# Patient Record
Sex: FEMALE | Race: WHITE | NOT HISPANIC OR LATINO | Employment: OTHER | URBAN - METROPOLITAN AREA
[De-identification: names, ages, dates, MRNs, and addresses within clinical notes are randomized per-mention and may not be internally consistent; named-entity substitution may affect disease eponyms.]

---

## 2017-05-19 ENCOUNTER — TRANSCRIBE ORDERS (OUTPATIENT)
Dept: LAB | Facility: CLINIC | Age: 82
End: 2017-05-19

## 2017-05-19 ENCOUNTER — APPOINTMENT (OUTPATIENT)
Dept: LAB | Facility: CLINIC | Age: 82
End: 2017-05-19
Payer: COMMERCIAL

## 2017-05-19 DIAGNOSIS — R60.0 LOCALIZED EDEMA: ICD-10-CM

## 2017-05-19 DIAGNOSIS — R53.83 FATIGUE, UNSPECIFIED TYPE: ICD-10-CM

## 2017-05-19 DIAGNOSIS — R53.83 FATIGUE, UNSPECIFIED TYPE: Primary | ICD-10-CM

## 2017-05-19 LAB
ALBUMIN SERPL BCP-MCNC: 3.5 G/DL (ref 3.5–5)
ALP SERPL-CCNC: 67 U/L (ref 46–116)
ALT SERPL W P-5'-P-CCNC: 16 U/L (ref 12–78)
ANION GAP SERPL CALCULATED.3IONS-SCNC: 7 MMOL/L (ref 4–13)
AST SERPL W P-5'-P-CCNC: 15 U/L (ref 5–45)
BILIRUB SERPL-MCNC: 0.8 MG/DL (ref 0.2–1)
BUN SERPL-MCNC: 23 MG/DL (ref 5–25)
CALCIUM SERPL-MCNC: 9.1 MG/DL (ref 8.3–10.1)
CHLORIDE SERPL-SCNC: 108 MMOL/L (ref 100–108)
CO2 SERPL-SCNC: 27 MMOL/L (ref 21–32)
CREAT SERPL-MCNC: 0.91 MG/DL (ref 0.6–1.3)
GFR SERPL CREATININE-BSD FRML MDRD: 57.8 ML/MIN/1.73SQ M
GLUCOSE P FAST SERPL-MCNC: 76 MG/DL (ref 65–99)
POTASSIUM SERPL-SCNC: 4.2 MMOL/L (ref 3.5–5.3)
PROT SERPL-MCNC: 6.7 G/DL (ref 6.4–8.2)
SODIUM SERPL-SCNC: 142 MMOL/L (ref 136–145)

## 2017-05-19 PROCEDURE — 36415 COLL VENOUS BLD VENIPUNCTURE: CPT

## 2017-05-19 PROCEDURE — 80053 COMPREHEN METABOLIC PANEL: CPT

## 2017-07-06 ENCOUNTER — APPOINTMENT (OUTPATIENT)
Dept: PHYSICAL THERAPY | Facility: REHABILITATION | Age: 82
End: 2017-07-06
Payer: COMMERCIAL

## 2017-07-06 PROCEDURE — 97162 PT EVAL MOD COMPLEX 30 MIN: CPT

## 2017-07-06 PROCEDURE — G8978 MOBILITY CURRENT STATUS: HCPCS | Performed by: PHYSICAL THERAPIST

## 2017-07-06 PROCEDURE — G8979 MOBILITY GOAL STATUS: HCPCS | Performed by: PHYSICAL THERAPIST

## 2017-07-18 ENCOUNTER — APPOINTMENT (OUTPATIENT)
Dept: PHYSICAL THERAPY | Facility: REHABILITATION | Age: 82
End: 2017-07-18
Payer: COMMERCIAL

## 2017-07-18 PROCEDURE — 97112 NEUROMUSCULAR REEDUCATION: CPT

## 2017-07-18 PROCEDURE — 97110 THERAPEUTIC EXERCISES: CPT

## 2017-07-18 PROCEDURE — 97140 MANUAL THERAPY 1/> REGIONS: CPT

## 2017-07-20 ENCOUNTER — APPOINTMENT (OUTPATIENT)
Dept: PHYSICAL THERAPY | Facility: REHABILITATION | Age: 82
End: 2017-07-20
Payer: COMMERCIAL

## 2017-07-24 ENCOUNTER — APPOINTMENT (OUTPATIENT)
Dept: PHYSICAL THERAPY | Facility: REHABILITATION | Age: 82
End: 2017-07-24
Payer: COMMERCIAL

## 2017-07-26 ENCOUNTER — APPOINTMENT (OUTPATIENT)
Dept: PHYSICAL THERAPY | Facility: REHABILITATION | Age: 82
End: 2017-07-26
Payer: COMMERCIAL

## 2017-07-26 PROCEDURE — 97110 THERAPEUTIC EXERCISES: CPT

## 2017-07-26 PROCEDURE — 97112 NEUROMUSCULAR REEDUCATION: CPT

## 2017-07-26 PROCEDURE — 97140 MANUAL THERAPY 1/> REGIONS: CPT

## 2017-07-31 ENCOUNTER — APPOINTMENT (OUTPATIENT)
Dept: PHYSICAL THERAPY | Facility: REHABILITATION | Age: 82
End: 2017-07-31
Payer: COMMERCIAL

## 2017-08-02 ENCOUNTER — APPOINTMENT (OUTPATIENT)
Dept: PHYSICAL THERAPY | Facility: REHABILITATION | Age: 82
End: 2017-08-02
Payer: COMMERCIAL

## 2017-08-02 PROCEDURE — 97140 MANUAL THERAPY 1/> REGIONS: CPT

## 2017-08-02 PROCEDURE — 97110 THERAPEUTIC EXERCISES: CPT

## 2017-08-02 PROCEDURE — 97112 NEUROMUSCULAR REEDUCATION: CPT

## 2017-08-07 ENCOUNTER — APPOINTMENT (OUTPATIENT)
Dept: PHYSICAL THERAPY | Facility: REHABILITATION | Age: 82
End: 2017-08-07
Payer: COMMERCIAL

## 2017-08-09 ENCOUNTER — APPOINTMENT (OUTPATIENT)
Dept: PHYSICAL THERAPY | Facility: REHABILITATION | Age: 82
End: 2017-08-09
Payer: COMMERCIAL

## 2017-08-09 PROCEDURE — 97110 THERAPEUTIC EXERCISES: CPT

## 2017-08-09 PROCEDURE — 97112 NEUROMUSCULAR REEDUCATION: CPT

## 2017-08-14 ENCOUNTER — APPOINTMENT (OUTPATIENT)
Dept: PHYSICAL THERAPY | Facility: REHABILITATION | Age: 82
End: 2017-08-14
Payer: COMMERCIAL

## 2017-08-14 PROCEDURE — 97112 NEUROMUSCULAR REEDUCATION: CPT

## 2017-08-14 PROCEDURE — 97110 THERAPEUTIC EXERCISES: CPT

## 2017-08-23 ENCOUNTER — APPOINTMENT (OUTPATIENT)
Dept: PHYSICAL THERAPY | Facility: REHABILITATION | Age: 82
End: 2017-08-23
Payer: COMMERCIAL

## 2017-08-23 PROCEDURE — 97112 NEUROMUSCULAR REEDUCATION: CPT

## 2017-08-29 ENCOUNTER — APPOINTMENT (OUTPATIENT)
Dept: PHYSICAL THERAPY | Facility: REHABILITATION | Age: 82
End: 2017-08-29
Payer: COMMERCIAL

## 2017-08-29 PROCEDURE — 97110 THERAPEUTIC EXERCISES: CPT

## 2017-08-29 PROCEDURE — 97112 NEUROMUSCULAR REEDUCATION: CPT

## 2017-09-13 ENCOUNTER — APPOINTMENT (OUTPATIENT)
Dept: PHYSICAL THERAPY | Facility: REHABILITATION | Age: 82
End: 2017-09-13
Payer: COMMERCIAL

## 2017-09-13 PROCEDURE — 97112 NEUROMUSCULAR REEDUCATION: CPT

## 2017-09-13 PROCEDURE — 97110 THERAPEUTIC EXERCISES: CPT

## 2017-09-26 ENCOUNTER — APPOINTMENT (OUTPATIENT)
Dept: PHYSICAL THERAPY | Facility: REHABILITATION | Age: 82
End: 2017-09-26
Payer: COMMERCIAL

## 2018-12-03 ENCOUNTER — APPOINTMENT (EMERGENCY)
Dept: RADIOLOGY | Facility: HOSPITAL | Age: 83
End: 2018-12-03
Payer: COMMERCIAL

## 2018-12-03 ENCOUNTER — HOSPITAL ENCOUNTER (OUTPATIENT)
Facility: HOSPITAL | Age: 83
Setting detail: OBSERVATION
Discharge: NURSING, REHAB OR LONG-TERM CARE | End: 2018-12-05
Attending: EMERGENCY MEDICINE | Admitting: INTERNAL MEDICINE
Payer: COMMERCIAL

## 2018-12-03 DIAGNOSIS — R06.00 DYSPNEA: ICD-10-CM

## 2018-12-03 DIAGNOSIS — N39.0 UTI (URINARY TRACT INFECTION): ICD-10-CM

## 2018-12-03 DIAGNOSIS — R07.9 CHEST PAIN: Primary | ICD-10-CM

## 2018-12-03 DIAGNOSIS — I16.0 HYPERTENSIVE URGENCY: ICD-10-CM

## 2018-12-03 PROBLEM — R07.89 ATYPICAL CHEST PAIN: Status: ACTIVE | Noted: 2018-12-03

## 2018-12-03 PROBLEM — G62.9 NEUROPATHY: Status: ACTIVE | Noted: 2018-12-03

## 2018-12-03 LAB
ABO GROUP BLD: NORMAL
ALBUMIN SERPL BCP-MCNC: 3.7 G/DL (ref 3.5–5)
ALP SERPL-CCNC: 76 U/L (ref 46–116)
ALT SERPL W P-5'-P-CCNC: 18 U/L (ref 12–78)
ANION GAP BLD CALC-SCNC: 15 MMOL/L (ref 4–13)
ANION GAP SERPL CALCULATED.3IONS-SCNC: 7 MMOL/L (ref 4–13)
AST SERPL W P-5'-P-CCNC: 12 U/L (ref 5–45)
ATRIAL RATE: 416 BPM
ATRIAL RATE: 62 BPM
ATRIAL RATE: 76 BPM
BACTERIA UR QL AUTO: ABNORMAL /HPF
BASOPHILS # BLD AUTO: 0.03 THOUSANDS/ΜL (ref 0–0.1)
BASOPHILS NFR BLD AUTO: 0 % (ref 0–1)
BILIRUB SERPL-MCNC: 0.59 MG/DL (ref 0.2–1)
BILIRUB UR QL STRIP: NEGATIVE
BLD GP AB SCN SERPL QL: NEGATIVE
BUN BLD-MCNC: 23 MG/DL (ref 5–25)
BUN SERPL-MCNC: 18 MG/DL (ref 5–25)
CA-I BLD-SCNC: 1.06 MMOL/L (ref 1.12–1.32)
CALCIUM SERPL-MCNC: 8.6 MG/DL (ref 8.3–10.1)
CHLORIDE BLD-SCNC: 102 MMOL/L (ref 100–108)
CHLORIDE SERPL-SCNC: 105 MMOL/L (ref 100–108)
CLARITY UR: ABNORMAL
CO2 SERPL-SCNC: 28 MMOL/L (ref 21–32)
COLOR UR: YELLOW
CREAT BLD-MCNC: 0.9 MG/DL (ref 0.6–1.3)
CREAT SERPL-MCNC: 1 MG/DL (ref 0.6–1.3)
EOSINOPHIL # BLD AUTO: 0.13 THOUSAND/ΜL (ref 0–0.61)
EOSINOPHIL NFR BLD AUTO: 2 % (ref 0–6)
ERYTHROCYTE [DISTWIDTH] IN BLOOD BY AUTOMATED COUNT: 12.8 % (ref 11.6–15.1)
GFR SERPL CREATININE-BSD FRML MDRD: 49 ML/MIN/1.73SQ M
GFR SERPL CREATININE-BSD FRML MDRD: 55 ML/MIN/1.73SQ M
GLUCOSE SERPL-MCNC: 125 MG/DL (ref 65–140)
GLUCOSE SERPL-MCNC: 126 MG/DL (ref 65–140)
GLUCOSE UR STRIP-MCNC: NEGATIVE MG/DL
HCT VFR BLD AUTO: 46.2 % (ref 34.8–46.1)
HCT VFR BLD CALC: 44 % (ref 34.8–46.1)
HGB BLD-MCNC: 14.8 G/DL (ref 11.5–15.4)
HGB BLDA-MCNC: 15 G/DL (ref 11.5–15.4)
HGB UR QL STRIP.AUTO: ABNORMAL
HYALINE CASTS #/AREA URNS LPF: ABNORMAL /LPF
IMM GRANULOCYTES # BLD AUTO: 0.03 THOUSAND/UL (ref 0–0.2)
IMM GRANULOCYTES NFR BLD AUTO: 0 % (ref 0–2)
KETONES UR STRIP-MCNC: NEGATIVE MG/DL
LEUKOCYTE ESTERASE UR QL STRIP: ABNORMAL
LYMPHOCYTES # BLD AUTO: 1.75 THOUSANDS/ΜL (ref 0.6–4.47)
LYMPHOCYTES NFR BLD AUTO: 22 % (ref 14–44)
MCH RBC QN AUTO: 31.8 PG (ref 26.8–34.3)
MCHC RBC AUTO-ENTMCNC: 32 G/DL (ref 31.4–37.4)
MCV RBC AUTO: 99 FL (ref 82–98)
MONOCYTES # BLD AUTO: 0.64 THOUSAND/ΜL (ref 0.17–1.22)
MONOCYTES NFR BLD AUTO: 8 % (ref 4–12)
NEUTROPHILS # BLD AUTO: 5.53 THOUSANDS/ΜL (ref 1.85–7.62)
NEUTS SEG NFR BLD AUTO: 68 % (ref 43–75)
NITRITE UR QL STRIP: POSITIVE
NON-SQ EPI CELLS URNS QL MICRO: ABNORMAL /HPF
NRBC BLD AUTO-RTO: 0 /100 WBCS
P AXIS: 72 DEGREES
P AXIS: 85 DEGREES
PCO2 BLD: 29 MMOL/L (ref 21–32)
PH UR STRIP.AUTO: 6 [PH] (ref 4.5–8)
PLATELET # BLD AUTO: 173 THOUSANDS/UL (ref 149–390)
PMV BLD AUTO: 9.7 FL (ref 8.9–12.7)
POTASSIUM BLD-SCNC: 3.9 MMOL/L (ref 3.5–5.3)
POTASSIUM SERPL-SCNC: 3.5 MMOL/L (ref 3.5–5.3)
PR INTERVAL: 178 MS
PR INTERVAL: 188 MS
PROT SERPL-MCNC: 7.2 G/DL (ref 6.4–8.2)
PROT UR STRIP-MCNC: NEGATIVE MG/DL
QRS AXIS: -25 DEGREES
QRS AXIS: -43 DEGREES
QRS AXIS: -76 DEGREES
QRSD INTERVAL: 122 MS
QRSD INTERVAL: 132 MS
QRSD INTERVAL: 138 MS
QT INTERVAL: 300 MS
QT INTERVAL: 400 MS
QT INTERVAL: 444 MS
QTC INTERVAL: 450 MS
QTC INTERVAL: 450 MS
QTC INTERVAL: 466 MS
RBC # BLD AUTO: 4.66 MILLION/UL (ref 3.81–5.12)
RBC #/AREA URNS AUTO: ABNORMAL /HPF
RH BLD: POSITIVE
SODIUM BLD-SCNC: 140 MMOL/L (ref 136–145)
SODIUM SERPL-SCNC: 140 MMOL/L (ref 136–145)
SP GR UR STRIP.AUTO: 1.03 (ref 1–1.03)
SPECIMEN EXPIRATION DATE: NORMAL
SPECIMEN SOURCE: ABNORMAL
T WAVE AXIS: -88 DEGREES
T WAVE AXIS: 39 DEGREES
T WAVE AXIS: 71 DEGREES
TROPONIN I SERPL-MCNC: <0.02 NG/ML
UROBILINOGEN UR QL STRIP.AUTO: 1 E.U./DL
VENTRICULAR RATE: 145 BPM
VENTRICULAR RATE: 62 BPM
VENTRICULAR RATE: 76 BPM
WBC # BLD AUTO: 8.11 THOUSAND/UL (ref 4.31–10.16)
WBC #/AREA URNS AUTO: ABNORMAL /HPF

## 2018-12-03 PROCEDURE — 87077 CULTURE AEROBIC IDENTIFY: CPT | Performed by: INTERNAL MEDICINE

## 2018-12-03 PROCEDURE — 80047 BASIC METABLC PNL IONIZED CA: CPT

## 2018-12-03 PROCEDURE — 85025 COMPLETE CBC W/AUTO DIFF WBC: CPT | Performed by: EMERGENCY MEDICINE

## 2018-12-03 PROCEDURE — 96374 THER/PROPH/DIAG INJ IV PUSH: CPT

## 2018-12-03 PROCEDURE — 93010 ELECTROCARDIOGRAM REPORT: CPT | Performed by: INTERNAL MEDICINE

## 2018-12-03 PROCEDURE — 93005 ELECTROCARDIOGRAM TRACING: CPT

## 2018-12-03 PROCEDURE — 87086 URINE CULTURE/COLONY COUNT: CPT | Performed by: INTERNAL MEDICINE

## 2018-12-03 PROCEDURE — 71275 CT ANGIOGRAPHY CHEST: CPT

## 2018-12-03 PROCEDURE — 86850 RBC ANTIBODY SCREEN: CPT | Performed by: EMERGENCY MEDICINE

## 2018-12-03 PROCEDURE — 36415 COLL VENOUS BLD VENIPUNCTURE: CPT

## 2018-12-03 PROCEDURE — 99285 EMERGENCY DEPT VISIT HI MDM: CPT

## 2018-12-03 PROCEDURE — 80053 COMPREHEN METABOLIC PANEL: CPT | Performed by: EMERGENCY MEDICINE

## 2018-12-03 PROCEDURE — 84484 ASSAY OF TROPONIN QUANT: CPT | Performed by: INTERNAL MEDICINE

## 2018-12-03 PROCEDURE — 74175 CTA ABDOMEN W/CONTRAST: CPT

## 2018-12-03 PROCEDURE — 84484 ASSAY OF TROPONIN QUANT: CPT | Performed by: EMERGENCY MEDICINE

## 2018-12-03 PROCEDURE — 99220 PR INITIAL OBSERVATION CARE/DAY 70 MINUTES: CPT | Performed by: INTERNAL MEDICINE

## 2018-12-03 PROCEDURE — 86900 BLOOD TYPING SEROLOGIC ABO: CPT | Performed by: EMERGENCY MEDICINE

## 2018-12-03 PROCEDURE — 85014 HEMATOCRIT: CPT

## 2018-12-03 PROCEDURE — 87186 SC STD MICRODIL/AGAR DIL: CPT | Performed by: INTERNAL MEDICINE

## 2018-12-03 PROCEDURE — 81001 URINALYSIS AUTO W/SCOPE: CPT | Performed by: INTERNAL MEDICINE

## 2018-12-03 PROCEDURE — 86901 BLOOD TYPING SEROLOGIC RH(D): CPT | Performed by: EMERGENCY MEDICINE

## 2018-12-03 RX ORDER — ASPIRIN 81 MG/1
243 TABLET, CHEWABLE ORAL ONCE
Status: COMPLETED | OUTPATIENT
Start: 2018-12-03 | End: 2018-12-03

## 2018-12-03 RX ORDER — MAGNESIUM HYDROXIDE/ALUMINUM HYDROXICE/SIMETHICONE 120; 1200; 1200 MG/30ML; MG/30ML; MG/30ML
15 SUSPENSION ORAL EVERY 4 HOURS PRN
Status: DISCONTINUED | OUTPATIENT
Start: 2018-12-03 | End: 2018-12-05 | Stop reason: HOSPADM

## 2018-12-03 RX ORDER — GABAPENTIN 100 MG/1
100 CAPSULE ORAL 2 TIMES DAILY
Status: DISCONTINUED | OUTPATIENT
Start: 2018-12-03 | End: 2018-12-05 | Stop reason: HOSPADM

## 2018-12-03 RX ORDER — NITROGLYCERIN 0.4 MG/1
0.4 TABLET SUBLINGUAL
Status: DISCONTINUED | OUTPATIENT
Start: 2018-12-03 | End: 2018-12-05 | Stop reason: HOSPADM

## 2018-12-03 RX ORDER — LABETALOL HYDROCHLORIDE 5 MG/ML
5 INJECTION, SOLUTION INTRAVENOUS ONCE
Status: DISCONTINUED | OUTPATIENT
Start: 2018-12-03 | End: 2018-12-03

## 2018-12-03 RX ORDER — GABAPENTIN 300 MG/1
300 CAPSULE ORAL 2 TIMES DAILY
COMMUNITY
End: 2020-09-20 | Stop reason: SDUPTHER

## 2018-12-03 RX ORDER — MAGNESIUM HYDROXIDE/ALUMINUM HYDROXICE/SIMETHICONE 120; 1200; 1200 MG/30ML; MG/30ML; MG/30ML
30 SUSPENSION ORAL EVERY 4 HOURS PRN
Status: DISCONTINUED | OUTPATIENT
Start: 2018-12-03 | End: 2018-12-03

## 2018-12-03 RX ORDER — LABETALOL HYDROCHLORIDE 5 MG/ML
10 INJECTION, SOLUTION INTRAVENOUS EVERY 8 HOURS PRN
Status: DISCONTINUED | OUTPATIENT
Start: 2018-12-03 | End: 2018-12-05 | Stop reason: HOSPADM

## 2018-12-03 RX ORDER — NITROGLYCERIN 0.4 MG/1
0.4 TABLET SUBLINGUAL ONCE
Status: COMPLETED | OUTPATIENT
Start: 2018-12-03 | End: 2018-12-03

## 2018-12-03 RX ORDER — LISINOPRIL 10 MG/1
10 TABLET ORAL DAILY
Status: DISCONTINUED | OUTPATIENT
Start: 2018-12-03 | End: 2018-12-04

## 2018-12-03 RX ORDER — ACETAMINOPHEN 325 MG/1
650 TABLET ORAL EVERY 6 HOURS PRN
Status: DISCONTINUED | OUTPATIENT
Start: 2018-12-03 | End: 2018-12-05 | Stop reason: HOSPADM

## 2018-12-03 RX ORDER — FENTANYL CITRATE 50 UG/ML
25 INJECTION, SOLUTION INTRAMUSCULAR; INTRAVENOUS ONCE
Status: COMPLETED | OUTPATIENT
Start: 2018-12-03 | End: 2018-12-03

## 2018-12-03 RX ADMIN — ACETAMINOPHEN 650 MG: 325 TABLET, FILM COATED ORAL at 15:19

## 2018-12-03 RX ADMIN — LISINOPRIL 10 MG: 10 TABLET ORAL at 16:30

## 2018-12-03 RX ADMIN — GABAPENTIN 100 MG: 100 CAPSULE ORAL at 09:14

## 2018-12-03 RX ADMIN — ENOXAPARIN SODIUM 40 MG: 40 INJECTION SUBCUTANEOUS at 08:36

## 2018-12-03 RX ADMIN — ASPIRIN 81 MG 243 MG: 81 TABLET ORAL at 06:52

## 2018-12-03 RX ADMIN — SODIUM CHLORIDE 250 ML: 0.9 INJECTION, SOLUTION INTRAVENOUS at 04:19

## 2018-12-03 RX ADMIN — FENTANYL CITRATE 25 MCG: 50 INJECTION, SOLUTION INTRAMUSCULAR; INTRAVENOUS at 04:55

## 2018-12-03 RX ADMIN — NITROGLYCERIN 0.4 MG: 0.4 TABLET SUBLINGUAL at 06:53

## 2018-12-03 RX ADMIN — GABAPENTIN 100 MG: 100 CAPSULE ORAL at 17:16

## 2018-12-03 RX ADMIN — IOHEXOL 100 ML: 350 INJECTION, SOLUTION INTRAVENOUS at 05:15

## 2018-12-03 NOTE — MEDICAL STUDENT
Assessment/Plan     Principal Problem:    Atypical chest pain  Active Problems:    Hypertensive urgency    Neuropathy    1  Chest pain  -Differential includes ACS, musculoskeletal, GERD, hypertensive urgency, PE, aortic dissection   -Chest pain is likely atypical as it is not related to exertion, exacerbated by deep breaths and positional changes, radiates to the back, and is not relieved with rest   -Patient's pain was reproducible on exam making musculoskeletal most likely  -3/3 troponins have been <0 02   -ECG shows right bundle branch block with left axis deviation; no signs of ischemic changes  -ACS less likely due to atypical nature of chest pain, negative troponins and ECG findings  -CTA showed no evidence of aortic dissection or any acute abnormality consistent with the patient's presentation including PE   -Patient got one dose of fentayl citrate 25 mcg in the ED for her pain  2  Hypertension   -Patient got one dose of nitroglycerine 0 4 mg in the ED which may be responsible for temporary decrease of patient's blood pressures   -Lisinopril 10 mg QD for blood pressure  3  Fever   -Patient was febrile this afternoon to 100 7   -Consider UTI in the context of patient's reported post-void abdominal pain  -Get urinalysis to check for presence of infection  4  Neuropathy   -Continue patient's home gabapentin 100 mg BID  Anna Shone is an 80 y o  female with a past medical history of neuropathy who presented to the ED with complaints of chest pain  She was brought here by her son who is present during the interview  She does not currently have the pain  She states her chest pain started at 1:30 am this morning and woke her from sleep  It is a pressure type of sensation located in the center of her chest from the top of her sternum to the start of the epigastric area  It radiates to the back in the same distribution  She states its worse with leaning forward and with deep breaths  She tried to lay down and take an aspirin to relieve the pain, neither of which helped  She denies any associated symptoms including lightheadedness, diaphoresis, syncope, SOB, hoarseness, cough, chest pain, palpitations, nausea, vomiting, pain of shoulder or arms  She has never had chest pain or this sensation before  She last ate peaches at 5 pm yesterday and went to bed at 11:30 pm  She is a former smoker who quit > 25 years ago  She smoked less than 1 pack per day  She currently drinks 1 glass of wine per night and occasionally has an orange juice with vodka  She does not use recreational drugs  In the ED she was noted to have an elevated blood pressure to 220/96  She has never been diagnosed with high blood pressure  She sees Dr Norma Montano in [facility] regularly and states her blood pressures have always been normal with him  She takes gabapentin for neuropathy of the lower extremities which was diagnosed 6 years ago  She also states she takes a water pill for bilateral ankle edema  History reviewed  No pertinent surgical history  History reviewed  No pertinent family history  Review of Systems   Constitutional: Negative for activity change, appetite change, chills, diaphoresis, fatigue, fever and unexpected weight change  HENT: Negative for hearing loss, nosebleeds, tinnitus and voice change  Eyes: Negative for visual disturbance  Respiratory: Negative for cough and shortness of breath  Cardiovascular: Positive for chest pain and leg swelling  Negative for palpitations  Gastrointestinal: Negative for abdominal pain, constipation, diarrhea, nausea and vomiting  Genitourinary: Positive for dysuria (describes abdominal pain after urinating)  Neurological: Negative for syncope, weakness, light-headedness, numbness and headaches       Objective     /71 (BP Location: Left arm)   Pulse 93   Temp 99 1 °F (37 3 °C) (Oral)   Resp 20   Ht 5' 6" (1 676 m)   Wt 63 2 kg (139 lb 5 3 oz) SpO2 95%   BMI 22 49 kg/m²     Physical Exam   Constitutional: She is oriented to person, place, and time  She appears well-developed and well-nourished  No distress  Nasal cannula in place  HENT:   Head: Normocephalic and atraumatic  Eyes: Conjunctivae and EOM are normal    Neck: Normal range of motion  Cardiovascular: Normal rate, regular rhythm and normal heart sounds  Exam reveals no gallop and no friction rub  No murmur heard  Pulmonary/Chest: Effort normal and breath sounds normal  No respiratory distress  She has no wheezes  She has no rales  She exhibits tenderness  Abdominal: Soft  Bowel sounds are normal  She exhibits no distension and no mass  There is no tenderness  There is no guarding  Musculoskeletal: She exhibits edema (2+ bilateral pitting lower extremity edema)  She exhibits no tenderness or deformity  Neurological: She is alert and oriented to person, place, and time  Skin: Skin is warm and dry  She is not diaphoretic  Laboratory Studies:  Lab Results   Component Value Date    WBC 8 11 12/03/2018    HGB 15 0 12/03/2018    HCT 44 12/03/2018    MCV 99 (H) 12/03/2018     12/03/2018     Lab Results   Component Value Date    K 3 5 12/03/2018     12/03/2018    CO2 29 12/03/2018    BUN 18 12/03/2018    CREATININE 1 00 12/03/2018    GLUCOSE 125 12/03/2018    CALCIUM 8 6 12/03/2018    AST 12 12/03/2018    ALT 18 12/03/2018    ALKPHOS 76 12/03/2018    EGFR 55 12/03/2018     Imaging Studies:  CTA dissection protocol chest and abdomen   Final Result by Frances Matthews MD (12/03 0550)      No evidence of aortic dissection or other acute abnormality to account for the patient's symptoms  Diverticulosis coli                 Workstation performed: [id number]

## 2018-12-03 NOTE — H&P
INTERNAL MEDICINE HISTORY AND PHYSICAL  ED 05 SOD Team C     NAME: Jasson Parson  AGE: 80 y o  SEX: female  : 1925   MRN: [id number]  ENCOUNTER: [id number]    DATE: 12/3/2018  TIME: 7:47 AM    Primary Care Physician: Syeda Shine DO  Admitting Provider: Maldonado Bowen MD    Chief complaint: Chest Pain    History of Present Illness     Jasson Parson is a 80 y o  female with past medical history of neuropathy for move evaluation of chest pain  Patient states chest pain began approximately 0130 in the morning and woke her up from sleep  She says the chest pain lasted for several hours and she finally told her son to take to the hospital   The chest pain is substernal in area and radiates down towards her abdomen  She says taking deep breaths in toward the end of expiration is when the pain worsens  There is no positional change in severity with chest pain  There is no alleviating or aggravating factors  The chest pains is not associated with any shortness of breath, cough, lower extremity swelling, fevers, symptoms of orthopnea, paroxysmal nocturnal dyspnea or exertional dyspnea  Patient ambulates with a walker has no difficulty ambulating  Denies any fatigue, weakness, appetite change, abdominal swelling  Patient does not recall any history of heartburn  She also states she does not take anything for high blood pressure  Nitroglycerin helped relieve some of the pain however it is still there during my interview  Patient has never had this happen before or any similar events  Otherwise rest review of system was unremarkable  Review of Systems   Review of Systems   Constitutional: Negative for activity change, chills, fatigue and fever  Eyes: Negative for visual disturbance  Respiratory: Positive for chest tightness  Negative for cough and shortness of breath  Cardiovascular: Negative for chest pain and leg swelling     Gastrointestinal: Negative for abdominal pain, constipation, diarrhea, nausea and vomiting  Endocrine: Negative for cold intolerance and heat intolerance  Genitourinary: Negative for difficulty urinating  Musculoskeletal: Negative for gait problem  Skin: Negative for rash  Allergic/Immunologic: Negative  Neurological: Negative  Negative for dizziness, weakness and light-headedness  Hematological: Negative  Psychiatric/Behavioral: Negative  All other systems reviewed and are negative  Past Medical History     Past Medical History:   Diagnosis Date    Neuropathy        Past Surgical History   History reviewed  No pertinent surgical history  Social History     History   Alcohol Use No     History   Drug Use No     History   Smoking Status    Never Smoker   Smokeless Tobacco    Never Used       Family History   History reviewed  No pertinent family history  Medications Prior to Admission     Prior to Admission medications    Medication Sig Start Date End Date Taking? Authorizing Provider   gabapentin (NEURONTIN) 300 mg capsule Take 100 mg by mouth 2 (two) times a day   Yes Historical Provider, MD       Allergies   No Known Allergies    Objective     Vitals:    12/03/18 0500 12/03/18 0650 12/03/18 0715 12/03/18 0730   BP: (!) 188/77 (!) 196/84 160/75 159/76   BP Location: Right arm Right arm     Pulse: 82 94 96 92   Resp: 15 20 18 16   Temp:       TempSrc:       SpO2: 97% 98% 98% 96%   Weight:       Height:         Body mass index is 19 37 kg/m²  Intake/Output Summary (Last 24 hours) at 12/03/18 0747  Last data filed at 12/03/18 0445   Gross per 24 hour   Intake              250 ml   Output                0 ml   Net              250 ml     Invasive Devices     Peripheral Intravenous Line            Peripheral IV 12/03/18 Left Antecubital less than 1 day    Peripheral IV 12/03/18 Right Antecubital less than 1 day                Physical Exam  GENERAL: Cachectic elderly female in no acute distress   HEENT: Normocephalic and atraumatic   No scleral icterus  PERRLA  EOMI B/L  No oropharyngeal edema  MM moist    NECK: Neck supple with no lymphadenopathy  Trachea midline  No JVD  CARDIOVASCULAR: S1 and S2 are present  Regular rate and rhythm  No murmurs, rubs, or gallops  RESPIRATORY: CTA B/L, no rales, rhonci or wheezes  Normal respiratory expansion  ABDOMINAL: Bowel sounds present in all 4 quadrants, non-tender, soft, non-distended  No organomegaly, rebound, or guarding  EXTREMITIES: 2+ DP and PT pulses bilaterally; no cyanosis, clubbing, edema  ROM intact  DEL REAL x4   MUSCULOSKELETAL: No joint tenderness, deformity or swelling, full range of motion without pain  NEUROLOGIC: Patient is alert and oriented to person, place, and time  No sensory or motor deficits  CN 2-12 intact  Plantars downgoing bilaterally  Speech fluent  SKIN: Skin is warm and dry  No skin lesions are present  No rashes  PSYCHIATRIC: Normal mood and affect     Lab Results: I have personally reviewed pertinent reports      CBC:   Results from last 7 days  Lab Units 12/03/18  0359 12/03/18  0341   WBC Thousand/uL  --  8 11   RBC Million/uL  --  4 66   HEMOGLOBIN g/dL  --  14 8   I STAT HEMOGLOBIN g/dl 15 0  --    HEMATOCRIT %  --  46 2*   HEMATOCRIT, ISTAT % 44  --    MCV fL  --  99*   MCH pg  --  31 8   MCHC g/dL  --  32 0   RDW %  --  12 8   MPV fL  --  9 7   PLATELETS Thousands/uL  --  173   NRBC AUTO /100 WBCs  --  0   NEUTROS PCT %  --  68   LYMPHS PCT %  --  22   MONOS PCT %  --  8   EOS PCT %  --  2   BASOS PCT %  --  0   NEUTROS ABS Thousands/µL  --  5 53   LYMPHS ABS Thousands/µL  --  1 75   MONOS ABS Thousand/µL  --  0 64   EOS ABS Thousand/µL  --  0 13   , Chemistry Profile:   Results from last 7 days  Lab Units 12/03/18  0359 12/03/18  0341   POTASSIUM mmol/L  --  3 5   CHLORIDE mmol/L  --  105   CO2 mmol/L  --  28   CO2, I-STAT mmol/L 29  --    BUN mg/dL  --  18   CREATININE mg/dL  --  1 00   GLUCOSE, ISTAT mg/dl 125  --    CALCIUM mg/dL  --  8 6   AST U/L --  12   ALT U/L  --  18   ALK PHOS U/L  --  76   EGFR ml/min/1 73sq m 55 49   , Coagulation Studies:   , Cardiac Studies:   Results from last 7 days  Lab Units 12/03/18  0824   TROPONIN I ng/mL <0 02   , Additional Labs:   , iSTAT CHEM 8:   Results from last 7 days  Lab Units 12/03/18  0359   SODIUM, I-STAT mmol/l 140   AGAP mmol/L 15*   BUN, I-STAT mg/dl 23   CREATININE, I-STAT mg/dl 0 9   EGFR ml/min/1 73sq m 55   GLUCOSE, ISTAT mg/dl 125   I STAT HEMOGLOBIN g/dl 15 0   , ABG:   , Toxicology:   , Last A1C/Lipid Panel/Thyroid Panel: No results found for: HGBA1C, TRIG, CHOL, HDL, LDLCALC, [id number], T3FREE, L8KMWFC, FREET4    Imaging: I have personally reviewed pertinent films in PACS  Cta Dissection Protocol Chest And Abdomen    Result Date: 12/3/2018  Narrative: CTA - CHEST AND ABDOMEN  - WITHOUT AND WITH IV CONTRAST INDICATION:   chest pain radiating to back with elevated BP  COMPARISON:  None  TECHNIQUE: CT examination of the chest and abdomen was performed both prior to and after the administration of intravenous contrast   Thin section angiographic arterial phase post contrast technique was used in order to evaluate for aortic dissection  3D reformatted images and volume rendering were performed on an independent workstation  Additionally, axial, sagittal, and coronal 2D reformatted images were created from the source data and submitted for interpretation  Radiation dose length product (DLP) for this visit:  780 51 mGy-cm   This examination, like all CT scans performed in the [facility], was performed utilizing techniques to minimize radiation dose exposure, including the use of iterative  reconstruction and automated exposure control  IV Contrast:  100 mL of iohexol (OMNIPAQUE) Enteric Contrast: Enteric contrast was not administered  FINDINGS: AORTA: There is no aortic dissection or intramural hematoma  There is no aortic aneurysm    There is extensive atherosclerotic calcification of the thoracic and abdominal aorta and branches  CHEST LUNGS:  There is patchy bibasilar atelectasis  Right lower lobe 8 mm nodule with fat attenuation, consistent with a benign nodule  There is no tracheal or endobronchial lesion  PLEURA:  Unremarkable  HEART/GREAT VESSELS:  Heart is enlarged  Atherosclerotic changes are noted in thoracic aorta and coronary arteries  MEDIASTINUM AND SANCHEZ:  Unremarkable  CHEST WALL AND LOWER NECK:   Unremarkable  ABDOMEN LIVER/BILIARY TREE:  Unremarkable  GALLBLADDER:  No calcified gallstones  No pericholecystic inflammatory change  SPLEEN:  Unremarkable  PANCREAS:  Unremarkable  ADRENAL GLANDS:  Unremarkable  KIDNEYS/URETERS:  There are several renal cysts, largest in the midportion of the right kidney measuring 3 9 cm  No hydronephrosis  VISUALIZED STOMACH, BOWEL AND APPENDIX:  Extensive colonic diverticulosis without imaging findings of diverticulitis  No bowel obstruction  VISUALIZED ABDOMINOPELVIC CAVITY:  No ascites or free intraperitoneal air  No lymphadenopathy  ABDOMINAL WALL:  Unremarkable  OSSEOUS STRUCTURES:  There are age appropriate degenerative changes  No acute fracture or destructive osseous lesion  Impression: No evidence of aortic dissection or other acute abnormality to account for the patient's symptoms  Diverticulosis coli  Workstation performed: [id number]         Urinalysis:       Invalid input(s): URIBILINOGEN     Urine Micro:        EKG, Pathology, and Other Studies: I have personally reviewed pertinent reports        Medications given in Emergency Department     Medication Administration - last 24 hours from 12/02/2018 0747 to 12/03/2018 0747       Date/Time Order Dose Route Action Action by     12/03/2018 0445 sodium chloride 0 9 % bolus 250 mL 0 mL Intravenous Stopped Diana Chinchilla RN     12/03/2018 0419 sodium chloride 0 9 % bolus 250 mL 250 mL Intravenous Tony 37 Diana Chinchilla RN     12/03/2018 0500 labetalol (NORMODYNE) injection 5 mg 0 mg Intravenous Hold Eva Lilly RN     12/03/2018 0455 fentanyl citrate (PF) 100 MCG/2ML 25 mcg 25 mcg Intravenous Given Eva Lilly RN     12/03/2018 0515 iohexol (OMNIPAQUE) 350 MG/ML injection (MULTI-DOSE) 100 mL 100 mL Intravenous Given Venetta Saliva     12/03/2018 5346 nitroglycerin (NITROSTAT) SL tablet 0 4 mg 0 4 mg Sublingual Given Eva Lilly RN     12/03/2018 2950 aspirin chewable tablet 243 mg 243 mg Oral Given Eva Lilly RN          Assessment and Plan       Atypical Chest Pain  · Will admit under observation for ACS rule out  However chest pain sounds very atypical in nature  Chest pain is constant not related to any exertional symptoms  There is no relieving qualities to chest pain and stress related by deep breath in  Also location of chest pain is very atypical in the lower sternal area with radiation toward the abdomen  · Troponin x1 has been negative  Patient was given aspirin 243 mg once as well as a 250 cc bolus normal saline  · EKG shows old T-wave changes  · CTA ruled out any pulmonary embolus, dissection  There was mild cardiomegaly with atherosclerotic changes in the thoracic abdominal aorta  · Will trend troponins x3  · Will give Mylanta, viscous lidocaine for any heartburn-like symptoms  Hypertensive urgency  · Blood pressure on admission was 220/96  · Patient was given nitroglycerin 0 25 mg sublingual and resolved elevated blood pressure  · Patient might need to be discharged with blood pressure medications      Right lower lobe nodule  · Per CTA on admission  Consistent with benign nodule  Code Status: Level 1 - Full Code  VTE Pharmacologic Prophylaxis: Enoxaparin (Lovenox)   VTE Mechanical Prophylaxis: sequential compression device  Admission Status: OBSERVATION    Admission Time  I spent 30 minutes admitting the patient    This involved direct patient contact where I performed a full history and physical, reviewing previous records, and reviewing laboratory and other diagnostic studies      Lavon Trent MD  Internal Medicine  PGY-2

## 2018-12-03 NOTE — ED PROVIDER NOTES
History  Chief Complaint   Patient presents with    Chest Pain     Patient c/o CP beginning around 0130  Patient reports, "I thought they were going to go away so I waited, but the pain just kept getting worse"     81 yo F presents emergency department with son for evaluation of acute onset sharp/stabbing midsternal chest pain that radiates to upper back between shoulder blades with associated pressure sensation and pain with inspiration that awoke her from sleep at 1:30 a m  This morning  Patient denies any trauma/injury, headache, vision changes, dizziness/lightheadedness/syncope, neck pain/stiffness, dyspnea, diaphoresis, palpitations, cough/cold symptoms, fever/chills, abdominal pain, nausea/vomiting/diarrhea, urinary symptoms, rash, blood in stool or focal neuro deficits  Patient does have baseline bilateral lower extremity swelling which she states that she occasionally takes a diuretic, denies any diuretic use at this time, denies any excessive weight gain  Patient has past medical history of neuropathy which she takes gabapentin for, denies any additional daily medications, no significant past surgical history  Patient is a nonsmoker, denies any EtOH or recreational drug use  No recent travel or known sick contacts  No history of similar events  Pt took one 81 mg ASA prior to arrival, no other interventions prior to arrival, no other complaints at this time  MDM:  Acute onset chest pain radiating to upper back with elevated blood pressures (no hx of HTN), will obtain cardiac workup and CTA dissection study, analgesics and likely admit for further evaluation                      Prior to Admission Medications   Prescriptions Last Dose Informant Patient Reported? Taking?   gabapentin (NEURONTIN) 300 mg capsule   Yes Yes   Sig: Take 100 mg by mouth 2 (two) times a day      Facility-Administered Medications: None       Past Medical History:   Diagnosis Date    Neuropathy        History reviewed   No pertinent surgical history  History reviewed  No pertinent family history  I have reviewed and agree with the history as documented  Social History   Substance Use Topics    Smoking status: Never Smoker    Smokeless tobacco: Never Used    Alcohol use No      Comment: socially        Review of Systems   Constitutional: Negative for chills, fatigue and fever  HENT: Negative for congestion, rhinorrhea and sore throat  Eyes: Negative for pain, redness and visual disturbance  Respiratory: Negative for cough, chest tightness, wheezing and stridor  Shortness of breath: pain with inspiration  Cardiovascular: Positive for chest pain and leg swelling  Negative for palpitations  Gastrointestinal: Negative for abdominal pain, blood in stool, constipation, diarrhea, nausea and vomiting  Genitourinary: Negative for dysuria, frequency, hematuria and urgency  Musculoskeletal: Positive for back pain  Negative for neck pain  Skin: Negative for pallor and rash  Neurological: Negative for dizziness, seizures, syncope, weakness, light-headedness and headaches  Numbness: baseline LE neuropathy  Psychiatric/Behavioral: Negative for agitation and confusion  Physical Exam  ED Triage Vitals [12/03/18 0335]   Temperature Pulse Respirations Blood Pressure SpO2   97 9 °F (36 6 °C) 62 20 (!) 220/96 96 %      Temp Source Heart Rate Source Patient Position - Orthostatic VS BP Location FiO2 (%)   Tympanic Monitor Lying Right arm --      Pain Score       8           Orthostatic Vital Signs  Vitals:    12/03/18 2205 12/04/18 0744 12/04/18 1524 12/04/18 2147   BP: (!) 101/47 138/61 127/64 136/80   Pulse: 70 86 79 65   Patient Position - Orthostatic VS: Lying Sitting Sitting Lying       Physical Exam   Constitutional: She is oriented to person, place, and time  She appears well-developed and well-nourished  No distress  HENT:   Head: Normocephalic and atraumatic     Nose: Nose normal    Mouth/Throat: Oropharynx is clear and moist  No oropharyngeal exudate  Eyes: Pupils are equal, round, and reactive to light  Conjunctivae and EOM are normal  Right eye exhibits no discharge  Left eye exhibits no discharge  Neck: Normal range of motion  Neck supple  No JVD present  No tracheal deviation present  Cardiovascular: Normal rate, regular rhythm, normal heart sounds and intact distal pulses  Exam reveals no gallop and no friction rub  No murmur heard  Pulmonary/Chest: Effort normal and breath sounds normal  No stridor  No respiratory distress  She has no wheezes  She has no rales  She exhibits no tenderness  Abdominal: Soft  Bowel sounds are normal  She exhibits no distension  There is no tenderness  There is no rebound and no guarding  Musculoskeletal: Normal range of motion  She exhibits edema (1+ pitting edema bilateral ankles)  She exhibits no tenderness or deformity  Moves all extremities well, full ROM, neurovascularly intact with swelling to bilateral ankles   Neurological: She is alert and oriented to person, place, and time  No cranial nerve deficit  AOx3, no facial droop/dysarthria/aphasia, intact strength/sensation x 4 extremities   Skin: Skin is warm and dry  Capillary refill takes less than 2 seconds  No rash noted  She is not diaphoretic  Psychiatric: She has a normal mood and affect  Nursing note and vitals reviewed        ED Medications  Medications   gabapentin (NEURONTIN) capsule 100 mg (100 mg Oral Given 12/4/18 1708)   acetaminophen (TYLENOL) tablet 650 mg (650 mg Oral Given 12/3/18 1519)   nitroglycerin (NITROSTAT) SL tablet 0 4 mg (not administered)   aluminum-magnesium hydroxide-simethicone (MYLANTA) 200-200-20 mg/5 mL oral suspension 15 mL (not administered)   labetalol (NORMODYNE) injection 10 mg (not administered)   lisinopril (ZESTRIL) tablet 5 mg (5 mg Oral Given 12/4/18 0848)   lidocaine viscous (XYLOCAINE) 2 % mucosal solution 15 mL (not administered)   cephalexin (KEFLEX) capsule 500 mg (500 mg Oral Given 12/4/18 2200)   sodium chloride 0 9 % bolus 250 mL (0 mL Intravenous Stopped 12/3/18 0445)   fentanyl citrate (PF) 100 MCG/2ML 25 mcg (25 mcg Intravenous Given 12/3/18 0455)   iohexol (OMNIPAQUE) 350 MG/ML injection (MULTI-DOSE) 100 mL (100 mL Intravenous Given 12/3/18 0515)   nitroglycerin (NITROSTAT) SL tablet 0 4 mg (0 4 mg Sublingual Given 12/3/18 2815)   aspirin chewable tablet 243 mg (243 mg Oral Given 12/3/18 9575)       Diagnostic Studies  Results Reviewed     Procedure Component Value Units Date/Time    Urine culture [[id number]]  (Abnormal) Collected:  12/03/18 1530    Lab Status:  Preliminary result Specimen:  Urine from Urine, Straight Cath Updated:  12/04/18 1410     Urine Culture >100,000 cfu/ml Escherichia coli (A)    Basic metabolic panel [[id number]] Collected:  12/04/18 0532    Lab Status:  Final result Specimen:  Blood from Arm, Right Updated:  12/04/18 9173     Sodium 141 mmol/L      Potassium 3 8 mmol/L      Chloride 107 mmol/L      CO2 27 mmol/L      ANION GAP 7 mmol/L      BUN 17 mg/dL      Creatinine 1 02 mg/dL      Glucose 86 mg/dL      Calcium 8 5 mg/dL      eGFR 48 ml/min/1 73sq m     Narrative:         National Kidney Disease Education Program recommendations are as follows:  GFR calculation is accurate only with a steady state creatinine  Chronic Kidney disease less than 60 ml/min/1 73 sq  meters  Kidney failure less than 15 ml/min/1 73 sq  meters      CBC and Platelet [[id number]]  (Abnormal) Collected:  12/04/18 0532    Lab Status:  Final result Specimen:  Blood from Arm, Right Updated:  12/04/18 0553     WBC 4 59 Thousand/uL      RBC 4 25 Million/uL      Hemoglobin 13 4 g/dL      Hematocrit 42 2 %      MCV 99 (H) fL      MCH 31 5 pg      MCHC 31 8 g/dL      RDW 13 0 %      Platelets 707 (L) Thousands/uL      MPV 9 7 fL     Urine Microscopic [[id number]]  (Abnormal) Collected:  12/03/18 1530    Lab Status:  Final result Specimen:  Urine from Urine, Straight Cath Updated:  12/03/18 1635     RBC, UA 10-20 (A) /hpf      WBC, UA Innumerable (A) /hpf      Epithelial Cells None Seen /hpf      Bacteria, UA Innumerable (A) /hpf      Hyaline Casts, UA None Seen /lpf     UA w Reflex to Microscopic w Reflex to Culture [[id number]]  (Abnormal) Collected:  12/03/18 1530    Lab Status:  Final result Specimen:  Urine from Urine, Straight Cath Updated:  12/03/18 1609     Color, UA Yellow     Clarity, UA Cloudy     Specific Artesian, UA 1 030     pH, UA 6 0     Leukocytes, UA Moderate (A)     Nitrite, UA Positive (A)     Protein, UA Negative mg/dl      Glucose, UA Negative mg/dl      Ketones, UA Negative mg/dl      Urobilinogen, UA 1 0 E U /dl      Bilirubin, UA Negative     Blood, UA Large (A)    Troponin I [[id number]]  (Normal) Collected:  12/03/18 1157    Lab Status:  Final result Specimen:  Blood from Arm, Left Updated:  12/03/18 1244     Troponin I <0 02 ng/mL     Troponin I [[id number]]  (Normal) Collected:  12/03/18 0824    Lab Status:  Final result Specimen:  Blood from Arm, Left Updated:  12/03/18 0856     Troponin I <0 02 ng/mL     Comprehensive metabolic panel [[id number]] Collected:  12/03/18 0341    Lab Status:  Final result Specimen:  Blood from Arm, Left Updated:  12/03/18 0408     Sodium 140 mmol/L      Potassium 3 5 mmol/L      Chloride 105 mmol/L      CO2 28 mmol/L      ANION GAP 7 mmol/L      BUN 18 mg/dL      Creatinine 1 00 mg/dL      Glucose 126 mg/dL      Calcium 8 6 mg/dL      AST 12 U/L      ALT 18 U/L      Alkaline Phosphatase 76 U/L      Total Protein 7 2 g/dL      Albumin 3 7 g/dL      Total Bilirubin 0 59 mg/dL      eGFR 49 ml/min/1 73sq m     Narrative:         National Kidney Disease Education Program recommendations are as follows:  GFR calculation is accurate only with a steady state creatinine  Chronic Kidney disease less than 60 ml/min/1 73 sq  meters  Kidney failure less than 15 ml/min/1 73 sq  meters      Troponin I [[id number]]  (Normal) Collected:  12/03/18 8498    Lab Status:  Final result Specimen:  Blood from Arm, Left Updated:  12/03/18 0408     Troponin I <0 02 ng/mL     CBC and differential [[id number]]  (Abnormal) Collected:  12/03/18 0341    Lab Status:  Final result Specimen:  Blood from Arm, Left Updated:  12/03/18 0406     WBC 8 11 Thousand/uL      RBC 4 66 Million/uL      Hemoglobin 14 8 g/dL      Hematocrit 46 2 (H) %      MCV 99 (H) fL      MCH 31 8 pg      MCHC 32 0 g/dL      RDW 12 8 %      MPV 9 7 fL      Platelets 026 Thousands/uL      nRBC 0 /100 WBCs      Neutrophils Relative 68 %      Immat GRANS % 0 %      Lymphocytes Relative 22 %      Monocytes Relative 8 %      Eosinophils Relative 2 %      Basophils Relative 0 %      Neutrophils Absolute 5 53 Thousands/µL      Immature Grans Absolute 0 03 Thousand/uL      Lymphocytes Absolute 1 75 Thousands/µL      Monocytes Absolute 0 64 Thousand/µL      Eosinophils Absolute 0 13 Thousand/µL      Basophils Absolute 0 03 Thousands/µL     POCT Chem 8+ [[id number]]  (Abnormal) Collected:  12/03/18 0359    Lab Status:  Final result Updated:  12/03/18 0404     SODIUM, I-STAT 140 mmol/l      Potassium, i-STAT 3 9 mmol/L      Chloride, istat 102 mmol/L      CO2, i-STAT 29 mmol/L      Anion Gap, i-STAT 15 (H) mmol/L      Calcium, Ionized i-STAT 1 06 (L) mmol/L      BUN, I-STAT 23 mg/dl      Creatinine, i-STAT 0 9 mg/dl      eGFR 55 ml/min/1 73sq m      Glucose, i-STAT 125 mg/dl      Hct, i-STAT 44 %      Hgb, i-STAT 15 0 g/dl      Specimen Type VENOUS                 CTA dissection protocol chest and abdomen   Final Result by Phyllis Bustillos MD (12/03 9430)      No evidence of aortic dissection or other acute abnormality to account for the patient's symptoms  Diverticulosis coli                 Workstation performed: [id number]               Procedures  ECG 12 Lead Documentation  Date/Time: 12/3/2018 3:41 AM  Performed by: Alexandra Baltazar  Authorized by: Kye Burgess     ECG reviewed by me, the ED Provider: yes    Patient location:  ED  Previous ECG:     Previous ECG:  Unavailable  Interpretation:     Interpretation: abnormal    Rate:     ECG rate assessment: normal    Rhythm:     Rhythm: sinus rhythm    Ectopy:     Ectopy: none    QRS:     QRS axis:  Left    QRS intervals: Wide  ST segments:     ST segments:  Non-specific    Elevation:  V1  T waves:     T waves: inverted      Inverted:  AVR, V1, V2 and V5  ECG 12 Lead Documentation  Date/Time: 12/3/2018 4:35 AM  Performed by: Allie Villegas by: Sada Rudolph     ECG reviewed by me, the ED Provider: yes    Patient location:  ED  Previous ECG:     Previous ECG:  Compared to current    Similarity:  No change  Interpretation:     Interpretation: abnormal    Rate:     ECG rate assessment: normal    Rhythm:     Rhythm: sinus rhythm    Ectopy:     Ectopy: none    QRS:     QRS axis:  Left    QRS intervals: Wide  ST segments:     ST segments:  Non-specific    Elevation:  V1  T waves:     T waves: inverted      Inverted:  AVR, V1, V2, V4 and V5          Phone Consults  ED Phone Contact    ED Course           Identification of Seniors at Risk      Most Recent Value   (ISAR) Identification of Seniors at Risk   Before the illness or injury that brought you to the Emergency, did you need someone to help you on a regular basis? 1 Filed at: 12/03/2018 0339   In the last 24 hours, have you needed more help than usual?  1 Filed at: 12/03/2018 1827   Have you been hospitalized for one or more nights during the past 6 months? 0 Filed at: 12/03/2018 6208   In general, do you see well?  0 Filed at: 12/03/2018 3880   In general, do you have serious problems with your memory? 0 Filed at: 12/03/2018 8998   Do you take more than three different medications every day? 1 Filed at: 12/03/2018 0339   ISAR Score  3 Filed at: 12/03/2018 7459                 Patient reevaluated with improvement in condition noted   Patient updated on results of tests and plan of care including admission to hospital for further evaluation of presenting complaint with understanding verbalized  Report to Dr Machelle Doll with SOD for continuation of patient care  MDM  CritCare Time    Disposition  Final diagnoses:   Chest pain   Dyspnea   Hypertensive urgency     Time reflects when diagnosis was documented in both MDM as applicable and the Disposition within this note     Time User Action Codes Description Comment    12/3/2018  6:37 AM Teagan Pope Add [R07 9] Chest pain     12/3/2018  6:37 AM Teagan Pope Add [R06 00] Dyspnea     12/3/2018  6:37 AM Teagan Pope Add [I16 0] Hypertensive urgency       ED Disposition     ED Disposition Condition Comment    Admit  Case was discussed with SOD and the patient's admission status was agreed to be Admission Status: observation status to the service of Dr Machelle Doll  Follow-up Information    None         Current Discharge Medication List      CONTINUE these medications which have NOT CHANGED    Details   gabapentin (NEURONTIN) 300 mg capsule Take 100 mg by mouth 2 (two) times a day           No discharge procedures on file  ED Provider  Attending physically available and evaluated Reena Hartmann I managed the patient along with the ED Attending      Electronically Signed by         Abdiel Phillips DO  12/05/18 9004

## 2018-12-03 NOTE — ED NOTES
Patient became tachycardic with HR ranging from 135-146  Dr Shruthi Rivera and Dr Keny Langford called to bedside, repeat ECG completed and signed  Patient denies any new complaints; placed on 2LNC  Medications to be ordered         Martha Diehl RN  12/03/18 3296

## 2018-12-03 NOTE — ED ATTENDING ATTESTATION
I, [address], DO, saw and evaluated the patient  I have discussed the patient with the resident/non-physician practitioner and agree with the resident's/non-physician practitioner's findings, Plan of Care, and MDM as documented in the resident's/non-physician practitioner's note, except where noted  All available labs and Radiology studies were reviewed  At this point I agree with the current assessment done in the Emergency Department  I have conducted an independent evaluation of this patient a history and physical is as follows:    80-year-old female presents with chest pain that started at 1:30 a m  In the morning  Patient states that central radiates to her back  Denies shortness of breath, nausea, vomiting, diaphoresis  Denies history of similar symptoms  No history of heart disease  On exam-no acute distress, heart regular, lungs clear, abdomen soft and nontender, bilateral lower extremity edema that patient states is chronic  No calf tenderness    Plan-CTA to rule out dissection, cardiac labs, will need admission    Critical Care Time  CritCare Time    Procedures

## 2018-12-03 NOTE — ED NOTES
Dr Aleman Toledo at the bedside for patient evaluation at this time     Isabelle Whitaker RN  12/03/18 3204

## 2018-12-04 PROBLEM — N39.0 UTI (URINARY TRACT INFECTION): Status: ACTIVE | Noted: 2018-12-04

## 2018-12-04 PROBLEM — R26.2 AMBULATORY DYSFUNCTION: Status: ACTIVE | Noted: 2018-12-04

## 2018-12-04 LAB
ANION GAP SERPL CALCULATED.3IONS-SCNC: 7 MMOL/L (ref 4–13)
BUN SERPL-MCNC: 17 MG/DL (ref 5–25)
CALCIUM SERPL-MCNC: 8.5 MG/DL (ref 8.3–10.1)
CHLORIDE SERPL-SCNC: 107 MMOL/L (ref 100–108)
CO2 SERPL-SCNC: 27 MMOL/L (ref 21–32)
CREAT SERPL-MCNC: 1.02 MG/DL (ref 0.6–1.3)
ERYTHROCYTE [DISTWIDTH] IN BLOOD BY AUTOMATED COUNT: 13 % (ref 11.6–15.1)
GFR SERPL CREATININE-BSD FRML MDRD: 48 ML/MIN/1.73SQ M
GLUCOSE SERPL-MCNC: 86 MG/DL (ref 65–140)
HCT VFR BLD AUTO: 42.2 % (ref 34.8–46.1)
HGB BLD-MCNC: 13.4 G/DL (ref 11.5–15.4)
MCH RBC QN AUTO: 31.5 PG (ref 26.8–34.3)
MCHC RBC AUTO-ENTMCNC: 31.8 G/DL (ref 31.4–37.4)
MCV RBC AUTO: 99 FL (ref 82–98)
PLATELET # BLD AUTO: 146 THOUSANDS/UL (ref 149–390)
PMV BLD AUTO: 9.7 FL (ref 8.9–12.7)
POTASSIUM SERPL-SCNC: 3.8 MMOL/L (ref 3.5–5.3)
RBC # BLD AUTO: 4.25 MILLION/UL (ref 3.81–5.12)
SODIUM SERPL-SCNC: 141 MMOL/L (ref 136–145)
WBC # BLD AUTO: 4.59 THOUSAND/UL (ref 4.31–10.16)

## 2018-12-04 PROCEDURE — 97162 PT EVAL MOD COMPLEX 30 MIN: CPT

## 2018-12-04 PROCEDURE — 80048 BASIC METABOLIC PNL TOTAL CA: CPT | Performed by: INTERNAL MEDICINE

## 2018-12-04 PROCEDURE — G8979 MOBILITY GOAL STATUS: HCPCS

## 2018-12-04 PROCEDURE — G8978 MOBILITY CURRENT STATUS: HCPCS

## 2018-12-04 PROCEDURE — 85027 COMPLETE CBC AUTOMATED: CPT | Performed by: INTERNAL MEDICINE

## 2018-12-04 PROCEDURE — G8987 SELF CARE CURRENT STATUS: HCPCS

## 2018-12-04 PROCEDURE — 97167 OT EVAL HIGH COMPLEX 60 MIN: CPT

## 2018-12-04 PROCEDURE — 99226 PR SBSQ OBSERVATION CARE/DAY 35 MINUTES: CPT | Performed by: INTERNAL MEDICINE

## 2018-12-04 PROCEDURE — G8988 SELF CARE GOAL STATUS: HCPCS

## 2018-12-04 RX ORDER — LISINOPRIL 5 MG/1
5 TABLET ORAL DAILY
Status: DISCONTINUED | OUTPATIENT
Start: 2018-12-04 | End: 2018-12-05 | Stop reason: HOSPADM

## 2018-12-04 RX ORDER — CEPHALEXIN 500 MG/1
500 CAPSULE ORAL EVERY 12 HOURS SCHEDULED
Status: DISCONTINUED | OUTPATIENT
Start: 2018-12-04 | End: 2018-12-05 | Stop reason: HOSPADM

## 2018-12-04 RX ORDER — SULFAMETHOXAZOLE AND TRIMETHOPRIM 800; 160 MG/1; MG/1
1 TABLET ORAL EVERY 12 HOURS SCHEDULED
Status: DISCONTINUED | OUTPATIENT
Start: 2018-12-04 | End: 2018-12-04

## 2018-12-04 RX ADMIN — GABAPENTIN 100 MG: 100 CAPSULE ORAL at 17:08

## 2018-12-04 RX ADMIN — LISINOPRIL 5 MG: 5 TABLET ORAL at 08:48

## 2018-12-04 RX ADMIN — GABAPENTIN 100 MG: 100 CAPSULE ORAL at 08:48

## 2018-12-04 RX ADMIN — CEPHALEXIN 500 MG: 500 CAPSULE ORAL at 15:30

## 2018-12-04 RX ADMIN — SULFAMETHOXAZOLE AND TRIMETHOPRIM 1 TABLET: 800; 160 TABLET ORAL at 08:54

## 2018-12-04 RX ADMIN — CEPHALEXIN 500 MG: 500 CAPSULE ORAL at 22:00

## 2018-12-04 NOTE — SOCIAL WORK
KALPESH initiated authorization with Jerry POSEY called [facility] [phone] and spoke to [address] [place] [address]  Pending reference# [id number]  KALPESH to receive a call/fax to provide clinicals

## 2018-12-04 NOTE — SOCIAL WORK
Informed pt and her son Mehdi Ruvalcaba that [address] and [facility] are able to accept pt  They stated that they prefer [facility]  Discussed transportation to facility with them  Pt's son Mehdi Ruvalcaba stated that he will transport pt to [facility] once discharged

## 2018-12-04 NOTE — PLAN OF CARE
Problem: PHYSICAL THERAPY ADULT  Goal: Performs mobility at highest level of function for planned discharge setting  See evaluation for individualized goals  Treatment/Interventions: Functional transfer training, LE strengthening/ROM, Elevations, Therapeutic exercise, Endurance training, Patient/family training, Equipment eval/education, Bed mobility, Gait training  Equipment Recommended: Leah Hernadez (RW)       See flowsheet documentation for full assessment, interventions and recommendations  Prognosis: Good  Problem List: Decreased strength, Decreased endurance, Impaired balance, Decreased mobility, Decreased coordination, Impaired judgement, Decreased safety awareness  Assessment: Pt is a 81yo female admitted to [facility] on 12/3/18 with substernal chest pain radiating to her abdomen; diagnosis likely musculoskeletal costochondritis with UTI  Pt also presents with neuropathy and hypertensive urgency  During session, pt performed bed mobility, transfer and ambulation as outlined above  While seated at EOB, pt demonstrated mild retropulsion, which she was able to correct with tactile feedback  Pt also demonstrated mild to moderate retropulsion with gait, requiring mod Ax2 to maintain upright balance  After returning to room, pt requested to attempt ambulation without assistance using her SPC  Pt educated on need for therapists to be close for safety and agreed to allow PT/OT to assist while ambulating with cane  Pt agreed that she feels more steady while using RW and agreed to continue use of RW at this time  Pt is moderate complexity based on admitting symptoms, diagnosis and new ambulatory dysfunction  PT recommend pt be D/C to IP rehabilitation at this time  Barriers to Discharge: Inaccessible home environment     Recommendation:  (IP rehabilitation)     PT - OK to Discharge: Yes (to IP rehabilitation when medically stable)    See flowsheet documentation for full assessment

## 2018-12-04 NOTE — SOCIAL WORK
Met with pt and her son Torrie Ritter and explained Cm role  Pt lives with her son and dgt in law in a 1 story house with [address]  Pt was independent PTA and does not drive  Pt's son provides her with transportation  Pt's PCP is Dr Marquis George  Pt has a cane at home  No hx of HHC or rehab  No hx of mental health issues or drug or alcohol use  Pt has a prescription plan and uses [facility] in [place] for her prescriptions  Primary contact is pt's son Torrie Ritter, contact # [phone]  Pt's son will provide pt with transportation home upon discharge  Pt's son Torrie Ritter is her POA  Document requested  CM reviewed d/c planning process including the following: identifying help at home, patient preference for d/c planning needs, Discharge Lounge, [facility] Meds to Bed program, availability of treatment team to discuss questions or concerns patient and/or family may have regarding understanding medications and recognizing signs and symptoms once discharged  CM also encouraged patient to follow up with all recommended appointments after discharge  Patient advised of importance for patient and family to participate in managing patients medical well being  Patient/caregiver received discharge checklist  Content reviewed  Patient/caregiver encouraged to participate in discharge plan of care prior to discharge home

## 2018-12-04 NOTE — OCCUPATIONAL THERAPY NOTE
[address]  Evaluation     Patient Name: Paulina Adjutant  SEVEN Date: 12/4/2018  Problem List  Patient Active Problem List   Diagnosis    Atypical chest pain    Hypertensive urgency    Neuropathy    UTI (urinary tract infection)    Ambulatory dysfunction     Past Medical History  Past Medical History:   Diagnosis Date    Neuropathy      Past Surgical History  History reviewed  No pertinent surgical history  12/04/18 1523   Note Type   Note type Eval/Treat   Restrictions/Precautions   Weight Bearing Precautions Per Order No   Other Precautions Chair Alarm; Fall Risk   Pain Assessment   Pain Assessment No/denies pain   Pain Score No Pain   Home Living   Type of [address] [facility] One level; Able to live on main level with bedroom/bathroom; Performs ADLs on one level  (5 [address])   Bathroom Shower/Tub Tub/shower unit   Bathroom Toilet Standard   Bathroom Equipment (pt's son reports they have grab bars he can install)   [address] [facility]   Prior Function   Level of [address] with ADLs and functional mobility   Lives With Son  (JO-ANN)   Receives Help From Family   ADL Assistance Independent   IADLs Needs assistance  (pt no longer drives)   Falls in the last 6 months 0   Vocational Retired   Lifestyle   Autonomy pta pt reports I in ADLs and functional mobility w/ use of RW; pt receives assistance for IADLs   Reciprocal Relationships supportive son present   Service to Others retired    Intrinsic Gratification likes the adult coloring books   Psychosocial   Psychosocial (WDL) WDL   Subjective   Subjective "I think I can walk w/o assistance w/ my cane"   ADL   Where Assessed Chair   Eating Assistance 5  Supervision/Setup   Grooming Assistance 5  Supervision/Setup   UB Bathing Assistance 4  Minimal Assistance   UB Bathing Deficit Steadying   LB Bathing Assistance 3  Moderate [address] 4  [address] 3  Moderate Assistance   Bed Mobility   Supine to Sit 4  Minimal assistance   Additional items Assist x 1;Bedrails; Increased time required; Impulsive;Verbal cues   Transfers   Sit to Stand 4  Minimal assistance   Additional items Assist x 1; Increased time required; Impulsive;Verbal cues   Stand to Sit 4  Minimal assistance   Additional items Assist x 1; Increased time required; Impulsive;Verbal cues  (after 2nd sit--> pt observed not reaching arms back before s)   Functional Mobility   Functional Mobility 3  Moderate assistance   Additional Comments A x 2   Additional items Rolling walker   Balance   Static Sitting Fair -   Dynamic Sitting Poor +   Static Standing Poor   Dynamic Standing Poor   Ambulatory Poor -   Activity Tolerance   Activity Tolerance Patient limited by fatigue   Medical Staff Made Aware PT [facility] ALEDO and SPT Benita present   Nurse Made Aware okay to see per RN Christina BIRCH Assessment   RUE Assessment WFL   LUE Assessment   LUE Assessment WFL   Hand Function   Gross Motor Coordination Functional   Fine Motor Coordination Functional   Cognition   Overall Cognitive Status Impaired   Arousal/Participation Responsive; Alert; Cooperative   Attention Attends with cues to redirect   Orientation Level Oriented to person;Oriented to place;Oriented to situation;Disoriented to time  (said date was Dec 19th, 2018)   Memory Decreased recall of precautions   Following Commands Follows one step commands with increased time or repetition   Comments pt observed impulsive and requiring redirection to task   Assessment   Limitation Decreased ADL status; Decreased Safe judgement during ADL;Decreased cognition;Decreased endurance;Decreased self-care trans;Decreased high-level ADLs   Prognosis Good   Assessment Pt is a 80* YO  Female admitted to [facility] on 12/03/18 w/ atypical chest pain and hypertensive emergency  Comorbidities include a UTI and a hx of neuropathy and ambulatory dysfxn   Pt with active OT orders and up and OOB as tolerated orders    Pt resides in a one story home w/ 5STE with son and dtr in law  Pt was I w/  ADLS and IADLS, does not drive, & required use of SPC PTA  Currently pt is Mod A x 2 for fxn'l mobility, Min A for transfers and UB ADLs, and Mod A for LB ADLs  During functional mobility, pt observed leaning back into therapist's arm multiple times and requires assistance for balance  When asked pt reported she would be able to walk by herself w/o assistance and only felt "slightly" off balance  Pt is limited at this time 2*: endurance, activity tolerance, functional mobility, balance, trunk control, functional standing tolerance, decreased I w/ ADLS/IADLS, cognitive impairments, decreased safety awareness and decreased insight into deficits  The following Occupational Performance Areas to address include: bathing/shower, toilet hygiene, dressing, medication management, health maintenance, functional mobility, community mobility and household maintenance  Pt scored overall  50/100 on the Barthel Index  Based on the aforementioned OT evaluation, functional performance deficits, and assessments, pt has been identified as a high complexity evaluation  From OT standpoint, anticipate d/c STR  Pt to continue to benefit from acute immediate OT services to address the following goals 3-5x/week to  w/in 7-10 days: Ron Barnes Goals   Patient Goals to go home   LTG Time Frame 7-10   Plan   Treatment Interventions ADL retraining;Functional transfer training; Endurance training;Cognitive reorientation;Patient/family training;Equipment evaluation/education; Compensatory technique education;Continued evaluation; Activityengagement; Energy conservation   Goal Expiration Date 18   OT Frequency 3-5x/wk   Recommendation   OT Discharge Recommendation Short Term Rehab   Equipment Recommended Bedside commode   OT - OK to Discharge Yes  (to STR)   Barthel Index   Feeding 10   Bathing 0   Grooming Score 5   Dressing Score 5   Bladder Score 10   Bowels Score 10 Toilet Use Score 5   Transfers (Bed/Chair) Score 5   Mobility (Level Surface) Score 0   Stairs Score 0   Barthel Index Score 50   Modified [place] Scale   Modified Tessie Scale 4     GOALS    1) Pt will improve activity tolerance to G for min 30 min txment sessions    2) Pt will complete UB/LB dressing/self care w/ mod I using adaptive device and DME as needed    3) Pt will complete bathing w/ Mod I w/ use of AE and DME as needed    4) Pt will complete toileting w/ mod I w/ G hygiene/thoroughness using DME as needed    5) Pt will improve functional transfers to Mod I on/off all surfaces using DME as needed w/ G balance/safety     6) Pt will improve functional mobility during ADL/IADL/leisure tasks to Mod I using DME as needed w/ G balance/safety     7) Pt will participate in simulated IADL management task to increase independence  w/ G safety and endurance    8) Pt will engage in ongoing cognitive assessment w/ G participation  to assist w/ safe d/c planning/recommendations    9) Pt will demonstrate G carryover of pt/caregiver education and training w/ good tolerance    10) Pt will demonstrate 100% carryover of energy conservation techniques t/o functional I/ADL/leisure tasks w/o cues s/p skilled education    Esperanza Penn MS, OTR/L

## 2018-12-04 NOTE — SOCIAL WORK
Informed by therapy that they are recommending inpt rehab  Met with pt and her son Zaid Brown to discuss therapy's recommendation for inpt rehab  They were agreeable  SNF rehab list provided to them to review  They prefer a referral to [place] [id number], [facility] [place] [facility] [place], and [facility]  [address] referral sent for same

## 2018-12-04 NOTE — PLAN OF CARE
Problem: DISCHARGE PLANNING - CARE MANAGEMENT  Goal: Discharge to post-acute care or home with appropriate resources  INTERVENTIONS:  - Conduct assessment to determine patient/family and health care team treatment goals, and need for post-acute services based on payer coverage, community resources, and patient preferences, and barriers to discharge  - Address psychosocial, clinical, and financial barriers to discharge as identified in assessment in conjunction with the patient/family and health care team  - Arrange appropriate level of post-acute services according to patient's   needs and preference and payer coverage in collaboration with the physician and health care team  - Communicate with and update the patient/family, physician, and health care team regarding progress on the discharge plan  - Arrange appropriate transportation to post-acute venues  - Pt will be discharged to home with family  Outcome: Progressing

## 2018-12-04 NOTE — PHYSICAL THERAPY NOTE
Physical Therapy Evaluation     12/04/18 1520   Note Type   Note type Eval only   Pain Assessment   Pain Assessment No/denies pain   Pain Score No Pain   Home Living   Type of [address] One level  (5STE)   Home Equipment [employer]   Additional Comments pt typically ambulates I with SPC   Prior Function   Level of [place] Independent with ADLs and functional mobility   Lives With Son  (daughter-in-law)   Receives Help From Family   ADL Assistance Independent   IADLs Independent   Falls in the last 6 months 0   Vocational Retired   Restrictions/Precautions   Wells Yoan Bearing Precautions Per Order No   Other Precautions Impulsive; Chair Alarm; Bed Alarm;Telemetry; Fall Risk   General   Family/Caregiver Present Yes  (son)   Cognition   Overall Cognitive Status Impaired   Arousal/Participation Alert   Orientation Level Oriented X4   Memory Within functional limits   Following Commands Follows one step commands without difficulty   RLE Assessment   RLE Assessment (grossly 4/5; assessed with ambulation)   LLE Assessment   LLE Assessment (grossly 4/5; assessed with ambulation)   Coordination   Movements are Fluid and Coordinated 0   Bed Mobility   Supine to Sit 5  Supervision   Additional items Increased time required;Verbal cues; Bedrails   Transfers   Sit to Stand 4  Minimal assistance   Additional items Assist x 1; Increased time required;Verbal cues   Stand to Sit 4  Minimal assistance   Additional items Assist x 1; Impulsive;Verbal cues   Ambulation/Elevation   Gait pattern Poor UE support; Improper Weight shift;Retropulsion;Decreased foot clearance; Short stride   Gait Assistance 3  Moderate assist   Additional items Assist x 2;Verbal cues; Tactile cues   Assistive Device Rolling walker   Distance 40ft with brief standing rest break; per pt's request, pt also ambulated 5ft x2 in room using [address]   Balance   Static Sitting Fair -   Dynamic Sitting Poor +   Static Standing Fair -   Dynamic Standing Poor + Ambulatory Poor   Endurance Deficit   Endurance Deficit Yes   Activity Tolerance   Activity Tolerance Patient limited by fatigue   Nurse Made Aware yes - Aileen Beach RN   Assessment   Prognosis Good   Problem List Decreased strength;Decreased endurance; Impaired balance;Decreased mobility; Decreased coordination; Impaired judgement;Decreased safety awareness   Assessment Pt is a 81yo female admitted to [facility] on 12/3/18 with substernal chest pain radiating to her abdomen; diagnosis likely musculoskeletal costochondritis with UTI  Pt also presents with neuropathy and hypertensive urgency  During session, pt performed bed mobility, transfer and ambulation as outlined above  While seated at EOB, pt demonstrated mild retropulsion, which she was able to correct with tactile feedback  Pt also demonstrated mild to moderate retropulsion with gait, requiring mod Ax2 to maintain upright balance  After returning to room, pt requested to attempt ambulation without assistance using her SPC  Pt educated on need for therapists to be close for safety and agreed to allow PT/OT to assist while ambulating with cane  Pt agreed that she feels more steady while using RW and agreed to continue use of RW at this time  Pt is moderate complexity based on admitting symptoms, diagnosis and new ambulatory dysfunction  PT recommend pt be D/C to IP rehabilitation at this time  Barriers to Discharge Inaccessible home environment   Goals   Patient Goals to go home   STG Expiration Date 12/18/18   Short Term Goal #1 pt will be I with bed mobility; pt will be I with transfers; pt will ambulate 300ft mod I with least restrictive device; pt will navigate 5 steps with supervision; pt will improve static standing balance to "fair+"; pt will improve BLE by 1/2 to 1 grade   Treatment Day 0   Plan   Treatment/Interventions Functional transfer training;LE strengthening/ROM; Elevations; Therapeutic exercise; Endurance training;Patient/family training;Equipment eval/education; Bed mobility;Gait training   PT Frequency (3-5x/wk)   Recommendation   Recommendation (IP rehabilitation)   Equipment Recommended Walker  (RW)   PT - OK to Discharge Yes  (to IP rehabilitation when medically stable)   Modified [place] Scale   Modified Tessie Scale 4   Barthel Index   Feeding 10   Bathing 0   Grooming Score 5   Dressing Score 5   Bladder Score 10   Bowels Score 10   Toilet Use Score 5   Transfers (Bed/Chair) Score 5   Mobility (Level Surface) Score 0   Stairs Score 0   Barthel Index Score 50     Benita Turner, SPT

## 2018-12-04 NOTE — PLAN OF CARE
Problem: OCCUPATIONAL THERAPY ADULT  Goal: Performs self-care activities at highest level of function for planned discharge setting  See evaluation for individualized goals  Treatment Interventions: ADL retraining, Functional transfer training, Endurance training, Cognitive reorientation, Patient/family training, Equipment evaluation/education, Compensatory technique education, Continued evaluation, Activityengagement, Energy conservation  Equipment Recommended: Bedside commode       See flowsheet documentation for full assessment, interventions and recommendations  Limitation: Decreased ADL status, Decreased Safe judgement during ADL, Decreased cognition, Decreased endurance, Decreased self-care trans, Decreased high-level ADLs  Prognosis: Good  Assessment: Pt is a 80* YO  Female [facility] hospitals on 12[facility] w/ atypical chest pain and hypertensive emergency  Comorbidities include a UTI and a hx of neuropathy and ambulatory dysfxn   Pt with active OT orders and up and OOB as tolerated orders    Pt resides in a one story home w/ 5STE with son and dtr in law  Pt was I w/  ADLS and IADLS, does not drive, & required use of SPC PTA  Currently pt is Mod A x 2 for fxn'l mobility, Min A for transfers and UB ADLs, and Mod A for LB ADLs  During functional mobility, pt observed leaning back into therapist's arm multiple times and requires assistance for balance  When asked pt reported she would be able to walk by herself w/o assistance and only felt "slightly" off balance  Pt is limited at this time 2*: endurance, activity tolerance, functional mobility, balance, trunk control, functional standing tolerance, decreased I w/ ADLS/IADLS, cognitive impairments, decreased safety awareness and decreased insight into deficits  The following Occupational Performance Areas to address include: bathing/shower, toilet hygiene, dressing, medication management, health maintenance, functional mobility, community mobility and household maintenance  Pt scored overall  50/100 on the Barthel Index  Based on the aforementioned OT evaluation, functional performance deficits, and assessments, pt has been identified as a high complexity evaluation  From OT standpoint, anticipate d/c STR  Pt to continue to benefit from acute immediate OT services to address the following goals 3-5x/week to  w/in 7-10 days:        OT Discharge Recommendation: Short Term Rehab  OT - OK to Discharge: Yes (to STR)      Comments: Iris Alcantara MS, OTR/L

## 2018-12-04 NOTE — UTILIZATION REVIEW
Initial Clinical Review    Admission: Date/Time/Statement: 12/03/18 6778    Orders Placed This Encounter   Procedures    Place in Observation (expected length of stay for this patient is less than two midnights)     Standing Status:   Standing     Number of Occurrences:   1     Order Specific Question:   Admitting Physician     Answer:   Camilo Uribe [[id number]]     Order Specific Question:   Level of Care     Answer:   Med Surg [16]         ED: Date/Time/Mode of Arrival:   ED Arrival Information     Expected Arrival Acuity Means of Arrival Escorted By Service Admission Type    - 12/3/2018 03:34 Emergent Wheelchair Family Member General Medicine Emergency    Arrival Complaint    Heart Problems          Chief Complaint:   Chief Complaint   Patient presents with    Chest Pain     Patient c/o CP beginning around 0130  Patient reports, "I thought they were going to go away so I waited, but the pain just kept getting worse"       History of Illness      80year old female  presents to ed for evaluation of chest pain which began at  0130  In morning waking her from sleep  This pain lasted several hours before coming into the hospital     Pain is substernal   Radiating to her abdomen  Pain exists on expiration  Pain continues  After nitroglycerin administered        ED Triage Vitals [12/03/18 0335]   97 9 °F (36 6 °C) 62 20 (!) 220/96 96 %      Pain Score       8        Wt Readings from Last 1 Encounters:   12/03/18 63 2 kg (139 lb 5 3 oz)       Vital Signs (abnormal):   Blood pressure:   174/75   180/79   197/86   188/77 196/84    97/45   101/47     Room air sats   93%  90%   Placed on 2L   O2    Temp max  100 4    Heart rate  94  96   92   98   90  90  91        Abnormal Labs/Diagnostic Test Results:    2+ bilateral lower extremity pitting edema     CTA chest abdomen and pelvis   No acute finding  Urinalysis : large blood  -  Moderate leukocytes -  Positive nitrite - innumerable bacteria - innumberable wbc- Platelets  914    EKG  #1RATE 62    Normal sinus rhythm  Right bundle branch block  Abnormal ECG  When compared with ECG of 10-AUG-2005 10:56,  QRS axis Shifted left    EKG  #2  RATE  76       Sinus rhythm with sinus arrhythmia  Left axis deviation  Right bundle branch block  Abnormal ECG  When compared with ECG of 03-DEC-2018 03:41, (unconfirmed)  No significant change was found        EKG  #3   RATE 145      Atrial fibrillation with rapid ventricular response  Left axis deviation  Right bundle branch block  Abnormal ECG  When compared with ECG of 03-DEC-2018 04:35, (unconfirmed)  Atrial fibrillation has replaced Sinus rhythm  Vent   rate has increased    TROPONIN  02  X 3       ED Treatment:   Medication Administration from 12/03/2018 0334 to 12/03/2018 1702       Date/Time Order Dose Route     12/03/2018 0419 sodium chloride 0 9 % bolus 250 mL 250 mL Intravenous     12/03/2018 0455 fentanyl citrate (PF) 100 MCG/2ML 25 mcg 25 mcg Intravenous     12/03/2018 0653 nitroglycerin (NITROSTAT) SL tablet 0 4 mg 0 4 mg Sublingual     12/03/2018 0717 aspirin chewable tablet 243 mg 243 mg Oral     12/03/2018 0914 gabapentin (NEURONTIN) capsule 100 mg 100 mg Oral     12/03/2018 0836 enoxaparin (LOVENOX) subcutaneous injection 40 mg 40 mg Subcutaneous     12/03/2018 1519 acetaminophen (TYLENOL) tablet 650 mg 650 mg Oral     12/03/2018 1630 lisinopril (ZESTRIL) tablet 10 mg 10 mg Oral           Past Medical History:    Neuropathy        Admitting Diagnosis: Chest pain [R07 9]  Dyspnea [R06 00]  Hypertensive urgency [I16 0]    Age/Sex: 80 y o  female    Assessment/Plan:    Atypical chest pain -  Rule out acute coronary syndrome, trend troponin, repeat ekg , mylanta and viscous lidocaine for heartburn like symptoms  Hypertension -  Evaluate for  Antihypertensive medication at discharge, start lisinopril and monitor blood pressure   Atrial fibrillation noted on EKG  #3   Continue  Telemetry   Urinalysis with  Positive nitrites Innumerable bacteria and  Wbc, temp 100 1,  Urine culture pending       Admission Orders:    2Lo2 nc  To maintain sats at least  92%   :   For  Hypoxemia , respiratory distress,  Increased work of breathing  Telemetry     Scheduled Meds:     acetaminophen 650 mg Oral Q6H PRN   aluminum-magnesium hydroxide-simethicone 15 mL Oral Q4H PRN   gabapentin 100 mg Oral BID   labetalol 10 mg Intravenous Q8H PRN   lidocaine viscous 15 mL Swish & Swallow 4x Daily PRN   lisinopril 5 mg Oral Daily   nitroglycerin 0 4 mg Sublingual Q5 Min PRN   sulfamethoxazole-trimethoprim 1 tablet Oral Q12H [address]

## 2018-12-04 NOTE — PROGRESS NOTES
IM Residency Progress Note   Unit/Bed#: [facility] 732-01 Encounter: [id number]  SOD Team C       Mahin Jalloh 80 y o  female [id number]    Hospital Stay Days: 0      Assessment/Plan:    Principal Problem:    Atypical chest pain  Active Problems:    Hypertensive urgency    Neuropathy    UTI (urinary tract infection)    Ambulatory dysfunction    1  Atypical chest pain  -reproducible chest pain likely musculoskeletal costochondritis; resolved  -ACS ruled out  Troponins negative x2 and hemodynamically stable  -continue Tylenol p r n   -continue to monitor    2  Hypertensive urgency  -resolved; blood pressure within normal limits  -continue lisinopril 5 mg daily with labetalol p r n     3  Urinary tract infection  -patient reporting burning sensation/dysuria yesterday  -urinalysis with positive nitrites, positive leukocytes, innumerable WBC and innumerable bacteria  -urine culture positive for gram-negative rods resembling E coli  -patient started on p o  Keflex 500 mg b i d     4  Neuropathy  -stable  -continue home gabapentin 100 mg b i d     5  Ambulatory dysfunction  -patient weak and has difficulty with ambulation/movement  -PT/OT evaluation pending, possible rehab placement    Disposition:  Patient medically stable for discharge  PT/OT evaluation pending - possible placement  Subjective:   Seen examined this morning  Patient resting comfortably in no acute distress  No acute events overnight  Patient reports her chest pain and abdominal tenderness have resolved  She also denies dysuria this morning  Denies fever, chills, headaches, lightheadedness, dizziness, visual disturbances, sore throat, chest pain, palpitations, SOB, abdominal pain, nausea, vomiting, or trouble urinating/ defecating            Vitals: Temp (24hrs), Av 7 °F (37 1 °C), Min:97 6 °F (36 4 °C), Max:100 4 °F (38 °C)  Current: Temperature: 98 3 °F (36 8 °C)  Vitals:    18 1711 18 1940 18 2205 18 0744   BP: 126/71 (!) 97/45 (!) 101/47 138/61   BP Location: Left arm Left arm Right arm Right arm   Pulse: 93 75 70 86   Resp: 20 18 18 16   Temp: 99 1 °F (37 3 °C) 97 6 °F (36 4 °C) 98 3 °F (36 8 °C) 98 3 °F (36 8 °C)   TempSrc: Oral Oral Oral Oral   SpO2: 95% 96% 97% 96%   Weight: 63 2 kg (139 lb 5 3 oz)      Height: 5' 6" (1 676 m)       Body mass index is 22 49 kg/m²  I/O last 24 hours: In: 800 [P O :800]  Out: 1700 [Urine:1700]      Physical Exam   Constitutional: She is oriented to person, place, and time  She appears well-developed  No distress  Thin frail appearance   HENT:   Head: Normocephalic and atraumatic  Right Ear: External ear normal    Left Ear: External ear normal    Nose: Nose normal    Eyes: Pupils are equal, round, and reactive to light  Conjunctivae and EOM are normal  Right eye exhibits no discharge  Left eye exhibits no discharge  No scleral icterus  Neck: No tracheal deviation present  Cardiovascular: Normal rate, regular rhythm, normal heart sounds and intact distal pulses  Exam reveals no gallop and no friction rub  No murmur heard  Pulmonary/Chest: Effort normal and breath sounds normal  No stridor  No respiratory distress  She has no wheezes  She has no rales  She exhibits no tenderness  Abdominal: Soft  Bowel sounds are normal  She exhibits no distension and no mass  There is no tenderness  There is no rebound  Musculoskeletal: She exhibits no edema or tenderness  Neurological: She is alert and oriented to person, place, and time  No cranial nerve deficit or sensory deficit  Skin: Skin is warm and dry  No rash noted  She is not diaphoretic  No erythema  No pallor  Vitals reviewed          Invasive Devices          No matching active lines, drains, or airways                    Labs:   Recent Results (from the past 24 hour(s))   UA w Reflex to Microscopic w Reflex to Culture    Collection Time: 12/03/18  3:30 PM   Result Value Ref Range    Color, UA Yellow     Clarity, UA Cloudy Specific [place], UA 1 030 1 003 - 1 030    pH, UA 6 0 4 5 - 8 0    Leukocytes, UA Moderate (A) Negative    Nitrite, UA Positive (A) Negative    Protein, UA Negative Negative mg/dl    Glucose, UA Negative Negative mg/dl    Ketones, UA Negative Negative mg/dl    Urobilinogen, UA 1 0 0 2, 1 0 E U /dl E U /dl    Bilirubin, UA Negative Negative    Blood, UA Large (A) Negative   Urine Microscopic    Collection Time: 12/03/18  3:30 PM   Result Value Ref Range    RBC, UA 10-20 (A) None Seen, 0-5 /hpf    WBC, UA Innumerable (A) None Seen, 0-5, 5-55, 5-65 /hpf    Epithelial Cells None Seen None Seen, Occasional /hpf    Bacteria, UA Innumerable (A) None Seen, Occasional /hpf    Hyaline Casts, UA None Seen None Seen /lpf   Urine culture    Collection Time: 12/03/18  3:30 PM   Result Value Ref Range    Urine Culture (A)      >100,000 cfu/ml Gram Negative Hubert resembling Escherichia coli   CBC and Platelet    Collection Time: 12/04/18  5:32 AM   Result Value Ref Range    WBC 4 59 4 31 - 10 16 Thousand/uL    RBC 4 25 3 81 - 5 12 Million/uL    Hemoglobin 13 4 11 5 - 15 4 g/dL    Hematocrit 42 2 34 8 - 46 1 %    MCV 99 (H) 82 - 98 fL    MCH 31 5 26 8 - 34 3 pg    MCHC 31 8 31 4 - 37 4 g/dL    RDW 13 0 11 6 - 15 1 %    Platelets 139 (L) 751 - 390 Thousands/uL    MPV 9 7 8 9 - 12 7 fL   Basic metabolic panel    Collection Time: 12/04/18  5:32 AM   Result Value Ref Range    Sodium 141 136 - 145 mmol/L    Potassium 3 8 3 5 - 5 3 mmol/L    Chloride 107 100 - 108 mmol/L    CO2 27 21 - 32 mmol/L    ANION GAP 7 4 - 13 mmol/L    BUN 17 5 - 25 mg/dL    Creatinine 1 02 0 60 - 1 30 mg/dL    Glucose 86 65 - 140 mg/dL    Calcium 8 5 8 3 - 10 1 mg/dL    eGFR 48 ml/min/1 73sq m       Radiology Results: I have personally reviewed pertinent reports  Other Diagnostic Testing:   I have personally reviewed pertinent reports          Active Meds:   Current Facility-Administered Medications   Medication Dose Route Frequency    acetaminophen (TYLENOL) tablet 650 mg  650 mg Oral Q6H PRN    aluminum-magnesium hydroxide-simethicone (MYLANTA) 200-200-20 mg/5 mL oral suspension 15 mL  15 mL Oral Q4H PRN    cephalexin (KEFLEX) capsule 500 mg  500 mg Oral Q12H [facility]    gabapentin (NEURONTIN) capsule 100 mg  100 mg Oral BID    labetalol (NORMODYNE) injection 10 mg  10 mg Intravenous Q8H PRN    lidocaine viscous (XYLOCAINE) 2 % mucosal solution 15 mL  15 mL Swish & Swallow 4x Daily PRN    lisinopril (ZESTRIL) tablet 5 mg  5 mg Oral Daily    nitroglycerin (NITROSTAT) SL tablet 0 4 mg  0 4 mg Sublingual Q5 Min PRN         VTE Pharmacologic Prophylaxis:   Low risk  VTE Mechanical Prophylaxis: sequential compression device    Reynaldo Yost DO

## 2018-12-05 VITALS
OXYGEN SATURATION: 98 % | RESPIRATION RATE: 16 BRPM | DIASTOLIC BLOOD PRESSURE: 61 MMHG | TEMPERATURE: 97.7 F | BODY MASS INDEX: 22.39 KG/M2 | HEIGHT: 66 IN | HEART RATE: 82 BPM | SYSTOLIC BLOOD PRESSURE: 122 MMHG | WEIGHT: 139.33 LBS

## 2018-12-05 PROBLEM — R07.89 ATYPICAL CHEST PAIN: Status: RESOLVED | Noted: 2018-12-03 | Resolved: 2018-12-05

## 2018-12-05 PROBLEM — I16.0 HYPERTENSIVE URGENCY: Status: RESOLVED | Noted: 2018-12-03 | Resolved: 2018-12-05

## 2018-12-05 LAB — BACTERIA UR CULT: ABNORMAL

## 2018-12-05 PROCEDURE — 99239 HOSP IP/OBS DSCHRG MGMT >30: CPT | Performed by: INTERNAL MEDICINE

## 2018-12-05 PROCEDURE — 99232 SBSQ HOSP IP/OBS MODERATE 35: CPT | Performed by: INTERNAL MEDICINE

## 2018-12-05 RX ORDER — CEPHALEXIN 500 MG/1
500 CAPSULE ORAL EVERY 12 HOURS SCHEDULED
Qty: 12 CAPSULE | Refills: 0 | Status: SHIPPED | OUTPATIENT
Start: 2018-12-05 | End: 2018-12-11

## 2018-12-05 RX ORDER — LISINOPRIL 5 MG/1
5 TABLET ORAL DAILY
Qty: 30 TABLET | Refills: 0 | Status: SHIPPED | OUTPATIENT
Start: 2018-12-06 | End: 2019-03-20 | Stop reason: ALTCHOICE

## 2018-12-05 RX ADMIN — CEPHALEXIN 500 MG: 500 CAPSULE ORAL at 09:15

## 2018-12-05 RX ADMIN — GABAPENTIN 100 MG: 100 CAPSULE ORAL at 09:15

## 2018-12-05 RX ADMIN — LISINOPRIL 5 MG: 5 TABLET ORAL at 09:15

## 2018-12-05 RX ADMIN — GABAPENTIN 100 MG: 100 CAPSULE ORAL at 16:47

## 2018-12-05 NOTE — PROGRESS NOTES
After discussion with the patient and also with the son patient agree to go to the rehab for ambulatory dysfunction and high fall risk  Case management will be looking for the placement    Otherwise patient is doing very well no chest pain, urinary tract infection from E coli is treated with appropriate antibiotics, patient was seen and evaluated with resident please see resident's note for further detail I will attest the resident note once it is available

## 2018-12-05 NOTE — SOCIAL WORK
Received a call from Richard Aguilar [phone] at [facility] who states pt is approved for 5 days  Start date 12/5 to 12/10  Next review on 12/10  Auth# [id number]  CM provided Choco Baca at [facility] with [address] information  Pt's son Makayla Client requested CM make arrangements for [facility]  CM discussed cost  Makayla Client is agreeable  CM arranged with Esvin Clark for a 7pm dc to [facility]  CM notified pt, pt's bedside RN Donna Villegasch, pt's son Cunningham Client, and Choco Baca at [facility] of dc time  Facility transfer form completed  Chart copy requested

## 2018-12-05 NOTE — DISCHARGE SUMMARY
[facility] CENTRAL Discharge Summary - Medical Pati Holguin 80 y o  female MRN: [id number]    [address] [facility] Room / Bed: [facility] 732/[facility] 732-01 Encounter: [id number]    BRIEF OVERVIEW    Admitting Provider: Jaelyn Live MD  Discharge Provider: Jaelyn Live MD  Primary Care Physician at Discharge: Wilman Fairchild DO    Discharge To: [facility]      Admission Date: 12/3/2018     Discharge Date: 12/5/18  Primary Discharge Diagnosis  Principal Problem (Resolved): Atypical chest pain  Active Problems:    Neuropathy    UTI (urinary tract infection)    Ambulatory dysfunction  Resolved Problems:    Hypertensive urgency      Other Problems Addressed: none    Consulting Providers   none         Therapeutic Operative Procedures Performed  none    Diagnostic Procedures Performed    Cta Dissection Protocol Chest And Abdomen    Result Date: 12/3/2018  Impression: No evidence of aortic dissection or other acute abnormality to account for the patient's symptoms  Diverticulosis coli   Workstation performed: [id number]       Discharge Disposition:  Stable  Discharged With Lines: no    Test Results Pending at Discharge: none    Outpatient Follow-Up    Wilman Fairchild DO - PCP    Follow up with consulting providers  none required   Active Issues Requiring Follow-up     Patient Active Problem List   Diagnosis    Neuropathy    UTI (urinary tract infection)    Ambulatory dysfunction         Code Status: Level 3 - DNAR and DNI  Advance Directive and Living Will: <no information>  Power of :    POLST:      Medications     Medications         TAKE these medications      Morning Afternoon Evening Bedtime As Needed    cephalexin 500 mg capsule   Commonly known as:  KEFLEX   Take 1 capsule (500 mg total) by mouth every 12 (twelve) hours for 6 days   Refills:  0        12/5      gabapentin 300 mg capsule   Commonly known as:  NEURONTIN   Take 100 mg by mouth 2 (two) times a day   Refills:  0     12/6         lisinopril 5 mg tablet   Commonly known as:  ZESTRIL   Start taking on:  12/6/2018   Take 1 tablet (5 mg total) by mouth daily   Refills:  0     12/6                      Allergies  No Known Allergies  Discharge Diet: regular diet  Activity restrictions: none    [address] [facility] [place] [facility]  Patient is a 51-year-old female with past medical history neuropathy who presents for evaluation of substernal chest pain that lasted for several hours the radiation abdomen  Emergency department patient was also noted to be hypertensive with systolic 684  Patient was admitted for ACS rule out  Troponins were negative x2 and EKG was negative  On examination was noted that patient's chest pain was reproducible and was mainly located at costochondral area  It was deemed that the pain was likely musculoskeletal costochondritis  Throughout hospitalization chest pain completely resolved  Patient did report dysuria and mild lower abdominal tenderness  Oral temperature was obtained and revealed she had a temperature of 100 7°F   Urinalysis was obtained and revealed E coli UTI  She was initially started on Bactrim and switch to a 7 day course of p o  Keflex 500 mg b i d  Throughout hospitalization patient remained hemodynamically stable  She was also started on lisinopril 5 mg daily for hypertension  There was concern that the patient is weak and she displayed ambulatory dysfunction  PT/OT evaluated the patient recommended short-term rehab  Patient was discharged on 12/05/2018 to [facility] with instructions to follow up with her primary care provider within 10 days of discharge and to complete 6 days of oral Keflex for UTI  Presenting Problem/History of Present Illness  Principal Problem (Resolved):     Atypical chest pain  Active Problems:    Neuropathy    UTI (urinary tract infection)    Ambulatory dysfunction  Resolved Problems:    Hypertensive urgency        Other Pertinent Test Results  none     Discharge Condition: stable      Discharge  Statement   I spent 30 minutes minutes discharging the patient  This time was spent on the day of discharge  I had direct contact with the patient on the day of discharge

## 2018-12-05 NOTE — PROGRESS NOTES
IM Residency Progress Note   Unit/Bed#: [facility] 732-01 Encounter: [id number]  SOD Team C       Darlin Pelayo 80 y o  female [id number]    Hospital Stay Days: 0      Assessment/Plan:    Principal Problem:    Atypical chest pain  Active Problems:    Hypertensive urgency    Neuropathy    UTI (urinary tract infection)    Ambulatory dysfunction    Atypical chest pain  -reproducible chest pain likely musculoskeletal costochondritis; resolved  -ACS ruled out  Troponins negative x2 and hemodynamically stable  -continue Tylenol p r n   -continue to monitor     Hypertensive urgency  -resolved; blood pressure within normal limits  -continue lisinopril 5 mg daily with labetalol p r n      Urinary tract infection  -patient reporting burning sensation/dysuria yesterday  -urinalysis with positive nitrites, positive leukocytes, innumerable WBC and innumerable bacteria  -urine culture positive for pansensitive E coli  -continue p o  Keflex 500 mg b i d  x 7 days     Neuropathy  -stable  -continue home gabapentin 100 mg b i d      Ambulatory dysfunction  -patient weak and has difficulty with ambulation/movement  -PT/OT evaluation pending, possible rehab placement    Disposition:  Patient medically stable for discharge  Placement pending  Subjective:   Patient seen examined this morning  Patient resting comfortably in no acute distress  No acute events overnight  Patient reports that her chest pain significantly improved and is doing well  Denies any urinary symptoms or dysuria  Denies fever, chills, headaches, lightheadedness, dizziness, visual disturbances, sore throat, chest pain, palpitations, SOB, abdominal pain, nausea, vomiting, or trouble urinating/ defecating            Vitals: Temp (24hrs), Av 8 °F (36 6 °C), Min:97 6 °F (36 4 °C), Max:98 °F (36 7 °C)  Current: Temperature: 97 6 °F (36 4 °C)  Vitals:    18 0744 18 1524 18 2147 18 0743   BP: 138/61 127/64 136/80 154/64   BP Location: Right arm Left arm Left arm Left arm   Pulse: 86 79 65 75   Resp: 16 18 18 18   Temp: 98 3 °F (36 8 °C) 97 8 °F (36 6 °C) 98 °F (36 7 °C) 97 6 °F (36 4 °C)   TempSrc: Oral Oral Oral Oral   SpO2: 96% 94% 98% 96%   Weight:       Height:        Body mass index is 22 49 kg/m²  I/O last 24 hours: In: 1200 [P O :1200]  Out: 1275 [Urine:1275]      Physical Exam   Constitutional: She is oriented to person, place, and time  She appears well-developed  No distress  Thin frail appearance   HENT:   Head: Normocephalic and atraumatic  Right Ear: External ear normal    Left Ear: External ear normal    Nose: Nose normal    Eyes: Pupils are equal, round, and reactive to light  Conjunctivae and EOM are normal  Right eye exhibits no discharge  Left eye exhibits no discharge  No scleral icterus  Neck: No tracheal deviation present  Cardiovascular: Normal rate, regular rhythm, normal heart sounds and intact distal pulses  Exam reveals no gallop and no friction rub  No murmur heard  Pulmonary/Chest: Effort normal and breath sounds normal  No stridor  No respiratory distress  She has no wheezes  She has no rales  She exhibits no tenderness  Abdominal: Soft  Bowel sounds are normal  She exhibits no distension  There is no tenderness  There is no rebound and no guarding  Musculoskeletal: She exhibits no edema or tenderness  Neurological: She is alert and oriented to person, place, and time  No cranial nerve deficit or sensory deficit  Skin: Skin is warm and dry  She is not diaphoretic  Vitals reviewed  Invasive Devices          No matching active lines, drains, or airways                    Labs: No results found for this or any previous visit (from the past 24 hour(s))  Radiology Results: I have personally reviewed pertinent reports  Other Diagnostic Testing:   I have personally reviewed pertinent reports          Active Meds:   Current Facility-Administered Medications   Medication Dose Route Frequency    acetaminophen (TYLENOL) tablet 650 mg  650 mg Oral Q6H PRN    aluminum-magnesium hydroxide-simethicone (MYLANTA) 200-200-20 mg/5 mL oral suspension 15 mL  15 mL Oral Q4H PRN    cephalexin (KEFLEX) capsule 500 mg  500 mg Oral Q12H [address]    gabapentin (NEURONTIN) capsule 100 mg  100 mg Oral BID    labetalol (NORMODYNE) injection 10 mg  10 mg Intravenous Q8H PRN    lidocaine viscous (XYLOCAINE) 2 % mucosal solution 15 mL  15 mL Swish & Swallow 4x Daily PRN    lisinopril (ZESTRIL) tablet 5 mg  5 mg Oral Daily    nitroglycerin (NITROSTAT) SL tablet 0 4 mg  0 4 mg Sublingual Q5 Min PRN         VTE Pharmacologic Prophylaxis:  Low risk  VTE Mechanical Prophylaxis: sequential compression device    Nimo Hutton DO

## 2018-12-05 NOTE — RESTORATIVE TECHNICIAN NOTE
Restorative Specialist Mobility Note       Activity: Ambulate in room, Ambulate in enriquez, [place] privileges, Chair, Stand at bedside, Dangle (Educated/encouraged pt to ambulate with assistance 3-4 x's/day  Chair alarm on   Pt callbell, phone/tray within reach )     Assistive Device: Front wheel walker    Erma Al BS, Restorative Technician, [employer]

## 2019-03-20 ENCOUNTER — OFFICE VISIT (OUTPATIENT)
Dept: FAMILY MEDICINE CLINIC | Facility: CLINIC | Age: 84
End: 2019-03-20
Payer: COMMERCIAL

## 2019-03-20 VITALS
RESPIRATION RATE: 16 BRPM | HEIGHT: 66 IN | DIASTOLIC BLOOD PRESSURE: 62 MMHG | WEIGHT: 144.4 LBS | HEART RATE: 76 BPM | SYSTOLIC BLOOD PRESSURE: 122 MMHG | BODY MASS INDEX: 23.21 KG/M2 | TEMPERATURE: 99.2 F

## 2019-03-20 DIAGNOSIS — R60.0 LOWER EXTREMITY EDEMA: ICD-10-CM

## 2019-03-20 DIAGNOSIS — I10 HYPERTENSION, UNSPECIFIED TYPE: ICD-10-CM

## 2019-03-20 DIAGNOSIS — I87.2 VENOUS STASIS DERMATITIS OF BOTH LOWER EXTREMITIES: ICD-10-CM

## 2019-03-20 DIAGNOSIS — Z28.21 VACCINATION REFUSED BY PATIENT: ICD-10-CM

## 2019-03-20 DIAGNOSIS — E78.5 HYPERLIPIDEMIA, UNSPECIFIED HYPERLIPIDEMIA TYPE: ICD-10-CM

## 2019-03-20 DIAGNOSIS — N18.30 CKD (CHRONIC KIDNEY DISEASE) STAGE 3, GFR 30-59 ML/MIN (HCC): ICD-10-CM

## 2019-03-20 DIAGNOSIS — Z76.89 ENCOUNTER TO ESTABLISH CARE: Primary | ICD-10-CM

## 2019-03-20 PROBLEM — N39.0 UTI (URINARY TRACT INFECTION): Status: RESOLVED | Noted: 2018-12-04 | Resolved: 2019-03-20

## 2019-03-20 PROCEDURE — 3725F SCREEN DEPRESSION PERFORMED: CPT | Performed by: NURSE PRACTITIONER

## 2019-03-20 PROCEDURE — 1160F RVW MEDS BY RX/DR IN RCRD: CPT | Performed by: NURSE PRACTITIONER

## 2019-03-20 PROCEDURE — 99204 OFFICE O/P NEW MOD 45 MIN: CPT | Performed by: NURSE PRACTITIONER

## 2019-03-20 RX ORDER — POTASSIUM CHLORIDE 750 MG/1
10 CAPSULE, EXTENDED RELEASE ORAL DAILY
Qty: 60 CAPSULE | Refills: 1 | Status: SHIPPED | OUTPATIENT
Start: 2019-03-20 | End: 2019-04-16 | Stop reason: SDUPTHER

## 2019-03-20 RX ORDER — FUROSEMIDE 40 MG/1
TABLET ORAL
Qty: 40 TABLET | Refills: 1 | Status: SHIPPED | OUTPATIENT
Start: 2019-03-20 | End: 2019-04-12 | Stop reason: SDUPTHER

## 2019-03-20 NOTE — PROGRESS NOTES
FAMILY PRACTICE OFFICE VISIT       NAME: Reena Man  AGE: 80 y o  SEX: female       : 1925        MRN: [id number]    DATE: 3/20/2019    Assessment and Plan     Problem List Items Addressed This Visit        Musculoskeletal and Integument    Venous stasis dermatitis of both lower extremities    Relevant Orders    Echo complete with contrast if indicated       Genitourinary    CKD (chronic kidney disease) stage 3, GFR 30-59 ml/min (HCC)    Relevant Medications    furosemide (LASIX) 40 mg tablet       Other    Lower extremity edema    Relevant Medications    furosemide (LASIX) 40 mg tablet    potassium chloride (MICRO-K) 10 MEQ CR capsule    Other Relevant Orders    Echo complete with contrast if indicated    VAS lower limb venous duplex study, complete bilateral (Completed)    CBC and differential (Completed)    Comprehensive metabolic panel (Completed)    TSH, 3rd generation (Completed)      Other Visit Diagnoses     Encounter to establish care    -  Primary    Hypertension, unspecified type        Relevant Medications    furosemide (LASIX) 40 mg tablet    Hyperlipidemia, unspecified hyperlipidemia type        Relevant Orders    Lipid panel (Completed)    Vaccination refused by patient        Relevant Orders    PREFERRED: influenza vaccine, [id number], high-dose, PF 0 5 mL, for patients 65 yr+ (FLUZONE HIGH-DOSE)          1  Encounter to establish care  Reena Man is a 49-year-old female presenting today to establish care  She is a prior patient of Dr Kingston Meza who has recently retired  2  Lower extremity edema  Long history of intermittent lower extremity edema  She consistently has lower extremity edema for the past 6 months, worsening over the past 3 5 months  Denies shortness of breath, chest pain, orthopnea, or paroxysmal nocturnal dyspnea  She intermittently takes furosemide 40 mg daily  Will complete bilateral lower extremity venous duplex study to rule out any DVT    Will complete echocardiogram to assess heart function  Will check routine blood work  Recommend taking furosemide 40 mg twice daily for the next 7 days, then return to taking 40 mg daily  When taking furosemide twice daily take potassium chloride twice daily  When taking furosemide once daily, take potassium chloride once daily  She has compression stockings that do not fit currently  Once swelling reduces, recommend wearing compression stockings regularly and elevating lower extremities during the daytime as much as possible  Follow-up in office in 2 weeks  Echo complete with contrast if indicated    VAS lower limb venous duplex study, complete bilateral    CBC and differential    Comprehensive metabolic panel    TSH, 3rd generation    furosemide (LASIX) 40 mg tablet    potassium chloride (MICRO-K) 10 MEQ CR capsule   3  Venous stasis dermatitis of both lower extremities  Erythematous, scaly skin on bilateral lower extremities consistent with stasis dermatitis  No open or ulcerated areas  Will work on reducing swelling as noted above  Recommend applying Eucerin or aquaphor twice daily to lower extremities  Follow up in 2 weeks  Echo complete with contrast if indicated   4  CKD (chronic kidney disease) stage 3, GFR 30-59 ml/min ([facility])  Review of records shows creatinine running around, 0 9, with GFR 40's-50's  Reviewed with patient and family, good hydration and avoiding NSAIDs such as Aleve, ibuprofen, Motrin, Advil, etc  May use Tylenol as needed for pain  5  Hypertension, unspecified type  BP stable 122/62 off BP medication, lisinopril 5 mg daily, as she ran out  Will continue to monitor off lisinopril for now  6  Hyperlipidemia, unspecified hyperlipidemia type  Last cholesterol check available from 2014 with cholesterol 206, triglycerides 48, HDL 85, and   Cholesterol/HDL ratio 2 42  Will repeat lipid panel  Lipid panel   7   Vaccination refused by patient  Refuses influenza vaccination  PREFERRED: influenza vaccine, [id number], high-dose, PF 0 5 mL, for patients 65 yr+ (FLUZONE HIGH-DOSE)           Chief Complaint     Chief Complaint   Patient presents with   [address] Swelling     Pt is here w/ c/o bilateral foot swelling     Leg Pain       History of Present Illness     Paulina Steel is a 80year old female presenting today to establish care  She was a patient of Dr Diana No who recently retired  She reports overall good health, aside from peripheral neuropathy, venous stasis with lower extremity edema  She has had lower extremity edema intermittently for many years  She takes Furosemide 40 mg daily on an as needed basis  Lower extremities have been consistently swollen for the past 6 months and getting worse over the past 3 5 months  She now has some redness and dry scaly skin on lower extremities  She has took 80 mg of Lasix yesterday, and swelling is somewhat better today  She is not taking any potassium supplementation currently, believes she has run out  She is not sure how old her Furosemide prescription is  She stopped taking lisinopril 5 mg daily, as she ran out of medication  Denies shortness of breath, chest pain, dizziness, palpitations, cough, orthopnea, or paroxysmal nocturnal dyspnea  Peripheral neuropathy of bilateral lower extremities, causes pain in lower legs and feet  She is taking Gabapentin 300 mg twice daily  She consistently takes her morning dose  She often forgets evening dose  She does not desire any further health maintenance screenings or vaccines  Review of Systems   Review of Systems   Constitutional: Negative  HENT: Negative  Eyes: Negative  Respiratory: Negative  Cardiovascular: Positive for leg swelling  Negative for chest pain and palpitations  Gastrointestinal: Negative  Endocrine: Negative  Genitourinary: Negative  Musculoskeletal: Negative  Skin: Positive for rash (on lower extremities)  Allergic/Immunologic: Negative  Neurological: Negative  Hematological: Negative  Psychiatric/Behavioral: Negative  Active Problem List     Patient Active Problem List   Diagnosis    Neuropathy    Ambulatory dysfunction    Venous stasis dermatitis of both lower extremities    Age-related osteoporosis without current pathological fracture    Lower extremity edema    CKD (chronic kidney disease) stage 3, GFR 30-59 ml/min ([facility])       Past Medical History:  Past Medical History:   Diagnosis Date    Interstitial cystitis     Leukopenia     Neuropathy     Osteoarthritis     Renal cyst        Past Surgical History:  Past Surgical History:   Procedure Laterality Date    CAPSULOTOMY      Right eye    CATARACT EXTRACTION Left     CHALAZION EXCISION      COLONOSCOPY  2006       Family History:  Family History   Problem Relation Age of Onset    Diabetes Mother     Lung disease Father     Cancer Maternal Grandfather        Social History:  Social History     Socioeconomic History    Marital status:       Spouse name: Not on file    Number of children: Not on file    Years of education: Not on file    Highest education level: Not on file   Occupational History    Not on file   Social Needs    Financial resource strain: Not on file    Food insecurity:     Worry: Not on file     Inability: Not on file    Transportation needs:     Medical: Not on file     Non-medical: Not on file   Tobacco Use    Smoking status: Former Smoker     Packs/day: 1 00     Years: 20 00     Pack years: 20 00     Last attempt to quit:      Years since quittin 2    Smokeless tobacco: Never Used   Substance and Sexual Activity    Alcohol use: No     Comment: socially    Drug use: No    Sexual activity: Not Currently   Lifestyle    Physical activity:     Days per week: Not on file     Minutes per session: Not on file    Stress: Not on file   Relationships    Social connections:     Talks on phone: Not on file     Gets together: Not on file     Attends Mandaeism service: Not on file     Active member of club or organization: Not on file     Attends meetings of clubs or organizations: Not on file     Relationship status: Not on file    Intimate partner violence:     Fear of current or ex partner: Not on file     Emotionally abused: Not on file     Physically abused: Not on file     Forced sexual activity: Not on file   Other Topics Concern    Not on file   Social History Narrative    Lives with her son Renea Bob and daughter in [place]  I have reviewed the patient's medical history in detail; there are no changes to the history as noted in the electronic medical record  Objective     Vitals:    03/20/19 1431 03/20/19 1513   BP: 142/72 122/62   BP Location: Right arm    Patient Position: Sitting    Cuff Size: Adult    Pulse: 76    Resp: 16    Temp: 99 2 °F (37 3 °C)    TempSrc: Tympanic    Weight: 65 5 kg (144 lb 6 4 oz)    Height: 5' 6" (1 676 m)      Wt Readings from Last 3 Encounters:   03/20/19 65 5 kg (144 lb 6 4 oz)   12/03/18 63 2 kg (139 lb 5 3 oz)     Body mass index is 23 31 kg/m²  PHQ-9 Depression Screening    PHQ-9:    Frequency of the following problems over the past two weeks:       Little interest or pleasure in doing things:  0 - not at all  Feeling down, depressed, or hopeless:  0 - not at all  PHQ-2 Score:  0       Physical Exam   Constitutional: She is oriented to person, place, and time  She appears well-developed and well-nourished  No distress  Appears younger than age  HENT:   Head: Normocephalic and atraumatic  Right Ear: Tympanic membrane and ear canal normal    Left Ear: Tympanic membrane and ear canal normal    Nose: Nose normal    Mouth/Throat: Oropharynx is clear and moist    Eyes: Pupils are equal, round, and reactive to light  Conjunctivae are normal    Neck: Normal range of motion  Neck supple  No thyromegaly present     Cardiovascular: Normal rate, regular rhythm and normal heart sounds  Pulmonary/Chest: Effort normal and breath sounds normal    Abdominal: Soft  Bowel sounds are normal  There is no tenderness  Musculoskeletal: She exhibits edema (+1 bilateral lower extremity pitting edema  )  Lymphadenopathy:     She has no cervical adenopathy  Neurological: She is alert and oriented to person, place, and time  Skin: Rash (erythema and scaly skin on bilateral lower extremities consistent with stasis dermatitis) noted  Psychiatric: She has a normal mood and affect  Nursing note and vitals reviewed  ALLERGIES:  No Known Allergies    Current Medications     Current Outpatient Medications   Medication Sig Dispense Refill    gabapentin (NEURONTIN) 300 mg capsule Take 300 mg by mouth 2 (two) times a day       furosemide (LASIX) 40 mg tablet Take 2 tablets (80 mg) daily for 7 days, then take 1 tablet (40 mg) daily  40 tablet 1    potassium chloride (MICRO-K) 10 MEQ CR capsule Take 1 capsule (10 mEq total) by mouth daily 60 capsule 1     No current facility-administered medications for this visit            Health Maintenance     Health Maintenance   Topic Date Due    Medicare Annual Wellness Visit (AWV)  05/16/1925    Pneumococcal PPSV23/PCV13 65+ Years / Low and Medium Risk (2 of 2 - PCV13) 10/19/2000    INFLUENZA VACCINE  03/20/2020 (Originally 7/1/2018)    DTaP,Tdap,and Td Vaccines (1 - Tdap) 03/20/2020 (Originally 5/16/1946)    Fall Risk  03/20/2020    Depression Screening PHQ  03/20/2020    Urinary Incontinence Screening  03/20/2020    BMI: Adult  03/20/2020    HEPATITIS B VACCINES  Aged Out     Immunization History   Administered Date(s) Administered    Pneumococcal Polysaccharide PPV23 10/19/1999       KERON Rodriguez

## 2019-03-21 ENCOUNTER — LAB (OUTPATIENT)
Dept: LAB | Facility: CLINIC | Age: 84
End: 2019-03-21
Payer: COMMERCIAL

## 2019-03-21 ENCOUNTER — TRANSCRIBE ORDERS (OUTPATIENT)
Dept: LAB | Facility: CLINIC | Age: 84
End: 2019-03-21

## 2019-03-21 DIAGNOSIS — R60.0 LOWER EXTREMITY EDEMA: ICD-10-CM

## 2019-03-21 DIAGNOSIS — Z13.220 LIPID SCREENING: ICD-10-CM

## 2019-03-21 LAB
ALBUMIN SERPL BCP-MCNC: 3.5 G/DL (ref 3.5–5)
ALP SERPL-CCNC: 75 U/L (ref 46–116)
ALT SERPL W P-5'-P-CCNC: 16 U/L (ref 12–78)
ANION GAP SERPL CALCULATED.3IONS-SCNC: 4 MMOL/L (ref 4–13)
AST SERPL W P-5'-P-CCNC: 13 U/L (ref 5–45)
BASOPHILS # BLD AUTO: 0.04 THOUSANDS/ΜL (ref 0–0.1)
BASOPHILS NFR BLD AUTO: 1 % (ref 0–1)
BILIRUB SERPL-MCNC: 0.62 MG/DL (ref 0.2–1)
BUN SERPL-MCNC: 18 MG/DL (ref 5–25)
CALCIUM SERPL-MCNC: 8.6 MG/DL (ref 8.3–10.1)
CHLORIDE SERPL-SCNC: 108 MMOL/L (ref 100–108)
CHOLEST SERPL-MCNC: 193 MG/DL (ref 50–200)
CO2 SERPL-SCNC: 29 MMOL/L (ref 21–32)
CREAT SERPL-MCNC: 1.07 MG/DL (ref 0.6–1.3)
EOSINOPHIL # BLD AUTO: 0.18 THOUSAND/ΜL (ref 0–0.61)
EOSINOPHIL NFR BLD AUTO: 5 % (ref 0–6)
ERYTHROCYTE [DISTWIDTH] IN BLOOD BY AUTOMATED COUNT: 13.2 % (ref 11.6–15.1)
GFR SERPL CREATININE-BSD FRML MDRD: 45 ML/MIN/1.73SQ M
GLUCOSE P FAST SERPL-MCNC: 89 MG/DL (ref 65–99)
HCT VFR BLD AUTO: 44.4 % (ref 34.8–46.1)
HDLC SERPL-MCNC: 66 MG/DL (ref 40–60)
HGB BLD-MCNC: 14.4 G/DL (ref 11.5–15.4)
IMM GRANULOCYTES # BLD AUTO: 0.01 THOUSAND/UL (ref 0–0.2)
IMM GRANULOCYTES NFR BLD AUTO: 0 % (ref 0–2)
LDLC SERPL CALC-MCNC: 116 MG/DL (ref 0–100)
LYMPHOCYTES # BLD AUTO: 1.37 THOUSANDS/ΜL (ref 0.6–4.47)
LYMPHOCYTES NFR BLD AUTO: 35 % (ref 14–44)
MCH RBC QN AUTO: 32.4 PG (ref 26.8–34.3)
MCHC RBC AUTO-ENTMCNC: 32.4 G/DL (ref 31.4–37.4)
MCV RBC AUTO: 100 FL (ref 82–98)
MONOCYTES # BLD AUTO: 0.57 THOUSAND/ΜL (ref 0.17–1.22)
MONOCYTES NFR BLD AUTO: 15 % (ref 4–12)
NEUTROPHILS # BLD AUTO: 1.77 THOUSANDS/ΜL (ref 1.85–7.62)
NEUTS SEG NFR BLD AUTO: 44 % (ref 43–75)
NONHDLC SERPL-MCNC: 127 MG/DL
NRBC BLD AUTO-RTO: 0 /100 WBCS
PLATELET # BLD AUTO: 160 THOUSANDS/UL (ref 149–390)
PMV BLD AUTO: 10.1 FL (ref 8.9–12.7)
POTASSIUM SERPL-SCNC: 3.8 MMOL/L (ref 3.5–5.3)
PROT SERPL-MCNC: 6.7 G/DL (ref 6.4–8.2)
RBC # BLD AUTO: 4.45 MILLION/UL (ref 3.81–5.12)
SODIUM SERPL-SCNC: 141 MMOL/L (ref 136–145)
TRIGL SERPL-MCNC: 54 MG/DL
TSH SERPL DL<=0.05 MIU/L-ACNC: 1.37 UIU/ML (ref 0.36–3.74)
WBC # BLD AUTO: 3.94 THOUSAND/UL (ref 4.31–10.16)

## 2019-03-21 PROCEDURE — 80053 COMPREHEN METABOLIC PANEL: CPT

## 2019-03-21 PROCEDURE — 36415 COLL VENOUS BLD VENIPUNCTURE: CPT

## 2019-03-21 PROCEDURE — 85025 COMPLETE CBC W/AUTO DIFF WBC: CPT

## 2019-03-21 PROCEDURE — 80061 LIPID PANEL: CPT

## 2019-03-21 PROCEDURE — 84443 ASSAY THYROID STIM HORMONE: CPT

## 2019-03-22 ENCOUNTER — HOSPITAL ENCOUNTER (OUTPATIENT)
Dept: NON INVASIVE DIAGNOSTICS | Facility: CLINIC | Age: 84
Discharge: HOME | End: 2019-03-22
Payer: COMMERCIAL

## 2019-03-22 DIAGNOSIS — R60.0 LOWER EXTREMITY EDEMA: ICD-10-CM

## 2019-03-22 PROCEDURE — 93970 EXTREMITY STUDY: CPT

## 2019-03-22 PROCEDURE — 93970 EXTREMITY STUDY: CPT | Performed by: SURGERY

## 2019-03-24 PROBLEM — R60.0 LOWER EXTREMITY EDEMA: Status: ACTIVE | Noted: 2019-03-24

## 2019-03-24 PROBLEM — N18.30 CKD (CHRONIC KIDNEY DISEASE) STAGE 3, GFR 30-59 ML/MIN (HCC): Status: ACTIVE | Noted: 2019-03-24

## 2019-03-24 PROBLEM — M81.0 AGE-RELATED OSTEOPOROSIS WITHOUT CURRENT PATHOLOGICAL FRACTURE: Status: ACTIVE | Noted: 2019-03-24

## 2019-03-25 ENCOUNTER — TELEPHONE (OUTPATIENT)
Dept: FAMILY MEDICINE CLINIC | Facility: CLINIC | Age: 84
End: 2019-03-25

## 2019-03-25 NOTE — TELEPHONE ENCOUNTER
----- Message from 53Glory Collinsreyna sent at 3/24/2019  8:56 PM EDT -----  Please let patient know her blood work is good  White blood cell count is a little bit low, we will repeat this  We can discuss blood work in further detail at her next office visit in April

## 2019-03-25 NOTE — TELEPHONE ENCOUNTER
----- Message from [id number] sent at 3/24/2019  9:10 PM EDT -----  Please let patient know her venous doppler study showed no blood clots

## 2019-03-25 NOTE — RESULT ENCOUNTER NOTE
Please let patient know her blood work is good  White blood cell count is a little bit low, we will repeat this  We can discuss blood work in further detail at her next office visit in April

## 2019-04-11 ENCOUNTER — HOSPITAL ENCOUNTER (OUTPATIENT)
Dept: NON INVASIVE DIAGNOSTICS | Facility: HOSPITAL | Age: 84
Discharge: HOME | End: 2019-04-11
Payer: COMMERCIAL

## 2019-04-11 DIAGNOSIS — I87.2 VENOUS STASIS DERMATITIS OF BOTH LOWER EXTREMITIES: ICD-10-CM

## 2019-04-11 DIAGNOSIS — R60.0 LOWER EXTREMITY EDEMA: ICD-10-CM

## 2019-04-11 PROCEDURE — 93306 TTE W/DOPPLER COMPLETE: CPT

## 2019-04-11 PROCEDURE — 93306 TTE W/DOPPLER COMPLETE: CPT | Performed by: INTERNAL MEDICINE

## 2019-04-12 ENCOUNTER — TELEPHONE (OUTPATIENT)
Dept: FAMILY MEDICINE CLINIC | Facility: CLINIC | Age: 84
End: 2019-04-12

## 2019-04-12 DIAGNOSIS — R60.0 LOWER EXTREMITY EDEMA: ICD-10-CM

## 2019-04-12 RX ORDER — FUROSEMIDE 40 MG/1
TABLET ORAL
Qty: 40 TABLET | Refills: 0 | OUTPATIENT
Start: 2019-04-12

## 2019-04-12 RX ORDER — FUROSEMIDE 40 MG/1
TABLET ORAL
Qty: 30 TABLET | Refills: 1 | Status: SHIPPED | OUTPATIENT
Start: 2019-04-12 | End: 2019-04-16 | Stop reason: SDUPTHER

## 2019-04-16 ENCOUNTER — OFFICE VISIT (OUTPATIENT)
Dept: FAMILY MEDICINE CLINIC | Facility: CLINIC | Age: 84
End: 2019-04-16
Payer: COMMERCIAL

## 2019-04-16 VITALS
TEMPERATURE: 97.5 F | BODY MASS INDEX: 23.14 KG/M2 | DIASTOLIC BLOOD PRESSURE: 60 MMHG | RESPIRATION RATE: 16 BRPM | SYSTOLIC BLOOD PRESSURE: 130 MMHG | HEIGHT: 66 IN | WEIGHT: 144 LBS | HEART RATE: 72 BPM

## 2019-04-16 DIAGNOSIS — R60.0 LOWER EXTREMITY EDEMA: Primary | ICD-10-CM

## 2019-04-16 DIAGNOSIS — D72.819 LEUKOPENIA, UNSPECIFIED TYPE: ICD-10-CM

## 2019-04-16 DIAGNOSIS — N18.30 CKD (CHRONIC KIDNEY DISEASE) STAGE 3, GFR 30-59 ML/MIN (HCC): ICD-10-CM

## 2019-04-16 PROCEDURE — 1125F AMNT PAIN NOTED PAIN PRSNT: CPT | Performed by: NURSE PRACTITIONER

## 2019-04-16 PROCEDURE — 99214 OFFICE O/P EST MOD 30 MIN: CPT | Performed by: NURSE PRACTITIONER

## 2019-04-16 PROCEDURE — G0438 PPPS, INITIAL VISIT: HCPCS | Performed by: NURSE PRACTITIONER

## 2019-04-16 PROCEDURE — 1170F FXNL STATUS ASSESSED: CPT | Performed by: NURSE PRACTITIONER

## 2019-04-16 RX ORDER — POTASSIUM CHLORIDE 750 MG/1
10 CAPSULE, EXTENDED RELEASE ORAL DAILY
Qty: 90 CAPSULE | Refills: 1 | Status: SHIPPED | OUTPATIENT
Start: 2019-04-16 | End: 2019-10-29 | Stop reason: SDUPTHER

## 2019-04-16 RX ORDER — FUROSEMIDE 40 MG/1
TABLET ORAL
Qty: 90 TABLET | Refills: 1 | Status: SHIPPED | OUTPATIENT
Start: 2019-04-16 | End: 2019-10-03 | Stop reason: SDUPTHER

## 2019-07-05 ENCOUNTER — OFFICE VISIT (OUTPATIENT)
Dept: FAMILY MEDICINE CLINIC | Facility: CLINIC | Age: 84
End: 2019-07-05
Payer: COMMERCIAL

## 2019-07-05 VITALS
RESPIRATION RATE: 16 BRPM | HEART RATE: 60 BPM | HEIGHT: 66 IN | SYSTOLIC BLOOD PRESSURE: 160 MMHG | WEIGHT: 139.25 LBS | TEMPERATURE: 97.3 F | OXYGEN SATURATION: 97 % | DIASTOLIC BLOOD PRESSURE: 70 MMHG | BODY MASS INDEX: 22.38 KG/M2

## 2019-07-05 DIAGNOSIS — L03.116 CELLULITIS OF LEFT LOWER EXTREMITY: Primary | ICD-10-CM

## 2019-07-05 PROCEDURE — 99213 OFFICE O/P EST LOW 20 MIN: CPT | Performed by: NURSE PRACTITIONER

## 2019-07-05 RX ORDER — DOXYCYCLINE 100 MG/1
100 CAPSULE ORAL 2 TIMES DAILY
Qty: 14 CAPSULE | Refills: 0 | Status: SHIPPED | OUTPATIENT
Start: 2019-07-05 | End: 2019-07-12 | Stop reason: SDUPTHER

## 2019-07-05 RX ORDER — CEPHALEXIN 500 MG/1
CAPSULE ORAL
Refills: 0 | COMMUNITY
Start: 2019-06-27 | End: 2019-07-05 | Stop reason: ALTCHOICE

## 2019-07-05 NOTE — PROGRESS NOTES
FAMILY PRACTICE OFFICE VISIT       NAME: Jax Barney  AGE: 80 y o  SEX: female       : 1925        MRN: [id number]    DATE: 2019    Assessment and Plan     Problem List Items Addressed This Visit     None      Visit Diagnoses     Cellulitis of left lower extremity    -  Primary    Relevant Medications    doxycycline monohydrate (MONODOX) 100 mg capsule        1  Cellulitis of left lower extremity  doxycycline monohydrate (MONODOX) 100 mg capsule     This 79-year-old female presents today with suspected cellulitis of left lower extremity  She completed a course of Keflex that was prescribed at urgent care  Depth in color of redness has improved, however redness has extended over more of her lower extremity  She still has a stabbing intermittent pain in her left lower leg  Will treat with doxycycline 100 mg twice daily for 7 days  Warm compresses to left lower extremity 15-20 minutes 3-4 times per day  Tdap was administered at urgent care  Rest, elevate  She was trying compression stockings, but they are difficult to put on  I have asked her to return early next week for re-eval        Chief Complaint     Chief Complaint   Patient presents with    Follow-Up Cellulitis       History of Present Illness     Jax Barney is a 79-year-old female presenting today accompanied by her daughter-in-law, Russell Spencer  Approximately 2 weeks ago she was losing her balance going up concrete stairs, and leaned back to catch her balance  Her posterior left lower leg scraped the back of a concrete step  She went to urgent care on  for redness and increased swelling  She was treated with a course of Keflex  She has been washing with soap and water applying Neosporin daily  Completed Keflex yesterday, has noted some improvement in symptoms  Redness has extended, however depth of redness color is improving  Abrasions have improved significantly    She does still have intermittent stabbing pain in her left lower leg  Review of Systems   Review of Systems   Constitutional: Negative for chills, diaphoresis, fatigue and fever  Skin: Positive for wound (As noted in HPI)  Active Problem List     Patient Active Problem List   Diagnosis    Neuropathy    Ambulatory dysfunction    Venous stasis dermatitis of both lower extremities    Age-related osteoporosis without current pathological fracture    Lower extremity edema    CKD (chronic kidney disease) stage 3, GFR 30-59 ml/min ([facility])       Past Medical History:  Past Medical History:   Diagnosis Date    Interstitial cystitis     Leukopenia     Neurologic gait dysfunction     Abstraction Dr Jose Ruvalcaba charts    Neuropathy     Neutropenia [facility])     Abstraction Dr Ivelisse Acuna Peripheral edema     Abstraction Dr Ed Venegas Renal cyst     Syncope     Abstraction Dr Jose Ruvalcaba charts       Past Surgical History:  Past Surgical History:   Procedure Laterality Date    CAPSULOTOMY      Right eye    CATARACT EXTRACTION Left     CHALAZION EXCISION      COLONOSCOPY  2006       Family History:  Family History   Problem Relation Age of Onset    Diabetes Mother     Lung disease Father     Cancer Maternal Grandfather     Lung disease Sister        Social History:  Social History     Socioeconomic History    Marital status:       Spouse name: Not on file    Number of children: Not on file    Years of education: Not on file    Highest education level: Not on file   Occupational History    Not on file   Social Needs    Financial resource strain: Not on file    Food insecurity:     Worry: Not on file     Inability: Not on file    Transportation needs:     Medical: Not on file     Non-medical: Not on file   Tobacco Use    Smoking status: Former Smoker     Packs/day:      Years: 20 00     Pack years: 20 00     Last attempt to quit:      Years since quittin 5    Smokeless tobacco: Never Used   Substance and Sexual Activity    Alcohol use: No     Comment: socially    Drug use: No    Sexual activity: Not Currently   Lifestyle    Physical activity:     Days per week: Not on file     Minutes per session: Not on file    Stress: Not on file   Relationships    Social connections:     Talks on phone: Not on file     Gets together: Not on file     Attends Episcopal service: Not on file     Active member of club or organization: Not on file     Attends meetings of clubs or organizations: Not on file     Relationship status: Not on file    Intimate partner violence:     Fear of current or ex partner: Not on file     Emotionally abused: Not on file     Physically abused: Not on file     Forced sexual activity: Not on file   Other Topics Concern    Not on file   Social History Narrative    Lives with her son Dior Henriquez and daughter in [place]  I have reviewed the patient's medical history in detail; there are no changes to the history as noted in the electronic medical record  Objective     Vitals:    07/05/19 1544   BP: 160/70   BP Location: Left arm   Patient Position: Sitting   Cuff Size: Adult   Pulse: 60   Resp: 16   Temp: (!) 97 3 °F (36 3 °C)   TempSrc: Tympanic   SpO2: 97%   Weight: 63 2 kg (139 lb 4 oz)   Height: 5' 6" (1 676 m)     Wt Readings from Last 3 Encounters:   07/05/19 63 2 kg (139 lb 4 oz)   04/16/19 65 3 kg (144 lb)   03/20/19 65 5 kg (144 lb 6 4 oz)     Body mass index is 22 48 kg/m²  PHQ-9 Depression Screening    PHQ-9:    Frequency of the following problems over the past two weeks:            Physical Exam   Constitutional: She appears well-developed and well-nourished  No distress  HENT:   Head: Normocephalic and atraumatic  Cardiovascular: Normal rate, regular rhythm and normal heart sounds  No murmur heard    Pulmonary/Chest: Effort normal and breath sounds normal    Skin:   Left lower extremity with well demarcated erythema on left lower extremity, mid calf to ankle  2 healing scabbed areas on the back of left lower leg  Bilateral stasis dermatitis  +1 pitting edema to left lower extremity  Trace to right lower extremity  Psychiatric: She has a normal mood and affect  Nursing note and vitals reviewed  ALLERGIES:  No Known Allergies    Current Medications     Current Outpatient Medications   Medication Sig Dispense Refill    furosemide (LASIX) 40 mg tablet Take 1 tablet (40 mg) daily  90 tablet 1    gabapentin (NEURONTIN) 300 mg capsule Take 300 mg by mouth 2 (two) times a day       potassium chloride (MICRO-K) 10 MEQ CR capsule Take 1 capsule (10 mEq total) by mouth daily 90 capsule 1    doxycycline monohydrate (MONODOX) 100 mg capsule Take 1 capsule (100 mg total) by mouth 2 (two) times a day for 7 days 14 capsule 0     No current facility-administered medications for this visit            Health Maintenance     Health Maintenance   Topic Date Due    HEPATITIS B VACCINES (1 of 3 - Risk 3-dose series) 05/16/1944    Pneumococcal Vaccine: 65+ Years (2 of 2 - PCV13) 10/19/2000    CRC Screening: Colonoscopy  10/11/2009    DTaP,Tdap,and Td Vaccines (1 - Tdap) 06/28/2019    INFLUENZA VACCINE  03/20/2020 (Originally 7/1/2019)    Fall Risk  04/16/2020    Depression Screening PHQ  04/16/2020    Urinary Incontinence Screening  04/16/2020    Medicare Annual Wellness Visit (AWV)  04/16/2020    BMI: Adult  07/05/2020    Pneumococcal Vaccine: Pediatrics (0 to 5 Years) and At-Risk Patients (6 to 59 Years)  Aged [employer] History   Administered Date(s) Administered    Pneumococcal Polysaccharide PPV23 10/19/1999    Td (adult), Unspecified 06/27/2019       KERON Us

## 2019-07-12 ENCOUNTER — LAB (OUTPATIENT)
Dept: LAB | Facility: CLINIC | Age: 84
End: 2019-07-12
Payer: COMMERCIAL

## 2019-07-12 ENCOUNTER — OFFICE VISIT (OUTPATIENT)
Dept: FAMILY MEDICINE CLINIC | Facility: CLINIC | Age: 84
End: 2019-07-12
Payer: COMMERCIAL

## 2019-07-12 VITALS
OXYGEN SATURATION: 95 % | RESPIRATION RATE: 16 BRPM | HEART RATE: 69 BPM | SYSTOLIC BLOOD PRESSURE: 138 MMHG | WEIGHT: 141.4 LBS | TEMPERATURE: 98.8 F | HEIGHT: 66 IN | BODY MASS INDEX: 22.73 KG/M2 | DIASTOLIC BLOOD PRESSURE: 64 MMHG

## 2019-07-12 DIAGNOSIS — D72.819 LEUKOPENIA, UNSPECIFIED TYPE: ICD-10-CM

## 2019-07-12 DIAGNOSIS — N18.30 CKD (CHRONIC KIDNEY DISEASE) STAGE 3, GFR 30-59 ML/MIN (HCC): Primary | ICD-10-CM

## 2019-07-12 DIAGNOSIS — L03.116 CELLULITIS OF LEFT LOWER EXTREMITY: ICD-10-CM

## 2019-07-12 DIAGNOSIS — R60.0 LOWER EXTREMITY EDEMA: ICD-10-CM

## 2019-07-12 DIAGNOSIS — N18.30 CKD (CHRONIC KIDNEY DISEASE) STAGE 3, GFR 30-59 ML/MIN (HCC): ICD-10-CM

## 2019-07-12 LAB
ANION GAP SERPL CALCULATED.3IONS-SCNC: 4 MMOL/L (ref 4–13)
BUN SERPL-MCNC: 19 MG/DL (ref 5–25)
CALCIUM SERPL-MCNC: 9.1 MG/DL (ref 8.3–10.1)
CHLORIDE SERPL-SCNC: 106 MMOL/L (ref 100–108)
CO2 SERPL-SCNC: 30 MMOL/L (ref 21–32)
CREAT SERPL-MCNC: 1.1 MG/DL (ref 0.6–1.3)
ERYTHROCYTE [DISTWIDTH] IN BLOOD BY AUTOMATED COUNT: 12.8 % (ref 11.6–15.1)
GFR SERPL CREATININE-BSD FRML MDRD: 43 ML/MIN/1.73SQ M
GLUCOSE SERPL-MCNC: 81 MG/DL (ref 65–140)
HCT VFR BLD AUTO: 43.8 % (ref 34.8–46.1)
HGB BLD-MCNC: 14.1 G/DL (ref 11.5–15.4)
MCH RBC QN AUTO: 32 PG (ref 26.8–34.3)
MCHC RBC AUTO-ENTMCNC: 32.2 G/DL (ref 31.4–37.4)
MCV RBC AUTO: 99 FL (ref 82–98)
PLATELET # BLD AUTO: 175 THOUSANDS/UL (ref 149–390)
PMV BLD AUTO: 10.6 FL (ref 8.9–12.7)
POTASSIUM SERPL-SCNC: 4.3 MMOL/L (ref 3.5–5.3)
RBC # BLD AUTO: 4.41 MILLION/UL (ref 3.81–5.12)
SODIUM SERPL-SCNC: 140 MMOL/L (ref 136–145)
WBC # BLD AUTO: 4.59 THOUSAND/UL (ref 4.31–10.16)

## 2019-07-12 PROCEDURE — 85027 COMPLETE CBC AUTOMATED: CPT

## 2019-07-12 PROCEDURE — 99213 OFFICE O/P EST LOW 20 MIN: CPT | Performed by: NURSE PRACTITIONER

## 2019-07-12 PROCEDURE — 80048 BASIC METABOLIC PNL TOTAL CA: CPT

## 2019-07-12 PROCEDURE — 36415 COLL VENOUS BLD VENIPUNCTURE: CPT

## 2019-07-12 RX ORDER — DOXYCYCLINE 100 MG/1
100 CAPSULE ORAL 2 TIMES DAILY
Qty: 6 CAPSULE | Refills: 0 | Status: SHIPPED | OUTPATIENT
Start: 2019-07-12 | End: 2019-07-15

## 2019-07-12 NOTE — RESULT ENCOUNTER NOTE
Please let patient know her blood work is good  Please have her repeat her blood work and follow-up in October  I placed the orders for her blood work

## 2019-07-12 NOTE — PROGRESS NOTES
FAMILY PRACTICE OFFICE VISIT       NAME: Gisselle Hobbs  AGE: 80 y o  SEX: female       : 1925        MRN: [id number]    DATE: 2019    Assessment and Plan     Problem List Items Addressed This Visit     None      Visit Diagnoses     Cellulitis of left lower extremity        Relevant Medications    doxycycline monohydrate (MONODOX) 100 mg capsule        1  Cellulitis of left lower extremity  doxycycline monohydrate (MONODOX) 100 mg capsule     This 63-year-old female presents today to follow-up on left lower leg cellulitis  Symptoms are improving significantly  Recommend taking 3 more days of doxycycline  Continue to rest, elevate, warm compresses  Instructed to call if symptoms worsen, or persistent  Chief Complaint     Chief Complaint   Patient presents with    Follow-up       History of Present Illness     Gisselle Hobbs is a 63-year-old female presenting today for follow-up on left lower leg cellulitis  Today is her last dose of doxycycline  Pain has improved significantly  Still has some soreness at times, but not as intense before  She is walking much better due to less pain  Redness is improving, but still present  Edema is at her baseline  Accompanied by her daughter-in-law, Thania Gaston, today  Review of Systems   Review of Systems   Constitutional: Negative for chills, diaphoresis, fatigue and fever     Skin:        As noted in HPI       Active Problem List     Patient Active Problem List   Diagnosis    Neuropathy    Ambulatory dysfunction    Venous stasis dermatitis of both lower extremities    Age-related osteoporosis without current pathological fracture    Lower extremity edema    CKD (chronic kidney disease) stage 3, GFR 30-59 ml/min ([facility])       Past Medical History:  Past Medical History:   Diagnosis Date    Interstitial cystitis     Leukopenia     Neurologic gait dysfunction     Abstraction Dr Flo Shipley charts    Neuropathy     Neutropenia [facility])     Abstraction Dr Jus Garcia Peripheral edema     Abstraction Dr Little K[place]ns Renal cyst     Syncope     Abstraction Dr Morris [place] charts       Past Surgical History:  Past Surgical History:   Procedure Laterality Date    CAPSULOTOMY      Right eye    CATARACT EXTRACTION Left     CHALAZION EXCISION      COLONOSCOPY  2006       Family History:  Family History   Problem Relation Age of Onset    Diabetes Mother     Lung disease Father     Cancer Maternal Grandfather     Lung disease Sister        Social History:  Social History     Socioeconomic History    Marital status:       Spouse name: Not on file    Number of children: Not on file    Years of education: Not on file    Highest education level: Not on file   Occupational History    Not on file   Social Needs    Financial resource strain: Not on file    Food insecurity:     Worry: Not on file     Inability: Not on file    Transportation needs:     Medical: Not on file     Non-medical: Not on file   Tobacco Use    Smoking status: Former Smoker     Packs/day:      Years: 20 00     Pack years: 20 00     Last attempt to quit:      Years since quittin 5    Smokeless tobacco: Never Used   Substance and Sexual Activity    Alcohol use: No     Comment: socially    Drug use: No    Sexual activity: Not Currently   Lifestyle    Physical activity:     Days per week: Not on file     Minutes per session: Not on file    Stress: Not on file   Relationships    Social connections:     Talks on phone: Not on file     Gets together: Not on file     Attends Gnosticist service: Not on file     Active member of club or organization: Not on file     Attends meetings of clubs or organizations: Not on file     Relationship status: Not on file    Intimate partner violence:     Fear of current or ex partner: Not on file     Emotionally abused: Not on file     Physically abused: Not on file     Forced sexual activity: Not on file   Other Topics Concern    Not on file   Social History Narrative    Lives with her son Mann Westfall and daughter in [place]  I have reviewed the patient's medical history in detail; there are no changes to the history as noted in the electronic medical record  Objective     Vitals:    07/12/19 1105   BP: 138/64   BP Location: Left arm   Patient Position: Sitting   Cuff Size: Adult   Pulse: 69   Resp: 16   Temp: 98 8 °F (37 1 °C)   TempSrc: Tympanic   SpO2: 95%   Weight: 64 1 kg (141 lb 6 4 oz)   Height: 5' 6" (1 676 m)     Wt Readings from Last 3 Encounters:   07/12/19 64 1 kg (141 lb 6 4 oz)   07/05/19 63 2 kg (139 lb 4 oz)   04/16/19 65 3 kg (144 lb)     Body mass index is 22 82 kg/m²  PHQ-9 Depression Screening    PHQ-9:    Frequency of the following problems over the past two weeks:            Physical Exam   Constitutional: She appears well-developed and well-nourished  No distress  Neck: Normal range of motion  Neck supple  Cardiovascular: Normal rate, regular rhythm and normal heart sounds  No murmur heard  Pulses:       Dorsalis pedis pulses are 1+ on the right side, and 1+ on the left side  Posterior tibial pulses are 1+ on the right side, and 1+ on the left side  Pulmonary/Chest: Effort normal and breath sounds normal    Musculoskeletal:   Left lower leg redness improving, but still present  Two scabbed healing areas posterior left lower let  Bilateral PVD discoloration  +1 pitting ankle edema bilaterally, left slightly greater than right  Psychiatric: She has a normal mood and affect  ALLERGIES:  No Known Allergies    Current Medications     Current Outpatient Medications   Medication Sig Dispense Refill    doxycycline monohydrate (MONODOX) 100 mg capsule Take 1 capsule (100 mg total) by mouth 2 (two) times a day for 3 days 6 capsule 0    furosemide (LASIX) 40 mg tablet Take 1 tablet (40 mg) daily   90 tablet 1    gabapentin (NEURONTIN) 300 mg capsule Take 300 mg by mouth 2 (two) times a day       potassium chloride (MICRO-K) 10 MEQ CR capsule Take 1 capsule (10 mEq total) by mouth daily 90 capsule 1     No current facility-administered medications for this visit            Health Maintenance     Health Maintenance   Topic Date Due    HEPATITIS B VACCINES (1 of 3 - Risk 3-dose series) 05/16/1944    Pneumococcal Vaccine: 65+ Years (2 of 2 - PCV13) 10/19/2000    CRC Screening: Colonoscopy  10/11/2009    DTaP,Tdap,and Td Vaccines (1 - Tdap) 06/28/2019    INFLUENZA VACCINE  03/20/2020 (Originally 7/1/2019)    Fall Risk  04/16/2020    Depression Screening PHQ  04/16/2020    Urinary Incontinence Screening  04/16/2020    Medicare Annual Wellness Visit (AWV)  04/16/2020    BMI: Adult  07/12/2020    Pneumococcal Vaccine: Pediatrics (0 to 5 Years) and At-Risk Patients (6 to 59 Years)  Aged [employer] History   Administered Date(s) Administered    Pneumococcal Polysaccharide PPV23 10/19/1999    Td (adult), Unspecified 06/27/2019    Td (adult), adsorbed 06/27/2019       KERON Aldrich

## 2019-07-23 ENCOUNTER — OFFICE VISIT (OUTPATIENT)
Dept: FAMILY MEDICINE CLINIC | Facility: CLINIC | Age: 84
End: 2019-07-23
Payer: COMMERCIAL

## 2019-07-23 VITALS
TEMPERATURE: 97.6 F | OXYGEN SATURATION: 96 % | BODY MASS INDEX: 22.73 KG/M2 | HEIGHT: 66 IN | HEART RATE: 64 BPM | SYSTOLIC BLOOD PRESSURE: 142 MMHG | RESPIRATION RATE: 16 BRPM | DIASTOLIC BLOOD PRESSURE: 80 MMHG | WEIGHT: 141.4 LBS

## 2019-07-23 DIAGNOSIS — T14.8XXD DELAYED WOUND HEALING: ICD-10-CM

## 2019-07-23 DIAGNOSIS — R60.0 LOWER EXTREMITY EDEMA: ICD-10-CM

## 2019-07-23 DIAGNOSIS — L30.9 DERMATITIS: ICD-10-CM

## 2019-07-23 DIAGNOSIS — M79.662 PAIN IN LEFT LOWER LEG: Primary | ICD-10-CM

## 2019-07-23 PROCEDURE — 1160F RVW MEDS BY RX/DR IN RCRD: CPT | Performed by: NURSE PRACTITIONER

## 2019-07-23 PROCEDURE — 99214 OFFICE O/P EST MOD 30 MIN: CPT | Performed by: NURSE PRACTITIONER

## 2019-07-23 RX ORDER — TRIAMCINOLONE ACETONIDE 1 MG/G
CREAM TOPICAL 2 TIMES DAILY
Qty: 30 G | Refills: 0 | Status: SHIPPED | OUTPATIENT
Start: 2019-07-23 | End: 2020-01-27 | Stop reason: ALTCHOICE

## 2019-07-23 NOTE — PROGRESS NOTES
FAMILY PRACTICE OFFICE VISIT       NAME: Gisselle Hobbs  AGE: 80 y o  SEX: female       : 1925        MRN: [id number]    DATE: 2019    Assessment and Plan     Problem List Items Addressed This Visit        Other    Lower extremity edema    Relevant Orders    Basic metabolic panel      Other Visit Diagnoses     Pain in left lower leg    -  Primary    Relevant Orders    VAS lower limb arterial duplex, complete bilateral    Delayed wound healing        Relevant Orders    VAS lower limb arterial duplex, complete bilateral    Dermatitis        Relevant Medications    triamcinolone (KENALOG) 0 1 % cream        1  Pain in left lower leg  VAS lower limb arterial duplex, complete bilateral   2  Delayed wound healing  VAS lower limb arterial duplex, complete bilateral   3  Dermatitis  triamcinolone (KENALOG) 0 1 % cream   4  Lower extremity edema  Basic metabolic panel     This 20-G[facility] female presents today for persistent intermittent left lower leg pain and poor healing of abrasions on posterior lower leg over the past 1 month  Abrasions are scabbed and healing slowly  PVD dermatitis bilaterally, will check bilateral lower extremity arterial doppler  Apply eucerin cream or aquaphor to lower extremities daily  Elevate periodically throughout the day  Office will with call with results of Doppler when available  Dermatitis right lateral knee  Will trial triamcinolone cream 0 1%, apply twice daily  Instructed to call if no improvement rash in 1 week  Bilateral ankle and pedal edema  Furosemide 40 mg daily is helping somewhat  Will increase to furosemide 60 mg daily  Continue to stay hydrated  BMP in 1 week  Elevate lower extremities  Consider wearing compression stockings            Chief Complaint     Chief Complaint   Patient presents with    Leg Injury     Pt is here for left leg abrasion not healing and rt leg rash       History of Present Illness     Gisselle Hobbs is a 80-year-old female presenting today for follow-up on left lower extremity  One month ago she she scraped the back of her left leg on the steps at home, and had abrasions on the back of her leg and cellulitis  She was treated for cellulitis  Abrasions are healing very slowly  Still has some pain in left lower extremity  Also has a new rash developing on right lateral knee  Complains of persistent bilateral ankle edema  Furosemide 40 mg daily is only helping a little bit  Review of Systems   Review of Systems   Constitutional: Negative  Cardiovascular: Positive for leg swelling  Skin: Positive for wound (As noted in HPI)  Neurological: Negative  Active Problem List     Patient Active Problem List   Diagnosis    Neuropathy    Ambulatory dysfunction    Venous stasis dermatitis of both lower extremities    Age-related osteoporosis without current pathological fracture    Lower extremity edema    CKD (chronic kidney disease) stage 3, GFR 30-59 ml/min ([facility])       Past Medical History:  Past Medical History:   Diagnosis Date    Interstitial cystitis     Leukopenia     Neurologic gait dysfunction     Abstraction Dr Pawan Belcher charts    Neuropathy     Neutropenia [facility])     Abstraction Dr Chano Sims Peripheral edema     Abstraction Dr Shruthi Harris Renal cyst     Syncope     Abstraction Dr Pawan Belcher charts       Past Surgical History:  Past Surgical History:   Procedure Laterality Date    CAPSULOTOMY      Right eye    CATARACT EXTRACTION Left     CHALAZION EXCISION      COLONOSCOPY  2006       Family History:  Family History   Problem Relation Age of Onset    Diabetes Mother     Lung disease Father     Cancer Maternal Grandfather     Lung disease Sister        Social History:  Social History     Socioeconomic History    Marital status:       Spouse name: Not on file    Number of children: Not on file    Years of education: Not on file    Highest education level: Not on file   Occupational History    Not on file   Social Needs    Financial resource strain: Not on file    Food insecurity:     Worry: Not on file     Inability: Not on file    Transportation needs:     Medical: Not on file     Non-medical: Not on file   Tobacco Use    Smoking status: Former Smoker     Packs/day: 1 00     Years: 20 00     Pack years: 20 00     Last attempt to quit: 1985     Years since quittin 5    Smokeless tobacco: Never Used   Substance and Sexual Activity    Alcohol use: No     Comment: socially    Drug use: No    Sexual activity: Not Currently   Lifestyle    Physical activity:     Days per week: Not on file     Minutes per session: Not on file    Stress: Not on file   Relationships    Social connections:     Talks on phone: Not on file     Gets together: Not on file     Attends Judaism service: Not on file     Active member of club or organization: Not on file     Attends meetings of clubs or organizations: Not on file     Relationship status: Not on file    Intimate partner violence:     Fear of current or ex partner: Not on file     Emotionally abused: Not on file     Physically abused: Not on file     Forced sexual activity: Not on file   Other Topics Concern    Not on file   Social History Narrative    Lives with her son Alanis Brower and daughter in [place]  I have reviewed the patient's medical history in detail; there are no changes to the history as noted in the electronic medical record  Objective     Vitals:    19 1301 19 1341   BP: 150/90 142/80   Pulse: 64    Resp: 16    Temp: 97 6 °F (36 4 °C)    TempSrc: Tympanic    SpO2: 96%    Weight: 64 1 kg (141 lb 6 4 oz)    Height: 5' 6" (1 676 m)      Wt Readings from Last 3 Encounters:   19 64 1 kg (141 lb 6 4 oz)   19 64 1 kg (141 lb 6 4 oz)   19 63 2 kg (139 lb 4 oz)     Body mass index is 22 82 kg/m²    PHQ-9 Depression Screening    PHQ-9: Frequency of the following problems over the past two weeks:            Physical Exam   Constitutional: She appears well-developed and well-nourished  No distress  HENT:   Head: Normocephalic and atraumatic  Eyes: Conjunctivae are normal    Neck: Normal range of motion  Neck supple  Cardiovascular: Normal rate, regular rhythm and normal heart sounds  No murmur heard  Pulmonary/Chest: Effort normal and breath sounds normal    Musculoskeletal: She exhibits edema (+1 pitting bilateral ankle and pedal edema)  Skin:   Two abrasions on posterior lower leg, scabbed, slow healing  No signs of infection  PVD dermatitis bilaterally, left greater than right  Right lateral knee with patch of erythematous macular rash  Psychiatric: She has a normal mood and affect  Nursing note and vitals reviewed  ALLERGIES:  No Known Allergies    Current Medications     Current Outpatient Medications   Medication Sig Dispense Refill    furosemide (LASIX) 40 mg tablet Take 1 tablet (40 mg) daily  90 tablet 1    gabapentin (NEURONTIN) 300 mg capsule Take 300 mg by mouth 2 (two) times a day       potassium chloride (MICRO-K) 10 MEQ CR capsule Take 1 capsule (10 mEq total) by mouth daily 90 capsule 1    triamcinolone (KENALOG) 0 1 % cream Apply topically 2 (two) times a day 30 g 0     No current facility-administered medications for this visit            Health Maintenance     Health Maintenance   Topic Date Due    HEPATITIS B VACCINES (1 of 3 - Risk 3-dose series) 05/16/1944    Pneumococcal Vaccine: 65+ Years (2 of 2 - PCV13) 10/19/2000    DTaP,Tdap,and Td Vaccines (1 - Tdap) 06/28/2019    INFLUENZA VACCINE  03/20/2020 (Originally 7/1/2019)    Fall Risk  04/16/2020    Depression Screening PHQ  04/16/2020    Urinary Incontinence Screening  04/16/2020    Medicare Annual Wellness Visit (AWV)  04/16/2020    BMI: Adult  07/23/2020    Pneumococcal Vaccine: Pediatrics (0 to 5 Years) and At-Risk Patients (6 to 59 Years)  Aged [employer] History   Administered Date(s) Administered    Pneumococcal Polysaccharide PPV23 10/19/1999    Td (adult), Unspecified 06/27/2019    Td (adult), adsorbed 06/27/2019       Katie James Speaker, MELINDANP

## 2019-10-03 DIAGNOSIS — R60.0 LOWER EXTREMITY EDEMA: ICD-10-CM

## 2019-10-03 RX ORDER — FUROSEMIDE 40 MG/1
TABLET ORAL
Qty: 90 TABLET | Refills: 1 | Status: SHIPPED | OUTPATIENT
Start: 2019-10-03 | End: 2020-03-30

## 2019-10-29 DIAGNOSIS — R60.0 LOWER EXTREMITY EDEMA: ICD-10-CM

## 2019-10-29 RX ORDER — POTASSIUM CHLORIDE 750 MG/1
CAPSULE, EXTENDED RELEASE ORAL
Qty: 90 CAPSULE | Refills: 1 | Status: SHIPPED | OUTPATIENT
Start: 2019-10-29 | End: 2020-06-06

## 2020-01-27 ENCOUNTER — OFFICE VISIT (OUTPATIENT)
Dept: FAMILY MEDICINE CLINIC | Facility: CLINIC | Age: 85
End: 2020-01-27
Payer: COMMERCIAL

## 2020-01-27 VITALS
SYSTOLIC BLOOD PRESSURE: 120 MMHG | HEIGHT: 66 IN | OXYGEN SATURATION: 98 % | BODY MASS INDEX: 22.6 KG/M2 | TEMPERATURE: 98.2 F | DIASTOLIC BLOOD PRESSURE: 80 MMHG | RESPIRATION RATE: 16 BRPM | HEART RATE: 108 BPM | WEIGHT: 140.6 LBS

## 2020-01-27 DIAGNOSIS — I51.89 GRADE I DIASTOLIC DYSFUNCTION: ICD-10-CM

## 2020-01-27 DIAGNOSIS — R60.0 LOWER EXTREMITY EDEMA: ICD-10-CM

## 2020-01-27 DIAGNOSIS — S51.011A SKIN TEAR OF RIGHT ELBOW WITHOUT COMPLICATION, INITIAL ENCOUNTER: Primary | ICD-10-CM

## 2020-01-27 DIAGNOSIS — N18.30 CKD (CHRONIC KIDNEY DISEASE) STAGE 3, GFR 30-59 ML/MIN (HCC): ICD-10-CM

## 2020-01-27 DIAGNOSIS — E78.5 DYSLIPIDEMIA: ICD-10-CM

## 2020-01-27 PROCEDURE — 1160F RVW MEDS BY RX/DR IN RCRD: CPT | Performed by: NURSE PRACTITIONER

## 2020-01-27 PROCEDURE — 99214 OFFICE O/P EST MOD 30 MIN: CPT | Performed by: NURSE PRACTITIONER

## 2020-01-27 RX ORDER — CHOLECALCIFEROL (VITAMIN D3) 25 MCG
CAPSULE ORAL DAILY
COMMUNITY

## 2020-01-27 NOTE — PROGRESS NOTES
FAMILY PRACTICE OFFICE VISIT       NAME: Johan Johnson  AGE: 80 y o  SEX: female       : 1925        MRN: [id number]      Assessment and Plan     Problem List Items Addressed This Visit        Genitourinary    CKD (chronic kidney disease) stage 3, GFR 30-59 ml/min ([facility])    Relevant Orders    Lipid Panel with Direct LDL reflex (Completed)    TSH, 3rd generation (Completed)       Other    Lower extremity edema    Relevant Orders    Lipid Panel with Direct LDL reflex (Completed)    TSH, 3rd generation (Completed)    Ambulatory referral to Cardiology      Other Visit Diagnoses     Skin tear of right elbow without complication, initial encounter    -  Primary    Grade I diastolic dysfunction        Relevant Orders    Ambulatory referral to Cardiology    Dyslipidemia        Relevant Orders    Lipid Panel with Direct LDL reflex (Completed)    TSH, 3rd generation (Completed)        1  Skin tear of right elbow without complication, initial encounter  Right elbow skin tear sustained in a fall 4 days ago  Skin tear shows no signs of infection  Healing as expected  Cleansed and redressed today  Advised daughter in [place], [place], to keep clean, apply antibiotic ointment and keep covered  Redress, twice daily  Return to office for any redness, swelling, edema, pain, fevers, chills, or not feeling well  Also instructed to call if wound does not appear to be healing  2  Lower extremity edema  Bilateral lower extremity edema, patient reports is worsening  She self increase furosemide from 40 mg daily to 80 mg daily 4-5 days ago  She has not noticed any improvement in edema  On exam, she has moderate nonpitting bilateral lower extremity edema  She will complete blood work today, CBC, CMP, lipid panel and TSH  Advised to elevate lower extremities as much as possible during the daytime  Wear compression stockings  And decrease sodium intake to 2000 mg per day     Notes she does eat a lot of microwave meals, such as maccarroni and cheese and pot pies  She would benefit from cardiology evaluation, name and number provided today  Return to furosemide 40 mg daily with KCL 10 meq daily, as higher dose, has not improved edema  Lipid Panel with Direct LDL reflex    TSH, 3rd generation    Ambulatory referral to Cardiology   3  Grade I diastolic dysfunction  Ambulatory referral to Cardiology   4  CKD (chronic kidney disease) stage 3, GFR 30-59 ml/min ([facility])  Will repeat blood work, CBC, CMP, lipid panel, TSH for monitoring  Creatinine has been stable around 1 00-1 07 with GFR 44-45  Lipid Panel with Direct LDL reflex    TSH, 3rd generation   5  Dyslipidemia  Due for repeat Lipid panel  LDL acceptable 116  Lipid Panel with Direct LDL reflex    TSH, 3rd generation           Chief Complaint     Chief Complaint   Patient presents with    Fall     Pt ishere for falling and scraping rt forearm 5 + days       History of Present Illness     Zoey Root is a 17-year-old female presenting today for right arm skin tear  She fell onto her buttocks in the kitchen at home and scraped her right arm  Daughter in law has been applying neosporin and keeping covered  Uses a cane to ambulate  Admits she has poor balance  Complains of bilateral lower extremity edema worsening  States over the past 4-5 days she has self increased her furosemide to 2 tablets daily  However does not seem to be helping  Denies shortness of breath, dizziness, palpitations, chest pain  Denies orthopnea or paroxysmal nocturnal dyspnea  Review of Systems   Review of Systems   Constitutional: Negative  Respiratory: Negative for cough, chest tightness, shortness of breath and wheezing  Cardiovascular: Positive for leg swelling  Negative for chest pain and palpitations  Gastrointestinal: Negative  Genitourinary: Negative  Musculoskeletal: Negative  Skin: Positive for wound     Neurological: Negative for dizziness, syncope, speech difficulty, weakness, light-headedness, numbness and headaches  Poor balance as noted in HPI       Active Problem List     Patient Active Problem List   Diagnosis    Neuropathy    Ambulatory dysfunction    Venous stasis dermatitis of both lower extremities    Age-related osteoporosis without current pathological fracture    Lower extremity edema    CKD (chronic kidney disease) stage 3, GFR 30-59 ml/min ([facility])       Past Medical History:  Past Medical History:   Diagnosis Date    Interstitial cystitis     Leukopenia     Neurologic gait dysfunction     Abstraction Dr Jackson Mae charts    Neuropathy     Neutropenia [facility])     Abstraction Dr Kyle Hathaway Peripheral edema     Abstraction Dr Tigre Carpio Renal cyst     Syncope     Abstraction Dr Jackson Mae charts       Past Surgical History:  Past Surgical History:   Procedure Laterality Date    CAPSULOTOMY      Right eye    CATARACT EXTRACTION Left     CHALAZION EXCISION      COLONOSCOPY  2006       Family History:  Family History   Problem Relation Age of Onset    Diabetes Mother     Lung disease Father     Cancer Maternal Grandfather     Lung disease Sister        Social History:  Social History     Socioeconomic History    Marital status:      Spouse name: Not on file    Number of children: Not on file    Years of education: Not on file    Highest education level: Not on file   Occupational History    Not on file   Social Needs    Financial resource strain: Not on file    Food insecurity:     Worry: Not on file     Inability: Not on file    Transportation needs:     Medical: Not on file     Non-medical: Not on file   Tobacco Use    Smoking status: Former Smoker     Packs/day: 1 00     Years: 20 00     Pack years: 20 00     Last attempt to quit:      Years since quittin 0    Smokeless tobacco: Never Used   Substance and Sexual Activity    Alcohol use:  Yes Comment: socially    Drug use: No    Sexual activity: Not Currently   Lifestyle    Physical activity:     Days per week: Not on file     Minutes per session: Not on file    Stress: Not on file   Relationships    Social connections:     Talks on phone: Not on file     Gets together: Not on file     Attends Muslim service: Not on file     Active member of club or organization: Not on file     Attends meetings of clubs or organizations: Not on file     Relationship status: Not on file    Intimate partner violence:     Fear of current or ex partner: Not on file     Emotionally abused: Not on file     Physically abused: Not on file     Forced sexual activity: Not on file   Other Topics Concern    Not on file   Social History Narrative    Lives with her son Darcie Rea and daughter in [place]  I have reviewed the patient's medical history in detail; there are no changes to the history as noted in the electronic medical record  Objective     Vitals:    01/27/20 1204   BP: 120/80   Pulse: (!) 108   Resp: 16   Temp: 98 2 °F (36 8 °C)   TempSrc: Tympanic   SpO2: 98%   Weight: 63 8 kg (140 lb 9 6 oz)   Height: 5' 6" (1 676 m)     Wt Readings from Last 3 Encounters:   01/27/20 63 8 kg (140 lb 9 6 oz)   07/23/19 64 1 kg (141 lb 6 4 oz)   07/12/19 64 1 kg (141 lb 6 4 oz)     Physical Exam   Constitutional: She is oriented to person, place, and time  She appears well-developed and well-nourished  Non-toxic appearance  She does not appear ill  No distress  Cardiovascular: Normal rate, regular rhythm and normal heart sounds  Exam reveals no gallop and no friction rub  No murmur heard  moderate non pitting bilateral lower extremity edema  Pulmonary/Chest: Effort normal and breath sounds normal  No respiratory distress  She has no wheezes  She has no rales  Neurological: She is alert and oriented to person, place, and time  Skin: Skin is warm and dry  Capillary refill takes less than 2 seconds   No rash noted    Right lateral elbow with approximate 6 cm skin tear  Small piece of loose skin cut away with sterile scissors from wound  Approximately, 2 cm of skin tear is open  The rest is covered with purple skin flap, which is well adhered to wound  Daughter in law reports she lifted skin flap and replaced it over wound immediately after injury  There is no redness or warmth  Old dressing with serosanguinous drainage and small amount of yellow discharge  Dressing last changed yesterday  Wound cleansed with normal sterile saline  Neosporin applied  Covered with nonstick dressing and wrapped with gauze  Psychiatric: She has a normal mood and affect  Nursing note and vitals reviewed  ALLERGIES:  No Known Allergies    Current Medications     Current Outpatient Medications   Medication Sig Dispense Refill    Cholecalciferol (VITAMIN D-3) 25 MCG (1000 UT) CAPS Take by mouth daily      furosemide (LASIX) 40 mg tablet TAKE 1 TABLET (40 MG) DAILY  90 tablet 1    gabapentin (NEURONTIN) 300 mg capsule Take 300 mg by mouth 2 (two) times a day       potassium chloride (MICRO-K) 10 MEQ CR capsule TAKE 1 CAPSULE BY MOUTH EVERY DAY 90 capsule 1    TURMERIC PO Take by mouth daily       No current facility-administered medications for this visit            Health Maintenance     Health Maintenance   Topic Date Due    DTaP,Tdap,and Td Vaccines (1 - Tdap) 05/16/1936    Pneumococcal Vaccine: 65+ Years (2 of 2 - PCV13) 10/19/2000    Influenza Vaccine  03/20/2020 (Originally 7/1/2019)    Fall Risk  04/16/2020    Depression Screening PHQ  04/16/2020    Medicare Annual Wellness Visit (AWV)  04/16/2020    BMI: Adult  01/27/2021    Pneumococcal Vaccine: Pediatrics (0 to 5 Years) and At-Risk Patients (6 to 59 Years)  Aged Out    HIB Vaccine  Aged Out    Hepatitis B Vaccine  Aged Out    IPV Vaccine  Aged Out    Hepatitis A Vaccine  Aged Out    Meningococcal ACWY Vaccine  Aged Out    HPV Vaccine  Aged Out Immunization History   Administered Date(s) Administered    Pneumococcal Polysaccharide PPV23 10/19/1999    Td (adult), Unspecified 06/27/2019    Td (adult), adsorbed 06/27/2019       KERON Mckenzie

## 2020-01-28 ENCOUNTER — LAB (OUTPATIENT)
Dept: LAB | Facility: CLINIC | Age: 85
End: 2020-01-28
Payer: COMMERCIAL

## 2020-01-28 DIAGNOSIS — N18.30 CKD (CHRONIC KIDNEY DISEASE) STAGE 3, GFR 30-59 ML/MIN (HCC): ICD-10-CM

## 2020-01-28 DIAGNOSIS — R60.0 LOWER EXTREMITY EDEMA: ICD-10-CM

## 2020-01-28 DIAGNOSIS — E78.5 DYSLIPIDEMIA: ICD-10-CM

## 2020-01-28 LAB
ALBUMIN SERPL BCP-MCNC: 3.4 G/DL (ref 3.5–5)
ALP SERPL-CCNC: 60 U/L (ref 46–116)
ALT SERPL W P-5'-P-CCNC: 17 U/L (ref 12–78)
ANION GAP SERPL CALCULATED.3IONS-SCNC: 3 MMOL/L (ref 4–13)
AST SERPL W P-5'-P-CCNC: 16 U/L (ref 5–45)
BILIRUB SERPL-MCNC: 0.9 MG/DL (ref 0.2–1)
BUN SERPL-MCNC: 20 MG/DL (ref 5–25)
CALCIUM SERPL-MCNC: 9.2 MG/DL (ref 8.3–10.1)
CHLORIDE SERPL-SCNC: 104 MMOL/L (ref 100–108)
CHOLEST SERPL-MCNC: 189 MG/DL (ref 50–200)
CO2 SERPL-SCNC: 33 MMOL/L (ref 21–32)
CREAT SERPL-MCNC: 1.09 MG/DL (ref 0.6–1.3)
ERYTHROCYTE [DISTWIDTH] IN BLOOD BY AUTOMATED COUNT: 12.7 % (ref 11.6–15.1)
GFR SERPL CREATININE-BSD FRML MDRD: 44 ML/MIN/1.73SQ M
GLUCOSE P FAST SERPL-MCNC: 93 MG/DL (ref 65–99)
HCT VFR BLD AUTO: 46.6 % (ref 34.8–46.1)
HDLC SERPL-MCNC: 56 MG/DL
HGB BLD-MCNC: 14.7 G/DL (ref 11.5–15.4)
LDLC SERPL CALC-MCNC: 116 MG/DL (ref 0–100)
MCH RBC QN AUTO: 31.2 PG (ref 26.8–34.3)
MCHC RBC AUTO-ENTMCNC: 31.5 G/DL (ref 31.4–37.4)
MCV RBC AUTO: 99 FL (ref 82–98)
PLATELET # BLD AUTO: 183 THOUSANDS/UL (ref 149–390)
PMV BLD AUTO: 10.1 FL (ref 8.9–12.7)
POTASSIUM SERPL-SCNC: 4 MMOL/L (ref 3.5–5.3)
PROT SERPL-MCNC: 6.6 G/DL (ref 6.4–8.2)
RBC # BLD AUTO: 4.71 MILLION/UL (ref 3.81–5.12)
SODIUM SERPL-SCNC: 140 MMOL/L (ref 136–145)
TRIGL SERPL-MCNC: 87 MG/DL
TSH SERPL DL<=0.05 MIU/L-ACNC: 1.8 UIU/ML (ref 0.36–3.74)
WBC # BLD AUTO: 4.21 THOUSAND/UL (ref 4.31–10.16)

## 2020-01-28 PROCEDURE — 85027 COMPLETE CBC AUTOMATED: CPT

## 2020-01-28 PROCEDURE — 36415 COLL VENOUS BLD VENIPUNCTURE: CPT

## 2020-01-28 PROCEDURE — 80061 LIPID PANEL: CPT

## 2020-01-28 PROCEDURE — 84443 ASSAY THYROID STIM HORMONE: CPT

## 2020-01-28 PROCEDURE — 80053 COMPREHEN METABOLIC PANEL: CPT

## 2020-01-29 DIAGNOSIS — D72.819 LEUKOPENIA, UNSPECIFIED TYPE: Primary | ICD-10-CM

## 2020-01-29 NOTE — RESULT ENCOUNTER NOTE
Please let patient or her son know, her blood work is good, except her white blood cell count is mildly low  I would recommend repeating this in 1 month to make sure it returns to normal range

## 2020-02-03 ENCOUNTER — CONSULT (OUTPATIENT)
Dept: CARDIOLOGY CLINIC | Facility: CLINIC | Age: 85
End: 2020-02-03
Payer: COMMERCIAL

## 2020-02-03 ENCOUNTER — TELEPHONE (OUTPATIENT)
Dept: FAMILY MEDICINE CLINIC | Facility: CLINIC | Age: 85
End: 2020-02-03

## 2020-02-03 VITALS
SYSTOLIC BLOOD PRESSURE: 138 MMHG | DIASTOLIC BLOOD PRESSURE: 76 MMHG | BODY MASS INDEX: 22.11 KG/M2 | HEIGHT: 66 IN | WEIGHT: 137.6 LBS | HEART RATE: 68 BPM

## 2020-02-03 DIAGNOSIS — I51.89 GRADE I DIASTOLIC DYSFUNCTION: ICD-10-CM

## 2020-02-03 DIAGNOSIS — R60.0 LOWER EXTREMITY EDEMA: Primary | ICD-10-CM

## 2020-02-03 PROCEDURE — 99203 OFFICE O/P NEW LOW 30 MIN: CPT | Performed by: INTERNAL MEDICINE

## 2020-02-03 PROCEDURE — 1160F RVW MEDS BY RX/DR IN RCRD: CPT | Performed by: INTERNAL MEDICINE

## 2020-02-03 PROCEDURE — 93000 ELECTROCARDIOGRAM COMPLETE: CPT | Performed by: INTERNAL MEDICINE

## 2020-02-03 NOTE — TELEPHONE ENCOUNTER
Daughter I law would like to discuss possible parkinson's for the patient  She stated that someone her  knows, their father had some of the same symptoms that the patient is having and he was diagnosed with Parkinson's  She would like to know what she can do to have her tested and find out? Please call to discuss    She was made aware that she may need an appointment to discuss,

## 2020-02-03 NOTE — PROGRESS NOTES
Cardiology Consultation     Katlyn Daniel  [id number]  5/16/1925  HEART & VASCULAR [facility] [address] [address]      1  Lower extremity edema  Ambulatory referral to Cardiology    POCT ECG   2  Grade I diastolic dysfunction  Ambulatory referral to Cardiology    POCT ECG       Discussion/Summary:    Overall, she has edema from venous stasis  She does not have heart failure causing her edema  Given her diet is 50% frozen dinners (like Chantal [place] pot pie), she has a high sodium diet  She does not drink too much fluid  She is not consistent with her diuretic  Currently, her edema is just mild after having taken her usual 40mg dose consistently for a week, per family  She is wearing compression stockings  Recommend continuing current lasix dose, elevation, compression stockings, minimize sodium  No need for repeat testing, as she had no cardiac symptoms even when edema was at its worst     Can return to see me PRN  History of Present Illness:  70-year-old female  She presents to the office today with her family for lower extremity edema  She is referred by Carl Chandra  She has been on lasix 40mg daily, but it seems that she doesn't take this regularly  The patient says she does, but family says she hides the pill, throws it on the ground, etc   She admits she doesn't like having to go to the bathroom so often  Was recently seen by Katie, and reported that she was doubling up and taking 80mg, but unclear how long this was for  Overall, her history doesn't seem consistent  Regardless, she says even when the legs were at their worst, she was not short of breath, had no PND, orthopnea  She had an echo done last April, reportedly for similar reasons of LE edema  There was grade 1 diastolic dysfunction, normal for her age        Patient Active Problem List   Diagnosis    Neuropathy    Ambulatory dysfunction    Venous stasis dermatitis of both lower extremities    Age-related osteoporosis without current pathological fracture    Lower extremity edema    CKD (chronic kidney disease) stage 3, GFR 30-59 ml/min ([facility])     Past Medical History:   Diagnosis Date    Interstitial cystitis     Leukopenia     Neurologic gait dysfunction     Abstraction Dr Keith Hernadez charts    Neuropathy     Neutropenia [facility])     Abstraction Dr Leon Mcqueen Peripheral edema     Abstraction Dr Pascale Rudolph Renal cyst     Syncope     Abstraction Dr Chante Talbot History     Tobacco Use    Smoking status: Former Smoker     Packs/day:      Years: 20      Pack years: 20      Last attempt to quit:      Years since quittin 1    Smokeless tobacco: Never Used   Substance Use Topics    Alcohol use: Yes     Comment: socially    Drug use: No      Family History   Problem Relation Age of Onset    Diabetes Mother     Lung disease Father     Cancer Maternal Grandfather     Lung disease Sister      Past Surgical History:   Procedure Laterality Date    CAPSULOTOMY      Right eye    CATARACT EXTRACTION Left     CHALAZION EXCISION      COLONOSCOPY  2006       Current Outpatient Medications:     Cholecalciferol (VITAMIN D-3) 25 MCG (1000 UT) CAPS, Take by mouth daily, Disp: , Rfl:     furosemide (LASIX) 40 mg tablet, TAKE 1 TABLET (40 MG) DAILY  , Disp: 90 tablet, Rfl: 1    gabapentin (NEURONTIN) 300 mg capsule, Take 300 mg by mouth 2 (two) times a day , Disp: , Rfl:     potassium chloride (MICRO-K) 10 MEQ CR capsule, TAKE 1 CAPSULE BY MOUTH EVERY DAY, Disp: 90 capsule, Rfl: 1    TURMERIC PO, Take by mouth daily, Disp: , Rfl:   No Known Allergies    Vitals:    20 1559   BP: 138/76   BP Location: Left arm   Patient Position: Sitting   Cuff Size: Standard   Pulse: 68   Weight: 62 4 kg (137 lb 9 6 oz)   Height: 5' 6" (1 676 m)     Vitals: 02/03/20 1559   Weight: 62 4 kg (137 lb 9 6 oz)      Height: 5' 6" (167 6 cm)   Body mass index is 22 21 kg/m²  Physical Exam:  GENERAL: Alert, well appearing, and in no distress  HEENT:  PERRL, EOMI, no scleral icterus, no conjunctival pallor  NECK:  Supple, No elevated JVP, no thyromegaly, no carotid bruits  HEART:  Regular rate and rhythm, normal S1/S2, no S3/S4, no murmur or rub  LUNGS:  Clear to auscultation bilaterally  ABDOMEN:  Soft, non-tender, positive bowel sounds, no rebound or guarding  EXTREMITIES:  1+ LE edema  NEURO: Slow gait, uses cane, needs assistance  SKIN: Normal without suspicious lesions on exposed skin    ROS:  Positive for difficulty with gait, edema, frequent urination  Except as noted in HPI, is otherwise reviewed in detail and a 12 point review of systems is negative  Labs:  Lab Results   Component Value Date    K 4 0 01/28/2020     01/28/2020    CREATININE 1 09 01/28/2020    BUN 20 01/28/2020    CO2 33 (H) 01/28/2020    ALT 17 01/28/2020    AST 16 01/28/2020    GLUF 93 01/28/2020    WBC 4 21 (L) 01/28/2020    HGB 14 7 01/28/2020    HCT 46 6 (H) 01/28/2020     01/28/2020       No results found for: CHOL  Lab Results   Component Value Date    HDL 56 01/28/2020    HDL 66 (H) 03/21/2019     Lab Results   Component Value Date    LDLCALC 116 (H) 01/28/2020    LDLCALC 116 (H) 03/21/2019     Lab Results   Component Value Date    TRIG 87 01/28/2020    TRIG 54 03/21/2019       Testing:  Echo 4/2019:  LEFT VENTRICLE:  Systolic function was normal  Ejection fraction was estimated to be 63 %  There were no regional wall motion abnormalities  Doppler parameters were consistent with abnormal left ventricular relaxation (grade 1 diastolic dysfunction)      MITRAL VALVE:  There was trace regurgitation      TRICUSPID VALVE:  There was trace regurgitation  EKG:  Sinus rhythm, 68 beats per minute   Bifascicular block

## 2020-02-03 NOTE — TELEPHONE ENCOUNTER
Spoke with Pt's Son Odessa Mon and explained everything to him and he made a appointment with Katie for 2/6/19 @ 11am

## 2020-02-06 ENCOUNTER — OFFICE VISIT (OUTPATIENT)
Dept: FAMILY MEDICINE CLINIC | Facility: CLINIC | Age: 85
End: 2020-02-06
Payer: COMMERCIAL

## 2020-02-06 VITALS
TEMPERATURE: 98.6 F | HEIGHT: 66 IN | DIASTOLIC BLOOD PRESSURE: 70 MMHG | HEART RATE: 86 BPM | OXYGEN SATURATION: 99 % | SYSTOLIC BLOOD PRESSURE: 120 MMHG | BODY MASS INDEX: 22.18 KG/M2 | RESPIRATION RATE: 16 BRPM | WEIGHT: 138 LBS

## 2020-02-06 DIAGNOSIS — R26.89 BALANCE PROBLEM: Primary | ICD-10-CM

## 2020-02-06 DIAGNOSIS — S51.011D SKIN TEAR OF RIGHT ELBOW WITHOUT COMPLICATION, SUBSEQUENT ENCOUNTER: ICD-10-CM

## 2020-02-06 DIAGNOSIS — R26.2 AMBULATORY DYSFUNCTION: ICD-10-CM

## 2020-02-06 DIAGNOSIS — G62.9 NEUROPATHY: ICD-10-CM

## 2020-02-06 DIAGNOSIS — I87.2 VENOUS STASIS DERMATITIS OF BOTH LOWER EXTREMITIES: ICD-10-CM

## 2020-02-06 PROCEDURE — 99214 OFFICE O/P EST MOD 30 MIN: CPT | Performed by: NURSE PRACTITIONER

## 2020-02-06 PROCEDURE — 1160F RVW MEDS BY RX/DR IN RCRD: CPT | Performed by: NURSE PRACTITIONER

## 2020-02-06 PROCEDURE — 4040F PNEUMOC VAC/ADMIN/RCVD: CPT | Performed by: NURSE PRACTITIONER

## 2020-02-06 PROCEDURE — 3078F DIAST BP <80 MM HG: CPT | Performed by: NURSE PRACTITIONER

## 2020-02-06 PROCEDURE — 1036F TOBACCO NON-USER: CPT | Performed by: NURSE PRACTITIONER

## 2020-02-06 PROCEDURE — 3074F SYST BP LT 130 MM HG: CPT | Performed by: NURSE PRACTITIONER

## 2020-02-09 ENCOUNTER — TELEPHONE (OUTPATIENT)
Dept: FAMILY MEDICINE CLINIC | Facility: CLINIC | Age: 85
End: 2020-02-09

## 2020-02-09 NOTE — TELEPHONE ENCOUNTER
Please have patient add a vitamin B12 level and Vitamin D level to her next blood work due in 1 month  Orders have been placed  Thank you

## 2020-03-30 DIAGNOSIS — R60.0 LOWER EXTREMITY EDEMA: ICD-10-CM

## 2020-03-30 RX ORDER — FUROSEMIDE 40 MG/1
TABLET ORAL
Qty: 90 TABLET | Refills: 1 | Status: SHIPPED | OUTPATIENT
Start: 2020-03-30 | End: 2020-09-20 | Stop reason: SDUPTHER

## 2020-06-06 DIAGNOSIS — R60.0 LOWER EXTREMITY EDEMA: ICD-10-CM

## 2020-06-06 RX ORDER — POTASSIUM CHLORIDE 750 MG/1
CAPSULE, EXTENDED RELEASE ORAL
Qty: 90 CAPSULE | Refills: 1 | Status: SHIPPED | OUTPATIENT
Start: 2020-06-06 | End: 2020-09-20 | Stop reason: SDUPTHER

## 2020-08-21 ENCOUNTER — HOSPITAL ENCOUNTER (INPATIENT)
Facility: HOSPITAL | Age: 85
LOS: 4 days | Discharge: NURSING, REHAB OR LONG-TERM CARE | DRG: 552 | End: 2020-08-25
Attending: EMERGENCY MEDICINE | Admitting: SURGERY
Payer: COMMERCIAL

## 2020-08-21 ENCOUNTER — APPOINTMENT (EMERGENCY)
Dept: RADIOLOGY | Facility: HOSPITAL | Age: 85
DRG: 552 | End: 2020-08-21
Payer: COMMERCIAL

## 2020-08-21 DIAGNOSIS — S32.030A COMPRESSION FRACTURE OF L3 LUMBAR VERTEBRA, CLOSED, INITIAL ENCOUNTER (HCC): ICD-10-CM

## 2020-08-21 DIAGNOSIS — S32.030A CLOSED COMPRESSION FRACTURE OF L3 LUMBAR VERTEBRA, INITIAL ENCOUNTER (HCC): Primary | ICD-10-CM

## 2020-08-21 LAB
ANION GAP SERPL CALCULATED.3IONS-SCNC: 5 MMOL/L (ref 4–13)
BUN SERPL-MCNC: 14 MG/DL (ref 5–25)
CALCIUM SERPL-MCNC: 8.4 MG/DL (ref 8.3–10.1)
CHLORIDE SERPL-SCNC: 108 MMOL/L (ref 100–108)
CO2 SERPL-SCNC: 29 MMOL/L (ref 21–32)
CREAT SERPL-MCNC: 0.76 MG/DL (ref 0.6–1.3)
GFR SERPL CREATININE-BSD FRML MDRD: 67 ML/MIN/1.73SQ M
GLUCOSE SERPL-MCNC: 109 MG/DL (ref 65–140)
PLATELET # BLD AUTO: 139 THOUSANDS/UL (ref 149–390)
PMV BLD AUTO: 9.7 FL (ref 8.9–12.7)
POTASSIUM SERPL-SCNC: 4.1 MMOL/L (ref 3.5–5.3)
SODIUM SERPL-SCNC: 142 MMOL/L (ref 136–145)

## 2020-08-21 PROCEDURE — 96374 THER/PROPH/DIAG INJ IV PUSH: CPT

## 2020-08-21 PROCEDURE — 99284 EMERGENCY DEPT VISIT MOD MDM: CPT | Performed by: EMERGENCY MEDICINE

## 2020-08-21 PROCEDURE — 73521 X-RAY EXAM HIPS BI 2 VIEWS: CPT

## 2020-08-21 PROCEDURE — 72100 X-RAY EXAM L-S SPINE 2/3 VWS: CPT

## 2020-08-21 PROCEDURE — 73700 CT LOWER EXTREMITY W/O DYE: CPT

## 2020-08-21 PROCEDURE — G1004 CDSM NDSC: HCPCS

## 2020-08-21 PROCEDURE — 99285 EMERGENCY DEPT VISIT HI MDM: CPT

## 2020-08-21 PROCEDURE — 85049 AUTOMATED PLATELET COUNT: CPT | Performed by: SURGERY

## 2020-08-21 PROCEDURE — 99222 1ST HOSP IP/OBS MODERATE 55: CPT | Performed by: SURGERY

## 2020-08-21 PROCEDURE — 80048 BASIC METABOLIC PNL TOTAL CA: CPT | Performed by: SURGERY

## 2020-08-21 PROCEDURE — 72220 X-RAY EXAM SACRUM TAILBONE: CPT

## 2020-08-21 PROCEDURE — 72131 CT LUMBAR SPINE W/O DYE: CPT

## 2020-08-21 RX ORDER — FUROSEMIDE 40 MG/1
40 TABLET ORAL DAILY
Status: DISCONTINUED | OUTPATIENT
Start: 2020-08-22 | End: 2020-08-25 | Stop reason: HOSPADM

## 2020-08-21 RX ORDER — LIDOCAINE 50 MG/G
1 PATCH TOPICAL ONCE
Status: COMPLETED | OUTPATIENT
Start: 2020-08-21 | End: 2020-08-22

## 2020-08-21 RX ORDER — POTASSIUM CHLORIDE 750 MG/1
10 CAPSULE, EXTENDED RELEASE ORAL DAILY
Status: DISCONTINUED | OUTPATIENT
Start: 2020-08-22 | End: 2020-08-21

## 2020-08-21 RX ORDER — ACETAMINOPHEN 325 MG/1
650 TABLET ORAL EVERY 6 HOURS PRN
Status: DISCONTINUED | OUTPATIENT
Start: 2020-08-21 | End: 2020-08-25 | Stop reason: HOSPADM

## 2020-08-21 RX ORDER — GABAPENTIN 300 MG/1
300 CAPSULE ORAL 2 TIMES DAILY
Status: DISCONTINUED | OUTPATIENT
Start: 2020-08-21 | End: 2020-08-25 | Stop reason: HOSPADM

## 2020-08-21 RX ORDER — ACETAMINOPHEN 325 MG/1
975 TABLET ORAL ONCE
Status: COMPLETED | OUTPATIENT
Start: 2020-08-21 | End: 2020-08-21

## 2020-08-21 RX ORDER — OXYCODONE HYDROCHLORIDE 5 MG/1
2.5 TABLET ORAL EVERY 6 HOURS PRN
Status: DISCONTINUED | OUTPATIENT
Start: 2020-08-21 | End: 2020-08-23

## 2020-08-21 RX ORDER — MELATONIN
1000 DAILY
Status: DISCONTINUED | OUTPATIENT
Start: 2020-08-22 | End: 2020-08-25 | Stop reason: HOSPADM

## 2020-08-21 RX ORDER — HYDROMORPHONE HYDROCHLORIDE 2 MG/1
2 TABLET ORAL ONCE
Status: DISCONTINUED | OUTPATIENT
Start: 2020-08-21 | End: 2020-08-21

## 2020-08-21 RX ORDER — HYDROMORPHONE HCL/PF 1 MG/ML
0.2 SYRINGE (ML) INJECTION 4 TIMES DAILY PRN
Status: DISCONTINUED | OUTPATIENT
Start: 2020-08-21 | End: 2020-08-24

## 2020-08-21 RX ORDER — POTASSIUM CHLORIDE 750 MG/1
10 TABLET, EXTENDED RELEASE ORAL DAILY
Status: DISCONTINUED | OUTPATIENT
Start: 2020-08-22 | End: 2020-08-25 | Stop reason: HOSPADM

## 2020-08-21 RX ADMIN — ACETAMINOPHEN 975 MG: 325 TABLET, FILM COATED ORAL at 14:51

## 2020-08-21 RX ADMIN — HYDROMORPHONE HYDROCHLORIDE 0.2 MG: 1 INJECTION, SOLUTION INTRAMUSCULAR; INTRAVENOUS; SUBCUTANEOUS at 16:33

## 2020-08-21 RX ADMIN — LIDOCAINE 1 PATCH: 50 PATCH CUTANEOUS at 14:52

## 2020-08-21 RX ADMIN — GABAPENTIN 300 MG: 300 CAPSULE ORAL at 20:43

## 2020-08-21 RX ADMIN — ENOXAPARIN SODIUM 30 MG: 30 INJECTION SUBCUTANEOUS at 22:36

## 2020-08-21 NOTE — ED PROVIDER NOTES
History  Chief Complaint   Patient presents with    Back Pain     Pt  reports sliding out of bed onto butt  States that she did not hit head and does not take thinners  Only reporting lower back pain  27-year-old female presents after a fall that occurred 3-4 hours prior to arrival   Patient does not take any blood thinners  Patient slid out of bed this morning and fell onto her bottom  She did not hit her head or lose consciousness  She was not able to get up on her own, but her son helped her up off the floor into a chair  Patient has not tried ambulate since the fall  She is now complaining of right-sided low back pain  Patient has chronic neuropathy in her right leg and states that is no worse since the fall  She has not taken anything for pain  Patient tried to rest thinking she would get better, but the pain is not getting better so she decided to come in  She has no other complaints today or any other injuries  Prior to Admission Medications   Prescriptions Last Dose Informant Patient Reported? Taking? Cholecalciferol (VITAMIN D-3) 25 MCG (1000 UT) CAPS  Child Yes Yes   Sig: Take by mouth daily   TURMERIC PO  Child Yes Yes   Sig: Take by mouth daily   furosemide (LASIX) 40 mg tablet   No Yes   Sig: TAKE 1 TABLET (40 MG) DAILY     gabapentin (NEURONTIN) 300 mg capsule  Child Yes Yes   Sig: Take 300 mg by mouth 2 (two) times a day    potassium chloride (MICRO-K) 10 MEQ CR capsule   No Yes   Sig: TAKE 1 CAPSULE BY MOUTH EVERY DAY      Facility-Administered Medications: None       Past Medical History:   Diagnosis Date    Interstitial cystitis     Leukopenia     Neurologic gait dysfunction     Abstraction Dr Brooke Mccormick charts    Neuropathy     Neutropenia [facility])     Abstraction Dr Melchor Lewis Peripheral edema     Abstraction Dr Suella Kawasaki Renal cyst     Syncope     Abstraction Dr Brooke Mccormick charts       Past Surgical History: Procedure Laterality Date    CAPSULOTOMY      Right eye    CATARACT EXTRACTION Left     CHALAZION EXCISION      COLONOSCOPY  2006       Family History   Problem Relation Age of Onset    Diabetes Mother     Lung disease Father     Cancer Maternal Grandfather     Lung disease Sister      I have reviewed and agree with the history as documented  E-Cigarette/Vaping    E-Cigarette Use Never User      E-Cigarette/Vaping Substances     Social History     Tobacco Use    Smoking status: Former Smoker     Packs/day: 1 00     Years: 20 00     Pack years: 20 00     Last attempt to quit:      Years since quittin 6    Smokeless tobacco: Never Used   Substance Use Topics    Alcohol use: Yes     Comment: socially    Drug use: No        Review of Systems   Constitutional: Negative for chills, fatigue and fever  HENT: Negative  Respiratory: Negative for cough, chest tightness, shortness of breath and wheezing  Cardiovascular: Negative for chest pain and palpitations  Gastrointestinal: Negative for abdominal pain, diarrhea, nausea and vomiting  Genitourinary: Negative for difficulty urinating and hematuria  Musculoskeletal: Positive for back pain  Negative for myalgias  Skin: Negative for pallor and rash  Neurological: Negative for dizziness, syncope, weakness, light-headedness and headaches  Physical Exam  ED Triage Vitals [20 1428]   Temperature Pulse Respirations Blood Pressure SpO2   98 4 °F (36 9 °C) 82 18 167/94 99 %      Temp Source Heart Rate Source Patient Position - Orthostatic VS BP Location FiO2 (%)   Oral Monitor Sitting Right arm --      Pain Score       8             Orthostatic Vital Signs  Vitals:    20 1428 20 1625 20 1700   BP: 167/94 160/69 (!) 181/77   Pulse: 82 62 66   Patient Position - Orthostatic VS: Sitting Lying Lying       Physical Exam  Vitals signs and nursing note reviewed  Constitutional:       Appearance: Normal appearance  HENT:      Head: Normocephalic and atraumatic  Eyes:      Conjunctiva/sclera: Conjunctivae normal    Neck:      Musculoskeletal: Normal range of motion and neck supple  No neck rigidity, spinous process tenderness or muscular tenderness  Cardiovascular:      Rate and Rhythm: Normal rate and regular rhythm  Pulses:           Dorsalis pedis pulses are 2+ on the right side and 2+ on the left side  Pulmonary:      Effort: Pulmonary effort is normal  No respiratory distress  Abdominal:      General: There is no distension  Palpations: Abdomen is soft  Tenderness: There is no abdominal tenderness  Musculoskeletal:      Right hip: She exhibits normal range of motion, normal strength, no tenderness, no bony tenderness and no deformity  Left hip: She exhibits normal range of motion, normal strength, no tenderness, no bony tenderness and no deformity  Right knee: She exhibits normal range of motion, no swelling and no bony tenderness  No tenderness found  Left knee: She exhibits normal range of motion, no swelling and no bony tenderness  No tenderness found  Right ankle: She exhibits normal range of motion and no swelling  No tenderness  Left ankle: She exhibits normal range of motion and no swelling  No tenderness  Thoracic back: She exhibits no tenderness and no bony tenderness  Lumbar back: She exhibits decreased range of motion  She exhibits no tenderness and no bony tenderness  Skin:     General: Skin is warm and dry  Findings: No bruising or lesion  Neurological:      General: No focal deficit present  Mental Status: She is alert and oriented to person, place, and time  Sensory: No sensory deficit  Motor: Weakness (decreased strength lifting her right leg off of the bed) present           ED Medications  Medications   lidocaine (LIDODERM) 5 % patch 1 patch (1 patch Topical Medication Applied 8/21/20 5826)   HYDROmorphone (DILAUDID) injection 0 2 mg (0 2 mg Intravenous Given 8/21/20 1633)   acetaminophen (TYLENOL) tablet 975 mg (975 mg Oral Given 8/21/20 1451)       Diagnostic Studies  Results Reviewed     None                 CT spine lumbar without contrast   Final Result by Zander Peres MD (08/21 1854)      Stable acute L3 compression fracture  No significant fracture fragment retropulsion or central canal stenosis  Workstation performed: [id number]         CT lower extremity wo contrast left   Final Result by Danii Mccormick MD (08/21 1825)      No acute displaced fracture  Moderate arthritic changes left hip joint  Incidentally detected  cyst within the pelvis measuring 3 4 x 2 9 cm probably a postmenopausal ovarian cyst   This can be evaluated with an ultrasound performed on nonemergent basis      The study was marked in EPIC for significant notification  Workstation performed: [id number]         XR lumbar spine 2 or 3 views   Final Result by Frederick De La Torre DO (08/21 1640)   Partial superior endplate collapse of L3 with resulting compression fracture contributing to 30% loss in axial height  This is of unknown age  MRI could be performed to assess acuity if clinically warranted  The study was marked in [facility] for immediate notification  Workstation performed: [id number]         XR sacrum and coccyx   Final Result by Frederick De La Torre DO (08/21 1640)   Neither the sacrum or the coccyx are well seen due to significant osteopenia and overlying stool  No obvious fracture  Workstation performed: [id number]         XR hips bilateral with ap pelvis 2 vw   Final Result by Frederick De La Torre DO (08/21 1658)   Faint linear radiolucency extending through the cephalad portion of the LEFT femoral neck  Nondisplaced fracture is not excluded  Consider CT or MRI if clinically warranted        I personally reviewed this study with Kari Cruz on 8/21/2020 at 4:57 PM                Workstation performed: [id number]               Procedures  Procedures      ED Course  ED Course as of Aug 21 1900   Fri Aug 21, 2020   1600 Pt is feeling a little better but still has some pain      1652 Pt is feeling better after receiving dilaudid  I spoke to her and her son about compression fracture of L3  Pt is okay with staying in the hospital for pain control  US AUDIT      Most Recent Value   Initial Alcohol Screen: US AUDIT-C    1  How often do you have a drink containing alcohol? 6 Filed at: 08/21/2020 1432   2  How many drinks containing alcohol do you have on a typical day you are drinking? 1 Filed at: 08/21/2020 1432   3a  Male UNDER 65: How often do you have five or more drinks on one occasion? 0 Filed at: 08/21/2020 1432   3b  FEMALE Any Age, or MALE 65+: How often do you have 4 or more drinks on one occassion? 0 Filed at: 08/21/2020 1432   Audit-C Score  7 Filed at: 08/21/2020 1432   Full Alcohol Screen: US AUDIT   4  How often during the last year have you found that you were not able to stop drinking once you had started? 0 Filed at: 08/21/2020 1432   5  How often during past year have you failed to do what was normally expected of you because of drinking? 0 Filed at: 08/21/2020 1432   6  How often in past year have you needed a first drink in the morning to get yourself going after a heavy drinking session? 0 Filed at: 08/21/2020 1432   7  How often in past year have you had feeling of guilt or remorse after drinking? 0 Filed at: 08/21/2020 1432   8  How often in past year have you been unable to remember what happened night before because you had been drinking? 0 Filed at: 08/21/2020 1432   9  Have you or someone else been injured as a result of your drinking? 0 Filed at: 08/21/2020 1432   10   Has a relative, friend, doctor or other health worker been concerned about your drinking and suggested you cut down?   0 Filed at: 08/21/2020 1432   AUDIT Total Score  7 Filed at: 08/21/2020 56              Identification of Seniors at Risk      Most Recent Value   (ISAR) Identification of Seniors at Risk   Before the illness or injury that brought you to the Emergency, did you need someone to help you on a regular basis? 0 Filed at: 08/21/2020 1432   In the last 24 hours, have you needed more help than usual?  0 Filed at: 08/21/2020 1432   Have you been hospitalized for one or more nights during the past 6 months? 0 Filed at: 08/21/2020 1432   In general, do you see well?  0 Filed at: 08/21/2020 1432   In general, do you have serious problems with your memory? 0 Filed at: 08/21/2020 1432   Do you take more than three different medications every day? 1 Filed at: 08/21/2020 1432   ISAR Score  1 Filed at: 08/21/2020 1432          ZANE/DAST-10      Most Recent Value   How many times in the past year have you    Used an illegal drug or used a prescription medication for non-medical reasons? Never Filed at: 08/21/2020 1430                              MDM  Number of Diagnoses or Management Options  Closed compression fracture of L3 lumbar vertebra, initial encounter [facility]): new and does not require workup  Diagnosis management comments: 80-year-old female presents after mechanical fall  Patient slipped out of bed and fell onto her bottom  She is not taking blood thinners, did not hit her head, and did not lose consciousness  Patient has decreased range of motion of her back  We will get an x-ray to evaluate the lumbar and sacral spine as well as bilateral hips and pelvis  We will give patient medication for pain control         Amount and/or Complexity of Data Reviewed  Tests in the radiology section of CPT®: ordered and reviewed  Review and summarize past medical records: yes  Discuss the patient with other providers: yes    Risk of Complications, Morbidity, and/or Mortality  Presenting problems: moderate  Diagnostic procedures: moderate  Management options: moderate    Patient Progress  Patient progress: stable    Patient's imaging showed L3 compression fracture  Patient's pain was well controlled after receiving Tylenol, Lidoderm patch, and Dilaudid  Patient was admitted to trauma for pain control  Trauma is aware of the incidental cyst found on her CT that needs to be evaluated with MRI  Disposition  Final diagnoses:   Closed compression fracture of L3 lumbar vertebra, initial encounter [facility])     Time reflects when diagnosis was documented in both MDM as applicable and the Disposition within this note     Time User Action Codes Description Comment    8/21/2020  6:07 PM Kashif Arteaga Add [S32 030A] Closed compression fracture of L3 lumbar vertebra, initial encounter [facility])       ED Disposition     ED Disposition Condition Date/Time Comment    Admit Stable Fri Aug 21, 2020  6:07 PM Case was discussed with Dr Hilario Hernandez and the patient's admission status was agreed to be Admission Status: inpatient status to the service of Dr Joyce Roper   Follow-up Information    None         Current Discharge Medication List      CONTINUE these medications which have NOT CHANGED    Details   Cholecalciferol (VITAMIN D-3) 25 MCG (1000 UT) CAPS Take by mouth daily      furosemide (LASIX) 40 mg tablet TAKE 1 TABLET (40 MG) DAILY  Qty: 90 tablet, Refills: 1    Associated Diagnoses: Lower extremity edema      gabapentin (NEURONTIN) 300 mg capsule Take 300 mg by mouth 2 (two) times a day       potassium chloride (MICRO-K) 10 MEQ CR capsule TAKE 1 CAPSULE BY MOUTH EVERY DAY  Qty: 90 capsule, Refills: 1    Associated Diagnoses: Lower extremity edema      TURMERIC PO Take by mouth daily           No discharge procedures on file  PDMP Review     None           ED Provider  Attending physically available and evaluated Maurice Kaufman  NATI managed the patient along with the ED Attending      Electronically Signed by         Jacquelyn Crowley DO  08/21/20 1937

## 2020-08-21 NOTE — H&P
H&P Exam - Trauma   Kristian Moreno 80 y o  female MRN: [id number]  Unit/Bed#: [facility] 606-01 Encounter: [id number]    Assessment/Plan   80year old female with traumatic L3 compression fracture secondary to a low level fall  Neurologically intact albeit with right low back pain  - optimal analgesia: multi-modal   - neurosurgery consult  - VTE chemoprophylaxis  - oral diet as tolerated  History of Present Illness   HPI:  Kristian Moreno is a 80 y o  female who presents with right sided low back pain following a low level fall  She reports falling off her bed and landing on her buttocks on an attempt to get out of bed earlier today  She was helped up by her son and has not attempted ambulating since then  She however denies any limb weakness or paresthesias  She denies any head trauma nor injuries to her chest, abdomen or extremities  Mechanism:Fall    Review of Systems   Constitutional: Negative  HENT: Negative  Eyes: Negative  Respiratory: Negative  Cardiovascular: Negative  Gastrointestinal: Negative  Endocrine: Negative  Genitourinary: Negative  Musculoskeletal: Positive for back pain  Skin: Negative  Allergic/Immunologic: Negative  Neurological: Negative  Hematological: Negative  Psychiatric/Behavioral: Negative  12-point, complete review of systems was reviewed and negative except as stated above         Historical Information     Past Medical History:   Diagnosis Date    Interstitial cystitis     Leukopenia     Neurologic gait dysfunction     Abstraction Dr Trent Echevarria charts    Neuropathy     Neutropenia [facility])     Abstraction Dr Shannan Zhong Peripheral edema     Abstraction Dr Guerda Valdez Renal cyst     Syncope     Abstraction Dr Trent Echevarria charts     Past Surgical History:   Procedure Laterality Date    CAPSULOTOMY      Right eye    CATARACT EXTRACTION Left     CHALAZION EXCISION      COLONOSCOPY  2006 Social History   Social History     Substance and Sexual Activity   Alcohol Use Yes    Comment: socially     Social History     Substance and Sexual Activity   Drug Use No     Social History     Tobacco Use   Smoking Status Former Smoker    Packs/day: 1 00    Years: 20 00    Pack years: 20 00    Last attempt to quit: [place] Brim Years since quittin 6   Smokeless Tobacco Never Used     E-Cigarette/Vaping    E-Cigarette Use Never User      E-Cigarette/Vaping Substances     Immunization History   Administered Date(s) Administered    Pneumococcal Polysaccharide PPV23 10/19/1999    Td (adult), Unspecified 2019    Td (adult), adsorbed 2019     Last Tetanus: NA  Family History: Non-contributory    Meds/Allergies   all current active meds have been reviewed    No Known Allergies      PHYSICAL EXAM    Objective   Vitals:   First set: Temperature: 98 4 °F (36 9 °C) (20 1428)  Pulse: 82 (20 1428)  Respirations: 18 (20 1428)  Blood Pressure: 167/94 (20 1428)    Secondary Survey: (Click on Physical Exam tab above)  Physical Exam  Vitals signs reviewed  Constitutional:       General: She is not in acute distress  Appearance: She is not ill-appearing, toxic-appearing or diaphoretic  HENT:      Head: Normocephalic and atraumatic  Right Ear: Tympanic membrane, ear canal and external ear normal       Left Ear: Tympanic membrane, ear canal and external ear normal       Nose: Nose normal       Mouth/Throat:      Mouth: Mucous membranes are moist    Eyes:      Pupils: Pupils are equal, round, and reactive to light  Neck:      Musculoskeletal: Normal range of motion and neck supple  Cardiovascular:      Rate and Rhythm: Normal rate  Pulses: Normal pulses  Heart sounds: Normal heart sounds  Pulmonary:      Effort: Pulmonary effort is normal       Breath sounds: Normal breath sounds  Chest:      Chest wall: No tenderness     Abdominal:      General: Abdomen is flat  Bowel sounds are normal       Palpations: Abdomen is soft  Tenderness: There is no abdominal tenderness  Musculoskeletal:      Comments: SPINE:  - right para-spinal tenderness (lumbar)  - no midline spine tenderness  - no step deformities  RLE:  - full power with intact sensation   - SLR positive at about 60 degrees  LLE:  - full power with intact sensation  UPPER EXTREMITIES:   - full power globally with no sensory deficits  Skin:     General: Skin is warm  Capillary Refill: Capillary refill takes less than 2 seconds  Neurological:      General: No focal deficit present  Mental Status: She is alert and oriented to person, place, and time  Cranial Nerves: No cranial nerve deficit  Sensory: No sensory deficit  Motor: No weakness  Coordination: Coordination normal    Psychiatric:         Mood and Affect: Mood normal          Invasive Devices     Peripheral Intravenous Line            Peripheral IV 08/21/20 Left Forearm less than 1 day                Lab Results: Results: I have personally reviewed pertinent reports  Imaging/EKG Studies: Results: I have personally reviewed pertinent reports        Code Status: Prior  Advance Directive and Living Will:      Power of :    POLST:

## 2020-08-22 PROBLEM — S32.030A COMPRESSION FRACTURE OF L3 LUMBAR VERTEBRA, CLOSED, INITIAL ENCOUNTER (HCC): Status: ACTIVE | Noted: 2020-08-22
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
## 2020-08-22 PROCEDURE — 99232 SBSQ HOSP IP/OBS MODERATE 35: CPT | Performed by: SURGERY

## 2020-08-22 PROCEDURE — 97167 OT EVAL HIGH COMPLEX 60 MIN: CPT

## 2020-08-22 PROCEDURE — 97163 PT EVAL HIGH COMPLEX 45 MIN: CPT

## 2020-08-22 PROCEDURE — 99222 1ST HOSP IP/OBS MODERATE 55: CPT | Performed by: PHYSICIAN ASSISTANT

## 2020-08-22 RX ADMIN — OXYCODONE HYDROCHLORIDE 2.5 MG: 5 TABLET ORAL at 09:02

## 2020-08-22 RX ADMIN — GABAPENTIN 300 MG: 300 CAPSULE ORAL at 08:57

## 2020-08-22 RX ADMIN — FUROSEMIDE 40 MG: 40 TABLET ORAL at 08:57

## 2020-08-22 RX ADMIN — Medication 1000 UNITS: at 08:57

## 2020-08-22 RX ADMIN — ENOXAPARIN SODIUM 30 MG: 30 INJECTION SUBCUTANEOUS at 20:25

## 2020-08-22 RX ADMIN — GABAPENTIN 300 MG: 300 CAPSULE ORAL at 17:17

## 2020-08-22 RX ADMIN — ACETAMINOPHEN 650 MG: 325 TABLET, FILM COATED ORAL at 09:02

## 2020-08-22 RX ADMIN — ENOXAPARIN SODIUM 30 MG: 30 INJECTION SUBCUTANEOUS at 08:56

## 2020-08-22 RX ADMIN — ACETAMINOPHEN 650 MG: 325 TABLET, FILM COATED ORAL at 17:17

## 2020-08-22 RX ADMIN — POTASSIUM CHLORIDE 10 MEQ: 750 TABLET, EXTENDED RELEASE ORAL at 08:57

## 2020-08-22 RX ADMIN — OXYCODONE HYDROCHLORIDE 2.5 MG: 5 TABLET ORAL at 17:17

## 2020-08-22 NOTE — OCCUPATIONAL THERAPY NOTE
Occupational Therapy Evaluation     Patient Name: Mortimer Motts  OYWWY'O Date: 8/22/2020  Problem List  Principal Problem:    Compression fracture of L3 lumbar vertebra, closed, initial encounter [facility])  Active Problems:    Fall    Past Medical History  Past Medical History:   Diagnosis Date    Interstitial cystitis     Leukopenia     Neurologic gait dysfunction     Abstraction Dr Sandra Covarrubias charts    Neuropathy     Neutropenia [facility])     Abstraction Dr Breezy Tidwell Peripheral edema     Abstraction Dr Gillermo Sever Renal cyst    Stephanie Covarrubias charts     Past Surgical History  Past Surgical History:   Procedure Laterality Date    CAPSULOTOMY      Right eye    CATARACT EXTRACTION Left     CHALAZION EXCISION      COLONOSCOPY  2006 08/22/20 1055   Note Type   Note type Eval/Treat   Restrictions/Precautions   Weight Bearing Precautions Per Order No   Braces or Orthoses Other (Comment)  (quickdraw)   Other Precautions Chair Alarm; Fall Risk;Pain;Spinal precautions   Pain Assessment   Pain Assessment Tool 0-10   Pain Score 5   Pain Location/Orientation Location: Back   Hospital Pain Intervention(s) Repositioned; Ambulation/increased activity   Home Living   Type of [address] One level;Stairs to enter with rails  ([address])   Bathroom Shower/Tub Tub/shower unit   Bathroom Toilet Standard   Bathroom Equipment Grab bars in [address]   Prior Function   Level of [address] with ADLs and functional mobility   Lives With Son   Receives Help From Family   ADL Assistance Independent   IADLs Independent   Falls in the last 6 months 1 to 4   Vocational Retired   [address] and mobility with SPC - denies additional falls - reports son is typically home but states she is home alone at times like when son goes to play golf  etc    Reciprocal Relationships supportive family    Service to Others retired    Intrinsic Gratification mostly sedentary   Subjective   Subjective offers no c/o    ADL   Eating Assistance 7  Independent   Grooming Assistance 5  Supervision/Setup   UB Bathing Assistance 4  Minimal Assistance   LB Bathing Assistance 3  Moderate Assistance   UB Dressing Assistance 4  Minimal Assistance   LB Dressing Assistance 3  Moderate Assistance   Toileting Assistance  3  Moderate Assistance   Bed Mobility   Rolling L 3  Moderate assistance   Supine to Sit 3  Moderate assistance   Sit to Supine 3  Moderate assistance   Transfers   Sit to Stand 3  Moderate assistance   Stand to Sit 3  Moderate assistance   Stand pivot 3  Moderate assistance   Functional Mobility   Functional Mobility 3  Moderate assistance   Additional items Rolling walker   Balance   Static Sitting Fair   Dynamic Sitting Fair -   Static Standing Poor +   Dynamic Standing Poor   Ambulatory Poor   Activity Tolerance   Activity Tolerance Patient limited by fatigue;Patient limited by pain   RUE Assessment   RUE Assessment WFL   LUE Assessment   LUE Assessment WFL   Cognition   Overall Cognitive Status Impaired   Arousal/Participation Alert; Cooperative   Attention Attends with cues to redirect   Orientation Level Oriented to person;Oriented to place;Oriented to time;Oriented to situation   Memory Decreased short term memory;Decreased recall of precautions   Following Commands Follows one step commands with increased time or repetition   Assessment   Limitation Decreased ADL status; Decreased Safe judgement during ADL;Decreased endurance;Decreased self-care trans;Decreased high-level ADLs   Prognosis Good;Fair   Assessment Pt is a 80 y o  female who was admitted to [facility] on 8/21/2020 with Compression fracture of L3 lumbar vertebra, closed, initial encounter ([facility] [address] ) s/p fall from bed landing on buttock   Pt's problem list also includes PMH of underlying neurological disorder, neuropathy and leukopenia, neurologic gait dysfunction, neutropenia, OA, osteoporosis, peripheral edema, syncope  At baseline pt was completing adls and mobility independently with [facility] - shares homemaking with son  Pt lives with son in 1 story home with [address]  Currently pt requires moderate assist  for overall ADLS and moderate assist for functional mobility/transfers  Pt currently presents with impairments in the following categories -steps to enter environment, limited home support, difficulty performing ADLS, difficulty performing IADLS  and environment activity tolerance, endurance, standing balance/tolerance and safety   These impairments, as well as pt's fatigue, pain, spinal precautions, decreased caregiver support and risk for falls  limit pt's ability to safely engage in all baseline areas of occupation, includingbathing, dressing, toileting, functional mobility/transfers, community mobility, house maintenance, meal prep, cleaning, social participation  and leisure activities  From OT standpoint, recommend inpt rehab upon D/C  OT will continue to follow to address the below stated goals  Goals   Patient Goals go home    LTG Time Frame 10-14   Long Term Goal #1 refer to established goals below   Plan   Treatment Interventions ADL retraining;Functional transfer training; Endurance training;Patient/family training;Equipment evaluation/education; Compensatory technique education; Activityengagement   Goal Expiration Date 09/05/20   OT Frequency 3-5x/wk   Recommendation   OT Discharge Recommendation Post-Acute Rehabilitation Services   OT - OK to Discharge Yes   Additional Comments  to rehab when medically cleared       OCCUPATIONAL THERAPY GOALS:    *Mod I adls after setup with use of AE PRN  *Mod I toileting and clothing management   *Mod I functional mobility and transfers to/from all surfaces with good dynamic balance and safety for participation in dynamic adls and iadl tasks   *Demonstrate good carryover with safe use of RW, management of quick-draw brace, and carryover with spinal precautions/use of proper body mechanics during functional tasks   *Assess DME needs   *Increase activity tolerance to 35-40 minutes for participation in adls and enjoyable activities  *Pt to participate in ongoing functional cognitive assessment with good attention/participation to assist with safe d/c recommendations    Ivett Correa, OT

## 2020-08-22 NOTE — INCIDENTAL FINDINGS
The following findings require follow up:  Radiographic finding   Finding: Incidentally detected  cyst within the pelvis measuring 3 4 x 2 9 cm probably a postmenopausal ovarian cyst   This can be evaluated with an ultrasound performed on nonemergent basis   Follow up required: primary care provider to schedule ultrasound   Follow up should be done within 1 week(s)

## 2020-08-22 NOTE — UTILIZATION REVIEW
Initial Clinical Review    Admission: Date/Time/Statement:   Admission Orders (From admission, onward)     Ordered        08/21/20 1808  Inpatient Admission  Once                   Orders Placed This Encounter   Procedures    Inpatient Admission     Standing Status:   Standing     Number of Occurrences:   1     Order Specific Question:   Admitting Physician     Answer:   Jalil Butcher [[id number]]     Order Specific Question:   Level of Care     Answer:   Med Surg [16]     Order Specific Question:   Estimated length of stay     Answer:   More than 2 Midnights     Order Specific Question:   Certification     Answer:   I certify that inpatient services are medically necessary for this patient for a duration of greater than two midnights  See H&P and MD Progress Notes for additional information about the patient's course of treatment  ED Arrival Information     Expected Arrival Acuity Means of Arrival Escorted By Service Admission Type    - 8/21/2020 14:21 Urgent Ambulance [place] Tra[facility] Urgent    Arrival Complaint    fall        Chief Complaint   Patient presents with    Back Pain     Pt  reports sliding out of bed onto butt  States that she did not hit head and does not take thinners  Only reporting lower back pain  Assessment/Plan:  79 y/o female who presents to ED via EMS with c/o R-sided low back pain after a low level fall  Pt reports falling off her bed and landing on her buttocks in an attempt to get out of be earlier today  She was helped up by her son and hs not attempted ambulating since then  Imaging revealed L3 compression fx   Admit inpatient to M/S unit under trauma service with traumatic L3 compression fracture secondary to a low level fall  Plan:  Consult NSx, SCD's for DVT ppx  Reg diet  NSx consult 8/22 -- recommend: LSO quick draw brace  No further imaging needed at this tie  PT/OT evals  Lumbar spine precautions  Neuro checks q4h         PT/OT evals -- recommending IP rehab on d/c  Pt and son agreeable  Referrals out to [facility], [address] and [facility] per [facility] That File note  Continue pain management  up in chair 3x/day  Spine brace  Lovenox/SCD's  Reg diet       ED Triage Vitals [08/21/20 1428]   Temperature Pulse Respirations Blood Pressure SpO2   98 4 °F (36 9 °C) 82 18 167/94 99 %      Temp Source Heart Rate Source Patient Position - Orthostatic VS BP Location FiO2 (%)   Oral Monitor Sitting Right arm --      Pain Score       8          Wt Readings from Last 1 Encounters:   08/21/20 56 7 kg (125 lb)     Additional Vital Signs:   Date/Time   Temp   Pulse   Resp   BP   MAP (mmHg)   SpO2   O2 Device   Patient Position - Orthostatic VS    08/22/20 15:36:16      65      138/75   96   95 %          08/22/20 0841                  93 %   None (Room air)       08/22/20 0815      70            91 %          08/22/20 0800      71            91 %          08/22/20 0745      64      147/75   99   93 %          08/22/20 07:36:21   99 9 °F (37 7 °C)   62   18   147/75   99   96 %          08/22/20 0730      57            93 %          08/21/20 23:55:57   98 2 °F (36 8 °C)   56   18   158/72   101   96 %          08/21/20 2000                     None (Room air)       08/21/20 1700      66   18   181/77Abnormal     111   94 %   None (Room air)   Lying    08/21/20 1625      62      160/69      94 %   None (Room air)   Lying    08/21/20 1433                     None (Room air)       08/21/20 1428   98 4 °F (36 9 °C)   82   18   167/94      99 %   None (Room air)   Sitting        Pertinent Labs/Diagnostic Test Results:   · CT lumbar spine w/o, 8/21/2020: Stable acute L3 compression fracture   No significant fracture fragment retropulsion or central canal stenosis    · Xray lumbar spine, 8/21/2020: Partial superior endplate collapse of L3 with resulting compression fracture contributing to 30% loss in axial height      Results from last 7 days   Lab Units 08/21/20 2051   PLATELETS Thousands/uL 139*         Results from last 7 days   Lab Units 08/21/20 2051   SODIUM mmol/L 142   POTASSIUM mmol/L 4 1   CHLORIDE mmol/L 108   CO2 mmol/L 29   ANION GAP mmol/L 5   BUN mg/dL 14   CREATININE mg/dL 0 76   EGFR ml/min/1 73sq m 67   CALCIUM mg/dL 8 4     Results from last 7 days   Lab Units 08/21/20 2051   GLUCOSE RANDOM mg/dL 109         ED Treatment:   Medication Administration from 08/21/2020 1421 to 08/21/2020 1843       Date/Time Order Dose Route Action     08/21/2020 1451 acetaminophen (TYLENOL) tablet 975 mg 975 mg Oral Given     08/21/2020 1452 lidocaine (LIDODERM) 5 % patch 1 patch 1 patch Topical Medication Applied     08/21/2020 1633 HYDROmorphone (DILAUDID) injection 0 2 mg 0 2 mg Intravenous Given     Past Medical History:   Diagnosis Date    Interstitial cystitis     Leukopenia     Neurologic gait dysfunction     Abstraction Dr Eluterio Leventhal charts    Neuropathy     Neutropenia ([facility] )     Abstraction Dr Adrienne Mead Peripheral edema     Abstraction Dr Renu Sneed Renal cyst     Syncope     Abstraction Dr Eluterio Leventhal charts     Admitting Diagnosis: Closed compression fracture of L3 lumbar vertebra, initial encounter ([facility] ) [S32 030A]  Unspecified multiple injuries, initial encounter [T07  XXXA]  Age/Sex: 80 y o  female  Admission Orders:  Scheduled Medications:  cholecalciferol, 1,000 Units, Oral, Daily  enoxaparin, 30 mg, Subcutaneous, Q12H EDDIE  furosemide, 40 mg, Oral, Daily  gabapentin, 300 mg, Oral, BID  potassium chloride, 10 mEq, Oral, Daily    PRN Meds:  acetaminophen, 650 mg, Oral, Q6H PRN  8/22 x1  HYDROmorphone, 0 2 mg, Intravenous, 4x Daily PRN  8/21 x1  oxyCODONE, 2 5 mg, Oral, Q6H PRN  8/2 x1        Network Utilization Review Department  [email or web address]  ATTENTION: Please call with any questions or concerns to [phone] and carefully listen to the prompts so that you are directed to the right person  All voicemails are confidential   Candia Siemens all requests for admission clinical reviews, approved or denied determinations and any other requests to dedicated fax number below belonging to the campus where the patient is receiving treatment   List of dedicated fax numbers for the Facilities:  [address] DENIALS (Administrative/Medical Necessity) [phone]   [address] (Maternity/NICU/Pediatrics) [phone]   Pierre Walsh [phone]   Marleny Napoles [phone]   [facility] [phone]   Haylie Redding [phone]   [address] [place] [address] [phone]   [facility]  [phone]   [address] [facility]  [address] [address] [address] [facility] [phone]

## 2020-08-22 NOTE — SOCIAL WORK
Trauma reported that pt is clear and agrees to rehab  SW met with pt/son and provided list of SNF's that contract with [facility], Daren Aldana will review list and make selections  12:00pm Daren Aldana reported the following choices: [facility]st [facility]; [facility]nd [facility]; 3rd [facility]  SW sent referrals  SW also explained to Daren Aldana that the SNF list may not have the most up to date info with [facility] contracted SNF's  SW explained that he may need to select more SNF's if his choices do not have availability or no longer have a contract with [employer]  Await responses in [facility]

## 2020-08-22 NOTE — PLAN OF CARE
Problem: PHYSICAL THERAPY ADULT  Goal: Performs mobility at highest level of function for planned discharge setting  See evaluation for individualized goals  Description: Treatment/Interventions: Functional transfer training, LE strengthening/ROM, Elevations, Therapeutic exercise, Endurance training, Patient/family training, Equipment eval/education, Bed mobility, Gait training, Spoke to nursing, OT  Equipment Recommended: Walker(RW)       See flowsheet documentation for full assessment, interventions and recommendations  Note: Prognosis: Fair  Problem List: Decreased strength, Decreased endurance, Decreased range of motion, Impaired balance, Decreased mobility, Decreased cognition, Impaired judgement, Decreased safety awareness, Orthopedic restrictions, Pain  Assessment: Pt is a 80 y o  female presenting to [facility] s/p fall out of bed  Pt was admitted with a primary diagnosis of closed compression fracture of L3 lumbar vertebra  Pt's PMH affecting eval includes insterstitial cystitis, leukopenia, neurologic gait dysfunction, neuropathy, neutropenia, osteoarthritis, osteoporosis, peripheral edema, renal cyst, syncope and personal factors including being home alone at times and steps to negotiate at home  Pt seen for high complexity PT eval due to ongoing medical management of admitting diagnosis, continuous pulse oximetry monitoring, quickdraw spinal brace, spinal precautions, decreased functional ability and activity tolerance compared to baseline, increased reliance on more restrictive AD, pain and fall risk  Upon eval, pt was resting in bed  Pt required Mod Ax1 with bed mobility using log roll technique  Once EOB, pt was able to maintain seated balance with Min Ax1  Pt required Mod Ax1 with transfers and ambulation using RW  Pt needed cueing for gait sequencing and hand placement on RW  Pt's ambulatory distance limited by fatigue and pain   Based on PT eval, pt is currently limited by decreased BLE strength, decreased ROM, decreased endurance, impaired balance, decreased mobility, orthopedic restrictions, pain and fall risk  PT will continue to follow pt for remainder of hospital stay to address these impairments  At conclusion of session, pt was left up in chair with all needs within reach and chair alarm on  PT currently recommending rehab at D/C  Barriers to Discharge: Decreased caregiver support, Inaccessible home environment     PT Discharge Recommendation: [address]     PT - OK to Discharge: Yes(Rehab when medically cleared)    See flowsheet documentation for full assessment

## 2020-08-22 NOTE — PROGRESS NOTES
Progress Note - Reena Man 5/16/1925, 80 y o  female MRN: [id number]    Unit/Bed#: [facility] 606-01 Encounter: [id number]    Primary Care Provider: KERON Scott   Date and time admitted to hospital: 8/21/2020  2:21 PM        [address]  - injuries as stated above  * Compression fracture of L3 lumbar vertebra, closed, initial encounter [facility])  Assessment & Plan  - neurologically intact with full power in bilateral lower extremities; no sensory deficits  - on multi-modal analgesia and satisfied with pain control; continue with same   - follow up neurosurgery recommendations  - consider TLSO brace  - continue VTE chemoprophylaxis  Prophylaxis: Enoxaparin (Lovenox)    Disposition: following case management recommendations    Code status:  Prior      Is the patient 72 years or older?: YES:    1  Before the illness or injury that brought you to the Emergency, did you need someone to help you on a regular basis? 0=No   2  Since the illness or injury that brought you to the Emergency, have you needed more help than usual to take care of yourself? 0=No   3  Have you been hospitalized for one or more nights during the past 6 months (excluding a stay in the Emergency Department)? 0=No   4  In general, do you see well? 0=Yes   5  In general, do you have serious problems with your memory? 0=No   6  Do you take more than three different medications everyday? 1=Yes   TOTAL   1     Did you order a geriatric consult if the score was 2 or greater?: n/a    Identification of Seniors at Risk      Most Recent Value   (ISAR) Identification of Seniors at Risk   Before the illness or injury that brought you to the Emergency, did you need someone to help you on a regular basis? 0 Filed at: 08/21/2020 1432   In the last 24 hours, have you needed more help than usual?  0 Filed at: 08/21/2020 1432   Have you been hospitalized for one or more nights during the past 6 months?   0 Filed at: 08/21/2020 1432   In general, do you see well?  0 Filed at: 08/21/2020 1432   In general, do you have serious problems with your memory? 0 Filed at: 08/21/2020 1432   Do you take more than three different medications every day? 1 Filed at: 08/21/2020 1432   ISAR Score  1 Filed at: 08/21/2020 1432            SUBJECTIVE:     Transfer from: home  Outside Films Received: no  Tertiary Exam Due on: 08/22/20    Mechanism of Injury:Fall    Details related to Injury: +LOC:  no    Chief Complaint: low back pain    HPI/Last 24 hour events:   - fell while attempting to get off her bed landing on the ground with her right buttock  No LOC  - presented at the ED with low back pain  - she has remained neurologically intact  - stable vital signs in room air  Active medications:           Current Facility-Administered Medications:     acetaminophen (TYLENOL) tablet 650 mg, 650 mg, Oral, Q6H PRN    cholecalciferol (VITAMIN D3) tablet 1,000 Units, 1,000 Units, Oral, Daily    enoxaparin (LOVENOX) subcutaneous injection 30 mg, 30 mg, Subcutaneous, Q12H EDDIE, 30 mg at 08/21/20 2236    furosemide (LASIX) tablet 40 mg, 40 mg, Oral, Daily    gabapentin (NEURONTIN) capsule 300 mg, 300 mg, Oral, BID, 300 mg at 08/21/20 2043    HYDROmorphone (DILAUDID) injection 0 2 mg, 0 2 mg, Intravenous, 4x Daily PRN, 0 2 mg at 08/21/20 1633    oxyCODONE (ROXICODONE) IR tablet 2 5 mg, 2 5 mg, Oral, Q6H PRN    potassium chloride (K-DUR,KLOR-CON) CR tablet 10 mEq, 10 mEq, Oral, Daily      OBJECTIVE:     Vitals:   Vitals:    08/21/20 2355   BP: 158/72   Pulse: 56   Resp: 18   Temp: 98 2 °F (36 8 °C)   SpO2: 96%       Physical Exam:   GENERAL APPEARANCE: NAD  NEURO: AO x3  HEENT: atraumatic, normocephalic  CV: RRR  LUNGS: CTA B/L  GI: soft, ND/NT  MSK: moves all extremities  SKIN: warm and dry    I/O:   I/O       08/20 0701 - 08/21 0700 08/21 0701 - 08/22 0700 08/22 0701 - 08/23 0700    P  O   200     Total Intake(mL/kg)  200 (3 5)     Urine (mL/kg/hr)  300     Total Output  300     Net  -100                  Invasive Devices: Invasive Devices     Peripheral Intravenous Line            Peripheral IV 08/21/20 Left Forearm less than 1 day          Drain            External Urinary Catheter less than 1 day                  Imaging:   Xr Lumbar Spine 2 Or 3 Views    Result Date: 8/21/2020  Impression: Partial superior endplate collapse of L3 with resulting compression fracture contributing to 30% loss in axial height  This is of unknown age  MRI could be performed to assess acuity if clinically warranted  The study was marked in [facility] for immediate notification  Workstation performed: [id number]     Xr Sacrum And Coccyx    Result Date: 8/21/2020  Impression: Neither the sacrum or the coccyx are well seen due to significant osteopenia and overlying stool  No obvious fracture  Workstation performed: [id number]     Xr Hips Bilateral With Ap Pelvis 2 Vw    Result Date: 8/21/2020  Impression: Faint linear radiolucency extending through the cephalad portion of the LEFT femoral neck  Nondisplaced fracture is not excluded  Consider CT or MRI if clinically warranted  I personally reviewed this study with Ok Payton on 8/21/2020 at 4:57 PM  Workstation performed: [id number]     Ct Spine Lumbar Without Contrast    Result Date: 8/21/2020  Impression: Stable acute L3 compression fracture  No significant fracture fragment retropulsion or central canal stenosis  Workstation performed: [id number]     Ct Lower Extremity Wo Contrast Left    Result Date: 8/21/2020  Impression: No acute displaced fracture  Moderate arthritic changes left hip joint  Incidentally detected  cyst within the pelvis measuring 3 4 x 2 9 cm probably a postmenopausal ovarian cyst   This can be evaluated with an ultrasound performed on nonemergent basis The study was marked in EPIC for significant notification   Workstation performed: [id number]       Labs:   CBC:   Lab Results   Component Value Date     (L) 08/21/2020    MPV 9 7 08/21/2020     CMP:   Lab Results   Component Value Date     08/21/2020    CO2 29 08/21/2020    BUN 14 08/21/2020    CREATININE 0 76 08/21/2020    CALCIUM 8 4 08/21/2020    EGFR 67 08/21/2020     Phosphorus: No results found for: PHOS  Magnesium: No results found for: MG

## 2020-08-22 NOTE — CONSULTS
Consult- Chris Cummins 5/16/1925, 80 y o  female MRN: [id number]    [facility] En[facility]: [id number]    Primary Care Provider: KERON Martinez   Date and time admitted to hospital: 8/21/2020  2:21 PM      Inpatient consult to Neurosurgery  Consult performed by: Mario Corbin PA-C  Consult ordered by: Georgina Roche MD      Imaging personally reviewed 8/22/2020 at 7:45am  Patient examined at bedside 8/22/2020 at 7:50am    * Compression fracture of L3 lumbar vertebra, closed, initial encounter [facility])  Assessment & Plan  L3 compression fracture s/p fall out of bed  · TLICS 1  · Patient with documented osteoporosis  · Currently c/o back pain but no radiculopathy    Imaging:  · CT lumbar spine w/o, 8/21/2020: Stable acute L3 compression fracture  No significant fracture fragment retropulsion or central canal stenosis  · Xray lumbar spine, 8/21/2020: Partial superior endplate collapse of L3 with resulting compression fracture contributing to 30% loss in axial height  Plan:  · LSO quick draw brace  · No further imaging is needed at this time  · PT/OT evaluation  · Lumbar spine precautions  · Frequent neuro checks  · Medical management and pain control per primary team  · DVT ppx: SCDs, ok for pharm ppx  · No neurosurgical intervention is anticipated at this time    Neurosurgery will sign off  Patient will follow up in our office in 2 weeks with xray lumbar spine prior  Please call with questions or concerns  History of Present Illness     HPI: Chris Cummins is a 80y o  year old female with PMH including neuropathy, osteoarthritis, osteoporosis, renal cyst who presents after a fall out of bed at her home where she lives with her son  She states she felt immediate back pain  She denies dizziness but states that her legs give out on her  She denies any radicular symptoms into her bilateral lower extremities, urinary incontinence, or bowel incontinence    She denies weakness but states she has not been walking since her fall  She denies hitting her head  Currently she is complaining of low back pain, even when she is lying flat  This pain is moderately controlled with medication  She denies chest pain, shortness of breath, abdominal pain, nausea, vomiting  Imaging was reviewed, which shows an L3 compression fracture with approximately 30% loss of height  There is no retropulsion into the spinal canal   The posterior elements are intact  Review of Systems   Constitutional: Negative  HENT: Negative  Eyes: Negative  Respiratory: Negative  Negative for cough, chest tightness and shortness of breath  Cardiovascular: Negative  Negative for chest pain  Gastrointestinal: Negative  Genitourinary: Negative  Musculoskeletal: Positive for back pain  Skin: Negative  Neurological: Negative  Psychiatric/Behavioral: Negative  All other systems reviewed and are negative        Historical Information   Past Medical History:   Diagnosis Date    Interstitial cystitis     Leukopenia     Neurologic gait dysfunction     Abstraction Dr Ning Smith charts    Neuropathy     Neutropenia [facility])     Abstraction Dr Myla Cardoso Peripheral edema     Abstraction Dr Chacho Narayan Renal cyst     Syncope     Abstraction Dr Ning Smith charts     Past Surgical History:   Procedure Laterality Date    CAPSULOTOMY      Right eye    CATARACT EXTRACTION Left     CHALAZION EXCISION      COLONOSCOPY       Social History     Substance and Sexual Activity   Alcohol Use Yes    Comment: socially     Social History     Substance and Sexual Activity   Drug Use No     Social History     Tobacco Use   Smoking Status Former Smoker    Packs/day: 1 00    Years: 20 00    Pack years: 20 00    Last attempt to quit: Shanice Amaya Years since quittin 6   Smokeless Tobacco Never Used     Family History   Problem Relation Age of Onset    Diabetes Mother     Lung disease Father     Cancer Maternal Grandfather     Lung disease Sister        Meds/Allergies   all current active meds have been reviewed, current meds:   Current Facility-Administered Medications   Medication Dose Route Frequency    acetaminophen (TYLENOL) tablet 650 mg  650 mg Oral Q6H PRN    cholecalciferol (VITAMIN D3) tablet 1,000 Units  1,000 Units Oral Daily    enoxaparin (LOVENOX) subcutaneous injection 30 mg  30 mg Subcutaneous Q12H [address]    furosemide (LASIX) tablet 40 mg  40 mg Oral Daily    gabapentin (NEURONTIN) capsule 300 mg  300 mg Oral BID    HYDROmorphone (DILAUDID) injection 0 2 mg  0 2 mg Intravenous 4x Daily PRN    oxyCODONE (ROXICODONE) IR tablet 2 5 mg  2 5 mg Oral Q6H PRN    potassium chloride (K-DUR,KLOR-CON) CR tablet 10 mEq  10 mEq Oral Daily    and PTA meds:   Prior to Admission Medications   Prescriptions Last Dose Informant Patient Reported? Taking? Cholecalciferol (VITAMIN D-3) 25 MCG (1000 UT) CAPS  Child Yes Yes   Sig: Take by mouth daily   TURMERIC PO  Child Yes Yes   Sig: Take by mouth daily   furosemide (LASIX) 40 mg tablet   No Yes   Sig: TAKE 1 TABLET (40 MG) DAILY  gabapentin (NEURONTIN) 300 mg capsule  Child Yes Yes   Sig: Take 300 mg by mouth 2 (two) times a day    potassium chloride (MICRO-K) 10 MEQ CR capsule   No Yes   Sig: TAKE 1 CAPSULE BY MOUTH EVERY DAY      Facility-Administered Medications: None     No Known Allergies    Objective   I/O       08/20 0701 - 08/21 0700 08/21 0701 - 08/22 0700 08/22 0701 - 08/23 0700    P  O   200     Total Intake(mL/kg)  200 (3 5)     Urine (mL/kg/hr)  300     Total Output  300     Net  -100                  Physical Exam  Vitals signs and nursing note reviewed  Constitutional:       Appearance: Normal appearance  She is well-developed  HENT:      Head: Normocephalic and atraumatic  Eyes:      Extraocular Movements: Extraocular movements intact        Pupils: Pupils are equal, round, and reactive to light    Neck:      Musculoskeletal: Normal range of motion and neck supple  Cardiovascular:      Rate and Rhythm: Normal rate  Pulmonary:      Effort: Pulmonary effort is normal  No respiratory distress  Abdominal:      Palpations: Abdomen is soft  Musculoskeletal: Normal range of motion  Skin:     General: Skin is warm and dry  Neurological:      General: No focal deficit present  Mental Status: She is alert and oriented to person, place, and time  Coordination: Finger-Nose-Finger Test normal       Deep Tendon Reflexes: Strength normal       Reflex Scores:       Tricep reflexes are 1+ on the right side and 1+ on the left side  Bicep reflexes are 1+ on the right side and 1+ on the left side  Brachioradialis reflexes are 1+ on the right side and 1+ on the left side  Patellar reflexes are 1+ on the right side and 1+ on the left side  Achilles reflexes are 1+ on the right side and 1+ on the left side  Psychiatric:         Mood and Affect: Mood normal          Speech: Speech normal          Behavior: Behavior normal          Thought Content: Thought content normal          Judgment: Judgment normal        Neurologic Exam     Mental Status   Oriented to person, place, and time  Follows 2 step commands  Attention: normal  Concentration: normal    Speech: speech is normal   Level of consciousness: alert  Knowledge: good  Able to perform simple calculations  Able to name object  Able to repeat  Normal comprehension  Cranial Nerves   Cranial nerves II through XII intact  CN III, IV, VI   Pupils are equal, round, and reactive to light  Motor Exam   Muscle bulk: normal  Overall muscle tone: normal  Right arm pronator drift: absent  Left arm pronator drift: absent    Strength   Strength 5/5 throughout       Sensory Exam   Light touch normal    DST and JPS intact bilaterally  TTP midline lumbar spine     Gait, Coordination, and Reflexes     Coordination   Finger to nose coordination: normal    Tremor   Resting tremor: absent    Reflexes   Right brachioradialis: 1+  Left brachioradialis: 1+  Right biceps: 1+  Left biceps: 1+  Right triceps: 1+  Left triceps: 1+  Right patellar: 1+  Left patellar: 1+  Right achilles: 1+  Left achilles: 1+  Right Ward: absent  Left Ward: absent  Right ankle clonus: absent  Left ankle clonus: absent        Vitals:Blood pressure 147/75, pulse 62, temperature 99 9 °F (37 7 °C), resp  rate 18, weight 56 7 kg (125 lb), SpO2 96 %  ,Body mass index is 20 18 kg/m²  Lab Results:   Results from last 7 days   Lab Units 08/21/20 2051   PLATELETS Thousands/uL 139*     Results from last 7 days   Lab Units 08/21/20 2051   POTASSIUM mmol/L 4 1   CHLORIDE mmol/L 108   CO2 mmol/L 29   BUN mg/dL 14   CREATININE mg/dL 0 76   CALCIUM mg/dL 8 4                 No results found for: TROPONINT  ABG:No results found for: PHART, YIW1HES, PO2ART, MBO0XFV, K8BRIPOI, BEART, SOURCE    Imaging Studies: I have personally reviewed pertinent reports  and I have personally reviewed pertinent films in PACS     Xr Lumbar Spine 2 Or 3 Views    Result Date: 8/21/2020  Narrative: LUMBAR SPINE INDICATION:   fall  COMPARISON:  Lumbar spine MRI dated January 11, 2012  CTA dated December 3, 2018 VIEWS:  XR SPINE LUMBAR 2 OR 3 VIEWS INJURY Images: 2 FINDINGS: Diffuse age-related osteopenia  There are 5 non rib bearing lumbar vertebral bodies  There is concavity involving superior endplate of L3 with resulting 30% loss in axial height of the L3 vertebral body  This is new from 2018  The other vertebral bodies appear grossly intact  Age-related degenerative changes  Slight concave left scoliotic curvature  Normal lordosis  No spondylolisthesis  Densely atherosclerotic aorta  Pedicles appear symmetric  Soft tissues are unremarkable  Impression: Partial superior endplate collapse of L3 with resulting compression fracture contributing to 30% loss in axial height   This is of unknown age  MRI could be performed to assess acuity if clinically warranted  The study was marked in [facility] for immediate notification  Workstation performed: [id number]     Xr Sacrum And Coccyx    Result Date: 8/21/2020  Narrative: SACRUM AND COCCYX INDICATION:   fall  COMPARISON:  CT dated December 3, 2018  VIEWS:  XR SACRUM AND COCCYX Images: 2  FINDINGS: Diffuse age-related osteopenia  Sacrum is difficult to visualize due to combination of osteopenia and overlying stool  There is no evidence of an acute fracture  Sacral arcuate lines are maintained  The sacroiliac joints appear symmetric and mildly degenerative  Pubic symphysis is maintained  Partial compression fracture of L3 partially imaged  Numerous calcifications in the pelvis bilaterally, likely phleboliths  Impression: Neither the sacrum or the coccyx are well seen due to significant osteopenia and overlying stool  No obvious fracture  Workstation performed: [id number]     Xr Hips Bilateral With Ap Pelvis 2 Vw    Result Date: 8/21/2020  Narrative: BILATERAL HIPS AND PELVIS INDICATION:   fall  COMPARISON:  CTA dated December 3, 2018 VIEWS:  XR HIPS BILATERAL 2 VW W PELVIS IF PERFORMED Images: 5  FINDINGS: Significant age-related osteopenia  The bony pelvis appears intact  Partial compression fracture of L3 of unknown age  LEFT HIP: Very faint linear radiolucency extending through the cephalad portion of the femoral neck  Mild age-related degenerative changes  Joint space alignment is maintained  Soft tissues are unremarkable  RIGHT HIP: Mild age-related degenerative changes  Joint space alignment is maintained  Soft tissues are unremarkable  Numerous pelvic phleboliths  Large amounts of formed stool the colon  Impression: Faint linear radiolucency extending through the cephalad portion of the LEFT femoral neck  Nondisplaced fracture is not excluded  Consider CT or MRI if clinically warranted   I personally reviewed this study with Jayy Red YONI on 8/21/2020 at 4:57 PM  Workstation performed: [id number]     Ct Spine Lumbar Without Contrast    Result Date: 8/21/2020  Narrative: CT LUMBAR SPINE INDICATION:   Back pain and trauma  COMPARISON: 8/21/2020  TECHNIQUE:  Contiguous axial images through the lumbar spine were obtained  Sagittal and coronal reconstructions were performed  Radiation dose length product (DLP) for this visit:  506 63 mGy-cm   This examination, like all CT scans performed in the [facility], was performed utilizing techniques to minimize radiation dose exposure, including the use of iterative  reconstruction and automated exposure control  IMAGE QUALITY:  Diagnostic  FINDINGS: ALIGNMENT:  There are 5 lumbar type vertebral bodies  Partial lumbarization of S1  Normal alignment  Diffuse osteopenia  VERTEBRAL BODIES:  Stable L3 fracture with disruption of the superior endplate  Approximately 30% loss of height centrally  Very minimal retropulsion of the posterior aspect of the superior endplate  L4 superior endplate Schmorl's node  No evidence of acute fracture  DEGENERATIVE CHANGES: Lower Thoracic spine: No significant disc degenerative change  L1-2:  No central canal stenosis or neural foraminal narrowing  L2-3:  Minimal posterior disc bulge and facet arthropathy  No central canal stenosis or neural foraminal narrowing  L3-4:  Mild posterior disc bulge and facet arthropathy  Mild central canal stenosis and neural foraminal narrowing  L4-5:  Posterior disc bulge and facet arthropathy  Mild central canal stenosis and neural foraminal narrowing  L5-S1:  Posterior disc bulge and facet arthropathy  Mild central canal stenosis without neural foraminal narrowing  PARASPINAL SOFT TISSUES:  No hematoma  Scarring at the lung bases  Right lower lobe 9 mm pulmonary nodule, series 3 image 6  Partially visualized right renal 4 cm cyst     Impression: Stable acute L3 compression fracture    No significant fracture fragment retropulsion or central canal stenosis  Workstation performed: [id number]     Ct Lower Extremity Wo Contrast Left    Result Date: 8/21/2020  Narrative: CT left hip without IV contrast INDICATION: fall  COMPARISON: None  TECHNIQUE: CT examination of the was performed  This examination, like all CT scans performed in the [facility], was performed utilizing techniques to minimize radiation dose exposure, including the use of iterative reconstruction and automated exposure control software  Sagittal and coronal two dimensional reconstructed images were also submitted for interpretation  Rad dose  205 76 mGy-cm FINDINGS: OSSEOUS STRUCTURES:  No fracture, dislocation or destructive osseous lesion  Moderate degenerative changes seen within the left hip joint VISUALIZED MUSCULATURE:  Unremarkable  SOFT TISSUES:  A rounded density seen within the pelvis on the left side measuring 3 4 x 2 9 cm OTHER PERTINENT FINDINGS:  None  Impression: No acute displaced fracture  Moderate arthritic changes left hip joint  Incidentally detected  cyst within the pelvis measuring 3 4 x 2 9 cm probably a postmenopausal ovarian cyst   This can be evaluated with an ultrasound performed on nonemergent basis The study was marked in [facility] for significant notification  Workstation performed: [id number]     EKG, Pathology, and Other Studies: I have personally reviewed pertinent reports  VTE Prophylaxis: Sequential compression device Coretta Gamino)     Code Status: Prior  Advance Directive and Living Will:      Power of :    POLST:      Counseling / Coordination of Care  I spent 45 minutes with the patient

## 2020-08-22 NOTE — DISCHARGE INSTRUCTIONS
Neurosurgery discharge instructions following spine fracture:      LSO brace to be worn when out of bed of head of bed greater than 45 degrees  May place brace on while sitting on edge of bed  May be removed for showering   No bending, twisting or heavy lifting  No strenuous activities  No Driving   Follow-up as scheduled in two weeks with repeat spine x-rays to be completed 2-3 days prior to visit  Prescription has been entered electronically  **Please notify MD immediately if you have increased back or leg pain  New numbness, tingling and/or weakness in your legs  **

## 2020-08-22 NOTE — ASSESSMENT & PLAN NOTE
- neurologically intact with full power in bilateral lower extremities; no sensory deficits  - on multi-modal analgesia and satisfied with pain control; continue with same   - follow up neurosurgery recommendations  - consider TLSO brace  - continue VTE chemoprophylaxis

## 2020-08-22 NOTE — PHYSICAL THERAPY NOTE
PHYSICAL THERAPY EVALUATION          Patient Name: Maurice Kaufman  EESTQ'T Date: 8/22/2020 08/22/20 1053   Note Type   Note type Eval only   Pain Assessment   Pain Assessment Tool 0-10   Pain Score 5   Pain Location/Orientation Orientation: Lower; Location: Back   Pain Onset/Description Onset: Ongoing   Effect of Pain on Daily Activities Increased pain with activity   Patient's Stated Pain Goal No pain   Hospital Pain Intervention(s) Repositioned; Ambulation/increased activity   Home Living   Type of [address] One level;Stairs to enter with rails  ([address])   Bathroom Shower/Tub Tub/shower unit   Bathroom Toilet Standard   Bathroom Equipment Grab bars in [address]   Prior Function   Level of [place] Independent with ADLs and functional mobility   Lives With Son   Receives Help From Family   ADL Assistance Independent   IADLs Independent   Falls in the last 6 months 1 to 4  (1)   Vocational Retired   Comments Pt reports being fully independent with cane prior to admission  Pt lives with her son who is in good health  She reports he is there most of the time with her but occasionally golfs and she is alone at that time  Restrictions/Precautions   Braces or Orthoses Other (Comment)  Ane Crawford)   Other Precautions Chair Alarm; Fall Risk;Pain;Spinal precautions;Multiple lines   General   Family/Caregiver Present No   Cognition   Overall Cognitive Status Impaired   Arousal/Participation Alert   Orientation Level Oriented X4   Memory Decreased recall of precautions;Decreased recall of recent events;Decreased short term memory   Following Commands Follows one step commands without difficulty   RLE Assessment   RLE Assessment WFL   Strength RLE   RLE Overall Strength 3+/5   LLE Assessment   LLE Assessment WFL   Strength LLE   LLE Overall Strength 3+/5   Bed Mobility   Rolling L 3  Moderate assistance Additional items Assist x 1; Increased time required;Verbal cues;LE management   Supine to Sit 3  Moderate assistance   Additional items Assist x 1;Bedrails; Increased time required;LE management   Transfers   Sit to Stand 3  Moderate assistance   Additional items Assist x 1; Increased time required;Verbal cues   Stand to Sit 3  Moderate assistance   Additional items Assist x 1; Armrests; Increased time required;Verbal cues   Additional Comments Pt performed transfers with RW   Ambulation/Elevation   Gait pattern Foward flexed; Excessively slow;Decreased foot clearance   Gait Assistance 3  Moderate assist   Additional items Assist x 1;Verbal cues; Tactile cues   Assistive Device Rolling walker   Distance 20 ft   Stair Management Assistance Not tested   Balance   Static Sitting Fair -   Static Standing Poor   Ambulatory Poor   Endurance Deficit   Endurance Deficit Yes   Endurance Deficit Description Deteriorating posture   Activity Tolerance   Activity Tolerance Patient limited by pain; Patient limited by fatigue   Medical Staff Made Aware Pt ok to mobilize per nsg   Nurse Made Aware Yes   Assessment   Prognosis Fair   Problem List Decreased strength;Decreased endurance;Decreased range of motion; Impaired balance;Decreased mobility; Decreased cognition; Impaired judgement;Decreased safety awareness;Orthopedic restrictions;Pain   Assessment Pt is a 80 y o  female presenting to [facility] [address]  s/p fall out of bed  Pt was admitted with a primary diagnosis of closed compression fracture of L3 lumbar vertebra  Pt's PMH affecting eval includes insterstitial cystitis, leukopenia, neurologic gait dysfunction, neuropathy, neutropenia, osteoarthritis, osteoporosis, peripheral edema, renal cyst, syncope and personal factors including being home alone at times and steps to negotiate at home   Pt seen for high complexity PT eval due to ongoing medical management of admitting diagnosis, continuous pulse oximetry monitoring, quickdraw spinal brace, spinal precautions, decreased functional ability and activity tolerance compared to baseline, increased reliance on more restrictive AD, pain and fall risk  Upon eval, pt was resting in bed  Pt required Mod Ax1 with bed mobility using log roll technique  Once EOB, pt was able to maintain seated balance with Min Ax1  Pt required Mod Ax1 with transfers and ambulation using RW  Pt needed cueing for gait sequencing and hand placement on RW  Pt's ambulatory distance limited by fatigue and pain  Based on PT eval, pt is currently limited by decreased BLE strength, decreased ROM, decreased endurance, impaired balance, decreased mobility, orthopedic restrictions, pain and fall risk  PT will continue to follow pt for remainder of hospital stay to address these impairments  At conclusion of session, pt was left up in chair with all needs within reach and chair alarm on  PT currently recommending rehab at D/C  Barriers to Discharge Decreased caregiver support; Inaccessible home environment   Goals   Patient Goals To go home   STG Expiration Date 09/01/20   Short Term Goal #1 In 10 days, pt will  Chavo Quinteros 1) perform all aspects of bed mobility with supervision in order to reduce caregiver burden  2) perform transfers with supervision in order to increase functional independence  3) ambulate 150 ft with supervision and least restrictive AD in order to return to PLOF  4) negotiate 5 steps with supervision in order to safely enter home  5) increase BLE strength by 1/2 grade in order to perform transfers with greater ease  6) improve ambulatory balance by 1 grade in order to reduce fall risk  Plan   Treatment/Interventions Functional transfer training;LE strengthening/ROM; Elevations; Therapeutic exercise; Endurance training;Patient/family training;Equipment eval/education; Bed mobility;Gait training;Spoke to nursing;OT   PT Frequency Other (Comment)  (3-6x/wk)   Recommendation   PT Discharge Recommendation Post-Acute Rehabilitation Services   Equipment Recommended Walker  (RW)   PT - OK to Discharge Yes  (Rehab when medically cleared)   Modified Hawkins Scale   Modified Hawkins Scale 4   Barthel Index   Feeding 10   Bathing 5   Grooming Score 5   Dressing Score 5   Bladder Score 0   Bowels Score 10   Toilet Use Score 5   Transfers (Bed/Chair) Score 5   Mobility (Level Surface) Score 0   Stairs Score 0   Barthel Index Score 45     Charleen Melendez, SPT

## 2020-08-22 NOTE — PLAN OF CARE
Problem: Potential for Falls  Goal: Patient will remain free of falls  Description: INTERVENTIONS:  - Assess patient frequently for physical needs  -  Identify cognitive and physical deficits and behaviors that affect risk of falls    -  [place] fall precautions as indicated by assessment   - Educate patient/family on patient safety including physical limitations  - Instruct patient to call for assistance with activity based on assessment  - Modify environment to reduce risk of injury  - Consider OT/PT consult to assist with strengthening/mobility  Outcome: Progressing     Problem: Prexisting or High Potential for Compromised Skin Integrity  Goal: Skin integrity is maintained or improved  Description: INTERVENTIONS:  - Identify patients at risk for skin breakdown  - Assess and monitor skin integrity  - Assess and monitor nutrition and hydration status  - Monitor labs   - Assess for incontinence   - Turn and reposition patient  - Assist with mobility/ambulation  - Relieve pressure over bony prominences  - Avoid friction and shearing  - Provide appropriate hygiene as needed including keeping skin clean and dry  - Evaluate need for skin moisturizer/barrier cream  - Collaborate with interdisciplinary team   - Patient/family teaching  - Consider wound care consult   Outcome: Progressing     Problem: PAIN - ADULT  Goal: Verbalizes/displays adequate comfort level or baseline comfort level  Description: Interventions:  - Encourage patient to monitor pain and request assistance  - Assess pain using appropriate pain scale  - Administer analgesics based on type and severity of pain and evaluate response  - Implement non-pharmacological measures as appropriate and evaluate response  - Consider cultural and social influences on pain and pain management  - Notify physician/advanced practitioner if interventions unsuccessful or patient reports new pain  Outcome: Progressing     Problem: SAFETY ADULT  Goal: Patient will remain free of falls  Description: INTERVENTIONS:  - Assess patient frequently for physical needs  -  Identify cognitive and physical deficits and behaviors that affect risk of falls    -  [place] fall precautions as indicated by assessment   - Educate patient/family on patient safety including physical limitations  - Instruct patient to call for assistance with activity based on assessment  - Modify environment to reduce risk of injury  - Consider OT/PT consult to assist with strengthening/mobility  Outcome: Progressing  Goal: Maintain or return to baseline ADL function  Description: INTERVENTIONS:  -  Assess patient's ability to carry out ADLs; assess patient's baseline for ADL function and identify physical deficits which impact ability to perform ADLs (bathing, care of mouth/teeth, toileting, grooming, dressing, etc )  - Assess/evaluate cause of self-care deficits   - Assess range of motion  - Assess patient's mobility; develop plan if impaired  - Assess patient's need for assistive devices and provide as appropriate  - Encourage maximum independence but intervene and supervise when necessary  - Involve family in performance of ADLs  - Assess for home care needs following discharge   - Consider OT consult to assist with ADL evaluation and planning for discharge  - Provide patient education as appropriate  Outcome: Progressing     Problem: DISCHARGE PLANNING  Goal: Discharge to home or other facility with appropriate resources  Description: INTERVENTIONS:  - Identify barriers to discharge w/patient and caregiver  - Arrange for needed discharge resources and transportation as appropriate  - Identify discharge learning needs (meds, wound care, etc )  - Arrange for interpretive services to assist at discharge as needed  - Refer to Case Management Department for coordinating discharge planning if the patient needs post-hospital services based on physician/advanced practitioner order or complex needs related to functional status, cognitive ability, or social support system  Outcome: Progressing     Problem: Knowledge Deficit  Goal: Patient/family/caregiver demonstrates understanding of disease process, treatment plan, medications, and discharge instructions  Description: Complete learning assessment and assess knowledge base    Interventions:  - Provide teaching at level of understanding  - Provide teaching via preferred learning methods  Outcome: Progressing

## 2020-08-22 NOTE — ORTHOTIC NOTE
Orthotic Note            Date: 8/22/2020      Patient Name: Telma Umanzor            Reason for Consult:  Patient Active Problem List   Diagnosis    Neuropathy    Ambulatory dysfunction    Venous stasis dermatitis of both lower extremities    Age-related osteoporosis without current pathological fracture    Lower extremity edema    CKD (chronic kidney disease) stage 3, GFR 30-59 ml/min (HCC)    Compression fracture of L3 lumbar vertebra, closed, initial encounter [facility])   [address] delivered and fit an [employer] onto pt while sitting EOB  Sized and measured pt to a size medium for optimal fit and comfort  Pt tolerated fitting well  Educated pt on proper donning, doffing, and cleaning instructions  Pt without questions or concerns at this time  RN aware and will continue to follow up daily  Pt put back to bed therefore [employer] and left on windowsill  Recommendations:  Please call [employer] ext [phone] in regards to any bracing instructions and/or adjustments       [address] [facility] Restorative Technician, BS

## 2020-08-22 NOTE — PLAN OF CARE
Problem: Potential for Falls  Goal: Patient will remain free of falls  Description: INTERVENTIONS:  - Assess patient frequently for physical needs  -  Identify cognitive and physical deficits and behaviors that affect risk of falls    -  [place] fall precautions as indicated by assessment   - Educate patient/family on patient safety including physical limitations  - Instruct patient to call for assistance with activity based on assessment  - Modify environment to reduce risk of injury  - Consider OT/PT consult to assist with strengthening/mobility  Outcome: Progressing     Problem: Prexisting or High Potential for Compromised Skin Integrity  Goal: Skin integrity is maintained or improved  Description: INTERVENTIONS:  - Identify patients at risk for skin breakdown  - Assess and monitor skin integrity  - Assess and monitor nutrition and hydration status  - Monitor labs   - Assess for incontinence   - Turn and reposition patient  - Assist with mobility/ambulation  - Relieve pressure over bony prominences  - Avoid friction and shearing  - Provide appropriate hygiene as needed including keeping skin clean and dry  - Evaluate need for skin moisturizer/barrier cream  - Collaborate with interdisciplinary team   - Patient/family teaching  - Consider wound care consult   Outcome: Progressing     Problem: PAIN - ADULT  Goal: Verbalizes/displays adequate comfort level or baseline comfort level  Description: Interventions:  - Encourage patient to monitor pain and request assistance  - Assess pain using appropriate pain scale  - Administer analgesics based on type and severity of pain and evaluate response  - Implement non-pharmacological measures as appropriate and evaluate response  - Consider cultural and social influences on pain and pain management  - Notify physician/advanced practitioner if interventions unsuccessful or patient reports new pain  Outcome: Progressing     Problem: SAFETY ADULT  Goal: Patient will remain free of falls  Description: INTERVENTIONS:  - Assess patient frequently for physical needs  -  Identify cognitive and physical deficits and behaviors that affect risk of falls    -  [place] fall precautions as indicated by assessment   - Educate patient/family on patient safety including physical limitations  - Instruct patient to call for assistance with activity based on assessment  - Modify environment to reduce risk of injury  - Consider OT/PT consult to assist with strengthening/mobility  Outcome: Progressing  Goal: Maintain or return to baseline ADL function  Description: INTERVENTIONS:  -  Assess patient's ability to carry out ADLs; assess patient's baseline for ADL function and identify physical deficits which impact ability to perform ADLs (bathing, care of mouth/teeth, toileting, grooming, dressing, etc )  - Assess/evaluate cause of self-care deficits   - Assess range of motion  - Assess patient's mobility; develop plan if impaired  - Assess patient's need for assistive devices and provide as appropriate  - Encourage maximum independence but intervene and supervise when necessary  - Involve family in performance of ADLs  - Assess for home care needs following discharge   - Consider OT consult to assist with ADL evaluation and planning for discharge  - Provide patient education as appropriate  Outcome: Progressing     Problem: DISCHARGE PLANNING  Goal: Discharge to home or other facility with appropriate resources  Description: INTERVENTIONS:  - Identify barriers to discharge w/patient and caregiver  - Arrange for needed discharge resources and transportation as appropriate  - Identify discharge learning needs (meds, wound care, etc )  - Arrange for interpretive services to assist at discharge as needed  - Refer to Case Management Department for coordinating discharge planning if the patient needs post-hospital services based on physician/advanced practitioner order or complex needs related to functional status, cognitive ability, or social support system  Outcome: Progressing     Problem: Knowledge Deficit  Goal: Patient/family/caregiver demonstrates understanding of disease process, treatment plan, medications, and discharge instructions  Description: Complete learning assessment and assess knowledge base    Interventions:  - Provide teaching at level of understanding  - Provide teaching via preferred learning methods  Outcome: Progressing

## 2020-08-22 NOTE — ED ATTENDING ATTESTATION
8/21/2020  I, Carole Quarles MD, saw and evaluated the patient  I have discussed the patient with the resident/non-physician practitioner and agree with the resident's/non-physician practitioner's findings, Plan of Care, and MDM as documented in the resident's/non-physician practitioner's note, except where noted  All available labs and Radiology studies were reviewed  I was present for key portions of any procedure(s) performed by the resident/non-physician practitioner and I was immediately available to provide assistance  At this point I agree with the current assessment done in the Emergency Department  I have conducted an independent evaluation of this patient a history and physical is as follows:      S/p fall from bed unto buttocks  C/o back pain exacerbated with sitting uu/ standing - patinet unable to st up in exam bed 2/2 pain  Patinet denies fall or head strike  No AC        Secondar survey remarkable for Lumbar spine tenderness to palpation - motr and neuro is grossly intact     Impression Low back pain s/p mechanical fall     Suspect Lumbar soine fracture  - check skeletal films  Pain contrl reasssess - suspect that patient will require admission for pain control      ED Course   films + L3 compression fracture - trauma service c/s - will admit to their service - family updated       Critical Care Time  Procedures

## 2020-08-22 NOTE — ASSESSMENT & PLAN NOTE
L3 compression fracture s/p fall out of bed  · TLICS 1  · Patient with documented osteoporosis  · Currently c/o back pain but no radiculopathy    Imaging:  · CT lumbar spine w/o, 8/21/2020: Stable acute L3 compression fracture  No significant fracture fragment retropulsion or central canal stenosis  · Xray lumbar spine, 8/21/2020: Partial superior endplate collapse of L3 with resulting compression fracture contributing to 30% loss in axial height  Plan:  · LSO quick draw brace  · No further imaging is needed at this time  · PT/OT evaluation  · Lumbar spine precautions  · Frequent neuro checks  · Medical management and pain control per primary team  · DVT ppx: SCDs, ok for pharm ppx  · No neurosurgical intervention is anticipated at this time    Neurosurgery will sign off  Patient will follow up in our office in 2 weeks with xray lumbar spine prior  Please call with questions or concerns

## 2020-08-22 NOTE — PLAN OF CARE
Problem: OCCUPATIONAL THERAPY ADULT  Goal: Performs self-care activities at highest level of function for planned discharge setting  See evaluation for individualized goals  Description: Treatment Interventions: ADL retraining, Functional transfer training, Endurance training, Patient/family training, Equipment evaluation/education, Compensatory technique education, Activityengagement          See flowsheet documentation for full assessment, interventions and recommendations  Note: Limitation: Decreased ADL status, Decreased Safe judgement during ADL, Decreased endurance, Decreased self-care trans, Decreased high-level ADLs  Prognosis: Good, Fair  Assessment: Pt is a 80 y o  female who was admitted to [facility] on 8/21/2020 with Compression fracture of L3 lumbar vertebra, closed, initial encounter ([facility] [address] ) s/p fall from bed landing on buttock  Pt's problem list also includes PMH of underlying neurological disorder, neuropathy and leukopenia, neurologic gait dysfunction, neutropenia, OA, osteoporosis, peripheral edema, syncope  At baseline pt was completing adls and mobility independently 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  Pt lives with son in 1 story home with 5 VALENTIN  Currently pt requires moderate assist  for overall ADLS and moderate assist for functional mobility/transfers  Pt currently presents with impairments in the following categories -steps to enter environment, limited home support, difficulty performing ADLS, difficulty performing IADLS  and environment activity tolerance, endurance, standing balance/tolerance and safety    These impairments, as well as pt's fatigue, pain, spinal precautions, decreased caregiver support and risk for falls  limit pt's ability to safely engage in all baseline areas of occupation, includingbathing, dressing, toileting, functional mobility/transfers, community mobility, house maintenance, meal prep, cleaning, social participation  and leisure activities  From OT standpoint, recommend inpt rehab upon D/C  OT will continue to follow to address the below stated goals  OT Discharge Recommendation: Post-Acute Rehabilitation Services  OT - OK to Discharge:  Yes

## 2020-08-23 PROCEDURE — 99231 SBSQ HOSP IP/OBS SF/LOW 25: CPT | Performed by: SURGERY

## 2020-08-23 RX ORDER — OXYCODONE HYDROCHLORIDE 5 MG/1
5 TABLET ORAL EVERY 6 HOURS PRN
Status: DISCONTINUED | OUTPATIENT
Start: 2020-08-23 | End: 2020-08-24

## 2020-08-23 RX ADMIN — HYDROMORPHONE HYDROCHLORIDE 0.2 MG: 1 INJECTION, SOLUTION INTRAMUSCULAR; INTRAVENOUS; SUBCUTANEOUS at 12:54

## 2020-08-23 RX ADMIN — POTASSIUM CHLORIDE 10 MEQ: 750 TABLET, EXTENDED RELEASE ORAL at 09:44

## 2020-08-23 RX ADMIN — HYDROMORPHONE HYDROCHLORIDE 0.2 MG: 1 INJECTION, SOLUTION INTRAMUSCULAR; INTRAVENOUS; SUBCUTANEOUS at 20:31

## 2020-08-23 RX ADMIN — FUROSEMIDE 40 MG: 40 TABLET ORAL at 09:44

## 2020-08-23 RX ADMIN — OXYCODONE HYDROCHLORIDE 2.5 MG: 5 TABLET ORAL at 09:44

## 2020-08-23 RX ADMIN — GABAPENTIN 300 MG: 300 CAPSULE ORAL at 09:44

## 2020-08-23 RX ADMIN — ENOXAPARIN SODIUM 30 MG: 30 INJECTION SUBCUTANEOUS at 09:44

## 2020-08-23 RX ADMIN — GABAPENTIN 300 MG: 300 CAPSULE ORAL at 17:12

## 2020-08-23 RX ADMIN — Medication 1000 UNITS: at 09:44

## 2020-08-23 RX ADMIN — OXYCODONE HYDROCHLORIDE 2.5 MG: 5 TABLET ORAL at 06:05

## 2020-08-23 RX ADMIN — ENOXAPARIN SODIUM 30 MG: 30 INJECTION SUBCUTANEOUS at 20:30

## 2020-08-23 RX ADMIN — OXYCODONE HYDROCHLORIDE 2.5 MG: 5 TABLET ORAL at 15:59

## 2020-08-23 NOTE — PROGRESS NOTES
Progress Note - Telma Umanzor 5/16/1925, 80 y o  female MRN: [id number]    Unit/Bed#: [facility] 606-01 Encounter: [id number]    Primary Care Provider: KERON De La Torre   Date and time admitted to hospital: 8/21/2020  2:21 PM        Fall  Assessment & Plan  - Status post fall with injury as stated above  - Fall precautions  - Geriatric Medicine consult secondary to ISAR greater than 2   - PT and OT evaluation and treatment as indicated  - Case Management assisting with disposition planning  * Compression fracture of L3 lumbar vertebra, closed, initial encounter [facility])  Assessment & Plan  - Neurosurgery evaluation and recommendations appreciated  Non operative management recommended  - Maintain TLSO brace whenever upright and/or out of bed   - Continue multimodal analgesic regimen   - Continue to monitor neurologic exam   - Continue PT and OT evaluation and treatment as indicated  - Continue VTE prophylaxis  - Outpatient follow-up with Neurosurgery in 2 weeks with repeat x-rays  Disposition:  Anticipate discharge to post acute care facility for rehab  Continue PT and OT evaluation and treatment as indicated  Case Management following for disposition planning  SUBJECTIVE:  Chief Complaint:  I do not know yet      Subjective:  Patient is feeling okay this morning she thinks  She did have a good night and slept well  Her back is not bothered her overnight over this morning at  She offers no other complaints at this time  She did have some discomfort in her lower back yesterday, but noted that the brace helped and her pain medication helped  She is tolerating her diet without nausea, vomiting or constipation  She denies any trouble with her bowel or bladder habits and also denies any numbness/weakness/tingling in her extremities        OBJECTIVE:     Meds/Allergies     Current Facility-Administered Medications:     acetaminophen (TYLENOL) tablet 650 mg, 650 mg, Oral, Q6H PRN, [place] Amy Shafer MD, 650 mg at 08/22/20 1717    cholecalciferol (VITAMIN D3) tablet 1,000 Units, 1,000 Units, Oral, Daily, Bailee Patten MD, 1,000 Units at 08/22/20 0857    enoxaparin (LOVENOX) subcutaneous injection 30 mg, 30 mg, Subcutaneous, Q12H [address], Bailee Patten MD, 30 mg at 08/22/20 2025    furosemide (LASIX) tablet 40 mg, 40 mg, Oral, Daily, Bailee Patten MD, 40 mg at 08/22/20 0857    gabapentin (NEURONTIN) capsule 300 mg, 300 mg, Oral, BID, Bailee Patten MD, 300 mg at 08/22/20 1717    HYDROmorphone (DILAUDID) injection 0 2 mg, 0 2 mg, Intravenous, 4x Daily PRN, Reyna Kyle DO, 0 2 mg at 08/21/20 1633    oxyCODONE (ROXICODONE) IR tablet 2 5 mg, 2 5 mg, Oral, Q6H PRN, Bailee Patten MD, 2 5 mg at 08/23/20 0605    potassium chloride (K-DUR,KLOR-CON) CR tablet 10 mEq, 10 mEq, Oral, Daily, Bailee Patten MD, 10 mEq at 08/22/20 0857     Vitals:   Vitals:    08/22/20 2343   BP:    Pulse:    Resp:    Temp:    SpO2: 96%       Intake/Output:  I/O       08/21 0701 - 08/22 0700 08/22 0701 - 08/23 0700 08/23 0701 - 08/24 0700    P  O  200 450     Total Intake(mL/kg) 200 (3 5) 450 (7 9)     Urine (mL/kg/hr) 300 400 (0 3)     Total Output 300 400     Net -100 +50            Unmeasured Urine Occurrence  2 x            Nutrition/GI Proph/Bowel Reg:  Regular diet    Physical Exam:   GENERAL APPEARANCE: Patient in no acute distress  HEENT: NCAT; Mucous membranes moist  NECK / BACK:  Mild tenderness over the lumbar spine without step-off or deformity  No other neck or back tenderness  CV: Regular rhythm, slightly bradycardic with heart rate in the 50s; no murmur/gallops/rubs appreciated  CHEST / LUNGS: Clear to auscultation; no wheezes/rales/rhonci  ABD: NABS; soft; non-distended; non-tender  EXT: +2 pulses bilaterally upper & lower extremities; no clubbing/cyanosis/edema  NEURO: GCS 15; no focal neurologic deficits; neurovascularly intact    SKIN: Warm, dry and well perfused; no rash; no jaundice  Invasive Devices     Peripheral Intravenous Line            Peripheral IV 08/21/20 Left Forearm 1 day          Drain            External Urinary Catheter 1 day                 Lab Results: Results: I have personally reviewed pertinent reports  Imaging/EKG Studies: Results: I have personally reviewed pertinent reports      Other Studies: N/A  VTE Prophylaxis: Sequential compression device (Venodyne)  and Enoxaparin (Lovenox)     Mirza Rodriguez PA-C  8/23/2020 07:08 AM

## 2020-08-23 NOTE — PLAN OF CARE
Problem: Potential for Falls  Goal: Patient will remain free of falls  Description: INTERVENTIONS:  - Assess patient frequently for physical needs  -  Identify cognitive and physical deficits and behaviors that affect risk of falls    -  [place] fall precautions as indicated by assessment   - Educate patient/family on patient safety including physical limitations  - Instruct patient to call for assistance with activity based on assessment  - Modify environment to reduce risk of injury  - Consider OT/PT consult to assist with strengthening/mobility  Outcome: Progressing     Problem: Prexisting or High Potential for Compromised Skin Integrity  Goal: Skin integrity is maintained or improved  Description: INTERVENTIONS:  - Identify patients at risk for skin breakdown  - Assess and monitor skin integrity  - Assess and monitor nutrition and hydration status  - Monitor labs   - Assess for incontinence   - Turn and reposition patient  - Assist with mobility/ambulation  - Relieve pressure over bony prominences  - Avoid friction and shearing  - Provide appropriate hygiene as needed including keeping skin clean and dry  - Evaluate need for skin moisturizer/barrier cream  - Collaborate with interdisciplinary team   - Patient/family teaching  - Consider wound care consult   Outcome: Progressing     Problem: PAIN - ADULT  Goal: Verbalizes/displays adequate comfort level or baseline comfort level  Description: Interventions:  - Encourage patient to monitor pain and request assistance  - Assess pain using appropriate pain scale  - Administer analgesics based on type and severity of pain and evaluate response  - Implement non-pharmacological measures as appropriate and evaluate response  - Consider cultural and social influences on pain and pain management  - Notify physician/advanced practitioner if interventions unsuccessful or patient reports new pain  Outcome: Progressing     Problem: SAFETY ADULT  Goal: Patient will remain free of falls  Description: INTERVENTIONS:  - Assess patient frequently for physical needs  -  Identify cognitive and physical deficits and behaviors that affect risk of falls    -  [place] fall precautions as indicated by assessment   - Educate patient/family on patient safety including physical limitations  - Instruct patient to call for assistance with activity based on assessment  - Modify environment to reduce risk of injury  - Consider OT/PT consult to assist with strengthening/mobility  Outcome: Progressing  Goal: Maintain or return to baseline ADL function  Description: INTERVENTIONS:  -  Assess patient's ability to carry out ADLs; assess patient's baseline for ADL function and identify physical deficits which impact ability to perform ADLs (bathing, care of mouth/teeth, toileting, grooming, dressing, etc )  - Assess/evaluate cause of self-care deficits   - Assess range of motion  - Assess patient's mobility; develop plan if impaired  - Assess patient's need for assistive devices and provide as appropriate  - Encourage maximum independence but intervene and supervise when necessary  - Involve family in performance of ADLs  - Assess for home care needs following discharge   - Consider OT consult to assist with ADL evaluation and planning for discharge  - Provide patient education as appropriate  Outcome: Progressing     Problem: DISCHARGE PLANNING  Goal: Discharge to home or other facility with appropriate resources  Description: INTERVENTIONS:  - Identify barriers to discharge w/patient and caregiver  - Arrange for needed discharge resources and transportation as appropriate  - Identify discharge learning needs (meds, wound care, etc )  - Arrange for interpretive services to assist at discharge as needed  - Refer to Case Management Department for coordinating discharge planning if the patient needs post-hospital services based on physician/advanced practitioner order or complex needs related to functional status, cognitive ability, or social support system  Outcome: Progressing     Problem: Knowledge Deficit  Goal: Patient/family/caregiver demonstrates understanding of disease process, treatment plan, medications, and discharge instructions  Description: Complete learning assessment and assess knowledge base    Interventions:  - Provide teaching at level of understanding  - Provide teaching via preferred learning methods  Outcome: Progressing

## 2020-08-23 NOTE — ORTHOTIC NOTE
Orthotic Note            Date: 8/23/2020      Patient Name: Gio Pizarro            Reason for Consult:  Patient Active Problem List   Diagnosis    Neuropathy    Ambulatory dysfunction    Venous stasis dermatitis of both lower extremities    Age-related osteoporosis without current pathological fracture    Lower extremity edema    CKD (chronic kidney disease) stage 3, GFR 30-59 ml/min (HCC)    Compression fracture of L3 lumbar vertebra, closed, initial encounter [facility])   [address] [place] followed up with pt concerning fit of Quickdraw brace  Pt stated the brace felt good once she was OOB  Reviewed proper donning and doffing instructions x3  Will continue to follow daily  Recommendations:  Please call [employer] ext [phone] in regards to any bracing instructions and/or adjustments       [facility] Restorative Technician, BS

## 2020-08-23 NOTE — ASSESSMENT & PLAN NOTE
- Status post fall with injury as stated above  - Fall precautions  - Geriatric Medicine consult secondary to ISAR greater than 2   - PT and OT evaluation and treatment as indicated  - Case Management assisting with disposition planning

## 2020-08-23 NOTE — DISCHARGE SUMMARY
Discharge- Telma Umanzor 5/16/1925, 80 y o  female MRN: [id number]    Unit/Bed#: [facility] 606-01 Encounter: [id number]    Primary Care Provider: KERON De La Torre   Date and time admitted to hospital: 8/21/2020  2:21 PM        Fall  Assessment & Plan  - Status post fall with injury as stated above  - Fall precautions  - Geriatric Medicine consult secondary to ISAR greater than 2   - PT and OT evaluation and treatment as indicated  - Case Management assisting with disposition planning  * Compression fracture of L3 lumbar vertebra, closed, initial encounter [facility])  Assessment & Plan  - Neurosurgery evaluation and recommendations appreciated  Non operative management recommended  - Maintain TLSO brace whenever upright and/or out of bed   - Continue multimodal analgesic regimen   - Continue to monitor neurologic exam   - Continue PT and OT evaluation and treatment as indicated  - Continue VTE prophylaxis  - Outpatient follow-up with Neurosurgery in 2 weeks with repeat x-rays  Discharge Summary - Trauma Service   Telma Umanzor 80 y o  female MRN: [id number]  Unit/Bed#: [facility] 606-01 Encounter: [id number]    Admission Date: 8/21/2020     Discharge Date: ***    Admitting Diagnosis: Closed compression fracture of L3 lumbar vertebra, initial encounter ([facility] [address] ) [S32 030A]  Unspecified multiple injuries, initial encounter [T07  XXXA]    Discharge Diagnosis: See above  Attending and Service: Dr Dmitriy Mcdaniels, Acute Care Surgical Services  Consulting Physician(s):     1  Neurosurgery  2  Geriatric Medicine  Imaging and Procedures Performed:     Xr Lumbar Spine 2 Or 3 Views    Result Date: 8/21/2020  Impression: Partial superior endplate collapse of L3 with resulting compression fracture contributing to 30% loss in axial height  This is of unknown age  MRI could be performed to assess acuity if clinically warranted  The study was marked in [facility] for immediate notification   Workstation performed: [id number] Xr Sacrum And Coccyx    Result Date: 8/21/2020  Impression: Neither the sacrum or the coccyx are well seen due to significant osteopenia and overlying stool  No obvious fracture  Workstation performed: [id number]     Xr Hips Bilateral With Ap Pelvis 2 Vw    Result Date: 8/21/2020  Impression: Faint linear radiolucency extending through the cephalad portion of the LEFT femoral neck  Nondisplaced fracture is not excluded  Consider CT or MRI if clinically warranted  I personally reviewed this study with David Scriver on 8/21/2020 at 4:57 PM  Workstation performed: [id number]     Ct Spine Lumbar Without Contrast    Result Date: 8/21/2020  Impression: Stable acute L3 compression fracture  No significant fracture fragment retropulsion or central canal stenosis  Workstation performed: [id number]     Ct Lower Extremity Wo Contrast Left    Result Date: 8/21/2020  Impression: No acute displaced fracture  Moderate arthritic changes left hip joint  Incidentally detected  cyst within the pelvis measuring 3 4 x 2 9 cm probably a postmenopausal ovarian cyst   This can be evaluated with an ultrasound performed on nonemergent basis The study was marked in EP[employer] for significant notification  Workstation performed: Purple [employer] Ex[employer]re Course: Telma Umanzor is a 26-year-old female who presented complaining of right-sided low back pain following a low fall  The patient reported falling off of her bed and landing on her buttocks when attempting to get up earlier in the day  She was helped up by her son, but had not attempted ambulating since the fall  She denied any limited weakness or paresthesias  She denies any other traumatic injuries  On her initial trauma evaluation, her primary survey was unremarkable    On secondary survey, she was hypertensive with normal vital signs otherwise; she had tenderness to the right paraspinal musculature of the lumbar spine with no step-offs or deformities; the remainder of her exam was unremarkable  Her initial workup included labs in the above-noted imaging studies  She was admitted to the trauma service status post fall with an L3 compression fracture  Neurosurgery was consulted, the patient was allowed a diet as tolerated, she was placed on a multimodal analgesic regimen and chemical DVT prophylaxis  An LSO brace was ordered  Neurosurgery evaluated the patient and recommended non operative management  Upright x-rays were obtained which did not change our management plan following review with Neurosurgery  She had PT and OT evaluation and treatment as indicated with recommendations for rehab  Case Management assisted with disposition planning  The patient is deemed stable for discharge on ***    On discharge, the patient is instructed to follow-up with the patient's primary care provider to review the events of the patient's recent hospitalization  The patient is instructed to follow-up in the Trauma Clinic as needed  The patient is instructed to follow up with Neurosurgery in 2 weeks with repeat x-rays  The patient should follow the provided discharge instructions  Condition at Discharge: fair     Discharge instructions/Information to patient and family:   See after visit summary for information provided to patient and family  Provisions for Follow-Up Care:  See after visit summary for information related to follow-up care and any pertinent home health orders  Disposition: See After Visit Summary for discharge disposition information  Planned Readmission: No    Discharge Statement   I spent 26 minutes discharging the patient  This time was spent on the day of discharge  I had direct contact with the patient on the day of discharge  Additional documentation is required if more than 30 minutes were spent on discharge  Discharge Medications:  See after visit summary for reconciled discharge medications provided to patient and family        Tramaine Buck ANGELES  8/23/2020  10:20 AM

## 2020-08-23 NOTE — SOCIAL WORK
Met with pt to discuss the role of CM and to discuss any help pt may need prior to dc  Pt lives with her son Daniela Noyola in a 1st floor home with 4 VAL[address]  Pt performed ADL's indptly pta, pt uses a cane for ambulation  No hx of HHC  Pt has a hx of rehab at [facility]  No hx of mental health or D&A treatment  Pt's preferred pharmacy is [facility] in [address] [facility]  Pt's PCP is Dr Aly Camacho  Contact: Daniela Noyola (son) [phone]  Daniela Noyola reports being POA and pt has a living will; document requested  Rich provides transport  CM reviewed d/c planning process including the following: identifying help at home, patient preference for d/c planning needs, Discharge Lounge, [facility] Meds to Bed program, availability of treatment team to discuss questions or concerns patient and/or family may have regarding understanding medications and recognizing signs and symptoms once discharged  CM also encouraged patient to follow up with all recommended appointments after discharge  Patient advised of importance for patient and family to participate in managing patients medical well being  Patient/caregiver received discharge checklist  Content reviewed  Patient/caregiver encouraged to participate in discharge plan of care prior to discharge home

## 2020-08-23 NOTE — ASSESSMENT & PLAN NOTE
- Neurosurgery evaluation and recommendations appreciated  Non operative management recommended  - Maintain TLSO brace whenever upright and/or out of bed   - Continue multimodal analgesic regimen   - Continue to monitor neurologic exam   - Continue PT and OT evaluation and treatment as indicated  - Continue VTE prophylaxis  - Outpatient follow-up with Neurosurgery in 2 weeks with repeat x-rays

## 2020-08-24 ENCOUNTER — TELEPHONE (OUTPATIENT)
Dept: NEUROSURGERY | Facility: CLINIC | Age: 85
End: 2020-08-24

## 2020-08-24 LAB
BACTERIA UR QL AUTO: ABNORMAL /HPF
BILIRUB UR QL STRIP: NEGATIVE
CLARITY UR: CLEAR
COLOR UR: YELLOW
GLUCOSE UR STRIP-MCNC: NEGATIVE MG/DL
HGB UR QL STRIP.AUTO: ABNORMAL
HYALINE CASTS #/AREA URNS LPF: ABNORMAL /LPF
KETONES UR STRIP-MCNC: NEGATIVE MG/DL
LEUKOCYTE ESTERASE UR QL STRIP: NEGATIVE
NITRITE UR QL STRIP: NEGATIVE
NON-SQ EPI CELLS URNS QL MICRO: ABNORMAL /HPF
PH UR STRIP.AUTO: 5.5 [PH]
PROT UR STRIP-MCNC: NEGATIVE MG/DL
RBC #/AREA URNS AUTO: ABNORMAL /HPF
SP GR UR STRIP.AUTO: 1.01 (ref 1–1.03)
UROBILINOGEN UR QL STRIP.AUTO: 1 E.U./DL
WBC #/AREA URNS AUTO: ABNORMAL /HPF

## 2020-08-24 PROCEDURE — 97116 GAIT TRAINING THERAPY: CPT

## 2020-08-24 PROCEDURE — 97535 SELF CARE MNGMENT TRAINING: CPT

## 2020-08-24 PROCEDURE — 99231 SBSQ HOSP IP/OBS SF/LOW 25: CPT | Performed by: EMERGENCY MEDICINE

## 2020-08-24 PROCEDURE — 99222 1ST HOSP IP/OBS MODERATE 55: CPT | Performed by: INTERNAL MEDICINE

## 2020-08-24 PROCEDURE — 81001 URINALYSIS AUTO W/SCOPE: CPT | Performed by: NURSE PRACTITIONER

## 2020-08-24 RX ORDER — HYDROMORPHONE HCL/PF 1 MG/ML
0.2 SYRINGE (ML) INJECTION EVERY 4 HOURS PRN
Status: DISCONTINUED | OUTPATIENT
Start: 2020-08-24 | End: 2020-08-25 | Stop reason: HOSPADM

## 2020-08-24 RX ORDER — OXYCODONE HYDROCHLORIDE 5 MG/1
2.5 TABLET ORAL EVERY 4 HOURS PRN
Status: DISCONTINUED | OUTPATIENT
Start: 2020-08-24 | End: 2020-08-25 | Stop reason: HOSPADM

## 2020-08-24 RX ORDER — OXYCODONE HYDROCHLORIDE 5 MG/1
5 TABLET ORAL EVERY 4 HOURS PRN
Status: DISCONTINUED | OUTPATIENT
Start: 2020-08-24 | End: 2020-08-25 | Stop reason: HOSPADM

## 2020-08-24 RX ADMIN — ENOXAPARIN SODIUM 30 MG: 30 INJECTION SUBCUTANEOUS at 08:23

## 2020-08-24 RX ADMIN — ENOXAPARIN SODIUM 30 MG: 30 INJECTION SUBCUTANEOUS at 20:47

## 2020-08-24 RX ADMIN — GABAPENTIN 300 MG: 300 CAPSULE ORAL at 17:40

## 2020-08-24 RX ADMIN — OXYCODONE HYDROCHLORIDE 5 MG: 5 TABLET ORAL at 05:19

## 2020-08-24 RX ADMIN — POTASSIUM CHLORIDE 10 MEQ: 750 TABLET, EXTENDED RELEASE ORAL at 08:23

## 2020-08-24 RX ADMIN — Medication 1000 UNITS: at 08:23

## 2020-08-24 RX ADMIN — HYDROMORPHONE HYDROCHLORIDE 0.2 MG: 1 INJECTION, SOLUTION INTRAMUSCULAR; INTRAVENOUS; SUBCUTANEOUS at 17:40

## 2020-08-24 RX ADMIN — FUROSEMIDE 40 MG: 40 TABLET ORAL at 08:23

## 2020-08-24 RX ADMIN — OXYCODONE HYDROCHLORIDE 5 MG: 5 TABLET ORAL at 19:44

## 2020-08-24 RX ADMIN — GABAPENTIN 300 MG: 300 CAPSULE ORAL at 08:23

## 2020-08-24 RX ADMIN — OXYCODONE HYDROCHLORIDE 5 MG: 5 TABLET ORAL at 12:07

## 2020-08-24 NOTE — UTILIZATION REVIEW
Initial Clinical Review    Admission: Date/Time/Statement:   Admission Orders (From admission, onward)     Ordered        08/21/20 1808  Inpatient Admission  Once                   Orders Placed This Encounter   Procedures    Inpatient Admission     Standing Status:   Standing     Number of Occurrences:   1     Order Specific Question:   Admitting Physician     Answer:   Tom Basilio [[id number]]     Order Specific Question:   Level of Care     Answer:   Med Surg [[id number]]     Order Specific Question:   Estimated length of stay     Answer:   More than 2 Midnights     Order Specific Question:   Certification     Answer:   I certify that inpatient services are medically necessary for this patient for a duration of greater than two midnights  See H&P and MD Progress Notes for additional information about the patient's course of treatment  ED Arrival Information     Expected Arrival Acuity Means of Arrival Escorted By Service Admission Type    - 8/21/2020 14:21 Urgent Ambulance [facility] Trauma Urgent    Arrival Complaint    fall        Chief Complaint   Patient presents with    Back Pain     Pt  reports sliding out of bed onto butt  States that she did not hit head and does not take thinners  Only reporting lower back pain  Assessment/Plan:  79 y/o female who presents to ED via EMS with c/o R-sided low back pain after a low level fall  Pt reports falling off her bed and landing on her buttocks in an attempt to get out of be earlier today  She was helped up by her son and hs not attempted ambulating since then  Imaging revealed L3 compression fx   Admit inpatient to M/S unit under trauma service with traumatic L3 compression fracture secondary to a low level fall  Plan:  Consult NSx, SCD's for DVT ppx  Reg diet  NSx consult 8/22 -- recommend: LSO quick draw brace  No further imaging needed at this tie  PT/OT evals  Lumbar spine precautions  Neuro checks q4h         PT/OT evals -- recommending IP rehab on d/c  Pt and son agreeable  Referrals out to [facility], [facility] and [facility] per [facility] note  Continue pain management  up in chair 3x/day  Spine brace  Lovenox/SCD's  Reg diet       ED Triage Vitals [08/21/20 1428]   Temperature Pulse Respirations Blood Pressure SpO2   98 4 °F (36 9 °C) 82 18 167/94 99 %      Temp Source Heart Rate Source Patient Position - Orthostatic VS BP Location FiO2 (%)   Oral Monitor Sitting Right arm --      Pain Score       8          Wt Readings from Last 1 Encounters:   08/21/20 56 7 kg (125 lb)     Additional Vital Signs:   Date/Time   Temp   Pulse   Resp   BP   MAP (mmHg)   SpO2   O2 Device   Patient Position - Orthostatic VS    08/22/20 15:36:16      65      138/75   96   95 %          08/22/20 0841                  93 %   None (Room air)       08/22/20 0815      70            91 %          08/22/20 0800      71            91 %          08/22/20 0745      64      147/75   99   93 %          08/22/20 07:36:21   99 9 °F (37 7 °C)   62   18   147/75   99   96 %          08/22/20 0730      57            93 %          08/21/20 23:55:57   98 2 °F (36 8 °C)   56   18   158/72   101   96 %          08/21/20 2000                     None (Room air)       08/21/20 1700      66   18   181/77Abnormal     111   94 %   None (Room air)   Lying    08/21/20 1625      62      160/69      94 %   None (Room air)   Lying    08/21/20 1433                     None (Room air)       08/21/20 1428   98 4 °F (36 9 °C)   82   18   167/94      99 %   None (Room air)   Sitting        Pertinent Labs/Diagnostic Test Results:   · CT lumbar spine w/o, 8/21/2020: Stable acute L3 compression fracture   No significant fracture fragment retropulsion or central canal stenosis    · Xray lumbar spine, 8/21/2020: Partial superior endplate collapse of L3 with resulting compression fracture contributing to 30% loss in axial height      Results from last 7 days   Lab Units 08/21/20 2051   PLATELETS Thousands/uL 139*         Results from last 7 days   Lab Units 08/21/20 2051   SODIUM mmol/L 142   POTASSIUM mmol/L 4 1   CHLORIDE mmol/L 108   CO2 mmol/L 29   ANION GAP mmol/L 5   BUN mg/dL 14   CREATININE mg/dL 0 76   EGFR ml/min/1 73sq m 67   CALCIUM mg/dL 8 4     Results from last 7 days   Lab Units 08/21/20 2051   GLUCOSE RANDOM mg/dL 109         ED Treatment:   Medication Administration from 08/21/2020 1421 to 08/21/2020 1843       Date/Time Order Dose Route Action     08/21/2020 1451 acetaminophen (TYLENOL) tablet 975 mg 975 mg Oral Given     08/21/2020 1452 lidocaine (LIDODERM) 5 % patch 1 patch 1 patch Topical Medication Applied     08/21/2020 1633 HYDROmorphone (DILAUDID) injection 0 2 mg 0 2 mg Intravenous Given     Past Medical History:   Diagnosis Date    Interstitial cystitis     Leukopenia     Neurologic gait dysfunction     Abstraction Dr Roseanna Waggoner charts    Neuropathy     Neutropenia ([facility] )     Abstraction Dr Chano Schafer Peripheral edema     Abstraction Dr Osei Ramp Renal cyst     Syncope     Abstraction Dr Roseanna Waggoner charts     Admitting Diagnosis: Closed compression fracture of L3 lumbar vertebra, initial encounter ([facility] ) [S32 030A]  Unspecified multiple injuries, initial encounter [T07  XXXA]  Age/Sex: 80 y o  female  Admission Orders:  Scheduled Medications:  cholecalciferol, 1,000 Units, Oral, Daily  enoxaparin, 30 mg, Subcutaneous, Q12H EDDIE  furosemide, 40 mg, Oral, Daily  gabapentin, 300 mg, Oral, BID  potassium chloride, 10 mEq, Oral, Daily    PRN Meds:  acetaminophen, 650 mg, Oral, Q6H PRN  8/22 x1  HYDROmorphone, 0 2 mg, Intravenous, 4x Daily PRN  8/21 x1  oxyCODONE, 2 5 mg, Oral, Q6H PRN  8/2 x1        Network Utilization Review Department  [email or web address]  ATTENTION: Please call with any questions or concerns to [phone] and carefully listen to the prompts so that you are directed to the right person  All voicemails are confidential   Rl Cox all requests for admission clinical reviews, approved or denied determinations and any other requests to dedicated fax number below belonging to the campus where the patient is receiving treatment   List of dedicated fax numbers for the Facilities:  [address] DENIALS (Administrative/Medical Necessity) [phone]   1000  [facility] (Maternity/NICU/Pediatrics) [phone] [address] [facility] [phone]   Reena Marti [phone]   Lamar Staples [phone]   [facility] [phone]   [address] [facility] [address] [facility] [phone]   [facility]  [phone]   [address] [facility] [address]  [address] [facility] [address] [facility] [phone]

## 2020-08-24 NOTE — TELEPHONE ENCOUNTER
08/27/2020-CALLED [facility] AT PHONE#[phone] EXT [phone], CONFIRMED 09/08/2020 APT AND TO HAVE XRAY COMPLETED PRIOR TO APT  FAXED XRAY SCRIPT TO FACILITY TO [facility]#[phone]      08/26/2020-PT DISCHARGED TO [facility]    08/24/2020-PT [address]  09/08/2020 APT W/L-SPINE XRAY      ----- Message from Tami Rodriguez PA-C sent at 8/22/2020  8:14 AM EDT -----  Please schedule hospital follow up in 2 weeks with lumbar xray prior  Thanks!

## 2020-08-24 NOTE — PROGRESS NOTES
Progress Note - Chris Cummins 5/16/1925, 80 y o  female MRN: [id number]    Unit/Bed#: [facility] 606-01 Encounter: [id number]    Primary Care Provider: KERON Martinez   Date and time admitted to hospital: 8/21/2020  2:21 PM        Fall  Assessment & Plan  - Status post fall with injury as stated above  - Fall precautions  - Geriatric Medicine consult secondary to ISAR greater than 2   - PT and OT evaluation and treatment as indicated  - Case Management assisting with disposition planning  * Compression fracture of L3 lumbar vertebra, closed, initial encounter [facility])  Assessment & Plan  - Neurosurgery evaluation and recommendations appreciated  Non operative management recommended  - Maintain TLSO brace whenever upright and/or out of bed   - Continue multimodal analgesic regimen   - Continue to monitor neurologic exam   - Continue PT and OT evaluation and treatment as indicated  - Continue VTE prophylaxis  - Outpatient follow-up with Neurosurgery in 2 weeks with repeat x-rays  Disposition: home (with her son)      SUBJECTIVE:  - complains of intermittent low back pain; overall, now well controlled on current analgesic regimen   - Tmax of 101 4 yesterday afternoon; has been afebrile since then    - ambulating with a walker and cooperating with PT     OBJECTIVE:     Meds/Allergies     Current Facility-Administered Medications:     acetaminophen (TYLENOL) tablet 650 mg, 650 mg, Oral, Q6H PRN, Georgina Roche MD, 650 mg at 08/22/20 1717    cholecalciferol (VITAMIN D3) tablet 1,000 Units, 1,000 Units, Oral, Daily, Kirt Jiang MD, 1,000 Units at 08/23/20 0944    enoxaparin (LOVENOX) subcutaneous injection 30 mg, 30 mg, Subcutaneous, Q12H [facility], Georgina Roche MD, 30 mg at 08/23/20 2030    furosemide (LASIX) tablet 40 mg, 40 mg, Oral, Daily, Georgina Roche MD, 40 mg at 08/23/20 0944    gabapentin (NEURONTIN) capsule 300 mg, 300 mg, Oral, BID, Georgina Roche MD, 300 mg at 08/23/20 1712    HYDROmorphone (DILAUDID) injection 0 2 mg, 0 2 mg, Intravenous, 4x Daily PRN, Reyna Kyle DO, 0 2 mg at 08/23/20 2031    oxyCODONE (ROXICODONE) IR tablet 5 mg, 5 mg, Oral, Q6H PRN, Sharee Redding MD, 5 mg at 08/24/20 0519    potassium chloride (K-DUR,KLOR-CON) CR tablet 10 mEq, 10 mEq, Oral, Daily, Sharee Redding MD, 10 mEq at 08/23/20 0944     Vitals:   Vitals:    08/23/20 2300   BP: 138/76   Pulse: 77   Resp: 18   Temp: 99 3 °F (37 4 °C)   SpO2: 94%       Intake/Output:  I/O       08/22 0701 - 08/23 0700 08/23 0701 - 08/24 0700 08/24 0701 - 08/25 0700    P  O  450 870     Total Intake(mL/kg) 450 (7 9) 870 (15 3)     Urine (mL/kg/hr) 400 (0 3)      Total Output 400      Net +50 +870            Unmeasured Urine Occurrence 2 x 0 x            Nutrition/GI Proph/Bowel Reg: regular diet    Physical Exam:   GENERAL APPEARANCE: Patient in no acute distress  HEENT: NCAT; Mucous membranes moist  NECK / BACK:  Mild tenderness over the lumbar spine without step-off or deformity  No other neck or back tenderness  CV: Regular rhythm, slightly bradycardic with heart rate in the 50s; no murmur/gallops/rubs appreciated  CHEST / LUNGS: Clear to auscultation; no wheezes/rales/rhonci  ABD: NABS; soft; non-distended; non-tender  EXT: +2 pulses bilaterally upper & lower extremities; no clubbing/cyanosis/edema  NEURO: GCS 15; no focal neurologic deficits; neurovascularly intact  SKIN: Warm, dry and well perfused; no rash; no jaundice        Invasive Devices     Peripheral Intravenous Line            Peripheral IV 08/21/20 Left Forearm 2 days                 Lab Results: Results: I have personally reviewed pertinent reports  Imaging/EKG Studies: Results: I have personally reviewed pertinent reports      Other Studies: NA  VTE Prophylaxis: Sequential compression device (Venodyne)  and Enoxaparin (Lovenox)

## 2020-08-24 NOTE — CONSULTS
Consultation - Geriatric Medicine   Marjorie Weiss 80 y o  female MRN: [id number]  Unit/Bed#: [facility] 606-01 Encounter: [id number]      Assessment/Plan     Ambulatory dysfunction with fall  -mechanical per pt as reports lost footing causing fall, multifactorial including impaired vision, deconditioning, peripheral neuropathy, and age  -admits to at least 1 other fall in the past 6 months  -requires use cane for ambulation at baseline, encourage use at all appropriate times  -encourage assistance with all transfers  -continue good body mechanics   -keep personal items and call bell close to prevent reaching   -consider home fall risk assessment and personal fall alert system  -encourage participation with community events geared towards reducing geriatric fall such as Silver sneakers or [employer] of balance programs  -PT/OT recommending STR on dc    Compression fracture of L3  -confirmed on XR lumbar spine on admission  -continue LSO brace per Nsx  -continue acute pain control per geriatric pain protocol as noted below  -recommend checking vitamin-D level, may benefit from vitamin-D supplementation  -osteoporosis workup as below    Acute pain due to trauma  -recommend pain control per Geriatric pain protocol:  Tylenol 975mg Q8H scheduled  Roxicodone 2 5mg Q4H PRN moderate pain  Roxicodone 5mg Q4H PRN severe pain  Dilaudid 0 2mg Q4H PRN  -consider adjuncts such as lidocaine patch topically  -encourage addition of non-pharmacologic pain treatment including ice and frequent repositioning  -encourage bowel regimen to prevent and treat constipation due to increased risk with acute pain and opiate pain medications    Osteoporosis  -evidenced by L3 vertebral compression fracture in fall from standing height   -recommend checking vitamin-D level, encourage adequate calcium and vitamin-D from diet, if inadequate recommend supplementation  -TSH 1 800, consider rechecking with FT4  -encourage weight-bearing exercise with assistance for both bone and muscle strength  -recommend DEXA scan as outpatient and follow-up with PCP/and over    Cognitive screening  -Pt is A/O to person, place, situation, month but not day which is baseline per son, no prior diagnosis of cognitive impairment or dementia but short-term memory for son is extremely poor and worsening over past few months  -no CTH no record, consider checking to complete as part of cognitive workup  -recommend checking TSH/FT4 and B12 this admission, were requested as o/p by PCP as well but not yet obtained  -no prior cognitive screening including mini cog or Cochran on record  -pt confused about timing of fall as reports that it was about one week ago but was on July 4th (despite this able to state that month is Aug), may be secondary to pain, will attempt cognitive screening later this admission   -patient reports no longer drives and depends on son for transportation and that she is otherwise independent with ADLs and IADLs, does admit to some forgetfulness   -continue to encourage patient remain physically, socially, and cognitively active engaged  -will compile caregiver resource packet take provided to patient's family  -recommend comprehensive geriatric assessment outpatient to establish baseline and mobilize community resources as indicated and inappropriate    Peripheral neuropathy  -workup in progress as o/p, recommend checking labs as above  -no anemia or gross electrolyte abnormalities noted to be major contributing factor  -pt reports use of Gabapentin 300mg BID as o/p for symptom control with waning benefit, may benefit from dose adjustment pending further workup of etiology     Impaired Vision  -recommend use of corrective lenses at all appropriate times  -encourage adequate lighting and encourage use of assistance with ambulation  -keep personal belongings close to person to avoid reaching  -encourage appropriate footwear at all times  -consider home fall risk assessment and personal fall alert system    Impaired Hearing  -Encourage use of hearing aids at all appropriate times  -encourage providers and caregivers to speak slowly and clearly directly to patient  -minimize background noise to encourage patient engagement  -consider use of hearing amplifier to reduce risk of straining to hear if hearing aids are not present or are not sufficient     Impaired mastication  -requires use of dentures for mastication  -encourage use of dentures at all appropriate ensure that they are well fitting  -ensure that meal consistency is appropriate for abilities  -continue aspiration precautions    Deconditioning/debility/frailty  -clinical frailty scale stage 5, moderately frail  -albumin low 3 4  -continue to encourage well-balanced nutrition, consider nutritional supplements between meals if oral intake is poor  -continue good control chronic medical conditions  -continue to address hearing, vision, and mobilization issues as these directly contribute to overall debility as well as increased risk of falls  -monitor for mood symptoms may play a large role in patient's quality of life and overall outcomes    Delirium precautions  -Patient is high risk of delirium due to age, impaired vision, multiple chronic medical comorbidities, fall with traumatic injury, acute pain, and hospitalization  -Initiate delirium precautions  -maintain normal sleep/wake cycle  -minimize overnight interruptions, group overnight vitals/labs/nursing checks as possible  -dim lights, close blinds and turn off tv to minimize stimulation and encourage sleep environment in evenings  -ensure that pain is well controlled  -monitor for fecal and urinary retention which may precipitate delirium  -encourage early mobilization and ambulation with assistance  -provide frequent reorientation and redirection as appropriate  -encourage family and friends at the bedside to help help calm patient if anxious  -avoid medications which may precipitate or worsen delirium such as tramadol, benzodiazepine, anticholinergics, and benadryl  -encourage hydration and nutrition   -redirect unwanted behaviors as first line    High risk of caregiver burnout  -patient resides at home with son who is her primary caregiver, will compile caregiver resource packet to placed in geriatric resource packet room for patient's son  -will discuss with case management    Home medication review  Personally confirmed with [facility]:    Furosemide 40mg daily  KCL 10 mEq daily    Patient reports taking gabapentin 300mg BID and son confirms, not on file with [facility]      History of Present Illness   Physician Requesting Consult: Izzy Stock DO  Reason for Consult / Principal Problem: Fall  Hx and PE limited by: N/A  Additional history obtained from: Georgia (son)     HPI: Clive Rowley is a 80y o  year old female with deconditioning and debility, frailty, CKD stage 3, ambulatory dysfunction with recurrent falls, suspected cognitive impairment, impaired vision, impaired hearing, impaired mastication, chronic venous stasis, osteoporosis, osteoarthritis, and peripheral neuropathy who is admitted to Trauma following fall found to have L3 compression fracture on admission  She is being seen in consultation by Geriatrics for ambulatory dysfunction with recurrent falls  She seen examined at bedside where she sitting resting comfortably waiting breakfast, she reports that she is doing well so far this morning and has no acute complaints aside from lower back pain from her fall  She is slightly confused and unable to provide significant HPI thus it was obtained from her son Georgia [phone]  Rich reports that the patient resides at home with him and has significant decline over the past few months since his wife had passed in July  Becoming overwhelmed with caring for his mom especially due to her recurrent falls at least 3 in the past week and her increasing care needs at home  He reports that she ambulates with a cane at baseline, she uses hearing aids, dentures, and glasses and has extremely poor short-term memory  She requires assistance with most IADLs and is independent with ADLs  He explains that she had a fall on Thursday outside of a restaurant when her other son took her to breakfast but was reported as a soft fall landing on her buttocks and not having any injuries, the fall prompting admission occurred on Friday which he also reports was not a very traumatic fall but she was having severe back pain since that time prompting evaluation  She has had multiple other recent falls due to weakness and neuropathy  Although she is very poor short-term memory requires additional assistance in the home she has no prior formal diagnosis of dementia or cognitive impairment  Inpatient consult to Gerontology  Consult performed by: Ruben Mijares DO  Consult ordered by: Courtney Taylor PA-C        Review of Systems   Constitutional: Negative for appetite change, chills and fever  HENT: Positive for dental problem (wears dentures) and hearing loss (hearing aids)  Negative for trouble swallowing  Eyes: Positive for visual disturbance (wears glasses)  Respiratory: Negative for cough, chest tightness, shortness of breath and wheezing  Cardiovascular: Positive for leg swelling (chronic)  Negative for chest pain and palpitations  Gastrointestinal: Negative for abdominal pain, nausea and vomiting  Endocrine: Negative  Genitourinary: Negative for difficulty urinating  Musculoskeletal: Positive for arthralgias, back pain and gait problem (cane at baseline)  Skin: Negative  Neurological: Positive for weakness (generalized)  Negative for dizziness, light-headedness and headaches  Hematological: Bruises/bleeds easily  Psychiatric/Behavioral: Negative for dysphoric mood and sleep disturbance  All other systems reviewed and are negative      Historical Information   Past Medical History:   Diagnosis Date    Interstitial cystitis     Leukopenia     Neurologic gait dysfunction     Abstraction Dr Yesi Vee charts    Neuropathy     Neutropenia [facility])     Abstraction Dr Radha Garcia Peripheral edema     Abstraction Dr Ronel Zheng Renal cyst     Syncope     Abstraction Dr Yesi Vee charts     Past Surgical History:   Procedure Laterality Date    CAPSULOTOMY      Right eye    CATARACT EXTRACTION Left     CHALAZION EXCISION      COLONOSCOPY  2006     Social History   Social History     Substance and Sexual Activity   Alcohol Use Yes    Comment: socially     Social History     Substance and Sexual Activity   Drug Use No     Social History     Tobacco Use   Smoking Status Former Smoker    Packs/day: 1 00    Years: 20 00    Pack years: 20 00    Last attempt to quit: Mayito Cabrera Years since quittin 6   Smokeless Tobacco Never Used     Family History:   Family History   Problem Relation Age of Onset    Diabetes Mother     Lung disease Father     Cancer Maternal Grandfather     Lung disease Sister      Meds/Allergies   all current active meds have been reviewed    No Known Allergies    Objective     Intake/Output Summary (Last 24 hours) at 2020 3317  Last data filed at 2020 0501  Gross per 24 hour   Intake 870 ml   Output    Net 870 ml     Invasive Devices     Peripheral Intravenous Line            Peripheral IV 20 Left Forearm 2 days              Physical Exam  Vitals signs and nursing note reviewed  Constitutional:       General: She is not in acute distress  Comments: Elderly for appearing female sitting in chair resting comfortably   HENT:      Head: Normocephalic and atraumatic  Mouth/Throat:      Mouth: Mucous membranes are moist    Eyes:      General: No scleral icterus  Right eye: No discharge        Conjunctiva/sclera: Conjunctivae normal       Comments: Corrective lenses in place Neck:      Musculoskeletal: Normal range of motion and neck supple  Comments: Trachea midline  Cardiovascular:      Rate and Rhythm: Normal rate  Pulses: Normal pulses  Pulmonary:      Effort: Pulmonary effort is normal       Breath sounds: No wheezing  Abdominal:      General: There is no distension  Palpations: Abdomen is soft  Tenderness: There is no abdominal tenderness  Musculoskeletal:         General: Swelling present  No tenderness  Right lower leg: Edema present  Left lower leg: Edema present  Comments: Moderate to severe subcutaneous fat muscle wasting   Skin:     General: Skin is warm and dry  Coloration: Skin is pale  Neurological:      Mental Status: She is alert  Mental status is at baseline        Comments: Oriented to person, place, situation, not day of week or date  Pleasantly confused which is baseline per son   Psychiatric:         Mood and Affect: Mood normal          Behavior: Behavior normal        Lab Results:   I have personally reviewed pertinent lab results including the following:  -sodium 142, potassium 4 1, chloride 108, CO2 29, BUN 14, creatinine 0 76, glucose 109, calcium 8 4, GFR 67  -albumin 3 4  -hemoglobin 14 7, hematocrit 46 6, WBC 4 1, platelets 750  -TSH 6 58    I have personally reviewed the following imaging study reports in PACS:    XR lumbar spine 08/21/2020:  Partial superior endplate collapse L3 with resulting compression fracture contributing to 30% loss of axial height  XR sacrum and coccyx 08/21/2020:  Neither sacrum or coccyx well seen due to significant osteopenia in overlying stool, no obvious fracture  XR hips bilaterally 8/21/20:  Faint linear radiolucency extending through cephalad portion of left femoral neck, nondisplaced fracture not excluded  CT lower extremity without contrast left 08/21/2020:  No acute displaced fracture, moderate arthritic changes left hip joint    Therapies:   PT:  Recommend short-term rehab  OT:  Recommend short-term rehab    VTE Prophylaxis: Enoxaparin (Lovenox)    Code Status: Prior  Advance Directive and Living Will:      Power of :    POLST:      Family and Social Support:   Living Arrangements: Children  Support Systems: Children  Type of Current Residence: Private residence  [address]: No  Discharge planning discussed with[de-identified] patient  Freedom of Choice: Yes  CM Handoff Comments: 8/23: Compression fx of L3 lumbar vertebra; lives with son Dayanna Stafford; therapy recommends rehab; refs  to 1 [facility], 2  [facility], 3 [facility]  will need transport  DC TBD      Goals of Care:  Get walking

## 2020-08-24 NOTE — OCCUPATIONAL THERAPY NOTE
Occupational Therapy Treatment Note:       08/24/20 1147   Restrictions/Precautions   Other Precautions Chair Alarm; Fall Risk  (quick draw)   Pain Assessment   Pain Assessment Tool 0-10   Pain Score 8   ADL   Where Assessed Chair   Grooming Assistance 5  Supervision/Setup   UB Bathing Assistance 5  Supervision/Setup   LB Bathing Assistance 4  Minimal Assistance   LB Bathing Comments asst for lower legs   UB Dressing Assistance 4  Minimal Assistance   LB Dressing Assistance 3  Moderate Assistance   Toileting Assistance  3  Moderate Assistance   Toileting Comments for clothing management of gown and underwear with quick draw   Transfers   Sit to Stand 3  Moderate assistance   Additional items   (worsens wiht pain)   Cognition   Arousal/Participation Alert   Attention Within functional limits   Memory Decreased short term memory;Decreased recall of precautions   Following Commands Follows one step commands without difficulty   Comments   (limited carryover of how to radha quick draw brace)   Activity Tolerance   Activity Tolerance Patient limited by pain   Assessment   Assessment pt particiapted in am ot sessoin and was seen focusing on adls seated in chair at bedside  pt reports being oob all am, had increased pain but wished to bathe  pt was able to perfoprm ub bahting min asst, lb with mod  she needed max asst to radha quick draw  pt was alert and needed max cues and hand over hand to open straps on quick draw  pt required mod asst to stand from arm chair  feel pt continues to require in pt rehab inorder to incrase independence with mobility, adls and iadls  Plan   Treatment Interventions ADL retraining;Functional transfer training; Endurance training;Patient/family training;Equipment evaluation/education; Activityengagement   Goal Expiration Date 09/05/20   OT Treatment Day 1   OT Frequency 3-5x/wk   Recommendation   OT Discharge Recommendation Post-Acute Rehabilitation Services   OT - OK to Discharge Yes ANNY Genao

## 2020-08-24 NOTE — PLAN OF CARE
Problem: OCCUPATIONAL THERAPY ADULT  Goal: Performs self-care activities at highest level of function for planned discharge setting  See evaluation for individualized goals  Description: Treatment Interventions: ADL retraining, Functional transfer training, Endurance training, Patient/family training, Equipment evaluation/education, Activityengagement          See flowsheet documentation for full assessment, interventions and recommendations  Outcome: Progressing  Note: Limitation: Decreased ADL status, Decreased Safe judgement during ADL, Decreased endurance, Decreased self-care trans, Decreased high-level ADLs  Prognosis: Good, Fair  Assessment: pt particiapted in am ot carina and was seen focusing on adls seated in chair at bedside  pt reports being oob all am, had increased pain but wished to bathe  pt was able to perfoprm ub bahting min asst, lb with mod  she needed max asst to radha quick draw  pt was alert and needed max cues and hand over hand to open straps on quick draw  pt required mod asst to stand from arm chair  feel pt continues to require in pt rehab inorder to incrase independence with mobility, adls and iadls  OT Discharge Recommendation: Post-Acute Rehabilitation Services  OT - OK to Discharge:  Yes     ANNY Ortiz

## 2020-08-24 NOTE — PHYSICAL THERAPY NOTE
PHYSICAL THERAPY Treatment NOTE    Patient Name: Frank Campbell  SOHQY'[id number] Date: 8/24/2020 08/24/20 1630   Pain Assessment   Pain Assessment Tool 0-10   Pain Score 5   Pain Location/Orientation Location: Back   Restrictions/Precautions   Weight Bearing Precautions Per Order No   Braces or Orthoses LSO   Other Precautions Chair Alarm; Bed Alarm; Fall Risk;Pain;Cognitive   General   Chart Reviewed Yes   Additional Pertinent History Pt  is a 79 yo F who presents s/p fall with L3 Fx  LSO and spinal precautions   Family/Caregiver Present No   Cognition   Overall Cognitive Status Impaired   Arousal/Participation Cooperative   Attention Attends with cues to redirect   Orientation Level Oriented X4   Memory Decreased short term memory;Decreased recall of recent events   Following Commands Follows one step commands with increased time or repetition   Comments Pt  was identified with full name and birthdate   Subjective   Subjective Pt  agreeable to PT session   Bed Mobility   Additional Comments Pt  seated OOB in recliner prior to and following PT session   Transfers   Sit to Stand 3  Moderate assistance   Additional items Assist x 1; Increased time required;Verbal cues  (for hand placement and sequencing)   Stand to Sit 3  Moderate assistance   Additional items Assist x 1; Impulsive;Verbal cues  (to reach back to control descent)   Ambulation/Elevation   Gait pattern Narrow LUZ; Forward Flexion;Decreased foot clearance;Shuffling;Scissoring; Inconsistent kasandra; Redundant gait at times; Short stride; Step to;Excessively slow   Gait Assistance 3  Moderate assist   Additional items Assist x 1   Assistive Device Rolling walker   Distance 15'x2   Balance   Static Sitting Fair   Dynamic Sitting Fair -   Static Standing Poor +   Dynamic Standing Poor +   Ambulatory Poor   Endurance Deficit   Endurance Deficit Yes   Endurance Deficit Description postural and gait degradation noted with fatigue   Activity Tolerance   Activity Tolerance Patient limited by fatigue;Patient limited by pain   Nurse Made Aware Spoke to RN   Assessment   Prognosis Fair   Problem List Decreased strength;Decreased range of motion;Decreased endurance; Impaired balance;Decreased mobility; Decreased coordination;Decreased safety awareness;Decreased cognition;Pain;Orthopedic restrictions   Assessment Pt  is a 81 yo F who presents s/p fall with L3 Fx  LSO and spinal precautions  Pt  Agreeable to PT session, Pt  Identified with full name and birthdate  Pt  Demonstrated increased functional independence and increased activity tolerance as evidenced above  Pt  Tolerated increased ambulation distances and demonstrated improved functional mobility and balance as compared to previous session  Pt  Is progressing towards goals, as expected and will benefit from continued skilled therapy to increase functional independence and aid patient in return to PLOF with decreased burden of care  D/C recommendation is rehab when medically appropriate  Goals   Patient Goals to get home   STG Expiration Date 09/01/20   Plan   Treatment/Interventions Functional transfer training;LE strengthening/ROM; Endurance training; Therapeutic exercise;Cognitive reorientation;Patient/family training;Bed mobility;Gait training;Equipment eval/education;Spoke to nursing;Spoke to case management   Progress Progressing toward goals   PT Frequency   (3-6x/wk)   Recommendation   PT Discharge Recommendation Post-Acute Rehabilitation Services   Equipment Recommended Walker   PT - OK to Discharge Yes  (to rehab when medically appropriate)     Ciro Corbett, PT,DPT 8/24/2020

## 2020-08-24 NOTE — PLAN OF CARE
Problem: PHYSICAL THERAPY ADULT  Goal: Performs mobility at highest level of function for planned discharge setting  See evaluation for individualized goals  Description: Treatment/Interventions: Functional transfer training, LE strengthening/ROM, Elevations, Therapeutic exercise, Endurance training, Patient/family training, Equipment eval/education, Bed mobility, Gait training, Spoke to nursing, OT  Equipment Recommended: Walker(RW)       See flowsheet documentation for full assessment, interventions and recommendations  Outcome: Progressing  Note: Prognosis: Fair  Problem List: Decreased strength, Decreased range of motion, Decreased endurance, Impaired balance, Decreased mobility, Decreased coordination, Decreased safety awareness, Decreased cognition, Pain, Orthopedic restrictions  Assessment: Pt  is a 81 yo F who presents s/p fall with L3 Fx  LSO and spinal precautions  Pt  Agreeable to PT session, Pt  Identified with full name and birthdate  Pt  Demonstrated increased functional independence and increased activity tolerance as evidenced above  Pt  Tolerated increased ambulation distances and demonstrated improved functional mobility and balance as compared to previous session  Pt  Is progressing towards goals, as expected and will benefit from continued skilled therapy to increase functional independence and aid patient in return to PLOF with decreased burden of care  D/C recommendation is rehab when medically appropriate  Barriers to Discharge: Decreased caregiver support, Inaccessible home environment     PT Discharge Recommendation: [address]     PT - OK to Discharge: Yes(to rehab when medically appropriate)    See flowsheet documentation for full assessment

## 2020-08-24 NOTE — PLAN OF CARE
Problem: Potential for Falls  Goal: Patient will remain free of falls  Description: INTERVENTIONS:  - Assess patient frequently for physical needs  -  Identify cognitive and physical deficits and behaviors that affect risk of falls    -  [place] fall precautions as indicated by assessment   - Educate patient/family on patient safety including physical limitations  - Instruct patient to call for assistance with activity based on assessment  - Modify environment to reduce risk of injury  - Consider OT/PT consult to assist with strengthening/mobility  Outcome: Progressing     Problem: Prexisting or High Potential for Compromised Skin Integrity  Goal: Skin integrity is maintained or improved  Description: INTERVENTIONS:  - Identify patients at risk for skin breakdown  - Assess and monitor skin integrity  - Assess and monitor nutrition and hydration status  - Monitor labs   - Assess for incontinence   - Turn and reposition patient  - Assist with mobility/ambulation  - Relieve pressure over bony prominences  - Avoid friction and shearing  - Provide appropriate hygiene as needed including keeping skin clean and dry  - Evaluate need for skin moisturizer/barrier cream  - Collaborate with interdisciplinary team   - Patient/family teaching  - Consider wound care consult   Outcome: Progressing     Problem: PAIN - ADULT  Goal: Verbalizes/displays adequate comfort level or baseline comfort level  Description: Interventions:  - Encourage patient to monitor pain and request assistance  - Assess pain using appropriate pain scale  - Administer analgesics based on type and severity of pain and evaluate response  - Implement non-pharmacological measures as appropriate and evaluate response  - Consider cultural and social influences on pain and pain management  - Notify physician/advanced practitioner if interventions unsuccessful or patient reports new pain  Outcome: Progressing     Problem: SAFETY ADULT  Goal: Patient will remain free of falls  Description: INTERVENTIONS:  - Assess patient frequently for physical needs  -  Identify cognitive and physical deficits and behaviors that affect risk of falls    -  [place] fall precautions as indicated by assessment   - Educate patient/family on patient safety including physical limitations  - Instruct patient to call for assistance with activity based on assessment  - Modify environment to reduce risk of injury  - Consider OT/PT consult to assist with strengthening/mobility  Outcome: Progressing  Goal: Maintain or return to baseline ADL function  Description: INTERVENTIONS:  -  Assess patient's ability to carry out ADLs; assess patient's baseline for ADL function and identify physical deficits which impact ability to perform ADLs (bathing, care of mouth/teeth, toileting, grooming, dressing, etc )  - Assess/evaluate cause of self-care deficits   - Assess range of motion  - Assess patient's mobility; develop plan if impaired  - Assess patient's need for assistive devices and provide as appropriate  - Encourage maximum independence but intervene and supervise when necessary  - Involve family in performance of ADLs  - Assess for home care needs following discharge   - Consider OT consult to assist with ADL evaluation and planning for discharge  - Provide patient education as appropriate  Outcome: Progressing     Problem: DISCHARGE PLANNING  Goal: Discharge to home or other facility with appropriate resources  Description: INTERVENTIONS:  - Identify barriers to discharge w/patient and caregiver  - Arrange for needed discharge resources and transportation as appropriate  - Identify discharge learning needs (meds, wound care, etc )  - Arrange for interpretive services to assist at discharge as needed  - Refer to Case Management Department for coordinating discharge planning if the patient needs post-hospital services based on physician/advanced practitioner order or complex needs related to functional status, cognitive ability, or social support system  Outcome: Progressing     Problem: Knowledge Deficit  Goal: Patient/family/caregiver demonstrates understanding of disease process, treatment plan, medications, and discharge instructions  Description: Complete learning assessment and assess knowledge base    Interventions:  - Provide teaching at level of understanding  - Provide teaching via preferred learning methods  Outcome: Progressing

## 2020-08-25 VITALS
WEIGHT: 125 LBS | DIASTOLIC BLOOD PRESSURE: 66 MMHG | TEMPERATURE: 97.9 F | RESPIRATION RATE: 17 BRPM | OXYGEN SATURATION: 88 % | BODY MASS INDEX: 20.18 KG/M2 | HEART RATE: 66 BPM | SYSTOLIC BLOOD PRESSURE: 137 MMHG

## 2020-08-25 LAB
EXT SARS-COV-2: NOT DETECTED
SARS-COV-2 RNA RESP QL NAA+PROBE: NEGATIVE

## 2020-08-25 PROCEDURE — U0003 INFECTIOUS AGENT DETECTION BY NUCLEIC ACID (DNA OR RNA); SEVERE ACUTE RESPIRATORY SYNDROME CORONAVIRUS 2 (SARS-COV-2) (CORONAVIRUS DISEASE [COVID-19]), AMPLIFIED PROBE TECHNIQUE, MAKING USE OF HIGH THROUGHPUT TECHNOLOGIES AS DESCRIBED BY CMS-2020-01-R: HCPCS | Performed by: PHYSICIAN ASSISTANT

## 2020-08-25 PROCEDURE — 99232 SBSQ HOSP IP/OBS MODERATE 35: CPT | Performed by: INTERNAL MEDICINE

## 2020-08-25 PROCEDURE — 99238 HOSP IP/OBS DSCHRG MGMT 30/<: CPT | Performed by: EMERGENCY MEDICINE

## 2020-08-25 RX ORDER — ACETAMINOPHEN 325 MG/1
650 TABLET ORAL EVERY 6 HOURS PRN
Qty: 30 TABLET | Refills: 0 | Status: SHIPPED | OUTPATIENT
Start: 2020-08-25

## 2020-08-25 RX ORDER — OXYCODONE HYDROCHLORIDE 5 MG/1
5 TABLET ORAL EVERY 4 HOURS PRN
Qty: 20 TABLET | Refills: 0 | Status: SHIPPED | OUTPATIENT
Start: 2020-08-25 | End: 2020-09-04

## 2020-08-25 RX ADMIN — Medication 1000 UNITS: at 09:00

## 2020-08-25 RX ADMIN — FUROSEMIDE 40 MG: 40 TABLET ORAL at 09:00

## 2020-08-25 RX ADMIN — ENOXAPARIN SODIUM 30 MG: 30 INJECTION SUBCUTANEOUS at 09:00

## 2020-08-25 RX ADMIN — OXYCODONE HYDROCHLORIDE 5 MG: 5 TABLET ORAL at 13:04

## 2020-08-25 RX ADMIN — POTASSIUM CHLORIDE 10 MEQ: 750 TABLET, EXTENDED RELEASE ORAL at 09:00

## 2020-08-25 RX ADMIN — OXYCODONE HYDROCHLORIDE 5 MG: 5 TABLET ORAL at 04:17

## 2020-08-25 RX ADMIN — GABAPENTIN 300 MG: 300 CAPSULE ORAL at 09:00

## 2020-08-25 RX ADMIN — OXYCODONE HYDROCHLORIDE 5 MG: 5 TABLET ORAL at 09:00

## 2020-08-25 NOTE — PROGRESS NOTES
Progress Note - Geriatric Medicine   Tessie Abt 80 y o  female MRN: [id number]  Unit/Bed#: [facility] 606-01 Encounter: [id number]      Assessment/Plan:    Ambulatory dysfunction with fall  -mechanical fall per patient   -encourage good body mechanics and assistance with all transfers  -continue fall precautions  -PT and OT recommending short-term rehab discharge    Compression fracture of L3  -neurosurgery following, recommend LSO brace  -encourage use of brace at all appropriate times  -encourage calcium and vitamin-D supplementation    Acute pain due to trauma  -pain is well controlled on current regimen  -continue geriatric pain protocol    Peripheral neuropathy  -maintained on gabapentin 300 mg BID  -continue o/p w/u of etiology    Frailty in geriatric patient  -continue to encourage nutritional support  -continue good control chronic medical conditions  -continue to ensure that sensory impairment are treated including ensuring patient has her glasses and hearing aids at all appropriate times  -continue PT and OT    High risk of caregiver burnout  -discussed at length with patient's on admission given recent passing of his wife and now his being primary caregiver of his mother with probable underlying dementia  -caregiver resource packet including information regarding caregiver stress, support groups, and bereavement compiled and folder with information placed in patient's discharging information bag to be sent home with family on d/c    Subjective:     Patient reports that she slept well overnight and is doing well this am, she is awaiting transfer to [facility]  Caregiver and Geriatric resources compiled and placed in patients belonging to ensure that they go with her and to her son on dc  Review of Systems   Constitutional: Negative for appetite change, chills and fever  HENT: Negative for trouble swallowing  Eyes: Positive for visual disturbance (Wears glasses)     Respiratory: Negative for cough, shortness of breath and wheezing  Cardiovascular: Negative for chest pain and palpitations  Gastrointestinal: Negative for constipation, nausea and vomiting  Endocrine: Negative  Genitourinary: Negative  Musculoskeletal: Positive for gait problem  Skin: Negative  Neurological: Negative for light-headedness and headaches  Hematological: Negative  Psychiatric/Behavioral: Negative for sleep disturbance  All other systems reviewed and are negative  Objective:     Vitals: Blood pressure 137/66, pulse 66, temperature 97 9 °F (36 6 °C), temperature source Oral, resp  rate 17, weight 56 7 kg (125 lb), SpO2 (!) 88 %  ,Body mass index is 20 18 kg/m²  Intake/Output Summary (Last 24 hours) at 8/25/2020 1106  Last data filed at 8/25/2020 0601  Gross per 24 hour   Intake 720 ml   Output 975 ml   Net -255 ml     Current Medications: Reviewed    Physical Exam:   Physical Exam  Vitals signs and nursing note reviewed  Constitutional:       Comments: Thin, frail and elderly appearing   HENT:      Head: Normocephalic and atraumatic  Mouth/Throat:      Mouth: Mucous membranes are moist    Eyes:      General:         Right eye: No discharge  Left eye: No discharge  Conjunctiva/sclera: Conjunctivae normal    Cardiovascular:      Rate and Rhythm: Normal rate  Pulmonary:      Effort: Pulmonary effort is normal    Abdominal:      Palpations: Abdomen is soft  Musculoskeletal:         General: No swelling  Skin:     General: Skin is warm and dry  Neurological:      Mental Status: She is alert  Comments: Awake and alert, oriented to self and situation, answering questions appropriately   Psychiatric:         Mood and Affect: Mood normal       Comments: Pleasant and cooperative        Invasive Devices     Peripheral Intravenous Line            Peripheral IV 08/21/20 Left Forearm 3 days              Lab, Imaging and other studies: I have personally reviewed pertinent reports

## 2020-08-25 NOTE — DISCHARGE SUMMARY
Discharge- Reena Man 5/16/1925, 80 y o  female MRN: [id number]    Unit/Bed#: [facility] 606-01 Encounter: [id number]    Primary Care Provider: KERON Scott   Date and time admitted to hospital: 8/21/2020  2:21 PM        Fall  Assessment & Plan  - Status post fall with injury as stated above  - Fall precautions  - Geriatric Medicine consult secondary to ISAR greater than 2  Recommendations appreciated  - PT and OT evaluation and treatment as indicated  - Case Management assisting with disposition planning  Discharge to inpatient rehab at [facility] today  * Compression fracture of L3 lumbar vertebra, closed, initial encounter [facility])  Assessment & Plan  - Neurosurgery evaluation and recommendations appreciated  Non operative management recommended  - Maintain TLSO brace whenever upright and/or out of bed   - Continue multimodal analgesic regimen   - Continue to monitor neurologic exam   - Continue PT and OT evaluation and treatment as indicated  - Continue VTE prophylaxis  - Outpatient follow-up with Neurosurgery in 2 weeks with repeat x-rays  Resolved Problems  Date Reviewed: 8/25/2020    None          Admission Date:   Admission Orders (From admission, onward)     Ordered        08/21/20 1808  Inpatient Admission  Once                     Admitting Diagnosis: Closed compression fracture of L3 lumbar vertebra, initial encounter ([facility] [place] [address] ) [S32 030A]  Unspecified multiple injuries, initial encounter [T07  XXXA]    HPI: As documented by DR Alayne Nissen who evaluated the patient on admission, " Reena Man is a 80 y o  female who presents with right sided low back pain following a low level fall  She reports falling off her bed and landing on her buttocks on an attempt to get out of bed earlier today  She was helped up by her son and has not attempted ambulating since then  She however denies any limb weakness or paresthesias   She denies any head trauma nor injuries to her chest, abdomen or extremities "    Procedures Performed: No orders of the defined types were placed in this encounter  Summary of Hospital Course:  Patient was placed on the trauma service following when she sustained an L3 fracture  She was seen by Neurosurgery who evaluated the patient recommended conservative management in a quick draw brace  She was recommended follow-up in their office in 2 weeks with upright x-rays at that time  She was up and ambulatory with PT/OT recommended discharge to inpatient rehab  Her pain was adequately controlled and she was medically stable for discharge on 08/25/2020  This morning she notes swelling back discomfort  She states that the pain medications do help  She has no other complaints or new pain  She is motivated to get to rehab today  Exam:  GEN:  No acute distress  HEENT:  Normocephalic, atraumatic  NEURO:  GCS 15, nonfocal exam  CV:  Regular rate and rhythm, no murmurs gallops or rubs  PULM:  Clear to auscultation bilaterally  GI:  Soft, nontender, nondistended  :  Voiding  MSK:  Moving all extremities equally with full strength; +quick draw brace in place  SKIN:  Pink, warm, dry      Significant Findings, Care, Treatment and Services Provided:   Xr Lumbar Spine 2 Or 3 Views    Result Date: 8/21/2020  Impression: Partial superior endplate collapse of L3 with resulting compression fracture contributing to 30% loss in axial height  This is of unknown age  MRI could be performed to assess acuity if clinically warranted  The study was marked in [facility] for immediate notification  Workstation performed: [id number]     Xr Sacrum And Coccyx    Result Date: 8/21/2020  Impression: Neither the sacrum or the coccyx are well seen due to significant osteopenia and overlying stool  No obvious fracture   Workstation performed: [id number]     Xr Hips Bilateral With Ap Pelvis 2 Vw    Result Date: 8/21/2020  Impression: Faint linear radiolucency extending through the cephalad portion of the LEFT femoral neck  Nondisplaced fracture is not excluded  Consider CT or MRI if clinically warranted  I personally reviewed this study with Ana Arteaga on 8/21/2020 at 4:57 PM  Workstation performed: [id number]     Ct Spine Lumbar Without Contrast    Result Date: 8/21/2020  Impression: Stable acute L3 compression fracture  No significant fracture fragment retropulsion or central canal stenosis  Workstation performed: [id number]     Ct Lower Extremity Wo Contrast Left    Result Date: 8/21/2020  Impression: No acute displaced fracture  Moderate arthritic changes left hip joint  Incidentally detected  cyst within the pelvis measuring 3 4 x 2 9 cm probably a postmenopausal ovarian cyst   This can be evaluated with an ultrasound performed on nonemergent basis The study was marked in [employer] for significant notification  Workstation performed: [id number]       Complications: none    Condition at Discharge: good         Discharge instructions/Information to patient and family:   See after visit summary for information provided to patient and family  Provisions for Follow-Up Care:  See after visit summary for information related to follow-up care and any pertinent home health orders  PCP: KERON Burns    Disposition: Short-term rehab at [address] [place] [address] Readmission: No    Discharge Statement   I spent 30 minutes discharging the patient  This time was spent on the day of discharge  I had direct contact with the patient on the day of discharge  Additional documentation is required if more than 30 minutes were spent on discharge  Discharge Medications:  See after visit summary for reconciled discharge medications provided to patient and family

## 2020-08-25 NOTE — PLAN OF CARE
Problem: Potential for Falls  Goal: Patient will remain free of falls  Description: INTERVENTIONS:  - Assess patient frequently for physical needs  -  Identify cognitive and physical deficits and behaviors that affect risk of falls    -  [place] fall precautions as indicated by assessment   - Educate patient/family on patient safety including physical limitations  - Instruct patient to call for assistance with activity based on assessment  - Modify environment to reduce risk of injury  - Consider OT/PT consult to assist with strengthening/mobility  8/25/2020 1052 by Page Doshi RN  Outcome: Adequate for Discharge  8/25/2020 0759 by Page Doshi RN  Outcome: Progressing     Problem: Prexisting or High Potential for Compromised Skin Integrity  Goal: Skin integrity is maintained or improved  Description: INTERVENTIONS:  - Identify patients at risk for skin breakdown  - Assess and monitor skin integrity  - Assess and monitor nutrition and hydration status  - Monitor labs   - Assess for incontinence   - Turn and reposition patient  - Assist with mobility/ambulation  - Relieve pressure over bony prominences  - Avoid friction and shearing  - Provide appropriate hygiene as needed including keeping skin clean and dry  - Evaluate need for skin moisturizer/barrier cream  - Collaborate with interdisciplinary team   - Patient/family teaching  - Consider wound care consult   8/25/2020 1052 by Page Doshi RN  Outcome: Adequate for Discharge  8/25/2020 0759 by Page Doshi RN  Outcome: Progressing     Problem: PAIN - ADULT  Goal: Verbalizes/displays adequate comfort level or baseline comfort level  Description: Interventions:  - Encourage patient to monitor pain and request assistance  - Assess pain using appropriate pain scale  - Administer analgesics based on type and severity of pain and evaluate response  - Implement non-pharmacological measures as appropriate and evaluate response  - Consider cultural and social influences on pain and pain management  - Notify physician/advanced practitioner if interventions unsuccessful or patient reports new pain  8/25/2020 1052 by Mani Bae RN  Outcome: Adequate for Discharge  8/25/2020 0759 by Mani Bae RN  Outcome: Progressing     Problem: SAFETY ADULT  Goal: Patient will remain free of falls  Description: INTERVENTIONS:  - Assess patient frequently for physical needs  -  Identify cognitive and physical deficits and behaviors that affect risk of falls    -  [place] fall precautions as indicated by assessment   - Educate patient/family on patient safety including physical limitations  - Instruct patient to call for assistance with activity based on assessment  - Modify environment to reduce risk of injury  - Consider OT/PT consult to assist with strengthening/mobility  8/25/2020 1052 by Mani Bae RN  Outcome: Adequate for Discharge  8/25/2020 0759 by Mani Bae RN  Outcome: Progressing  Goal: Maintain or return to baseline ADL function  Description: INTERVENTIONS:  -  Assess patient's ability to carry out ADLs; assess patient's baseline for ADL function and identify physical deficits which impact ability to perform ADLs (bathing, care of mouth/teeth, toileting, grooming, dressing, etc )  - Assess/evaluate cause of self-care deficits   - Assess range of motion  - Assess patient's mobility; develop plan if impaired  - Assess patient's need for assistive devices and provide as appropriate  - Encourage maximum independence but intervene and supervise when necessary  - Involve family in performance of ADLs  - Assess for home care needs following discharge   - Consider OT consult to assist with ADL evaluation and planning for discharge  - Provide patient education as appropriate  8/25/2020 1052 by Mani Bae RN  Outcome: Adequate for Discharge  8/25/2020 0759 by Mani Bae RN  Outcome: Progressing     Problem: DISCHARGE PLANNING  Goal: Discharge to home or other facility with appropriate resources  Description: INTERVENTIONS:  - Identify barriers to discharge w/patient and caregiver  - Arrange for needed discharge resources and transportation as appropriate  - Identify discharge learning needs (meds, wound care, etc )  - Arrange for interpretive services to assist at discharge as needed  - Refer to Case Management Department for coordinating discharge planning if the patient needs post-hospital services based on physician/advanced practitioner order or complex needs related to functional status, cognitive ability, or social support system  8/25/2020 1052 by Pao Paredes RN  Outcome: Adequate for Discharge  8/25/2020 0759 by Pao Paredes RN  Outcome: Progressing     Problem: Knowledge Deficit  Goal: Patient/family/caregiver demonstrates understanding of disease process, treatment plan, medications, and discharge instructions  Description: Complete learning assessment and assess knowledge base  Interventions:  - Provide teaching at level of understanding  - Provide teaching via preferred learning methods  8/25/2020 1052 by Pao Paredes RN  Outcome: Adequate for Discharge  8/25/2020 0759 by Pao Paredes RN  Outcome: Progressing     Problem: Nutrition/Hydration-ADULT  Goal: Nutrient/Hydration intake appropriate for improving, restoring or maintaining nutritional needs  Description: Monitor and assess patient's nutrition/hydration status for malnutrition  Collaborate with interdisciplinary team and initiate plan and interventions as ordered  Monitor patient's weight and dietary intake as ordered or per policy  Utilize nutrition screening tool and intervene as necessary  Determine patient's food preferences and provide high-protein, high-caloric foods as appropriate       INTERVENTIONS:  - Monitor oral intake, urinary output, labs, and treatment plans  - Assess nutrition and hydration status and recommend course of action  - Evaluate amount of meals eaten  - Assist patient with eating if necessary   - Allow adequate time for meals  - Recommend/ encourage appropriate diets, oral nutritional supplements, and vitamin/mineral supplements  - Order, calculate, and assess calorie counts as needed  - Recommend, monitor, and adjust tube feedings and TPN/PPN based on assessed needs  - Assess need for intravenous fluids  - Provide specific nutrition/hydration education as appropriate  - Include patient/family/caregiver in decisions related to nutrition  8/25/2020 1052 by Bob Herdeia RN  Outcome: Adequate for Discharge  8/25/2020 0759 by Bob Heredia, RN  Outcome: Progressing

## 2020-08-25 NOTE — ASSESSMENT & PLAN NOTE
- Status post fall with injury as stated above  - Fall precautions  - Geriatric Medicine consult secondary to ISAR greater than 2  Recommendations appreciated  - PT and OT evaluation and treatment as indicated  - Case Management assisting with disposition planning  Discharge to inpatient rehab at [facility] today

## 2020-08-25 NOTE — PLAN OF CARE
Problem: Potential for Falls  Goal: Patient will remain free of falls  Description: INTERVENTIONS:  - Assess patient frequently for physical needs  -  Identify cognitive and physical deficits and behaviors that affect risk of falls    -  [place] fall precautions as indicated by assessment   - Educate patient/family on patient safety including physical limitations  - Instruct patient to call for assistance with activity based on assessment  - Modify environment to reduce risk of injury  - Consider OT/PT consult to assist with strengthening/mobility  Outcome: Progressing     Problem: Prexisting or High Potential for Compromised Skin Integrity  Goal: Skin integrity is maintained or improved  Description: INTERVENTIONS:  - Identify patients at risk for skin breakdown  - Assess and monitor skin integrity  - Assess and monitor nutrition and hydration status  - Monitor labs   - Assess for incontinence   - Turn and reposition patient  - Assist with mobility/ambulation  - Relieve pressure over bony prominences  - Avoid friction and shearing  - Provide appropriate hygiene as needed including keeping skin clean and dry  - Evaluate need for skin moisturizer/barrier cream  - Collaborate with interdisciplinary team   - Patient/family teaching  - Consider wound care consult   Outcome: Progressing     Problem: PAIN - ADULT  Goal: Verbalizes/displays adequate comfort level or baseline comfort level  Description: Interventions:  - Encourage patient to monitor pain and request assistance  - Assess pain using appropriate pain scale  - Administer analgesics based on type and severity of pain and evaluate response  - Implement non-pharmacological measures as appropriate and evaluate response  - Consider cultural and social influences on pain and pain management  - Notify physician/advanced practitioner if interventions unsuccessful or patient reports new pain  Outcome: Progressing     Problem: SAFETY ADULT  Goal: Patient will remain free of falls  Description: INTERVENTIONS:  - Assess patient frequently for physical needs  -  Identify cognitive and physical deficits and behaviors that affect risk of falls    -  [place] fall precautions as indicated by assessment   - Educate patient/family on patient safety including physical limitations  - Instruct patient to call for assistance with activity based on assessment  - Modify environment to reduce risk of injury  - Consider OT/PT consult to assist with strengthening/mobility  Outcome: Progressing  Goal: Maintain or return to baseline ADL function  Description: INTERVENTIONS:  -  Assess patient's ability to carry out ADLs; assess patient's baseline for ADL function and identify physical deficits which impact ability to perform ADLs (bathing, care of mouth/teeth, toileting, grooming, dressing, etc )  - Assess/evaluate cause of self-care deficits   - Assess range of motion  - Assess patient's mobility; develop plan if impaired  - Assess patient's need for assistive devices and provide as appropriate  - Encourage maximum independence but intervene and supervise when necessary  - Involve family in performance of ADLs  - Assess for home care needs following discharge   - Consider OT consult to assist with ADL evaluation and planning for discharge  - Provide patient education as appropriate  Outcome: Progressing     Problem: DISCHARGE PLANNING  Goal: Discharge to home or other facility with appropriate resources  Description: INTERVENTIONS:  - Identify barriers to discharge w/patient and caregiver  - Arrange for needed discharge resources and transportation as appropriate  - Identify discharge learning needs (meds, wound care, etc )  - Arrange for interpretive services to assist at discharge as needed  - Refer to Case Management Department for coordinating discharge planning if the patient needs post-hospital services based on physician/advanced practitioner order or complex needs related to functional status, cognitive ability, or social support system  Outcome: Progressing     Problem: Knowledge Deficit  Goal: Patient/family/caregiver demonstrates understanding of disease process, treatment plan, medications, and discharge instructions  Description: Complete learning assessment and assess knowledge base  Interventions:  - Provide teaching at level of understanding  - Provide teaching via preferred learning methods  Outcome: Progressing     Problem: Nutrition/Hydration-ADULT  Goal: Nutrient/Hydration intake appropriate for improving, restoring or maintaining nutritional needs  Description: Monitor and assess patient's nutrition/hydration status for malnutrition  Collaborate with interdisciplinary team and initiate plan and interventions as ordered  Monitor patient's weight and dietary intake as ordered or per policy  Utilize nutrition screening tool and intervene as necessary  Determine patient's food preferences and provide high-protein, high-caloric foods as appropriate       INTERVENTIONS:  - Monitor oral intake, urinary output, labs, and treatment plans  - Assess nutrition and hydration status and recommend course of action  - Evaluate amount of meals eaten  - Assist patient with eating if necessary   - Allow adequate time for meals  - Recommend/ encourage appropriate diets, oral nutritional supplements, and vitamin/mineral supplements  - Order, calculate, and assess calorie counts as needed  - Recommend, monitor, and adjust tube feedings and TPN/PPN based on assessed needs  - Assess need for intravenous fluids  - Provide specific nutrition/hydration education as appropriate  - Include patient/family/caregiver in decisions related to nutrition  Outcome: Progressing

## 2020-08-25 NOTE — SOCIAL WORK
Auth obtained   # [id number]  Good until 8/28/20  Fax clinicals to [phone]    Pt will d/c to [facility] this afternoon at 1330 via [address]  Pt's son Marcie Prado was made aware and in agreement

## 2020-08-26 ENCOUNTER — TRANSITIONAL CARE MANAGEMENT (OUTPATIENT)
Dept: FAMILY MEDICINE CLINIC | Facility: CLINIC | Age: 85
End: 2020-08-26

## 2020-08-26 NOTE — UTILIZATION REVIEW
Notification of Discharge  This is a Notification of Discharge from our facility [address]  Please be advised that this patient has been discharge from our facility  Below you will find the admission and discharge date and time including the patients disposition  PRESENTATION DATE: 8/21/2020  2:21 PM  OBS ADMISSION DATE:   IP ADMISSION DATE: 8/21/20 1808   DISCHARGE DATE: 8/25/2020  2:12 PM  DISPOSITION: Non [facility] Non [facility]   Admission Orders listed below:  Admission Orders (From admission, onward)     Ordered        08/21/20 1808  Inpatient Admission  Once                   Please contact the UR Department if additional information is required to close this patient's authorization/case  [address] Utilization Review Department  Main: [phone] x carefully listen to the prompts  All voicemails are confidential   [email or web address]  Send all requests for admission clinical reviews, approved or denied determinations and any other requests to dedicated fax number below belonging to the campus where the patient is receiving treatment   List of dedicated fax numbers:  [address] DENIALS (Administrative/Medical Necessity) [phone]   [address] (Maternity/NICU/Pediatrics) [phone]   Inge Barriga [phone]   Shari Resendiz [phone]   Michael Hammond [phone]   [address] [phone]   [facility]  [phone]   [address] [facility]  [address] [facility] [address] [facility] [phone]

## 2020-09-08 ENCOUNTER — TRANSCRIBE ORDERS (OUTPATIENT)
Dept: RADIOLOGY | Facility: HOSPITAL | Age: 85
End: 2020-09-08

## 2020-09-08 ENCOUNTER — OFFICE VISIT (OUTPATIENT)
Dept: NEUROSURGERY | Facility: CLINIC | Age: 85
End: 2020-09-08
Payer: COMMERCIAL

## 2020-09-08 ENCOUNTER — HOSPITAL ENCOUNTER (OUTPATIENT)
Dept: RADIOLOGY | Facility: HOSPITAL | Age: 85
Discharge: HOME | End: 2020-09-08
Payer: COMMERCIAL

## 2020-09-08 VITALS
TEMPERATURE: 97.3 F | DIASTOLIC BLOOD PRESSURE: 76 MMHG | RESPIRATION RATE: 16 BRPM | HEART RATE: 55 BPM | SYSTOLIC BLOOD PRESSURE: 130 MMHG

## 2020-09-08 DIAGNOSIS — S32.030A CLOSED COMPRESSION FRACTURE OF L3 LUMBAR VERTEBRA, INITIAL ENCOUNTER (HCC): ICD-10-CM

## 2020-09-08 DIAGNOSIS — S32.030A COMPRESSION FRACTURE OF L3 LUMBAR VERTEBRA, CLOSED, INITIAL ENCOUNTER (HCC): Primary | ICD-10-CM

## 2020-09-08 PROCEDURE — 99213 OFFICE O/P EST LOW 20 MIN: CPT | Performed by: PHYSICIAN ASSISTANT

## 2020-09-08 PROCEDURE — 72100 X-RAY EXAM L-S SPINE 2/3 VWS: CPT

## 2020-09-08 NOTE — PROGRESS NOTES
Neurosurgery Office Note  Frde Watson 80 y o  female MRN: [id number]      Assessment/Plan     Compression fracture of L3 lumbar vertebra, closed, initial encounter [facility])  Patient presents for 2 week hospital follow up s/p fall out of bed with L3 compression fracture, TLICS 1    Imaging:   XR lumbar spine 09/08/2020:  Official read pending however per my interpretation L3 compression fracture appears stable with stable alignment  Plan:   Continue LSO quick draw when OOB and HOB greater than 45 degrees   Instructed patient to have more frequent bathroom visits in order to decrease her chances of urinating on herself   Patient is to return to the office in 4 weeks with new upright lumbar XR or sooner should patient develop worsening symptoms or red flag signs  o If patient is doing well at that time without pain to palpation or subjective pain complaints, can consider flexion and extension lumbar x-rays and possible weaning out of brace       Diagnoses and all orders for this visit:    Compression fracture of L3 lumbar vertebra, closed, initial encounter ([facility] [address] )  -     XR spine lumbar 2 or 3 views injury; Future    Other orders  -     bisacodyl (FLEET) 10 MG/30ML ENEM; Insert 10 mg into the rectum once            CHIEF COMPLAINT    Chief Complaint   Patient presents with    Follow-up       HISTORY    History of Present Illness     80y o  year old female who presents for 2 week follow-up status post fall with L3 compression fracture  Patient states she has been doing well  Currently at rehab  Wearing her LSO quick drop brace as instructed when out of bed and head of bed greater than 45°  Complains of 7/10 back pain  At times pain will go down into her right leg  Does endorse some numbness in her bilateral feet but states this is longstanding  Denies bowel or bladder issues, saddle anesthesia but does report at times she cannot make it to the bathroom soon enough    Ambulating with a walker at therapy and states everyday she is getting a little bit better but does hope she will be able to walk with a cane as she was prior to her fall  She otherwise denies headache, dizziness, chest pain, shortness of breath, abdominal pain, nausea or vomiting, new numbness/tingling/weakness  HPI    See Discussion    REVIEW OF SYSTEMS    Review of Systems   Constitutional: Negative  HENT: Negative  Eyes: Negative  Respiratory: Negative  Cardiovascular: Negative  Gastrointestinal: Negative  Endocrine: Negative  Genitourinary: Negative  Musculoskeletal: Positive for back pain (lower back pain, occa feels it going down her right leg, 7/10 pain)  Negative for gait problem, myalgias and neck pain  Skin: Negative  Allergic/Immunologic: Negative  Neurological: Negative  Hematological: Negative  Psychiatric/Behavioral: Negative  Meds/Allergies     Current Outpatient Medications   Medication Sig Dispense Refill    acetaminophen (TYLENOL) 325 mg tablet Take 2 tablets (650 mg total) by mouth every 6 (six) hours as needed for mild pain 30 tablet 0    bisacodyl (FLEET) 10 MG/30ML ENEM Insert 10 mg into the rectum once      furosemide (LASIX) 40 mg tablet TAKE 1 TABLET (40 MG) DAILY  90 tablet 1    gabapentin (NEURONTIN) 300 mg capsule Take 300 mg by mouth 2 (two) times a day       Cholecalciferol (VITAMIN D-3) 25 MCG (1000 UT) CAPS Take by mouth daily      potassium chloride (MICRO-K) 10 MEQ CR capsule TAKE 1 CAPSULE BY MOUTH EVERY DAY (Patient not taking: Reported on 9/8/2020) 90 capsule 1     No current facility-administered medications for this visit          No Known Allergies    PAST HISTORY    Past Medical History:   Diagnosis Date    Interstitial cystitis     Leukopenia     Neurologic gait dysfunction     Abstraction Dr Sandoval Mohr Neuropathy     Neutropenia [facility])     Abstraction Dr Nirmala Fabian Osteoporosis     Peripheral edema     Abstraction Dr Ronel Zheng Renal cyst     Syncope     Abstraction Dr Yesi Vee charts       Past Surgical History:   Procedure Laterality Date    CAPSULOTOMY      Right eye    CATARACT EXTRACTION Left     CHALAZION EXCISION      COLONOSCOPY  2006       Social History     Tobacco Use    Smoking status: Former Smoker     Packs/day:      Years: 20      Pack years: 20      Last attempt to quit:      Years since quittin 7    Smokeless tobacco: Never Used   Substance Use Topics    Alcohol use: Yes     Comment: socially    Drug use: No       Family History   Problem Relation Age of Onset    Diabetes Mother     Lung disease Father     Cancer Maternal Grandfather     Lung disease Sister          Above history personally reviewed  EXAM    Vitals:Blood pressure 130/76, pulse 55, temperature (!) 97 3 °F (36 3 °C), temperature source Tympanic, resp  rate 16 ,There is no height or weight on file to calculate BMI  Physical Exam  Constitutional:       General: She is awake  Appearance: She is well-developed  Comments: LSO quick draw brace on   HENT:      Head: Normocephalic and atraumatic  Eyes:      Conjunctiva/sclera: Conjunctivae normal    Neck:      Musculoskeletal: No spinous process tenderness or muscular tenderness  Cardiovascular:      Rate and Rhythm: Normal rate  Pulmonary:      Effort: Pulmonary effort is normal  No respiratory distress  Musculoskeletal:      Cervical back: She exhibits no tenderness  Thoracic back: She exhibits no tenderness  Lumbar back: She exhibits no tenderness  Skin:     General: Skin is warm and dry  Neurological:      Mental Status: She is alert and oriented to person, place, and time  Coordination: Finger-Nose-Finger Test abnormal (mild dysmetria but able to self correct)     Psychiatric:         Attention and Perception: Attention and perception normal          Mood and Affect: Mood and affect normal          Speech: Speech normal          Behavior: Behavior normal  Behavior is cooperative  Thought Content: Thought content normal          Cognition and Memory: Cognition and memory normal          Judgment: Judgment normal          Neurologic Exam     Mental Status   Oriented to person, place, and time  Follows 1 step commands  Attention: normal  Concentration: normal    Speech: speech is normal   Level of consciousness: alert  Normal comprehension  Cranial Nerves     CN III, IV, VI   Conjugate gaze: present    CN VIII   Hearing: intact    Motor Exam   Muscle bulk: normal  Overall muscle tone: normal  Right arm pronator drift: absent  Left arm pronator drift: absent  RLE:  4/5 HF, 4+/5 KE  LLE:  4+/5 HF, 4+/5 KE     Sensory Exam   Light touch normal    DST not well intact distal RLE     Gait, Coordination, and Reflexes     Gait  Gait: (in a wheelchair at the time of exam)    Coordination   Finger to nose coordination: abnormal (mild dysmetria but able to self correct)    Tremor   Resting tremor: absent  Intention tremor: absent  Action tremor: absent    Reflexes   Right Ward: absent  Left Ward: absent  Right ankle clonus: absent  Left ankle clonus: absent  Patellar reflex is not well appreciated on exam         MEDICAL DECISION MAKING    Imaging Studies:     Xr Lumbar Spine 2 Or 3 Views    Result Date: 8/21/2020  Narrative: LUMBAR SPINE INDICATION:   fall  COMPARISON:  Lumbar spine MRI dated January 11, 2012  CTA dated December 3, 2018 VIEWS:  XR SPINE LUMBAR 2 OR 3 VIEWS INJURY Images: 2 FINDINGS: Diffuse age-related osteopenia  There are 5 non rib bearing lumbar vertebral bodies  There is concavity involving superior endplate of L3 with resulting 30% loss in axial height of the L3 vertebral body  This is new from 2018  The other vertebral bodies appear grossly intact  Age-related degenerative changes  Slight concave left scoliotic curvature  Normal lordosis  No spondylolisthesis   Densely atherosclerotic aorta  Pedicles appear symmetric  Soft tissues are unremarkable  Impression: Partial superior endplate collapse of L3 with resulting compression fracture contributing to 30% loss in axial height  This is of unknown age  MRI could be performed to assess acuity if clinically warranted  The study was marked in [facility] for immediate notification  Workstation performed: [id number]     Xr Sacrum And Coccyx    Result Date: 8/21/2020  Narrative: SACRUM AND COCCYX INDICATION:   fall  COMPARISON:  CT dated December 3, 2018  VIEWS:  XR SACRUM AND COCCYX Images: 2  FINDINGS: Diffuse age-related osteopenia  Sacrum is difficult to visualize due to combination of osteopenia and overlying stool  There is no evidence of an acute fracture  Sacral arcuate lines are maintained  The sacroiliac joints appear symmetric and mildly degenerative  Pubic symphysis is maintained  Partial compression fracture of L3 partially imaged  Numerous calcifications in the pelvis bilaterally, likely phleboliths  Impression: Neither the sacrum or the coccyx are well seen due to significant osteopenia and overlying stool  No obvious fracture  Workstation performed: [id number]     Xr Hips Bilateral With Ap Pelvis 2 Vw    Result Date: 8/21/2020  Narrative: BILATERAL HIPS AND PELVIS INDICATION:   fall  COMPARISON:  CTA dated December 3, 2018 VIEWS:  XR HIPS BILATERAL 2 VW W PELVIS IF PERFORMED Images: 5  FINDINGS: Significant age-related osteopenia  The bony pelvis appears intact  Partial compression fracture of L3 of unknown age  LEFT HIP: Very faint linear radiolucency extending through the cephalad portion of the femoral neck  Mild age-related degenerative changes  Joint space alignment is maintained  Soft tissues are unremarkable  RIGHT HIP: Mild age-related degenerative changes  Joint space alignment is maintained  Soft tissues are unremarkable  Numerous pelvic phleboliths  Large amounts of formed stool the colon       Impression: Faint linear radiolucency extending through the cephalad portion of the LEFT femoral neck  Nondisplaced fracture is not excluded  Consider CT or MRI if clinically warranted  I personally reviewed this study with Giuseppeevelyn Gary on 8/21/2020 at 4:57 PM  Workstation performed: [id number]     Ct Spine Lumbar Without Contrast    Result Date: 8/21/2020  Narrative: CT LUMBAR SPINE INDICATION:   Back pain and trauma  COMPARISON: 8/21/2020  TECHNIQUE:  Contiguous axial images through the lumbar spine were obtained  Sagittal and coronal reconstructions were performed  Radiation dose length product (DLP) for this visit:  506 63 mGy-cm   This examination, like all CT scans performed in the [facility], was performed utilizing techniques to minimize radiation dose exposure, including the use of iterative  reconstruction and automated exposure control  IMAGE QUALITY:  Diagnostic  FINDINGS: ALIGNMENT:  There are 5 lumbar type vertebral bodies  Partial lumbarization of S1  Normal alignment  Diffuse osteopenia  VERTEBRAL BODIES:  Stable L3 fracture with disruption of the superior endplate  Approximately 30% loss of height centrally  Very minimal retropulsion of the posterior aspect of the superior endplate  L4 superior endplate Schmorl's node  No evidence of acute fracture  DEGENERATIVE CHANGES: Lower Thoracic spine: No significant disc degenerative change  L1-2:  No central canal stenosis or neural foraminal narrowing  L2-3:  Minimal posterior disc bulge and facet arthropathy  No central canal stenosis or neural foraminal narrowing  L3-4:  Mild posterior disc bulge and facet arthropathy  Mild central canal stenosis and neural foraminal narrowing  L4-5:  Posterior disc bulge and facet arthropathy  Mild central canal stenosis and neural foraminal narrowing  L5-S1:  Posterior disc bulge and facet arthropathy  Mild central canal stenosis without neural foraminal narrowing  PARASPINAL SOFT TISSUES:  No hematoma  Scarring at the lung bases  Right lower lobe 9 mm pulmonary nodule, series 3 image 6  Partially visualized right renal 4 cm cyst     Impression: Stable acute L3 compression fracture  No significant fracture fragment retropulsion or central canal stenosis  Workstation performed: [id number]     Ct Lower Extremity Wo Contrast Left    Result Date: 8/21/2020  Narrative: CT left hip without IV contrast INDICATION: fall  COMPARISON: None  TECHNIQUE: CT examination of the was performed  This examination, like all CT scans performed in the [facility], was performed utilizing techniques to minimize radiation dose exposure, including the use of iterative reconstruction and automated exposure control software  Sagittal and coronal two dimensional reconstructed images were also submitted for interpretation  Rad dose  205 76 mGy-cm FINDINGS: OSSEOUS STRUCTURES:  No fracture, dislocation or destructive osseous lesion  Moderate degenerative changes seen within the left hip joint VISUALIZED MUSCULATURE:  Unremarkable  SOFT TISSUES:  A rounded density seen within the pelvis on the left side measuring 3 4 x 2 9 cm OTHER PERTINENT FINDINGS:  None  Impression: No acute displaced fracture  Moderate arthritic changes left hip joint  Incidentally detected  cyst within the pelvis measuring 3 4 x 2 9 cm probably a postmenopausal ovarian cyst   This can be evaluated with an ultrasound performed on nonemergent basis The study was marked in [employer] for significant notification  Workstation performed: [id number]       I have personally reviewed pertinent reports     and I have personally reviewed pertinent films in PACS

## 2020-09-08 NOTE — ASSESSMENT & PLAN NOTE
Patient presents for 2 week hospital follow up s/p fall out of bed with L3 compression fracture, TLICS 1    Imaging:   XR lumbar spine 09/08/2020:  Official read pending however per my interpretation L3 compression fracture appears stable with stable alignment      Plan:   Continue LSO quick draw when OOB and HOB greater than 45 degrees   Instructed patient to have more frequent bathroom visits in order to decrease her chances of urinating on herself   Patient is to return to the office in 4 weeks with new upright lumbar XR or sooner should patient develop worsening symptoms or red flag signs  o If patient is doing well at that time without pain to palpation or subjective pain complaints, can consider flexion and extension lumbar x-rays and possible weaning out of brace

## 2020-09-08 NOTE — PATIENT INSTRUCTIONS
Follow-up in 4 weeks time with repeat lumbar spine x-ray  Continue LSO brace quick draw when out of bed and head of bed greater than 45°  Return to the office sooner should you develop worsening symptoms or red flag signs

## 2020-09-14 ENCOUNTER — TRANSITIONAL CARE MANAGEMENT (OUTPATIENT)
Dept: FAMILY MEDICINE CLINIC | Facility: CLINIC | Age: 85
End: 2020-09-14

## 2020-09-15 ENCOUNTER — OFFICE VISIT (OUTPATIENT)
Dept: FAMILY MEDICINE CLINIC | Facility: CLINIC | Age: 85
End: 2020-09-15
Payer: COMMERCIAL

## 2020-09-15 VITALS
RESPIRATION RATE: 16 BRPM | WEIGHT: 130.6 LBS | BODY MASS INDEX: 20.99 KG/M2 | SYSTOLIC BLOOD PRESSURE: 130 MMHG | TEMPERATURE: 97.6 F | OXYGEN SATURATION: 100 % | DIASTOLIC BLOOD PRESSURE: 70 MMHG | HEIGHT: 66 IN | HEART RATE: 63 BPM

## 2020-09-15 DIAGNOSIS — G62.9 NEUROPATHY: ICD-10-CM

## 2020-09-15 DIAGNOSIS — S32.030D COMPRESSION FRACTURE OF L3 VERTEBRA WITH ROUTINE HEALING, SUBSEQUENT ENCOUNTER: Primary | ICD-10-CM

## 2020-09-15 DIAGNOSIS — L89.622 PRESSURE INJURY OF LEFT HEEL, STAGE 2 (HCC): ICD-10-CM

## 2020-09-15 DIAGNOSIS — E78.5 HYPERLIPIDEMIA, UNSPECIFIED HYPERLIPIDEMIA TYPE: ICD-10-CM

## 2020-09-15 DIAGNOSIS — R60.0 LOWER EXTREMITY EDEMA: ICD-10-CM

## 2020-09-15 DIAGNOSIS — B35.1 ONYCHOMYCOSIS: ICD-10-CM

## 2020-09-15 DIAGNOSIS — R26.2 AMBULATORY DYSFUNCTION: ICD-10-CM

## 2020-09-15 PROCEDURE — 99495 TRANSJ CARE MGMT MOD F2F 14D: CPT | Performed by: NURSE PRACTITIONER

## 2020-09-20 RX ORDER — FUROSEMIDE 40 MG/1
40 TABLET ORAL DAILY
Qty: 90 TABLET | Refills: 1 | Status: SHIPPED | OUTPATIENT
Start: 2020-09-20 | End: 2022-02-23 | Stop reason: SDUPTHER

## 2020-09-20 RX ORDER — GABAPENTIN 300 MG/1
300 CAPSULE ORAL 2 TIMES DAILY
Qty: 180 CAPSULE | Refills: 1 | Status: SHIPPED | OUTPATIENT
Start: 2020-09-20 | End: 2021-12-03 | Stop reason: ALTCHOICE

## 2020-09-20 RX ORDER — POTASSIUM CHLORIDE 750 MG/1
10 CAPSULE, EXTENDED RELEASE ORAL DAILY
Qty: 90 CAPSULE | Refills: 1 | Status: SHIPPED | OUTPATIENT
Start: 2020-09-20 | End: 2021-08-29 | Stop reason: SDUPTHER

## 2020-09-21 NOTE — PROGRESS NOTES
FAMILY PRACTICE OFFICE VISIT       NAME: Eliane Johnston  AGE: 80 y o  SEX: female       : 1925        MRN: [id number]    Assessment and Plan     Problem List Items Addressed This Visit        Nervous and Auditory    Neuropathy    Relevant Medications    gabapentin (NEURONTIN) 300 mg capsule       Other    Ambulatory dysfunction    Lower extremity edema    Relevant Medications    furosemide (LASIX) 40 mg tablet    potassium chloride (MICRO-K) 10 MEQ CR capsule    Other Relevant Orders    Comprehensive metabolic panel    TSH, 3rd generation    T4, free      Other Visit Diagnoses     Compression fracture of L3 vertebra with routine healing, subsequent encounter    -  Primary    Relevant Orders    DXA bone density spine hip and pelvis    TSH, 3rd generation    T4, free    Hyperlipidemia, unspecified hyperlipidemia type        Relevant Orders    Lipid panel    Onychomycosis        Relevant Orders    Ambulatory referral to Podiatry    Pressure injury of left heel, stage 2 ([facility] [address] )        Relevant Orders    Ambulatory referral to Wound Care          1  Compression fracture of L3 vertebra with routine healing, subsequent encounter  DXA bone density spine hip and pelvis    TSH, 3rd generation    T4, free   2  Ambulatory dysfunction     3  Lower extremity edema  Comprehensive metabolic panel    TSH, 3rd generation    T4, free    furosemide (LASIX) 40 mg tablet    potassium chloride (MICRO-K) 10 MEQ CR capsule   4  Hyperlipidemia, unspecified hyperlipidemia type  Lipid panel   5  Onychomycosis  Ambulatory referral to Podiatry   6  Pressure injury of left heel, stage 2 ([address] )  Ambulatory referral to Wound Care   7  Neuropathy  gabapentin (NEURONTIN) 300 mg capsule     This 54-year-old female presents today for hospital follow-up  She was admitted to the hospital 2020 through 2020 after suffering a L3 compression fracture secondary to fall    She was discharged to rehab at [facility] on  and stayed through 09/12/2020  She is now home, where she lives with her son  Reports she is doing well  Able to complete all ADLs independently  Using a roller walker  Wearing a back brace  Following with neuro surgery  Using oxycodone once daily in the morning for pain  Pain is decreasing  Will complete Dxa scan once pain is improved and no longer needing to wear brace  Denies any problems with constipation  Encouraged to drink a lot of fluids and keep moving  Lower extremity edema is stable with furosemide 40 mg daily and potassium supplementation 10 mEq daily  Will repeat blood work  Currently ambulating with a walker  She hopes to go back to using her cane once pain improves  Hyperlipidemia, will repeat lipid panel  On exam she has a pressure ulcer on left medial heel, which she states she had a big blister on her heel when she was in the hospital   Discussed with patient this looks like a pressure ulcer, likely from being in bed, caused by fraction and pressure from being in bed  Advised to keep area clean, dry  Wear shoes with an open back, as to not put any more pressure on this wound  Elevate this foot up off the bed, if sitting in a recliner, elevate this foot and a pillow to keep pressure off the heel  Advised to follow with Podiatry for nail care  May also follow with Podiatry or wound center for heel ulceration  She will establish care with Podiatry, and verify if they are able to care for this ulcer  If not, she will go to the wound center  Discussed with patient and her son, importance of following with Podiatry our wound care for this ulcer, as there may be poor wound healing due to poor circulation and neuropathy  They verbalize understanding  Neuropathy, is currently taking gabapentin 300 mg twice daily  She does not feel this is effective  Does not like to take medication  Is unsure if she would like to continue taking this medication  Will continue to think about this  Complete blood work  Return in 3 months  Chief Complaint     Chief Complaint   Patient presents with    Transition of Care Management       History of Present Illness     Shayy Junior is a 20-year-old female presenting today for hospital follow-up  She was admitted to the hospital 8/21/20 through 08/25/2020 after sustaining L3 compression fracture after a fall  She was discharged from the hospital to [facility] for rehabilitation  She was at [facility] from 08/25/2020 through 09/12/2020  She was discharged home  She lives with her son,  Maria D Campuzano, who has accompanied her today  She is doing well at home  Able to perform her own ADLs  Using a roller walker  She was using a cane prior to her fall and is hoping to go back to using a cane  She is wearing a back brace  She still has some pain, is taking oxycodone only 1 tablet once a day in the morning when she 1st wakes up  Pain is lessening  She is scheduled to return to Neurosurgery for follow-up on October 8th  Currently taking gabapentin 300 mg twice daily for neuropathy  She does not feel this is helping  She has not been using Tylenol for pain thus far  She is taking furosemide and KCl on a daily basis  She knows a blister on her left heel  States she noticed this when she was in the hospital     Having no difficulty with constipation  Admits she does not drink enough water, typically drinks about 8 oz of water per day, 1 cup of coffee, and 1 drink of rum and Coke every afternoon  Although, has not had rum and coke for about 1 month now  Developed laryngitis yesterday  No sore throat, cold or allergy symptoms  Just lost her voice  Voice is better today than yesterday         TCM Call (since 8/20/2020)     Date and time call was made  9/14/2020  3:35 PM    Hospital care reviewed  Records reviewed    Patient was hospitialized at  Other (comment) (Comment)  [facility]     Date of Admission  08/21/20    Date of discharge  09/12/20    Diagnosis  Compression Fx of Lumbar Spine    Disposition  Home    Were the patients medications reviewed and updated  No    Current Symptoms  Back pain - left side; Back pain - right side    Back pain, left side, severity  Moderate    Back pain, left side, onset  Ongoing    Back pain, right side, severity  Moderate    Back pain, right side, onset  Ongoing      TCM Call (since 8/20/2020)     Post hospital issues  None    Should patient be enrolled in anticoag monitoring? No    Scheduled for follow up? Yes    Not clinically warranted  SLB: 08/21/20-08/25/20: Fall, Compression fracture of L3 lumbar vertebra, closed, Multiple injuries  Pt was D/C to Rehab at [facility]  Will F/U W/ pt post D/C from rehab  Referrals needed  no    Did you obtain your prescribed medications  Yes    Do you need help managing your prescriptions or medications  No    Is transportation to your appointment needed  No    I have advised the patient to call PCP with any new or worsening symptoms  Linh Nguyen, [address]    The type of support provided  Emotional    Do you have social support  Yes, as much as I need    Are you recieving any outpatient services  No    Are you recieving home care services  Yes    Types of home care services  Home PT    Are you using any community resources  No    Current waiver services  No    Have you fallen in the last 12 months  Yes    How many times  4-5 times     Interperter language line needed  No    Counseling  Family    Counseling topics  Activities of daily living; Importance of RX compliance    Comments  SLB: 08/21/20-08/25/20: Fall, Compression fracture of L3 lumbar vertebra, closed, Multiple injuries  Pt was D/C to Rehab at [facility]  Will F/U W/ pt post D/C from rehab  Review of Systems   Review of Systems   Constitutional: Negative  HENT: Positive for voice change  Negative for congestion, ear pain, postnasal drip, rhinorrhea, sinus pressure, sneezing and sore throat  Eyes: Negative  Respiratory: Negative  Cardiovascular: Positive for leg swelling  Negative for chest pain and palpitations  Gastrointestinal: Negative  Genitourinary: Negative  Musculoskeletal: Positive for back pain  Skin: Negative  Neurological: Positive for numbness  Hematological: Negative  Psychiatric/Behavioral: Negative  Active Problem List     Patient Active Problem List   Diagnosis    Neuropathy    Ambulatory dysfunction    Venous stasis dermatitis of both lower extremities    Age-related osteoporosis without current pathological fracture    Lower extremity edema    CKD (chronic kidney disease) stage 3, GFR 30-59 ml/min ([facility])    Compression fracture of L3 lumbar vertebra, closed, initial encounter ([facility] [address] )    Fall       Past Medical History:  Past Medical History:   Diagnosis Date    Interstitial cystitis     Leukopenia     Neurologic gait dysfunction     Abstraction Dr Mario Garcia charts    Neuropathy     Neutropenia [facility])     Abstraction Dr Arely Carey Peripheral edema     Abstraction Dr Jory Ribera Renal cyst     Syncope     Abstraction Dr Mario Garcia charts       Past Surgical History:  Past Surgical History:   Procedure Laterality Date    CAPSULOTOMY      Right eye    CATARACT EXTRACTION Left     CHALAZION EXCISION      COLONOSCOPY  2006       Family History:  Family History   Problem Relation Age of Onset    Diabetes Mother     Lung disease Father     Cancer Maternal Grandfather     Lung disease Sister        Social History:  Social History     Socioeconomic History    Marital status:       Spouse name: Not on file    Number of children: Not on file    Years of education: Not on file    Highest education level: Not on file   Occupational History    Not on file   Social Needs    Financial resource strain: Not on file    Food insecurity     Worry: Not on file     Inability: Not on file    Transportation needs     Medical: Not on file     Non-medical: Not on file   Tobacco Use    Smoking status: Former Smoker     Packs/day:      Years: 20 00     Pack years: 20 00     Last attempt to quit: 1985     Years since quittin 7    Smokeless tobacco: Never Used   Substance and Sexual Activity    Alcohol use: Yes     Comment: socially    Drug use: No    Sexual activity: Not Currently   Lifestyle    Physical activity     Days per week: Not on file     Minutes per session: Not on file    Stress: Not on file   Relationships    Social connections     Talks on phone: Not on file     Gets together: Not on file     Attends Spiritism service: Not on file     Active member of club or organization: Not on file     Attends meetings of clubs or organizations: Not on file     Relationship status: Not on file    Intimate partner violence     Fear of current or ex partner: Not on file     Emotionally abused: Not on file     Physically abused: Not on file     Forced sexual activity: Not on file   Other Topics Concern    Not on file   Social History Narrative    Lives with her son Meaghan Platt       I have reviewed the patient's medical history in detail; there are no changes to the history as noted in the electronic medical record  Objective     Vitals:    09/15/20 1012   BP: 130/70   Pulse: 63   Resp: 16   Temp: 97 6 °F (36 4 °C)   TempSrc: Temporal   SpO2: 100%   Weight: 59 2 kg (130 lb 9 6 oz)   Height: 5' 6" (1 676 m)     Wt Readings from Last 3 Encounters:   09/15/20 59 2 kg (130 lb 9 6 oz)   20 56 7 kg (125 lb)   20 62 6 kg (138 lb)     Physical Exam  Vitals signs and nursing note reviewed  Constitutional:       General: She is not in acute distress  Appearance: Normal appearance  She is well-developed  She is not ill-appearing, toxic-appearing or diaphoretic     HENT:      Head: Normocephalic and atraumatic  Right Ear: Tympanic membrane and ear canal normal       Left Ear: Tympanic membrane and ear canal normal       Ears:      Comments: Bilateral hearing aids     Mouth/Throat:      Mouth: Mucous membranes are moist       Pharynx: Oropharynx is clear  Eyes:      Conjunctiva/sclera: Conjunctivae normal       Pupils: Pupils are equal, round, and reactive to light  Neck:      Musculoskeletal: Normal range of motion and neck supple  Thyroid: No thyromegaly  Vascular: No carotid bruit  Cardiovascular:      Rate and Rhythm: Normal rate and regular rhythm  Pulses:           Dorsalis pedis pulses are 1+ on the right side and 1+ on the left side  Posterior tibial pulses are 1+ on the right side and 1+ on the left side  Heart sounds: No murmur  Pulmonary:      Effort: Pulmonary effort is normal  No respiratory distress  Breath sounds: Normal breath sounds  No wheezing or rales  Abdominal:      General: Bowel sounds are normal       Palpations: Abdomen is soft  Musculoskeletal: Normal range of motion  Right lower leg: Edema present  Left lower leg: Edema present  Comments: Moderate bilateral lower extremity non-pitting edema  Lymphadenopathy:      Cervical: No cervical adenopathy  Skin:     Findings: No rash  Comments: Bilateral lower extremities with venous stasis discoloration  Left medial heel with 1 cm round open stage 2 pressure ulcer  There is surrounding macerated skin  Neurological:      Mental Status: She is alert and oriented to person, place, and time     Psychiatric:         Mood and Affect: Mood normal          Behavior: Behavior normal             ALLERGIES:  No Known Allergies    Current Medications     Current Outpatient Medications   Medication Sig Dispense Refill    acetaminophen (TYLENOL) 325 mg tablet Take 2 tablets (650 mg total) by mouth every 6 (six) hours as needed for mild pain 30 tablet 0    Cholecalciferol (VITAMIN D-3) 25 MCG (1000 UT) CAPS Take by mouth daily      furosemide (LASIX) 40 mg tablet Take 1 tablet (40 mg total) by mouth daily 90 tablet 1    gabapentin (NEURONTIN) 300 mg capsule Take 1 capsule (300 mg total) by mouth 2 (two) times a day 180 capsule 1    OXYCODONE ER PO Take by mouth daily Takes 1 tablet in the AM      potassium chloride (MICRO-K) 10 MEQ CR capsule Take 1 capsule (10 mEq total) by mouth daily 90 capsule 1     No current facility-administered medications for this visit            Health Maintenance     Health Maintenance   Topic Date Due    DTaP,Tdap,and Td Vaccines (1 - Tdap) 05/16/1946    Fall Risk  04/16/2020    Medicare Annual Wellness Visit (AWV)  04/16/2020    Influenza Vaccine  07/01/2020    Depression Screening PHQ  02/06/2021    BMI: Adult  09/15/2021    Pneumococcal Vaccine: 65+ Years  Completed    HIB Vaccine  Aged Out    Hepatitis B Vaccine  Aged Out    IPV Vaccine  Aged Out    Hepatitis A Vaccine  Aged Out    Meningococcal ACWY Vaccine  Aged Out    HPV Vaccine  Aged [employer] History   Administered Date(s) Administered    Pneumococcal Polysaccharide PPV23 10/19/1999    Td (adult), Unspecified 06/27/2019    Td (adult), adsorbed 06/27/2019       KERON Ware

## 2020-10-08 ENCOUNTER — HOSPITAL ENCOUNTER (OUTPATIENT)
Dept: RADIOLOGY | Facility: HOSPITAL | Age: 85
Discharge: HOME | End: 2020-10-08
Payer: COMMERCIAL

## 2020-10-08 ENCOUNTER — OFFICE VISIT (OUTPATIENT)
Dept: NEUROSURGERY | Facility: CLINIC | Age: 85
End: 2020-10-08
Payer: COMMERCIAL

## 2020-10-08 ENCOUNTER — TRANSCRIBE ORDERS (OUTPATIENT)
Dept: RADIOLOGY | Facility: HOSPITAL | Age: 85
End: 2020-10-08

## 2020-10-08 VITALS
HEIGHT: 66 IN | DIASTOLIC BLOOD PRESSURE: 74 MMHG | TEMPERATURE: 98.2 F | SYSTOLIC BLOOD PRESSURE: 116 MMHG | HEART RATE: 63 BPM | RESPIRATION RATE: 16 BRPM | BODY MASS INDEX: 21.08 KG/M2

## 2020-10-08 DIAGNOSIS — S32.030A COMPRESSION FRACTURE OF L3 LUMBAR VERTEBRA, CLOSED, INITIAL ENCOUNTER (HCC): Primary | ICD-10-CM

## 2020-10-08 DIAGNOSIS — S32.030A COMPRESSION FRACTURE OF L3 LUMBAR VERTEBRA, CLOSED, INITIAL ENCOUNTER (HCC): ICD-10-CM

## 2020-10-08 PROCEDURE — 72100 X-RAY EXAM L-S SPINE 2/3 VWS: CPT

## 2020-10-08 PROCEDURE — 99213 OFFICE O/P EST LOW 20 MIN: CPT | Performed by: PHYSICIAN ASSISTANT

## 2020-10-08 PROCEDURE — 1036F TOBACCO NON-USER: CPT | Performed by: PHYSICIAN ASSISTANT

## 2020-10-08 PROCEDURE — 1160F RVW MEDS BY RX/DR IN RCRD: CPT | Performed by: PHYSICIAN ASSISTANT

## 2021-02-12 DIAGNOSIS — Z23 ENCOUNTER FOR IMMUNIZATION: ICD-10-CM

## 2021-05-28 ENCOUNTER — TELEPHONE (OUTPATIENT)
Dept: FAMILY MEDICINE CLINIC | Facility: CLINIC | Age: 86
End: 2021-05-28

## 2021-05-28 DIAGNOSIS — R93.89 ABNORMAL CT SCAN: Primary | ICD-10-CM

## 2021-05-28 NOTE — TELEPHONE ENCOUNTER
Pt son patricia is aware of provider instructions , son stated that his mom is 80 ys  and she refuse to go anyware and he is sure that pt will do the u/s

## 2021-05-28 NOTE — TELEPHONE ENCOUNTER
Please contact patient  A pelvic cyst was incidentally noted on her CT left hip in August  Likely a postmenopausal ovarian cyst    However, it is recommended she complete a pelvic ultrasound to be sure  I have placed orders  Please give patient contact information for central scheduling

## 2021-08-29 DIAGNOSIS — R60.0 LOWER EXTREMITY EDEMA: ICD-10-CM

## 2021-08-30 RX ORDER — POTASSIUM CHLORIDE 750 MG/1
10 CAPSULE, EXTENDED RELEASE ORAL DAILY
Qty: 90 CAPSULE | Refills: 0 | Status: SHIPPED | OUTPATIENT
Start: 2021-08-30 | End: 2022-02-23 | Stop reason: SDUPTHER

## 2021-08-31 NOTE — TELEPHONE ENCOUNTER
Please contact patient and let her know she is due for a checkup in the office  In September it is 1 year since she has been seen

## 2021-08-31 NOTE — TELEPHONE ENCOUNTER
Spoke to patient's son Cristian Domínguez about the need for patient to make appt for OV, but Cristian Domínguez said patient will absolutely not come in for an appointment because ever since patient started using a walker she refuses to go anywhere  I told Cristian Domínguez I would let PCP know about this since patient is overdue for an OV

## 2021-12-03 ENCOUNTER — OFFICE VISIT (OUTPATIENT)
Dept: FAMILY MEDICINE CLINIC | Facility: CLINIC | Age: 86
End: 2021-12-03
Payer: COMMERCIAL

## 2021-12-03 VITALS
OXYGEN SATURATION: 97 % | SYSTOLIC BLOOD PRESSURE: 128 MMHG | HEART RATE: 73 BPM | HEIGHT: 66 IN | WEIGHT: 137 LBS | DIASTOLIC BLOOD PRESSURE: 60 MMHG | RESPIRATION RATE: 16 BRPM | TEMPERATURE: 97.5 F | BODY MASS INDEX: 22.02 KG/M2

## 2021-12-03 DIAGNOSIS — R60.0 LOWER EXTREMITY EDEMA: ICD-10-CM

## 2021-12-03 DIAGNOSIS — I87.2 VENOUS STASIS DERMATITIS OF BOTH LOWER EXTREMITIES: ICD-10-CM

## 2021-12-03 DIAGNOSIS — Z23 FLU VACCINE NEED: ICD-10-CM

## 2021-12-03 DIAGNOSIS — L03.116 CELLULITIS OF LEFT LOWER EXTREMITY: Primary | ICD-10-CM

## 2021-12-03 DIAGNOSIS — N18.31 STAGE 3A CHRONIC KIDNEY DISEASE (HCC): ICD-10-CM

## 2021-12-03 DIAGNOSIS — S81.812A SKIN TEAR OF LEFT LOWER LEG WITHOUT COMPLICATION, INITIAL ENCOUNTER: ICD-10-CM

## 2021-12-03 DIAGNOSIS — M81.0 AGE-RELATED OSTEOPOROSIS WITHOUT CURRENT PATHOLOGICAL FRACTURE: ICD-10-CM

## 2021-12-03 PROCEDURE — 90662 IIV NO PRSV INCREASED AG IM: CPT

## 2021-12-03 PROCEDURE — 1170F FXNL STATUS ASSESSED: CPT | Performed by: NURSE PRACTITIONER

## 2021-12-03 PROCEDURE — G0439 PPPS, SUBSEQ VISIT: HCPCS

## 2021-12-03 PROCEDURE — 1125F AMNT PAIN NOTED PAIN PRSNT: CPT | Performed by: NURSE PRACTITIONER

## 2021-12-03 PROCEDURE — G0008 ADMIN INFLUENZA VIRUS VAC: HCPCS

## 2021-12-03 PROCEDURE — 1160F RVW MEDS BY RX/DR IN RCRD: CPT | Performed by: NURSE PRACTITIONER

## 2021-12-03 PROCEDURE — 1036F TOBACCO NON-USER: CPT | Performed by: NURSE PRACTITIONER

## 2021-12-03 PROCEDURE — 3725F SCREEN DEPRESSION PERFORMED: CPT | Performed by: NURSE PRACTITIONER

## 2021-12-03 PROCEDURE — 99214 OFFICE O/P EST MOD 30 MIN: CPT | Performed by: NURSE PRACTITIONER

## 2021-12-03 PROCEDURE — 3288F FALL RISK ASSESSMENT DOCD: CPT | Performed by: NURSE PRACTITIONER

## 2021-12-03 RX ORDER — CEPHALEXIN 500 MG/1
500 CAPSULE ORAL EVERY 6 HOURS SCHEDULED
Qty: 20 CAPSULE | Refills: 0 | Status: SHIPPED | OUTPATIENT
Start: 2021-12-03 | End: 2021-12-08

## 2022-02-23 DIAGNOSIS — R60.0 LOWER EXTREMITY EDEMA: ICD-10-CM

## 2022-02-24 ENCOUNTER — APPOINTMENT (OUTPATIENT)
Dept: LAB | Facility: CLINIC | Age: 87
End: 2022-02-24
Payer: COMMERCIAL

## 2022-02-24 DIAGNOSIS — M81.0 AGE-RELATED OSTEOPOROSIS WITHOUT CURRENT PATHOLOGICAL FRACTURE: ICD-10-CM

## 2022-02-24 DIAGNOSIS — N18.31 STAGE 3A CHRONIC KIDNEY DISEASE (HCC): ICD-10-CM

## 2022-02-24 DIAGNOSIS — R60.0 LOWER EXTREMITY EDEMA: ICD-10-CM

## 2022-02-24 DIAGNOSIS — I87.2 VENOUS STASIS DERMATITIS OF BOTH LOWER EXTREMITIES: ICD-10-CM

## 2022-02-24 LAB
ALBUMIN SERPL BCP-MCNC: 3.6 G/DL (ref 3.5–5)
ALP SERPL-CCNC: 66 U/L (ref 46–116)
ALT SERPL W P-5'-P-CCNC: 17 U/L (ref 12–78)
ANION GAP SERPL CALCULATED.3IONS-SCNC: 5 MMOL/L (ref 4–13)
AST SERPL W P-5'-P-CCNC: 12 U/L (ref 5–45)
BILIRUB SERPL-MCNC: 1.01 MG/DL (ref 0.2–1)
BUN SERPL-MCNC: 21 MG/DL (ref 5–25)
CALCIUM SERPL-MCNC: 9.4 MG/DL (ref 8.3–10.1)
CHLORIDE SERPL-SCNC: 105 MMOL/L (ref 100–108)
CO2 SERPL-SCNC: 30 MMOL/L (ref 21–32)
CREAT SERPL-MCNC: 1.04 MG/DL (ref 0.6–1.3)
ERYTHROCYTE [DISTWIDTH] IN BLOOD BY AUTOMATED COUNT: 12.8 % (ref 11.6–15.1)
GFR SERPL CREATININE-BSD FRML MDRD: 45 ML/MIN/1.73SQ M
GLUCOSE P FAST SERPL-MCNC: 97 MG/DL (ref 65–99)
HCT VFR BLD AUTO: 46.7 % (ref 34.8–46.1)
HGB BLD-MCNC: 15.1 G/DL (ref 11.5–15.4)
MCH RBC QN AUTO: 32.5 PG (ref 26.8–34.3)
MCHC RBC AUTO-ENTMCNC: 32.3 G/DL (ref 31.4–37.4)
MCV RBC AUTO: 101 FL (ref 82–98)
PLATELET # BLD AUTO: 202 THOUSANDS/UL (ref 149–390)
PMV BLD AUTO: 9.5 FL (ref 8.9–12.7)
POTASSIUM SERPL-SCNC: 4.5 MMOL/L (ref 3.5–5.3)
PROT SERPL-MCNC: 7 G/DL (ref 6.4–8.2)
RBC # BLD AUTO: 4.64 MILLION/UL (ref 3.81–5.12)
SODIUM SERPL-SCNC: 140 MMOL/L (ref 136–145)
TSH SERPL DL<=0.05 MIU/L-ACNC: 1.99 UIU/ML (ref 0.36–3.74)
WBC # BLD AUTO: 4.47 THOUSAND/UL (ref 4.31–10.16)

## 2022-02-24 PROCEDURE — 84443 ASSAY THYROID STIM HORMONE: CPT

## 2022-02-24 PROCEDURE — 85027 COMPLETE CBC AUTOMATED: CPT

## 2022-02-24 PROCEDURE — 36415 COLL VENOUS BLD VENIPUNCTURE: CPT

## 2022-02-24 PROCEDURE — 80053 COMPREHEN METABOLIC PANEL: CPT

## 2022-02-24 RX ORDER — POTASSIUM CHLORIDE 750 MG/1
10 CAPSULE, EXTENDED RELEASE ORAL DAILY
Qty: 30 CAPSULE | Refills: 0 | Status: SHIPPED | OUTPATIENT
Start: 2022-02-24 | End: 2022-02-24 | Stop reason: SDUPTHER

## 2022-02-24 RX ORDER — FUROSEMIDE 40 MG/1
40 TABLET ORAL DAILY
Qty: 90 TABLET | Refills: 1 | Status: SHIPPED | OUTPATIENT
Start: 2022-02-24 | End: 2022-06-20 | Stop reason: SDUPTHER

## 2022-02-24 RX ORDER — POTASSIUM CHLORIDE 750 MG/1
10 CAPSULE, EXTENDED RELEASE ORAL DAILY
Qty: 90 CAPSULE | Refills: 1 | Status: SHIPPED | OUTPATIENT
Start: 2022-02-24 | End: 2022-06-20

## 2022-02-24 RX ORDER — FUROSEMIDE 40 MG/1
40 TABLET ORAL DAILY
Qty: 30 TABLET | Refills: 0 | Status: SHIPPED | OUTPATIENT
Start: 2022-02-24 | End: 2022-02-24 | Stop reason: SDUPTHER

## 2022-02-24 NOTE — TELEPHONE ENCOUNTER
Transmission to pharmacy in progress error occurred  Medication resent to pharmacy  Failed again  Will retry later

## 2022-02-24 NOTE — TELEPHONE ENCOUNTER
Can you please remind Raisa to complete blood work  This is important because of the medications she is taking  Thank you

## 2022-02-24 NOTE — TELEPHONE ENCOUNTER
Spoke with pts son Juice Quevedo, aware pt needs BW  May have difficulty getting pt out of house  Offered home draw to son, he will ask his mother if she is OK with this this and call office back if he would like to proceed with home draws

## 2022-02-25 ENCOUNTER — TELEPHONE (OUTPATIENT)
Dept: FAMILY MEDICINE CLINIC | Facility: CLINIC | Age: 87
End: 2022-02-25

## 2022-02-25 NOTE — TELEPHONE ENCOUNTER
----- Message from [id number] [facility] sent at 2/24/2022  7:44 PM EST -----  Blood work is good  Thank you so much for completing it so quickly! Continue same medications  full dentures yellow metal chain necklace w cross/Jewelry/Money (specify)/Other belongings/Clothing

## 2022-06-19 ENCOUNTER — NURSE TRIAGE (OUTPATIENT)
Dept: OTHER | Facility: OTHER | Age: 87
End: 2022-06-19

## 2022-06-19 NOTE — TELEPHONE ENCOUNTER
Regarding: Leg pain and swelling  ----- Message from Kal Sanchez sent at 6/19/2022  8:59 AM EDT -----  "My mom has pain and swelling in both legs  "

## 2022-06-19 NOTE — TELEPHONE ENCOUNTER
Reason for Disposition   [1] MODERATE leg swelling (e g , swelling extends up to knees) AND [2] new-onset or worsening    Answer Assessment - Initial Assessment Questions  1  ONSET: "When did the swelling start?" (e g , minutes, hours, days)      today  2  LOCATION: "What part of the leg is swollen?"  "Are both legs swollen or just one leg?"      Right leg swollen more but left le swollen as well  3  SEVERITY: "How bad is the swelling?" (e g , localized; mild, moderate, severe)   - Localized - small area of swelling localized to one leg   - MILD pedal edema - swelling limited to foot and ankle, pitting edema < 1/4 inch (6 mm) deep, rest and elevation eliminate most or all swelling   - MODERATE edema - swelling of lower leg to knee, pitting edema > 1/4 inch (6 mm) deep, rest and elevation only partially reduce swelling   - SEVERE edema - swelling extends above knee, facial or hand swelling present       Moderate   4  REDNESS: "Does the swelling look red or infected?"      red  5  PAIN: "Is the swelling painful to touch?" If Yes, ask: "How painful is it?"   (Scale 1-10; mild, moderate or severe)      Can walk but jan pain  6  FEVER: "Do you have a fever?" If Yes, ask: "What is it, how was it measured, and when did it start?"       no  7  CAUSE: "What do you think is causing the leg swelling?"      Not taking water pills   8  MEDICAL HISTORY: "Do you have a history of heart failure, kidney disease, liver failure, or cancer?"      Water pill just for swelling   9  RECURRENT SYMPTOM: "Have you had leg swelling before?" If Yes, ask: "When was the last time?" "What happened that time?"      yes  10  OTHER SYMPTOMS: "Do you have any other symptoms?" (e g , chest pain, difficulty breathing)        no  11   PREGNANCY: "Is there any chance you are pregnant?" "When was your last menstrual period?"        no    Protocols used: LEG SWELLING AND EDEMA-ADULT-

## 2022-06-20 ENCOUNTER — APPOINTMENT (OUTPATIENT)
Dept: LAB | Facility: CLINIC | Age: 87
End: 2022-06-20
Payer: COMMERCIAL

## 2022-06-20 ENCOUNTER — OFFICE VISIT (OUTPATIENT)
Dept: FAMILY MEDICINE CLINIC | Facility: CLINIC | Age: 87
End: 2022-06-20
Payer: COMMERCIAL

## 2022-06-20 VITALS
TEMPERATURE: 98 F | WEIGHT: 142.8 LBS | BODY MASS INDEX: 22.95 KG/M2 | HEART RATE: 71 BPM | RESPIRATION RATE: 16 BRPM | OXYGEN SATURATION: 97 % | HEIGHT: 66 IN | DIASTOLIC BLOOD PRESSURE: 86 MMHG | SYSTOLIC BLOOD PRESSURE: 144 MMHG

## 2022-06-20 DIAGNOSIS — N18.31 STAGE 3A CHRONIC KIDNEY DISEASE (HCC): ICD-10-CM

## 2022-06-20 DIAGNOSIS — R60.0 LOWER EXTREMITY EDEMA: Primary | ICD-10-CM

## 2022-06-20 DIAGNOSIS — R60.0 LOWER EXTREMITY EDEMA: ICD-10-CM

## 2022-06-20 LAB
ALBUMIN SERPL BCP-MCNC: 3.6 G/DL (ref 3.5–5)
ALP SERPL-CCNC: 58 U/L (ref 46–116)
ALT SERPL W P-5'-P-CCNC: 16 U/L (ref 12–78)
ANION GAP SERPL CALCULATED.3IONS-SCNC: 9 MMOL/L (ref 4–13)
AST SERPL W P-5'-P-CCNC: 15 U/L (ref 5–45)
BILIRUB SERPL-MCNC: 0.93 MG/DL (ref 0.2–1)
BUN SERPL-MCNC: 15 MG/DL (ref 5–25)
CALCIUM SERPL-MCNC: 9.1 MG/DL (ref 8.3–10.1)
CHLORIDE SERPL-SCNC: 107 MMOL/L (ref 100–108)
CO2 SERPL-SCNC: 24 MMOL/L (ref 21–32)
CREAT SERPL-MCNC: 1.1 MG/DL (ref 0.6–1.3)
ERYTHROCYTE [DISTWIDTH] IN BLOOD BY AUTOMATED COUNT: 13.7 % (ref 11.6–15.1)
GFR SERPL CREATININE-BSD FRML MDRD: 42 ML/MIN/1.73SQ M
GLUCOSE P FAST SERPL-MCNC: 100 MG/DL (ref 65–99)
HCT VFR BLD AUTO: 44 % (ref 34.8–46.1)
HGB BLD-MCNC: 14.4 G/DL (ref 11.5–15.4)
MCH RBC QN AUTO: 32 PG (ref 26.8–34.3)
MCHC RBC AUTO-ENTMCNC: 32.7 G/DL (ref 31.4–37.4)
MCV RBC AUTO: 98 FL (ref 82–98)
PLATELET # BLD AUTO: 195 THOUSANDS/UL (ref 149–390)
PMV BLD AUTO: 10.1 FL (ref 8.9–12.7)
POTASSIUM SERPL-SCNC: 4.5 MMOL/L (ref 3.5–5.3)
PROT SERPL-MCNC: 7 G/DL (ref 6.4–8.2)
RBC # BLD AUTO: 4.5 MILLION/UL (ref 3.81–5.12)
SODIUM SERPL-SCNC: 140 MMOL/L (ref 136–145)
TSH SERPL DL<=0.05 MIU/L-ACNC: 2.23 UIU/ML (ref 0.45–4.5)
WBC # BLD AUTO: 4.3 THOUSAND/UL (ref 4.31–10.16)

## 2022-06-20 PROCEDURE — 84443 ASSAY THYROID STIM HORMONE: CPT

## 2022-06-20 PROCEDURE — 3725F SCREEN DEPRESSION PERFORMED: CPT | Performed by: NURSE PRACTITIONER

## 2022-06-20 PROCEDURE — 99213 OFFICE O/P EST LOW 20 MIN: CPT | Performed by: NURSE PRACTITIONER

## 2022-06-20 PROCEDURE — 1160F RVW MEDS BY RX/DR IN RCRD: CPT | Performed by: NURSE PRACTITIONER

## 2022-06-20 PROCEDURE — 80053 COMPREHEN METABOLIC PANEL: CPT

## 2022-06-20 PROCEDURE — 1036F TOBACCO NON-USER: CPT | Performed by: NURSE PRACTITIONER

## 2022-06-20 PROCEDURE — 36415 COLL VENOUS BLD VENIPUNCTURE: CPT

## 2022-06-20 PROCEDURE — 85027 COMPLETE CBC AUTOMATED: CPT

## 2022-06-20 RX ORDER — FUROSEMIDE 40 MG/1
TABLET ORAL
Qty: 90 TABLET | Refills: 1
Start: 2022-06-20 | End: 2022-07-01 | Stop reason: SDUPTHER

## 2022-06-20 NOTE — PROGRESS NOTES
FAMILY PRACTICE OFFICE VISIT       NAME: Debra Reyes  AGE: 80 y o  SEX: female       : 1925        MRN: [id number]    Assessment and Plan   1  Lower extremity edema  -     CBC; Future  -     Comprehensive metabolic panel; Future  -     TSH, 3rd generation; Future  -     furosemide (LASIX) 40 mg tablet; Take 1 tablet (40 mg) daily in the morning, and 0 5 tablet (20 mg) daily early afternoon    2  Stage 3a chronic kidney disease (HCC)       Lower extremity edema worsening over past 2 weeks  Suspect venous insufficiency  Renal function has been consistently stable in the past, Creatinine 1 00-1 10, EGR 43-48  Questionable compliance with taking furosemide daily  Her son will make sure is taking medication furosemide 40 mg in the morning, 20 mg early afternoon around 1-2 pm    Continue KCL supplement 10 meq daily  Elevate as able  Blood work as noted today  Echo in 0700:  Systolic function was normal  Ejection fraction was estimated to be 63 %  There were no regional wall motion abnormalities  Doppler parameters were consistent with abnormal left ventricular relaxation (grade 1 diastolic dysfunction)  No valvular disease  Will hold off on repeat echo for now, will monitor response to increase in furosemide  Return to office in 1 week for recheck  Chief Complaint     Chief Complaint   Patient presents with    B/L Leg swellind     X's ongoing for years  Pt noticed swelling has worsen over the past week  History of Present Illness     Debra Reyes is a 78-year-old female presenting today for lower extremity edema  Doing overall well  Ambulating with rollator walker  Chronic bilateral lower extremity edema, worse over the past 2 weeks  Just mentioned it to her son yesterday  Taking Furosemide, most days with potassium     Although her son notes, he does not believe she is taking every day, because she has more left over tablets than he would expect her to have when it is time for refills  Sometimes she does not take it, because she does not want to in the bathroom so much  No shortness of breath with exertion or rest    No cough  No chest pain, no palpations  Sleeps well  No PND or orthopnea  No urinary symptoms  Not voiding less than normal                     Review of Systems   Review of Systems   Constitutional: Negative  HENT: Negative  Respiratory: Negative for cough, chest tightness, shortness of breath and wheezing  Cardiovascular: Positive for leg swelling  Negative for chest pain and palpitations  Gastrointestinal: Negative  Genitourinary: Negative  I have reviewed the patient's medical history in detail; there are no changes to the history as noted in the electronic medical record  Objective     Vitals:    06/20/22 1112   BP: 144/86   BP Location: Right arm   Patient Position: Sitting   Cuff Size: Standard   Pulse: 71   Resp: 16   Temp: 98 °F (36 7 °C)   TempSrc: Temporal   SpO2: 97%   Weight: 64 8 kg (142 lb 12 8 oz)   Height: 5' 6" (1 676 m)     Wt Readings from Last 3 Encounters:   06/20/22 64 8 kg (142 lb 12 8 oz)   12/03/21 62 1 kg (137 lb)   09/15/20 59 2 kg (130 lb 9 6 oz)     Physical Exam  Vitals and nursing note reviewed  Constitutional:       Appearance: She is not ill-appearing  Comments: Elderly, frail, but does not appear ill  HENT:      Head: Normocephalic and atraumatic  Cardiovascular:      Rate and Rhythm: Normal rate and regular rhythm  Heart sounds: No murmur heard  Comments: +2 pitting edema of right  +1 pitting edema of left   Pretibial region shiny, pink in color, no vesicles  Difficult to palpate pulses due to edema  Pulmonary:      Effort: Pulmonary effort is normal  No respiratory distress  Breath sounds: Normal breath sounds  No wheezing or rales  Abdominal:      General: Abdomen is flat  Bowel sounds are normal       Palpations: Abdomen is soft  Tenderness:  There is no abdominal tenderness  Skin:     Findings: No rash  Neurological:      Mental Status: She is alert and oriented to person, place, and time  Comments: Has some trouble estimating timing, dates  Psychiatric:         Mood and Affect: Mood normal             ALLERGIES:  No Known Allergies    Current Medications     Current Outpatient Medications   Medication Sig Dispense Refill    Cholecalciferol (VITAMIN D-3) 25 MCG (1000 UT) CAPS Take by mouth daily      furosemide (LASIX) 40 mg tablet Take 1 tablet (40 mg) daily in the morning, and 0 5 tablet (20 mg) daily early afternoon 90 tablet 1    potassium chloride (MICRO-K) 10 MEQ CR capsule Take 1 capsule (10 mEq total) by mouth daily 90 capsule 1    acetaminophen (TYLENOL) 325 mg tablet Take 2 tablets (650 mg total) by mouth every 6 (six) hours as needed for mild pain (Patient not taking: Reported on 6/20/2022) 30 tablet 0     No current facility-administered medications for this visit           Health Maintenance     Health Maintenance   Topic Date Due    Pneumococcal Vaccine: 65+ Years (2 - PCV) 10/19/2000    DTaP,Tdap,and Td Vaccines (1 - Tdap) 06/28/2019    COVID-19 Vaccine (1) 09/20/2022 (Originally 5/16/1930)    Fall Risk  12/03/2022    Medicare Annual Wellness Visit (AWV)  12/03/2022    Depression Screening  06/20/2023    BMI: Adult  06/20/2023    Influenza Vaccine  Completed    HIB Vaccine  Aged Out    Hepatitis B Vaccine  Aged Out    IPV Vaccine  Aged Out    Hepatitis A Vaccine  Aged Out    Meningococcal ACWY Vaccine  Aged Out    HPV Vaccine  Aged Out     Immunization History   Administered Date(s) Administered    Influenza, high dose seasonal 0 7 mL 12/03/2021    Pneumococcal Polysaccharide PPV23 10/19/1999    Td (adult), Unspecified 06/27/2019    Td (adult), adsorbed 06/27/2019       KERON Noble

## 2022-06-21 ENCOUNTER — RA CDI HCC (OUTPATIENT)
Dept: OTHER | Facility: HOSPITAL | Age: 87
End: 2022-06-21

## 2022-06-21 ENCOUNTER — TELEPHONE (OUTPATIENT)
Dept: FAMILY MEDICINE CLINIC | Facility: CLINIC | Age: 87
End: 2022-06-21

## 2022-06-21 NOTE — PROGRESS NOTES
[facility]  coding opportunities       Chart reviewed, no opportunity found:   [address]        Patients Insurance     Medicare Insurance: [employer]

## 2022-06-21 NOTE — TELEPHONE ENCOUNTER
----- Message from [facility] [facility] sent at 6/20/2022  8:49 PM EDT -----  Blood work is good  Continue with plan for increasing the water pill (furosemide) as we discussed, keep follow up  visit in the office next week

## 2022-07-01 ENCOUNTER — OFFICE VISIT (OUTPATIENT)
Dept: FAMILY MEDICINE CLINIC | Facility: CLINIC | Age: 87
End: 2022-07-01
Payer: COMMERCIAL

## 2022-07-01 VITALS
WEIGHT: 136 LBS | BODY MASS INDEX: 21.86 KG/M2 | SYSTOLIC BLOOD PRESSURE: 130 MMHG | TEMPERATURE: 98 F | DIASTOLIC BLOOD PRESSURE: 70 MMHG | RESPIRATION RATE: 16 BRPM | HEIGHT: 66 IN | OXYGEN SATURATION: 96 % | HEART RATE: 79 BPM

## 2022-07-01 DIAGNOSIS — R60.0 LOWER EXTREMITY EDEMA: ICD-10-CM

## 2022-07-01 PROCEDURE — 1160F RVW MEDS BY RX/DR IN RCRD: CPT | Performed by: NURSE PRACTITIONER

## 2022-07-01 PROCEDURE — 99213 OFFICE O/P EST LOW 20 MIN: CPT | Performed by: NURSE PRACTITIONER

## 2022-07-01 RX ORDER — FUROSEMIDE 40 MG/1
40 TABLET ORAL 2 TIMES DAILY
Qty: 180 TABLET | Refills: 1
Start: 2022-07-01 | End: 2022-07-11

## 2022-07-01 NOTE — PROGRESS NOTES
FAMILY PRACTICE OFFICE VISIT       NAME: Asuncion Scott  AGE: 80 y o  SEX: female       : 1925        MRN: [id number]    Assessment and Plan   1  Lower extremity edema  -     furosemide (LASIX) 40 mg tablet; Take 1 tablet (40 mg total) by mouth 2 (two) times a day  -     Basic metabolic panel; Future         Increase Lasix to 40 mg twice daily  Most recent potassium 4 5, with furosemide 40 mg in the am and 20 mg in the pm    Therefore, will continue potassium supplement 10 meq daily  Bmp in a week  Will get update from her son in one week regarding edema and how she is doing on this medication change  They will monitor skin tear on left lower extremity  Keep clean, dry  Monitor for signs of infection, development of redness surrounding tear, increasing swelling, pain, fever, not feeling well  They will call if any of these symptoms develop or if skin tear is not healing as expected  Chief Complaint     Chief Complaint   Patient presents with    Follow-up     Pt is here for 1 wk f/u swollen legs       History of Present Illness     Asuncion Scott is a 80year old female presenting today for follow up on edema  Furosemide dose increased from 40 mg daily to 40 mg in the morning and 20 mg in the afternoon one week ago  Has lost 6 pounds over the past week  New skin tear right lower extremity, new today  Accompanied by her son today  Review of Systems   Review of Systems   Constitutional: Negative  Respiratory: Negative for shortness of breath  Cardiovascular: Positive for leg swelling  Negative for chest pain and palpitations  Gastrointestinal: Negative  Skin: Positive for wound (skin tear)  I have reviewed the patient's medical history in detail; there are no changes to the history as noted in the electronic medical record      Objective     Vitals:    22 1205   BP: 130/70   Pulse: 79   Resp: 16   Temp: 98 °F (36 7 °C)   TempSrc: Temporal SpO2: 96%   Weight: 61 7 kg (136 lb)   Height: 5' 6" (1 676 m)     Wt Readings from Last 3 Encounters:   07/01/22 61 7 kg (136 lb)   06/20/22 64 8 kg (142 lb 12 8 oz)   12/03/21 62 1 kg (137 lb)     Physical Exam  Vitals and nursing note reviewed  Constitutional:       General: She is not in acute distress  Appearance: Normal appearance  She is not ill-appearing  HENT:      Head: Normocephalic and atraumatic  Cardiovascular:      Rate and Rhythm: Normal rate and regular rhythm  Heart sounds: No murmur heard  Comments: Right lower extremity edema > left lower extremity edema  Right lower extremity +1 edema, pitting, below knee to pedal area  No longer shiny and pink  Left lower extremity edema, only trace of pre-tibial region, +1 pitting of ankle and pedal area  New skin tear on anterior-more medial pretibial region  1 cm around  Superficial, with no sign of infection  Peripheral vascular dermatitis posterior left lower extremity, with darkened, thickened, scaling skin  Pulmonary:      Effort: Pulmonary effort is normal  No respiratory distress  Breath sounds: Normal breath sounds  No wheezing or rales  Skin:     Comments:      Neurological:      Mental Status: She is alert        Comments: Ambulating with walker   Psychiatric:         Mood and Affect: Mood normal             ALLERGIES:  No Known Allergies    Current Medications     Current Outpatient Medications   Medication Sig Dispense Refill    Cholecalciferol (VITAMIN D-3) 25 MCG (1000 UT) CAPS Take by mouth daily      furosemide (LASIX) 40 mg tablet Take 1 tablet (40 mg total) by mouth 2 (two) times a day 180 tablet 1    acetaminophen (TYLENOL) 325 mg tablet Take 2 tablets (650 mg total) by mouth every 6 (six) hours as needed for mild pain (Patient not taking: No sig reported) 30 tablet 0    potassium chloride (MICRO-K) 10 MEQ CR capsule Take 1 capsule (10 mEq total) by mouth daily 90 capsule 1     No current facility-administered medications for this visit           Health Maintenance     Health Maintenance   Topic Date Due    Pneumococcal Vaccine: 65+ Years (2 - PCV) 10/19/2000    DTaP,Tdap,and Td Vaccines (1 - Tdap) 06/28/2019    Influenza Vaccine (1) 09/01/2022    COVID-19 Vaccine (1) 09/20/2022 (Originally 5/16/1930)    Fall Risk  12/03/2022    Medicare Annual Wellness Visit (AWV)  12/03/2022    Depression Screening  06/20/2023    BMI: Adult  07/01/2023    HIB Vaccine  Aged Out    Hepatitis B Vaccine  Aged Out    IPV Vaccine  Aged Out    Hepatitis A Vaccine  Aged Out    Meningococcal ACWY Vaccine  Aged Out    HPV Vaccine  Aged Out     Immunization History   Administered Date(s) Administered    Influenza, high dose seasonal 0 7 mL 12/03/2021    Pneumococcal Polysaccharide PPV23 10/19/1999    Td (adult), Unspecified 06/27/2019    Td (adult), adsorbed 06/27/2019       KERON Metzger

## 2022-07-08 ENCOUNTER — APPOINTMENT (OUTPATIENT)
Dept: LAB | Facility: CLINIC | Age: 87
End: 2022-07-08
Payer: COMMERCIAL

## 2022-07-08 ENCOUNTER — TELEPHONE (OUTPATIENT)
Dept: FAMILY MEDICINE CLINIC | Facility: CLINIC | Age: 87
End: 2022-07-08

## 2022-07-08 DIAGNOSIS — R60.0 LOWER EXTREMITY EDEMA: ICD-10-CM

## 2022-07-08 LAB
ANION GAP SERPL CALCULATED.3IONS-SCNC: 6 MMOL/L (ref 4–13)
BUN SERPL-MCNC: 16 MG/DL (ref 5–25)
CALCIUM SERPL-MCNC: 9.5 MG/DL (ref 8.3–10.1)
CHLORIDE SERPL-SCNC: 103 MMOL/L (ref 100–108)
CO2 SERPL-SCNC: 31 MMOL/L (ref 21–32)
CREAT SERPL-MCNC: 1.32 MG/DL (ref 0.6–1.3)
GFR SERPL CREATININE-BSD FRML MDRD: 33 ML/MIN/1.73SQ M
GLUCOSE P FAST SERPL-MCNC: 107 MG/DL (ref 65–99)
POTASSIUM SERPL-SCNC: 4 MMOL/L (ref 3.5–5.3)
SODIUM SERPL-SCNC: 140 MMOL/L (ref 136–145)

## 2022-07-08 PROCEDURE — 36415 COLL VENOUS BLD VENIPUNCTURE: CPT

## 2022-07-08 PROCEDURE — 80048 BASIC METABOLIC PNL TOTAL CA: CPT

## 2022-07-08 NOTE — TELEPHONE ENCOUNTER
----- Message from [id number] sent at 7/8/2022  3:59 PM EDT -----    Kidney function is a little bit low, this is from the diuretics  Please inquire how swelling of legs is this week?

## 2022-07-11 DIAGNOSIS — R60.0 LOWER EXTREMITY EDEMA: ICD-10-CM

## 2022-07-11 DIAGNOSIS — N18.31 STAGE 3A CHRONIC KIDNEY DISEASE (HCC): Primary | ICD-10-CM

## 2022-07-11 RX ORDER — FUROSEMIDE 40 MG/1
TABLET ORAL
Qty: 180 TABLET | Refills: 1
Start: 2022-07-11 | End: 2022-09-30 | Stop reason: SDUPTHER

## 2022-07-11 NOTE — TELEPHONE ENCOUNTER
Due to Kidney function reduction on higher dose of furosemide, I would like Raisa to return to Furosemide 1 tablet (40 mg) in the morning, and 0 5 tablet (20 mg) in the afternoon  Elevate legs as much as able  If she is able to tolerate compression socks, these would be beneficial, if not, would she be willing to wear ace wraps on her lower legs daily--apply in the morning, remove at night  The compression would help with swelling  Please repeat blood work in 1 week

## 2022-07-11 NOTE — TELEPHONE ENCOUNTER
2nd Call  Spoke w/ pt's son-Macario  Made aware of results  He states pt's legs are pretty good, however, feet are still quite swollen

## 2022-08-22 DIAGNOSIS — R60.0 LOWER EXTREMITY EDEMA: ICD-10-CM

## 2022-08-22 RX ORDER — POTASSIUM CHLORIDE 750 MG/1
CAPSULE, EXTENDED RELEASE ORAL
Qty: 90 CAPSULE | Refills: 1 | Status: SHIPPED | OUTPATIENT
Start: 2022-08-22 | End: 2022-10-01 | Stop reason: SDUPTHER

## 2022-09-30 DIAGNOSIS — R60.0 LOWER EXTREMITY EDEMA: ICD-10-CM

## 2022-10-01 RX ORDER — FUROSEMIDE 40 MG/1
TABLET ORAL
Qty: 180 TABLET | Refills: 1 | Status: SHIPPED | OUTPATIENT
Start: 2022-10-01

## 2022-10-01 RX ORDER — POTASSIUM CHLORIDE 750 MG/1
10 CAPSULE, EXTENDED RELEASE ORAL DAILY
Qty: 90 CAPSULE | Refills: 1 | Status: SHIPPED | OUTPATIENT
Start: 2022-10-01

## 2022-10-01 NOTE — TELEPHONE ENCOUNTER
Furosemideand potassium refilled  Please ask Raisa to repeat blood work to check kidney function and electrolytes  It is very important on this medication  How is her leg swelling?

## 2022-12-27 ENCOUNTER — APPOINTMENT (INPATIENT)
Dept: RADIOLOGY | Facility: HOSPITAL | Age: 87
End: 2022-12-27

## 2022-12-27 ENCOUNTER — HOSPITAL ENCOUNTER (INPATIENT)
Facility: HOSPITAL | Age: 87
LOS: 6 days | Discharge: NURSING, REHAB OR LONG-TERM CARE | End: 2023-01-02
Attending: EMERGENCY MEDICINE | Admitting: GENERAL PRACTICE

## 2022-12-27 ENCOUNTER — APPOINTMENT (EMERGENCY)
Dept: RADIOLOGY | Facility: HOSPITAL | Age: 87
End: 2022-12-27

## 2022-12-27 DIAGNOSIS — R62.7 FAILURE TO THRIVE IN ADULT: ICD-10-CM

## 2022-12-27 DIAGNOSIS — I48.91 A-FIB (HCC): ICD-10-CM

## 2022-12-27 DIAGNOSIS — I47.1 SVT (SUPRAVENTRICULAR TACHYCARDIA) (HCC): Primary | ICD-10-CM

## 2022-12-27 DIAGNOSIS — N83.209 OVARIAN CYST: ICD-10-CM

## 2022-12-27 DIAGNOSIS — J90 PLEURAL EFFUSION: ICD-10-CM

## 2022-12-27 DIAGNOSIS — J18.9 PNEUMONIA: ICD-10-CM

## 2022-12-27 DIAGNOSIS — R03.0 ELEVATED BLOOD PRESSURE READING: ICD-10-CM

## 2022-12-27 DIAGNOSIS — I47.1 PSVT (PAROXYSMAL SUPRAVENTRICULAR TACHYCARDIA) (HCC): ICD-10-CM

## 2022-12-27 DIAGNOSIS — K57.90 DIVERTICULOSIS: ICD-10-CM

## 2022-12-27 PROBLEM — R93.89 INFILTRATE NOTED ON IMAGING STUDY: Status: ACTIVE | Noted: 2022-12-27

## 2022-12-27 PROBLEM — I47.10 PSVT (PAROXYSMAL SUPRAVENTRICULAR TACHYCARDIA): Status: ACTIVE | Noted: 2022-12-27

## 2022-12-27 LAB
2HR DELTA HS TROPONIN: 2 NG/L
ALBUMIN SERPL BCP-MCNC: 3.2 G/DL (ref 3.5–5)
ALP SERPL-CCNC: 74 U/L (ref 46–116)
ALT SERPL W P-5'-P-CCNC: 19 U/L (ref 12–78)
ANION GAP SERPL CALCULATED.3IONS-SCNC: 7 MMOL/L (ref 4–13)
AST SERPL W P-5'-P-CCNC: 38 U/L (ref 5–45)
ATRIAL RATE: 82 BPM
ATRIAL RATE: 89 BPM
BASE EX.OXY STD BLDV CALC-SCNC: 89.7 % (ref 60–80)
BASE EXCESS BLDV CALC-SCNC: -2.1 MMOL/L
BASOPHILS # BLD AUTO: 0.02 THOUSANDS/ÂΜL (ref 0–0.1)
BASOPHILS NFR BLD AUTO: 0 % (ref 0–1)
BILIRUB SERPL-MCNC: 1.47 MG/DL (ref 0.2–1)
BUN SERPL-MCNC: 21 MG/DL (ref 5–25)
CA-I BLD-SCNC: 1.16 MMOL/L (ref 1.12–1.32)
CALCIUM ALBUM COR SERPL-MCNC: 9.8 MG/DL (ref 8.3–10.1)
CALCIUM SERPL-MCNC: 9.2 MG/DL (ref 8.3–10.1)
CARDIAC TROPONIN I PNL SERPL HS: 6 NG/L
CARDIAC TROPONIN I PNL SERPL HS: 8 NG/L
CHLORIDE SERPL-SCNC: 105 MMOL/L (ref 96–108)
CO2 SERPL-SCNC: 23 MMOL/L (ref 21–32)
CREAT SERPL-MCNC: 1.07 MG/DL (ref 0.6–1.3)
EOSINOPHIL # BLD AUTO: 0 THOUSAND/ÂΜL (ref 0–0.61)
EOSINOPHIL NFR BLD AUTO: 0 % (ref 0–6)
ERYTHROCYTE [DISTWIDTH] IN BLOOD BY AUTOMATED COUNT: 12.5 % (ref 11.6–15.1)
FLUAV RNA RESP QL NAA+PROBE: NEGATIVE
FLUBV RNA RESP QL NAA+PROBE: NEGATIVE
GFR SERPL CREATININE-BSD FRML MDRD: 43 ML/MIN/1.73SQ M
GLUCOSE SERPL-MCNC: 133 MG/DL (ref 65–140)
HCO3 BLDV-SCNC: 21.5 MMOL/L (ref 24–30)
HCT VFR BLD AUTO: 44.8 % (ref 34.8–46.1)
HGB BLD-MCNC: 14.9 G/DL (ref 11.5–15.4)
IMM GRANULOCYTES # BLD AUTO: 0.07 THOUSAND/UL (ref 0–0.2)
IMM GRANULOCYTES NFR BLD AUTO: 1 % (ref 0–2)
LIPASE SERPL-CCNC: 62 U/L (ref 73–393)
LYMPHOCYTES # BLD AUTO: 0.74 THOUSANDS/ÂΜL (ref 0.6–4.47)
LYMPHOCYTES NFR BLD AUTO: 8 % (ref 14–44)
MAGNESIUM SERPL-MCNC: 2.5 MG/DL (ref 1.6–2.6)
MCH RBC QN AUTO: 33.2 PG (ref 26.8–34.3)
MCHC RBC AUTO-ENTMCNC: 33.3 G/DL (ref 31.4–37.4)
MCV RBC AUTO: 100 FL (ref 82–98)
MONOCYTES # BLD AUTO: 0.94 THOUSAND/ÂΜL (ref 0.17–1.22)
MONOCYTES NFR BLD AUTO: 10 % (ref 4–12)
NEUTROPHILS # BLD AUTO: 7.42 THOUSANDS/ÂΜL (ref 1.85–7.62)
NEUTS SEG NFR BLD AUTO: 81 % (ref 43–75)
NRBC BLD AUTO-RTO: 0 /100 WBCS
O2 CT BLDV-SCNC: 20.4 ML/DL
P AXIS: 0 DEGREES
P AXIS: 56 DEGREES
PCO2 BLDV: 34.2 MM HG (ref 42–50)
PH BLDV: 7.42 [PH] (ref 7.3–7.4)
PLATELET # BLD AUTO: 205 THOUSANDS/UL (ref 149–390)
PMV BLD AUTO: 9.3 FL (ref 8.9–12.7)
PO2 BLDV: 60.6 MM HG (ref 35–45)
POTASSIUM SERPL-SCNC: 4.6 MMOL/L (ref 3.5–5.3)
PR INTERVAL: 174 MS
PR INTERVAL: 88 MS
PROT SERPL-MCNC: 7.3 G/DL (ref 6.4–8.4)
QRS AXIS: -60 DEGREES
QRS AXIS: -65 DEGREES
QRSD INTERVAL: 124 MS
QRSD INTERVAL: 126 MS
QT INTERVAL: 360 MS
QT INTERVAL: 394 MS
QTC INTERVAL: 479 MS
QTC INTERVAL: 491 MS
RBC # BLD AUTO: 4.49 MILLION/UL (ref 3.81–5.12)
RSV RNA RESP QL NAA+PROBE: NEGATIVE
SARS-COV-2 RNA RESP QL NAA+PROBE: NEGATIVE
SODIUM SERPL-SCNC: 135 MMOL/L (ref 135–147)
T WAVE AXIS: 24 DEGREES
T WAVE AXIS: 60 DEGREES
VENTRICULAR RATE: 112 BPM
VENTRICULAR RATE: 89 BPM
WBC # BLD AUTO: 9.19 THOUSAND/UL (ref 4.31–10.16)

## 2022-12-27 RX ORDER — MELATONIN
1000 DAILY
Status: DISCONTINUED | OUTPATIENT
Start: 2022-12-28 | End: 2023-01-02 | Stop reason: HOSPADM

## 2022-12-27 RX ORDER — ACETAMINOPHEN 325 MG/1
650 TABLET ORAL ONCE
Status: COMPLETED | OUTPATIENT
Start: 2022-12-27 | End: 2022-12-27

## 2022-12-27 RX ORDER — ENOXAPARIN SODIUM 100 MG/ML
30 INJECTION SUBCUTANEOUS DAILY
Status: DISCONTINUED | OUTPATIENT
Start: 2022-12-28 | End: 2023-01-02 | Stop reason: HOSPADM

## 2022-12-27 RX ORDER — OXYCODONE HYDROCHLORIDE 5 MG/1
2.5 TABLET ORAL EVERY 6 HOURS PRN
Status: DISCONTINUED | OUTPATIENT
Start: 2022-12-27 | End: 2023-01-02 | Stop reason: HOSPADM

## 2022-12-27 RX ORDER — DOCUSATE SODIUM 100 MG/1
100 CAPSULE, LIQUID FILLED ORAL 2 TIMES DAILY
Status: DISCONTINUED | OUTPATIENT
Start: 2022-12-27 | End: 2022-12-30

## 2022-12-27 RX ORDER — ACETAMINOPHEN 325 MG/1
975 TABLET ORAL EVERY 8 HOURS SCHEDULED
Status: DISCONTINUED | OUTPATIENT
Start: 2022-12-27 | End: 2023-01-02 | Stop reason: HOSPADM

## 2022-12-27 RX ORDER — METOPROLOL SUCCINATE 25 MG/1
12.5 TABLET, EXTENDED RELEASE ORAL DAILY
Status: DISCONTINUED | OUTPATIENT
Start: 2022-12-27 | End: 2022-12-28

## 2022-12-27 RX ORDER — FUROSEMIDE 40 MG/1
40 TABLET ORAL DAILY
Status: DISCONTINUED | OUTPATIENT
Start: 2022-12-28 | End: 2023-01-02 | Stop reason: HOSPADM

## 2022-12-27 RX ORDER — SENNOSIDES 8.6 MG
1 TABLET ORAL
Status: DISCONTINUED | OUTPATIENT
Start: 2022-12-27 | End: 2022-12-30

## 2022-12-27 RX ADMIN — IOHEXOL 70 ML: 350 INJECTION, SOLUTION INTRAVENOUS at 14:32

## 2022-12-27 RX ADMIN — ACETAMINOPHEN 975 MG: 325 TABLET, FILM COATED ORAL at 22:13

## 2022-12-27 RX ADMIN — Medication 1000 MG: at 16:26

## 2022-12-27 RX ADMIN — ACETAMINOPHEN 650 MG: 325 TABLET, FILM COATED ORAL at 13:48

## 2022-12-27 RX ADMIN — METOPROLOL SUCCINATE 12.5 MG: 25 TABLET, EXTENDED RELEASE ORAL at 22:13

## 2022-12-27 NOTE — H&P
[facility]  H&P- Wang Bryant 5/16/1925, 80 y o  female MRN: [id number]  Unit/Bed#: ED 28 Encounter: [id number]  Primary Care Provider: KERON Lomeli   Date and time admitted to hospital: 12/27/2022  1:10 PM    * PSVT (paroxysmal supraventricular tachycardia) ([address] )  Assessment & Plan  · Noted to have SVT in Er that self resolved  · Start Toprol daily  · Check echo    Ambulatory dysfunction  Assessment & Plan  · Severe right hip pain  · Right hip XR ordered  · PT/OT  · Pain control w/ scheduled tylenol and oxy prn    Infiltrate noted on imaging study  Assessment & Plan  · CXR pending  · No symptoms of PNA based on imaging  · Rocephin given in ER  · Check AM procal    CKD (chronic kidney disease) stage 3, GFR 30-59 ml/min [facility])  Assessment & Plan  Lab Results   Component Value Date    EGFR 43 12/27/2022    EGFR 33 07/08/2022    EGFR 42 06/20/2022    CREATININE 1 07 12/27/2022    CREATININE 1 32 (H) 07/08/2022    CREATININE 1 10 06/20/2022   · Estimated Creatinine Clearance: 28 1 mL/min (by C-G formula based on SCr of 1 07 mg/dL)  · Cr at b/l 1 0-1 4    Lower extremity edema  Assessment & Plan  · C/w Daily Lasix      VTE Pharmacologic Prophylaxis: VTE Score: 3 Moderate Risk (Score 3-4) - Pharmacological DVT Prophylaxis Ordered: enoxaparin (Lovenox)  Code Status: DNR/DNI  Discussion with family: Updated  (son) at bedside  Anticipated Length of Stay: Patient will be admitted on an inpatient basis with an anticipated length of stay of greater than 2 midnights secondary to need for Echo and PT/OT  Total Time for Visit, including Counseling / Coordination of Care: 45 minutes Greater than 50% of this total time spent on direct patient counseling and coordination of care  Chief Complaint: pain    History of Present Illness:  Wang Bryant is a 80 y o  female with a PMH of LE edema on Lasix who presents with right hip pain  Severe hip pain w/ walking    Unable to ambulate well  Has not taken anything for pain  No CP or SOB  No n/v  Admits to constipation  Review of Systems:  Review of Systems   Constitutional: Positive for activity change  HENT: Negative  Eyes: Negative  Respiratory: Negative  Cardiovascular: Negative  Gastrointestinal: Positive for constipation  Endocrine: Negative  Genitourinary: Negative  Musculoskeletal: Positive for gait problem  Skin: Negative  Allergic/Immunologic: Negative  Hematological: Negative  Psychiatric/Behavioral: Negative  Past Medical and Surgical History:   Past Medical History:   Diagnosis Date   • Interstitial cystitis    • Leukopenia    • Neurologic gait dysfunction     Abstraction Dr Jami Awad charts   • Neuropathy    • Neutropenia [facility])     Abstraction Dr Anant Haddad   • Osteoarthritis    • Osteoporosis    • Peripheral edema     Abstraction Dr Jami Awad charts   • Renal cyst    • Syncope     Abstraction Dr Jami Awad charts       Past Surgical History:   Procedure Laterality Date   • CAPSULOTOMY      Right eye   • CATARACT EXTRACTION Left    • CHALAZION EXCISION     • COLONOSCOPY  2006       Meds/Allergies:  Prior to Admission medications    Medication Sig Start Date End Date Taking? Authorizing Provider   acetaminophen (TYLENOL) 325 mg tablet Take 2 tablets (650 mg total) by mouth every 6 (six) hours as needed for mild pain  Patient not taking: No sig reported 8/25/20   Jorgekenya Hoffman PA-C   Cholecalciferol (VITAMIN D-3) 25 MCG (1000 UT) CAPS Take by mouth daily    Historical Provider, MD   furosemide (LASIX) 40 mg tablet Take 1 tablet (40 mg) in the morning, and take 0 5 tablet (20 mg) in the afternoon  10/1/22   KERON Resendez   potassium chloride (MICRO-K) 10 MEQ CR capsule Take 1 capsule (10 mEq total) by mouth daily 10/1/22   KERON Damon     I have reviewed home medications using recent Epic encounter      Allergies: No Known Allergies    Social History:  Marital Status:      Patient Pre-hospital Living Situation: Home  Patient Pre-hospital Level of Mobility: unable to be assessed at time of evaluation    Substance Use History:   Social History     Substance and Sexual Activity   Alcohol Use Yes    Comment: socially     Social History     Tobacco Use   Smoking Status Former   • Packs/day: 1 00   • Years: 20 00   • Pack years: 20 00   • Types: Cigarettes   • Quit date: 5   • Years since quittin 0   Smokeless Tobacco Never     Social History     Substance and Sexual Activity   Drug Use No       Family History:  Family History   Problem Relation Age of Onset   • Diabetes Mother    • Lung disease Father    • Cancer Maternal Grandfather    • Lung disease Sister        Physical Exam:     Vitals:   Blood Pressure: 166/79 (22 1321)  Pulse: 82 (22 1321)  Temperature: 98 6 °F (37 °C) (22 1321)  Temp Source: Tympanic (22 1321)  Respirations: 18 (22 1321)  SpO2: 94 % (22 1321)    Physical Exam  HENT:      Head: Normocephalic and atraumatic  Nose: Nose normal       Mouth/Throat:      Mouth: Mucous membranes are moist    Eyes:      Extraocular Movements: Extraocular movements intact  Conjunctiva/sclera: Conjunctivae normal    Cardiovascular:      Rate and Rhythm: Normal rate and regular rhythm  Pulmonary:      Effort: Pulmonary effort is normal       Breath sounds: Normal breath sounds  Abdominal:      General: Bowel sounds are normal       Palpations: Abdomen is soft  Musculoskeletal:         General: Normal range of motion  Cervical back: Normal range of motion and neck supple  Right lower leg: No edema  Left lower leg: No edema  Comments: Right lateral hip pain   Skin:     General: Skin is warm and dry  Neurological:      Mental Status: She is alert and oriented to person, place, and time           Additional Data:     Lab Results:  Results from last 7 days   Lab Units 12/27/22  1346   WBC Thousand/uL 9 19   HEMOGLOBIN g/dL 14 9   HEMATOCRIT % 44 8   PLATELETS Thousands/uL 205   NEUTROS PCT % 81*   LYMPHS PCT % 8*   MONOS PCT % 10   EOS PCT % 0     Results from last 7 days   Lab Units 12/27/22  1346   SODIUM mmol/L 135   POTASSIUM mmol/L 4 6   CHLORIDE mmol/L 105   CO2 mmol/L 23   BUN mg/dL 21   CREATININE mg/dL 1 07   ANION GAP mmol/L 7   CALCIUM mg/dL 9 2   ALBUMIN g/dL 3 2*   TOTAL BILIRUBIN mg/dL 1 47*   ALK PHOS U/L 74   ALT U/L 19   AST U/L 38   GLUCOSE RANDOM mg/dL 133                       Lines/Drains:  Invasive Devices     Peripheral Intravenous Line  Duration           Peripheral IV 12/27/22 Proximal;Right;Ventral (anterior) Forearm <1 day                    Imaging: Reviewed radiology reports from this admission including: abdominal/pelvic CT  CT abdomen pelvis with contrast   Final Result by Alex Cadena MD (12/27 3369)      1  There is a small left-sided pleural effusion, with left lower lobe airspace disease most concerning for pneumonia  2  There is a trace right effusion  There is a stable 0 8 cm right lower lobe nodule   3  Extensive colonic diverticulosis without evidence of acute diverticulitis or bowel obstruction   4  Stable left ovarian 3 2 cm cyst               Workstation performed: [id number]         XR chest 1 view portable    (Results Pending)   XR hip/pelv 2-3 vws right if performed    (Results Pending)       EKG and Other Studies Reviewed on Admission:   · EKG: PSVT  ** Please Note: This note has been constructed using a voice recognition system   **

## 2022-12-27 NOTE — ED PROVIDER NOTES
History  Chief Complaint   Patient presents with   • Weakness - Generalized     Pt comes from home, lives with son  Reports generalized weakness/pain  Son states he "is unable to care for her at home" gcs 15     Patient is a 59-year-old female, with a history significant for peripheral edema, prior compression fracture, neuropathy, who presents to the ED today, via EMS from home, as her son, whom she lives with, feels as though he is unable to take care of her  Currently, patient describes generalized body pain that has been present for the last three-to-four weeks and generalized weakness  When asked to specify, patient states that she feels it most in her abdomen and hips  She also describes back pain but denies fever, weakness, numbness, saddle anesthesia, bowel or bladder incontinence, urinary retention  Movement exacerbates her discomfort  No clear remitting factors  Specifically asked, patient denies chest pain or dyspnea  Patient is without other concerns at this time     - No language barrier    - History obtained from patient and EMS report  - There are no limitations to the history obtained  - Previous charting was reviewed          Prior to Admission Medications   Prescriptions Last Dose Informant Patient Reported? Taking? Cholecalciferol (VITAMIN D-3) 25 MCG (1000 UT) CAPS   Yes No   Sig: Take by mouth daily   acetaminophen (TYLENOL) 325 mg tablet   No No   Sig: Take 2 tablets (650 mg total) by mouth every 6 (six) hours as needed for mild pain   Patient not taking: No sig reported   furosemide (LASIX) 40 mg tablet   No No   Sig: Take 1 tablet (40 mg) in the morning, and take 0 5 tablet (20 mg) in the afternoon     potassium chloride (MICRO-K) 10 MEQ CR capsule   No No   Sig: Take 1 capsule (10 mEq total) by mouth daily      Facility-Administered Medications: None       Past Medical History:   Diagnosis Date   • Interstitial cystitis    • Leukopenia    • Neurologic gait dysfunction Abstraction Dr Nancy Bermeo charts   • Neuropathy    • Neutropenia [facility])     Abstraction Dr Sae Godoy   • Osteoarthritis    • Osteoporosis    • Peripheral edema     Abstraction Dr Sae Godoy   • Renal cyst    • Syncope     Abstraction Dr Nancy Bermeo charts       Past Surgical History:   Procedure Laterality Date   • CAPSULOTOMY      Right eye   • CATARACT EXTRACTION Left    • CHALAZION EXCISION     • COLONOSCOPY         Family History   Problem Relation Age of Onset   • Diabetes Mother    • Lung disease Father    • Cancer Maternal Grandfather    • Lung disease Sister      I have reviewed and agree with the history as documented  E-Cigarette/Vaping   • E-Cigarette Use Never User      E-Cigarette/Vaping Substances   • Nicotine No    • THC No    • CBD No    • Flavoring No    • Other No    • Unknown No      Social History     Tobacco Use   • Smoking status: Former     Packs/day: 1 00     Years: 20 00     Pack years: 20 00     Types: Cigarettes     Quit date: 5     Years since quittin 0   • Smokeless tobacco: Never   Vaping Use   • Vaping Use: Never used   Substance Use Topics   • Alcohol use: Yes     Comment: socially   • Drug use: No        Review of Systems   Constitutional: Negative for fever  HENT: Negative for trouble swallowing  Eyes: Negative for visual disturbance  Respiratory: Negative for shortness of breath  Cardiovascular: Negative for chest pain  Gastrointestinal: Positive for abdominal pain  Endocrine: Negative for polyuria  Genitourinary: Negative for dysuria  Musculoskeletal: Positive for gait problem (Walker use at baseline)  Skin: Negative for rash  Allergic/Immunologic: Negative for environmental allergies  Neurological: Positive for weakness (Generalized)  Negative for numbness  Hematological: Negative for adenopathy  Psychiatric/Behavioral: Negative for confusion  All other systems reviewed and are negative        Physical Exam  ED Triage Vitals Temperature Pulse Respirations Blood Pressure SpO2   12/27/22 1321 12/27/22 1321 12/27/22 1321 12/27/22 1321 12/27/22 1321   98 6 °F (37 °C) 82 18 166/79 94 %      Temp Source Heart Rate Source Patient Position - Orthostatic VS BP Location FiO2 (%)   12/27/22 1321 12/27/22 1321 12/27/22 1321 12/27/22 1321 --   Tympanic Monitor Sitting Left arm       Pain Score       12/27/22 1348       7             Orthostatic Vital Signs  Vitals:    12/27/22 1321   BP: 166/79   Pulse: 82   Patient Position - Orthostatic VS: Sitting       Physical Exam  Vitals and nursing note reviewed  Exam conducted with a chaperone present  Constitutional:       General: She is not in acute distress  Appearance: She is not toxic-appearing or diaphoretic  Comments: Patient appears comfortable during my evaluation    HENT:      Head: Normocephalic and atraumatic  Right Ear: External ear normal       Left Ear: External ear normal       Nose: Nose normal  No rhinorrhea  Mouth/Throat:      Mouth: Mucous membranes are moist       Pharynx: Oropharynx is clear  No oropharyngeal exudate or posterior oropharyngeal erythema  Comments: Uvula midline  No oropharyngeal or submandibular mass/swelling  Eyes:      General: No scleral icterus  Right eye: No discharge  Left eye: No discharge  Conjunctiva/sclera: Conjunctivae normal       Pupils: Pupils are equal, round, and reactive to light  Neck:      Comments: Patient is spontaneously rotating their neck to the left and right during the history and physical exam interaction without difficulty or apparent discomfort    Cardiovascular:      Rate and Rhythm: Normal rate and regular rhythm  Pulses: Normal pulses  Heart sounds: Normal heart sounds  No murmur heard  No friction rub  No gallop  Comments: 2+ Radial  Pulmonary:      Effort: Pulmonary effort is normal  No respiratory distress  Breath sounds: Normal breath sounds  No stridor   No wheezing, rhonchi or rales  Abdominal:      General: Abdomen is flat  There is no distension  Palpations: Abdomen is soft  Tenderness: There is abdominal tenderness (Left lower quadrant)  There is no right CVA tenderness, left CVA tenderness, guarding or rebound  Genitourinary:     General: Normal vulva  Rectum: Normal    Musculoskeletal:         General: No deformity  Cervical back: Neck supple  No tenderness  No muscular tenderness  Comments: No midline C, T, L-spine tenderness to palpation   Lymphadenopathy:      Cervical: No cervical adenopathy  Skin:     General: Skin is warm and dry  Capillary Refill: Capillary refill takes less than 2 seconds  Neurological:      Mental Status: She is alert  Comments: AAOx4  Patient is speaking clearly in complete sentences  Patient is answering appropriately and able follow commands  Patient is moving all four extremities spontaneously  No facial droop  Tongue midline  Intact L5 and S1 motor and sensory function  No saddle anesthesia         Psychiatric:         Mood and Affect: Mood normal          Behavior: Behavior normal          ED Medications  Medications   ceftriaxone (ROCEPHIN) 1 g/50 mL in dextrose IVPB (has no administration in time range)   acetaminophen (TYLENOL) tablet 650 mg (650 mg Oral Given 12/27/22 1348)   iohexol (OMNIPAQUE) 350 MG/ML injection (SINGLE-DOSE) 70 mL (70 mL Intravenous Given 12/27/22 1432)       Diagnostic Studies  Results Reviewed     Procedure Component Value Units Date/Time    Strep Pneumoniae, Urine [[id number]]     Lab Status: No result Specimen: Urine     Legionella antigen, urine [[id number]]     Lab Status: No result Specimen: Urine     Blood gas, venous [[id number]]     Lab Status: No result Specimen: Blood     Blood culture [[id number]]     Lab Status: No result Specimen: Blood     Blood culture [[id number]]     Lab Status: No result Specimen: Blood     MRSA culture [[id number]]     Lab Status: No result Specimen: Nose     HS Troponin I 4hr [[id number]]     Lab Status: No result Specimen: Blood     COVID/FLU/RSV [[id number]]  (Normal) Collected: 12/27/22 1346    Lab Status: Final result Specimen: Nares from Nose Updated: 12/27/22 1449     SARS-CoV-2 Negative     INFLUENZA A PCR Negative     INFLUENZA B PCR Negative     RSV PCR Negative    Narrative:      FOR PEDIATRIC PATIENTS - copy/paste COVID Guidelines URL to browser: [email or web address]    SARS-CoV-2 assay is a Nucleic Acid Amplification assay intended for the  qualitative detection of nucleic acid from SARS-CoV-2 in nasopharyngeal  swabs  Results are for the presumptive identification of SARS-CoV-2 RNA  Positive results are indicative of infection with SARS-CoV-2, the virus  causing COVID-19, but do not rule out bacterial infection or co-infection  with other viruses  Laboratories within the [place] and its  territories are required to report all positive results to the appropriate  public health authorities  Negative results do not preclude SARS-CoV-2  infection and should not be used as the sole basis for treatment or other  patient management decisions  Negative results must be combined with  clinical observations, patient history, and epidemiological information  This test has not been FDA cleared or approved  This test has been authorized by FDA under an Emergency Use Authorization  (EUA)  This test is only authorized for the duration of time the  declaration that circumstances exist justifying the authorization of the  emergency use of an in vitro diagnostic tests for detection of SARS-CoV-2  virus and/or diagnosis of COVID-19 infection under section 564(b)(1) of  the Act, 21 U  S C  584IEF-9(B)(9), unless the authorization is terminated  or revoked sooner  The test has been validated but independent review by FDA  and CLIA is pending      Test performed using sms[employer]P GeneXpert: This RT-PCR assay targets N2,  a region unique to SARS-CoV-2  A conserved region in the E-gene was chosen  for pan-Sarbecovirus detection which includes SARS-CoV-2  According to CMS-2020-01-R, this platform meets the definition of high-throughput technology      Calcium, ionized [[id number]]     Lab Status: No result Specimen: Blood     Magnesium [[id number]]     Lab Status: No result Specimen: Blood     HS Troponin 0hr (reflex protocol) [[id number]]  (Normal) Collected: 12/27/22 1346    Lab Status: Final result Specimen: Blood from Arm, Right Updated: 12/27/22 1426     hs TnI 0hr 6 ng/L     HS Troponin I 2hr [[id number]]     Lab Status: No result Specimen: Blood     Comprehensive metabolic panel [[id number]]  (Abnormal) Collected: 12/27/22 1346    Lab Status: Final result Specimen: Blood from Arm, Right Updated: 12/27/22 1421     Sodium 135 mmol/L      Potassium 4 6 mmol/L      Chloride 105 mmol/L      CO2 23 mmol/L      ANION GAP 7 mmol/L      BUN 21 mg/dL      Creatinine 1 07 mg/dL      Glucose 133 mg/dL      Calcium 9 2 mg/dL      Corrected Calcium 9 8 mg/dL      AST 38 U/L      ALT 19 U/L      Alkaline Phosphatase 74 U/L      Total Protein 7 3 g/dL      Albumin 3 2 g/dL      Total Bilirubin 1 47 mg/dL      eGFR 43 ml/min/1 73sq m     Narrative:      [facility] guidelines for Chronic Kidney Disease (CKD):   •  Stage 1 with normal or high GFR (GFR > 90 mL/min/1 73 square meters)  •  Stage 2 Mild CKD (GFR = 60-89 mL/min/1 73 square meters)  •  Stage 3A Moderate CKD (GFR = 45-59 mL/min/1 73 square meters)  •  Stage 3B Moderate CKD (GFR = 30-44 mL/min/1 73 square meters)  •  Stage 4 Severe CKD (GFR = 15-29 mL/min/1 73 square meters)  •  Stage 5 End Stage CKD (GFR <15 mL/min/1 73 square meters)  Note: GFR calculation is accurate only with a steady state creatinine    Lipase [[id number]]  (Abnormal) Collected: 12/27/22 1346    Lab Status: Final result Specimen: Blood from Arm, Right Updated: 12/27/22 1421     Lipase 62 u/L     CBC and differential [[id number]]  (Abnormal) Collected: 12/27/22 1346    Lab Status: Final result Specimen: Blood from Arm, Right Updated: 12/27/22 1358     WBC 9 19 Thousand/uL      RBC 4 49 Million/uL      Hemoglobin 14 9 g/dL      Hematocrit 44 8 %       fL      MCH 33 2 pg      MCHC 33 3 g/dL      RDW 12 5 %      MPV 9 3 fL      Platelets 462 Thousands/uL      nRBC 0 /100 WBCs      Neutrophils Relative 81 %      Immat GRANS % 1 %      Lymphocytes Relative 8 %      Monocytes Relative 10 %      Eosinophils Relative 0 %      Basophils Relative 0 %      Neutrophils Absolute 7 42 Thousands/µL      Immature Grans Absolute 0 07 Thousand/uL      Lymphocytes Absolute 0 74 Thousands/µL      Monocytes Absolute 0 94 Thousand/µL      Eosinophils Absolute 0 00 Thousand/µL      Basophils Absolute 0 02 Thousands/µL     UA w Reflex to Microscopic w Reflex to Culture [[id number]]     Lab Status: No result Specimen: Urine                  CT abdomen pelvis with contrast   Final Result by Marybeth Oneill MD (12/27 1529)      1  There is a small left-sided pleural effusion, with left lower lobe airspace disease most concerning for pneumonia  2  There is a trace right effusion  There is a stable 0 8 cm right lower lobe nodule   3  Extensive colonic diverticulosis without evidence of acute diverticulitis or bowel obstruction   4  Stable left ovarian 3 2 cm cyst               Workstation performed: [id number]         XR chest 1 view portable    (Results Pending)         Procedures  Procedures      ED Course  ED Course as of 12/27/22 1620   Tue Dec 27, 2022   1410 Discussed with patient's son: he confirms that patient isn't performing ADLs adequately at home   He states that patient had had back pain that radiates down legs for some time                                        MDM  Number of Diagnoses or Management Options  Diverticulosis  Elevated blood pressure reading  Failure to thrive in adult  Pleural effusion  Pneumonia  SVT (supraventricular tachycardia) (HCC)  Diagnosis management comments: Patient is a 24-year-old female, with a history significant for peripheral edema, prior compression fracture, neuropathy, who presents to the ED today, via EMS from home, as her son, whom she lives with, feels as though he is unable to take care of her  Currently, patient describes generalized body pain that has been present for the last three-to-four weeks and generalized weakness  When asked to specify, patient states that she feels it most in her abdomen and hips  She also describes back pain but denies fever, weakness, numbness, saddle anesthesia, bowel or bladder incontinence, urinary retention  Patient is currently afebrile and medically stable  Her physical exam is notable for tenderness palpation of the left lower quadrant  This presentation is concerning for: UTI, colitis, fracture  I also considered aortic pathology, ACS, pancreatitis  No reason to suspect cauda equina, conus medullaris at this time based on history and physical exam   Investigate with cardiac work-up, viral testing, lipase, UA, CT imaging of the abdomen/pelvis  Will manage with Tylenol, further based on work-up        Disposition  Final diagnoses:   Pneumonia   Diverticulosis   Elevated blood pressure reading   SVT (supraventricular tachycardia) (HCC)   Pleural effusion   Failure to thrive in adult   Ovarian cyst     Time reflects when diagnosis was documented in both MDM as applicable and the Disposition within this note     Time User Action Codes Description Comment    12/27/2022  4:00 PM Yury Agent Add [J18 9] Pneumonia     12/27/2022  4:00 PM Yury Agent A Add [K57 90] Diverticulosis     12/27/2022  4:00 PM Maria Luisa Hall Na A Add [R03 0] Elevated blood pressure reading     12/27/2022  4:00 PM Legjannet, Maria Luisa Na A Add [I47 1] [facility]     12/27/2022  4:00 PM Arlys Salk Modify [J18 9] Pneumonia     12/27/2022  4:00 PM Arlys Salk A Modify [I47 1] SVT (supraventricular tachycardia) ([facility] [address] )     12/27/2022  4:00 PM Legare, Lear Games A Add [J90] Pleural effusion     12/27/2022  4:00 PM Legare, Lear Games A Add [R62 7] Failure to thrive in adult     12/27/2022  4:20 PM Arlys Salk Add [M66 105] Ovarian cyst       ED Disposition     ED Disposition   Admit    Condition   Stable    Date/Time   Tue Dec 27, 2022  4:19 PM    Comment   Case was discussed with FIDENCIO and the patient's admission status was agreed to be Admission Status: inpatient status to the service of Dr López Generous   Follow-up Information    None         Patient's Medications   Discharge Prescriptions    No medications on file     No discharge procedures on file  PDMP Review       Value Time User    PDMP Reviewed  Yes 8/25/2020 10:52 AM Suma Landeros PA-C           ED Provider  Attending physically available and evaluated Juanis Daiaram DAMIAN managed the patient along with the ED Attending      Electronically Signed by         Yeny Lou MD  12/27/22 9921

## 2022-12-27 NOTE — ED NOTES
Pt tachycardic @ 150bpm  Dr Sushant Rick @ bedside during episode   Pt asymptomatic, ekg completed and signed     Hiren Grey RN  12/27/22 6591

## 2022-12-27 NOTE — ASSESSMENT & PLAN NOTE
· Severe right hip pain  · Right hip XR ordered  · PT/OT  · Pain control w/ scheduled tylenol and oxy prn

## 2022-12-27 NOTE — ED ATTENDING ATTESTATION
Pablo Francois MD, saw and evaluated the patient  I have discussed the patient with the resident and agree with the resident's findings, Plan of Care, and MDM as documented in the resident's note, except where noted  All available labs and Radiology studies were reviewed  I was present for key portions of any procedure(s) performed by the resident and I was immediately available to provide assistance  At this point I agree with the current assessment done in the Emergency Department  I have conducted an independent evaluation of this patient a history and physical is as follows:    79 yo female with a history of CKD, osteoarthritis, and ambulatory dysfunction brought to the ED by EMS for evaluation of worsening dysfunction at home  Medics report that the patient's son called 9-1-1 because he is no longer able to care for her  The patient reports generalized "body pain" x 3-4 weeks  She usually ambulates with a walker at home but has had significant difficulty lately secondary to the pain  When asked to clarify her complaints the patient says the pain is worst in her abdomen and hips  No fevers/chills  She denies chest pain, shortness of breath, and cough  (+) Poor appetite  No numbness or weakness  No dysuria or hematuria  No other specific complaints  ROS: per resident physician note    Gen: NAD, AA&Ox4  HEENT: PERRL, EOMI  Neck: supple  CV: RRR  Lungs: CTA B/L  Abdomen: soft, (+) mild ttp in left lower abdomen  Extremities: no swelling or deformity  Neuro: 5/5 strength all extremities, sensation grossly intact, unable to ambulate independently  Skin: no rash    ED Course  The patient is comfortable appearing with stable vital signs  Physical exam is only significant for LLQ abdominal tenderness and profound ambulatory dysfunction  Patient's son confirms that she is no longer able to do her ADLs and he cannot continue to care for her in her current condition   Will check EKG, CXR, basic labs, troponin, UA, hip x-rays, and CT A/P  Will continue to monitor in the ED  The patient will likely require admission to [facility] for placement in an assisted living facility        Critical Care Time  Procedures

## 2022-12-27 NOTE — ASSESSMENT & PLAN NOTE
Lab Results   Component Value Date    EGFR 43 12/27/2022    EGFR 33 07/08/2022    EGFR 42 06/20/2022    CREATININE 1 07 12/27/2022    CREATININE 1 32 (H) 07/08/2022    CREATININE 1 10 06/20/2022   · Estimated Creatinine Clearance: 28 1 mL/min (by C-G formula based on SCr of 1 07 mg/dL)    · Cr at b/l 1 0-1 4

## 2022-12-28 ENCOUNTER — APPOINTMENT (INPATIENT)
Dept: NON INVASIVE DIAGNOSTICS | Facility: HOSPITAL | Age: 87
End: 2022-12-28

## 2022-12-28 LAB
ALBUMIN SERPL BCP-MCNC: 2.6 G/DL (ref 3.5–5)
ALP SERPL-CCNC: 58 U/L (ref 46–116)
ALT SERPL W P-5'-P-CCNC: 17 U/L (ref 12–78)
ANION GAP SERPL CALCULATED.3IONS-SCNC: 8 MMOL/L (ref 4–13)
AORTIC ROOT: 3.3 CM
AORTIC VALVE MEAN VELOCITY: 9.8 M/S
APICAL FOUR CHAMBER EJECTION FRACTION: 65 %
ASCENDING AORTA: 3.1 CM
AST SERPL W P-5'-P-CCNC: 33 U/L (ref 5–45)
ATRIAL RATE: 94 BPM
AV LVOT MEAN GRADIENT: 4 MMHG
AV LVOT PEAK GRADIENT: 7 MMHG
AV MEAN GRADIENT: 4 MMHG
AV PEAK GRADIENT: 8 MMHG
AV VELOCITY RATIO: 0.96
BILIRUB SERPL-MCNC: 0.85 MG/DL (ref 0.2–1)
BUN SERPL-MCNC: 23 MG/DL (ref 5–25)
CALCIUM ALBUM COR SERPL-MCNC: 9.7 MG/DL (ref 8.3–10.1)
CALCIUM SERPL-MCNC: 8.6 MG/DL (ref 8.3–10.1)
CHLORIDE SERPL-SCNC: 107 MMOL/L (ref 96–108)
CO2 SERPL-SCNC: 24 MMOL/L (ref 21–32)
CREAT SERPL-MCNC: 0.95 MG/DL (ref 0.6–1.3)
DOP CALC AO PEAK VEL: 1.38 M/S
DOP CALC AO VTI: 29.97 CM
DOP CALC LVOT PEAK VEL VTI: 29.64 CM
DOP CALC LVOT PEAK VEL: 1.33 M/S
E WAVE DECELERATION TIME: 298 MS
ERYTHROCYTE [DISTWIDTH] IN BLOOD BY AUTOMATED COUNT: 13 % (ref 11.6–15.1)
FRACTIONAL SHORTENING: 39 (ref 28–44)
GFR SERPL CREATININE-BSD FRML MDRD: 50 ML/MIN/1.73SQ M
GLUCOSE SERPL-MCNC: 98 MG/DL (ref 65–140)
HCT VFR BLD AUTO: 38.6 % (ref 34.8–46.1)
HGB BLD-MCNC: 12.6 G/DL (ref 11.5–15.4)
INTERVENTRICULAR SEPTUM IN DIASTOLE (PARASTERNAL SHORT AXIS VIEW): 1 CM
INTERVENTRICULAR SEPTUM: 1 CM (ref 0.6–1.1)
L PNEUMO1 AG UR QL IA.RAPID: NEGATIVE
LAAS-AP2: 15.8 CM2
LAAS-AP4: 16.3 CM2
LEFT ATRIUM SIZE: 3.3 CM
LEFT INTERNAL DIMENSION IN SYSTOLE: 2.7 CM (ref 2.1–4)
LEFT VENTRICULAR INTERNAL DIMENSION IN DIASTOLE: 4.4 CM (ref 3.5–6)
LEFT VENTRICULAR POSTERIOR WALL IN END DIASTOLE: 0.9 CM
LEFT VENTRICULAR STROKE VOLUME: 62 ML
LVSV (TEICH): 62 ML
MAGNESIUM SERPL-MCNC: 2.5 MG/DL (ref 1.6–2.6)
MCH RBC QN AUTO: 32.2 PG (ref 26.8–34.3)
MCHC RBC AUTO-ENTMCNC: 32.6 G/DL (ref 31.4–37.4)
MCV RBC AUTO: 99 FL (ref 82–98)
MV E'TISSUE VEL-SEP: 5 CM/S
MV PEAK A VEL: 1.24 M/S
MV PEAK E VEL: 66 CM/S
MV STENOSIS PRESSURE HALF TIME: 86 MS
MV VALVE AREA P 1/2 METHOD: 2.56
PHOSPHATE SERPL-MCNC: 2.9 MG/DL (ref 2.3–4.1)
PLATELET # BLD AUTO: 207 THOUSANDS/UL (ref 149–390)
PMV BLD AUTO: 9.7 FL (ref 8.9–12.7)
POTASSIUM SERPL-SCNC: 4.1 MMOL/L (ref 3.5–5.3)
PROCALCITONIN SERPL-MCNC: 0.35 NG/ML
PROT SERPL-MCNC: 5.8 G/DL (ref 6.4–8.4)
QRS AXIS: -72 DEGREES
QRSD INTERVAL: 130 MS
QT INTERVAL: 378 MS
QTC INTERVAL: 472 MS
RA PRESSURE ESTIMATED: 3 MMHG
RBC # BLD AUTO: 3.91 MILLION/UL (ref 3.81–5.12)
RIGHT VENTRICLE ID DIMENSION: 3.9 CM
RV PSP: 32 MMHG
S PNEUM AG UR QL: NEGATIVE
SL CV LEFT ATRIUM LENGTH A2C: 5.6 CM
SL CV LV EF: 65
SL CV PED ECHO LEFT VENTRICLE DIASTOLIC VOLUME (MOD BIPLANE) 2D: 89 ML
SL CV PED ECHO LEFT VENTRICLE SYSTOLIC VOLUME (MOD BIPLANE) 2D: 28 ML
SODIUM SERPL-SCNC: 139 MMOL/L (ref 135–147)
T WAVE AXIS: 33 DEGREES
TR MAX PG: 29 MMHG
TR PEAK VELOCITY: 2.7 M/S
TRICUSPID VALVE PEAK REGURGITATION VELOCITY: 2.67 M/S
TSH SERPL DL<=0.05 MIU/L-ACNC: 2.43 UIU/ML (ref 0.45–4.5)
VENTRICULAR RATE: 94 BPM
WBC # BLD AUTO: 6.79 THOUSAND/UL (ref 4.31–10.16)

## 2022-12-28 RX ORDER — FUROSEMIDE 20 MG/1
20 TABLET ORAL
Status: DISCONTINUED | OUTPATIENT
Start: 2022-12-28 | End: 2023-01-02 | Stop reason: HOSPADM

## 2022-12-28 RX ORDER — SODIUM CHLORIDE, SODIUM GLUCONATE, SODIUM ACETATE, POTASSIUM CHLORIDE, MAGNESIUM CHLORIDE, SODIUM PHOSPHATE, DIBASIC, AND POTASSIUM PHOSPHATE .53; .5; .37; .037; .03; .012; .00082 G/100ML; G/100ML; G/100ML; G/100ML; G/100ML; G/100ML; G/100ML
100 INJECTION, SOLUTION INTRAVENOUS CONTINUOUS
Status: DISCONTINUED | OUTPATIENT
Start: 2022-12-28 | End: 2022-12-28

## 2022-12-28 RX ADMIN — FUROSEMIDE 40 MG: 40 TABLET ORAL at 10:17

## 2022-12-28 RX ADMIN — METOPROLOL TARTRATE 25 MG: 25 TABLET, FILM COATED ORAL at 17:10

## 2022-12-28 RX ADMIN — Medication 1000 UNITS: at 10:17

## 2022-12-28 RX ADMIN — DOCUSATE SODIUM 100 MG: 100 CAPSULE, LIQUID FILLED ORAL at 10:17

## 2022-12-28 RX ADMIN — ACETAMINOPHEN 975 MG: 325 TABLET, FILM COATED ORAL at 20:41

## 2022-12-28 RX ADMIN — SODIUM CHLORIDE, SODIUM GLUCONATE, SODIUM ACETATE, POTASSIUM CHLORIDE AND MAGNESIUM CHLORIDE 100 ML/HR: 526; 502; 368; 37; 30 INJECTION, SOLUTION INTRAVENOUS at 12:52

## 2022-12-28 RX ADMIN — METOPROLOL SUCCINATE 12.5 MG: 25 TABLET, EXTENDED RELEASE ORAL at 10:17

## 2022-12-28 RX ADMIN — FUROSEMIDE 20 MG: 20 TABLET ORAL at 17:11

## 2022-12-28 RX ADMIN — ENOXAPARIN SODIUM 30 MG: 30 INJECTION SUBCUTANEOUS at 10:17

## 2022-12-28 RX ADMIN — SENNOSIDES 8.6 MG: 8.6 TABLET, FILM COATED ORAL at 20:41

## 2022-12-28 RX ADMIN — DOCUSATE SODIUM 100 MG: 100 CAPSULE, LIQUID FILLED ORAL at 17:10

## 2022-12-28 NOTE — PLAN OF CARE
Problem: OCCUPATIONAL THERAPY ADULT  Goal: Performs self-care activities at highest level of function for planned discharge setting  See evaluation for individualized goals  Description: Treatment Interventions: ADL retraining, Functional transfer training, UE strengthening/ROM, Endurance training, Patient/family training, Cognitive reorientation, Compensatory technique education, Activityengagement, Energy conservation, Continued evaluation          See flowsheet documentation for full assessment, interventions and recommendations  Note: Limitation: Decreased ADL status, Decreased Safe judgement during ADL, Decreased UE strength, Decreased endurance, Decreased self-care trans, Decreased high-level ADLs  Prognosis: Good  Assessment: Pt is a 80 y o  female seen for OT evaluation s/p admit to [facility] on 12/27/2022 w/ PSVT (paroxysmal supraventricular tachycardia) ([facility] [address] )  Pt presents to ED by EMS for evaluation of worsening dysfunction at home  Medics report that the patient's son called 9-1-1 because he is no longer able to care for her  The patient reports generalized "body pain" x 3-4 weeks  Comorbidities affecting pt's functional performance at time of assessment include: Ambulatory dysfunciton, LE edema, CKD, Infiltrate noted on imaging study  Personal factors affecting pt at time of IE include:steps to enter environment, difficulty performing ADLS, difficulty performing IADLS , limited insight into deficits, decreased initiation and engagement  and health management   Prior to admission, pt was I with ADLS and needs assist with IADLS  Upon evaluation: Pt presents supine and is agreeable to OTIE  Pt requires overall Mod A 2* the following deficits impacting occupational performance: weakness, decreased strength, decreased balance, decreased tolerance, impaired problem solving, decreased safety awareness, increased pain and decreased coping skills   Pt resting in chair at end of session with all needs in reach, alarm on, all lines in place and SCD's on  Pt to benefit from continued skilled OT tx while in the hospital to address deficits as defined above and maximize level of functional independence w ADL's and functional mobility  Occupational Performance areas to address include: grooming, bathing/shower, toilet hygiene, dressing, health maintenance, functional mobility, community mobility and clothing management  The patient's raw score on the AM-PAC Daily Activity inpatient short form is 15  , standardized score is 34 69  , less than 39 4  Patients at this level are likely to benefit from discharge to post-acute rehabilitation services  Please refer to the recommendation of the Occupational Therapist for safe discharge planning       OT Discharge Recommendation: Post acute rehabilitation services

## 2022-12-28 NOTE — ASSESSMENT & PLAN NOTE
· Severe right hip pain  · Right hip XR negative  · Pain control w/ scheduled tylenol and oxy prn  · PT/OT eval recommending post acute rehab  · Pending placement

## 2022-12-28 NOTE — CONSULTS
Cardiology   Dagoberto Coma 80 y o  female MRN: [id number]  Unit/Bed#: CW2 212-01 Encounter: [id number]      Reason for Consult / Principal Problem: SVT    Physician Requesting Consult:  Santy Nelson DO    Cardiologist: Dr Bernardino Meza  1  PSVT / probable PAT   -Initial ECG demonstrated NSR with frequent PACs, RBBB/LAFB, and LVH with QRS widening   -While in the ED was reported to have had an episode of tachycardia with an HR level of up to 150 bpm per documentation  ECG obtained during this event and was noted to have been in SVT with reversion back to NSR with PACs  There was an additional ECG ordered from today which was read as ' atrial fibrillation' however upon further review appears to be NSR with PACs  -24-hour telemetry review -predominant NSR with frequent PACs  Intermittent episodes of SVT w/ heart rates around 150 bpm   No clear/definitive evidence for atrial fibrillation there is quite a bit of telemetry artifact which makes interpretation difficult at times   -Started on Toprol-XL 12 5 mg daily yesterday by the primary team   2  LLL infiltrate  3  Small left-sided pleural effusion/trace right-sided pleural effusion  -Asymptomatic   -On 2L of supplemental O2  -Procalcitonin level 0 35  -Afebrile/no significant leukocytosis  -Received IV antibiotics in the ED no further doses administered  -Previously on furosemide 40 mg in the a m  and 20 mg in the p m as an outpatient - currently on gentle IVF's and oral furosemide 40 mg daily ? 4  Preserved biventricular systolic function  -Appears compensated  -TTE 12/28/2022 -LVEF 65%, grade 1 DD, RV normal size/systolic function, LA/RA normal, trace MR, trace TR, IVC normal in size  5  Chronic lower extremity edema  -On furosemide 40 mg in the a m  and 20 mg in the p m  6  CKD stage III   -Baseline creatinine around 0 8-1 1  -Creatinine 1 1 on admission, currently 0 95      Plan  -ECG/telemetry reviewed - predominate NSR w/frequent PACs, episodes of PSVT w/rates in the 150 range  Completely asymptomatic    -DC low dose metoprolol succinate, start metoprolol tartrate 25 mg q12 for more enhanced arrhythmia suppression  Monitor on telemetry overnight    -DC IV fluids - no clear indication for this - tolerating oral intake, renal function stable, and mildly volume overloaded on exam / imaging   -Continue oral diuretics - can up-titrate back to prior home dose of furosemide 40 mg in the am and 20 mg in the pm    -Monitor electrolytes closely - replete to maintain K+ level at 4 and mag level at 2    HPI: Darlin Pelayo 80y o  year old female with a medical history of  chronic bilateral lower extremity edema / venous stasis  CKD stage III, osteoarthritis and baseline amatory dysfunction  Non-smoker denies excessive alcohol or recreational drug use  She previously followed with Dr Darlin Tracey at the [address] [place] [address] Cardiology service last seen as an outpatient back in 2020, has not been seen since then  She presented to the [facility] on 12/27/2022 with complaints of severe hip pain a with subsequent ambulatory dysfunction  Imaging  -CT abdomen pelvis with contrast -small left-sided pleural effusion, and a trace right-sided pleural effusion  Left lower lobe airspace disease most concerning for PNA  -X-ray hip -no acute osseous abnormality   -Chest x-ray imaging -bibasilar opacities and small pleural effusions better demonstrated on recent CT, additional patchy infiltrate in the left infrahilar region, no consolidation in the upper lung fields  Hemodynamics on admission   -Temp 98 6 degrees F, HR 82, RR 18, /79, sat 94% on RA  Laboratory data on admission  -Na+ 135, K+ 4 6, chloride 105, CO2 23, anion gap 7, BUN 21, creatinine 1 1, glucose 133, calcium 9 2, T bili elevated at 1 47normal LFTs with the exception of T bili elevated at 1 47, lipase 62, WBC 9 2, Hgb 14 9, platelet count 064      HS troponin levels 6 --8    Initial ECG demonstrated NSR with frequent PACs, RBBB/LAFB, and LVH with QRS widening  While in the ED was reported to have had an episode of tachycardia with an HR level of up to 150 bpm per documentation  ECG obtained during this event and was noted to have been in SVT with reversion back to NSR with PACs  She was initiated on low-dose Toprol-XL by the primary service  There was an additional ECG ordered from today which was read as ' atrial fibrillation' however upon further review appears to be NSR with PACs  Cardiology was consulted for further treatment recommendations/management      Family History:   Family History   Problem Relation Age of Onset   • Diabetes Mother    • Lung disease Father    • Cancer Maternal Grandfather    • Lung disease Sister      Historical Information   Past Medical History:   Diagnosis Date   • Interstitial cystitis    • Leukopenia    • Neurologic gait dysfunction     Abstraction Dr Ponce Sender charts   • Neuropathy    • Neutropenia (HCC)     Abstraction Dr Ijeoma Mcdonald   • Osteoarthritis    • Osteoporosis    • Peripheral edema     Abstraction Dr Ijeoma Mcdonald   • Renal cyst    • Syncope     Abstraction Dr Ponce Sender charts     Past Surgical History:   Procedure Laterality Date   • CAPSULOTOMY      Right eye   • CATARACT EXTRACTION Left    • CHALAZION EXCISION     • COLONOSCOPY       Social History   Social History     Substance and Sexual Activity   Alcohol Use Yes    Comment: socially     Social History     Substance and Sexual Activity   Drug Use No     Social History     Tobacco Use   Smoking Status Former   • Packs/day: 1 00   • Years: 20 00   • Pack years: 20 00   • Types: Cigarettes   • Quit date: 5   • Years since quittin 0   Smokeless Tobacco Never     Family History:   Family History   Problem Relation Age of Onset   • Diabetes Mother    • Lung disease Father    • Cancer Maternal Grandfather    • Lung disease Sister        Review of Systems:  Review of Systems   Constitutional: Negative for activity change, appetite change, chills, fatigue and fever  Eyes: Negative for visual disturbance  Respiratory: Negative for cough, chest tightness and shortness of breath  Cardiovascular: Negative for chest pain, palpitations and leg swelling  Gastrointestinal: Negative for abdominal pain  Neurological: Negative for dizziness, light-headedness and headaches  All other systems reviewed and are negative            Scheduled Meds:  Current Facility-Administered Medications   Medication Dose Route Frequency Provider Last Rate   • acetaminophen  975 mg Oral [facility] Glen Tracie, DO     • cholecalciferol  1,000 Units Oral Daily Glen Greenevers, DO     • docusate sodium  100 mg Oral BID Glen Greenevers, DO     • enoxaparin  30 mg Subcutaneous Daily Glen Greenevers, DO     • furosemide  40 mg Oral Daily Glen Tracie, DO     • metoprolol succinate  12 5 mg Oral Daily Glen Greenevers, DO     • multi-electrolyte  100 mL/hr Intravenous Continuous Mark Kline  mL/hr (12/28/22 1252)   • oxyCODONE  2 5 mg Oral Q6H PRN Glen Tracie, DO     • senna  1 tablet Oral HS Glen Greenevers, DO       Continuous Infusions:multi-electrolyte, 100 mL/hr, Last Rate: 100 mL/hr (12/28/22 1252)      PRN Meds: •  oxyCODONE  all current active meds have been reviewed and current meds:   Current Facility-Administered Medications   Medication Dose Route Frequency   • acetaminophen (TYLENOL) tablet 975 mg  975 mg Oral Q8H [facility]   • cholecalciferol (VITAMIN D3) tablet 1,000 Units  1,000 Units Oral Daily   • docusate sodium (COLACE) capsule 100 mg  100 mg Oral BID   • enoxaparin (LOVENOX) subcutaneous injection 30 mg  30 mg Subcutaneous Daily   • furosemide (LASIX) tablet 40 mg  40 mg Oral Daily   • metoprolol succinate (TOPROL-XL) 24 hr tablet 12 5 mg  12 5 mg Oral Daily   • multi-electrolyte (PLASMALYTE-A/ISOLYTE-S PH 7 4) IV solution  100 mL/hr Intravenous Continuous   • oxyCODONE (ROXICODONE) IR tablet 2 5 mg  2 5 mg Oral Q6H PRN   • senna (SENOKOT) tablet 8 6 mg  1 tablet Oral HS       No Known Allergies    Objective   Vitals: Blood pressure 128/68, pulse 73, temperature 98 9 °F (37 2 °C), resp  rate 18, height 5' 6" (1 676 m), weight 61 7 kg (136 lb), SpO2 92 %  , Body mass index is 21 95 kg/m² ,   Orthostatic Blood Pressures    Flowsheet Row Most Recent Value   Blood Pressure 128/68 filed at 12/28/2022 1139   Patient Position - Orthostatic VS Sitting filed at 12/27/2022 1321            Intake/Output Summary (Last 24 hours) at 12/28/2022 1530  Last data filed at 12/28/2022 1001  Gross per 24 hour   Intake 50 ml   Output 300 ml   Net -250 ml       Invasive Devices     Peripheral Intravenous Line  Duration           Peripheral IV 12/27/22 Proximal;Right;Ventral (anterior) Forearm 1 day          Drain  Duration           External Urinary Catheter <1 day              Physical Exam:  Physical Exam  Vitals and nursing note reviewed  Constitutional:       General: She is not in acute distress  Appearance: She is not diaphoretic  HENT:      Head: Normocephalic and atraumatic  Eyes:      General: No scleral icterus  Cardiovascular:      Rate and Rhythm: Normal rate and regular rhythm  Pulses: Normal pulses  Heart sounds: Normal heart sounds  Pulmonary:      Effort: Pulmonary effort is normal       Breath sounds: Normal breath sounds  No wheezing or rales  Abdominal:      Palpations: Abdomen is soft  Musculoskeletal:      Cervical back: Neck supple  Right lower leg: Edema present  Left lower leg: Edema present  Skin:     General: Skin is warm and dry  Capillary Refill: Capillary refill takes less than 2 seconds  Neurological:      General: No focal deficit present  Mental Status: She is alert and oriented to person, place, and time     Psychiatric:         Mood and Affect: Mood normal          Lab Results:   Recent Results (from the past 24 hour(s))   Blood culture    Collection Time: 12/27/22  6:49 PM Specimen: Hand, Left; Blood   Result Value Ref Range    Blood Culture Received in Microbiology Lab  Culture in Progress  Blood culture    Collection Time: 12/27/22  6:49 PM    Specimen: Hand, Right; Blood   Result Value Ref Range    Blood Culture Received in Microbiology Lab  Culture in Progress      HS Troponin I 2hr    Collection Time: 12/27/22  6:57 PM   Result Value Ref Range    hs TnI 2hr 8 "Refer to ACS Flowchart"- see link ng/L    Delta 2hr hsTnI 2 <20 ng/L   Calcium, ionized    Collection Time: 12/27/22  6:57 PM   Result Value Ref Range    Calcium, Ionized 1 16 1 12 - 1 32 mmol/L   Blood gas, venous    Collection Time: 12/27/22  6:57 PM   Result Value Ref Range    pH, Valeriano 7 417 (H) 7 300 - 7 400    pCO2, Valeriano 34 2 (L) 42 0 - 50 0 mm Hg    pO2, Valeriano 60 6 (H) 35 0 - 45 0 mm Hg    HCO3, Valeriano 21 5 (L) 24 - 30 mmol/L    Base Excess, Valeriano -2 1 mmol/L    O2 Content, Valeriano 20 4 ml/dL    O2 HGB, VENOUS 89 7 (H) 60 0 - 80 0 %   Procalcitonin    Collection Time: 12/28/22  5:04 AM   Result Value Ref Range    Procalcitonin 0 35 (H) <=0 25 ng/ml   Comprehensive metabolic panel    Collection Time: 12/28/22  5:04 AM   Result Value Ref Range    Sodium 139 135 - 147 mmol/L    Potassium 4 1 3 5 - 5 3 mmol/L    Chloride 107 96 - 108 mmol/L    CO2 24 21 - 32 mmol/L    ANION GAP 8 4 - 13 mmol/L    BUN 23 5 - 25 mg/dL    Creatinine 0 95 0 60 - 1 30 mg/dL    Glucose 98 65 - 140 mg/dL    Calcium 8 6 8 3 - 10 1 mg/dL    Corrected Calcium 9 7 8 3 - 10 1 mg/dL    AST 33 5 - 45 U/L    ALT 17 12 - 78 U/L    Alkaline Phosphatase 58 46 - 116 U/L    Total Protein 5 8 (L) 6 4 - 8 4 g/dL    Albumin 2 6 (L) 3 5 - 5 0 g/dL    Total Bilirubin 0 85 0 20 - 1 00 mg/dL    eGFR 50 ml/min/1 73sq m   Magnesium    Collection Time: 12/28/22  5:04 AM   Result Value Ref Range    Magnesium 2 5 1 6 - 2 6 mg/dL   Phosphorus    Collection Time: 12/28/22  5:04 AM   Result Value Ref Range    Phosphorus 2 9 2 3 - 4 1 mg/dL   CBC (With Platelets)    Collection Time: 12/28/22  5:04 AM   Result Value Ref Range    WBC 6 79 4 31 - 10 16 Thousand/uL    RBC 3 91 3 81 - 5 12 Million/uL    Hemoglobin 12 6 11 5 - 15 4 g/dL    Hematocrit 38 6 34 8 - 46 1 %    MCV 99 (H) 82 - 98 fL    MCH 32 2 26 8 - 34 3 pg    MCHC 32 6 31 4 - 37 4 g/dL    RDW 13 0 11 6 - 15 1 %    Platelets 641 452 - 297 Thousands/uL    MPV 9 7 8 9 - 12 7 fL   TSH, 3rd generation with Free T4 reflex    Collection Time: 12/28/22  5:04 AM   Result Value Ref Range    TSH 3RD GENERATON 2 430 0 450 - 4 500 uIU/mL   Echo complete w/ contrast if indicated    Collection Time: 12/28/22 10:00 AM   Result Value Ref Range    A4C EF 65 %    LVIDd 4 40 cm    LVIDS 2 70 cm    IVSd 1 00 cm    LVPWd 0 90 cm    FS 39 28 - 44    MV E' Tissue Velocity Septal 5 cm/s    E wave deceleration time 298 ms    MV Peak E Brando 66 cm/s    MV Peak A Brando 1 24 m/s    AV LVOT peak gradient 7 mmHg    LVOT peak VTI 29 64 cm    LVOT peak brando 1 33 m/s    RVID d 3 9 cm    LA size 3 3 cm    LA length (A2C) 5 60 cm    Aortic valve peak velocity 1 38 m/s    Ao VTI 29 97 cm    AV mean gradient 4 mmHg    LVOT mn grad 4 0 mmHg    AV peak gradient 8 mmHg    MV stenosis pressure 1/2 time 86 ms    MV valve area p 1/2 method 2 56     TR Peak Brando 2 7 m/s    Triscuspid Valve Regurgitation Peak Gradient 29 0 mmHg    Ao root 3 30 cm    Asc Ao 3 1 cm    Aortic valve mean velocity 9 80 m/s    Tricuspid valve peak regurgitation velocity 2 67 m/s    Left ventricular stroke volume (2D) 62 00 mL    IVS 1 cm    LEFT VENTRICLE SYSTOLIC VOLUME (MOD BIPLANE) 2D 28 mL    LV DIASTOLIC VOLUME (MOD BIPLANE) 2D 89 mL    Left Atrium Area-systolic Four Chamber 07 8 cm2    Left Atrium Area-systolic Apical Two Chamber 15 8 cm2    LVSV, 2D 62 mL    Dimensionless velociy index 0 96     LV EF 65     Est  RA pres 3 0 mmHg    Right Ventricular Peak Systolic Pressure 95 34 mmHg   Strep Pneumoniae, Urine    Collection Time: 12/28/22 10:27 AM    Specimen: Urine, Other   Result Value Ref Range Strep pneumoniae antigen, urine Negative Negative   Legionella antigen, urine    Collection Time: 12/28/22 10:27 AM    Specimen: Urine, Other   Result Value Ref Range    Legionella Urinary Antigen Negative Negative   ECG 12 lead    Collection Time: 12/28/22  2:06 PM   Result Value Ref Range    Ventricular Rate 94 BPM    Atrial Rate 94 BPM    RI Interval  ms    QRSD Interval 130 ms    QT Interval 378 ms    QTC Interval 472 ms    P Axis  degrees    QRS Axis -72 degrees    T Wave Axis 33 degrees   Imaging: I have personally reviewed pertinent reports  and I have personally reviewed pertinent films in PACS  Code Status: Level 1 full code  Epic/ Allscripts/Care Everywhere records reviewed: Yes    * Please Note: Fluency DirectDictation voice to text software may have been used in the creation of this document   **

## 2022-12-28 NOTE — ASSESSMENT & PLAN NOTE
· Noted to have SVT in Er that self resolved  · Cardiology consulted as patient having irregular rhythm in tele  · Increased Toprol to 25mg daily  · No repeated episodes on telemetry

## 2022-12-28 NOTE — OCCUPATIONAL THERAPY NOTE
Occupational Therapy Evaluation     Patient Name: Cristian Stark  IEYFY'D Date: 12/28/2022  Problem List  Principal Problem:    PSVT (paroxysmal supraventricular tachycardia) ([address] )  Active Problems:    Ambulatory dysfunction    Lower extremity edema    CKD (chronic kidney disease) stage 3, GFR 30-59 ml/min ([facility])    Infiltrate noted on imaging study    Past Medical History  Past Medical History:   Diagnosis Date    Interstitial cystitis     Leukopenia     Neurologic gait dysfunction     Abstraction Dr Shanda Toussaint charts    Neuropathy     Neutropenia [facility])     Abstraction Dr Jemima Burdick     Osteoporosis     Peripheral edema     Abstraction Dr Albertina Iqbal    Renal cyst     Syncope     Abstraction Dr Shanda Toussaint charts     Past Surgical History  Past Surgical History:   Procedure Laterality Date    CAPSULOTOMY      Right eye    CATARACT EXTRACTION Left     CHALAZION EXCISION      COLONOSCOPY  2006 12/28/22 1203   OT Last Visit   OT Visit Date 12/28/22   Note Type   Note type Evaluation   Pain Assessment   Pain Score 5   Pain Location/Orientation Orientation: Lower; Location: Back   Restrictions/Precautions   Weight Bearing Precautions Per Order No   Other Precautions Cognitive; Chair Alarm; Fall Risk;Pain;Multiple lines;Telemetry;Hard of hearing   Home Living   Type of [address] [place] [address] One level;Performs ADLs on one level;Stairs to enter with rails   Bathroom Shower/Tub Tub/shower unit   Bathroom Toilet Standard   Bathroom Equipment Grab bars in shower; Shower chair   [address] [place]   Additional Comments Pt lives in a one level home with 3 VALENTIN  Was utilizing RW PTA   Prior Function   Level of [place] Independent with ADLs; Independent with functional mobility; Needs assistance with IADLS   Lives With Jo Rodriguez Help From Family   IADLs Family/Friend/Other provides transportation; Family/Friend/Other provides meals; Family/Friend/Other provides medication management   Falls in the last 6 months 0   Vocational Retired   Lifestyle   Autonomy I with ADLS and funcitonal mobility at [address] [place] [address] level, requires A for IADLS, Does not drive   Reciprocal Relationships supportive son who she lives with   Service to Others retired   Intrinsic Gratification enjoys doing puzzles   Subjective   Subjective "Falls? No, I dont need that!"   ADL   Where Assessed Edge of bed   Grooming Assistance 4  Minimal Assistance   UB Bathing Assistance 3  Moderate Assistance   LB Bathing Assistance 2  Maximal Assistance   UB Dressing Assistance 3  Moderate Assistance   LB Dressing Assistance 2  Maximal Assistance   Toileting Assistance  2  Maximal Assistance   Additional Comments Increased assist for LB self cares 2* decreased sitting and standing balance   Bed Mobility   Supine to Sit 4  Minimal assistance   Additional items Assist x 1;HOB elevated; Increased time required;LE management   Additional Comments OOB at end of session   Transfers   Sit to Stand 3  Moderate assistance   Additional items Assist x 1; Increased time required;Verbal cues   Stand to Sit 3  Moderate assistance   Additional items Assist x 1; Increased time required;Verbal cues   Stand pivot 3  Moderate assistance   Additional items Increased time required;Verbal cues   Additional Comments RW increased time TC and VC   Functional Mobility   Functional Mobility 3  Moderate assistance   Additional Comments Ax1   Additional items Rolling walker   Balance   Static Sitting Fair -   Dynamic Sitting Poor +   Static Standing Poor   Dynamic Standing Poor -   Ambulatory Poor -   Activity Tolerance   Activity Tolerance Patient limited by fatigue   Medical Staff Made Aware DPT, NSG aware   Nurse Made Aware yes   RUE Assessment   RUE Assessment WFL   LUE Assessment   LUE Assessment WFL   Psychosocial   Psychosocial (WDL) WDL   Cognition   Overall Cognitive Status WFL   Arousal/Participation Alert; Responsive; Cooperative   Attention Attends with cues to redirect   Orientation Level Oriented to person;Oriented to place; Disoriented to time;Disoriented to situation   Memory Decreased recall of recent events   Following Commands Follows one step commands with increased time or repetition   Comments Overall pleasant and cooperative, decreased insight and judgment noted, slow to process at times   Assessment   Limitation Decreased ADL status; Decreased Safe judgement during ADL;Decreased UE strength;Decreased endurance;Decreased self-care trans;Decreased high-level ADLs   Prognosis Good   Assessment Pt is a 80 y o  female seen for OT evaluation s/p admit to B on 12/27/2022 w/ PSVT (paroxysmal supraventricular tachycardia) ([facility] [address] )  Pt presents to ED by EMS for evaluation of worsening dysfunction at home  Medics report that the patient's son called 9-1-1 because he is no longer able to care for her  The patient reports generalized "body pain" x 3-4 weeks  Comorbidities affecting pt's functional performance at time of assessment include: Ambulatory dysfunciton, LE edema, CKD, Infiltrate noted on imaging study  Personal factors affecting pt at time of IE include:steps to enter environment, difficulty performing ADLS, difficulty performing IADLS , limited insight into deficits, decreased initiation and engagement  and health management   Prior to admission, pt was I with ADLS and needs assist with IADLS  Upon evaluation: Pt presents supine and is agreeable to OTIE  Pt requires overall Mod A 2* the following deficits impacting occupational performance: weakness, decreased strength, decreased balance, decreased tolerance, impaired problem solving, decreased safety awareness, increased pain and decreased coping skills  Pt resting in chair at end of session with all needs in reach, alarm on, all lines in place and SCD's on   Pt to benefit from continued skilled OT tx while in the hospital to address deficits as defined above and maximize level of functional independence w ADL's and functional mobility  Occupational Performance areas to address include: grooming, bathing/shower, toilet hygiene, dressing, health maintenance, functional mobility, community mobility and clothing management  The patient's raw score on the AM-PAC Daily Activity inpatient short form is 15  , standardized score is 34 69  , less than 39 4  Patients at this level are likely to benefit from discharge to post-acute rehabilitation services  Please refer to the recommendation of the Occupational Therapist for safe discharge planning  Goals   Patient Goals To get out of bed   Plan   Treatment Interventions ADL retraining;Functional transfer training;UE strengthening/ROM; Endurance training;Patient/family training;Cognitive reorientation; Compensatory technique education; Activityengagement; Energy conservation;Continued evaluation   Goal Expiration Date 01/11/23   OT Frequency Other (comment)  (2-4x/wk)   Recommendation   OT Discharge Recommendation Post acute rehabilitation services   [facility] Daily Activity Inpatient   Lower Body Dressing 2   Bathing 2   Toileting 2   Upper Body Dressing 2   Grooming 3   Eating 4   Daily Activity Raw Score 15   Daily Activity Standardized Score (Calc for Raw Score >=11) 34 69   AM-PAC Applied Cognition Inpatient   Following a Speech/Presentation 3   Understanding Ordinary Conversation 4   Taking Medications 3   Remembering Where Things Are Placed or Put Away 3   Remembering List of 4-5 Errands 2   Taking Care of Complicated Tasks 2   Applied Cognition Raw Score 17   Applied Cognition Standardized Score 36 52     OT goals to be addressed in the next 14 days:    Pt will increase activity tolerance to G for 30 min txment sessions    Pt will complete UB/LB /bathing/dressing/self care w/ mod I using adaptive device and DME as needed    Pt will complete toileting w/ mod I w/ G hygiene/thoroughness using DME as needed    Pt will improve functional transfers/mobility to Mod I on/off all surfaces using DME as needed w/ G balance/safety     Pt will demonstrate G carryover of pt/caregiver education and training as appropriate  Pt will demonstrate 100% carryover of energy conservation techniques t/o functional I/ADL/leisure tasks w/o cues s/p skilled education    Pt will independently identify and utilize 2-3 coping strategies to increase positive affect and promote overall well-being

## 2022-12-28 NOTE — ASSESSMENT & PLAN NOTE
Lab Results   Component Value Date    EGFR 50 12/28/2022    EGFR 43 12/27/2022    EGFR 33 07/08/2022    CREATININE 0 95 12/28/2022    CREATININE 1 07 12/27/2022    CREATININE 1 32 (H) 07/08/2022   · Estimated Creatinine Clearance: 31 7 mL/min (by C-G formula based on SCr of 0 95 mg/dL)    · Cr at b/l 1 0-1 4

## 2022-12-28 NOTE — UTILIZATION REVIEW
Initial Clinical Review    Admission: Date/Time/Statement:   Admission Orders (From admission, onward)     Ordered        12/27/22 1620  INPATIENT ADMISSION  Once                      Orders Placed This Encounter   Procedures   • INPATIENT ADMISSION     Standing Status:   Standing     Number of Occurrences:   1     Order Specific Question:   Level of Care     Answer:   Med Surg [16]     Order Specific Question:   Estimated length of stay     Answer:   More than 2 Midnights     Order Specific Question:   Certification     Answer:   I certify that inpatient services are medically necessary for this patient for a duration of greater than two midnights  See H&P and MD Progress Notes for additional information about the patient's course of treatment  ED Arrival Information     Expected   -    Arrival   12/27/2022 13:10    Acuity   Urgent            Means of arrival   Ambulance    Escorted by   [facility]    Service   Hospitalist    Admission type   Emergency            Arrival complaint   weakness           Chief Complaint   Patient presents with   • Weakness - Generalized     Pt comes from home, lives with son  Reports generalized weakness/pain  Son states he "is unable to care for her at home" gcs 15       Initial Presentation: 80 y o  female with PMH of LE edema, Syncope presents with c/o right hip pain and difficulty ambulating  In ED, pt noted to SVT that self resolved  Admit Inpatient med surg telemetry d/t PSVT, Ambulatory dysfunction: start toprol qd, order echo, pt and ot eval, pain control, fu on xrs, check procal, continue home lasix  Date: 12/28   Day 2:   Reports hip and back pain improving but has not ambulated yet  PT/OT eval pending, continue pain control, fu on TTE, continue to monitor procal and Cr, continue lasix  Date: 12/29    IP Day 3: Has surpassed a 2nd midnight with active treatments and services  Pt unable to ambulate with PT yesterday, will attempt today   PT/OT recommending post acute rehab, pt awaiting placement      ED Triage Vitals   Temperature Pulse Respirations Blood Pressure SpO2   12/27/22 1321 12/27/22 1321 12/27/22 1321 12/27/22 1321 12/27/22 1321   98 6 °F (37 °C) 82 18 166/79 94 %      Temp Source Heart Rate Source Patient Position - Orthostatic VS BP Location FiO2 (%)   12/27/22 1321 12/27/22 1321 12/27/22 1321 12/27/22 1321 --   Tympanic Monitor Sitting Left arm       Pain Score       12/27/22 1348       7          Wt Readings from Last 1 Encounters:   12/28/22 61 7 kg (136 lb)     Additional Vital Signs:   Date/Time Temp Pulse Resp BP MAP (mmHg) SpO2 Calculated FIO2 (%) - Nasal Cannula Nasal Cannula O2 Flow Rate (L/min) O2 Device Patient Position - Orthostatic VS   12/29/22 08:04:51 -- 53 Abnormal  -- 139/76 97 90 % -- -- -- --   12/29/22 03:57:28 -- 44 Abnormal  -- 138/75 96 90 % -- -- -- --   12/28/22 23:40:38 -- 70 -- 140/75 97 92 % -- -- -- --   12/28/22 19:44:36 98 4 °F (36 9 °C) 79 -- 155/84 108 86 % Abnormal  28 2 L/min Nasal cannula Lying   12/28/22 16:05:18 -- 88 -- 162/94 117 87 % Abnormal  -- -- -- --   12/28/22 11:39:30 -- 73 -- 128/68 88 92 % -- -- -- --   12/28/22 1048 -- -- -- -- -- -- -- -- None (Room air) --   12/28/22 0930 -- 74 -- 127/68 -- -- -- -- -- --   12/28/22 08:08:30 -- 74 18 127/68 88 92 % -- -- -- --   12/28/22 03:34:50 -- 75 -- 142/92 109 93 % -- -- -- --   12/28/22 03:30:51 -- 68 -- -- -- 93 % -- -- -- --   12/27/22 22:12:54 -- 79 -- 144/81 102 95 % -- -- -- --   12/27/22 20:21:26 98 9 °F (37 2 °C) 89 -- 144/80 101 93 % -- -- -- --   12/27/22 20:19:06 98 9 °F (37 2 °C) -- -- 144/80 101 -- -- -- -- --   12/27/22 1357 -- -- -- -- -- -- -- -- None (Room air) --   12/27/22 1321 98 6 °F (37 °C) 82 18 166/79 112 94 % -- -- None (Room air) Sitting     Pertinent Labs/Diagnostic Test Results:   12/27 EKG:  Sinus rhythm with frequent Premature atrial complexes  Right bundle branch block  Left anterior fascicular block  * Bifascicular block *  Left ventricular hypertrophy with QRS widening  Abnormal ECG    12/28 ECHO PENDING    XR chest 1 view portable   Final Result by Goldie Abdalla MD (12/28 1861)      Bibasilar opacities and small pleural effusions are better demonstrated on recent CT  Additional patchy infiltrate in the left infrahilar region  No consolidation in the upper lung fields  Increased superior mediastinal paratracheal soft tissue, though this may just be vascular and technique  This may be reassessed on follow-up exam   Chest CT evaluation may also be considered  Workstation performed: [id number]         XR hip/pelv 2-3 vws right if performed   Final Result by Hair Luis MD (12/28 5601)      No acute osseous abnormality  Workstation performed: [id number]         CT abdomen pelvis with contrast   Final Result by Alex Cadena MD (12/27 3525)      1  There is a small left-sided pleural effusion, with left lower lobe airspace disease most concerning for pneumonia  2  There is a trace right effusion  There is a stable 0 8 cm right lower lobe nodule   3  Extensive colonic diverticulosis without evidence of acute diverticulitis or bowel obstruction   4   Stable left ovarian 3 2 cm cyst               Workstation performed: [id number]           Results from last 7 days   Lab Units 12/27/22  1346   SARS-COV-2  Negative     Results from last 7 days   Lab Units 12/28/22  0504 12/27/22  1346   WBC Thousand/uL 6 79 9 19   HEMOGLOBIN g/dL 12 6 14 9   HEMATOCRIT % 38 6 44 8   PLATELETS Thousands/uL 207 205   NEUTROS ABS Thousands/µL  --  7 42         Results from last 7 days   Lab Units 12/28/22  0504 12/27/22  1857 12/27/22  1346   SODIUM mmol/L 139  --  135   POTASSIUM mmol/L 4 1  --  4 6   CHLORIDE mmol/L 107  --  105   CO2 mmol/L 24  --  23   ANION GAP mmol/L 8  --  7   BUN mg/dL 23  --  21   CREATININE mg/dL 0 95  --  1 07   EGFR ml/min/1 73sq m 50  --  43   CALCIUM mg/dL 8 6  --  9 2   CALCIUM, IONIZED mmol/L  --  1 16  --    MAGNESIUM mg/dL 2 5  --  2 5   PHOSPHORUS mg/dL 2 9  --   --      Results from last 7 days   Lab Units 12/28/22  0504 12/27/22  1346   AST U/L 33 38   ALT U/L 17 19   ALK PHOS U/L 58 74   TOTAL PROTEIN g/dL 5 8* 7 3   ALBUMIN g/dL 2 6* 3 2*   TOTAL BILIRUBIN mg/dL 0 85 1 47*         Results from last 7 days   Lab Units 12/28/22  0504 12/27/22  1346   GLUCOSE RANDOM mg/dL 98 133     Results from last 7 days   Lab Units 12/27/22  1857   PH CHASE  7 417*   PCO2 CHASE mm Hg 34 2*   PO2 CHASE mm Hg 60 6*   HCO3 CHASE mmol/L 21 5*   BASE EXC CHASE mmol/L -2 1   O2 CONTENT CHASE ml/dL 20 4   O2 HGB, VENOUS % 89 7*     Results from last 7 days   Lab Units 12/27/22  1857 12/27/22  1346   HS TNI 0HR ng/L  --  6   HS TNI 2HR ng/L 8  --    HSTNI D2 ng/L 2  --      Results from last 7 days   Lab Units 12/28/22  0504   TSH 3RD GENERATON uIU/mL 2 430     Results from last 7 days   Lab Units 12/28/22  0504   PROCALCITONIN ng/ml 0 35*     Results from last 7 days   Lab Units 12/27/22  1346   LIPASE u/L 62*     Results from last 7 days   Lab Units 12/28/22  1027 12/27/22  1346   STREP PNEUMONIAE ANTIGEN, URINE  Negative  --    LEGIONELLA URINARY ANTIGEN  Negative  --    INFLUENZA A PCR   --  Negative   INFLUENZA B PCR   --  Negative   RSV PCR   --  Negative     Results from last 7 days   Lab Units 12/27/22  1849   BLOOD CULTURE  No Growth at 24 hrs  No Growth at 24 hrs       ED Treatment:   Medication Administration from 12/27/2022 1310 to 12/27/2022 1944       Date/Time Order Dose Route Action     12/27/2022 1348 EST acetaminophen (TYLENOL) tablet 650 mg 650 mg Oral Given     12/27/2022 1432 EST iohexol (OMNIPAQUE) 350 MG/ML injection (SINGLE-DOSE) 70 mL 70 mL Intravenous Given     12/27/2022 1626 EST ceftriaxone (ROCEPHIN) 1 g/50 mL in dextrose IVPB 1,000 mg Intravenous New Bag        Past Medical History:   Diagnosis Date   • Interstitial cystitis    • Leukopenia    • Neurologic gait dysfunction Abstraction Dr Paulette Montelongo charts   • Neuropathy    • Neutropenia [facility])     Abstraction Dr Leslye Mckenzie   • Osteoarthritis    • Osteoporosis    • Peripheral edema     Abstraction Dr Leslye Mckenzie   • Renal cyst    • Syncope     Abstraction Dr Paulette Montelongo charts     Present on Admission:  • CKD (chronic kidney disease) stage 3, GFR 30-59 ml/min ([facility])  • Ambulatory dysfunction  • Lower extremity edema      Admitting Diagnosis: Diverticulosis [K57 90]  Ovarian cyst [N83 209]  SVT (supraventricular tachycardia) ([facility]) [I47 1]  Pneumonia [J18 9]  Elevated blood pressure reading [R03 0]  Weakness [R53 1]  Pleural effusion [J90]  Failure to thrive in adult [R62 7]  Age/Sex: 80 y o  female  Admission Orders:  Scheduled Medications:  acetaminophen, 975 mg, Oral, Q8H [address]  cholecalciferol, 1,000 Units, Oral, Daily  docusate sodium, 100 mg, Oral, BID  enoxaparin, 30 mg, Subcutaneous, Daily  furosemide, 20 mg, Oral, After Lunch  furosemide, 40 mg, Oral, Daily  metoprolol succinate, 25 mg, Oral, Daily  senna, 1 tablet, Oral, HS      Continuous IV Infusions: none     PRN Meds:  oxyCODONE, 2 5 mg, Oral, Q6H PRN      scd    Network Utilization Review Department  ATTENTION: Please call with any questions or concerns to [phone] and carefully listen to the prompts so that you are directed to the right person  All voicemails are confidential   [facility] all requests for admission clinical reviews, approved or denied determinations and any other requests to dedicated fax number below belonging to the campus where the patient is receiving treatment   List of dedicated fax numbers for the Facilities:  [address] DENIALS (Administrative/Medical Necessity) [phone]   [address] [place] [address] (Maternity/NICU/Pediatrics) [phone]   [address] 6000 [facility] [address] [place] [address] Kenyon Wright [address] [facility] [address]15 [facility] [phone]

## 2022-12-28 NOTE — PROGRESS NOTES
INTERNAL MEDICINE PROGRESS NOTE     Name: Gio Pizarro   Age & Sex: 80 y o  female   MRN: [id number]  Unit/Bed#: 2 212-01   Encounter: [id number]  Team: SLIM    PATIENT INFORMATION     Name: Gio Pizarro   Age & Sex: 80 y o  female   MRN: [id number]  Hospital Stay Days: 1    ASSESSMENT/PLAN     Principal Problem:    PSVT (paroxysmal supraventricular tachycardia) ([address] )  Active Problems:    Ambulatory dysfunction    Lower extremity edema    CKD (chronic kidney disease) stage 3, GFR 30-59 ml/min (HCC)    Infiltrate noted on imaging study      Infiltrate noted on imaging study  Assessment & Plan  · CXR with bibasilar opacities and small pleural effusions   · No symptoms of PNA based on imaging  · Rocephin given in ER  · AM procal 0 35    CKD (chronic kidney disease) stage 3, GFR 30-59 ml/min [facility])  Assessment & Plan  Lab Results   Component Value Date    EGFR 50 12/28/2022    EGFR 43 12/27/2022    EGFR 33 07/08/2022    CREATININE 0 95 12/28/2022    CREATININE 1 07 12/27/2022    CREATININE 1 32 (H) 07/08/2022   · Estimated Creatinine Clearance: 31 7 mL/min (by C-G formula based on SCr of 0 95 mg/dL)  · Cr at b/l 1 0-1 4    Lower extremity edema  Assessment & Plan  · C/w Daily Lasix    Ambulatory dysfunction  Assessment & Plan  · Severe right hip pain  · Right hip XR negative  · Pain control w/ scheduled tylenol and oxy prn  · PT/OT eval pending    * PSVT (paroxysmal supraventricular tachycardia) ([facility])  Assessment & Plan  · Noted to have SVT in Er that self resolved  · Start Toprol daily  · Follow up TTE    SUBJECTIVE     Patient seen and examined  No acute events overnight  Patient reports hip/back pain is improved from yesterday, however, she hasn't walked since she came in and reports pain is usually exacerbated by walking  Denies chest pain, shortness of breath, palpitations       OBJECTIVE     Vitals:    12/28/22 0330 12/28/22 0334 12/28/22 0808 12/28/22 0930   BP:  142/92 127/68 127/68   Pulse: 68 75 74 74 Resp:   18    Temp:       TempSrc:       SpO2: 93% 93% 92%    Weight:    61 7 kg (136 lb)   Height:    5' 6" (1 676 m)      Temperature:   Temp (24hrs), Av 8 °F (37 1 °C), Min:98 6 °F (37 °C), Max:98 9 °F (37 2 °C)    Temperature: 98 9 °F (37 2 °C)  Intake & Output:  I/O        07 07 07 07 07 07    IV Piggyback  50     Total Intake  50     Net  +50                Weights:        Body mass index is 21 95 kg/m²  Weight (last 2 days)     Date/Time Weight    22 0930 61 7 (136)        Physical Exam  Vitals and nursing note reviewed  Constitutional:       General: She is not in acute distress  Appearance: She is well-developed  HENT:      Head: Normocephalic and atraumatic  Eyes:      Conjunctiva/sclera: Conjunctivae normal    Cardiovascular:      Rate and Rhythm: Normal rate and regular rhythm  Heart sounds: No murmur heard  Pulmonary:      Effort: Pulmonary effort is normal  No respiratory distress  Breath sounds: Normal breath sounds  Abdominal:      Palpations: Abdomen is soft  Tenderness: There is no abdominal tenderness  Musculoskeletal:         General: No swelling  Cervical back: Neck supple  Skin:     General: Skin is warm and dry  Capillary Refill: Capillary refill takes less than 2 seconds  Neurological:      Mental Status: She is alert and oriented to person, place, and time  Psychiatric:         Mood and Affect: Mood normal        LABORATORY DATA     Labs: I have personally reviewed pertinent reports    Results from last 7 days   Lab Units 22  0504 22  1346   WBC Thousand/uL 6 79 9 19   HEMOGLOBIN g/dL 12 6 14 9   HEMATOCRIT % 38 6 44 8   PLATELETS Thousands/uL 207 205   NEUTROS PCT %  --  81*   MONOS PCT %  --  10      Results from last 7 days   Lab Units 22  0504 22  1346   POTASSIUM mmol/L 4 1 4 6   CHLORIDE mmol/L 107 105   CO2 mmol/L 24 23   BUN mg/dL 23 21   CREATININE mg/dL 0 95 1 07   CALCIUM mg/dL 8 6 9 2   ALK PHOS U/L 58 74   ALT U/L 17 19   AST U/L 33 38     Results from last 7 days   Lab Units 12/28/22  0504 12/27/22  1346   MAGNESIUM mg/dL 2 5 2 5     Results from last 7 days   Lab Units 12/28/22  0504   PHOSPHORUS mg/dL 2 9                    IMAGING & DIAGNOSTIC TESTING     Radiology Results: I have personally reviewed pertinent reports  CT abdomen pelvis with contrast    Result Date: 12/27/2022  Impression: 1  There is a small left-sided pleural effusion, with left lower lobe airspace disease most concerning for pneumonia  2  There is a trace right effusion  There is a stable 0 8 cm right lower lobe nodule 3  Extensive colonic diverticulosis without evidence of acute diverticulitis or bowel obstruction 4  Stable left ovarian 3 2 cm cyst Workstation performed: [id number]     Other Diagnostic Testing: I have personally reviewed pertinent reports  ACTIVE MEDICATIONS     Current Facility-Administered Medications   Medication Dose Route Frequency   • acetaminophen (TYLENOL) tablet 975 mg  975 mg Oral Q8H [address]   • cholecalciferol (VITAMIN D3) tablet 1,000 Units  1,000 Units Oral Daily   • docusate sodium (COLACE) capsule 100 mg  100 mg Oral BID   • enoxaparin (LOVENOX) subcutaneous injection 30 mg  30 mg Subcutaneous Daily   • furosemide (LASIX) tablet 40 mg  40 mg Oral Daily   • metoprolol succinate (TOPROL-XL) 24 hr tablet 12 5 mg  12 5 mg Oral Daily   • oxyCODONE (ROXICODONE) IR tablet 2 5 mg  2 5 mg Oral Q6H PRN   • senna (SENOKOT) tablet 8 6 mg  1 tablet Oral HS       VTE Pharmacologic Prophylaxis: Enoxaparin (Lovenox)  VTE Mechanical Prophylaxis: sequential compression device    Portions of the record may have been created with voice recognition software  Occasional wrong word or "sound a like" substitutions may have occurred due to the inherent limitations of voice recognition software    Read the chart carefully and recognize, using context, where substitutions have occurred   ==  Jacob Kline MD  [address]  Internal Medicine Residency PGY-2

## 2022-12-28 NOTE — ASSESSMENT & PLAN NOTE
· CXR with bibasilar opacities and small pleural effusions   · No symptoms of PNA based on imaging  · Rocephin given in ER  · AM procal 0 35

## 2022-12-28 NOTE — PLAN OF CARE
Problem: PHYSICAL THERAPY ADULT  Goal: Performs mobility at highest level of function for planned discharge setting  See evaluation for individualized goals  Description: Treatment/Interventions: ADL retraining, Functional transfer training, LE strengthening/ROM, Patient/family training, Cognitive reorientation, Equipment eval/education, Bed mobility, Gait training, Spoke to nursing, Spoke to case management, OT  Equipment Recommended: Wheelchair, Nati Wolf       See flowsheet documentation for full assessment, interventions and recommendations  Note: Prognosis: Fair  Problem List: Decreased strength, Decreased endurance, Decreased range of motion, Impaired balance, Decreased mobility, Impaired judgement, Decreased safety awareness, Pain, Impaired hearing  Assessment: Pt seen for high complexity PT evaluation  Pt with active PT eval/treat orders  Pt is a 80 y o  female who was admitted to [facility] on 12/27/2022 with PSVT  X-ray negative for R hip fx  Pt's current dx/ problem list include: infiltrate, CKD, LE edema, ambulatory dysfunction  Comorbidities affecting pt's physical performance at time of assessment are as follows:  has a past medical history of Interstitial cystitis, Leukopenia, Neurologic gait dysfunction, Neuropathy, Neutropenia ([address] ), Osteoarthritis, Osteoporosis, Peripheral edema, Renal cyst, and Syncope  Personal factors affecting pt at time of initial examination include: steps to enter environment, advanced age, past experience, inability to perform IADLs, inability to perform ADLs, inability to ambulate household distances, limited insight into impairments    Due to acute medical issues, ongoing medical workup for primary dx; pain, fall risk, increased reliance on more restrictive AD compared to baseline;  decreased activity tolerance compared to baseline, increased assistance needed from caregiver at current time, continuous telemetry monitoring, multiple lines, decline in overall functional mobility status; health management issues; note unstable clinical picture (high complexity)  At baseline, pt resides with son in 1 [place] with 5 [address]ENT[address] and was independent using RW prior to hospital admission  Currently, upon initial examination, pt  is requiring mod A for bed mobility skills; mod A for functional transfers and mod A for ambulation with RW  Currently, pt presents functioning below baseline and with overall mobility deficits 2* to: decreased LE strength/AROM; limited flexibility;  generalized weakness/ deconditioning; decreased endurance; decreased activity tolerance; decreased coordination; impaired balance; gait deviations; decreased safety awareness; fatigue; impaired safety and judgement; limited insight into current deficits; bed/ chair alarms; multiple lines; hearing impairment as well as : weakness, impaired balance, decreased endurance, impaired coordination, gait deviations, pain, decreased activity tolerance, decreased functional mobility tolerance and fall risk  Pt currently at increased risk for falls  At the end of evaluation, pt was left sitting OOB in recliner with all needs in reach  Pt would benefit from continued skilled PT services while in hospital to address remaining limitations and maximize functional potential  PT to continue to follow pt and recommends STR pending progress  The patient's AM-PAC Basic Mobility Inpatient Short Form Raw Score is 13  A Raw score of less than or equal to 16 suggests the patient may benefit from discharge to post-acute rehabilitation services  Please also refer to the recommendation of the Physical Therapist for safe discharge planning  Barriers to Discharge: Inaccessible home environment     PT Discharge Recommendation: Post acute rehabilitation services    See flowsheet documentation for full assessment

## 2022-12-28 NOTE — CASE MANAGEMENT
Case Management Assessment    Patient name Clive Rowley  Location CW2 963/FC0 212-01 MRN [id number]  : 1925 Date 2022       Current Admission Date: 2022  Current Admission Diagnosis:PSVT (paroxysmal supraventricular tachycardia) [facility])   Patient Active Problem List    Diagnosis Date Noted   • PSVT (paroxysmal supraventricular tachycardia) ([facility] ) 2022   • Infiltrate noted on imaging study 2022   • Compression fracture of L3 lumbar vertebra, closed, initial encounter ([facility] ) 2020   • Fall 2020   • Age-related osteoporosis without current pathological fracture 2019   • Lower extremity edema 2019   • CKD (chronic kidney disease) stage 3, GFR 30-59 ml/min ([facility]) 2019   • Venous stasis dermatitis of both lower extremities 2019   • Ambulatory dysfunction 2018   • Neuropathy 2018      LOS (days): 1  Geometric Mean LOS (GMLOS) (days):   Days to GMLOS:     OBJECTIVE:    Risk of Unplanned Readmission Score: 8 88         Current admission status: Inpatient       Preferred Pharmacy:   [address] [place] [address]  Phone: [phone] Fax: [phone]    Primary Care Provider: KERON Lowry    Primary Insurance: [facility]  Secondary Insurance:     ASSESSMENT:  [facility], [facility] Representative - Son   Primary Phone: [phone] (Mobile)                              Patient Information  Admitted from[de-identified] Home  Mental Status: Alert  During Assessment patient was accompanied by: Son  Assessment information provided by[de-identified] Patient, Spouse  Primary Caregiver: Self  Support Systems: Son  Home entry access options   Select all that apply : Stairs  Number of steps to enter home : 5  Type of Current Residence: [place]  Homeless/housing insecurity resource given?: N/A  Living Arrangements: Lives w/ Son    Activities of Daily Living Prior to Admission  Functional Status: Independent  Completes ADLs independently?: Yes  Ambulates independently?: Yes  Does patient use assisted devices?: Yes  Assisted Devices (DME) used: Seng De  Does patient currently own DME?: Yes  What DME does the patient currently own?: Seng De, Bedside Commode, Straight Cane, Shower Chair  Does patient have a history of Outpatient Therapy (PT/OT)?: Yes  Does the patient have a history of Short-Term Rehab?: Yes  Does patient have a history of HH?: Yes  Does patient currently have [place] [address]?: No         Patient Information Continued  Income Source: Pension/care home  Food insecurity resource given?: N/A  Does patient receive dialysis treatments?: No  Does patient have a history of substance abuse?: No  Does patient have a history of Mental Health Diagnosis?: No         Means of Transportation  Means of Transport to [facility][de-identified] Family transport  Was application for public transport provided?: N/A

## 2022-12-28 NOTE — PHYSICAL THERAPY NOTE
Physical Therapy Evaluation     Patient's Name: Shawn Lincoln    Admitting Diagnosis  Diverticulosis [K57 90]  Ovarian cyst [N83 209]  SVT (supraventricular tachycardia) ([facility] [address] ) [I47 1]  Pneumonia [J18 9]  Elevated blood pressure reading [R03 0]  Weakness [R53 1]  Pleural effusion [J90]  Failure to thrive in adult [R62 7]    Problem List  Patient Active Problem List   Diagnosis    Neuropathy    Ambulatory dysfunction    Venous stasis dermatitis of both lower extremities    Age-related osteoporosis without current pathological fracture    Lower extremity edema    CKD (chronic kidney disease) stage 3, GFR 30-59 ml/min ([facility])    Compression fracture of L3 lumbar vertebra, closed, initial encounter ([facility] [address] )    Fall    PSVT (paroxysmal supraventricular tachycardia) ([facility] [address] )    Infiltrate noted on imaging study       Past Medical History  Past Medical History:   Diagnosis Date    Interstitial cystitis     Leukopenia     Neurologic gait dysfunction     Abstraction Dr Savilla Sacks charts    Neuropathy     Neutropenia [facility])     Abstraction Dr Jani Mercado     Osteoporosis     Peripheral edema     Abstraction Dr Negrita Castillo    Renal cyst     Syncope     Abstraction Dr Savilla Sacks charts       Past Surgical History  Past Surgical History:   Procedure Laterality Date    CAPSULOTOMY      Right eye    CATARACT EXTRACTION Left     CHALAZION EXCISION      COLONOSCOPY  2006 12/28/22 1202   PT Last Visit   PT Visit Date 12/28/22   Note Type   Note type Evaluation   Pain Assessment   Pain Assessment Tool 0-10   Pain Score 5   Pain Location/Orientation Location: Back   Hospital Pain Intervention(s) Repositioned; Ambulation/increased activity; Emotional support   Restrictions/Precautions   Weight Bearing Precautions Per Order No   Other Precautions Cognitive; Chair Alarm; Bed Alarm;Multiple lines; Fall Risk;Telemetry;Pain;Hard of hearing   Home Living   Type of [address] [place] [address] One level;Performs ADLs on one level;Able to live on main level with bedroom/bathroom  ([address])   Bathroom Shower/Tub Tub/shower unit   Bathroom Toilet Standard   Bathroom Equipment Grab bars in shower; Shower chair   [facility]  (was using PTA)   Prior Function   Level of [place] Independent with ADLs; Independent with functional mobility   Lives With Son   Get Foley in the last 6 months 0   Vocational Retired   General   Family/Caregiver Present No   Cognition   Arousal/Participation Alert   Orientation Level Oriented to person;Oriented to place  (correct year, reports month as February)   Memory Decreased recall of precautions   Following Commands Follows one step commands with increased time or repetition   Comments pt pleasant and cooperative, cues for safety required   RLE Assessment   RLE Assessment   (functionally 3+/5)   LLE Assessment   LLE Assessment   (functionally 3+/5)   Bed Mobility   Supine to Sit 3  Moderate assistance   Additional items Assist x 1; Increased time required;Verbal cues;LE management   Sit to Supine Unable to assess   Additional Comments pt supine in bed upon arrival  Pt initially requiring mod A to maintain EOB balance, however progresses to min A  Pt left sitting OOB in recliner with alarm on and all needs wihtin reach   Transfers   Sit to Stand 3  Moderate assistance   Additional items Assist x 1; Increased time required;Verbal cues   Stand to Sit 3  Moderate assistance   Additional items Assist x 1; Increased time required;Verbal cues   Additional Comments transfers with RW, cues for hand placement and sequencing   Ambulation/Elevation   Gait pattern Excessively slow; Short stride; Foward flexed;Decreased foot clearance; Improper Weight shift   Gait Assistance 3  Moderate assist   Additional items Assist x 1;Verbal cues; Tactile cues  (+ SBA of another for safety)   Assistive Device Rolling walker   Distance 3ft to bedside chair   Stair Management Assistance Not tested   Balance   Static Sitting Fair -   Dynamic Sitting Poor +   Static Standing Poor   Dynamic Standing Poor   Ambulatory Poor   Endurance Deficit   Endurance Deficit Yes   Activity Tolerance   Activity Tolerance Patient limited by fatigue   Medical Staff Made Aware OT; co-eval performed this date 2* increased medical complexity and multiple co-morbidities   Nurse Made Aware RN cleared pt to participate in therapy session   Assessment   Prognosis Fair   Problem List Decreased strength;Decreased endurance;Decreased range of motion; Impaired balance;Decreased mobility; Impaired judgement;Decreased safety awareness;Pain; Impaired hearing   Assessment Pt seen for high complexity PT evaluation  Pt with active PT eval/treat orders  Pt is a 80 y o  female who was admitted to [facility] on 12/27/2022 with PSVT  X-ray negative for R hip fx  Pt's current dx/ problem list include: infiltrate, CKD, LE edema, ambulatory dysfunction  Comorbidities affecting pt's physical performance at time of assessment are as follows:  has a past medical history of Interstitial cystitis, Leukopenia, Neurologic gait dysfunction, Neuropathy, Neutropenia ([address] ), Osteoarthritis, Osteoporosis, Peripheral edema, Renal cyst, and Syncope  Personal factors affecting pt at time of initial examination include: steps to enter environment, advanced age, past experience, inability to perform IADLs, inability to perform ADLs, inability to ambulate household distances, limited insight into impairments    Due to acute medical issues, ongoing medical workup for primary dx; pain, fall risk, increased reliance on more restrictive AD compared to baseline;  decreased activity tolerance compared to baseline, increased assistance needed from caregiver at current time, continuous telemetry monitoring, multiple lines, decline in overall functional mobility status; health management issues; note unstable clinical picture (high complexity)  At baseline, pt resides with son in 1  with [address] and was independent using RW prior to hospital admission  Currently, upon initial examination, pt  is requiring mod A for bed mobility skills; mod A for functional transfers and mod A for ambulation with RW  Currently, pt presents functioning below baseline and with overall mobility deficits 2* to: decreased LE strength/AROM; limited flexibility;  generalized weakness/ deconditioning; decreased endurance; decreased activity tolerance; decreased coordination; impaired balance; gait deviations; decreased safety awareness; fatigue; impaired safety and judgement; limited insight into current deficits; bed/ chair alarms; multiple lines; hearing impairment as well as : weakness, impaired balance, decreased endurance, impaired coordination, gait deviations, pain, decreased activity tolerance, decreased functional mobility tolerance and fall risk  Pt currently at increased risk for falls  At the end of evaluation, pt was left sitting OOB in recliner with all needs in reach  Pt would benefit from continued skilled PT services while in hospital to address remaining limitations and maximize functional potential  PT to continue to follow pt and recommends STR pending progress  The patient's AM-PAC Basic Mobility Inpatient Short Form Raw Score is 13  A Raw score of less than or equal to 16 suggests the patient may benefit from discharge to post-acute rehabilitation services  Please also refer to the recommendation of the Physical Therapist for safe discharge planning  Barriers to Discharge Inaccessible home environment   Goals   Patient Goals to brush her hair   STG Expiration Date 01/11/23   Short Term Goal #1 STG 1  Pt will be able to perform bed mobility tasks with S level in order to improve overall functional mobility and assist in safe d/c  STG 2   Pt with sit EOB for at least 25 minutes at S level in order to strengthen abdominal musculature and assist in future transfers/ ambulation  STG 3  Pt will be able to perform functional transfer with S level in order to improve overall functional mobility and assist in safe d/c  STG 4  Pt will be able to ambulate at least 250 feet with least restrictive device with S level A in order to improve overall functional mobility and assist in safe d/c  STG 5  Pt will improve sitting/standing static/dynamic balance 1/2 grade in order to improve functional mobility and assist in safe d/c  STG 6  Pt will improve LE strength by 1/2 grade in order to improve functional mobility and assist in safe d/c  STG 7  Pt will be able to negotiate at least 5 stairs with least restrictive device with S level A in order to improve overall functional mobility and assist in safe d/c    PT Treatment Day 0   Plan   Treatment/Interventions ADL retraining;Functional transfer training;LE strengthening/ROM; Patient/family training;Cognitive reorientation;Equipment eval/education; Bed mobility;Gait training;Spoke to nursing;Spoke to case management;OT   PT Frequency Other (Comment)  (2-4x/wk)   Recommendation   PT Discharge Recommendation Post acute rehabilitation services   Equipment Recommended Wheelchair;Walker   AM-PAC Basic Mobility Inpatient   Turning in Bed Without Bedrails 3   Lying on Back to Sitting on Edge of Flat Bed 2   Moving Bed to Chair 2   Standing Up From Chair 2   Walk in Room 2   Climb 3-5 Stairs 2   Basic Mobility Inpatient Raw Score 13   Basic Mobility Standardized Score 33 99   Highest Level Of Mobility   JH-HLM Goal 4: Move to chair/commode   JH-HLM Achieved 4: Move to chair/commode   Modified [place] Scale   Modified [place] Scale 4           Anel Arguello, PT DPT

## 2022-12-29 LAB
MRSA NOSE QL CULT: NORMAL
SARS-COV-2 RNA RESP QL NAA+PROBE: NEGATIVE

## 2022-12-29 RX ORDER — METOPROLOL SUCCINATE 25 MG/1
25 TABLET, EXTENDED RELEASE ORAL DAILY
Status: DISCONTINUED | OUTPATIENT
Start: 2022-12-29 | End: 2023-01-02 | Stop reason: HOSPADM

## 2022-12-29 RX ORDER — METOPROLOL SUCCINATE 50 MG/1
50 TABLET, EXTENDED RELEASE ORAL DAILY
Status: DISCONTINUED | OUTPATIENT
Start: 2022-12-29 | End: 2022-12-29

## 2022-12-29 RX ADMIN — Medication 1000 UNITS: at 09:07

## 2022-12-29 RX ADMIN — ACETAMINOPHEN 975 MG: 325 TABLET, FILM COATED ORAL at 20:27

## 2022-12-29 RX ADMIN — ENOXAPARIN SODIUM 30 MG: 30 INJECTION SUBCUTANEOUS at 09:07

## 2022-12-29 RX ADMIN — ACETAMINOPHEN 975 MG: 325 TABLET, FILM COATED ORAL at 03:52

## 2022-12-29 RX ADMIN — FUROSEMIDE 20 MG: 20 TABLET ORAL at 15:06

## 2022-12-29 RX ADMIN — FUROSEMIDE 40 MG: 40 TABLET ORAL at 09:07

## 2022-12-29 RX ADMIN — ACETAMINOPHEN 975 MG: 325 TABLET, FILM COATED ORAL at 15:06

## 2022-12-29 RX ADMIN — SENNOSIDES 8.6 MG: 8.6 TABLET, FILM COATED ORAL at 20:27

## 2022-12-29 RX ADMIN — DOCUSATE SODIUM 100 MG: 100 CAPSULE, LIQUID FILLED ORAL at 18:16

## 2022-12-29 RX ADMIN — METOPROLOL SUCCINATE 25 MG: 25 TABLET, FILM COATED, EXTENDED RELEASE ORAL at 09:07

## 2022-12-29 RX ADMIN — DOCUSATE SODIUM 100 MG: 100 CAPSULE, LIQUID FILLED ORAL at 09:08

## 2022-12-29 NOTE — PROGRESS NOTES
General Cardiology   Progress Note -  [facility]   Joan Yee 80 y o  female MRN: [id number]    Unit/Bed#: CW2 212-01 Encounter: [id number]    Assessment  1  PSVT / probable PAT   -Initial ECG demonstrated NSR with frequent PACs, RBBB/LAFB, and LVH with QRS widening  -Telemetry review 12/27-12/28; Predominate NSR w/PACs,frequent but brief self terminating episodes of PSVT - likely PAT  -Telemetry review 12/28 - 12/29; Predominate NSR w/PACs, no further episodes of PSVT overnight/this a m  -On metoprolol tartrate 25 mg BID   2  LLL infiltrate  3  Small left-sided pleural effusion/trace right-sided pleural effusion  -Asymptomatic   -On 2L of supplemental O2  -Procalcitonin level 0 35  -Afebrile/no significant leukocytosis  -Received IV antibiotics in the ED no further doses administered   -On furosemide 40 mg in the a m  and 20 mg in the p m  4  Preserved biventricular systolic function  -Appears compensated  -TTE 12/28/2022 - LVEF 65%, grade 1 DD, RV normal size/systolic function, LA/RA normal, trace MR, trace TR, IVC normal in size  5  Chronic lower extremity edema  -Stable   -On furosemide 40 mg in the a m  and 20 mg in the p m  6  CKD stage III   -Baseline creatinine around 0 8-1 1  -Creatinine 1 1 on admission, currently 0 95      Plan  -Significant improvement in arrhythmia burden over the past 12 hrs w/uptitration of BB therapy  Continue metoprolol, but will switch to succinate 25 mg daily today    -Continue furosemide 40 mg a/m and 20 mg pm   -Will arrange f/u w/Cardiology on DC       Subjective  Review of Systems   Constitutional: Negative for chills, fever and malaise/fatigue  Eyes: Negative for visual disturbance  Cardiovascular: Positive for leg swelling  Negative for chest pain, dyspnea on exertion, orthopnea and palpitations  Respiratory: Negative for cough and shortness of breath  Gastrointestinal: Negative for abdominal pain     Neurological: Negative for dizziness, headaches and light-headedness  Objective:   Physical Exam  Vitals and nursing note reviewed  Constitutional:       General: She is not in acute distress  Appearance: She is not diaphoretic  HENT:      Head: Normocephalic and atraumatic  Eyes:      General: No scleral icterus  Cardiovascular:      Rate and Rhythm: Normal rate and regular rhythm  Pulses: Normal pulses  Heart sounds: Normal heart sounds  Pulmonary:      Effort: Pulmonary effort is normal       Breath sounds: Normal breath sounds  No wheezing or rales  Abdominal:      Palpations: Abdomen is soft  Musculoskeletal:      Cervical back: Neck supple  Right lower leg: Edema present  Left lower leg: Edema present  Comments: Chronic mild B/l LE edema  R>L   Skin:     General: Skin is warm and dry  Capillary Refill: Capillary refill takes less than 2 seconds  Neurological:      General: No focal deficit present  Mental Status: She is alert and oriented to person, place, and time  Psychiatric:         Mood and Affect: Mood normal          Vitals: Blood pressure 139/76, pulse (!) 53, temperature 98 4 °F (36 9 °C), temperature source Oral, resp  rate 18, height 5' 6" (1 676 m), weight 61 7 kg (136 lb), SpO2 90 %  ,     Body mass index is 21 95 kg/m²  ,   Systolic (61AJX), TCT:603 , Min:127 , HQJ:606     Diastolic (41XNY), DKJ:76, Min:68, Max:94      Intake/Output Summary (Last 24 hours) at 12/29/2022 0815  Last data filed at 12/28/2022 2201  Gross per 24 hour   Intake 810 ml   Output 800 ml   Net 10 ml     Weight (last 2 days)     Date/Time Weight    12/28/22 0930 61 7 (136)          LABORATORY RESULTS      CBC with diff:   Results from last 7 days   Lab Units 12/28/22  0504 12/27/22  1346   WBC Thousand/uL 6 79 9 19   HEMOGLOBIN g/dL 12 6 14 9   HEMATOCRIT % 38 6 44 8   MCV fL 99* 100*   PLATELETS Thousands/uL 207 205   MCH pg 32 2 33 2   MCHC g/dL 32 6 33 3   RDW % 13 0 12 5   MPV fL 9 7 9 3   NRBC AUTO /100 WBCs  -- 0       CMP:  Results from last 7 days   Lab Units 22  0504 22  1346   POTASSIUM mmol/L 4 1 4 6   CHLORIDE mmol/L 107 105   CO2 mmol/L 24 23   BUN mg/dL 23 21   CREATININE mg/dL 0 95 1 07   CALCIUM mg/dL 8 6 9 2   AST U/L 33 38   ALT U/L 17 19   ALK PHOS U/L 58 74   EGFR ml/min/1 73sq m 50 43       BMP:  Results from last 7 days   Lab Units 22  0504 22  1346   POTASSIUM mmol/L 4 1 4 6   CHLORIDE mmol/L 107 105   CO2 mmol/L 24 23   BUN mg/dL 23 21   CREATININE mg/dL 0 95 1 07   CALCIUM mg/dL 8 6 9 2       No results found for: NTBNP     Results from last 7 days   Lab Units 22  0504 22  1346   MAGNESIUM mg/dL 2 5 2 5             Results from last 7 days   Lab Units 22  0504   TSH 3RD GENERATON uIU/mL 2 430             Lipid Profile:   No results found for: CHOL  Lab Results   Component Value Date    HDL 56 2020    HDL 66 (H) 2019     Lab Results   Component Value Date    LDLCALC 116 (H) 2020    LDLCALC 116 (H) 2019     Lab Results   Component Value Date    TRIG 87 2020    TRIG 54 2019       Cardiac testing:   Results for orders placed during the hospital encounter of 19    Echo complete with contrast if indicated    Narrative  [address]  [address] [facility], [address] [place] [address]  [phone]    Transthoracic Echocardiogram  2D, M-mode, Doppler, and Color Doppler    Study date:  2019    Patient: Bessy Munguia  MR number: [id number]  Account number: [de-identified]  : 16-May-1925  Age: 80 years  Gender: Female  Status: Outpatient  Location: Echo lab  Height: 66 in  Weight: 144 lb  BP: 142/ 72 mmHg    Indications: Edema      Diagnoses: R60 9 - Edema, unspecified    Sonographer:  Emily Tena RDCS  Referring Physician:  KERON Esparza  Group:  [facility]  Interpreting Physician:  Brissa Roque MD    SUMMARY    LEFT VENTRICLE:  Systolic function was normal  Ejection fraction was estimated to be 63 %  There were no regional wall motion abnormalities  Doppler parameters were consistent with abnormal left ventricular relaxation (grade 1 diastolic dysfunction)  MITRAL VALVE:  There was trace regurgitation  TRICUSPID VALVE:  There was trace regurgitation  HISTORY: PRIOR HISTORY: Hypertension, Hyperlipidemia, Chronic kidney disease  PROCEDURE: The procedure was performed in the echo lab  This was a routine study  The transthoracic approach was used  The study included complete 2D imaging, M-mode, complete spectral Doppler, and color Doppler  The heart rate was 60 bpm,  at the start of the study  Images were obtained from the parasternal, apical, subcostal, and suprasternal notch acoustic windows  Image quality was adequate  LEFT VENTRICLE: Size was normal  Systolic function was normal  Ejection fraction was estimated to be 63 %  There were no regional wall motion abnormalities  Wall thickness was normal  There was no evidence of concentric hypertrophy  DOPPLER: Doppler parameters were consistent with abnormal left ventricular relaxation (grade 1 diastolic dysfunction)  RIGHT VENTRICLE: The size was normal  Systolic function was normal  Wall thickness was normal     LEFT ATRIUM: Size was normal     RIGHT ATRIUM: The atrium was small  MITRAL VALVE: Valve structure was normal  There was normal leaflet separation  DOPPLER: The transmitral velocity was within the normal range  There was no evidence for stenosis  There was trace regurgitation  AORTIC VALVE: The valve was trileaflet  Leaflets exhibited normal thickness and normal cuspal separation  DOPPLER: Transaortic velocity was within the normal range  There was no evidence for stenosis  There was no regurgitation  TRICUSPID VALVE: The valve structure was normal  There was normal leaflet separation  DOPPLER: The transtricuspid velocity was within the normal range  There was no evidence for stenosis   There was trace regurgitation  Pulmonary artery  systolic pressure was within the normal range  PULMONIC VALVE: Leaflets exhibited normal thickness, no calcification, and normal cuspal separation  DOPPLER: The transpulmonic velocity was within the normal range  There was no regurgitation  PERICARDIUM: There was no pericardial effusion  The pericardium was normal in appearance  AORTA: The root exhibited normal size  SYSTEM MEASUREMENT TABLES    2D  %FS: 26 6 %  Ao Diam: 3 35 cm  EDV(Teich): 73 44 ml  EF(Cube): 60 46 %  EF(Teich): 52 5 %  ESV(Cube): 26 89 ml  ESV(Teich): 34 88 ml  IVSd: 0 81 cm  LA Area: 18 45 cm2  LA Diam: 3 14 cm  LVEDV MOD A4C: 52 5 ml  LVEF MOD A4C: 62 58 %  LVESV MOD A4C: 19 64 ml  LVIDd: 4 08 cm  LVIDs: 3 cm  LVLd A4C: 6 45 cm  LVLs A4C: 5 38 cm  LVPWd: 0 98 cm  RA Area: 12 29 cm2  SV MOD A4C: 32 86 ml  SV(Cube): 41 11 ml  SV(Teich): 38 56 ml  rv diam: 2 83 cm    CW  TR Vmax: 2 39 m/s  TR maxP 85 mmHg    MM  TAPSE: 1 54 cm    PW  E': 0 03 m/s  E/E': 16 12  MV A [place]: 1 01 m/s  MV Dec [place]: 1 96 m/s2  MV DecT: 269 63 ms  MV E [place]: 0 53 m/s  MV E/A Ratio: 0 52    [facility]    Prepared and electronically signed by    Abeba Gagnon MD  Signed 2019 15:52:54    No results found for this or any previous visit  No results found for this or any previous visit  No valid procedures specified  No results found for this or any previous visit        Meds/Allergies   all current active meds have been reviewed and current meds:   Current Facility-Administered Medications   Medication Dose Route Frequency   • acetaminophen (TYLENOL) tablet 975 mg  975 mg Oral Q8H [address]   • cholecalciferol (VITAMIN D3) tablet 1,000 Units  1,000 Units Oral Daily   • docusate sodium (COLACE) capsule 100 mg  100 mg Oral BID   • enoxaparin (LOVENOX) subcutaneous injection 30 mg  30 mg Subcutaneous Daily   • furosemide (LASIX) tablet 20 mg  20 mg Oral After Lunch   • furosemide (LASIX) tablet 40 mg  40 mg Oral Daily   • metoprolol tartrate (LOPRESSOR) tablet 25 mg  25 mg Oral Q12H [address]   • oxyCODONE (ROXICODONE) IR tablet 2 5 mg  2 5 mg Oral Q6H PRN   • senna (SENOKOT) tablet 8 6 mg  1 tablet Oral HS              Assessment:  Principal Problem:    PSVT (paroxysmal supraventricular tachycardia) ([facility])  Active Problems:    Ambulatory dysfunction    Lower extremity edema    CKD (chronic kidney disease) stage 3, GFR 30-59 ml/min ([facility])    Infiltrate noted on imaging study    Counseling / Coordination of Care  Total floor / unit time spent today 20 minutes  Greater than 50% of total time was spent with the patient and / or family counseling and / or coordination of care  ** Please Note: Dragon 360 Dictation voice to text software may have been used in the creation of this document   **

## 2022-12-29 NOTE — PROGRESS NOTES
[facility] [facility]  Progress Note - Kira Hayes 5/16/1925, 80 y o  female MRN: [id number]  Unit/Bed#: 2 212-01 Encounter: [id number]  Primary Care Provider: Mac Phalen, CRNP   Date and time admitted to hospital: 12/27/2022  1:10 PM    * Ambulatory dysfunction  Assessment & Plan  · Severe right hip pain  · Right hip XR negative  · Pain control w/ scheduled tylenol and oxy prn  · PT/OT eval recommending post acute rehab  · Pending placement    PSVT (paroxysmal supraventricular tachycardia) ([facility] [address] )  Assessment & Plan  · Noted to have SVT in Er that self resolved  · Cardiology consulted as patient having irregular rhythm in tele  · Increased Toprol to 25mg daily  · No repeated episodes on telemetry    Lower extremity edema  Assessment & Plan  · C/w Daily Lasix    Infiltrate noted on imaging study  Assessment & Plan  · CXR with bibasilar opacities and small pleural effusions   · No symptoms of PNA based on imaging  · Rocephin given in ER  · AM procal 0 35    CKD (chronic kidney disease) stage 3, GFR 30-59 ml/min [facility])  Assessment & Plan  Lab Results   Component Value Date    EGFR 50 12/28/2022    EGFR 43 12/27/2022    EGFR 33 07/08/2022    CREATININE 0 95 12/28/2022    CREATININE 1 07 12/27/2022    CREATININE 1 32 (H) 07/08/2022   · Estimated Creatinine Clearance: 31 7 mL/min (by C-G formula based on SCr of 0 95 mg/dL)  · Cr at b/l 1 0-1 4      VTE Pharmacologic Prophylaxis: VTE Score: 3 Moderate Risk (Score 3-4) - Pharmacological DVT Prophylaxis Ordered: enoxaparin (Lovenox)  Patient Centered Rounds: I performed bedside rounds with nursing staff today  Education and Discussions with Family / Patient: Updated  (son) via phone  Time Spent for Care: 30 minutes  More than 50% of total time spent on counseling and coordination of care as described above      Current Length of Stay: 2 day(s)  Current Patient Status: Inpatient   Certification Statement: The patient will continue to require additional inpatient hospital stay due to pending placement for post-acute rehab  Discharge Plan: Anticipate discharge in 24-48 hrs to rehab facility  Code Status: Level 3 - DNAR and DNI    Subjective:   Patient reports doing well with no complaints  Reports no hip pain as she hasnt walked today  Patient reports that wasn't able to walk with PT yesterday  Denies chest pain, SOB or palpitations  Objective:     Vitals:   Temp (24hrs), Av 4 °F (36 9 °C), Min:98 4 °F (36 9 °C), Max:98 4 °F (36 9 °C)    Temp:  [98 4 °F (36 9 °C)] 98 4 °F (36 9 °C)  HR:  [44-88] 53  BP: (128-162)/(68-94) 139/76  SpO2:  [86 %-92 %] 90 %  Body mass index is 21 95 kg/m²  Input and Output Summary (last 24 hours): Intake/Output Summary (Last 24 hours) at 2022 1108  Last data filed at 2022 0901  Gross per 24 hour   Intake 1050 ml   Output 500 ml   Net 550 ml       Physical Exam:   Physical Exam  Vitals and nursing note reviewed  Constitutional:       General: She is not in acute distress  Appearance: She is well-developed  HENT:      Head: Normocephalic and atraumatic  Eyes:      Conjunctiva/sclera: Conjunctivae normal    Cardiovascular:      Rate and Rhythm: Normal rate and regular rhythm  Heart sounds: No murmur heard  Pulmonary:      Effort: Pulmonary effort is normal  No respiratory distress  Breath sounds: Normal breath sounds  Abdominal:      Palpations: Abdomen is soft  Tenderness: There is no abdominal tenderness  Musculoskeletal:         General: No swelling  Cervical back: Neck supple  Right lower leg: No edema  Left lower leg: No edema  Skin:     General: Skin is warm and dry  Capillary Refill: Capillary refill takes less than 2 seconds  Neurological:      Mental Status: She is alert  Mental status is at baseline     Psychiatric:         Mood and Affect: Mood normal        Additional Data:     Labs:  Results from last 7 days   Lab Units 12/28/22  0504 12/27/22  1346   WBC Thousand/uL 6 79 9 19   HEMOGLOBIN g/dL 12 6 14 9   HEMATOCRIT % 38 6 44 8   PLATELETS Thousands/uL 207 205   NEUTROS PCT %  --  81*   LYMPHS PCT %  --  8*   MONOS PCT %  --  10   EOS PCT %  --  0     Results from last 7 days   Lab Units 12/28/22  0504   SODIUM mmol/L 139   POTASSIUM mmol/L 4 1   CHLORIDE mmol/L 107   CO2 mmol/L 24   BUN mg/dL 23   CREATININE mg/dL 0 95   ANION GAP mmol/L 8   CALCIUM mg/dL 8 6   ALBUMIN g/dL 2 6*   TOTAL BILIRUBIN mg/dL 0 85   ALK PHOS U/L 58   ALT U/L 17   AST U/L 33   GLUCOSE RANDOM mg/dL 98                 Results from last 7 days   Lab Units 12/28/22  0504   PROCALCITONIN ng/ml 0 35*       Lines/Drains:  Invasive Devices     Peripheral Intravenous Line  Duration           Peripheral IV 12/27/22 Proximal;Right;Ventral (anterior) Forearm 1 day          Drain  Duration           External Urinary Catheter 1 day                  Recent Cultures (last 7 days):   Results from last 7 days   Lab Units 12/28/22  1027 12/27/22  1849   BLOOD CULTURE   --  No Growth at 24 hrs  No Growth at 24 hrs     LEGIONELLA URINARY ANTIGEN  Negative  --        Last 24 Hours Medication List:   Current Facility-Administered Medications   Medication Dose Route Frequency Provider Last Rate   • acetaminophen  975 mg Oral [facility] Evangelina Engadine, DO     • cholecalciferol  1,000 Units Oral Daily Evangelina Engadine, DO     • docusate sodium  100 mg Oral BID Evangelina Engadine, DO     • enoxaparin  30 mg Subcutaneous Daily Evangelina Engadine, DO     • furosemide  20 mg Oral After Lunch Greg Rough Spironello, CRNP     • furosemide  40 mg Oral Daily Evangelina Engadine, DO     • metoprolol succinate  25 mg Oral Daily Michele Beer, CRNP     • oxyCODONE  2 5 mg Oral Q6H PRN Evangelina Engadine, DO     • senna  1 tablet Oral HS Evangelina Engadine, DO          Today, Patient Was Seen By: Delfino Kline MD    **Please Note: This note may have been constructed using a voice recognition system  **

## 2022-12-29 NOTE — CASE MANAGEMENT
Case Management Discharge Planning Note    Patient name Mirela Verma  Location CW2 453/QL9 212-01 MRN [id number]  : 1925 Date 2022       Current Admission Date: 2022  Current Admission Diagnosis:Ambulatory dysfunction   Patient Active Problem List    Diagnosis Date Noted   • PS[facility]) [facility] 2022   • Infiltrate noted on imaging study 2022   • Compression fracture of L3 lumbar vertebra, closed, initial encounter ([facility] [address] ) 2020   • Fall 2020   • Age-related osteoporosis without current pathological fracture 2019   • Lower extremity edema 2019   • CKD (chronic kidney disease) stage 3, GFR 30-59 ml/min ([facility]) 2019   • Venous stasis dermatitis of both lower extremities 2019   • Ambulatory dysfunction 2018   • Neuropathy 2018      LOS (days): 2  Geometric Mean LOS (GMLOS) (days):   Days to GMLOS:     OBJECTIVE:  Risk of Unplanned Readmission Score: 9 13         Current admission status: Inpatient   Preferred Pharmacy:   [address] [place] [address]  Phone: [phone] Fax: [phone]    Primary Care Provider: KERON Allan    Primary Insurance: [facility]  Secondary Insurance:     DISCHARGE DETAILS:                  Additional Comments: Patient ready for discharge, spoke with son/patient at bedside about STR, son agreeable  [facility] referral placed, reviewed list of accepting facilities  [facility] accepted, however son declined; wanted [facility] as first choice (facility declined), awaiting possible acceptance from [facility] (son's second choice)  CM will continue to follow for rehab placement

## 2022-12-29 NOTE — PLAN OF CARE
Problem: OCCUPATIONAL THERAPY ADULT  Goal: Performs self-care activities at highest level of function for planned discharge setting  See evaluation for individualized goals  Description: Treatment Interventions: ADL retraining, Functional transfer training, UE strengthening/ROM, Endurance training, Patient/family training, Cognitive reorientation, Compensatory technique education, Activityengagement, Energy conservation, Continued evaluation          See flowsheet documentation for full assessment, interventions and recommendations  Outcome: Progressing  Note: Limitation: Decreased ADL status, Decreased Safe judgement during ADL, Decreased UE strength, Decreased endurance, Decreased self-care trans, Decreased high-level ADLs  Prognosis: Good  Assessment: Pt was seen on 12/29/2022 for skilled OT session  OT session was focused on ADLs, functional mobility, functional transfers, static/dynamic balance, safety awareness, education, increased activity tolerance, and energy conservation  Pt was agreeable to OT session  Pt was able to demonstrate supine <> sit with mod Ax1 and sit <> supine min Ax1  Pt was able to sit EOB for ~ 10 mins to complete ADLs  Pt required min A occasionally for balance at EOB  Pt was able to complete grooming tasks with min A and LB dressing with max A  Pt was able to transfer with RW with mod Ax1 to complete hygiene care and change sheets  Pt required VC'ing to address right sided leaning  Pt is limited 2* endurance, activity tolerance, functional mobility, balance, trunk control, functional standing tolerance, decreased I w/ ADLS/IADLS, cognitive impairments and decreased safety awareness  The following Occupational Performance Areas to address include: grooming, bathing/shower, toilet hygiene, dressing, socialization, health maintenance and functional mobility  Pt will continue to benefit from skilled OT services 2-4x a wk to address functional goals   The patient's raw score on the AM-PAC Daily Activity inpatient short form is 15, standardized score is 34 69, less than 39 4  Patients at this level are likely to benefit from discharge to post-acute rehabilitation services  Please refer to the recommendation of the Occupational Therapist for safe discharge planning  OT recommendations for d/c include post acute rehab       OT Discharge Recommendation: Post acute rehabilitation services

## 2022-12-29 NOTE — OCCUPATIONAL THERAPY NOTE
Occupational Therapy Progress Note     Patient Name: Gio Pizarro  EACLOVISC'P Date: 12/29/2022  Problem List  Principal Problem:    PSVT (paroxysmal supraventricular tachycardia) ([address] )  Active Problems:    Ambulatory dysfunction    Lower extremity edema    CKD (chronic kidney disease) stage 3, GFR 30-59 ml/min (HCC)    Infiltrate noted on imaging study            12/29/22 0846   OT Last Visit   OT Visit Date 12/29/22   Note Type   Note Type Treatment   Pain Assessment   Pain Assessment Tool 0-10   Pain Score No Pain   Restrictions/Precautions   Weight Bearing Precautions Per Order No   Other Precautions Bed Alarm; Fall Risk;Cognitive;Hard of hearing;Telemetry   Lifestyle   Autonomy I with ADLS and funcitonal mobility at RW level, requires A for IADLS, Does not drive   Reciprocal Relationships supportive son who she lives with   Service to Others retired   [address] enjoys doing puzzles   ADL   Where Assessed Edge of bed   Eating Assistance 5  Supervision/Setup   Eating Deficit Setup; Increased time to complete   Eating Comments Upon arrival, pt was sitting up in bed eating on her own  Grooming Assistance 4  Minimal Assistance   Grooming Deficit Setup; Increased time to complete;Wash/dry hands; Wash/dry face; Teeth care;Denture care   LB Dressing Assistance 2  Maximal Assistance   LB Dressing Deficit Don/doff R sock; Don/doff L sock   LB Dressing Comments Pt attempted to put on socks, but 2* hip pain required max A to don socks  Toileting Assistance  2  Maximal Assistance   Toileting Deficit Perineal hygiene   Toileting Comments Pt required max A for hygiene  Functional Standing Tolerance   Time ~ 1 min   Activity static standing   Comments Pt was able to tolerate ~ 1 min of static standing with RW while PCA changed sheets and for hygiene care  Pt was mod Ax1 while standing, 2* leaning to R side  Bed Mobility   Supine to Sit 3  Moderate assistance   Additional items Assist x 1;HOB elevated; Increased time required;LE management;Verbal cues   Sit to Supine 4  Minimal assistance   Additional items Assist x 1; Increased time required;LE management   Additional Comments Upon arrival pt was supine in bed  Pt was able to tolerate sitting EOB ~ 10 mins  At times pt require min A for balance while sitting EOB  Pt was able to complete ADLs at the EOB  At the end of the session, pt was left sitting upright in bed with all necessary items within reach and bed alarm on  Transfers   Sit to Stand 3  Moderate assistance   Additional items Assist x 1; Increased time required;Verbal cues   Stand to Sit 3  Moderate assistance   Additional items Assist x 1; Increased time required;Verbal cues   Additional Comments Transfers with RW  Subjective   Subjective "I feel better, but lousy"   Cognition   Overall Cognitive Status [facility]   Arousal/Participation Alert; Responsive; Cooperative   Attention Attends with cues to redirect   Orientation Level Oriented to person;Oriented to place;Oriented to situation;Disoriented to time  (Pt stated it was February)   Memory Decreased recall of recent events   Following Commands Follows one step commands with increased time or repetition   Comments Pt was pleasant and cooperative t/o session  Activity Tolerance   Activity Tolerance Patient tolerated treatment well;Patient limited by fatigue   Medical Staff Made Aware RN made aware   Assessment   Assessment Pt was seen on 12/29/2022 for skilled OT session  OT session was focused on ADLs, functional mobility, functional transfers, static/dynamic balance, safety awareness, education, increased activity tolerance, and energy conservation  Pt was agreeable to OT session  Pt was able to demonstrate supine <> sit with mod Ax1 and sit <> supine min Ax1  Pt was able to sit EOB for ~ 10 mins to complete ADLs  Pt required min A occasionally for balance at EOB  Pt was able to complete grooming tasks with min A and LB dressing with max A   Pt was able to transfer with RW with mod Ax1 to complete hygiene care and change sheets  Pt required VC'ing to address right sided leaning  Pt is limited 2* endurance, activity tolerance, functional mobility, balance, trunk control, functional standing tolerance, decreased I w/ ADLS/IADLS, cognitive impairments and decreased safety awareness  The following Occupational Performance Areas to address include: grooming, bathing/shower, toilet hygiene, dressing, socialization, health maintenance and functional mobility  Pt will continue to benefit from skilled OT services 2-4x a wk to address functional goals  The patient's raw score on the AM-PAC Daily Activity inpatient short form is 15, standardized score is 34 69, less than 39 4  Patients at this level are likely to benefit from discharge to post-acute rehabilitation services  Please refer to the recommendation of the Occupational Therapist for safe discharge planning  OT recommendations for d/c include post acute rehab  Plan   Treatment Interventions ADL retraining;Functional transfer training; Endurance training;Cognitive reorientation;Patient/family training;Equipment evaluation/education; Compensatory technique education;Continued evaluation; Energy conservation; Activityengagement   Goal Expiration Date 01/11/23   OT Treatment Day 1   OT Frequency Other (comment)  (2-4x)   Recommendation   OT Discharge Recommendation Post acute rehabilitation services   [facility] Daily Activity Inpatient   Lower Body Dressing 2   Bathing 2   Toileting 2   Upper Body Dressing 2   Grooming 3   Eating 4   Daily Activity Raw Score 15   Daily Activity Standardized Score (Calc for Raw Score >=11) 34 69   AM-[facility] Applied Cognition Inpatient   Following a Speech/Presentation 3   Understanding Ordinary Conversation 4   Taking Medications 3   Remembering Where Things Are Placed or Put Away 3   Remembering List of 4-5 Errands 2   Taking Care of Complicated Tasks 2   Applied Cognition Raw Score 17   Applied Cognition Standardized Score 36 52   Modified Lumpkin Scale   Modified [place]umpkin Scale 4   End of Consult   Education Provided Yes   Patient Position at End of Consult Supine;Bed/Chair alarm activated; All needs within reach   Nurse Communication Nurse aware of consult   End of Consult Comments Pt was supine in bed with all necessary items within reach and bed alarm on at the end of session         PARAS Mcghee/STEPHEN

## 2022-12-30 ENCOUNTER — APPOINTMENT (INPATIENT)
Dept: RADIOLOGY | Facility: HOSPITAL | Age: 87
End: 2022-12-30

## 2022-12-30 RX ORDER — POLYETHYLENE GLYCOL 3350 17 G/17G
17 POWDER, FOR SOLUTION ORAL DAILY
Status: DISCONTINUED | OUTPATIENT
Start: 2022-12-30 | End: 2022-12-31

## 2022-12-30 RX ORDER — AMOXICILLIN 250 MG
1 CAPSULE ORAL 2 TIMES DAILY
Status: DISCONTINUED | OUTPATIENT
Start: 2022-12-30 | End: 2022-12-31

## 2022-12-30 RX ADMIN — DOCUSATE SODIUM 100 MG: 100 CAPSULE, LIQUID FILLED ORAL at 10:37

## 2022-12-30 RX ADMIN — SENNOSIDES AND DOCUSATE SODIUM 1 TABLET: 8.6; 5 TABLET ORAL at 16:21

## 2022-12-30 RX ADMIN — ACETAMINOPHEN 975 MG: 325 TABLET, FILM COATED ORAL at 06:09

## 2022-12-30 RX ADMIN — SENNOSIDES AND DOCUSATE SODIUM 1 TABLET: 8.6; 5 TABLET ORAL at 12:00

## 2022-12-30 RX ADMIN — METOPROLOL SUCCINATE 25 MG: 25 TABLET, FILM COATED, EXTENDED RELEASE ORAL at 10:37

## 2022-12-30 RX ADMIN — FUROSEMIDE 20 MG: 20 TABLET ORAL at 16:21

## 2022-12-30 RX ADMIN — POLYETHYLENE GLYCOL 3350 17 G: 17 POWDER, FOR SOLUTION ORAL at 10:38

## 2022-12-30 RX ADMIN — FUROSEMIDE 40 MG: 40 TABLET ORAL at 10:37

## 2022-12-30 RX ADMIN — ENOXAPARIN SODIUM 30 MG: 30 INJECTION SUBCUTANEOUS at 10:37

## 2022-12-30 RX ADMIN — ACETAMINOPHEN 975 MG: 325 TABLET, FILM COATED ORAL at 16:21

## 2022-12-30 RX ADMIN — Medication 1000 UNITS: at 10:39

## 2022-12-30 NOTE — CASE MANAGEMENT
Case Management Discharge Planning Note    Patient name Cristian Stark  Location CW2 876/VH6 212-01 MRN [id number]  : 1925 Date 2022       Current Admission Date: 2022  Current Admission Diagnosis:Ambulatory dysfunction   Patient Active Problem List    Diagnosis Date Noted   • PSVT (paroxysmal supraventricular tachycardia) ([facility] ) 2022   • Infiltrate noted on imaging study 2022   • Compression fracture of L3 lumbar vertebra, closed, [facility] ) 2020   • Fall 2020   • Age-related osteoporosis without current pathological fracture 2019   • Lower extremity edema 2019   • CKD (chronic kidney disease) stage 3, GFR 30-59 ml/min ([facility]) 2019   • Venous stasis dermatitis of both lower extremities 2019   • Ambulatory dysfunction 2018   • Neuropathy 2018      LOS (days): 3  Geometric Mean LOS (GMLOS) (days): 2 30  Days to GMLOS:-0 7     OBJECTIVE:  Risk of Unplanned Readmission Score: 9 01         Current admission status: Inpatient   Preferred Pharmacy:   [address] [place] [address], [address] [place] [address]  [address] [address]  Phone: [phone] Fax: [phone]    Primary Care Provider: KERON Padron    Primary Insurance: [facility]  Secondary Insurance:     DISCHARGE DETAILS:                                          Other Referral/Resources/Interventions Provided:  Referral Comments: Pt not accepted at [facility]  CM called pt's son, Ronak Bello, reviewing available rehab placement options of [address], [address]  CM also explained [facility] was still reviewing, but is requesting a financial application be completed, which pt's son declined to do  Pt's son confirmed that  [address] is the preferred facility  [facility] and will follow for bed availability

## 2022-12-30 NOTE — PROGRESS NOTES
[facility] [facility]  Progress Note - Jasson Needle 5/16/1925, 80 y o  female MRN: [id number]  Unit/Bed#: 2 212-01 Encounter: [id number]  Primary Care Provider: Cyrus Fernandez   Date and time admitted to hospital: 12/27/2022  1:10 PM    * Ambulatory dysfunction  Assessment & Plan  · Right hip pain with walking  · Right hip XR negative  · Pain control w/ scheduled tylenol and oxy prn  · PT/OT eval recommending rehab  · Pending placement    Infiltrate noted on imaging study  Assessment & Plan  · CXR with bibasilar opacities and small pleural effusions   · No symptoms of PNA based on imaging  · Rocephin given in ER  · procal 0 35  · Afebrile without leukocytosis  · Continue to observe off antibiotic    PSVT (paroxysmal supraventricular tachycardia) ([facility] [address] )  Assessment & Plan  · Noted to have SVT in ED that self resolved  · Cardiology consulted as patient having irregular rhythm in tele  · Started on beta-blocker  · No repeated episodes on telemetry    CKD (chronic kidney disease) stage 3, GFR 30-59 ml/min [facility])  Assessment & Plan  Lab Results   Component Value Date    EGFR 50 12/28/2022    EGFR 43 12/27/2022    EGFR 33 07/08/2022    CREATININE 0 95 12/28/2022    CREATININE 1 07 12/27/2022    CREATININE 1 32 (H) 07/08/2022   · Estimated Creatinine Clearance: 31 7 mL/min (by C-G formula based on SCr of 0 95 mg/dL)  · Cr at b/l 1 0-1 4  · Stable    Lower extremity edema  Assessment & Plan  · Chronic lower extremity edema  · Stable  · C/w Daily Lasix        VTE Pharmacologic Prophylaxis: VTE Score: 3 Moderate Risk (Score 3-4) - Pharmacological DVT Prophylaxis Ordered: enoxaparin (Lovenox)  Patient Centered Rounds: I performed bedside rounds with nursing staff today  Discussions with Specialists or Other Care Team Provider:     Education and Discussions with Family / Patient: Will discuss son at bedside  Time Spent for Care: 45 minutes   More than 50% of total time spent on counseling and coordination of care as described above  Current Length of Stay: 3 day(s)  Current Patient Status: Inpatient   Certification Statement: The patient will continue to require additional inpatient hospital stay due to Awaiting rehab placement  Discharge Plan: Awaiting placement    Code Status: Level 3 - DNAR and DNI    Subjective:   She is comfortable in bed  No event overnight  Denied pain    Objective:     Vitals:   Temp (24hrs), Av 2 °F (36 8 °C), Min:97 8 °F (36 6 °C), Max:98 7 °F (37 1 °C)    Temp:  [97 8 °F (36 6 °C)-98 7 °F (37 1 °C)] 98 7 °F (37 1 °C)  HR:  [63-65] 63  Resp:  [20] 20  BP: (126-148)/(63-74) 148/74  SpO2:  [91 %-92 %] 91 %  Body mass index is 21 95 kg/m²  Input and Output Summary (last 24 hours):      Intake/Output Summary (Last 24 hours) at 2022 1232  Last data filed at 2022 0900  Gross per 24 hour   Intake 790 ml   Output 1200 ml   Net -410 ml       Physical Exam:   Physical Exam     She is awake alert oriented no acute distress  Comfortable in bed  Lung clear to auscultation bilateral anteriorly  Heart positive S1-S2 no murmur  Abdomen soft nontender  Lateral lower extremity with chronic trace venous stasis edema    Additional Data:     Labs:  Results from last 7 days   Lab Units 22  0504 22  1346   WBC Thousand/uL 6 79 9 19   HEMOGLOBIN g/dL 12 6 14 9   HEMATOCRIT % 38 6 44 8   PLATELETS Thousands/uL 207 205   NEUTROS PCT %  --  81*   LYMPHS PCT %  --  8*   MONOS PCT %  --  10   EOS PCT %  --  0     Results from last 7 days   Lab Units 22  0504   SODIUM mmol/L 139   POTASSIUM mmol/L 4 1   CHLORIDE mmol/L 107   CO2 mmol/L 24   BUN mg/dL 23   CREATININE mg/dL 0 95   ANION GAP mmol/L 8   CALCIUM mg/dL 8 6   ALBUMIN g/dL 2 6*   TOTAL BILIRUBIN mg/dL 0 85   ALK PHOS U/L 58   ALT U/L 17   AST U/L 33   GLUCOSE RANDOM mg/dL 98                 Results from last 7 days   Lab Units 22  0504   PROCALCITONIN ng/ml 0 35* Lines/Drains:  Invasive Devices     Peripheral Intravenous Line  Duration           Peripheral IV 12/27/22 Proximal;Right;Ventral (anterior) Forearm 2 days          Drain  Duration           External Urinary Catheter 2 days                      Imaging: No pertinent imaging reviewed  Recent Cultures (last 7 days):   Results from last 7 days   Lab Units 12/28/22  1027 12/27/22  1849   BLOOD CULTURE   --  No Growth at 48 hrs  No Growth at 48 hrs  LEGIONELLA URINARY ANTIGEN  Negative  --        Last 24 Hours Medication List:   Current Facility-Administered Medications   Medication Dose Route Frequency Provider Last Rate   • acetaminophen  975 mg Oral [facility] Cristopher Grossman, DO     • cholecalciferol  1,000 Units Oral Daily Cristopher Grossman, DO     • enoxaparin  30 mg Subcutaneous Daily Cristopher Grossman DO     • furosemide  20 mg Oral After Lunch KERON Hansen     • furosemide  40 mg Oral Daily Cristopher Grossman DO     • metoprolol succinate  25 mg Oral Daily KERON Hansen     • oxyCODONE  2 5 mg Oral Q6H PRN Cristopher Grossman DO     • polyethylene glycol  17 g Oral Daily Lucho Crowell DO     • senna-docusate sodium  1 tablet Oral BID Lucho Crowell DO          Today, Patient Was Seen By: Lucho Crowell DO    **Please Note: This note may have been constructed using a voice recognition system  **

## 2022-12-30 NOTE — ASSESSMENT & PLAN NOTE
Lab Results   Component Value Date    EGFR 50 12/28/2022    EGFR 43 12/27/2022    EGFR 33 07/08/2022    CREATININE 0 95 12/28/2022    CREATININE 1 07 12/27/2022    CREATININE 1 32 (H) 07/08/2022   · Estimated Creatinine Clearance: 31 7 mL/min (by C-G formula based on SCr of 0 95 mg/dL)    · Cr at b/l 1 0-1 4  · Stable

## 2022-12-30 NOTE — ASSESSMENT & PLAN NOTE
· Right hip pain with walking  · Right hip XR negative  · Pain control w/ scheduled tylenol and oxy prn  · PT/OT eval recommending rehab  · Pending placement

## 2022-12-30 NOTE — ASSESSMENT & PLAN NOTE
· Right hip pain with walking  · Right hip XR negative for fracture  · She continues to have pain with walking  · CT scan without acute fracture  · Pain control w/ scheduled tylenol and oxy prn  · PT/OT eval recommending rehab  · Pending placement

## 2022-12-30 NOTE — ASSESSMENT & PLAN NOTE
· CXR with bibasilar opacities and small pleural effusions   · No symptoms of PNA based on imaging  · Rocephin given in ER  · procal 0 35  · Afebrile without leukocytosis  · Continue to observe off antibiotic

## 2022-12-30 NOTE — ASSESSMENT & PLAN NOTE
· Noted to have SVT in ED that self resolved  · Cardiology consulted as patient having irregular rhythm in tele  · Started on beta-blocker  · No repeated episodes on telemetry

## 2022-12-31 LAB
ANION GAP SERPL CALCULATED.3IONS-SCNC: 5 MMOL/L (ref 4–13)
BUN SERPL-MCNC: 24 MG/DL (ref 5–25)
CALCIUM SERPL-MCNC: 8.9 MG/DL (ref 8.3–10.1)
CHLORIDE SERPL-SCNC: 108 MMOL/L (ref 96–108)
CO2 SERPL-SCNC: 29 MMOL/L (ref 21–32)
CREAT SERPL-MCNC: 1.13 MG/DL (ref 0.6–1.3)
GFR SERPL CREATININE-BSD FRML MDRD: 40 ML/MIN/1.73SQ M
GLUCOSE SERPL-MCNC: 94 MG/DL (ref 65–140)
POTASSIUM SERPL-SCNC: 4.5 MMOL/L (ref 3.5–5.3)
SODIUM SERPL-SCNC: 142 MMOL/L (ref 135–147)

## 2022-12-31 RX ORDER — POLYETHYLENE GLYCOL 3350 17 G/17G
17 POWDER, FOR SOLUTION ORAL DAILY PRN
Status: DISCONTINUED | OUTPATIENT
Start: 2022-12-31 | End: 2023-01-02 | Stop reason: HOSPADM

## 2022-12-31 RX ADMIN — METOPROLOL SUCCINATE 25 MG: 25 TABLET, FILM COATED, EXTENDED RELEASE ORAL at 10:41

## 2022-12-31 RX ADMIN — ACETAMINOPHEN 975 MG: 325 TABLET, FILM COATED ORAL at 15:51

## 2022-12-31 RX ADMIN — Medication 1000 UNITS: at 10:41

## 2022-12-31 RX ADMIN — ENOXAPARIN SODIUM 30 MG: 30 INJECTION SUBCUTANEOUS at 10:41

## 2022-12-31 RX ADMIN — ACETAMINOPHEN 975 MG: 325 TABLET, FILM COATED ORAL at 06:09

## 2022-12-31 RX ADMIN — FUROSEMIDE 40 MG: 40 TABLET ORAL at 10:41

## 2022-12-31 RX ADMIN — FUROSEMIDE 20 MG: 20 TABLET ORAL at 15:51

## 2022-12-31 RX ADMIN — ACETAMINOPHEN 975 MG: 325 TABLET, FILM COATED ORAL at 21:14

## 2022-12-31 NOTE — PLAN OF CARE
Problem: Prexisting or High Potential for Compromised Skin Integrity  Goal: Skin integrity is maintained or improved  Description: INTERVENTIONS:  - Identify patients at risk for skin breakdown  - Assess and monitor skin integrity  - Assess and monitor nutrition and hydration status  - Monitor labs   - Assess for incontinence   - Turn and reposition patient  - Assist with mobility/ambulation  - Relieve pressure over bony prominences  - Avoid friction and shearing  - Provide appropriate hygiene as needed including keeping skin clean and dry  - Evaluate need for skin moisturizer/barrier cream  - Collaborate with interdisciplinary team   - Patient/family teaching  - Consider wound care consult   12/30/2022 2058 by Dariel Rahman RN  Outcome: Progressing  12/30/2022 2057 by Dariel Rahman RN  Outcome: Progressing

## 2022-12-31 NOTE — ASSESSMENT & PLAN NOTE
Lab Results   Component Value Date    EGFR 40 12/31/2022    EGFR 50 12/28/2022    EGFR 43 12/27/2022    CREATININE 1 13 12/31/2022    CREATININE 0 95 12/28/2022    CREATININE 1 07 12/27/2022   · Estimated Creatinine Clearance: 26 6 mL/min (by C-G formula based on SCr of 1 13 mg/dL)    · Cr at b/l 1 0-1 4  · Stable

## 2022-12-31 NOTE — PLAN OF CARE
Problem: PHYSICAL THERAPY ADULT  Goal: Performs mobility at highest level of function for planned discharge setting  See evaluation for individualized goals  Description: Treatment/Interventions: ADL retraining, Functional transfer training, LE strengthening/ROM, Patient/family training, Cognitive reorientation, Equipment eval/education, Bed mobility, Gait training, Spoke to nursing, Spoke to case management, OT  Equipment Recommended: Wheelchair, Leah Hernadez       See flowsheet documentation for full assessment, interventions and recommendations  Outcome: Progressing  Note: Prognosis: Fair  Problem List: Decreased strength, Decreased endurance, Decreased range of motion, Impaired balance, Decreased mobility, Impaired judgement, Decreased safety awareness, Pain, Impaired hearing  Assessment: Pt seen for PT session today  Treatment session included functional transfers, seated balance and ambulation to improve overall mobility and independence  Pt requires mod assist x1 for transfers, and  min assist x1 for ambulation  Pt is making good towards goals, as demonstrated by increased activity tolerance  Pt requires cuing for hand placement with transfers  Frequent cuing required with ambulation for proximity to RW and deficits in turning  Patient reports that she feels tired but better today and reports "I just want to go home to die"  Pt was left seated in bedside chair at end of session, all needs within reach  Pt would benefit from continued skilled physical therapy to address remaining limitations  PT to continue to follow pt, recommending rehab  The patient's AM-PAC Basic Mobility Inpatient Short Form Raw Score is 16  A Raw score of less than or equal to 16 suggests the patient may benefit from discharge to post-acute rehabilitation services  Please also refer to the recommendation of the Physical Therapist for safe discharge planning    Barriers to Discharge: Inaccessible home environment     PT Discharge Recommendation: Post acute rehabilitation services    See flowsheet documentation for full assessment

## 2022-12-31 NOTE — CASE MANAGEMENT
Case Management Discharge Planning Note    Patient name Arturo Hobbs  Location CW2 722/CZ6 212-01 MRN [id number]  : 1925 Date 2022       Current Admission Date: 2022  Current Admission Diagnosis:Ambulatory dysfunction   Patient Active Problem List    Diagnosis Date Noted   • PSVT (paroxysmal supraventricular tachycardia) ([facility] ) 2022   • Infiltrate noted on imaging study 2022   • Compression fracture of L3 lumbar vertebra, closed, initial encounter ([facility] ) 2020   • Fall 2020   • Age-related osteoporosis without current pathological fracture 2019   • Lower extremity edema 2019   • CKD (chronic kidney disease) stage 3, GFR 30-59 ml/min ([facility]) 2019   • Venous stasis dermatitis of both lower extremities 2019   • Ambulatory dysfunction 2018   • Neuropathy 2018      LOS (days): 4  Geometric Mean LOS (GMLOS) (days): 2 30  Days to GMLOS:-1 6     OBJECTIVE:  Risk of Unplanned Readmission Score: 8 18         Current admission status: Inpatient   Preferred Pharmacy:   [address] [place] [id number]  Phone: [phone] Fax: [phone]    Primary Care Provider: KERON Rand    Primary Insurance: [facility]  Secondary Insurance:     DISCHARGE DETAILS:                                          Other Referral/Resources/Interventions Provided:  Referral Comments: No [facility] beds available at the time of this note  CM will follow

## 2022-12-31 NOTE — PHYSICAL THERAPY NOTE
PHYSICAL THERAPY NOTE          Patient Name: Cristian Stark  BRNWX'V Date: 12/31/2022 12/31/22 0843   PT Last Visit   PT Visit Date 12/31/22   Note Type   Note Type Treatment   Pain Assessment   Pain Assessment Tool 0-10   Pain Score No Pain   Restrictions/Precautions   Weight Bearing Precautions Per Order No   Other Precautions Cognitive; Chair Alarm; Fall Risk;Multiple lines   General   Chart Reviewed Yes   Response to Previous Treatment Patient with no complaints from previous session  Family/Caregiver Present No   Cognition   Following Commands Follows one step commands with increased time or repetition   Bed Mobility   Additional Comments Pt greeted OOB in bathroom with nursing   Transfers   Sit to Stand 3  Moderate assistance   Additional items Assist x 1; Increased time required;Verbal cues   Stand to Sit 3  Moderate assistance   Additional items Assist x 1; Increased time required;Verbal cues   Additional Comments Performed with RW   Ambulation/Elevation   Gait pattern Decreased foot clearance; Wide LUZ; Foward flexed   Gait Assistance 4  Minimal assist   Additional items Assist x 1;Verbal cues   Assistive Device Rolling walker   Distance 10', 76'   Ambulation/Elevation Additional Comments Increased difficulty with turning, vc for hand placement   Balance   Static Sitting Fair   Dynamic Sitting Fair   Static Standing Fair -   Dynamic Standing Poor +   Ambulatory Poor +   Activity Tolerance   Activity Tolerance Patient limited by fatigue   Nurse Made Aware RN cleared pt forPT treatment   Assessment   Prognosis Fair   Problem List Decreased strength;Decreased endurance;Decreased range of motion; Impaired balance;Decreased mobility; Impaired judgement;Decreased safety awareness;Pain; Impaired hearing   Assessment Pt seen for PT session today   Treatment session included functional transfers, seated balance and ambulation to improve overall mobility and independence  Pt requires mod assist x1 for transfers, and  min assist x1 for ambulation  Pt is making good towards goals, as demonstrated by increased activity tolerance  Pt requires cuing for hand placement with transfers  Frequent cuing required with ambulation for proximity to RW and deficits in turning  Patient reports that she feels tired but better today and reports "I just want to go home to die"  Pt was left seated in bedside chair at end of session, all needs within reach  Pt would benefit from continued skilled physical therapy to address remaining limitations  PT to continue to follow pt, recommending rehab  The patient's AM-[facility] Basic Mobility Inpatient Short Form Raw Score is 16  A Raw score of less than or equal to 16 suggests the patient may benefit from discharge to post-acute rehabilitation services  Please also refer to the recommendation of the Physical Therapist for safe discharge planning  Barriers to Discharge Inaccessible home environment   Goals   Patient Goals To go home   STG Expiration Date 01/11/22   PT Treatment Day 1   Plan   Treatment/Interventions ADL retraining;Functional transfer training;LE strengthening/ROM; Therapeutic exercise; Endurance training;Patient/family training;Equipment eval/education;Gait training;Continued evaluation;Spoke to nursing   Progress Progressing toward goals   Recommendation   PT Discharge Recommendation Post acute rehabilitation services   AM-[facility] Basic Mobility Inpatient   Turning in Bed Without Bedrails 3   Lying on Back to Sitting on Edge of Flat Bed 3   Moving Bed to Chair 3   Standing Up From Chair 2   Walk in Room 3   Climb 3-5 Stairs 2   Basic Mobility Inpatient Raw Score 16   Basic Mobility Standardized Score 38 32   Highest Level Of Mobility   JH-HLM Goal 5: Stand one or more mins   JH-HLM Achieved 7: Walk 25 feet or more   Education   Education Provided Assistive device;Home exercise program         David Lopez, PT

## 2022-12-31 NOTE — PLAN OF CARE
Problem: Prexisting or High Potential for Compromised Skin Integrity  Goal: Skin integrity is maintained or improved  Description: INTERVENTIONS:  - Identify patients at risk for skin breakdown  - Assess and monitor skin integrity  - Assess and monitor nutrition and hydration status  - Monitor labs   - Assess for incontinence   - Turn and reposition patient  - Assist with mobility/ambulation  - Relieve pressure over bony prominences  - Avoid friction and shearing  - Provide appropriate hygiene as needed including keeping skin clean and dry  - Evaluate need for skin moisturizer/barrier cream  - Collaborate with interdisciplinary team   - Patient/family teaching  - Consider wound care consult   12/31/2022 1857 by Minetta Romberg, RN  Outcome: Progressing  12/31/2022 1856 by Minetta Romberg, RN  Outcome: Progressing     Problem: METABOLIC, FLUID AND ELECTROLYTES - ADULT  Goal: Fluid balance maintained  Description: INTERVENTIONS:  - Monitor labs   - Monitor I/O and WT  - Instruct patient on fluid and nutrition as appropriate  - Assess for signs & symptoms of volume excess or deficit  Outcome: Progressing     Problem: METABOLIC, FLUID AND ELECTROLYTES - ADULT  Goal: Electrolytes maintained within normal limits  Description: INTERVENTIONS:  - Monitor labs and assess patient for signs and symptoms of electrolyte imbalances  - Administer electrolyte replacement as ordered  - Monitor response to electrolyte replacements, including repeat lab results as appropriate  - Instruct patient on fluid and nutrition as appropriate  Outcome: Progressing     Problem: RESPIRATORY - ADULT  Goal: Achieves optimal ventilation and oxygenation  Description: INTERVENTIONS:  - Assess for changes in respiratory status  - Assess for changes in mentation and behavior  - Position to facilitate oxygenation and minimize respiratory effort  - Oxygen administered by appropriate delivery if ordered  - Initiate smoking cessation education as indicated  - Encourage broncho-pulmonary hygiene including cough, deep breathe, Incentive Spirometry  - Assess the need for suctioning and aspirate as needed  - Assess and instruct to report SOB or any respiratory difficulty  - Respiratory Therapy support as indicated  Outcome: Progressing     Problem: CARDIOVASCULAR - ADULT  Goal: Absence of cardiac dysrhythmias or at baseline rhythm  Description: INTERVENTIONS:  - Continuous cardiac monitoring, vital signs, obtain 12 lead EKG if ordered  - Administer antiarrhythmic and heart rate control medications as ordered  - Monitor electrolytes and administer replacement therapy as ordered  Outcome: Progressing     Problem: CARDIOVASCULAR - ADULT  Goal: Maintains optimal cardiac output and hemodynamic stability  Description: INTERVENTIONS:  - Monitor I/O, vital signs and rhythm  - Monitor for S/S and trends of decreased cardiac output  - Administer and titrate ordered vasoactive medications to optimize hemodynamic stability  - Assess quality of pulses, skin color and temperature  - Assess for signs of decreased coronary artery perfusion  - Instruct patient to report change in severity of symptoms  Outcome: Progressing

## 2022-12-31 NOTE — PROGRESS NOTES
[facility]  Progress Note - Maurice Kaufman 5/16/1925, 80 y o  female MRN: [id number]  Unit/Bed#: 2 212-01 Encounter: [id number]  Primary Care Provider: KERON Rice   Date and time admitted to hospital: 12/27/2022  1:10 PM    * Ambulatory dysfunction  Assessment & Plan  · Right hip pain with walking  · Right hip XR negative for fracture  · She continues to have pain with walking  · CT scan without acute fracture  · Pain control w/ scheduled tylenol and oxy prn  · PT/OT eval recommending rehab  · Pending placement    Infiltrate noted on imaging study  Assessment & Plan  · CXR with bibasilar opacities and small pleural effusions   · No symptoms of PNA based on imaging  · Rocephin given in ER  · procal 0 35  · Afebrile without leukocytosis  · Continue to observe off antibiotic    PSVT (paroxysmal supraventricular tachycardia) ([address] )  Assessment & Plan  · Noted to have SVT in ED that self resolved  · Cardiology consulted as patient having irregular rhythm in tele  · Started on beta-blocker  · No repeated episodes on telemetry    CKD (chronic kidney disease) stage 3, GFR 30-59 ml/min [facility])  Assessment & Plan  Lab Results   Component Value Date    EGFR 40 12/31/2022    EGFR 50 12/28/2022    EGFR 43 12/27/2022    CREATININE 1 13 12/31/2022    CREATININE 0 95 12/28/2022    CREATININE 1 07 12/27/2022   · Estimated Creatinine Clearance: 26 6 mL/min (by C-G formula based on SCr of 1 13 mg/dL)  · Cr at b/l 1 0-1 4  · Stable    Lower extremity edema  Assessment & Plan  · Chronic lower extremity edema  · Stable  · C/w Daily Lasix        VTE Pharmacologic Prophylaxis: VTE Score: 3 Moderate Risk (Score 3-4) - Pharmacological DVT Prophylaxis Ordered: enoxaparin (Lovenox)  Patient Centered Rounds: I performed bedside rounds with nursing staff today    Discussions with Specialists or Other Care Team Provider:     Education and Discussions with Family / Patient: Updated  (son) at bedside  Time Spent for Care: 45 minutes  More than 50% of total time spent on counseling and coordination of care as described above  Current Length of Stay: 4 day(s)  Current Patient Status: Inpatient   Certification Statement: The patient will continue to require additional inpatient hospital stay due to Awaiting rehab placement  Discharge Plan: Rehab placement    Code Status: Level 3 - DNAR and DNI    Subjective:   Patient is comfortable sitting in chair  No event overnight  Had a bowel movement  Denied pain    Objective:     Vitals:   Temp (24hrs), Av 3 °F (36 8 °C), Min:98 3 °F (36 8 °C), Max:98 4 °F (36 9 °C)    Temp:  [98 3 °F (36 8 °C)-98 4 °F (36 9 °C)] 98 4 °F (36 9 °C)  HR:  [61-69] 61  Resp:  [19] 19  BP: (132-152)/(61-65) 148/64  SpO2:  [90 %-93 %] 90 %  Body mass index is 21 95 kg/m²  Input and Output Summary (last 24 hours):      Intake/Output Summary (Last 24 hours) at 2022 0915  Last data filed at 2022 0700  Gross per 24 hour   Intake 490 ml   Output 200 ml   Net 290 ml       Physical Exam:   Physical Exam   She is awake alert oriented no acute distress  Comfortable in bed  Lung clear to auscultation bilateral anteriorly  Heart positive S1-S2 no murmur  Abdomen soft nontender  Lateral lower extremity with chronic trace venous stasis edema    Additional Data:     Labs:  Results from last 7 days   Lab Units 22  0504 22  1346   WBC Thousand/uL 6 79 9 19   HEMOGLOBIN g/dL 12 6 14 9   HEMATOCRIT % 38 6 44 8   PLATELETS Thousands/uL 207 205   NEUTROS PCT %  --  81*   LYMPHS PCT %  --  8*   MONOS PCT %  --  10   EOS PCT %  --  0     Results from last 7 days   Lab Units 22  0624 22  0504   SODIUM mmol/L 142 139   POTASSIUM mmol/L 4 5 4 1   CHLORIDE mmol/L 108 107   CO2 mmol/L 29 24   BUN mg/dL 24 23   CREATININE mg/dL 1 13 0 95   ANION GAP mmol/L 5 8   CALCIUM mg/dL 8 9 8 6   ALBUMIN g/dL  --  2 6*   TOTAL BILIRUBIN mg/dL  --  0 85   ALK PHOS U/L  --  58 ALT U/L  --  17   AST U/L  --  33   GLUCOSE RANDOM mg/dL 94 98                 Results from last 7 days   Lab Units 12/28/22  0504   PROCALCITONIN ng/ml 0 35*       Lines/Drains:  Invasive Devices     Peripheral Intravenous Line  Duration           Peripheral IV 12/31/22 Left;Ventral (anterior) Forearm <1 day          Drain  Duration           External Urinary Catheter 2 days                      Imaging: No pertinent imaging reviewed  Recent Cultures (last 7 days):   Results from last 7 days   Lab Units 12/28/22  1027 12/27/22  1849   BLOOD CULTURE   --  No Growth at 72 hrs  No Growth at 72 hrs  LEGIONELLA URINARY ANTIGEN  Negative  --        Last 24 Hours Medication List:   Current Facility-Administered Medications   Medication Dose Route Frequency Provider Last Rate   • acetaminophen  975 mg Oral [facility] Bard Hyatt, DO     • cholecalciferol  1,000 Units Oral Daily Bard Hyatt, DO     • enoxaparin  30 mg Subcutaneous Daily Bard Hyatt, DO     • furosemide  20 mg Oral After Lunch KERON Munroe     • furosemide  40 mg Oral Daily Bard Hyatt, DO     • metoprolol succinate  25 mg Oral Daily KERON Munroe     • oxyCODONE  2 5 mg Oral Q6H PRN Bard Hyatt, DO     • polyethylene glycol  17 g Oral Daily PRN Carlos Uribe DO          Today, Patient Was Seen By: Carlos Uribe DO    **Please Note: This note may have been constructed using a voice recognition system  **

## 2023-01-01 RX ADMIN — ACETAMINOPHEN 975 MG: 325 TABLET, FILM COATED ORAL at 06:23

## 2023-01-01 RX ADMIN — METOPROLOL SUCCINATE 25 MG: 25 TABLET, FILM COATED, EXTENDED RELEASE ORAL at 09:42

## 2023-01-01 RX ADMIN — ACETAMINOPHEN 975 MG: 325 TABLET, FILM COATED ORAL at 22:16

## 2023-01-01 RX ADMIN — FUROSEMIDE 40 MG: 40 TABLET ORAL at 09:42

## 2023-01-01 RX ADMIN — ACETAMINOPHEN 975 MG: 325 TABLET, FILM COATED ORAL at 13:40

## 2023-01-01 RX ADMIN — Medication 1000 UNITS: at 09:42

## 2023-01-01 RX ADMIN — ENOXAPARIN SODIUM 30 MG: 30 INJECTION SUBCUTANEOUS at 09:43

## 2023-01-01 RX ADMIN — FUROSEMIDE 20 MG: 20 TABLET ORAL at 13:41

## 2023-01-01 NOTE — ASSESSMENT & PLAN NOTE
· Right hip pain with walking  · Right hip XR negative for fracture  · She continues to have pain with walking  · CT scan without acute fracture  · Pain control w/ scheduled tylenol and oxy prn  · PT/OT eval recommending rehab  · Pending placement  · Clinically improving

## 2023-01-01 NOTE — PLAN OF CARE
Problem: MOBILITY - ADULT  Goal: Maintain or return to baseline ADL function  Description: INTERVENTIONS:  -  Assess patient's ability to carry out ADLs; assess patient's baseline for ADL function and identify physical deficits which impact ability to perform ADLs (bathing, care of mouth/teeth, toileting, grooming, dressing, etc )  - Assess/evaluate cause of self-care deficits   - Assess range of motion  - Assess patient's mobility; develop plan if impaired  - Assess patient's need for assistive devices and provide as appropriate  - Encourage maximum independence but intervene and supervise when necessary  - Involve family in performance of ADLs  - Assess for home care needs following discharge   - Consider OT consult to assist with ADL evaluation and planning for discharge  - Provide patient education as appropriate  Outcome: Progressing  Goal: Maintains/Returns to pre admission functional level  Description: INTERVENTIONS:  - Perform BMAT or MOVE assessment daily    - Set and communicate daily mobility goal to care team and patient/family/caregiver  - Collaborate with rehabilitation services on mobility goals if consulted  - Perform Range of Motion   times a day  - Reposition patient every   hours    - Dangle patient   times a day  - Stand patient   times a day  - Ambulate patient   times a day  - Out of bed to chair   times a day   - Out of bed for meals   times a day  - Out of bed for toileting  - Record patient progress and toleration of activity level   Outcome: Progressing     Problem: Potential for Falls  Goal: Patient will remain free of falls  Description: INTERVENTIONS:  - Educate patient/family on patient safety including physical limitations  - Instruct patient to call for assistance with activity   - Consult OT/PT to assist with strengthening/mobility   - Keep Call bell within reach  - Keep bed low and locked with side rails adjusted as appropriate  - Keep care items and personal belongings within reach  - Initiate and maintain comfort rounds  - Make Fall Risk Sign visible to staff  - Offer Toileting every   Hours, in advance of need  - Initiate/Maintain  alarm  - Obtain necessary fall risk management equipment:          - Apply yellow socks and bracelet for high fall risk patients  - Consider moving patient to room near nurses station  Outcome: Progressing     Problem: Prexisting or High Potential for Compromised Skin Integrity  Goal: Skin integrity is maintained or improved  Description: INTERVENTIONS:  - Identify patients at risk for skin breakdown  - Assess and monitor skin integrity  - Assess and monitor nutrition and hydration status  - Monitor labs   - Assess for incontinence   - Turn and reposition patient  - Assist with mobility/ambulation  - Relieve pressure over bony prominences  - Avoid friction and shearing  - Provide appropriate hygiene as needed including keeping skin clean and dry  - Evaluate need for skin moisturizer/barrier cream  - Collaborate with interdisciplinary team   - Patient/family teaching  - Consider wound care consult   Outcome: Progressing     Problem: CARDIOVASCULAR - ADULT  Goal: Maintains optimal cardiac output and hemodynamic stability  Description: INTERVENTIONS:  - Monitor I/O, vital signs and rhythm  - Monitor for S/S and trends of decreased cardiac output  - Administer and titrate ordered vasoactive medications to optimize hemodynamic stability  - Assess quality of pulses, skin color and temperature  - Assess for signs of decreased coronary artery perfusion  - Instruct patient to report change in severity of symptoms  Outcome: Progressing  Goal: Absence of cardiac dysrhythmias or at baseline rhythm  Description: INTERVENTIONS:  - Continuous cardiac monitoring, vital signs, obtain 12 lead EKG if ordered  - Administer antiarrhythmic and heart rate control medications as ordered  - Monitor electrolytes and administer replacement therapy as ordered  Outcome: Progressing Problem: RESPIRATORY - ADULT  Goal: Achieves optimal ventilation and oxygenation  Description: INTERVENTIONS:  - Assess for changes in respiratory status  - Assess for changes in mentation and behavior  - Position to facilitate oxygenation and minimize respiratory effort  - Oxygen administered by appropriate delivery if ordered  - Initiate smoking cessation education as indicated  - Encourage broncho-pulmonary hygiene including cough, deep breathe, Incentive Spirometry  - Assess the need for suctioning and aspirate as needed  - Assess and instruct to report SOB or any respiratory difficulty  - Respiratory Therapy support as indicated  Outcome: Progressing     Problem: METABOLIC, FLUID AND ELECTROLYTES - ADULT  Goal: Electrolytes maintained within normal limits  Description: INTERVENTIONS:  - Monitor labs and assess patient for signs and symptoms of electrolyte imbalances  - Administer electrolyte replacement as ordered  - Monitor response to electrolyte replacements, including repeat lab results as appropriate  - Instruct patient on fluid and nutrition as appropriate  Outcome: Progressing  Goal: Fluid balance maintained  Description: INTERVENTIONS:  - Monitor labs   - Monitor I/O and WT  - Instruct patient on fluid and nutrition as appropriate  - Assess for signs & symptoms of volume excess or deficit  Outcome: Progressing

## 2023-01-01 NOTE — ASSESSMENT & PLAN NOTE
· CXR with bibasilar opacities and small pleural effusions   · No symptoms of PNA based on imaging  · Rocephin given in ER  · procal 0 35  · Afebrile without leukocytosis  · Continue to observe off antibiotic  · On room air

## 2023-01-01 NOTE — ASSESSMENT & PLAN NOTE
· Noted to have SVT in ED that self resolved  · Cardiology consulted as patient having irregular rhythm in tele  · Started on beta-blocker  · No repeated episodes on telemetry  · Stable continue Toprol-XL

## 2023-01-01 NOTE — PROGRESS NOTES
[facility]  Progress Note - Frank Campbell 5/16/1925, 80 y o  female MRN: [id number]  Unit/Bed#: 2 212-01 Encounter: [id number]  Primary Care Provider: Irene Talbot   Date and time admitted to hospital: 12/27/2022  1:10 PM    * Ambulatory dysfunction  Assessment & Plan  · Right hip pain with walking  · Right hip XR negative for fracture  · She continues to have pain with walking  · CT scan without acute fracture  · Pain control w/ scheduled tylenol and oxy prn  · PT/OT eval recommending rehab  · Pending placement  · Clinically improving    Infiltrate noted on imaging study  Assessment & Plan  · CXR with bibasilar opacities and small pleural effusions   · No symptoms of PNA based on imaging  · Rocephin given in ER  · procal 0 35  · Afebrile without leukocytosis  · Continue to observe off antibiotic  · On room air    PSVT (paroxysmal supraventricular tachycardia) ([address] )  Assessment & Plan  · Noted to have SVT in ED that self resolved  · Cardiology consulted as patient having irregular rhythm in tele  · Started on beta-blocker  · No repeated episodes on telemetry  · Stable continue Toprol-XL    CKD (chronic kidney disease) stage 3, GFR 30-59 ml/min [facility])  Assessment & Plan  Lab Results   Component Value Date    EGFR 40 12/31/2022    EGFR 50 12/28/2022    EGFR 43 12/27/2022    CREATININE 1 13 12/31/2022    CREATININE 0 95 12/28/2022    CREATININE 1 07 12/27/2022   · Estimated Creatinine Clearance: 26 6 mL/min (by C-G formula based on SCr of 1 13 mg/dL)  · Cr at b/l 1 0-1 4  · Stable    Lower extremity edema  Assessment & Plan  · Chronic lower extremity edema  · Stable  · C/w Daily Lasix      VTE Pharmacologic Prophylaxis: VTE Score: 3 Moderate Risk (Score 3-4) - Pharmacological DVT Prophylaxis Ordered: enoxaparin (Lovenox)  Patient Centered Rounds: I performed bedside rounds with nursing staff today    Discussions with Specialists or Other Care Team Provider:     Education and Discussions with Family / Patient: Updated  (son) via phone  Time Spent for Care: 45 minutes  More than 50% of total time spent on counseling and coordination of care as described above  Current Length of Stay: 5 day(s)  Current Patient Status: Inpatient   Certification Statement: The patient will continue to require additional inpatient hospital stay due to Awaiting rehab placement  Discharge Plan: Awaiting rehab placement    Code Status: Level 3 - DNAR and DNI    Subjective:   Patient seen and examined  Comfortable in bed  No event overnight  Denied pain    Objective:     Vitals:   Temp (24hrs), Av 1 °F (36 7 °C), Min:98 °F (36 7 °C), Max:98 2 °F (36 8 °C)    Temp:  [98 °F (36 7 °C)-98 2 °F (36 8 °C)] 98 °F (36 7 °C)  HR:  [58-67] 67  Resp:  [16] 16  BP: (116-148)/(62-86) 148/86  SpO2:  [93 %-96 %] 93 %  Body mass index is 21 95 kg/m²  Input and Output Summary (last 24 hours):      Intake/Output Summary (Last 24 hours) at 2023 1330  Last data filed at 2023 1159  Gross per 24 hour   Intake 780 ml   Output --   Net 780 ml       Physical Exam:   Physical Exam   She is awake alert oriented no acute distress  Comfortable in bed  Lung clear to auscultation bilateral anteriorly  Heart positive S1-S2 no murmur  Abdomen soft nontender  Lateral lower extremity with chronic trace venous stasis edema  Additional Data:     Labs:  Results from last 7 days   Lab Units 22  0504 22  1346   WBC Thousand/uL 6 79 9 19   HEMOGLOBIN g/dL 12 6 14 9   HEMATOCRIT % 38 6 44 8   PLATELETS Thousands/uL 207 205   NEUTROS PCT %  --  81*   LYMPHS PCT %  --  8*   MONOS PCT %  --  10   EOS PCT %  --  0     Results from last 7 days   Lab Units 22  0624 22  0504   SODIUM mmol/L 142 139   POTASSIUM mmol/L 4 5 4 1   CHLORIDE mmol/L 108 107   CO2 mmol/L 29 24   BUN mg/dL 24 23   CREATININE mg/dL 1 13 0 95   ANION GAP mmol/L 5 8   CALCIUM mg/dL 8 9 8 6   ALBUMIN g/dL  --  2 6*   TOTAL BILIRUBIN mg/dL  --  0 85   ALK PHOS U/L  --  58   ALT U/L  --  17   AST U/L  --  33   GLUCOSE RANDOM mg/dL 94 98                 Results from last 7 days   Lab Units 12/28/22  0504   PROCALCITONIN ng/ml 0 35*       Lines/Drains:  Invasive Devices     Peripheral Intravenous Line  Duration           Peripheral IV 12/31/22 Left;Ventral (anterior) Forearm 1 day          Drain  Duration           External Urinary Catheter 4 days                      Imaging: No pertinent imaging reviewed  Recent Cultures (last 7 days):   Results from last 7 days   Lab Units 12/28/22  1027 12/27/22  1849   BLOOD CULTURE   --  No Growth After 4 Days  No Growth After 4 Days  LEGIONELLA URINARY ANTIGEN  Negative  --        Last 24 Hours Medication List:   Current Facility-Administered Medications   Medication Dose Route Frequency Provider Last Rate   • acetaminophen  975 mg Oral [facility] Laura Lima DO     • cholecalciferol  1,000 Units Oral Daily Laura Lima DO     • enoxaparin  30 mg Subcutaneous Daily Laura Lima DO     • furosemide  20 mg Oral After Lunch Vara Kawasaki Spironello, CRNP     • furosemide  40 mg Oral Daily Laura Lima DO     • metoprolol succinate  25 mg Oral Daily Vara Kawasaki Spironello, CRNP     • oxyCODONE  2 5 mg Oral Q6H PRN Laura Lima DO     • polyethylene glycol  17 g Oral Daily PRN Chuck Mckeon DO          Today, Patient Was Seen By: Chuck Mckeon DO    **Please Note: This note may have been constructed using a voice recognition system  **

## 2023-01-02 VITALS
OXYGEN SATURATION: 92 % | DIASTOLIC BLOOD PRESSURE: 78 MMHG | TEMPERATURE: 98.7 F | BODY MASS INDEX: 21.86 KG/M2 | HEIGHT: 66 IN | RESPIRATION RATE: 19 BRPM | WEIGHT: 136 LBS | HEART RATE: 55 BPM | SYSTOLIC BLOOD PRESSURE: 95 MMHG

## 2023-01-02 LAB
BACTERIA BLD CULT: NORMAL
BACTERIA BLD CULT: NORMAL
SARS-COV-2 RNA RESP QL NAA+PROBE: NEGATIVE

## 2023-01-02 RX ORDER — METOPROLOL SUCCINATE 25 MG/1
25 TABLET, EXTENDED RELEASE ORAL DAILY
Refills: 0
Start: 2023-01-03 | End: 2023-01-11 | Stop reason: SDUPTHER

## 2023-01-02 RX ORDER — POLYETHYLENE GLYCOL 3350 17 G/17G
17 POWDER, FOR SOLUTION ORAL DAILY PRN
Refills: 0
Start: 2023-01-02

## 2023-01-02 RX ADMIN — Medication 1000 UNITS: at 09:14

## 2023-01-02 RX ADMIN — FUROSEMIDE 20 MG: 20 TABLET ORAL at 13:47

## 2023-01-02 RX ADMIN — ENOXAPARIN SODIUM 30 MG: 30 INJECTION SUBCUTANEOUS at 09:14

## 2023-01-02 RX ADMIN — METOPROLOL SUCCINATE 25 MG: 25 TABLET, FILM COATED, EXTENDED RELEASE ORAL at 09:15

## 2023-01-02 RX ADMIN — ACETAMINOPHEN 975 MG: 325 TABLET, FILM COATED ORAL at 06:22

## 2023-01-02 RX ADMIN — FUROSEMIDE 40 MG: 40 TABLET ORAL at 09:14

## 2023-01-02 NOTE — DISCHARGE SUMMARY
[facility]  Discharge- Shayy Junior 5/16/1925, 80 y o  female MRN: [id number]  Unit/Bed#: CW2 212-01 Encounter: [id number]  Primary Care Provider: Jung Bynum   Date and time admitted to hospital: 12/27/2022  1:10 PM    * Ambulatory dysfunction  Assessment & Plan  · Right hip pain with walking  · Right hip XR negative for fracture  · She continues to have pain with walking  · CT scan without acute fracture  · Pain control w/ scheduled tylenol and oxy prn  · PT/OT eval recommending rehab  · Clinically improving  · Likely rehab today    PSVT (paroxysmal supraventricular tachycardia) ([facility] [address] [address] )  Assessment & Plan  · Noted to have SVT in ED that self resolved  · Cardiology consulted as patient having irregular rhythm in tele  · Started on beta-blocker  · No repeated episodes on telemetry  · Stable, continue Toprol-XL    CKD (chronic kidney disease) stage 3, GFR 30-59 ml/min [facility])  Assessment & Plan  Lab Results   Component Value Date    EGFR 40 12/31/2022    EGFR 50 12/28/2022    EGFR 43 12/27/2022    CREATININE 1 13 12/31/2022    CREATININE 0 95 12/28/2022    CREATININE 1 07 12/27/2022   · Estimated Creatinine Clearance: 26 6 mL/min (by C-G formula based on SCr of 1 13 mg/dL)  · Cr at b/l 1 0-1 4  · Stable    Lower extremity edema  Assessment & Plan  · Chronic lower extremity edema  · Stable  · C/w Daily Lasix    Medical Problems     Resolved Problems  Date Reviewed: 1/2/2023   None       Discharging Physician / Practitioner: Carlos Whitley DO  PCP: Starr Burns  Admission Date:   Admission Orders (From admission, onward)     Ordered        12/27/22 1620  INPATIENT ADMISSION  Once                      Discharge Date: 01/02/23    Consultations During Hospital Stay:  · Cardiology    Procedures Performed:   · Right hip CT scan no fracture  · CT abdomen pelvis   1   There is a small left-sided pleural effusion, with left lower lobe airspace disease most concerning for pneumonia  2  There is a trace right effusion  There is a stable 0 8 cm right lower lobe nodule  3  Extensive colonic diverticulosis without evidence of acute diverticulitis or bowel obstruction  4  Stable left ovarian 3 2 cm cyst    Cardiac echo with EF 65% and grade 1 diastolic dysfunction     Significant Findings / Test Results:   · As above    Incidental Findings:   · As above    Test Results Pending at Discharge (will require follow up): · None     Outpatient Tests Requested:  · none    Complications:  none    Reason for Admission:   Generalized weakness and right hip pain with walking    Hospital Course:       Gio Pizarro is a 80 y o  female patient who originally presented to the hospital on 12/27/2022 due to generalized weakness and right hip pain with walking, unable to ambulate well   Work-up was negative for fracture  Patient was found to have tachycardia on the monitor, evaluated by cardiology, patient has brief self terminating episode of paroxysmal atrial tachycardia, now heart rate maintained on beta-blocker    Patient was evaluated by PT OT communication for rehab placement  Patient stable for rehab today        Please see above list of diagnoses and related plan for additional information       Condition at Discharge: stable    Discharge Day Visit / Exam:   Subjective:    Patient is comfortable in bed  No event overnight  No chest pain or shortness of breath  Vitals: Blood Pressure: 152/80 (01/02/23 0658)  Pulse: 55 (01/02/23 0915)  Temperature: 98 1 °F (36 7 °C) (01/02/23 0658)  Temp Source: Oral (01/02/23 0658)  Respirations: 19 (01/02/23 0658)  Height: 5' 6" (167 6 cm) (12/28/22 0930)  Weight - Scale: 61 7 kg (136 lb) (12/28/22 0930)  SpO2: 92 % (01/02/23 0800)  Exam:   Physical Exam     She is awake alert  no acute distress  Comfortable in bed  Lung clear to auscultation bilateral anteriorly  Heart positive S1-S2 no murmur  Abdomen soft nontender  Lateral lower extremity with chronic trace venous stasis edema    Discharge instructions/Information to patient and family:   See after visit summary for information provided to patient and family  Provisions for Follow-Up Care:  See after visit summary for information related to follow-up care and any pertinent home health orders  Disposition:   Neelima Mckeon Mahamed at [facility]    Planned Readmission: no     Discharge Statement:  I spent 45  minutes discharging the patient  This time was spent on the day of discharge  I had direct contact with the patient on the day of discharge  Greater than 50% of the total time was spent examining patient, answering all patient questions, arranging and discussing plan of care with patient as well as directly providing post-discharge instructions  Additional time then spent on discharge activities  Discharge Medications:  See after visit summary for reconciled discharge medications provided to patient and/or family        **Please Note: This note may have been constructed using a voice recognition system**

## 2023-01-02 NOTE — PLAN OF CARE
Problem: MOBILITY - ADULT  Goal: Maintain or return to baseline ADL function  Description: INTERVENTIONS:  -  Assess patient's ability to carry out ADLs; assess patient's baseline for ADL function and identify physical deficits which impact ability to perform ADLs (bathing, care of mouth/teeth, toileting, grooming, dressing, etc )  - Assess/evaluate cause of self-care deficits   - Assess range of motion  - Assess patient's mobility; develop plan if impaired  - Assess patient's need for assistive devices and provide as appropriate  - Encourage maximum independence but intervene and supervise when necessary  - Involve family in performance of ADLs  - Assess for home care needs following discharge   - Consider OT consult to assist with ADL evaluation and planning for discharge  - Provide patient education as appropriate  Outcome: Progressing       Problem: CARDIOVASCULAR - ADULT  Goal: Maintains optimal cardiac output and hemodynamic stability  Description: INTERVENTIONS:  - Monitor I/O, vital signs and rhythm  - Monitor for S/S and trends of decreased cardiac output  - Administer and titrate ordered vasoactive medications to optimize hemodynamic stability  - Assess quality of pulses, skin color and temperature  - Assess for signs of decreased coronary artery perfusion  - Instruct patient to report change in severity of symptoms  Outcome: Progressing

## 2023-01-02 NOTE — CASE MANAGEMENT
Case Management Discharge Planning Note    Patient name Shayy Junior  Location CW2 577/HL1 212-01 MRN [id number]  : 1925 Date 2023       Current Admission Date: 2022  Current Admission Diagnosis:Ambulatory dysfunction   Patient Active Problem List    Diagnosis Date Noted   • PSVT (paroxysmal supraventricular tachycardia) ([facility] ) 2022   • Infiltrate noted on imaging study 2022   • Compression fracture of L3 lumbar vertebra, closed, initial encounter ([facility] ) 2020   • Fall 2020   • Age-related osteoporosis without current pathological fracture 2019   • Lower extremity edema 2019   • CKD (chronic kidney disease) stage 3, GFR 30-59 ml/min ([facility]) 2019   • Venous stasis dermatitis of both lower extremities 2019   • Ambulatory dysfunction 2018   • Neuropathy 2018      LOS (days): 6  Geometric Mean LOS (GMLOS) (days): 2 30  Days to GMLOS:-3 5     OBJECTIVE:  Risk of Unplanned Readmission Score: 8 64         Current admission status: Inpatient   Preferred Pharmacy:   [address] [place] [address] [address] [place] [address]  Phone: [phone] Fax: [phone]    Primary Care Provider: KERON Burns    Primary Insurance: [facility]  Secondary Insurance:     DISCHARGE DETAILS:    Discharge planning discussed with[de-identified] lacie Aguilar of Choice: Yes  Comments - Freedom of Choice: discussed referral responses & choices, OK with [place]  CM contacted family/caregiver?: Yes  Were Treatment Team discharge recommendations reviewed with patient/caregiver?: Yes  Did patient/caregiver verbalize understanding of patient care needs?: Yes  Were patient/caregiver advised of the risks associated with not following Treatment Team discharge recommendations?: Yes                   Other Referral/Resources/Interventions Provided:  Interventions: Short Term Rehab         Treatment Team Recommendation: Short Term Rehab  Discharge Destination Plan[de-identified] Short Term Rehab  Transport at Discharge : [facility]  Dispatcher Contacted: Yes  Number/Name of Dispatcher: [facility]  Transported by [employer] and Unit #): Nabeel Schofield [facility]/[phone]  ETA of Transport (Date): 01/02/23  ETA of Transport (Time): 1500     Transfer Mode: Stretcher        IMM Given (Date):: 01/02/23  IMM Given to[de-identified] Family  Family notified[de-identified] lacie Mar Kevin  Additional Comments: Patient cleared for discharge to [facility]  Transportation arranged and confirmed via [address] [place] [address] at 1500  Patient, son Fidel Robert, provider, facility all aware of d/c plan

## 2023-01-02 NOTE — ASSESSMENT & PLAN NOTE
· Right hip pain with walking  · Right hip XR negative for fracture  · She continues to have pain with walking  · CT scan without acute fracture  · Pain control w/ scheduled tylenol and oxy prn  · PT/OT eval recommending rehab  · Clinically improving  · Likely rehab today

## 2023-01-02 NOTE — ASSESSMENT & PLAN NOTE
· Noted to have SVT in ED that self resolved  · Cardiology consulted as patient having irregular rhythm in tele  · Started on beta-blocker  · No repeated episodes on telemetry  · Stable, continue Toprol-XL

## 2023-01-03 NOTE — UTILIZATION REVIEW
NOTIFICATION OF ADMISSION DISCHARGE   This is a Notification of Discharge from [address]  Please be advised that this patient has been discharge from our facility  Below you will find the admission and discharge date and time including the patient’s disposition  UTILIZATION REVIEW CONTACT:  Lorena Obregon  Utilization   Network Utilization Review Department  Phone: [phone] x carefully listen to the prompts  All voicemails are confidential   Email: [email or web address]     ADMISSION INFORMATION  PRESENTATION DATE: 12/27/2022  1:10 PM  OBERVATION ADMISSION DATE:   INPATIENT ADMISSION DATE: 12/27/22  4:20 PM   DISCHARGE DATE: 1/2/2023  5:14 PM   DISPOSITION:Non [facility] SNF/TCU/SNU    IMPORTANT INFORMATION:  Send all requests for admission clinical reviews, approved or denied determinations and any other requests to dedicated fax number below belonging to the campus where the patient is receiving treatment   List of dedicated fax numbers:  [address] DENIALS (Administrative/Medical Necessity) [phone]   [address] (Maternity/NICU/Pediatrics) [phone]   [facility] [phone]   [place] [address] [phone]   [address] [phone]   220 [facility] [phone] 90 [facility] [phone]   [address] [place] [address] 119 [phone]   [facility]  [phone] 4058 [facility] [phone]   412 [facility] [address] [facility] [phone]

## 2023-01-04 ENCOUNTER — NURSING HOME VISIT (OUTPATIENT)
Dept: GERIATRICS | Facility: OTHER | Age: 88
End: 2023-01-04

## 2023-01-04 DIAGNOSIS — R26.2 AMBULATORY DYSFUNCTION: Primary | ICD-10-CM

## 2023-01-04 DIAGNOSIS — R60.0 LOWER EXTREMITY EDEMA: ICD-10-CM

## 2023-01-04 DIAGNOSIS — I47.1 PSVT (PAROXYSMAL SUPRAVENTRICULAR TACHYCARDIA) (HCC): ICD-10-CM

## 2023-01-04 PROBLEM — R26.89 BALANCE PROBLEM: Status: ACTIVE | Noted: 2017-01-01

## 2023-01-04 PROBLEM — K57.90 DIVERTICULOSIS OF INTESTINE, PART UNSPECIFIED, WITHOUT PERFORATION OR ABSCESS WITHOUT BLEEDING: Status: ACTIVE | Noted: 2018-12-05

## 2023-01-04 PROBLEM — G62.9 POLYNEUROPATHY, UNSPECIFIED: Status: ACTIVE | Noted: 2018-12-05

## 2023-01-04 PROBLEM — M62.81 GENERALIZED MUSCLE WEAKNESS: Status: ACTIVE | Noted: 2018-12-05

## 2023-01-04 PROBLEM — K59.00 CONSTIPATION, UNSPECIFIED: Status: ACTIVE | Noted: 2018-12-05

## 2023-01-04 PROBLEM — R41.3 MEMORY DEFICIT: Status: ACTIVE | Noted: 2017-01-01

## 2023-01-05 ENCOUNTER — NURSING HOME VISIT (OUTPATIENT)
Dept: GERIATRICS | Facility: OTHER | Age: 88
End: 2023-01-05

## 2023-01-05 DIAGNOSIS — I47.1 PSVT (PAROXYSMAL SUPRAVENTRICULAR TACHYCARDIA) (HCC): ICD-10-CM

## 2023-01-05 DIAGNOSIS — N18.31 STAGE 3A CHRONIC KIDNEY DISEASE (HCC): ICD-10-CM

## 2023-01-05 DIAGNOSIS — K59.00 CONSTIPATION, UNSPECIFIED CONSTIPATION TYPE: ICD-10-CM

## 2023-01-05 DIAGNOSIS — R60.0 LOWER EXTREMITY EDEMA: ICD-10-CM

## 2023-01-05 DIAGNOSIS — R26.2 AMBULATORY DYSFUNCTION: Primary | ICD-10-CM

## 2023-01-05 NOTE — ASSESSMENT & PLAN NOTE
Stable   Continue Lasix 40 mg in a m  20 mg in p m , only weights  BMP on 1/5 showed normal electrolytes  Repeat BMP in 1 week

## 2023-01-05 NOTE — PROGRESS NOTES
[facility]  [address]  [phone]  [place]  Code 31 (STR)          NAME: Wang Bryant  AGE: 80 y o  SEX: female     DATE OF ENCOUNTER: 1/5/2023    Assessment and Plan     1  Ambulatory dysfunction  Assessment & Plan:  Continue PT/OT at short term rehab  Recent work up for right hip pain negative for fx  No active reports of pain  Continues ambulation with walker use  Pain fall precautions      2  Lower extremity edema  Assessment & Plan:  Able  Continue daily Lasix  BMP on 1/5 showed normal electrolytes  Repeat BMP in 1 week      3  PSVT (paroxysmal supraventricular tachycardia) ([facility])  Assessment & Plan:  Stable  Continues on Toprol-XL  Continue to monitor lab work, vital signs  4  Stage 3a chronic kidney disease [facility])  Assessment & Plan:  Lab Results   Component Value Date    EGFR 40 12/31/2022    EGFR 50 12/28/2022    EGFR 43 12/27/2022    CREATININE 1 13 12/31/2022    CREATININE 0 95 12/28/2022    CREATININE 1 07 12/27/2022   In creatinine around 1 0-1 4  Labs reviewed today, current creatinine 1 16  Continue p o  intake, blood pressure, monitoring lab work      5  Constipation, unspecified constipation type  Assessment & Plan:  Stable  Last BM 1/4  Any as needed MiraLAX         All medications and routine orders were reviewed and updated as needed      Chief Complaint     STR follow up visit      Past Medical and Surgica History      Past Medical History:   Diagnosis Date   • Interstitial cystitis    • Leukopenia    • Neurologic gait dysfunction     Abstraction Dr Sharon Patricia charts   • Neuropathy    • Neutropenia [facility])     Abstraction Dr Angelica Brink   • Osteoarthritis    • Osteoporosis    • Peripheral edema     Abstraction Dr Sharon Patricia charts   • Renal cyst    • Syncope     Abstraction Dr Sharon Patricia charts     Past Surgical History:   Procedure Laterality Date   • CAPSULOTOMY      Right eye   • CATARACT EXTRACTION Left    • CHALAZION EXCISION     • COLONOSCOPY 2006     No Known Allergies       History of Present Illness     Mahin Jalloh is a 80year old female admitted to [facility] for short term rehab  PMH including but not limited to PSVT, neuropathy, venous stasis, CKD, constipation, memory deficit, seen in collaboration with nursing for medical mgmt and STR follow up  Hospital course:  She was admitted to [address] pathology from 12/27 through 1/2 for ambulatory dysfunction  Right hip pain negative for fracture  Had self resolved SVT started on metoprolol XL  She was discharged to short-term rehab    Rehab:  Continues with supportive care at short term rehab  At baseline she lives at home with her son, uses rolling walker, quires assistance with ADLs  Therapy notes reviewed, ambulated 50 feet with assistive device 2 wheeled walker  She demonstrates forward flexed posture , small step length and slow speed  Appetite fair/good, last BM 1/4  She reports some constipation but declined any pharmacological intervention, abdomen non-tender, no n/v   Labs remain stable setting of CKD and diuretic use  The patient's allergies, past medical, surgical, social and family history were reviewed and unchanged  Review of Systems     REVIEW OF SYSTEMS:  Per history of present illness, all other systems reviewed and negative  Objective     Vitals: /60, temp 97 2, HR 63, RR 18, O2 95%      Physical Exam  Constitutional:       General: She is not in acute distress  Appearance: Normal appearance  She is not ill-appearing  HENT:      Head: Normocephalic and atraumatic  Right Ear: External ear normal       Left Ear: External ear normal       Nose: Nose normal  No congestion or rhinorrhea  Mouth/Throat:      Mouth: Mucous membranes are moist       Pharynx: No oropharyngeal exudate or posterior oropharyngeal erythema  Eyes:      General:         Right eye: No discharge  Left eye: No discharge        Extraocular Movements: Extraocular movements intact  Conjunctiva/sclera: Conjunctivae normal       Pupils: Pupils are equal, round, and reactive to light  Cardiovascular:      Rate and Rhythm: Normal rate and regular rhythm  Pulses: Normal pulses  Heart sounds: Normal heart sounds  Pulmonary:      Effort: Pulmonary effort is normal  No respiratory distress  Breath sounds: Normal breath sounds  No wheezing  Comments: CTA  Abdominal:      General: Bowel sounds are normal  There is no distension  Palpations: Abdomen is soft  Tenderness: There is no abdominal tenderness  There is no guarding  Musculoskeletal:         General: No tenderness  Cervical back: Normal range of motion  Right lower leg: Edema present  Left lower leg: Edema present  Comments: B/L LE trace edema   Skin:     General: Skin is warm and dry  Findings: No bruising, erythema or rash  Neurological:      General: No focal deficit present  Mental Status: She is alert  Mental status is at baseline  Sensory: No sensory deficit  Motor: Weakness present  Gait: Gait abnormal       Comments: Stated year 2022, month January, place [address]   Psychiatric:         Mood and Affect: Mood normal          Behavior: Behavior normal          Pertinent Laboratory/Diagnostic Studies:    1/5 BMP: BUN 20, creatinine 1 16, sodium 140, K 4 2, EGFR 43    Current Medications   Medications reviewed and updated see facility [facility] for details  Please note:  Voice-recognition software may have been used in the preparation of this document  Occasional wrong word or "sound-alike" substitutions may have occurred due to the inherent limitations of voice recognition software  Interpretation should be guided by context           KERON Cartwright  1/5/2023  1:40 PM  Patient: Nicolette Marce

## 2023-01-05 NOTE — PROGRESS NOTES
[facility]  [address], [address] [facility] Admission    NAME: Nisa Wilkinson  AGE: 80 y o  SEX: female [id number]    DATE OF ENCOUNTER: 1-4-2023    Assessment/Plan:   Ambulatory dysfunction  Neg CT scan of Rt hip for fracture  Son says pt did not fall  PT/OT to eval/tx    S/p SVT  Pt had SVT in ER  Cont metoprolol succinate 25 mg 1 tab po daily    Chronic lower extremity edema  Furosemide 40 mg 1 tab po qam  Cont furosemide 20 mg in afternoon    CKD3  Cr 1 13 on 12/31/2022    Preventative Health  Cont vitamin D 25 mcg (1000 units) 1 tab po daily    Patient’s care was coordinated with nursing facility staff  Recent vitals, labs and updated medications were reviewed on [facility]  Past Medical, surgical, social, medication and allergy history and patient’s previous records reviewed  Chief Complaint     Needs physical therapy    HPI   Patient is a 80 y o  female with PMH HTN, CKD3, venous stasis of both lower extremities, neuropathy and osteoporosis  Pt admitted to [facility] on 12/27/2022 due to generalized weakness and Rt hip pain  Pt was unable to walk well  Pt had incidental finding of SVT and was started on metoprolol succinate 25 mg 1 tab po daily  Pt seen and examined with her son at bedside  Pt does not have pain right now  Discussed importance of physical therapy  Pt denies any palpitations         Past Medical History:   Diagnosis Date   • Interstitial cystitis    • Leukopenia    • Neurologic gait dysfunction     Abstraction Dr Babatunde Vinson charts   • Neuropathy    • Neutropenia [facility])     Abstraction Dr Aditya Okeefe   • Osteoarthritis    • Osteoporosis    • Peripheral edema     Abstraction Dr Babatunde Vinson charts   • Renal cyst    • Syncope     Abstraction Dr Babatunde Vinson charts       Past Surgical History:   Procedure Laterality Date   • CAPSULOTOMY      Right eye   • CATARACT EXTRACTION Left    • CHALAZION EXCISION     • COLONOSCOPY         Social History     Tobacco Use   Smoking Status Former   • Packs/day: 1 00   • Years: 20 00   • Pack years: 20 00   • Types: Cigarettes   • Quit date: 5   • Years since quittin 0   Smokeless Tobacco Never          Family History   Problem Relation Age of Onset   • Diabetes Mother    • Lung disease Father    • Cancer Maternal Grandfather    • Lung disease Sister         No Known Allergies       Current Outpatient Medications:   •  acetaminophen (TYLENOL) 325 mg tablet, Take 2 tablets (650 mg total) by mouth every 6 (six) hours as needed for mild pain (Patient not taking: No sig reported), Disp: 30 tablet, Rfl: 0  •  Cholecalciferol (VITAMIN D-3) 25 MCG (1000 UT) CAPS, Take by mouth daily, Disp: , Rfl:   •  furosemide (LASIX) 40 mg tablet, Take 1 tablet (40 mg) in the morning, and take 0 5 tablet (20 mg) in the afternoon  , Disp: 180 tablet, Rfl: 1  •  metoprolol succinate (TOPROL-XL) 25 mg 24 hr tablet, Take 1 tablet (25 mg total) by mouth daily Do not start before January 3, 2023 , Disp: , Rfl: 0  •  polyethylene glycol (MIRALAX) 17 g packet, Take 17 g by mouth daily as needed (Constipation), Disp: , Rfl: 0  •  potassium chloride (MICRO-K) 10 MEQ CR capsule, Take 1 capsule (10 mEq total) by mouth daily, Disp: 90 capsule, Rfl: 1    Updated list was reviewed in [facility]  There were no vitals filed for this visit  Vital signs were reviewed in [facility] care    Review of Systems   All other systems reviewed and are negative  Physical Exam  Constitutional:       General: She is not in acute distress  Appearance: She is not ill-appearing  HENT:      Head: Normocephalic and atraumatic  Cardiovascular:      Rate and Rhythm: Normal rate and regular rhythm  Pulses: Normal pulses  Dorsalis pedis pulses are 2+ on the right side and 2+ on the left side  Heart sounds: Normal heart sounds  No murmur heard    Pulmonary: Effort: No respiratory distress  Breath sounds: Normal breath sounds  No wheezing  Abdominal:      General: Bowel sounds are normal  There is no distension  Palpations: Abdomen is soft  Tenderness: There is no abdominal tenderness  Feet:      Comments: Cold feet; chronic trace purplish as cold feet  Neurological:      Mental Status: She is alert  Psychiatric:      Comments: Calm, cooperative       Diagnostic Data   Recent labs and imaging studies were reviewed  Code Status:    DNR/DNI  Additional notes:     This note was electronically signed by Dr Loetta Claude Yes

## 2023-01-05 NOTE — ASSESSMENT & PLAN NOTE
Lab Results   Component Value Date    EGFR 40 12/31/2022    EGFR 50 12/28/2022    EGFR 43 12/27/2022    CREATININE 1 13 12/31/2022    CREATININE 0 95 12/28/2022    CREATININE 1 07 12/27/2022   In creatinine around 1 0-1 4  Labs reviewed today, current creatinine 1 16  Continue p o  intake, blood pressure, monitoring lab work

## 2023-01-11 ENCOUNTER — NURSING HOME VISIT (OUTPATIENT)
Dept: GERIATRICS | Facility: OTHER | Age: 88
End: 2023-01-11

## 2023-01-11 DIAGNOSIS — R60.0 LOWER EXTREMITY EDEMA: ICD-10-CM

## 2023-01-11 DIAGNOSIS — K59.00 CONSTIPATION, UNSPECIFIED CONSTIPATION TYPE: ICD-10-CM

## 2023-01-11 DIAGNOSIS — R26.2 AMBULATORY DYSFUNCTION: Primary | ICD-10-CM

## 2023-01-11 DIAGNOSIS — I47.1 PSVT (PAROXYSMAL SUPRAVENTRICULAR TACHYCARDIA) (HCC): ICD-10-CM

## 2023-01-11 DIAGNOSIS — N18.31 STAGE 3A CHRONIC KIDNEY DISEASE (HCC): ICD-10-CM

## 2023-01-11 RX ORDER — METOPROLOL SUCCINATE 25 MG/1
25 TABLET, EXTENDED RELEASE ORAL DAILY
Qty: 30 TABLET | Refills: 0 | Status: SHIPPED | OUTPATIENT
Start: 2023-01-11 | End: 2023-02-10

## 2023-01-11 NOTE — ASSESSMENT & PLAN NOTE
At goal  Completed PT/OT at short term rehab  Discharge back home with assistance, HH, PT/OT, SN  Continue use of walker  Continue fall precautions

## 2023-01-11 NOTE — ASSESSMENT & PLAN NOTE
Stable  Continue Lasix, monitor labs - BMP today stable  Elevated legs, consider compression, monitor weight

## 2023-01-11 NOTE — LETTER
January 11, 2023     KERON Scott  [address] [facility] [address]    Patient: Reena Man   YOB: 1925   Date of Visit: 1/11/2023       Dear Parker Screws:    I had the pleasure of seeing Reena Man while at [facility] for short term rehab  Below are my notes for discharge  If you have questions, please do not hesitate to call me  I look forward to following your patient along with you  Sincerely,        KERON Archer        CC: No Recipients  Starr Archer  1/11/2023  3:00 PM  Sign when Signing Visit  [facility]  [address]  [phone]  Code 32    NAME: Reena Man  AGE: 80 y o  SEX: female     DATE OF ADMISSION: 1/2/2023   DATE OF DISCHARGE: 1/13/2023   DISCHARGE DISPOSITION: Stable for discharge to home with family support and home health PT/OT/SN services  Reason for Admission: Patient was admitted to [facility] for rehabilitation after hospitalization for ambulatory dysfunction, SVT  Past Medical and Surgical History:   Past Medical History:   Diagnosis Date   • Interstitial cystitis    • Leukopenia    • Neurologic gait dysfunction     Abstraction Dr Kathi Garcia charts   • Neuropathy    • Neutropenia [facility])     Abstraction Dr Alex Virgen   • Osteoarthritis    • Osteoporosis    • Peripheral edema     Abstraction Dr Kathi Garcia charts   • Renal cyst    • Syncope     Abstraction Dr Kathi Garcia charts      Past Surgical History:   Procedure Laterality Date   • CAPSULOTOMY      Right eye   • CATARACT EXTRACTION Left    • CHALAZION EXCISION     • COLONOSCOPY  2006       Course of stay:     Reena Man is a 80year old female admitted to [facility] for short term rehab   PMH including but not limited to PSVT, neuropathy, venous stasis, CKD, constipation, memory deficit, seen in collaboration with nursing for medical mgmt and STR follow up, and discharge       Hospital course:   She was admitted to [address] from 12/27 through 1/2 for ambulatory dysfunction  Right hip pain negative for fracture  Had self resolved SVT started on metoprolol XL  She was discharged to short-term rehab     Rehab:  Continues with supportive care at short term rehab  Completed course of PT/OT, continues to ambulate with rolling walker  At baseline she lives at home with her son, uses rolling walker, quires assistance with ADLs  No acute reports of right hip pain  SVT has remained controlled on metoprolol  SNF chart reviewed, appetite fair, eating 2-3 meals per day, last BM, 1/11    Discharge:  Coordinated by  with tentative plan to discharge home on 1/13 with [facility], PT/OT, SN, family to transport patient home  ROS:  Per history of present illness, all other systems reviewed and negative  PHYSICAL EXAM:    VITALS:/64, temp 97 1, HR 74, RR 16, O2 97% on room air  Weight 134 5lb    Physical Exam  Constitutional:       General: She is not in acute distress  Appearance: Normal appearance  She is not ill-appearing  HENT:      Head: Normocephalic and atraumatic  Right Ear: External ear normal       Left Ear: External ear normal       Nose: Nose normal  No congestion or rhinorrhea  Mouth/Throat:      Mouth: Mucous membranes are moist       Pharynx: No oropharyngeal exudate or posterior oropharyngeal erythema  Eyes:      General:         Right eye: No discharge  Left eye: No discharge  Extraocular Movements: Extraocular movements intact  Conjunctiva/sclera: Conjunctivae normal       Pupils: Pupils are equal, round, and reactive to light  Cardiovascular:      Rate and Rhythm: Normal rate and regular rhythm  Pulses: Normal pulses  Heart sounds: Normal heart sounds  Pulmonary:      Effort: Pulmonary effort is normal  No respiratory distress  Breath sounds: Normal breath sounds  No wheezing     Abdominal:      General: Bowel sounds are normal  There is no distension  Palpations: Abdomen is soft  Tenderness: There is no abdominal tenderness  There is no guarding  Musculoskeletal:         General: No tenderness  Cervical back: Normal range of motion and neck supple  No tenderness  Right lower leg: No edema  Left lower leg: No edema  Skin:     General: Skin is warm and dry  Findings: No bruising, erythema or rash  Neurological:      General: No focal deficit present  Mental Status: She is alert and oriented to person, place, and time  Mental status is at baseline  Sensory: No sensory deficit  Motor: Weakness present  Gait: Gait abnormal       Comments: Stated the year was 2022   Psychiatric:         Mood and Affect: Mood normal          Behavior: Behavior normal          Admission Diagnoses:   1  Ambulatory dysfunction  Assessment & Plan:  At goal  Completed PT/OT at short term rehab  Discharge back home with assistance, HH, PT/OT, SN  Continue use of walker  Continue fall precautions      2  Lower extremity edema  Assessment & Plan:  Stable  Continue Lasix, monitor labs - BMP today stable  Elevated legs, consider compression, monitor weight      3  PSVT (paroxysmal supraventricular tachycardia) (HCC)  Assessment & Plan:  Stable  HR has remained in 60s-80s   Continue Toprol XL    Orders:  -     metoprolol succinate (TOPROL-XL) 25 mg 24 hr tablet; Take 1 tablet (25 mg total) by mouth daily    4  Stage 3a chronic kidney disease [facility])  Assessment & Plan:  Lab Results   Component Value Date    EGFR 40 12/31/2022    EGFR 50 12/28/2022    EGFR 43 12/27/2022    CREATININE 1 13 12/31/2022    CREATININE 0 95 12/28/2022    CREATININE 1 07 12/27/2022   Baseline creatinine 1 0-1 4  BMP reviewed today, creatinine 1 05  Continue to avoid NSAIDS, nephrotoxins,      5   Constipation, unspecified constipation type  Assessment & Plan:  Stable  Last BM today  Continue daily Miralax PRN         Follow-up Recommendations:    • Outpatient Follow up with PCP in the next 2 weeks  • Home Health PT/OT/SN services     Labs and testing performed during stay:    1/11 BMP: BUN 19, creatinine 1 05, sodium 137, potassium 4 0, EGFR 48    Discharge Medications: See discharge medication list which was reviewed and signed  Current Outpatient Medications:   •  Diclofenac Sodium (VOLTAREN) 1 %, Apply 2 g topically 2 (two) times a day Apply to right hip, Disp: , Rfl:   •  metoprolol succinate (TOPROL-XL) 25 mg 24 hr tablet, Take 1 tablet (25 mg total) by mouth daily, Disp: 30 tablet, Rfl: 0  •  acetaminophen (TYLENOL) 325 mg tablet, Take 2 tablets (650 mg total) by mouth every 6 (six) hours as needed for mild pain (Patient not taking: No sig reported), Disp: 30 tablet, Rfl: 0  •  Cholecalciferol (VITAMIN D-3) 25 MCG (1000 UT) CAPS, Take by mouth daily, Disp: , Rfl:   •  furosemide (LASIX) 40 mg tablet, Take 1 tablet (40 mg) in the morning, and take 0 5 tablet (20 mg) in the afternoon  , Disp: 180 tablet, Rfl: 1  •  polyethylene glycol (MIRALAX) 17 g packet, Take 17 g by mouth daily as needed (Constipation), Disp: , Rfl: 0  •  potassium chloride (MICRO-K) 10 MEQ CR capsule, Take 1 capsule (10 mEq total) by mouth daily, Disp: 90 capsule, Rfl: 1     Discussion with patient/family and further instructions:  -Fall precautions  -Aspiration precautions  -Bleeding precautions  -Monitor for signs/symptoms of infection  -Medication list was reviewed and updated      Status at time of discharge: Stable    Billing based on time  Time spent on unit, 40 minutes  Time spent counseling pt on debility/condition, 30 minutes  Please note:  Voice-recognition software may have been used in the preparation of this document  Occasional wrong word or "sound-alike" substitutions may have occurred due to the inherent limitations of voice recognition software  Interpretation should be guided by context          KERON Urias  1/11/2023 11:20 MARU  Patient: Pati Holguin

## 2023-01-11 NOTE — ASSESSMENT & PLAN NOTE
Lab Results   Component Value Date    EGFR 40 12/31/2022    EGFR 50 12/28/2022    EGFR 43 12/27/2022    CREATININE 1 13 12/31/2022    CREATININE 0 95 12/28/2022    CREATININE 1 07 12/27/2022   Baseline creatinine 1 0-1 4  BMP reviewed today, creatinine 1 05  Continue to avoid NSAIDS, nephrotoxins,

## 2023-01-11 NOTE — PROGRESS NOTES
[facility]  [address]  [phone]  [address]  Code 32    NAME: Beckie Turner  AGE: 80 y o  SEX: female     DATE OF ADMISSION: 1/2/2023   DATE OF DISCHARGE: 1/13/2023   DISCHARGE DISPOSITION: Stable for discharge to home with family support and home health PT/OT/SN services  Reason for Admission: Patient was admitted to [facility] for rehabilitation after hospitalization for ambulatory dysfunction, SVT  Past Medical and Surgical History:   Past Medical History:   Diagnosis Date   • Interstitial cystitis    • Leukopenia    • Neurologic gait dysfunction     Abstraction Dr Nancy Bermeo charts   • Neuropathy    • Neutropenia [facility])     Abstraction Dr Sae Godoy   • Osteoarthritis    • Osteoporosis    • Peripheral edema     Abstraction Dr Nancy Bermeo charts   • Renal cyst    • Syncope     Abstraction Dr Nancy Bermeo charts      Past Surgical History:   Procedure Laterality Date   • CAPSULOTOMY      Right eye   • CATARACT EXTRACTION Left    • CHALAZION EXCISION     • COLONOSCOPY  2006       Course of stay:     Beckie Turner is a 80year old female admitted to [facility] for short term rehab  PMH including but not limited to PSVT, neuropathy, venous stasis, CKD, constipation, memory deficit, seen in collaboration with nursing for medical mgmt and STR follow up, and discharge       Hospital course:   She was admitted to [address] from 12/27 through 1/2 for ambulatory dysfunction  Right hip pain negative for fracture  Had self resolved SVT started on metoprolol XL  She was discharged to short-term rehab     Rehab:  Continues with supportive care at short term rehab  Completed course of PT/OT, continues to ambulate with rolling walker  At baseline she lives at home with her son, uses rolling walker, quires assistance with ADLs  No acute reports of right hip pain  SVT has remained controlled on metoprolol    SNF chart reviewed, appetite fair, eating 2-3 meals per day, last BM, 1/11    Discharge:  Coordinated by  with tentative plan to discharge home on 1/13 with [facility], PT/OT, SN, family to transport patient home  ROS:  Per history of present illness, all other systems reviewed and negative  PHYSICAL EXAM:    VITALS:/64, temp 97 1, HR 74, RR 16, O2 97% on room air  Weight 134 5lb    Physical Exam  Constitutional:       General: She is not in acute distress  Appearance: Normal appearance  She is not ill-appearing  HENT:      Head: Normocephalic and atraumatic  Right Ear: External ear normal       Left Ear: External ear normal       Nose: Nose normal  No congestion or rhinorrhea  Mouth/Throat:      Mouth: Mucous membranes are moist       Pharynx: No oropharyngeal exudate or posterior oropharyngeal erythema  Eyes:      General:         Right eye: No discharge  Left eye: No discharge  Extraocular Movements: Extraocular movements intact  Conjunctiva/sclera: Conjunctivae normal       Pupils: Pupils are equal, round, and reactive to light  Cardiovascular:      Rate and Rhythm: Normal rate and regular rhythm  Pulses: Normal pulses  Heart sounds: Normal heart sounds  Pulmonary:      Effort: Pulmonary effort is normal  No respiratory distress  Breath sounds: Normal breath sounds  No wheezing  Abdominal:      General: Bowel sounds are normal  There is no distension  Palpations: Abdomen is soft  Tenderness: There is no abdominal tenderness  There is no guarding  Musculoskeletal:         General: No tenderness  Cervical back: Normal range of motion and neck supple  No tenderness  Right lower leg: No edema  Left lower leg: No edema  Skin:     General: Skin is warm and dry  Findings: No bruising, erythema or rash  Neurological:      General: No focal deficit present  Mental Status: She is alert and oriented to person, place, and time   Mental status is at baseline  Sensory: No sensory deficit  Motor: Weakness present  Gait: Gait abnormal       Comments: Stated the year was 2022   Psychiatric:         Mood and Affect: Mood normal          Behavior: Behavior normal          Admission Diagnoses:   1  Ambulatory dysfunction  Assessment & Plan:  At goal  Completed PT/OT at short term rehab  Discharge back home with assistance, HH, PT/OT, SN  Continue use of walker  Continue fall precautions      2  Lower extremity edema  Assessment & Plan:  Stable  Continue Lasix, monitor labs - BMP today stable  Elevated legs, consider compression, monitor weight      3  PSVT (paroxysmal supraventricular tachycardia) ([facility])  Assessment & Plan:  Stable  HR has remained in 60s-80s   Continue Toprol XL    Orders:  -     metoprolol succinate (TOPROL-XL) 25 mg 24 hr tablet; Take 1 tablet (25 mg total) by mouth daily    4  Stage 3a chronic kidney disease [facility])  Assessment & Plan:  Lab Results   Component Value Date    EGFR 40 12/31/2022    EGFR 50 12/28/2022    EGFR 43 12/27/2022    CREATININE 1 13 12/31/2022    CREATININE 0 95 12/28/2022    CREATININE 1 07 12/27/2022   Baseline creatinine 1 0-1 4  BMP reviewed today, creatinine 1 05  Continue to avoid NSAIDS, nephrotoxins,      5  Constipation, unspecified constipation type  Assessment & Plan:  Stable  Last BM today  Continue daily Miralax PRN         Follow-up Recommendations:    • Outpatient Follow up with PCP in the next 2 weeks  • Home Health PT/OT/SN services     Labs and testing performed during stay:    1/11 BMP: BUN 19, creatinine 1 05, sodium 137, potassium 4 0, EGFR 48    Discharge Medications: See discharge medication list which was reviewed and signed        Current Outpatient Medications:   •  Diclofenac Sodium (VOLTAREN) 1 %, Apply 2 g topically 2 (two) times a day Apply to right hip, Disp: , Rfl:   •  metoprolol succinate (TOPROL-XL) 25 mg 24 hr tablet, Take 1 tablet (25 mg total) by mouth daily, Disp: 30 tablet, Rfl: 0  •  acetaminophen (TYLENOL) 325 mg tablet, Take 2 tablets (650 mg total) by mouth every 6 (six) hours as needed for mild pain (Patient not taking: No sig reported), Disp: 30 tablet, Rfl: 0  •  Cholecalciferol (VITAMIN D-3) 25 MCG (1000 UT) CAPS, Take by mouth daily, Disp: , Rfl:   •  furosemide (LASIX) 40 mg tablet, Take 1 tablet (40 mg) in the morning, and take 0 5 tablet (20 mg) in the afternoon  , Disp: 180 tablet, Rfl: 1  •  polyethylene glycol (MIRALAX) 17 g packet, Take 17 g by mouth daily as needed (Constipation), Disp: , Rfl: 0  •  potassium chloride (MICRO-K) 10 MEQ CR capsule, Take 1 capsule (10 mEq total) by mouth daily, Disp: 90 capsule, Rfl: 1     Discussion with patient/family and further instructions:  -Fall precautions  -Aspiration precautions  -Bleeding precautions  -Monitor for signs/symptoms of infection  -Medication list was reviewed and updated      Status at time of discharge: Stable    Billing based on time  Time spent on unit, 40 minutes  Time spent counseling pt on debility/condition, 30 minutes  Please note:  Voice-recognition software may have been used in the preparation of this document  Occasional wrong word or "sound-alike" substitutions may have occurred due to the inherent limitations of voice recognition software  Interpretation should be guided by KERON Dale  1/11/2023 11:20 AM  Patient: [place]

## 2023-01-16 ENCOUNTER — APPOINTMENT (EMERGENCY)
Dept: RADIOLOGY | Facility: HOSPITAL | Age: 88
End: 2023-01-16

## 2023-01-16 ENCOUNTER — HOSPITAL ENCOUNTER (EMERGENCY)
Facility: HOSPITAL | Age: 88
Discharge: HOME | End: 2023-01-16
Attending: EMERGENCY MEDICINE

## 2023-01-16 VITALS
DIASTOLIC BLOOD PRESSURE: 77 MMHG | SYSTOLIC BLOOD PRESSURE: 133 MMHG | OXYGEN SATURATION: 92 % | HEART RATE: 83 BPM | TEMPERATURE: 98.4 F | RESPIRATION RATE: 16 BRPM

## 2023-01-16 DIAGNOSIS — M25.559 HIP PAIN: ICD-10-CM
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
## 2023-01-16 RX ORDER — ACETAMINOPHEN 325 MG/1
650 TABLET ORAL ONCE
Status: COMPLETED | OUTPATIENT
Start: 2023-01-16 | End: 2023-01-16

## 2023-01-16 RX ADMIN — ACETAMINOPHEN 650 MG: 325 TABLET, FILM COATED ORAL at 04:58

## 2023-01-16 NOTE — ED ATTENDING ATTESTATION
1/16/2023  INatalya MD, saw and evaluated the patient  I have discussed the patient with the resident/non-physician practitioner and agree with the resident's/non-physician practitioner's findings, Plan of Care, and MDM as documented in the resident's/non-physician practitioner's note, except where noted  All available labs and Radiology studies were reviewed  I was present for key portions of any procedure(s) performed by the resident/non-physician practitioner and I was immediately available to provide assistance  At this point I agree with the current assessment done in the Emergency Department  I have conducted an independent evaluation of this patient a history and physical is as follows:    ED Course     Emergency Department Note- Yen Palacio 80 y o  female MRN: [id number]    Unit/Bed#: ED 13 Encounter: [id number]    Yen Palacio is a 80 y o  female who presents with   Chief Complaint   Patient presents with   • Fall     Per EMS, pt fell out of bed between the bed and the wall  EMS states unknown HS, -LOC, -thinners         History of Present Illness   HPI:  Yen Palacio is a 80 y o  female who presents for evaluation of:  Fall out of bed and became lodged between bed and wall  Patient was not able to get to her walker and called EMS  In the ED, patient denies any specific complaints; she denies any chest, abdomen, back, neck, lower extremity, and upper extremity pain  She denies head strike  She does not take any anticoagulation or antiplatelet medications  Patient does note some right hip and pelvis pain  Review of Systems   Constitutional: Negative for fatigue and fever  HENT: Negative for congestion and sore throat  Respiratory: Negative for cough and shortness of breath  Cardiovascular: Negative for chest pain and palpitations  Gastrointestinal: Negative for abdominal pain and nausea  Genitourinary: Negative for flank pain and frequency     Neurological: Negative for light-headedness and headaches  Psychiatric/Behavioral: Negative for dysphoric mood and hallucinations  All other systems reviewed and are negative  Historical Information   Past Medical History:   Diagnosis Date   • Interstitial cystitis    • Leukopenia    • Neurologic gait dysfunction     Abstraction Dr Karissa Manzanares charts   • Neuropathy    • Neutropenia (HCC)     Abstraction Dr Thomas Avendaño   • Osteoarthritis    • Osteoporosis    • Peripheral edema     Abstraction Dr Thomas Avendaño   • Renal cyst    • Syncope     Abstraction Dr Karissa Manzanares charts     Past Surgical History:   Procedure Laterality Date   • CAPSULOTOMY      Right eye   • CATARACT EXTRACTION Left    • CHALAZION EXCISION     • COLONOSCOPY       Social History   Social History     Substance and Sexual Activity   Alcohol Use Yes    Comment: socially     Social History     Substance and Sexual Activity   Drug Use No     Social History     Tobacco Use   Smoking Status Former   • Packs/day: 1 00   • Years: 20    • Pack years:    • Types: Cigarettes   • Quit date:    • Years since quittin 0   Smokeless Tobacco Never     Family History:   Family History   Problem Relation Age of Onset   • Diabetes Mother    • Lung disease Father    • Cancer Maternal Grandfather    • Lung disease Sister        Meds/Allergies   PTA meds:   Prior to Admission Medications   Prescriptions Last Dose Informant Patient Reported? Taking? Cholecalciferol (VITAMIN D-3) 25 MCG (1000 UT) CAPS   Yes No   Sig: Take by mouth daily   Diclofenac Sodium (VOLTAREN) 1 %   Yes No   Sig: Apply 2 g topically 2 (two) times a day Apply to right hip   acetaminophen (TYLENOL) 325 mg tablet   No No   Sig: Take 2 tablets (650 mg total) by mouth every 6 (six) hours as needed for mild pain   Patient not taking: No sig reported   furosemide (LASIX) 40 mg tablet   No No   Sig: Take 1 tablet (40 mg) in the morning, and take 0 5 tablet (20 mg) in the afternoon     metoprolol succinate (TOPROL-XL) 25 mg 24 hr tablet   No No   Sig: Take 1 tablet (25 mg total) by mouth daily   polyethylene glycol (MIRALAX) 17 g packet   No No   Sig: Take 17 g by mouth daily as needed (Constipation)   potassium chloride (MICRO-K) 10 MEQ CR capsule   No No   Sig: Take 1 capsule (10 mEq total) by mouth daily      Facility-Administered Medications: None     No Known Allergies    Objective   First Vitals:   Blood Pressure: 133/77 (01/16/23 0425)  Pulse: 83 (01/16/23 0425)  Temperature: 98 4 °F (36 9 °C) (01/16/23 0425)  Temp Source: Tympanic (01/16/23 0425)  Respirations: 16 (01/16/23 0425)  SpO2: 92 % (01/16/23 0425)    Current Vitals:   Blood Pressure: 133/77 (01/16/23 0425)  Pulse: 83 (01/16/23 0425)  Temperature: 98 4 °F (36 9 °C) (01/16/23 0425)  Temp Source: Tympanic (01/16/23 0425)  Respirations: 16 (01/16/23 0425)  SpO2: 92 % (01/16/23 0425)    No intake or output data in the 24 hours ending 01/16/23 0519    Invasive Devices     None                 Physical Exam  Vitals and nursing note reviewed  Constitutional:       General: She is not in acute distress  Appearance: Normal appearance  She is well-developed  HENT:      Head: Normocephalic and atraumatic  Right Ear: External ear normal       Left Ear: External ear normal       Nose: Nose normal       Mouth/Throat:      Pharynx: No oropharyngeal exudate  Eyes:      Conjunctiva/sclera: Conjunctivae normal       Pupils: Pupils are equal, round, and reactive to light  Cardiovascular:      Rate and Rhythm: Normal rate and regular rhythm  Pulmonary:      Effort: Pulmonary effort is normal  No respiratory distress  Abdominal:      General: Abdomen is flat  There is no distension  Palpations: Abdomen is soft  Musculoskeletal:         General: No deformity  Normal range of motion  Cervical back: Normal range of motion and neck supple  Skin:     General: Skin is warm and dry        Capillary Refill: Capillary refill takes less than 2 seconds  Neurological:      General: No focal deficit present  Mental Status: She is alert and oriented to person, place, and time  Mental status is at baseline  Coordination: Coordination normal    Psychiatric:         Mood and Affect: Mood normal          Behavior: Behavior normal          Thought Content: Thought content normal          Judgment: Judgment normal            Medical Decision Makin  Right hip and pelvis pain post fall: X-ray rule out fracture  No results found for this or any previous visit (from the past 36 hour(s))  XR hip/pelv 2-3 vws right    (Results Pending)         Portions of the record may have been created with voice recognition software  Occasional wrong word or "sound a like" substitutions may have occurred due to the inherent limitations of voice recognition software  Read the chart carefully and recognize, using context, where substitutions have occurred          Critical Care Time  Procedures

## 2023-01-16 NOTE — ED PROVIDER NOTES
History  Chief Complaint   Patient presents with   • Fall     Per EMS, pt fell out of bed between the bed and the wall  EMS states unknown HS, -LOC, -thinners     HPI    79 yo female presenting for evaluation after a fall  Patient states that she slid out of bed and got stuck between her bed and the wall  He called EMS for assistance  At time of evaluation the patient states that she does not have any pain  Denies head strike, loss of consciousness  Patient is not on anticoagulation or antiplatelets  Otherwise feels well, states that she was unable to get away from the bed, wall because she did not have her walker nearby  Prior to Admission Medications   Prescriptions Last Dose Informant Patient Reported? Taking? Cholecalciferol (VITAMIN D-3) 25 MCG (1000 UT) CAPS   Yes No   Sig: Take by mouth daily   Diclofenac Sodium (VOLTAREN) 1 %   Yes No   Sig: Apply 2 g topically 2 (two) times a day Apply to right hip   acetaminophen (TYLENOL) 325 mg tablet   No No   Sig: Take 2 tablets (650 mg total) by mouth every 6 (six) hours as needed for mild pain   Patient not taking: No sig reported   furosemide (LASIX) 40 mg tablet   No No   Sig: Take 1 tablet (40 mg) in the morning, and take 0 5 tablet (20 mg) in the afternoon     metoprolol succinate (TOPROL-XL) 25 mg 24 hr tablet   No No   Sig: Take 1 tablet (25 mg total) by mouth daily   polyethylene glycol (MIRALAX) 17 g packet   No No   Sig: Take 17 g by mouth daily as needed (Constipation)   potassium chloride (MICRO-K) 10 MEQ CR capsule   No No   Sig: Take 1 capsule (10 mEq total) by mouth daily      Facility-Administered Medications: None       Past Medical History:   Diagnosis Date   • Interstitial cystitis    • Leukopenia    • Neurologic gait dysfunction     Abstraction Dr Ana Castillo charts   • Neuropathy    • Neutropenia [facility])     Abstraction Dr Ana Castillo charts   • Osteoarthritis    • Osteoporosis    • Peripheral edema     Abstraction Dr Ana Castillo charts   • Renal cyst    • Syncope     Abstraction Dr Ingrid Hansen charts       Past Surgical History:   Procedure Laterality Date   • CAPSULOTOMY      Right eye   • CATARACT EXTRACTION Left    • CHALAZION EXCISION     • COLONOSCOPY  2006       Family History   Problem Relation Age of Onset   • Diabetes Mother    • Lung disease Father    • Cancer Maternal Grandfather    • Lung disease Sister      I have reviewed and agree with the history as documented  E-Cigarette/Vaping   • E-Cigarette Use Never User      E-Cigarette/Vaping Substances   • Nicotine No    • THC No    • CBD No    • Flavoring No    • Other No    • Unknown No      Social History     Tobacco Use   • Smoking status: Former     Packs/day: 1 00     Years: 20 00     Pack years: 20 00     Types: Cigarettes     Quit date: 5     Years since quittin 0   • Smokeless tobacco: Never   Vaping Use   • Vaping Use: Never used   Substance Use Topics   • Alcohol use: Yes     Comment: socially   • Drug use: No        Review of Systems   Respiratory: Negative for shortness of breath  Cardiovascular: Negative for chest pain  Gastrointestinal: Negative for abdominal pain  Musculoskeletal: Positive for gait problem  Negative for back pain and neck pain  Neurological: Negative for weakness, numbness and headaches  All other systems reviewed and are negative  Physical Exam  ED Triage Vitals   Temperature Pulse Respirations Blood Pressure SpO2   23 0425 23 0425 23 0425 23 0425 23 042   98 4 °F (36 9 °C) 83 16 133/77 92 %      Temp Source Heart Rate Source Patient Position - Orthostatic VS BP Location FiO2 (%)   23 0425 23 0425 23 0425 23 042 --   Tympanic Monitor Lying Right arm       Pain Score       23 0458       3             Orthostatic Vital Signs  Vitals:    23   BP: 133/77   Pulse: 83   Patient Position - Orthostatic VS: Lying       Physical Exam  Vitals and nursing note reviewed  Constitutional:       General: She is not in acute distress  Appearance: Normal appearance  She is not ill-appearing or toxic-appearing  HENT:      Head: Normocephalic and atraumatic  Right Ear: External ear normal       Left Ear: External ear normal       Nose: Nose normal       Mouth/Throat:      Mouth: Mucous membranes are moist       Pharynx: Oropharynx is clear  Eyes:      General: No scleral icterus  Extraocular Movements: Extraocular movements intact  Cardiovascular:      Rate and Rhythm: Normal rate and regular rhythm  Pulses: Normal pulses  Pulmonary:      Effort: Pulmonary effort is normal  No respiratory distress  Breath sounds: Normal breath sounds  Abdominal:      Palpations: Abdomen is soft  Tenderness: There is no abdominal tenderness  There is no guarding or rebound  Musculoskeletal:         General: Normal range of motion  Cervical back: Normal range of motion and neck supple  Comments: Some pain with range of motion of the right upper extremity  Skin:     General: Skin is warm  Capillary Refill: Capillary refill takes less than 2 seconds  Neurological:      General: No focal deficit present  Mental Status: She is alert and oriented to person, place, and time  ED Medications  Medications   acetaminophen (TYLENOL) tablet 650 mg (650 mg Oral Given 1/16/23 0458)       Diagnostic Studies  Results Reviewed     None                 XR hip/pelv 2-3 vws right   ED Interpretation by Kirstie Javier DO (01/16 0600)   No acute fracture/ dislocation            Procedures  Procedures      ED Course                             SBIRT 22yo+    Flowsheet Row Most Recent Value   SBIRT (25 yo +)    In order to provide better care to our patients, we are screening all of our patients for alcohol and drug use  Would it be okay to ask you these screening questions?  Unable to answer at this time Filed at: 01/16/2023 [facility] [address] Decision Making  44-year-old female presenting for evaluation after a fall  Patient denies head strike, has no complaints but has minimal pain with range of motion of her right hip  Will treat symptomatically, image the right hip  I considered scanning the patient's head, neck but patient denies head strike, has no headache, neck pain, neck tenderness, full active and passive range of motion of the neck, we will forego head and neck imaging at this time  Patient's hip x-ray was interpreted by myself, significant for arthritis but no acute fracture or dislocation  Also discussed with the patient and her son over the phone  Patient is stable for discharge, advised outpatient follow-up with her primary care physician and gave strict return to ED precautions  All questions were answered at the time  I reviewed all testing with the patient: xray  I gave oral return precautions for what to return for in addition to the written return precautions  The patient (and any family present: son) verbalized understanding of the discharge instructions and warnings that would necessitate return to the Emergency Department  I specifically highlighted areas of special concern regarding the written and verbal discharge instructions and return precautions  All questions were answered prior to discharge '    Fall, initial encounter: self-limited or minor problem  Hip pain: acute illness or injury  Amount and/or Complexity of Data Reviewed  Independent Historian: guardian  Radiology: ordered and independent interpretation performed  Risk  OTC drugs  Disposition  Final diagnoses:   Fall, initial encounter   Hip pain     Time reflects when diagnosis was documented in both MDM as applicable and the Disposition within this note     Time User Action Codes Description Comment    1/16/2023  6:00 AM Seble Phi Add [W19  DZTU] Fall, initial encounter     1/16/2023  6:00 AM Seble Phi Add [M25 559] Hip pain       ED Disposition     ED Disposition   Discharge    Condition   Stable    Date/Time   Mon Jan 16, 2023  6:00 AM    Comment   Ashleigh Jordan discharge to home/self care  Follow-up Information     Follow up With Specialties Details Why Contact Info Additional Information    Katie Carey Norman, [address], Nurse Practitioner  For Emergency Department Follow-up [address] [facility] [address]       [facility] [facility] Emergency Department Emergency Medicine  If symptoms worsen [address] [phone]  [address] [facility] [address] [facility] Emergency Department, [address], [place], [place], [address]          Discharge Medication List as of 1/16/2023  6:01 AM      CONTINUE these medications which have NOT CHANGED    Details   acetaminophen (TYLENOL) 325 mg tablet Take 2 tablets (650 mg total) by mouth every 6 (six) hours as needed for mild pain, Starting Tue 8/25/2020, Normal      Cholecalciferol (VITAMIN D-3) 25 MCG (1000 UT) CAPS Take by mouth daily, Historical Med      Diclofenac Sodium (VOLTAREN) 1 % Apply 2 g topically 2 (two) times a day Apply to right hip, Historical Med      furosemide (LASIX) 40 mg tablet Take 1 tablet (40 mg) in the morning, and take 0 5 tablet (20 mg) in the afternoon , Normal      metoprolol succinate (TOPROL-XL) 25 mg 24 hr tablet Take 1 tablet (25 mg total) by mouth daily, Starting Wed 1/11/2023, Until Fri 2/10/2023, Normal      polyethylene glycol (MIRALAX) 17 g packet Take 17 g by mouth daily as needed (Constipation), Starting Mon 1/2/2023, No Print      potassium chloride (MICRO-K) 10 MEQ CR capsule Take 1 capsule (10 mEq total) by mouth daily, Starting Sat 10/1/2022, Normal           No discharge procedures on file      PDMP Review       Value Time User    PDMP Reviewed  Yes 8/25/2020 10:52 AM Stephani Gaitan PA-C           ED Provider  Attending physically available and kaycee Mack  I managed the patient along with the ED Attending      Electronically Signed by         Ousmane Bailey DO  01/16/23 5269

## 2023-01-16 NOTE — DISCHARGE INSTRUCTIONS
Your imaging was negative today for acute injury  Please use the following pain medications as prescribed:  - Tylenol 650mg every 6 hours    Follow up with your primary care physician  Return to the ED if symptoms worsen

## 2023-01-24 ENCOUNTER — TELEPHONE (OUTPATIENT)
Dept: OTHER | Facility: OTHER | Age: 88
End: 2023-01-24

## 2023-01-24 NOTE — TELEPHONE ENCOUNTER
Pt is limited to right flank pain out put has also decreased  Please give her a call back with any questions

## 2023-01-25 ENCOUNTER — APPOINTMENT (INPATIENT)
Dept: RADIOLOGY | Facility: HOSPITAL | Age: 88
End: 2023-01-25

## 2023-01-25 ENCOUNTER — TELEPHONE (OUTPATIENT)
Dept: FAMILY MEDICINE CLINIC | Facility: CLINIC | Age: 88
End: 2023-01-25

## 2023-01-25 ENCOUNTER — HOSPITAL ENCOUNTER (INPATIENT)
Facility: HOSPITAL | Age: 88
LOS: 4 days | Discharge: NURSING, REHAB OR LONG-TERM CARE | End: 2023-01-29
Attending: EMERGENCY MEDICINE | Admitting: STUDENT IN AN ORGANIZED HEALTH CARE EDUCATION/TRAINING PROGRAM

## 2023-01-25 ENCOUNTER — APPOINTMENT (EMERGENCY)
Dept: RADIOLOGY | Facility: HOSPITAL | Age: 88
End: 2023-01-25

## 2023-01-25 DIAGNOSIS — T68.XXXA HYPOTHERMIA, INITIAL ENCOUNTER: ICD-10-CM

## 2023-01-25 DIAGNOSIS — M54.9 RIGHT-SIDED BACK PAIN: ICD-10-CM

## 2023-01-25 DIAGNOSIS — M25.559 HIP PAIN: ICD-10-CM

## 2023-01-25 DIAGNOSIS — I82.4Z3 LOWER LEG DVT (DEEP VENOUS THROMBOEMBOLISM), ACUTE, BILATERAL (HCC): ICD-10-CM

## 2023-01-25 DIAGNOSIS — K57.90 DIVERTICULOSIS: ICD-10-CM

## 2023-01-25 DIAGNOSIS — R26.2 AMBULATORY DYSFUNCTION: ICD-10-CM

## 2023-01-25 DIAGNOSIS — I26.99 PULMONARY EMBOLISM (HCC): Primary | ICD-10-CM

## 2023-01-25 LAB
ALBUMIN SERPL BCP-MCNC: 2.7 G/DL (ref 3.5–5)
ALP SERPL-CCNC: 78 U/L (ref 46–116)
ALT SERPL W P-5'-P-CCNC: 15 U/L (ref 12–78)
ANION GAP SERPL CALCULATED.3IONS-SCNC: 6 MMOL/L (ref 4–13)
APTT PPP: 31 SECONDS (ref 23–37)
APTT PPP: 76 SECONDS (ref 23–37)
AST SERPL W P-5'-P-CCNC: 9 U/L (ref 5–45)
ATRIAL RATE: 234 BPM
BASOPHILS # BLD AUTO: 0 THOUSANDS/ÂΜL (ref 0–0.1)
BASOPHILS NFR BLD AUTO: 0 % (ref 0–1)
BILIRUB SERPL-MCNC: 1.07 MG/DL (ref 0.2–1)
BUN SERPL-MCNC: 26 MG/DL (ref 5–25)
CALCIUM ALBUM COR SERPL-MCNC: 10.1 MG/DL (ref 8.3–10.1)
CALCIUM SERPL-MCNC: 9.1 MG/DL (ref 8.3–10.1)
CHLORIDE SERPL-SCNC: 104 MMOL/L (ref 96–108)
CO2 SERPL-SCNC: 27 MMOL/L (ref 21–32)
CREAT SERPL-MCNC: 1.35 MG/DL (ref 0.6–1.3)
CRP SERPL QL: 287 MG/L
EOSINOPHIL # BLD AUTO: 0.02 THOUSAND/ÂΜL (ref 0–0.61)
EOSINOPHIL NFR BLD AUTO: 0 % (ref 0–6)
ERYTHROCYTE [DISTWIDTH] IN BLOOD BY AUTOMATED COUNT: 13 % (ref 11.6–15.1)
ERYTHROCYTE [DISTWIDTH] IN BLOOD BY AUTOMATED COUNT: 13 % (ref 11.6–15.1)
ERYTHROCYTE [SEDIMENTATION RATE] IN BLOOD: 53 MM/HOUR (ref 0–29)
GFR SERPL CREATININE-BSD FRML MDRD: 32 ML/MIN/1.73SQ M
GLUCOSE SERPL-MCNC: 114 MG/DL (ref 65–140)
HCT VFR BLD AUTO: 38.9 % (ref 34.8–46.1)
HCT VFR BLD AUTO: 43.5 % (ref 34.8–46.1)
HGB BLD-MCNC: 12.6 G/DL (ref 11.5–15.4)
HGB BLD-MCNC: 13.8 G/DL (ref 11.5–15.4)
IMM GRANULOCYTES # BLD AUTO: 0.05 THOUSAND/UL (ref 0–0.2)
IMM GRANULOCYTES NFR BLD AUTO: 1 % (ref 0–2)
INR PPP: 1.22 (ref 0.84–1.19)
LIPASE SERPL-CCNC: 92 U/L (ref 73–393)
LYMPHOCYTES # BLD AUTO: 0.67 THOUSANDS/ÂΜL (ref 0.6–4.47)
LYMPHOCYTES NFR BLD AUTO: 8 % (ref 14–44)
MCH RBC QN AUTO: 31.1 PG (ref 26.8–34.3)
MCH RBC QN AUTO: 31.3 PG (ref 26.8–34.3)
MCHC RBC AUTO-ENTMCNC: 31.7 G/DL (ref 31.4–37.4)
MCHC RBC AUTO-ENTMCNC: 32.4 G/DL (ref 31.4–37.4)
MCV RBC AUTO: 97 FL (ref 82–98)
MCV RBC AUTO: 98 FL (ref 82–98)
MONOCYTES # BLD AUTO: 0.67 THOUSAND/ÂΜL (ref 0.17–1.22)
MONOCYTES NFR BLD AUTO: 8 % (ref 4–12)
NEUTROPHILS # BLD AUTO: 7.15 THOUSANDS/ÂΜL (ref 1.85–7.62)
NEUTS SEG NFR BLD AUTO: 83 % (ref 43–75)
NRBC BLD AUTO-RTO: 0 /100 WBCS
PLATELET # BLD AUTO: 201 THOUSANDS/UL (ref 149–390)
PLATELET # BLD AUTO: 222 THOUSANDS/UL (ref 149–390)
PMV BLD AUTO: 9 FL (ref 8.9–12.7)
PMV BLD AUTO: 9.2 FL (ref 8.9–12.7)
POTASSIUM SERPL-SCNC: 3.4 MMOL/L (ref 3.5–5.3)
PROT SERPL-MCNC: 7 G/DL (ref 6.4–8.4)
PROTHROMBIN TIME: 15.6 SECONDS (ref 11.6–14.5)
QRS AXIS: -70 DEGREES
QRSD INTERVAL: 136 MS
QT INTERVAL: 446 MS
QTC INTERVAL: 456 MS
RBC # BLD AUTO: 4.03 MILLION/UL (ref 3.81–5.12)
RBC # BLD AUTO: 4.44 MILLION/UL (ref 3.81–5.12)
SODIUM SERPL-SCNC: 137 MMOL/L (ref 135–147)
T WAVE AXIS: 51 DEGREES
TSH SERPL DL<=0.05 MIU/L-ACNC: 1.41 UIU/ML (ref 0.45–4.5)
VENTRICULAR RATE: 63 BPM
WBC # BLD AUTO: 8.56 THOUSAND/UL (ref 4.31–10.16)
WBC # BLD AUTO: 8.76 THOUSAND/UL (ref 4.31–10.16)

## 2023-01-25 RX ORDER — HEPARIN SODIUM 1000 [USP'U]/ML
3600 INJECTION, SOLUTION INTRAVENOUS; SUBCUTANEOUS EVERY 6 HOURS PRN
Status: DISCONTINUED | OUTPATIENT
Start: 2023-01-25 | End: 2023-01-26

## 2023-01-25 RX ORDER — HEPARIN SODIUM 10000 [USP'U]/100ML
3-20 INJECTION, SOLUTION INTRAVENOUS
Status: DISCONTINUED | OUTPATIENT
Start: 2023-01-25 | End: 2023-01-26

## 2023-01-25 RX ORDER — ACETAMINOPHEN 325 MG/1
650 TABLET ORAL EVERY 6 HOURS PRN
Status: DISCONTINUED | OUTPATIENT
Start: 2023-01-25 | End: 2023-01-25

## 2023-01-25 RX ORDER — HEPARIN SODIUM 1000 [USP'U]/ML
1800 INJECTION, SOLUTION INTRAVENOUS; SUBCUTANEOUS EVERY 6 HOURS PRN
Status: DISCONTINUED | OUTPATIENT
Start: 2023-01-25 | End: 2023-01-26

## 2023-01-25 RX ORDER — HEPARIN SODIUM 1000 [USP'U]/ML
3600 INJECTION, SOLUTION INTRAVENOUS; SUBCUTANEOUS ONCE
Status: COMPLETED | OUTPATIENT
Start: 2023-01-25 | End: 2023-01-25

## 2023-01-25 RX ORDER — POTASSIUM CHLORIDE 20 MEQ/1
40 TABLET, EXTENDED RELEASE ORAL ONCE
Status: COMPLETED | OUTPATIENT
Start: 2023-01-25 | End: 2023-01-25

## 2023-01-25 RX ORDER — METHOCARBAMOL 750 MG/1
750 TABLET, FILM COATED ORAL EVERY 8 HOURS SCHEDULED
Status: DISCONTINUED | OUTPATIENT
Start: 2023-01-25 | End: 2023-01-26

## 2023-01-25 RX ORDER — ACETAMINOPHEN 325 MG/1
975 TABLET ORAL EVERY 8 HOURS SCHEDULED
Status: DISPENSED | OUTPATIENT
Start: 2023-01-25 | End: 2023-01-28

## 2023-01-25 RX ADMIN — SODIUM CHLORIDE 250 ML: 0.9 INJECTION, SOLUTION INTRAVENOUS at 11:56

## 2023-01-25 RX ADMIN — POTASSIUM CHLORIDE 40 MEQ: 1500 TABLET, EXTENDED RELEASE ORAL at 12:30

## 2023-01-25 RX ADMIN — HEPARIN SODIUM 3600 UNITS: 1000 INJECTION INTRAVENOUS; SUBCUTANEOUS at 16:38

## 2023-01-25 RX ADMIN — ACETAMINOPHEN 975 MG: 325 TABLET ORAL at 21:41

## 2023-01-25 RX ADMIN — IOHEXOL 90 ML: 350 INJECTION, SOLUTION INTRAVENOUS at 12:53

## 2023-01-25 RX ADMIN — METHOCARBAMOL TABLETS 750 MG: 750 TABLET, COATED ORAL at 21:41

## 2023-01-25 RX ADMIN — METHOCARBAMOL TABLETS 750 MG: 750 TABLET, COATED ORAL at 16:44

## 2023-01-25 RX ADMIN — ACETAMINOPHEN 975 MG: 325 TABLET ORAL at 16:44

## 2023-01-25 RX ADMIN — HEPARIN SODIUM 12 UNITS/KG/HR: 10000 INJECTION, SOLUTION INTRAVENOUS at 16:41

## 2023-01-25 NOTE — ASSESSMENT & PLAN NOTE
CT abdomen/pelvis negative for intra abdominal pathology,     Partially imaged right lower lobe pulmonary emboli  Mild right basilar consolidation atelectasis and/or pneumonia  Trace right pleural effusion    Discussed risk/benefits of anticoagulation given patient's age and risk of falling  Both patient and her son will like to start anticoagulation    Will place on heparin drip  Will obtain echo to assess for right heart strain

## 2023-01-25 NOTE — ASSESSMENT & PLAN NOTE
Lab Results   Component Value Date    EGFR 32 01/25/2023    EGFR 40 12/31/2022    EGFR 50 12/28/2022    CREATININE 1 35 (H) 01/25/2023    CREATININE 1 13 12/31/2022    CREATININE 0 95 12/28/2022     Baseline creatinine 1 0-1 4  Currently at baseline

## 2023-01-25 NOTE — ASSESSMENT & PLAN NOTE
Patient states that she is unable to walk secondary to generalized weakness and right sided pain  Patient has been admitted for ambulatory dysfunction previously  CT abdomen was done in the ER with contrast which was negative for acute findings  Hip Xray from 1/16 IMPRESSION: Mild degenerative arthritis both hips  Will consult orthopedics for evaluation  PT/OT  Place on standing robaxin and tylenol for pain control

## 2023-01-25 NOTE — ED ATTENDING ATTESTATION
Final Diagnoses:     1  Pulmonary embolism ([facility] [address] )    2  Diverticulosis    3  Right-sided back pain    4  Hypothermia, initial encounter      ED Course as of 01/25/23 1504   Wed Jan 25, 2023   1348 Incidental RIGHT lower lobe emboli  Will admit  Edi Forde MD, saw and evaluated the patient  All available labs and X-rays were ordered by me or the resident and have been reviewed by myself  I discussed the patient with the resident / non-physician and agree with the resident's / non-physician practitioner's findings and plan as documented in the resident's / non-physician practicitioner's note, except where noted  At this point, I agree with the current assessment done in the ED  I was present during key portions of all procedures performed unless otherwise stated  Nursing Triage:     Chief Complaint   Patient presents with   • Failure To Thrive     Per EMS, pt recently discharged from rehab; son reported pt has decreased appetite, increased weakness, not acting like herself; pt states "I'm not doing well", reports R sided back pain       HPI:   This is a 80 y o  female presenting for evaluation of weakness? I interviewed the patient with resident and nurse concurrently; patient was an extremely poor historian  She just doesn't feel well and feels weak  Beyond that, I couldn't get much  EMS stated that the patient was recently admitted for ambulatory dysfunction, went to rehab after that and was just discharged, going home with son  The patient might have had a fall to her RIGHT side? She denies CP/SOB  Denies headache??  ROS limited due to the above  ASSESSMENT + PLAN:   1  AF vs NSR on EKG: On monitor is looks like AF, but I thought some P-waves on EKG  She does have hx of pAF so not unreasonable she is in it regardless  Maybe she feels the lack of atrial kick ? weakness? 2  Hypothermia: warm blankets, continue to monitor  The 95 3F was a rectal temperature  Unclear etiology  Labs for infection  3  Talk to son to clarify hx    Physical:   General: VS reviewed  Appears in NAD  awake, alert  Appears old  Speaking normally in full sentences  Head: Normocephalic, atraumatic  Eyes: EOM-I  No diplopia  No hyphema  No subconjunctival hemorrhages  Symmetrical lids  ENT: Atraumatic external nose and ears  MMM  No malocclusion  No stridor  Normal phonation  No drooling  Normal swallowing  Neck: No JVD  CV: No pallor noted  2+ pulses throughout  Feels irregular y   Lungs:   No tachypnea  No respiratory distress  Abd: soft nt nd no rebound/guarding on my exam   Resident repeated exam immediately after I did and had tenderness minimally  Patient said CVAT to the nurses, denied it to me, had nothing on exam either  MSK:   FROM spontaneously  Skin: Dry, intact  Neuro: Awake, alert, GCS15, CN II-XII grossly intact  Motor grossly intact  Psychiatric/Behavioral: interacting normally; appropriate mood/affect   Exam: deferred    Vitals:    01/25/23 1300 01/25/23 1349 01/25/23 1400 01/25/23 1415   BP: 145/62  126/59    BP Location: Right arm      Pulse: 70  56 56   Resp: (!) 24  20 20   Temp:  (!) 95 6 °F (35 3 °C)     TempSrc:  Axillary     SpO2: 97%  93% 95%     - There are obvious limitations to social determinants of care: doesn't sound like son able to care for her ? will clarify    - Nursing note reviewed  - Vitals reviewed  - Orders placed by myself and/or advanced practitioner / resident     - Previous chart was reviewed  - No language barrier    - History obtained from patient , EMS  - There are limitations to the history obtained: as above     Critical Care Time:   - Critical care time: 33 minutes  - Critical care time was exclusive of seperately bilable procedures and treating other patients as well as teaching time     - Critical care was necessary to treat or prevent imminent or life-threatening deterioration of the following condition: hypothermia  - Critical care time was spent personally by me on the following activities as well as the above as per the ED course and rest of chart: blood draw for specimens, obtaining history from patient / surrogate, developement of a treatment plan, discussions with consultants, evaluation of patient's response to the treatment, examination of the patient, ordering/performing treatements and interventions, re-evaluation of the patient's condition, review of old charts, ordering/reviewing laboratory studies and ordering/reviewing of radiographic studies    Past Medical:    has a past medical history of Interstitial cystitis, Leukopenia, Neurologic gait dysfunction, Neuropathy, Neutropenia ([address] ), Osteoarthritis, Osteoporosis, Peripheral edema, Renal cyst, and Syncope  Past Surgical:    has a past surgical history that includes Cataract extraction (Left); Chalazion excision; Capsulectomy; and Colonoscopy ()  Social:     Social History     Substance and Sexual Activity   Alcohol Use Yes    Comment: socially     Social History     Tobacco Use   Smoking Status Former   • Packs/day: 1 00   • Years: 20 00   • Pack years: 20 00   • Types: Cigarettes   • Quit date:    • Years since quittin 0   Smokeless Tobacco Never     Social History     Substance and Sexual Activity   Drug Use No       Echo:   Results for orders placed during the hospital encounter of 19    Echo complete with contrast if indicated    Narrative  [facility] [address]  [facility] [address] [facility]  [phone]    Transthoracic Echocardiogram  2D, M-mode, Doppler, and Color Doppler    Study date:  2019    Patient: Keith Lisa  MR number: [id number]  Account number: [de-identified]  : 16-May-1925  Age: 80 years  Gender: Female  Status: Outpatient  Location: Echo lab  Height: 66 in  Weight: 144 lb  BP: 142/ 72 mmHg    Indications: Edema      Diagnoses: R60 9 - Edema, unspecified    Sonographer:  Patrica Jeter Robles Foreman  Referring Physician:  KERON Gottlieb  Group:  [facility]  Interpreting Physician:  Mason Gore MD    SUMMARY    LEFT VENTRICLE:  Systolic function was normal  Ejection fraction was estimated to be 63 %  There were no regional wall motion abnormalities  Doppler parameters were consistent with abnormal left ventricular relaxation (grade 1 diastolic dysfunction)  MITRAL VALVE:  There was trace regurgitation  TRICUSPID VALVE:  There was trace regurgitation  HISTORY: PRIOR HISTORY: Hypertension, Hyperlipidemia, Chronic kidney disease  PROCEDURE: The procedure was performed in the echo lab  This was a routine study  The transthoracic approach was used  The study included complete 2D imaging, M-mode, complete spectral Doppler, and color Doppler  The heart rate was 60 bpm,  at the start of the study  Images were obtained from the parasternal, apical, subcostal, and suprasternal notch acoustic windows  Image quality was adequate  LEFT VENTRICLE: Size was normal  Systolic function was normal  Ejection fraction was estimated to be 63 %  There were no regional wall motion abnormalities  Wall thickness was normal  There was no evidence of concentric hypertrophy  DOPPLER: Doppler parameters were consistent with abnormal left ventricular relaxation (grade 1 diastolic dysfunction)  RIGHT VENTRICLE: The size was normal  Systolic function was normal  Wall thickness was normal     LEFT ATRIUM: Size was normal     RIGHT ATRIUM: The atrium was small  MITRAL VALVE: Valve structure was normal  There was normal leaflet separation  DOPPLER: The transmitral velocity was within the normal range  There was no evidence for stenosis  There was trace regurgitation  AORTIC VALVE: The valve was trileaflet  Leaflets exhibited normal thickness and normal cuspal separation  DOPPLER: Transaortic velocity was within the normal range  There was no evidence for stenosis   There was no regurgitation  TRICUSPID VALVE: The valve structure was normal  There was normal leaflet separation  DOPPLER: The transtricuspid velocity was within the normal range  There was no evidence for stenosis  There was trace regurgitation  Pulmonary artery  systolic pressure was within the normal range  PULMONIC VALVE: Leaflets exhibited normal thickness, no calcification, and normal cuspal separation  DOPPLER: The transpulmonic velocity was within the normal range  There was no regurgitation  PERICARDIUM: There was no pericardial effusion  The pericardium was normal in appearance  AORTA: The root exhibited normal size  SYSTEM MEASUREMENT TABLES    2D  %FS: 26 6 %  Ao Diam: 3 35 cm  EDV(Teich): 73 44 ml  EF(Cube): 60 46 %  EF(Teich): 52 5 %  ESV(Cube): 26 89 ml  ESV(Teich): 34 88 ml  IVSd: 0 81 cm  LA Area: 18 45 cm2  LA Diam: 3 14 cm  LVEDV MOD A4C: 52 5 ml  LVEF MOD A4C: 62 58 %  LVESV MOD A4C: 19 64 ml  LVIDd: 4 08 cm  LVIDs: 3 cm  LVLd A4C: 6 45 cm  LVLs A4C: 5 38 cm  LVPWd: 0 98 cm  RA Area: 12 29 cm2  SV MOD A4C: 32 86 ml  SV(Cube): 41 11 ml  SV(Teich): 38 56 ml  rv diam: 2 83 cm    CW  TR Vmax: 2 39 m/s  TR maxP 85 mmHg    MM  TAPSE: 1 54 cm    PW  E': 0 03 m/s  E/E': 16 12  MV A [place]: 1 01 m/s  MV Dec [place]: 1 96 m/s2  MV DecT: 269 63 ms  MV E [place]: 0 53 m/s  MV E/A Ratio: 0 52    [employer] Accredited Echocardiography Laboratory    Prepared and electronically signed by    Daryle Gear, MD  Signed 2019 15:52:54    No results found for this or any previous visit  Cath:    No results found for this or any previous visit        Code Status: Prior  Advance Directive and Living Will:      Power of :    POLST:    Medications   sodium chloride 0 9 % bolus 250 mL (0 mL Intravenous Stopped 23 1232)   potassium chloride (K-DUR,KLOR-CON) CR tablet 40 mEq (40 mEq Oral Given 23 1230)   iohexol (OMNIPAQUE) 350 MG/ML injection (SINGLE-DOSE) 100 mL (90 mL Intravenous Given 1/25/23 1253)     CT abdomen pelvis w contrast   Final Result      Partially imaged right lower lobe pulmonary emboli  Mild right basilar consolidation atelectasis and/or pneumonia  Trace right pleural effusion  No acute pathology in the abdomen or pelvis  Colonic diverticulosis  I personally discussed this study with Dr Zahra Trejo on 1/25/2023 at 1:49 PM                                Workstation performed: [id number]           Orders Placed This Encounter   Procedures   • CT abdomen pelvis w contrast   • CBC and differential   • Comprehensive metabolic panel   • Lipase   • UA w Reflex to Microscopic w Reflex to Culture   • TSH, 3rd generation with Free T4 reflex   • Insert peripheral IV   • Nursing Communication 250mL of warm fluids given hypothermia     • Inpatient Consult to Case Management   • ECG 12 lead   • ECG 12 lead   • INPATIENT ADMISSION     Labs Reviewed   CBC AND DIFFERENTIAL - Abnormal       Result Value Ref Range Status    WBC 8 56  4 31 - 10 16 Thousand/uL Final    RBC 4 44  3 81 - 5 12 Million/uL Final    Hemoglobin 13 8  11 5 - 15 4 g/dL Final    Hematocrit 43 5  34 8 - 46 1 % Final    MCV 98  82 - 98 fL Final    MCH 31 1  26 8 - 34 3 pg Final    MCHC 31 7  31 4 - 37 4 g/dL Final    RDW 13 0  11 6 - 15 1 % Final    MPV 9 2  8 9 - 12 7 fL Final    Platelets 834  045 - 390 Thousands/uL Final    nRBC 0  /100 WBCs Final    Neutrophils Relative 83 (*) 43 - 75 % Final    Immat GRANS % 1  0 - 2 % Final    Lymphocytes Relative 8 (*) 14 - 44 % Final    Monocytes Relative 8  4 - 12 % Final    Eosinophils Relative 0  0 - 6 % Final    Basophils Relative 0  0 - 1 % Final    Neutrophils Absolute 7 15  1 85 - 7 62 Thousands/µL Final    Immature Grans Absolute 0 05  0 00 - 0 20 Thousand/uL Final    Lymphocytes Absolute 0 67  0 60 - 4 47 Thousands/µL Final    Monocytes Absolute 0 67  0 17 - 1 22 Thousand/µL Final    Eosinophils Absolute 0 02  0 00 - 0 61 Thousand/µL Final    Basophils Absolute 0 00  0 00 - 0 10 Thousands/µL Final   COMPREHENSIVE METABOLIC PANEL - Abnormal    Sodium 137  135 - 147 mmol/L Final    Potassium 3 4 (*) 3 5 - 5 3 mmol/L Final    Chloride 104  96 - 108 mmol/L Final    CO2 27  21 - 32 mmol/L Final    ANION GAP 6  4 - 13 mmol/L Final    BUN 26 (*) 5 - 25 mg/dL Final    Creatinine 1 35 (*) 0 60 - 1 30 mg/dL Final    Comment: Standardized to IDMS reference method    Glucose 114  65 - 140 mg/dL Final    Comment: If the patient is fasting, the ADA then defines impaired fasting glucose as > 100 mg/dL and diabetes as > or equal to 123 mg/dL  Specimen collection should occur prior to Sulfasalazine administration due to the potential for falsely depressed results  Specimen collection should occur prior to Sulfapyridine administration due to the potential for falsely elevated results  Calcium 9 1  8 3 - 10 1 mg/dL Final    Corrected Calcium 10 1  8 3 - 10 1 mg/dL Final    AST 9  5 - 45 U/L Final    Comment: Specimen collection should occur prior to Sulfasalazine administration due to the potential for falsely depressed results  ALT 15  12 - 78 U/L Final    Comment: Specimen collection should occur prior to Sulfasalazine and/or Sulfapyridine administration due to the potential for falsely depressed results  Alkaline Phosphatase 78  46 - 116 U/L Final    Total Protein 7 0  6 4 - 8 4 g/dL Final    Albumin 2 7 (*) 3 5 - 5 0 g/dL Final    Total Bilirubin 1 07 (*) 0 20 - 1 00 mg/dL Final    Comment: Use of this assay is not recommended for patients undergoing treatment with eltrombopag due to the potential for falsely elevated results      eGFR 32  ml/min/1 73sq m Final    Narrative:     [place] guidelines for Chronic Kidney Disease (CKD):   •  Stage 1 with normal or high GFR (GFR > 90 mL/min/1 73 square meters)  •  Stage 2 Mild CKD (GFR = 60-89 mL/min/1 73 square meters)  •  Stage 3A Moderate CKD (GFR = 45-59 mL/min/1 73 square meters)  •  Stage 3B Moderate CKD (GFR = 30-44 mL/min/1 73 square meters)  •  Stage 4 Severe CKD (GFR = 15-29 mL/min/1 73 square meters)  •  Stage 5 End Stage CKD (GFR <15 mL/min/1 73 square meters)  Note: GFR calculation is accurate only with a steady state creatinine   LIPASE - Normal    Lipase 92  73 - 393 u/L Final   TSH, 3RD GENERATION WITH FREE T4 REFLEX - Normal    TSH 3RD GENERATON 1 410  0 450 - 4 500 uIU/mL Final    Comment: The recommended reference ranges for TSH during pregnancy are as follows:   First trimester 0 1 to 2 5 uIU/mL   Second trimester  0 2 to 3 0 uIU/mL   Third trimester 0 3 to 3 0 uIU/m    Note: Normal ranges may not apply to patients who are transgender, non-binary, or whose legal sex, sex at birth, and gender identity differ  Adult TSH (3rd generation) reference range follows the recommended guidelines of the American Thy[employer] Association, January, 2020  Narrative:     Patients undergoing fluorescein dye angiography may retain small amounts of fluorescein in the body for 48-72 hours post procedure  Samples containing fluorescein can produce falsely depressed TSH values  If the patient had this procedure,a specimen should be resubmitted post fluorescein clearance  UA W REFLEX TO MICROSCOPIC WITH REFLEX TO CULTURE     Time reflects when diagnosis was documented in both MDM as applicable and the Disposition within this note     Time User Action Codes Description Comment    1/25/2023  2:51 PM Connye Laundry T Add [I26 99] Pulmonary embolism ([address] )     1/25/2023  2:52 PM Connye Laundry T Add [K57 90] Diverticulosis     1/25/2023  2:52 PM Connye Laundry T Add [M54 9] Right-sided back pain     1/25/2023  2:52 PM Connye Laundry T Add Hubertus Hurl  XXXA] Hypothermia, initial encounter       ED Disposition     ED Disposition   Admit    Condition   Stable    Date/Time   Wed Jan 25, 2023  2:51 PM    Comment   Case was discussed with Dr Karie Duvall and the patient's admission status was agreed to be Admission Status: inpatient status to the service of Dr Jake Quiñones  Follow-up Information    None       Patient's Medications   Discharge Prescriptions    No medications on file     No discharge procedures on file  Prior to Admission Medications   Prescriptions Last Dose Informant Patient Reported? Taking? Cholecalciferol (VITAMIN D-3) 25 MCG (1000 UT) CAPS   Yes No   Sig: Take by mouth daily   Diclofenac Sodium (VOLTAREN) 1 %   Yes No   Sig: Apply 2 g topically 2 (two) times a day Apply to right hip   acetaminophen (TYLENOL) 325 mg tablet   No No   Sig: Take 2 tablets (650 mg total) by mouth every 6 (six) hours as needed for mild pain   Patient not taking: No sig reported   furosemide (LASIX) 40 mg tablet   No No   Sig: Take 1 tablet (40 mg) in the morning, and take 0 5 tablet (20 mg) in the afternoon  metoprolol succinate (TOPROL-XL) 25 mg 24 hr tablet   No No   Sig: Take 1 tablet (25 mg total) by mouth daily   polyethylene glycol (MIRALAX) 17 g packet   No No   Sig: Take 17 g by mouth daily as needed (Constipation)   potassium chloride (MICRO-K) 10 MEQ CR capsule   No No   Sig: Take 1 capsule (10 mEq total) by mouth daily      Facility-Administered Medications: None                        Portions of the record may have been created with voice recognition software  Occasional wrong word or "sound a like" substitutions may have occurred due to the inherent limitations of voice recognition software  Read the chart carefully and recognize, using context, where substitutions have occurred      Electronically signed by:  Linsey Huang

## 2023-01-25 NOTE — ED NOTES
Dr Neeta Harrison at patient bedside to medically evaluate patient       Misbah Pineda RN  01/25/23 0676

## 2023-01-25 NOTE — H&P
[address] [facility]  H&P- Kirstin Duke 5/16/1925, 80 y o  female MRN: [id number]  Unit/Bed#: MELINDAB Encounter: [id number]  Primary Care Provider: KERON Arboleda   Date and time admitted to hospital: 1/25/2023 11:07 AM    * Ambulatory dysfunction  Assessment & Plan  Patient states that she is unable to walk secondary to generalized weakness and right sided pain  Patient has been admitted for ambulatory dysfunction previously  CT abdomen was done in the ER with contrast which was negative for acute findings  Hip Xray from 1/16 IMPRESSION: Mild degenerative arthritis both hips  Will consult orthopedics for evaluation  PT/OT  Place on standing robaxin and tylenol for pain control        Pulmonary embolism ([facility] [address] )  Assessment & Plan  CT abdomen/pelvis negative for intra abdominal pathology,     Partially imaged right lower lobe pulmonary emboli  Mild right basilar consolidation atelectasis and/or pneumonia  Trace right pleural effusion    Discussed risk/benefits of anticoagulation given patient's age and risk of falling  Both patient and her son will like to start anticoagulation  Will place on heparin drip  Will obtain echo to assess for right heart strain    PSVT (paroxysmal supraventricular tachycardia) (HCC)  Assessment & Plan  Heart rate stable  Continue Toprol-XL    CKD (chronic kidney disease) stage 3, GFR 30-59 ml/min [facility])  Assessment & Plan  Lab Results   Component Value Date    EGFR 32 01/25/2023    EGFR 40 12/31/2022    EGFR 50 12/28/2022    CREATININE 1 35 (H) 01/25/2023    CREATININE 1 13 12/31/2022    CREATININE 0 95 12/28/2022     Baseline creatinine 1 0-1 4  Currently at baseline    VTE Pharmacologic Prophylaxis:   Moderate Risk (Score 3-4) - Pharmacological DVT Prophylaxis Ordered: heparin drip  Code Status: Level 3 - DNAR and DNI   Discussion with family: Updated  (son) via phone      Anticipated Length of Stay: Patient will be admitted on an inpatient basis with an anticipated length of stay of greater than 2 midnights secondary to ambulatory dysfunction, pe  Total Time for Visit, including Counseling / Coordination of Care: 60 minutes Greater than 50% of this total time spent on direct patient counseling and coordination of care  Chief Complaint:     History of Present Illness:  Nelli Baumann is a 80 y o  female with a PMH of CKD stage III, paroxysmal supraventricular tachycardia who presents with generalized weakness, poor oral intake, and right sided back/hip pain  Patient states she has not been able to walk due to pain  Patient denies any recent injuries or trauma  Patient states she was recently discharged from rehab and since then she has been getting weaker  In the CT abdomen pelvis was done with contrast which was negative for abdominal pathology but noted for incidental pulmonary embolism  Review of Systems:  Review of Systems   Constitutional: Positive for appetite change and fatigue  Musculoskeletal: Positive for back pain, gait problem and myalgias  All other systems reviewed and are negative  Past Medical and Surgical History:   Past Medical History:   Diagnosis Date   • Interstitial cystitis    • Leukopenia    • Neurologic gait dysfunction     Abstraction Dr Shola Dennison charts   • Neuropathy    • Neutropenia [facility])     Abstraction Dr Neftali Walker   • Osteoarthritis    • Osteoporosis    • Peripheral edema     Abstraction Dr Shola Dennison charts   • Renal cyst    • Syncope     Abstraction Dr Shola Dennison charts       Past Surgical History:   Procedure Laterality Date   • CAPSULOTOMY      Right eye   • CATARACT EXTRACTION Left    • CHALAZION EXCISION     • COLONOSCOPY  2006       Meds/Allergies:  Prior to Admission medications    Medication Sig Start Date End Date Taking?  Authorizing Provider   acetaminophen (TYLENOL) 325 mg tablet Take 2 tablets (650 mg total) by mouth every 6 (six) hours as needed for mild pain  Patient not taking: No sig reported 20   Romana Finn, PA-C   Cholecalciferol (VITAMIN D-3) 25 MCG (1000 UT) CAPS Take by mouth daily    Historical Provider, MD   Diclofenac Sodium (VOLTAREN) 1 % Apply 2 g topically 2 (two) times a day Apply to right hip    Historical Provider, MD   furosemide (LASIX) 40 mg tablet Take 1 tablet (40 mg) in the morning, and take 0 5 tablet (20 mg) in the afternoon  10/1/22   KERON Mario   metoprolol succinate (TOPROL-XL) 25 mg 24 hr tablet Take 1 tablet (25 mg total) by mouth daily 1/11/23 2/10/23  KERON Richards   polyethylene glycol (MIRALAX) 17 g packet Take 17 g by mouth daily as needed (Constipation) 23   Cayden Oneal DO   potassium chloride (MICRO-K) 10 MEQ CR capsule Take 1 capsule (10 mEq total) by mouth daily 10/1/22   KERON Damon     I have reviewed home medications with patient family member  Allergies: No Known Allergies    Social History:  Marital Status:     Occupation: retired  Patient Pre-hospital Living Situation: Home  Patient Pre-hospital Level of Mobility: unable to be assessed at time of evaluation  Patient Pre-hospital Diet Restrictions: none  Substance Use History:   Social History     Substance and Sexual Activity   Alcohol Use Yes    Comment: socially     Social History     Tobacco Use   Smoking Status Former   • Packs/day: 1 00   • Years: 20 00   • Pack years: 20 00   • Types: Cigarettes   • Quit date: 5   • Years since quittin 0   Smokeless Tobacco Never     Social History     Substance and Sexual Activity   Drug Use No       Family History:  Family History   Problem Relation Age of Onset   • Diabetes Mother    • Lung disease Father    • Cancer Maternal Grandfather    • Lung disease Sister        Physical Exam:     Vitals:   Blood Pressure: 142/62 (23 1500)  Pulse: 62 (23 1500)  Temperature: (!) 97 °F (36 1 °C) (23 1523)  Temp Source: Rectal (23 1523)  Respirations: 21 (23 1500)  SpO2: 95 % (23 1500)    Physical Exam  Vitals reviewed  HENT:      Head: Normocephalic and atraumatic  Right Ear: External ear normal       Left Ear: External ear normal       Nose: Nose normal       Mouth/Throat:      Mouth: Mucous membranes are moist    Eyes:      Extraocular Movements: Extraocular movements intact  Cardiovascular:      Rate and Rhythm: Normal rate and regular rhythm  Heart sounds: Normal heart sounds  Pulmonary:      Effort: Pulmonary effort is normal       Breath sounds: Normal breath sounds  Abdominal:      General: Abdomen is flat  Palpations: Abdomen is soft  Tenderness: There is no abdominal tenderness  Musculoskeletal:      Cervical back: Normal range of motion  Right lower leg: No edema  Left lower leg: No edema  Skin:     General: Skin is warm and dry  Neurological:      Mental Status: She is alert  Mental status is at baseline     Psychiatric:         Mood and Affect: Mood normal          Behavior: Behavior normal           Additional Data:     Lab Results:  Results from last 7 days   Lab Units 01/25/23  1138   WBC Thousand/uL 8 56   HEMOGLOBIN g/dL 13 8   HEMATOCRIT % 43 5   PLATELETS Thousands/uL 222   NEUTROS PCT % 83*   LYMPHS PCT % 8*   MONOS PCT % 8   EOS PCT % 0     Results from last 7 days   Lab Units 01/25/23  1138   SODIUM mmol/L 137   POTASSIUM mmol/L 3 4*   CHLORIDE mmol/L 104   CO2 mmol/L 27   BUN mg/dL 26*   CREATININE mg/dL 1 35*   ANION GAP mmol/L 6   CALCIUM mg/dL 9 1   ALBUMIN g/dL 2 7*   TOTAL BILIRUBIN mg/dL 1 07*   ALK PHOS U/L 78   ALT U/L 15   AST U/L 9   GLUCOSE RANDOM mg/dL 114                       Lines/Drains:  Invasive Devices     Peripheral Intravenous Line  Duration           Peripheral IV 01/25/23 Left Antecubital <1 day                    Imaging: Reviewed radiology reports from this admission including: abdominal/pelvic CT  CT abdomen pelvis w contrast   Final Result by Dimple Sanchez MD (01/25 3146)      Partially imaged right lower lobe pulmonary emboli  Mild right basilar consolidation atelectasis and/or pneumonia  Trace right pleural effusion  No acute pathology in the abdomen or pelvis  Colonic diverticulosis  I personally discussed this study with Dr Ortiz Hinton on 1/25/2023 at 1:49 PM                                Workstation performed: [id number]         XR hip/pelv 2-3 vws right if performed    (Results Pending)       EKG and Other Studies Reviewed on Admission:   · EKG: sinus rhythm bifascicular block  ** Please Note: This note has been constructed using a voice recognition system   **

## 2023-01-25 NOTE — PROGRESS NOTES
Progress Note - Orthopedics   Emily Sommer 80 y o  female MRN: [id number]  Unit/Bed#: [facility] 834-01      Subjective:    80 y  o female with lumbar radiculopathy  No acute events, no new complaints  Pain controlled  Denies fevers, chills, CP, SOB, N/V, numbness or tingling  Patient states she still has pain down her leg    Labs:  0   Lab Value Date/Time    HCT 37 2 01/26/2023 0508    HCT 38 9 01/25/2023 1636    HCT 43 5 01/25/2023 1138    HGB 11 6 01/26/2023 0508    HGB 12 6 01/25/2023 1636    HGB 13 8 01/25/2023 1138    INR 1 22 (H) 01/25/2023 1636    WBC 6 92 01/26/2023 0508    WBC 8 76 01/25/2023 1636    WBC 8 56 01/25/2023 1138    ESR 53 (H) 01/25/2023 1636     0 (H) 01/25/2023 1138       Meds:    Current Facility-Administered Medications:   •  acetaminophen (TYLENOL) tablet 975 mg, 975 mg, Oral, Q8H [facility], Catarino Bustillo DO, 975 mg at 01/26/23 0557  •  betamethasone acetate-betamethasone sodium phosphate (CELESTONE) injection 12 mg, 12 mg, Intra-articular, Once, Annabella Uribe MD  •  bupivacaine (MARCAINE) 0 5 % injection 5 mL, 5 mL, Injection, Once, Annabella Uribe MD  •  heparin (porcine) 25,000 units in 0 45% NaCl 250 mL infusion (premix), 3-20 Units/kg/hr (Order-Specific), Intravenous, Titrated, Catarino Bustillo DO, Last Rate: 7 2 mL/hr at 01/25/23 1641, 12 Units/kg/hr at 01/25/23 1641  •  heparin (porcine) injection 1,800 Units, 1,800 Units, Intravenous, Q6H PRN, Catarion Bustillo DO  •  heparin (porcine) injection 3,600 Units, 3,600 Units, Intravenous, Q6H PRN, Catarino Bustillo DO  •  lidocaine (PF) (XYLOCAINE-MPF) 1 % injection 20 mL, 20 mL, Infiltration, Once, Annabella Uribe MD  •  methocarbamol (ROBAXIN) tablet 750 mg, 750 mg, Oral, Q8H [facility], Catarino CoughlinkaylynDO, 750 mg at 01/26/23 0557    Blood Culture:   Lab Results   Component Value Date    BLOODCX No Growth After 5 Days  12/27/2022    BLOODCX No Growth After 5 Days   12/27/2022       Wound Culture:   No results found for: WOUNDCULT    Ins and Outs:  I/O last 24 hours: In: 250 [IV Piggyback:250]  Out: -           Physical:  Vitals:    01/25/23 2126   BP: 149/78   Pulse: 79   Resp: 22   Temp: (!) 97 3 °F (36 3 °C)   SpO2: 92%     Musculoskeletal: right Lower Extremity  · Skin intact   No erythema or ecchymosis  · TTP sciatic notch, trochanteric bursa  · Nonpainful hip ROM   · Sensation intact to saphenous, sural, tibial, superficial peroneal nerve, and deep peroneal  · Motor intact to +FHL/EHL, +ankle dorsi/plantar flexion  · 2+ DP pulse, symmetric bilaterally  · Digits warm and well perfused  · Capillary refill < 2 seconds    Assessment:    89 y  o female with lumbar radiculopathy to RLE and trochanteric bursitis       Plan:  · CSI to R troch bursa  · Recommend further w/u of lumbar spine  · WBAT RLE  · PT/OT  · Pain control  · DVT ppx w SCDs  · Rest of care per primary team  · Dispo: Ortho signing off    Martina Hernandez MD

## 2023-01-25 NOTE — TELEPHONE ENCOUNTER
Raisa needs to come in for office visit  If she is not able to get into the office, she will need to go to the emergency room to be checked

## 2023-01-25 NOTE — TELEPHONE ENCOUNTER
Spoke to son  Yue Serna  She is having right flank pain  Barely urinating  Drinking only 1 bottle of water daily  Unable to bring her in  PT was at the house last night

## 2023-01-25 NOTE — CONSULTS
Orthopedics   Ashleigh Nikki 80 y o  female MRN: [id number]  Unit/Bed#: Cat Scan      Chief Complaint:   right hip pain    HPI:   80 y  o female ambulates w walker complaining of right hip pain  No hx of recent trauma  Admitted for ambulatory dysfunction secondary to generalized weakness and right hip pain  She been to the ED twice in the past month for right hip pain and negative CT as well as xrays  She lives with her son at his house  She's been having pain for months  Pain is dull in character, Located buttock on right, insidious in onset, constant in duration, severe in intensity  No Exacerbating factors, no remitting factors  Radiates to anterior knee, no numbness ortingling, no open wounds noted  Has had no previous treatment  No other complaints at this time  PMH significant for CKD stage III, paroxysmal supraventricular tachycardia, L3 compression fx  Occupation retired  Review Of Systems:   · Skin: Normal   · Neuro: See HPI  · Musculoskeletal: See HPI  · 14 point review of systems negative except as stated above     Past Medical History:   Past Medical History:   Diagnosis Date   • Interstitial cystitis    • Leukopenia    • Neurologic gait dysfunction     Abstraction Dr Kraig Gramajo charts   • Neuropathy    • Neutropenia (HCC)     Abstraction Dr Chepe Wilder   • Osteoarthritis    • Osteoporosis    • Peripheral edema     Abstraction Dr Chepe Wilder   • Renal cyst    • Syncope     Abstraction Dr Kraig Gramajo charts       Past Surgical History:   Past Surgical History:   Procedure Laterality Date   • CAPSULOTOMY      Right eye   • CATARACT EXTRACTION Left    • CHALAZION EXCISION     • COLONOSCOPY  2006       Family History:  Family history reviewed and non-contributory  Family History   Problem Relation Age of Onset   • Diabetes Mother    • Lung disease Father    • Cancer Maternal Grandfather    • Lung disease Sister        Social History:  Social History     Socioeconomic History   • Marital status:   Spouse name: None   • Number of children: None   • Years of education: None   • Highest education level: None   Occupational History   • None   Tobacco Use   • Smoking status: Former     Packs/day: 1 00     Years: 20 00     Pack years: 20 00     Types: Cigarettes     Quit date: 5     Years since quittin 0   • Smokeless tobacco: Never   Vaping Use   • Vaping Use: Never used   Substance and Sexual Activity   • Alcohol use: Yes     Comment: socially   • Drug use: No   • Sexual activity: Not Currently   Other Topics Concern   • None   Social History Narrative    Lives with her son Jamaal Campbell     Social Determinants of Health     Financial Resource Strain: Not on file   Food Insecurity: Not on file   Transportation Needs: Not on file   Physical Activity: Not on file   Stress: Not on file   Social Connections: Not on file   Intimate Partner Violence: Not on file   Housing Stability: Not on file       Allergies:   No Known Allergies        Labs:  0   Lab Value Date/Time    HCT 43 5 2023 1138    HCT 38 6 2022 0504    HCT 44 8 2022 1346    HGB 13 8 2023 1138    HGB 12 6 2022 0504    HGB 14 9 2022 1346    WBC 8 56 2023 1138    WBC 6 79 2022 0504    WBC 9 19 2022 1346       Meds:    Current Facility-Administered Medications:   •  acetaminophen (TYLENOL) tablet 975 mg, 975 mg, Oral, Q8H [facility], Catarino Bustillo, , 975 mg at 23 1644  •  heparin (porcine) 25,000 units in 0 45% NaCl 250 mL infusion (premix), 3-20 Units/kg/hr (Order-Specific), Intravenous, Titrated, Catarino Bustillo, , Last Rate: 7 2 mL/hr at 23 1641, 12 Units/kg/hr at 23 1641  •  heparin (porcine) injection 1,800 Units, 1,800 Units, Intravenous, Q6H PRN, Catarino Bustillo, DO  •  heparin (porcine) injection 3,600 Units, 3,600 Units, Intravenous, Q6H PRN, Catarino Bustillo, DO  •  methocarbamol (ROBAXIN) tablet 750 mg, 750 mg, Oral, Q8H [facility], Catarino Bustillo DO, 750 mg at 23 0091    Current Outpatient Medications:   •  acetaminophen (TYLENOL) 325 mg tablet, Take 2 tablets (650 mg total) by mouth every 6 (six) hours as needed for mild pain (Patient not taking: No sig reported), Disp: 30 tablet, Rfl: 0  •  Cholecalciferol (VITAMIN D-3) 25 MCG (1000 UT) CAPS, Take by mouth daily, Disp: , Rfl:   •  Diclofenac Sodium (VOLTAREN) 1 %, Apply 2 g topically 2 (two) times a day Apply to right hip, Disp: , Rfl:   •  furosemide (LASIX) 40 mg tablet, Take 1 tablet (40 mg) in the morning, and take 0 5 tablet (20 mg) in the afternoon  , Disp: 180 tablet, Rfl: 1  •  metoprolol succinate (TOPROL-XL) 25 mg 24 hr tablet, Take 1 tablet (25 mg total) by mouth daily, Disp: 30 tablet, Rfl: 0  •  polyethylene glycol (MIRALAX) 17 g packet, Take 17 g by mouth daily as needed (Constipation), Disp: , Rfl: 0  •  potassium chloride (MICRO-K) 10 MEQ CR capsule, Take 1 capsule (10 mEq total) by mouth daily, Disp: 90 capsule, Rfl: 1    Blood Culture:   Lab Results   Component Value Date    BLOODCX No Growth After 5 Days  12/27/2022    BLOODCX No Growth After 5 Days  12/27/2022       Wound Culture:   No results found for: WOUNDCULT    Ins and Outs:  I/O last 24 hours:   In: 250 [IV Piggyback:250]  Out: -           Physical Exam:   /62 (BP Location: Right arm)   Pulse 62   Temp (!) 97 °F (36 1 °C) (Rectal)   Resp 21   SpO2 95%   Gen: No acute distress, resting comfortably in bed  HEENT: Eyes clear, moist mucus membranes, hearing intact  Respiratory: No audible wheezing or stridor  Cardiovascular: Well Perfused peripherally, 2+ distal pulse  Abdomen: nondistended, no peritoneal signs  Musculoskeletal: right lower extremity  · Skin pink, dry, and intact, no erythema  · Nonpainful range of motion of hip joint p  · TTP sciatic notch and R SIJ  · Sensation decreased over L4 distribution  · 4/5 motor strength to hip flexion/extension, knee flexion/extension, ankle dorsi/plantar flexion  · No Pain with flexion/adduction/internal rotation or axial load, negative Stinchfield, negative SLR  · Positive KARINA  · 2+ DP/PT pulse  · Musculature is soft and compressible, no pain with passive stretch  · Leg lengths equal     Radiology:   I personally reviewed the films  CT CAP and X-rays AP/Lateral views of right hip shows no acute fx or dislocation  There is mild degenerative changes in the hip joint  Chronic L3 compression fx noted  RLL PE partially seen    Assessment:  80 y  o female with lumbar radiculopathy to her right hip  No orthopedic intervention at this time  Recommend further work up and treatment of her lumbar spine       Plan:   · WBAT right lower extremity  · PT/OT  · Pain control  · Dispo: Ortho will follow  · Rest of care per primary team      Louie Brumfield MD

## 2023-01-25 NOTE — ED PROVIDER NOTES
History  Chief Complaint   Patient presents with   • Failure To Thrive     Per EMS, pt recently discharged from rehab; son reported pt has decreased appetite, increased weakness, not acting like herself; pt states "I'm not doing well", reports R sided back pain     HPI    80year-old female with medical history significant for paroxysmal SVT, CKD, osteoarthritis and ambulatory dysfunction presents to the ED via EMS  Patient states her son who she lives with sent her to the ED for evaluation, she is unsure of why  She endorses right sided back pain  Denies any recent trauma  Denies fever, chest pain, shortness of breath, abdominal pain, nausea, vomiting, diarrhea, dysuria, headache, dizziness  Per chart review, patient was admitted to this hospital 12/27/2022 - 1/2/2023 for ambulatory dysfunction and transferred to rehab facility  It was reported that she was recently discharged from rehab  She lives at home with son and ambulates with a walker  She was evaluated in this department after a fall on 1/16/2023, x-ray of right hip with pelvis showed mild degenerative arthritis of both hips, patient was discharged home  Prior to Admission Medications   Prescriptions Last Dose Informant Patient Reported? Taking? Cholecalciferol (VITAMIN D-3) 25 MCG (1000 UT) CAPS   Yes No   Sig: Take by mouth daily   Diclofenac Sodium (VOLTAREN) 1 %   Yes No   Sig: Apply 2 g topically 2 (two) times a day Apply to right hip   acetaminophen (TYLENOL) 325 mg tablet   No No   Sig: Take 2 tablets (650 mg total) by mouth every 6 (six) hours as needed for mild pain   Patient not taking: No sig reported   furosemide (LASIX) 40 mg tablet   No No   Sig: Take 1 tablet (40 mg) in the morning, and take 0 5 tablet (20 mg) in the afternoon     metoprolol succinate (TOPROL-XL) 25 mg 24 hr tablet   No No   Sig: Take 1 tablet (25 mg total) by mouth daily   polyethylene glycol (MIRALAX) 17 g packet   No No   Sig: Take 17 g by mouth daily as needed (Constipation)   potassium chloride (MICRO-K) 10 MEQ CR capsule   No No   Sig: Take 1 capsule (10 mEq total) by mouth daily      Facility-Administered Medications: None       Past Medical History:   Diagnosis Date   • Interstitial cystitis    • Leukopenia    • Neurologic gait dysfunction     Abstraction Dr Ora Atkinson charts   • Neuropathy    • Neutropenia [facility])     Abstraction Dr Pritesh Beatty   • Osteoarthritis    • Osteoporosis    • Peripheral edema     Abstraction Dr Payan Presumkaylyn charts   • Renal cyst    • Syncope     Abstraction Dr Ora Atkinson charts       Past Surgical History:   Procedure Laterality Date   • CAPSULOTOMY      Right eye   • CATARACT EXTRACTION Left    • CHALAZION EXCISION     • COLONOSCOPY         Family History   Problem Relation Age of Onset   • Diabetes Mother    • Lung disease Father    • Cancer Maternal Grandfather    • Lung disease Sister      I have reviewed and agree with the history as documented      E-Cigarette/Vaping   • E-Cigarette Use Never User      E-Cigarette/Vaping Substances   • Nicotine No    • THC No    • CBD No    • Flavoring No    • Other No    • Unknown No      Social History     Tobacco Use   • Smoking status: Former     Packs/day: 1 00     Years: 20 00     Pack years: 20 00     Types: Cigarettes     Quit date: 5     Years since quittin 0   • Smokeless tobacco: Never   Vaping Use   • Vaping Use: Never used   Substance Use Topics   • Alcohol use: Yes     Comment: socially   • Drug use: No        Review of Systems   Unable to perform ROS: Dementia       Physical Exam  ED Triage Vitals [23 1116]   Temperature Pulse Respirations Blood Pressure SpO2   (!) 95 3 °F (35 2 °C) 67 16 133/63 97 %      Temp Source Heart Rate Source Patient Position - Orthostatic VS BP Location FiO2 (%)   Rectal Monitor Lying Right arm --      Pain Score       --             Orthostatic Vital Signs  Vitals:    23 1300 23 1400 23 1415 23 1500   BP: 145/62 126/59 142/62   Pulse: 70 56 56 62   Patient Position - Orthostatic VS: Lying   Lying       Physical Exam  Vitals and nursing note reviewed  Constitutional:       General: She is not in acute distress  Appearance: Normal appearance  She is well-developed  She is not ill-appearing, toxic-appearing or diaphoretic  HENT:      Head: Normocephalic and atraumatic  Eyes:      Conjunctiva/sclera: Conjunctivae normal    Cardiovascular:      Rate and Rhythm: Normal rate and regular rhythm  Pulses: Normal pulses  Heart sounds: Normal heart sounds  No murmur heard  Pulmonary:      Effort: Pulmonary effort is normal  No respiratory distress  Breath sounds: Normal breath sounds  Abdominal:      Palpations: Abdomen is soft  Tenderness: There is abdominal tenderness in the right upper quadrant and right lower quadrant  There is no right CVA tenderness, left CVA tenderness, guarding or rebound  Musculoskeletal:         General: No swelling  Cervical back: Neck supple  Skin:     General: Skin is warm and dry  Capillary Refill: Capillary refill takes less than 2 seconds  Neurological:      General: No focal deficit present  Mental Status: She is alert and oriented to person, place, and time     Psychiatric:         Mood and Affect: Mood normal          ED Medications  Medications   sodium chloride 0 9 % bolus 250 mL (0 mL Intravenous Stopped 1/25/23 1232)   potassium chloride (K-DUR,KLOR-CON) CR tablet 40 mEq (40 mEq Oral Given 1/25/23 1230)   iohexol (OMNIPAQUE) 350 MG/ML injection (SINGLE-DOSE) 100 mL (90 mL Intravenous Given 1/25/23 1253)       Diagnostic Studies  Results Reviewed     Procedure Component Value Units Date/Time    TSH, 3rd generation with Free T4 reflex [[id number]]  (Normal) Collected: 01/25/23 1138    Lab Status: Final result Specimen: Blood from Arm, Left Updated: 01/25/23 1212     TSH 3RD GENERATON 1 410 uIU/mL     Narrative:      Patients undergoing fluorescein dye angiography may retain small amounts of fluorescein in the body for 48-72 hours post procedure  Samples containing fluorescein can produce falsely depressed TSH values  If the patient had this procedure,a specimen should be resubmitted post fluorescein clearance        Lipase [[id number]]  (Normal) Collected: 01/25/23 1138    Lab Status: Final result Specimen: Blood from Arm, Left Updated: 01/25/23 1205     Lipase 92 u/L     Comprehensive metabolic panel [[id number]]  (Abnormal) Collected: 01/25/23 1138    Lab Status: Final result Specimen: Blood from Arm, Left Updated: 01/25/23 1205     Sodium 137 mmol/L      Potassium 3 4 mmol/L      Chloride 104 mmol/L      CO2 27 mmol/L      ANION GAP 6 mmol/L      BUN 26 mg/dL      Creatinine 1 35 mg/dL      Glucose 114 mg/dL      Calcium 9 1 mg/dL      Corrected Calcium 10 1 mg/dL      AST 9 U/L      ALT 15 U/L      Alkaline Phosphatase 78 U/L      Total Protein 7 0 g/dL      Albumin 2 7 g/dL      Total Bilirubin 1 07 mg/dL      eGFR 32 ml/min/1 73sq m     Narrative:      [place] guidelines for Chronic Kidney Disease (CKD):   •  Stage 1 with normal or high GFR (GFR > 90 mL/min/1 73 square meters)  •  Stage 2 Mild CKD (GFR = 60-89 mL/min/1 73 square meters)  •  Stage 3A Moderate CKD (GFR = 45-59 mL/min/1 73 square meters)  •  Stage 3B Moderate CKD (GFR = 30-44 mL/min/1 73 square meters)  •  Stage 4 Severe CKD (GFR = 15-29 mL/min/1 73 square meters)  •  Stage 5 End Stage CKD (GFR <15 mL/min/1 73 square meters)  Note: GFR calculation is accurate only with a steady state creatinine    CBC and differential [[id number]]  (Abnormal) Collected: 01/25/23 1138    Lab Status: Final result Specimen: Blood from Arm, Left Updated: 01/25/23 1146     WBC 8 56 Thousand/uL      RBC 4 44 Million/uL      Hemoglobin 13 8 g/dL      Hematocrit 43 5 %      MCV 98 fL      MCH 31 1 pg      MCHC 31 7 g/dL      RDW 13 0 %      MPV 9 2 fL      Platelets 528 Thousands/uL      nRBC 0 /100 WBCs      Neutrophils Relative 83 %      Immat GRANS % 1 %      Lymphocytes Relative 8 %      Monocytes Relative 8 %      Eosinophils Relative 0 %      Basophils Relative 0 %      Neutrophils Absolute 7 15 Thousands/µL      Immature Grans Absolute 0 05 Thousand/uL      Lymphocytes Absolute 0 67 Thousands/µL      Monocytes Absolute 0 67 Thousand/µL      Eosinophils Absolute 0 02 Thousand/µL      Basophils Absolute 0 00 Thousands/µL     UA w Reflex to Microscopic w Reflex to Culture [[id number]]     Lab Status: No result Specimen: Urine                  CT abdomen pelvis w contrast   Final Result by Ling Sifuentes MD (01/25 1349)      Partially imaged right lower lobe pulmonary emboli  Mild right basilar consolidation atelectasis and/or pneumonia  Trace right pleural effusion  No acute pathology in the abdomen or pelvis  Colonic diverticulosis  I personally discussed this study with Dr Seamus Tapia on 1/25/2023 at 1:49 PM                                Workstation performed: [id number]               Procedures  Procedures      ED Course  ED Course as of 01/25/23 1524   Wed Jan 25, 2023   1135 Blood Pressure: 133/63   1135 Temperature(!): 95 3 °F (35 2 °C)   1135 Temp Source: Rectal   1135 Pulse: 67   1135 Respirations: 16   1135 SpO2: 97 %   1152 WBC: 8 56  wnl   1152 Hemoglobin: 13 8  wnl   1152 Platelet Count: 249  wnl   1208 Lipase: 92  wnl   1230 TSH 3RD GENERATON: 1 410  wnl   1323 Patient reassessed, sleeping comfortably on stretcher  Son not in room at this time  1404 CT abdomen pelvis w contrast  Partially imaged right lower lobe pulmonary emboli  Mild right basilar consolidation atelectasis and/or pneumonia  Trace right pleural effusion      No acute pathology in the abdomen or pelvis  Colonic diverticulosis  [facility] to son Melyssa Artis, zayra patient has been complaining of right sided back pain  No recent falls at home   Has also not been eating and drinking much, decreased urinary output  Updated him on labs and CT AP results  0 Reached out to SLIM    1452 Admitted to [facility] for right lower lobe PE and failure to thrive               Identification of Seniors at [facility] Most Recent Value   (ISAR) Identification of Seniors at Risk    Before the illness or injury that brought you to the Emergency, did you need someone to help you on a regular basis? 1 Filed at: 01/25/2023 1116   In the last 24 hours, have you needed more help than usual? 0 Filed at: 01/25/2023 1116   Have you been hospitalized for one or more nights during the past 6 months? 1 Filed at: 01/25/2023 1116   In general, do you see well? 1 Filed at: 01/25/2023 1116   In general, do you have serious problems with your memory? 0 Filed at: 01/25/2023 1116   Do you take more than three different medications every day? 1 Filed at: 01/25/2023 1116   ISAR Score 4 Filed at: 01/25/2023 1116                              Medical Decision Making  Amount and/or Complexity of Data Reviewed  Labs: ordered  Radiology: ordered  77-year-old female with medical history significant for paroxysmal SVT, CKD, osteoarthritis and ambulatory dysfunction presents to the ED via EMS  She currently endorses right-sided back pain  Denies any recent trauma  Per chart review, patient was admitted to this hospital 12/27/2022 through 1/2/2023 for ambulatory dysfunction and subsequently transferred to a rehab facility  She was recently discharged from rehab  She was evaluated in this emergency department on 1/16/2023 after a fall, right hip x-ray with pelvis showed osteoarthritis, patient was discharged home  Hemodynamically stable  Temperature 95 3 °F rectal   Patient appears in no acute distress  Sickle exam notable for right-sided abdominal tenderness to palpation without guarding or rebound  No CVA tenderness  Patient alert and oriented x3   Will evaluate with CBC, CMP, lipase, TSH, UA, CT abdomen pelvis  Hypokalemia K 3 4, repleted with 40 meq KCl  CBC, lipase, TSH wnl  CT abdomen pelvis showed right lower lobe pulmonary emboli; mild right basilar consolidation atelectasis and/or PNA; diverticulosis  Updated patient and son Joie Pedroza via phone regarding results and plan for admission, they are agreeable  Admitted to [address] [place] [address]  Disposition  Final diagnoses:   Pulmonary embolism ([address] )   Diverticulosis   Right-sided back pain   Hypothermia, initial encounter     Time reflects when diagnosis was documented in both MDM as applicable and the Disposition within this note     Time User Action Codes Description Comment    1/25/2023  2:51 PM Chel [place] T Add [I26 99] Pulmonary embolism ([address] )     1/25/2023  2:52 PM Chel Brush T Add [K57 90] Diverticulosis     1/25/2023  2:52 PM Chel Brush T Add [M54 9] Right-sided back pain     1/25/2023  2:52 PM Chel [place] T Add Esaw Caul  XXXA] Hypothermia, initial encounter       ED Disposition     ED Disposition   Admit    Condition   Stable    Date/Time   Wed Jan 25, 2023  2:51 PM    Comment   Case was discussed with Dr Fred Small and the patient's admission status was agreed to be Admission Status: inpatient status to the service of Dr Fred Small  Follow-up Information    None         Patient's Medications   Discharge Prescriptions    No medications on file     No discharge procedures on file  PDMP Review       Value Time User    PDMP Reviewed  Yes 8/25/2020 10:52 AM Aide Landeros PA-C           ED Provider  Attending physically available and evaluated Howell Nikki  I managed the patient along with the ED Attending      Electronically Signed by         Raheem Hawk MD  01/25/23 6870

## 2023-01-26 ENCOUNTER — APPOINTMENT (INPATIENT)
Dept: NON INVASIVE DIAGNOSTICS | Facility: HOSPITAL | Age: 88
End: 2023-01-26

## 2023-01-26 LAB
ALBUMIN SERPL BCP-MCNC: 2.1 G/DL (ref 3.5–5)
ALP SERPL-CCNC: 68 U/L (ref 46–116)
ALT SERPL W P-5'-P-CCNC: 13 U/L (ref 12–78)
ANION GAP SERPL CALCULATED.3IONS-SCNC: 4 MMOL/L (ref 4–13)
APICAL FOUR CHAMBER EJECTION FRACTION: 66 %
APTT PPP: 65 SECONDS (ref 23–37)
AST SERPL W P-5'-P-CCNC: 9 U/L (ref 5–45)
BILIRUB SERPL-MCNC: 0.63 MG/DL (ref 0.2–1)
BUN SERPL-MCNC: 25 MG/DL (ref 5–25)
CALCIUM ALBUM COR SERPL-MCNC: 9.9 MG/DL (ref 8.3–10.1)
CALCIUM SERPL-MCNC: 8.4 MG/DL (ref 8.3–10.1)
CHLORIDE SERPL-SCNC: 113 MMOL/L (ref 96–108)
CO2 SERPL-SCNC: 23 MMOL/L (ref 21–32)
CREAT SERPL-MCNC: 1.12 MG/DL (ref 0.6–1.3)
E WAVE DECELERATION TIME: 239 MS
ERYTHROCYTE [DISTWIDTH] IN BLOOD BY AUTOMATED COUNT: 13.2 % (ref 11.6–15.1)
GFR SERPL CREATININE-BSD FRML MDRD: 41 ML/MIN/1.73SQ M
GLUCOSE SERPL-MCNC: 82 MG/DL (ref 65–140)
HCT VFR BLD AUTO: 37.2 % (ref 34.8–46.1)
HGB BLD-MCNC: 11.6 G/DL (ref 11.5–15.4)
LAAS-AP4: 17.5 CM2
MAGNESIUM SERPL-MCNC: 2.3 MG/DL (ref 1.6–2.6)
MCH RBC QN AUTO: 30.6 PG (ref 26.8–34.3)
MCHC RBC AUTO-ENTMCNC: 31.2 G/DL (ref 31.4–37.4)
MCV RBC AUTO: 98 FL (ref 82–98)
MV PEAK A VEL: 0.95 M/S
MV PEAK E VEL: 65 CM/S
MV STENOSIS PRESSURE HALF TIME: 69 MS
MV VALVE AREA P 1/2 METHOD: 3.19 CM2
PHOSPHATE SERPL-MCNC: 3.3 MG/DL (ref 2.3–4.1)
PLATELET # BLD AUTO: 200 THOUSANDS/UL (ref 149–390)
PMV BLD AUTO: 9.7 FL (ref 8.9–12.7)
POTASSIUM SERPL-SCNC: 4.6 MMOL/L (ref 3.5–5.3)
PROT SERPL-MCNC: 5.7 G/DL (ref 6.4–8.4)
RA PRESSURE ESTIMATED: 8 MMHG
RBC # BLD AUTO: 3.79 MILLION/UL (ref 3.81–5.12)
RIGHT ATRIUM AREA SYSTOLE A4C: 18.1 CM2
RIGHT VENTRICLE ID DIMENSION: 3.7 CM
RV PSP: 35 MMHG
SL CV LV EF: 65
SODIUM SERPL-SCNC: 140 MMOL/L (ref 135–147)
TR MAX PG: 27 MMHG
TR PEAK VELOCITY: 2.6 M/S
TRICUSPID ANNULAR PLANE SYSTOLIC EXCURSION: 2.1 CM
TRICUSPID VALVE PEAK REGURGITATION VELOCITY: 2.58 M/S
WBC # BLD AUTO: 6.92 THOUSAND/UL (ref 4.31–10.16)

## 2023-01-26 RX ORDER — BETAMETHASONE SODIUM PHOSPHATE AND BETAMETHASONE ACETATE 3; 3 MG/ML; MG/ML
12 INJECTION, SUSPENSION INTRA-ARTICULAR; INTRALESIONAL; INTRAMUSCULAR; SOFT TISSUE ONCE
Status: COMPLETED | OUTPATIENT
Start: 2023-01-26 | End: 2023-01-26

## 2023-01-26 RX ORDER — METHOCARBAMOL 750 MG/1
750 TABLET, FILM COATED ORAL EVERY 8 HOURS PRN
Status: DISCONTINUED | OUTPATIENT
Start: 2023-01-26 | End: 2023-01-29 | Stop reason: HOSPADM

## 2023-01-26 RX ORDER — METOPROLOL SUCCINATE 25 MG/1
25 TABLET, EXTENDED RELEASE ORAL DAILY
Status: DISCONTINUED | OUTPATIENT
Start: 2023-01-27 | End: 2023-01-29 | Stop reason: HOSPADM

## 2023-01-26 RX ORDER — BUPIVACAINE HYDROCHLORIDE 5 MG/ML
5 INJECTION, SOLUTION PERINEURAL ONCE
Status: DISCONTINUED | OUTPATIENT
Start: 2023-01-26 | End: 2023-01-29 | Stop reason: HOSPADM

## 2023-01-26 RX ORDER — POLYETHYLENE GLYCOL 3350 17 G/17G
17 POWDER, FOR SOLUTION ORAL DAILY
Status: DISCONTINUED | OUTPATIENT
Start: 2023-01-26 | End: 2023-01-29 | Stop reason: HOSPADM

## 2023-01-26 RX ORDER — LIDOCAINE HYDROCHLORIDE 10 MG/ML
20 INJECTION, SOLUTION EPIDURAL; INFILTRATION; INTRACAUDAL; PERINEURAL ONCE
Status: COMPLETED | OUTPATIENT
Start: 2023-01-26 | End: 2023-01-26

## 2023-01-26 RX ADMIN — APIXABAN 5 MG: 5 TABLET, FILM COATED ORAL at 14:27

## 2023-01-26 RX ADMIN — BETAMETHASONE SODIUM PHOSPHATE AND BETAMETHASONE ACETATE 12 MG: 3; 3 INJECTION, SUSPENSION INTRA-ARTICULAR; INTRALESIONAL; INTRAMUSCULAR at 08:39

## 2023-01-26 RX ADMIN — ACETAMINOPHEN 975 MG: 325 TABLET ORAL at 22:30

## 2023-01-26 RX ADMIN — ACETAMINOPHEN 975 MG: 325 TABLET ORAL at 05:57

## 2023-01-26 RX ADMIN — APIXABAN 5 MG: 5 TABLET, FILM COATED ORAL at 22:30

## 2023-01-26 RX ADMIN — METHOCARBAMOL TABLETS 750 MG: 750 TABLET, COATED ORAL at 05:57

## 2023-01-26 RX ADMIN — LIDOCAINE HYDROCHLORIDE 5 ML: 10 INJECTION, SOLUTION EPIDURAL; INFILTRATION; INTRACAUDAL at 08:39

## 2023-01-26 RX ADMIN — ACETAMINOPHEN 975 MG: 325 TABLET ORAL at 14:27

## 2023-01-26 NOTE — PLAN OF CARE
Problem: OCCUPATIONAL THERAPY ADULT  Goal: Performs self-care activities at highest level of function for planned discharge setting  See evaluation for individualized goals  Description: Treatment Interventions: ADL retraining, Functional transfer training, Endurance training, Equipment evaluation/education, Compensatory technique education, Continued evaluation, Energy conservation, Activityengagement          See flowsheet documentation for full assessment, interventions and recommendations  Note: Limitation: Decreased ADL status, Decreased self-care trans, Decreased high-level ADLs, Decreased endurance  Prognosis: Fair  Assessment: Pt is 80 y o  female admitted to [facility] on 1/25/2023 w/ ambulatory dysfunction  Pt  has a past medical history of Interstitial cystitis, Leukopenia, Neurologic gait dysfunction, Neuropathy, Neutropenia ([facility] ), Osteoarthritis, Osteoporosis, Peripheral edema, Renal cyst, and Syncope    Pt with active OT orders and activity orders  Pt resides in a one story Home, with 5 VALENTIN  Pt lives with son who is able to A if needed  Pt was receiving A from the son w/ ADLs, IADLs, (-) drove  Pt recently finished rehab from last hospitalization on 12/27/2022  Pt is currently functioning at S for eating and grooming, min A for UB ADLs, transfers, bed mobility, and functional mobility, and mod A for LB ADLs  Pt uses a walker at baseline  Pt is limited 2* pain, endurance, activity tolerance, functional mobility, functional standing tolerance, decreased I w/ ADLS/IADLS, decreased safety awareness and decreased insight into deficits  The Areas of Occupational Performance Areas to address w/ pt include: eating, grooming, bathing/shower, toilet hygiene, dressing, health maintenance and functional mobility  Based off of an OT evaulation, assessment, and performance functioning, pt is identified as a high complexity   The patient's raw score on the AM-PAC Daily Activity inpatient short form is 19, standardized score is 40 22, greater than 39 4  Patients at this level are likely to benefit from discharge to post-acute rehabilitation services  Please refer to the recommendation of the Occupational Therapist for safe discharge planning  Although, OT recommendation for d/c includes post acute rehab - [facility] referral   Pt would benefit from continued acute OT services for  2-3x/week to  w/in 10-14 days: to address functional goals       OT Discharge Recommendation: (S) Post acute rehabilitation services [facility] referral)

## 2023-01-26 NOTE — DISCHARGE INSTR - AVS FIRST PAGE
Discharge Instructions - Orthopedics  Sergio Mars 80 y o  female MRN: [id number]  Unit/Bed#: [facility] 834-01    Weight Bearing Status:                                           Weight bear as tolerated  Pain:  Continue analgesics as directed    Dressing Instructions:   Please keep clean, dry and intact until follow up     Appt Instructions: If you do not have an appointment, please call the clinic at [phone] t as needed  Otherwise followup as scheduled     Contact the office sooner if you experience any increased numbness/tingling in the extremities

## 2023-01-26 NOTE — CASE MANAGEMENT
Case Management Assessment    Patient name Nelli Baumann  Location [address] [place] [address] [id number] [id number] MRN [id number]  : 1925 Date 2023       Current Admission Date: 2023  Current Admission Diagnosis:Ambulatory dysfunction   Patient Active Problem List    Diagnosis Date Noted   • Pulmonary embolism ([facility] ) 2023   • PSVT (paroxysmal supraventricular tachycardia) ([place] [address] ) 2022   • Infiltrate noted on imaging study 2022   • Compression fracture of L3 lumbar vertebra, closed, initial encounter ([place] [address] ) 2020   • Fall 2020   • Age-related osteoporosis without current pathological fracture 2019   • Lower extremity edema 2019   • CKD (chronic kidney disease) stage 3, GFR 30-59 ml/min ([facility]) 2019   • Venous stasis dermatitis of both lower extremities 2019   • Constipation, unspecified 2018   • Diverticulosis of intestine, part unspecified, without perforation or abscess without bleeding 2018   • Generalized muscle weakness 2018   • Polyneuropathy, unspecified 2018   • Ambulatory dysfunction 2018   • Neuropathy 2018   • Balance problem 2017   • Memory deficit 2017      LOS (days): 1  Geometric Mean LOS (GMLOS) (days): 2 50  Days to GMLOS:1 7     OBJECTIVE:  PATIENT READMITTED TO HOSPITAL  Risk of Unplanned Readmission Score: 14 29         Current admission status: Inpatient       Preferred Pharmacy:   [address] [place] [id number]  Phone: [phone] Fax: [phone]    Primary Care Provider: KERON Antonio    Primary Insurance: MEDICARE  Secondary Insurance:     ASSESSMENT:  [address] [facility]   Primary Phone: [phone] (Mobile)                         Readmission Root Cause  30 Day Readmission: Yes  Who directed you to return to the hospital?: Other (comment) (facilitiy)  Did you understand whom to contact if you had questions or problems?: Yes  Did you get your prescriptions before you left the hospital?: No  Reason[de-identified] Preference for own pharmacy  Were you able to get your prescriptions filled when you left the hospital?: Yes  Did you take your medications as prescribed?: Yes  During previous admission, was a post-acute recommendation made?: Yes  What post-acute resources were offered?: STR  Patient was readmitted due to: r sided pain  Action Plan: Return to rehab    Patient Information  Admitted from[de-identified] Facility  Mental Status: Alert  During Assessment patient was accompanied by: Not accompanied during assessment  Assessment information provided by[de-identified]  (prior admission)  Primary Caregiver: Self  Support Systems: Family members, Son  Home entry access options  Select all that apply : Stairs  Number of steps to enter home : 5  Type of Current Residence: [facility]  Living Arrangements: Lives w/ Son    Activities of Daily Living Prior to Admission  Functional Status: Independent  Completes ADLs independently?: Yes  Ambulates independently?: Yes  Does patient use assisted devices?: Yes  Assisted Devices (DME) used: Mak Salomon  Does patient currently own DME?: Yes  What DME does the patient currently own?: Mak Salomon, Bedside Commode, Straight Cane, Shower Chair  Does patient have a history of Outpatient Therapy (PT/OT)?: Yes  Does the patient have a history of Short-Term Rehab?: Yes ([facility])  Does patient have a history of HHC?: Yes  [facility] No         Patient Information Continued  Income Source: Pension/jail  Does patient have prescription coverage?: Yes  Does patient have a history of substance abuse?: No  Does patient have a history of Mental Health Diagnosis?: No         Means of Transportation  Means of Transport to [facility][de-identified] Family transport    Pt was at rehab prior to admission at [facility]  Prior to that pt lived with son  Main contact, son Brenda Davies [phone]

## 2023-01-26 NOTE — PLAN OF CARE
Problem: Potential for Falls  Goal: Patient will remain free of falls  Description: INTERVENTIONS:  - Educate patient/family on patient safety including physical limitations  - Instruct patient to call for assistance with activity   - Consult OT/PT to assist with strengthening/mobility   - Keep Call bell within reach  - Keep bed low and locked with side rails adjusted as appropriate  - Keep care items and personal belongings within reach  - Initiate and maintain comfort rounds  - Make Fall Risk Sign visible to staff  - Offer Toileting, in advance of need  - Initiate/Maintain alarm  - Obtain necessary fall risk management equipment:  - Apply yellow socks and bracelet for high fall risk patients  - Consider moving patient to room near nurses station  Outcome: Progressing

## 2023-01-26 NOTE — QUICK NOTE
Procedure- Orthopedics   Ashleigh Jordan 80 y o  female MRN: [id number]  Unit/Bed#: [facility] 834-01    Procedure: right hip bursa injection    After sterile preparation of the skin overlying right greater trochanter, a 22g 3inch spinal needle was introduced down to bone  Needle was then pulled back 2mm and a mixture of 2cc 1% lidocaine, 2cc   5% Marcaine, 2cc Betamethasone was introduced into the greater trochanter bursa  Sterile dressing was then applied  Pt tolerated procedure well      Chaim Rosado MD

## 2023-01-26 NOTE — PHYSICAL THERAPY NOTE
Physical Therapy evaluation note     Patient Name: Rian Hobbs    GFXJT'L Date: 1/26/2023     Problem List  Principal Problem:    Ambulatory dysfunction  Active Problems:    CKD (chronic kidney disease) stage 3, GFR 30-59 ml/min (HCC)    PSVT (paroxysmal supraventricular tachycardia) (HCC)    Pulmonary embolism (HCC)       Past Medical History  Past Medical History:   Diagnosis Date   • Interstitial cystitis    • Leukopenia    • Neurologic gait dysfunction     Abstraction Dr Priscila Ewing charts   • Neuropathy    • Neutropenia [facility])     Abstraction Dr Janis Coley   • Osteoarthritis    • Osteoporosis    • Peripheral edema     Abstraction Dr Priscila Ewing charts   • Renal cyst    • Syncope     Abstraction Dr Priscila Ewing charts        Past Surgical History  Past Surgical History:   Procedure Laterality Date   • CAPSULOTOMY      Right eye   • CATARACT EXTRACTION Left    • CHALAZION EXCISION     • COLONOSCOPY  2006 01/26/23 1041   PT Last Visit   PT Visit Date 01/26/23   Note Type   Note type Evaluation   Pain Assessment   Pain Assessment Tool 0-10   Pain Score No Pain   Restrictions/Precautions   Weight Bearing Precautions Per Order WBAT on RLE per ortho   Other Precautions Chair Alarm; Bed Alarm; Fall Risk  ([place]')   Home Living   Type of [facility] One level   Additional Comments Pt lives with her son who is home at all times and able to assist  Pt required A with ADL's and IADL's prior  Pt used a RW at baseline  Pt was recently at rehab and discharged home  Pt was then re admitted  Pt has 5 VALENTIN  Prior Function   Level of [place] Needs assistance with IADLS; Independent with IADLS;Needs assistance with ADLs; Independent with functional mobility   Lives With Son   Receives Help From Family   IADLs Independent with medication management; Family/Friend/Other provides transportation; Family/Friend/Other provides meals   Falls in the last 6 months 0 Vocational Retired   General   Family/Caregiver Present Yes   Cognition   Overall Cognitive Status WFL   Attention Attends with cues to redirect   Orientation Level Oriented X4   RLE Assessment   RLE Assessment   (grossly 3/5 observed with mobility)   LLE Assessment   LLE Assessment   (grossly 3/5 observed with mobility)   Bed Mobility   Supine to Sit 4  Minimal assistance   Additional items Assist x 1;HOB elevated   Additional Comments sitting EOB S level   Transfers   Sit to Stand 4  Minimal assistance   Additional items Assist x 1   Stand to Sit 4  Minimal assistance   Additional items Assist x 1   Ambulation/Elevation   Gait pattern Excessively slow; Foward flexed; Shuffling   Gait Assistance 4  Minimal assist   Additional items Assist x 1   Assistive Device Rolling walker   Distance 3ft to chair   Balance   Static Sitting Fair +   Dynamic Sitting Fair   Static Standing Fair -   Dynamic Standing Fair -   Ambulatory Fair -   Endurance Deficit   Endurance Deficit Yes   Activity Tolerance   Activity Tolerance Patient limited by fatigue   Medical Staff Made Aware OT   Nurse Made Aware nurse approved therapy session   Assessment   Prognosis Good   Problem List Decreased strength;Decreased endurance; Impaired balance;Decreased mobility   Assessment Pt is a 79 yo female admitted to [place] [address] on 1/25/2023 s/p failure to thrive  DX: ambulatory dysfunction, pulmonary embolism, CKD  Two patient identifiers were used to confirm  Pt lives with her son in a 1 SH with 5 VALENTIN  Pt was I for mobility and required A with ADL's and IADL's prior  Pt used a RW at baseline  Pt was most recently in rehab then discharged home  Pt was then re admitted to the hospital  Pt's impairments include reduced mobility, gait abnormalitites  These impairments limit the ability of the patient to perform mobility without increased assistance, return to PLOF and participate in everyday life activities   Pt would benefit from continued skilled therapy while in the hospital to improve overall mobility and work towards a safe d/c  Recommend discharge to home with home PT  Recommend increased support from son if able  At the end of the session the patient was left in seated position with call bell and phone within reach  The patient's AM-PAC Basic Mobility Inpatient Short Form Raw Score is 16  A Raw score of less than or equal to 16 suggests the patient may benefit from discharge to home  Please also refer to the recommendation of the Physical Therapist for safe discharge planning  Barriers to Discharge Inaccessible home environment   Goals   STG Expiration Date 02/09/23   Short Term Goal #1 STG 1: Pt will perform transfers at a MI level to return to baseline of function  STG 2: Pt will ambulate 150ft with RW at a MI level to reduce the level of assistance needed upon d/c home  STG 3: Pt will negotiate 5 steps with HR at a I level to ensure safety with activity when able to ambulate 50ft at a min A level  STG 4: Pt will perform bed mobility at a I to safety return to PLOF  PT Treatment Day 0   Plan   Treatment/Interventions Functional transfer training;LE strengthening/ROM; Elevations; Therapeutic exercise; Endurance training;Bed mobility; Equipment eval/education;Gait training;OT   PT Frequency 3-5x/wk   Recommendation   PT Discharge Recommendation Home with home health rehabilitation  (pending family can provide assistance)   AM-PAC Basic Mobility Inpatient   Turning in Flat Bed Without Bedrails 3   Lying on Back to Sitting on Edge of Flat Bed Without Bedrails 3   Moving Bed to Chair 3   Standing Up From Chair Using Arms 3   Walk in Room 2   Climb 3-5 Stairs With Railing 2   Basic Mobility Inpatient Raw Score 16   Basic Mobility Standardized Score 38 32   Highest Level Of Mobility   JH-HLM Goal 5: Stand one or more mins   JH-HLM Achieved 4: Move to chair/commode   Joseph Basilio, PT, DPT

## 2023-01-26 NOTE — ASSESSMENT & PLAN NOTE
Lab Results   Component Value Date    EGFR 41 01/26/2023    EGFR 32 01/25/2023    EGFR 40 12/31/2022    CREATININE 1 12 01/26/2023    CREATININE 1 35 (H) 01/25/2023    CREATININE 1 13 12/31/2022     Baseline creatinine 1 0-1 4  Currently at baseline

## 2023-01-26 NOTE — OCCUPATIONAL THERAPY NOTE
Occupational Therapy Evaluation     Patient Name: Gilmer Carpenter  EQRNV'F Date: 1/26/2023  Problem List  Principal Problem:    Ambulatory dysfunction  Active Problems:    CKD (chronic kidney disease) stage 3, GFR 30-59 ml/min ([facility])    PSVT (paroxysmal supraventricular tachycardia) ([facility])    Pulmonary embolism ([facility])    Past Medical History  Past Medical History:   Diagnosis Date    Interstitial cystitis     Leukopenia     Neurologic gait dysfunction     Abstraction Dr Jing Brian charts    Neuropathy     Neutropenia [facility])     Abstraction Dr Joan Lerner     Osteoporosis     Peripheral edema     Abstraction Dr Clinton Mascorro    Renal cyst     Syncope     Abstraction Dr Jing Brian charts     Past Surgical History  Past Surgical History:   Procedure Laterality Date    CAPSULOTOMY      Right eye    CATARACT EXTRACTION Left     CHALAZION EXCISION      COLONOSCOPY  2006 01/26/23 1040   OT Last Visit   OT Visit Date 01/26/23   Note Type   Note type Evaluation   Pain Assessment   Pain Assessment Tool 0-10   Pain Score No Pain   Restrictions/Precautions   Weight Bearing Precautions Per Order No   Other Precautions Chair Alarm; Bed Alarm;Multiple lines; Fall Risk   Home Living   Type of [address] [place] [address] One level;Performs ADLs on one level;Stairs to enter with rails  (5STE)   Bathroom Shower/Tub Tub/shower unit   Bathroom Toilet Standard   Bathroom Equipment Commode; Shower chair   Bathroom Accessibility Accessible   Home Equipment Walker  (uses at baseline)   Additional Comments Pt lives in a ranch style home with her son  Pt has 5 VALENTIN  Prior Function   Level of [place] Needs assistance with ADLs; Needs assistance with IADLS; Independent with functional mobility   Lives With Son   Receives Help From Family   IADLs Independent with medication management; Family/Friend/Other provides transportation; Family/Friend/Other provides meals   Falls in the last 6 months 0   Vocational Retired Lifestyle   Autonomy Pt's son reports pt receiving A with ADLs, IADLs, and uses a walker at baseline  (-) drive  Reciprocal Relationships Pt lives with her son who is her primary caregiver  Service to Others Pt is retired  Intrinsic Gratification Pt likes doing puzzles  General   Family/Caregiver Present Yes   Subjective   Subjective "I guess I will go to the chair"   ADL   Where Assessed Chair   Eating Assistance 5  Supervision/Setup   Grooming Assistance 5  Supervision/Setup   UB Bathing Assistance 4  Minimal Assistance   LB Bathing Assistance 3  Moderate Assistance   [address] 4  [address] 3  Moderate [address]  4  Minimal Assistance   Functional Assistance 3  Moderate Assistance   Bed Mobility   Supine to Sit 4  Minimal assistance   Additional items Assist x 1; Increased time required;LE management   Additional Comments Upon arrival, pt was supine in bed  Pt was able to sit EOB with min Ax1  Pt was left sitting in chair at the end of session with all necessary items within reach and chair alarm on  Transfers   Sit to Stand 4  Minimal assistance   Additional items Assist x 1; Increased time required;Verbal cues   Stand to Sit 4  Minimal assistance   Additional items Assist x 1; Increased time required;Verbal cues   Additional Comments transfers with RW   Functional Mobility   Functional Mobility 4  Minimal assistance   Additional Comments Ax1  Pt was able to take a few steps from bed to chair with min Ax1 and RW  Additional items Rolling walker   Balance   Static Sitting Fair +   Dynamic Sitting Fair   Static Standing Fair -   Dynamic Standing Poor +   Ambulatory Poor   Activity Tolerance   Activity Tolerance Patient limited by fatigue   Medical Staff Made Aware Seen with PT 2* medical complexity     Nurse Made Aware RN made aware   RUE Assessment   RUE Assessment WFL   LUE Assessment   LUE Assessment WFL   Hand Function   Gross Motor Coordination Functional   Fine Motor Coordination Functional   Psychosocial   Psychosocial (WDL) WDL   Cognition   Overall Cognitive Status WFL   Arousal/Participation Alert; Responsive;Arousable; Cooperative   Attention Attends with cues to redirect   Orientation Level Oriented X4   Memory Decreased recall of recent events   Following Commands Follows one step commands with increased time or repetition   Comments Pt was pleasant and cooperative t/o session  Assessment   Limitation Decreased ADL status; Decreased self-care trans;Decreased high-level ADLs; Decreased endurance   Prognosis Fair   Assessment Pt is 80 y o  female admitted to [facility] on 1/25/2023 w/ ambulatory dysfunction  Pt  has a past medical history of Interstitial cystitis, Leukopenia, Neurologic gait dysfunction, Neuropathy, Neutropenia ([facility] [address] ), Osteoarthritis, Osteoporosis, Peripheral edema, Renal cyst, and Syncope    Pt with active OT orders and activity orders  Pt resides in a one story Home, with 5 VALENTIN  Pt lives with son who is able to A if needed  Pt was receiving A from the son w/ ADLs, IADLs, (-) drove  Pt recently finished rehab from last hospitalization on 12/27/2022  Pt is currently functioning at S for eating and grooming, min A for UB ADLs, transfers, bed mobility, and functional mobility, and mod A for LB ADLs  Pt uses a walker at baseline  Pt is limited 2* pain, endurance, activity tolerance, functional mobility, functional standing tolerance, decreased I w/ ADLS/IADLS, decreased safety awareness and decreased insight into deficits  The Areas of Occupational Performance Areas to address w/ pt include: eating, grooming, bathing/shower, toilet hygiene, dressing, health maintenance and functional mobility  Based off of an OT evaulation, assessment, and performance functioning, pt is identified as a high complexity  The patient's raw score on the AM-PAC Daily Activity inpatient short form is 19, standardized score is 40 22, greater than 39 4  Patients at this level are likely to benefit from discharge to post-acute rehabilitation services  Please refer to the recommendation of the Occupational Therapist for safe discharge planning  Although, OT recommendation for d/c includes post acute rehab - [facility] referral   Pt would benefit from continued acute OT services for  2-3x/week to  w/in 10-14 days: to address functional goals  Goals   Patient Goals to go home   LTG Time Frame 10-14   Long Term Goal see goals below   Plan   Treatment Interventions ADL retraining;Functional transfer training; Endurance training;Equipment evaluation/education; Compensatory technique education;Continued evaluation; Energy conservation; Activityengagement   Goal Expiration Date 23   OT Frequency 2-3x/wk   Recommendation   OT Discharge Recommendation (S)  Post acute rehabilitation services  ([facility] referral)   AM-PAC Daily Activity Inpatient   Lower Body Dressing 3   Bathing 2   Toileting 2   Upper Body Dressing 4   Grooming 4   Eating 4   Daily Activity Raw Score 19   Daily Activity Standardized Score (Calc for Raw Score >=11) 40 22   AM-PAC Applied Cognition Inpatient   Following a Speech/Presentation 3   Understanding Ordinary Conversation 4   Taking Medications 4   Remembering Where Things Are Placed or Put Away 3   Remembering List of 4-5 Errands 3   Taking Care of Complicated Tasks 3   Applied Cognition Raw Score 20   Applied Cognition Standardized Score 41 76   End of Consult   Education Provided Yes;Family or social support of family present for education by provider   Patient Position at End of Consult Bedside chair;Bed/Chair alarm activated; All needs within reach   Nurse Communication Nurse aware of consult       Pt w/ mod I will complete daily grooming tasks w/ adaptive equipment as needed  Pt will increase standing tolerance to 10 mins w/ mod I w/ adaptive equipment as needed       Pt will complete functional transfers w/ mod I on and off all surfaces w/ equipment as needed  Pt will complete functional mobility w/ mod I on and off all surfaces w/ equipment as needed  Pt will complete tolieting w/ mod I while demonstrating safety techniques w/ DME  Pt will complete feeding tasks w/ mod I using adaptive devices  Pt will demonstrate G safety awareness w/ mod I during OT session  Pt will demonstrate LE body dressing w/ mod I w/ adaptive equipment as needed  Pt will demonstrate UE body dressing w/ mod I w/ adaptive equipment as needed  Pt will increase activity tolerance to G during a 30 min OT tx session  Pt will demonstrate bed mobility skills w/ mod I  Pt will participate in a formal/functional cognitive assessment as needed to assist with safe discharge planning       Carol Amaris, OTR/L

## 2023-01-26 NOTE — PROGRESS NOTES
Pastoral Care Progress Note    2023  Patient: Ann Valdovinos : 1925  Admission Date & Time: 2023 110  MRN: [id number] CSN: [id number]        Introduced self/pastoral care to PT, visited briefly, prayed for her healing, and remain available to serve her emotional/spiritual needs

## 2023-01-26 NOTE — ASSESSMENT & PLAN NOTE
See dental finding on abdominal CT scan partially imaged right lower lobe pulmonary emboli  Mild right basilar consolidation atelectasis and/or pneumonia    Trace right pleural effusion    Started on heparin drip  Change to oral Eliquis  Check lower extremity venous Doppler to rule out DVT

## 2023-01-26 NOTE — PLAN OF CARE
Problem: PHYSICAL THERAPY ADULT  Goal: Performs mobility at highest level of function for planned discharge setting  See evaluation for individualized goals  Description: Treatment/Interventions: Functional transfer training, LE strengthening/ROM, Elevations, Therapeutic exercise, Endurance training, Bed mobility, Equipment eval/education, Gait training, OT          See flowsheet documentation for full assessment, interventions and recommendations  Note: Prognosis: Good  Problem List: Decreased strength, Decreased endurance, Impaired balance, Decreased mobility  Assessment: Pt is a 79 yo female admitted to [place] [address] on 1/25/2023 s/p failure to thrive  DX: ambulatory dysfunction, pulmonary embolism, CKD  Two patient identifiers were used to confirm  Pt lives with her son in a 1 SH with 5 VALENTIN  Pt was I for mobility and required A with ADL's and IADL's prior  Pt used a RW at baseline  Pt was most recently in rehab then discharged home  Pt was then re admitted to the hospital  Pt's impairments include reduced mobility, gait abnormalitites  These impairments limit the ability of the patient to perform mobility without increased assistance, return to PLOF and participate in everyday life activities  Pt would benefit from continued skilled therapy while in the hospital to improve overall mobility and work towards a safe d/c  Recommend discharge to home with home PT  Recommend increased support from son if able  At the end of the session the patient was left in seated position with call bell and phone within reach  The patient's AM-PAC Basic Mobility Inpatient Short Form Raw Score is 16  A Raw score of less than or equal to 16 suggests the patient may benefit from discharge to home  Please also refer to the recommendation of the Physical Therapist for safe discharge planning    Barriers to Discharge: Inaccessible home environment     PT Discharge Recommendation: Home with home health rehabilitation (pending family can provide assistance)    See flowsheet documentation for full assessment

## 2023-01-26 NOTE — CASE MANAGEMENT
Case Management Discharge Planning Note    Patient name Devin Becerra  [facility] [id number]  : 1925 Date 2023       Current Admission Date: 2023  Current Admission Diagnosis:Ambulatory dysfunction   Patient Active Problem List    Diagnosis Date Noted   • Pulmonary embolism ([facility] ) 2023   • PSVT (paroxysmal supraventricular tachycardia) ([place] [address] ) 2022   • Infiltrate noted on imaging study 2022   • Compression fracture of L3 lumbar vertebra, closed, initial encounter ([place] [address] ) 2020   • Fall 2020   • Age-related osteoporosis without current pathological fracture 2019   • Lower extremity edema 2019   • CKD (chronic kidney disease) stage 3, GFR 30-59 ml/min ([facility]) 2019   • Venous stasis dermatitis of both lower extremities 2019   • Constipation, unspecified 2018   • Diverticulosis of intestine, part unspecified, without perforation or abscess without bleeding 2018   • Generalized muscle weakness 2018   • Polyneuropathy, unspecified 2018   • Ambulatory dysfunction 2018   • Neuropathy 2018   • Balance problem 2017   • Memory deficit 2017      LOS (days): 1  Geometric Mean LOS (GMLOS) (days): 2 50  Days to GMLOS:1 7     OBJECTIVE:  Risk of Unplanned Readmission Score: 14 29         Current admission status: Inpatient   Preferred Pharmacy:   [address] [place] [id number]  Phone: [phone] Fax: [phone]    Primary Care Provider: KERON Londono    Primary Insurance: MEDICARE  Secondary Insurance:     DISCHARGE DETAILS:    Discharge planning discussed with[de-identified] lacie Cabrera of Choice: Yes     CM contacted family/caregiver?: Yes  Were Treatment Team discharge recommendations reviewed with patient/caregiver?: Yes  Did patient/caregiver verbalize understanding of patient care needs?: Yes  Were patient/caregiver advised of the risks associated with not following Treatment Team discharge recommendations?: Yes    Contacts  Patient Contacts: Boy Campbell  Relationship to Patient[de-identified] Family  Contact Method: Phone  Phone Number: [phone]  Reason/Outcome: Emergency Contact, Discharge Planning              Other Referral/Resources/Interventions Provided:  Referral Comments: S/w pt's son at bedside & dc plan is for pt to return to rehab at [facility]  States he feels she is not at her baseline & needs more rehab prior to coming home with him  S/w Lennox Amos at [facility] pt will need insurance auth & covid  1200 update; Therapy s/w CM & suggested B [facility] for pt  TC to boy Campbell [phone] & agreeable  Referral placed  7040 update:  TT from [facility]  Probably not acute appropriate  TC to boy Campbell & agreeable to return to [facility]  States he feels pt needs to be a stronger for her to return home  TC Perez Michael liasion in & they will submit for auth  Will need covid  SLIM updated

## 2023-01-26 NOTE — ASSESSMENT & PLAN NOTE
Patient states that she is unable to walk secondary to generalized weakness and right sided pain  Patient has been admitted for ambulatory dysfunction previously  CT abdomen was done in the ER with contrast which was negative for acute findings  Hip Xray from 1/16  with: Mild degenerative arthritis both hips  Elevated by orthopedics patient with lumbar radiculopathy to right lower leg and trochanteric bursitis  Status post CSI to right trochanteric bursa  PT/OT  Pain control

## 2023-01-26 NOTE — PROGRESS NOTES
1425 [facility]  Progress Note - Krystin Yareli 5/16/1925, 80 y o  female MRN: [id number]  Unit/Bed#: [facility] 834-01 Encounter: [id number]  Primary Care Provider: KERON Bailey   Date and time admitted to hospital: 1/25/2023 11:07 AM    * Ambulatory dysfunction  Assessment & Plan  Patient states that she is unable to walk secondary to generalized weakness and right sided pain  Patient has been admitted for ambulatory dysfunction previously  CT abdomen was done in the ER with contrast which was negative for acute findings  Hip Xray from 1/16  with: Mild degenerative arthritis both hips  Elevated by orthopedics patient with lumbar radiculopathy to right lower leg and trochanteric bursitis  Status post CSI to right trochanteric bursa  PT/OT  Pain control        Pulmonary embolism [facility])  Assessment & Plan  See dental finding on abdominal CT scan partially imaged right lower lobe pulmonary emboli  Mild right basilar consolidation atelectasis and/or pneumonia  Trace right pleural effusion    Started on heparin drip  Change to oral Eliquis  Check lower extremity venous Doppler to rule out DVT    PSVT (paroxysmal supraventricular tachycardia) (HCC)  Assessment & Plan  Heart rate stable  Continue Toprol-XL    CKD (chronic kidney disease) stage 3, GFR 30-59 ml/min [facility])  Assessment & Plan  Lab Results   Component Value Date    EGFR 41 01/26/2023    EGFR 32 01/25/2023    EGFR 40 12/31/2022    CREATININE 1 12 01/26/2023    CREATININE 1 35 (H) 01/25/2023    CREATININE 1 13 12/31/2022     Baseline creatinine 1 0-1 4  Currently at baseline      VTE Pharmacologic Prophylaxis:   High Risk (Score >/= 5) - Pharmacological DVT Prophylaxis Ordered: apixaban (Eliquis)  Sequential Compression Devices Ordered  Patient Centered Rounds: I performed bedside rounds with nursing staff today    Discussions with Specialists or Other Care Team Provider:     Education and Discussions with Family / Patient: Updated  (son) at bedside  Time Spent for Care: 60 minutes  More than 50% of total time spent on counseling and coordination of care as described above  Current Length of Stay: 1 day(s)  Current Patient Status: Inpatient   Certification Statement: The patient will continue to require additional inpatient hospital stay due to Management of ambulatory dysfunction  Discharge Plan: Anticipate discharge tomorrow to rehab facility  Code Status: Level 3 - DNAR and DNI    Subjective:   Patient seen and examined  Comfortable sitting in chair  Denied pain  No event overnight    Objective:     Vitals:   Temp (24hrs), Av 9 °F (36 1 °C), Min:95 6 °F (35 3 °C), Max:97 3 °F (36 3 °C)    Temp:  [95 6 °F (35 3 °C)-97 3 °F (36 3 °C)] 97 3 °F (36 3 °C)  HR:  [53-79] 53  Resp:  [16-24] 16  BP: (111-149)/(53-78) 116/54  SpO2:  [92 %-98 %] 98 %  Body mass index is 20 91 kg/m²  Input and Output Summary (last 24 hours): Intake/Output Summary (Last 24 hours) at 2023 1142  Last data filed at 2023 1232  Gross per 24 hour   Intake 250 ml   Output --   Net 250 ml       Physical Exam:   Physical Exam   Patient is awake alert in no acute distress  Hard of hearing  Lung clear to auscultation bilateral anteriorly  Heart positive S1-S2 no murmur  Abdomen soft nontender  Bilateral lower extremity venous stasis changes  Additional Data:     Labs:  Results from last 7 days   Lab Units 23  0508 23  1636 23  1138   WBC Thousand/uL 6 92   < > 8 56   HEMOGLOBIN g/dL 11 6   < > 13 8   HEMATOCRIT % 37 2   < > 43 5   PLATELETS Thousands/uL 200   < > 222   NEUTROS PCT %  --   --  83*   LYMPHS PCT %  --   --  8*   MONOS PCT %  --   --  8   EOS PCT %  --   --  0    < > = values in this interval not displayed       Results from last 7 days   Lab Units 23  0508   SODIUM mmol/L 140   POTASSIUM mmol/L 4 6   CHLORIDE mmol/L 113*   CO2 mmol/L 23   BUN mg/dL 25   CREATININE mg/dL 1 12   ANION GAP mmol/L 4   CALCIUM mg/dL 8 4   ALBUMIN g/dL 2 1*   TOTAL BILIRUBIN mg/dL 0 63   ALK PHOS U/L 68   ALT U/L 13   AST U/L 9   GLUCOSE RANDOM mg/dL 82     Results from last 7 days   Lab Units 01/25/23  1636   INR  1 22*                   Lines/Drains:  Invasive Devices     Peripheral Intravenous Line  Duration           Peripheral IV 01/25/23 Left Antecubital 1 day                      Imaging: No pertinent imaging reviewed  Recent Cultures (last 7 days):         Last 24 Hours Medication List:   Current Facility-Administered Medications   Medication Dose Route Frequency Provider Last Rate   • acetaminophen  975 mg Oral [facility] Tc Crespos, DO     • apixaban  5 mg Oral BID Tc Crespos, DO     • bupivacaine  5 mL Injection Once Annabella Uribe MD     • methocarbamol  750 mg Oral Q8H PRN Tc Cao DO     • [START ON 1/27/2023] metoprolol succinate  25 mg Oral Daily Tc Cao, DO     • polyethylene glycol  17 g Oral Daily Tc Crespos, DO          Today, Patient Was Seen By: Tc Cao DO    **Please Note: This note may have been constructed using a voice recognition system  **

## 2023-01-27 ENCOUNTER — APPOINTMENT (INPATIENT)
Dept: NON INVASIVE DIAGNOSTICS | Facility: HOSPITAL | Age: 88
End: 2023-01-27
Attending: INTERNAL MEDICINE

## 2023-01-27 PROBLEM — U07.1 COVID: Status: ACTIVE | Noted: 2023-01-27

## 2023-01-27 LAB
FLUAV RNA RESP QL NAA+PROBE: NEGATIVE
FLUBV RNA RESP QL NAA+PROBE: NEGATIVE
RSV RNA RESP QL NAA+PROBE: NEGATIVE
SARS-COV-2 RNA RESP QL NAA+PROBE: POSITIVE

## 2023-01-27 RX ORDER — LANOLIN ALCOHOL/MO/W.PET/CERES
3 CREAM (GRAM) TOPICAL
Status: DISCONTINUED | OUTPATIENT
Start: 2023-01-27 | End: 2023-01-29 | Stop reason: HOSPADM

## 2023-01-27 RX ADMIN — ACETAMINOPHEN 975 MG: 325 TABLET ORAL at 14:06

## 2023-01-27 RX ADMIN — POLYETHYLENE GLYCOL 3350 17 G: 17 POWDER, FOR SOLUTION ORAL at 09:31

## 2023-01-27 RX ADMIN — ACETAMINOPHEN 975 MG: 325 TABLET ORAL at 05:41

## 2023-01-27 RX ADMIN — MELATONIN 3 MG: at 22:51

## 2023-01-27 RX ADMIN — APIXABAN 5 MG: 5 TABLET, FILM COATED ORAL at 09:31

## 2023-01-27 RX ADMIN — METOPROLOL SUCCINATE 25 MG: 25 TABLET, EXTENDED RELEASE ORAL at 09:31

## 2023-01-27 RX ADMIN — ACETAMINOPHEN 975 MG: 325 TABLET ORAL at 21:49

## 2023-01-27 RX ADMIN — APIXABAN 5 MG: 5 TABLET, FILM COATED ORAL at 18:39

## 2023-01-27 NOTE — ASSESSMENT & PLAN NOTE
· CT of the abdomen and pelvis on admission incidentally noted partially imaged RLL PE  · Patient was started on heparin GTT on admission and was transitioned to Eliquis the next day   Given the patient's age, fall risk, renal function, weight, she was placed on Eliquis 5mg PO BID  · LE venous duplex pending  · Echo with normal right ventricular systolic function

## 2023-01-27 NOTE — CONSULTS
PHYSICAL MEDICINE AND REHABILITATION CONSULT NOTE  Shala Rosales 80 y o  female MRN: [id number]  [facility] Encounter: [id number]    Requested by (Physician/Service): José Fish DO  Reason for Consultation:  Assessment of rehabilitation needs    Assessment:  Rehabilitation Diagnosis:   • ***  • Impaired mobility and self care  • Impaired cognition     Recommendations:  Rehabilitation Plan:  • Continue PT/OT (SLP) while on acute care  • ***  • Covid-19 Testing: [facility] inpatient rehabilitation units require testing within 48 hours of all potential admissions at this time  *Re-testing is NOT required for patients recovering from COVID-19 infection if isolation has been discontinued per CDC criteria  Medical Co-morbidities Plan:  ***  Bowel plan:  Bladder plan:  DVT ppx: Thank you for this consultation  Do not hesitate to contact service with further questions  KERON Wong  PM&R        Total time spent:  {Time; 15 min - 1 hour:19605} with more than 50% spent counseling/coordinating care  Counseling includes extended discussion with patient (+/- family/relevant historian)  re: history, exam, function, mood, rehabilitation management plan, medical co-morbidities plan, and disposition options  Additional time spent with thorough chart review in EMR, reviewing recent medications, labs, imaging, and management plan of primary service  History of Present Illness:  Shala Rosales is a 80 y o  female with a PMH of CKD stage III, paroxysmal supraventricular tachycardia who presented to the [facility] on 1/25/23 with right sided back/hip pain, generalized weakness and poor oral intake  Per patient she was recently discharged from rehab at [facility] and reports she has been getting weaker since that time  CT of the A/P showed incidental pulmonary embolism  Hip x-ray showed mild degenerative arthritis both hips   She was placed on a heparin drip with plan to transition to [facility]  PM&R are consulted for rehabilitation recommendations  Review of Systems: 10 point ROS negative except for what is noted in HPI    Function:  Prior level of function and living situation: The patient lives in a one level home with her son who is home all the time  She needed assistance for ADLs and IADLs  Current level of function:  Physical Therapy: Minimal assist for bed mobility, transfers and ambulation   Occupational Therapy: Supervision for eating, grooming, minimal assist for UB bathing/dressing and toileting, moderate assist for LB bathing/dressing  Speech Therapy:      Physical Exam:  /84   Pulse 63   Temp (!) 97 2 °F (36 2 °C)   Resp 19   Ht 5' 5" (1 651 m)   Wt 57 kg (125 lb 10 6 oz)   SpO2 94%   BMI 20 91 kg/m²        Intake/Output Summary (Last 24 hours) at 2023 1006  Last data filed at 2023 0550  Gross per 24 hour   Intake 300 ml   Output --   Net 300 ml       Body mass index is 20 91 kg/m²  Physical Exam         Social History:    Social History     Socioeconomic History   • Marital status:       Spouse name: None   • Number of children: None   • Years of education: None   • Highest education level: None   Occupational History   • None   Tobacco Use   • Smoking status: Former     Packs/day: 1 00     Years: 20 00     Pack years: 20 00     Types: Cigarettes     Quit date: 5     Years since quittin 0   • Smokeless tobacco: Never   Vaping Use   • Vaping Use: Never used   Substance and Sexual Activity   • Alcohol use: Yes     Comment: socially   • Drug use: No   • Sexual activity: Not Currently   Other Topics Concern   • None   Social History Narrative    Lives with her son Beverly Durant     Social Determinants of Health     Financial Resource Strain: Not on file   Food Insecurity: Not on file   Transportation Needs: Not on file   Physical Activity: Not on file   Stress: Not on file   Social Connections: Not on file   Intimate Partner Violence: Not on file   Housing Stability: Not on file        Family History:    Family History   Problem Relation Age of Onset   • Diabetes Mother    • Lung disease Father    • Cancer Maternal Grandfather    • Lung disease Sister          Medications:     Current Facility-Administered Medications:   •  acetaminophen (TYLENOL) tablet 975 mg, 975 mg, Oral, Q8H [address], Albert Cogan, DO, 975 mg at 01/27/23 0541  •  apixaban (ELIQUIS) tablet 5 mg, 5 mg, Oral, BID, Albert Cogan, DO, 5 mg at 01/27/23 0931  •  bupivacaine (MARCAINE) 0 5 % injection 5 mL, 5 mL, Injection, Once, Dinora Farnsworth MD  •  methocarbamol (ROBAXIN) tablet 750 mg, 750 mg, Oral, Q8H PRN, Albert Cogan, DO  •  metoprolol succinate (TOPROL-XL) 24 hr tablet 25 mg, 25 mg, Oral, Daily, Albert Cogan, DO, 25 mg at 01/27/23 0931  •  polyethylene glycol (MIRALAX) packet 17 g, 17 g, Oral, Daily, Albert Cogan, DO, 17 g at 01/27/23 0931    Past Medical History:     Past Medical History:   Diagnosis Date   • Interstitial cystitis    • Leukopenia    • Neurologic gait dysfunction     Abstraction Dr Payan [place] charts   • Neuropathy    • Neutropenia [facility])     Abstraction Dr Sharmaine Marroquin    • Osteoporosis    • Peripheral Betha Dawley Dr Pay[facility] [place] charts   • Renal cyst    • Syncope     Abstraction Dr Payan [place] charts        Past Surgical History:     Past Surgical History:   Procedure Laterality Date   • CAPSULOTOMY      Right eye   • CATARACT EXTRACTION Left    • CHALAZION EXCISION     • COLONOSCOPY  2006         Allergies:     No Known Allergies        LABORATORY RESULTS:      Lab Results   Component Value Date    HGB 11 6 01/26/2023    HCT 37 2 01/26/2023    WBC 6 92 01/26/2023     Lab Results   Component Value Date    BUN 25 01/26/2023    K 4 6 01/26/2023     (H) 01/26/2023    GLUCOSE 125 12/03/2018    CREATININE 1 12 01/26/2023     Lab Results   Component Value Date    PROTIME 15 6 (H) 01/25/2023    INR 1 22 (H) 01/25/2023 DIAGNOSTIC STUDIES: Reviewed  XR hip/pelv 2-3 vws right if performed    Result Date: 1/26/2023  Impression: No acute osseous abnormality  Workstation performed: [id number]     CT abdomen pelvis w contrast    Result Date: 1/25/2023  Impression: Partially imaged right lower lobe pulmonary emboli  Mild right basilar consolidation atelectasis and/or pneumonia  Trace right pleural effusion  No acute pathology in the abdomen or pelvis  Colonic diverticulosis   I personally discussed this study with Dr Hamzah Head on 1/25/2023 at 1:49 PM   Workstation performed: [id number]

## 2023-01-27 NOTE — ASSESSMENT & PLAN NOTE
· Patient reported being unable to walk 2/2 generalized weakness and right-sided pain  · PT/OT following  · Plan for rehab   CM following for safe discharge planning

## 2023-01-27 NOTE — CASE MANAGEMENT
Case Management Discharge Planning Note    Patient name Emily Sommer  Location [address] [place] [address] 834/[facility] 931-16 MRN [id number]  : 1925 Date 2023       Current Admission Date: 2023  Current Admission Diagnosis:Ambulatory dysfunction   Patient Active Problem List    Diagnosis Date Noted   • [facility] ) 2023   • PSVT (paroxysmal supraventricular tachycardia) ([facility] ) 2022   • Infiltrate noted on imaging study 2022   • Compression fracture of L3 lumbar vertebra, closed, initial encounter ([facility] ) 2020   • Fall 2020   • Age-related osteoporosis without current pathological fracture 2019   • Lower extremity edema 2019   • CKD (chronic kidney disease) stage 3, GFR 30-59 ml/min ([facility]) 2019   • Venous stasis dermatitis of both lower extremities 2019   • Constipation, unspecified 2018   • Diverticulosis of intestine, part unspecified, without perforation or abscess without bleeding 2018   • Generalized muscle weakness 2018   • Polyneuropathy, unspecified 2018   • Ambulatory dysfunction 2018   • Neuropathy 2018   • Balance problem 2017   • Memory deficit 2017      LOS (days): 2  Geometric Mean LOS (GMLOS) (days): 2 50  Days to GMLOS:0 6     OBJECTIVE:  Risk of Unplanned Readmission Score: 12 86         Current admission status: Inpatient   Preferred Pharmacy:   [facility]/pharmacy #[id number] Huynh, [facility] [address] [place] [address]  Phone: [phone] Fax: [phone]    Primary Care Provider: KERON Valdez    Primary Insurance: MEDICARE  Secondary Insurance:     DISCHARGE DETAILS:                                          Other Referral/Resources/Interventions Provided:  Referral Comments: Still awaiting auth for [address]  S/w Fernanda at [address] admissions & she is aware pt's covid is positive  She is looking for a bed & still awaiting auth  Updated SLIM            1445 update: S/w [facility] & pt is still awaiting dopplers tbd  TC to Ira Lung at [facility] & updated her  She did find a covid isolation bed at facility & still awaiting auth  [facility] did submit for auth  CM to f/u with them tomorrow to see if pt can admit

## 2023-01-27 NOTE — ASSESSMENT & PLAN NOTE
Lab Results   Component Value Date    EGFR 41 01/26/2023    EGFR 32 01/25/2023    EGFR 40 12/31/2022    CREATININE 1 12 01/26/2023    CREATININE 1 35 (H) 01/25/2023    CREATININE 1 13 12/31/2022     · Baseline creatinine 1 0-1 4  · Currently at baseline

## 2023-01-27 NOTE — PROGRESS NOTES
1425 [facility]  Progress Note - Tilden Stage 5/16/1925, 80 y o  female MRN: [id number]  Unit/Bed#: [facility] 834-01 Encounter: [id number]  Primary Care Provider: KERON De Los Santos   Date and time admitted to hospital: 1/25/2023 11:07 AM    * Ambulatory dysfunction  Assessment & Plan  · Patient reported being unable to walk 2/2 generalized weakness and right-sided pain  · PT/OT following  · Plan for rehab  CM following for safe discharge planning       Pulmonary embolism ([address] )  Assessment & Plan  · CT of the abdomen and pelvis on admission incidentally noted partially imaged RLL PE  · Patient was started on heparin GTT on admission and was transitioned to Eliquis the next day  Given the patient's age, fall risk, renal function, weight, she was placed on Eliquis 5mg PO BID  · LE venous duplex pending  · Echo with normal right ventricular systolic function     COVID  Assessment & Plan  · Patient was noted to be COVID-19 positive on swab overnight  · Patient is asymptomatic  · Satting 99% on room air at the time of my exam  · Contact/airborne precautions     CKD (chronic kidney disease) stage 3, GFR 30-59 ml/min [facility])  Assessment & Plan  Lab Results   Component Value Date    EGFR 41 01/26/2023    EGFR 32 01/25/2023    EGFR 40 12/31/2022    CREATININE 1 12 01/26/2023    CREATININE 1 35 (H) 01/25/2023    CREATININE 1 13 12/31/2022     · Baseline creatinine 1 0-1 4  · Currently at baseline    PSVT (paroxysmal supraventricular tachycardia) ([facility])  Assessment & Plan  · Heart rate stable  · Continue Toprol-XL      VTE Pharmacologic Prophylaxis:   Eliquis    Patient Centered Rounds: I performed bedside rounds with nursing staff today  [facility] with Specialists or Other Care Team Provider: , FIDENCIO attending Dr Nicole Hein, inpatient pharmacist      Education and Discussions with Family / Patient: Updated  (son) via phone  patricia    Time Spent for Care: 30 minutes   More than 50% of total time spent on counseling and coordination of care as described above  Current Length of Stay: 2 day(s)  Current Patient Status: Inpatient   Certification Statement: The patient will continue to require additional inpatient hospital stay due to pending LE doppler and rehab  Discharge Plan: Anticipate discharge tomorrow to rehab facility  Code Status: Level 3 - DNAR and DNI    Subjective:   Ms Luz Marina Sharp denies complaint  She denies fever, chills, URI like symptoms, CP, SOB, cough, abdominal pain, diarrhea    Objective:     Vitals:   Temp (24hrs), Av 2 °F (36 2 °C), Min:97 °F (36 1 °C), Max:97 3 °F (36 3 °C)    Temp:  [97 °F (36 1 °C)-97 3 °F (36 3 °C)] 97 3 °F (36 3 °C)  HR:  [63-67] 67  Resp:  [18-20] 18  BP: (128-134)/(59-84) 129/59  SpO2:  [94 %-96 %] 96 %  Body mass index is 20 91 kg/m²  Input and Output Summary (last 24 hours): Intake/Output Summary (Last 24 hours) at 2023 1616  Last data filed at 2023 0550  Gross per 24 hour   Intake 250 ml   Output --   Net 250 ml       Physical Exam:   Physical Exam  Vitals reviewed  Constitutional:       Comments: Patient seen sitting in bed comfortably resting, NAD   Cardiovascular:      Rate and Rhythm: Normal rate and regular rhythm  Pulmonary:      Effort: Pulmonary effort is normal  No respiratory distress  Breath sounds: Normal breath sounds  Comments: 99% on room air  Abdominal:      General: Bowel sounds are normal       Palpations: Abdomen is soft  Tenderness: There is no abdominal tenderness  Musculoskeletal:      Right lower leg: No edema  Left lower leg: No edema  Skin:     General: Skin is warm  Neurological:      Mental Status: She is alert     Psychiatric:         Mood and Affect: Mood normal          Behavior: Behavior normal           Additional Data:     Labs:  Results from last 7 days   Lab Units 23  0508 23  1636 23  1138   WBC Thousand/uL 6 92   < > 8 56 HEMOGLOBIN g/dL 11 6   < > 13 8   HEMATOCRIT % 37 2   < > 43 5   PLATELETS Thousands/uL 200   < > 222   NEUTROS PCT %  --   --  83*   LYMPHS PCT %  --   --  8*   MONOS PCT %  --   --  8   EOS PCT %  --   --  0    < > = values in this interval not displayed  Results from last 7 days   Lab Units 01/26/23  0508   SODIUM mmol/L 140   POTASSIUM mmol/L 4 6   CHLORIDE mmol/L 113*   CO2 mmol/L 23   BUN mg/dL 25   CREATININE mg/dL 1 12   ANION GAP mmol/L 4   CALCIUM mg/dL 8 4   ALBUMIN g/dL 2 1*   TOTAL BILIRUBIN mg/dL 0 63   ALK PHOS U/L 68   ALT U/L 13   AST U/L 9   GLUCOSE RANDOM mg/dL 82     Results from last 7 days   Lab Units 01/25/23  1636   INR  1 22*                   Lines/Drains:  Invasive Devices     Peripheral Intravenous Line  Duration           Peripheral IV 01/25/23 Left Antecubital 2 days          Drain  Duration           External Urinary Catheter <1 day                      Imaging: Reviewed radiology reports from this admission including: abdominal/pelvic CT and XR hip/pelvis    Recent Cultures (last 7 days):         Last 24 Hours Medication List:   Current Facility-Administered Medications   Medication Dose Route Frequency Provider Last Rate   • acetaminophen  975 mg Oral [facility] Tc Bottoms, DO     • apixaban  5 mg Oral BID Tc Bottoms, DO     • bupivacaine  5 mL Injection Once Annabella Uribe MD     • methocarbamol  750 mg Oral Q8H PRN Tc Bottoms, DO     • metoprolol succinate  25 mg Oral Daily Tc Bottoms, DO     • polyethylene glycol  17 g Oral Daily Tc Bottoms, DO          Today, Patient Was Seen By: Wendy Greenwood PA-C    **Please Note: This note may have been constructed using a voice recognition system  **

## 2023-01-27 NOTE — ASSESSMENT & PLAN NOTE
· Patient was noted to be COVID-19 positive on swab overnight  · Patient is asymptomatic  · Satting 99% on room air at the time of my exam  · Contact/airborne precautions

## 2023-01-27 NOTE — PLAN OF CARE
Problem: Potential for Falls  Goal: Patient will remain free of falls  Description: INTERVENTIONS:  - Educate patient/family on patient safety including physical limitations  - Instruct patient to call for assistance with activity   - Consult OT/PT to assist with strengthening/mobility   - Keep Call bell within reach  - Keep bed low and locked with side rails adjusted as appropriate  - Keep care items and personal belongings within reach  - Initiate and maintain comfort rounds  - Make Fall Risk Sign visible to staff  - Offer Toileting every 2 Hours, in advance of need  - Initiate/Maintain bed alarm  - Obtain necessary fall risk management equipment: walker  - Apply yellow socks and bracelet for high fall risk patients  - Consider moving patient to room near nurses station  Outcome: Progressing     Problem: MOBILITY - ADULT  Goal: Maintain or return to baseline ADL function  Description: INTERVENTIONS:  -  Assess patient's ability to carry out ADLs; assess patient's baseline for ADL function and identify physical deficits which impact ability to perform ADLs (bathing, care of mouth/teeth, toileting, grooming, dressing, etc )  - Assess/evaluate cause of self-care deficits   - Assess range of motion  - Assess patient's mobility; develop plan if impaired  - Assess patient's need for assistive devices and provide as appropriate  - Encourage maximum independence but intervene and supervise when necessary  - Involve family in performance of ADLs  - Assess for home care needs following discharge   - Consider OT consult to assist with ADL evaluation and planning for discharge  - Provide patient education as appropriate  Outcome: Progressing  Goal: Maintains/Returns to pre admission functional level  Description: INTERVENTIONS:  - Perform BMAT or MOVE assessment daily    - Set and communicate daily mobility goal to care team and patient/family/caregiver     - Collaborate with rehabilitation services on mobility goals if consulted  - Perform Range of Motion 3 times a day  - Reposition patient every 2 hours    - Dangle patient 3 times a day  - Stand patient 3 times a day  - Ambulate patient 3 times a day  - Out of bed to chair 3 times a day   - Out of bed for meals 3 times a day  - Out of bed for toileting  - Record patient progress and toleration of activity level   Outcome: Progressing     Problem: Prexisting or High Potential for Compromised Skin Integrity  Goal: Skin integrity is maintained or improved  Description: INTERVENTIONS:  - Identify patients at risk for skin breakdown  - Assess and monitor skin integrity  - Assess and monitor nutrition and hydration status  - Monitor labs   - Assess for incontinence   - Turn and reposition patient  - Assist with mobility/ambulation  - Relieve pressure over bony prominences  - Avoid friction and shearing  - Provide appropriate hygiene as needed including keeping skin clean and dry  - Evaluate need for skin moisturizer/barrier cream  - Collaborate with interdisciplinary team   - Patient/family teaching  - Consider wound care consult   Outcome: Progressing

## 2023-01-28 PROBLEM — I82.4Z3 LOWER LEG DVT (DEEP VENOUS THROMBOEMBOLISM), ACUTE, BILATERAL (HCC): Status: ACTIVE | Noted: 2023-01-28

## 2023-01-28 LAB
ANION GAP SERPL CALCULATED.3IONS-SCNC: 4 MMOL/L (ref 4–13)
BUN SERPL-MCNC: 32 MG/DL (ref 5–25)
CALCIUM SERPL-MCNC: 8.8 MG/DL (ref 8.3–10.1)
CHLORIDE SERPL-SCNC: 109 MMOL/L (ref 96–108)
CO2 SERPL-SCNC: 24 MMOL/L (ref 21–32)
CREAT SERPL-MCNC: 0.88 MG/DL (ref 0.6–1.3)
ERYTHROCYTE [DISTWIDTH] IN BLOOD BY AUTOMATED COUNT: 13.3 % (ref 11.6–15.1)
GFR SERPL CREATININE-BSD FRML MDRD: 55 ML/MIN/1.73SQ M
GLUCOSE SERPL-MCNC: 118 MG/DL (ref 65–140)
HCT VFR BLD AUTO: 38.5 % (ref 34.8–46.1)
HGB BLD-MCNC: 12.4 G/DL (ref 11.5–15.4)
MCH RBC QN AUTO: 30.9 PG (ref 26.8–34.3)
MCHC RBC AUTO-ENTMCNC: 32.2 G/DL (ref 31.4–37.4)
MCV RBC AUTO: 96 FL (ref 82–98)
PLATELET # BLD AUTO: 318 THOUSANDS/UL (ref 149–390)
PMV BLD AUTO: 9.3 FL (ref 8.9–12.7)
POTASSIUM SERPL-SCNC: 5.3 MMOL/L (ref 3.5–5.3)
RBC # BLD AUTO: 4.01 MILLION/UL (ref 3.81–5.12)
SODIUM SERPL-SCNC: 137 MMOL/L (ref 135–147)
WBC # BLD AUTO: 9.73 THOUSAND/UL (ref 4.31–10.16)

## 2023-01-28 RX ADMIN — POLYETHYLENE GLYCOL 3350 17 G: 17 POWDER, FOR SOLUTION ORAL at 08:56

## 2023-01-28 RX ADMIN — METOPROLOL SUCCINATE 25 MG: 25 TABLET, EXTENDED RELEASE ORAL at 08:55

## 2023-01-28 RX ADMIN — APIXABAN 5 MG: 5 TABLET, FILM COATED ORAL at 08:55

## 2023-01-28 RX ADMIN — APIXABAN 5 MG: 5 TABLET, FILM COATED ORAL at 16:54

## 2023-01-28 RX ADMIN — MELATONIN 3 MG: at 22:34

## 2023-01-28 NOTE — ASSESSMENT & PLAN NOTE
· Patient was noted to be COVID-19 positive on 1/27/23  · Patient is asymptomatic  · Satting well on room air   · Contact/airborne precautions

## 2023-01-28 NOTE — ASSESSMENT & PLAN NOTE
· CT of the abdomen and pelvis on admission incidentally noted partially imaged RLL PE  · Patient was started on heparin GTT on admission and was transitioned to Eliquis the next day   Given the patient's age, fall risk, bleed risk, renal function, weight, she was placed on Eliquis 5mg PO BID  · Echo with normal right ventricular systolic function   · On room air

## 2023-01-28 NOTE — ASSESSMENT & PLAN NOTE
Lab Results   Component Value Date    EGFR 55 01/28/2023    EGFR 41 01/26/2023    EGFR 32 01/25/2023    CREATININE 0 88 01/28/2023    CREATININE 1 12 01/26/2023    CREATININE 1 35 (H) 01/25/2023     · Baseline creatinine 1 0-1 4  · Currently at baseline

## 2023-01-28 NOTE — PROGRESS NOTES
[facility]  Progress Note - Yen Palacio 5/16/1925, 80 y o  female MRN: [id number]  Unit/Bed#: [facility] 834-01 Encounter: [id number]  Primary Care Provider: Brayan Patient, KERON   Date and time admitted to hospital: 1/25/2023 11:07 AM    * Ambulatory dysfunction  Assessment & Plan  · Patient reported being unable to walk 2/2 generalized weakness and right-sided pain  · PT/OT following  · Plan for rehab  CM following for safe discharge planning       Pulmonary embolism ([address] )  Assessment & Plan  · CT of the abdomen and pelvis on admission incidentally noted partially imaged RLL PE  · Patient was started on heparin GTT on admission and was transitioned to Eliquis the next day  Given the patient's age, fall risk, bleed risk, renal function, weight, she was placed on Eliquis 5mg PO BID  · Echo with normal right ventricular systolic function   · On room air     Lower leg DVT (deep venous thromboembolism), acute, bilateral (HCC)  Assessment & Plan  · Lower extremity doppler revealed acute occlusive thrombus from the proximal femoral vein to the posterior tibial veins on the right and acute non-occlusive thrombus in the popliteal and gastrocnemius vein on the left  · On Eliquis as above    COVID  Assessment & Plan  · Patient was noted to be COVID-19 positive on 1/27/23  · Patient is asymptomatic  · Satting well on room air   · Contact/airborne precautions     CKD (chronic kidney disease) stage 3, GFR 30-59 ml/min [facility])  Assessment & Plan  Lab Results   Component Value Date    EGFR 55 01/28/2023    EGFR 41 01/26/2023    EGFR 32 01/25/2023    CREATININE 0 88 01/28/2023    CREATININE 1 12 01/26/2023    CREATININE 1 35 (H) 01/25/2023     · Baseline creatinine 1 0-1 4  · Currently at baseline    PSVT (paroxysmal supraventricular tachycardia) (HCC)  Assessment & Plan  · Heart rate stable      · Continue Toprol-XL        VTE Pharmacologic Prophylaxis:   Eliquis    Patient Centered Rounds: I performed bedside rounds with nursing staff today  Discussions with Specialists or Other Care Team Provider:     Education and Discussions with Family / Patient: Attempted to update  (son) via phone  Unable to contact  Time Spent for Care: 20 minutes  More than 50% of total time spent on counseling and coordination of care as described above  Current Length of Stay: 3 day(s)  Current Patient Status: Inpatient   Certification Statement: The patient will continue to require additional inpatient hospital stay due to pending rehab  Discharge Plan: pending auth and rehab    Code Status: Level 3 - DNAR and DNI    Subjective:   Ms Bobby Álvarez denies complaint  She denies fever, chills, URI-like symptoms, CP, SOB, cough, leg pain, abdominal pain  She reports having a "frog in my throat"    Objective:     Vitals:   Temp (24hrs), Av 2 °F (36 2 °C), Min:97 °F (36 1 °C), Max:97 3 °F (36 3 °C)    Temp:  [97 °F (36 1 °C)-97 3 °F (36 3 °C)] 97 2 °F (36 2 °C)  HR:  [50-61] 60  Resp:  [16-20] 16  BP: (122-166)/(63-67) 166/67  SpO2:  [94 %-96 %] 95 %  Body mass index is 20 91 kg/m²  Input and Output Summary (last 24 hours): Intake/Output Summary (Last 24 hours) at 2023 1600  Last data filed at 2023 1900  Gross per 24 hour   Intake 0 ml   Output --   Net 0 ml       Physical Exam:   Physical Exam  Vitals reviewed  Constitutional:       Comments: Patient seen sitting in bed comfortably resting watching TV, NAD   HENT:      Ears:      Comments: [place]  Cardiovascular:      Rate and Rhythm: Normal rate and regular rhythm  Pulmonary:      Effort: Pulmonary effort is normal       Breath sounds: Normal breath sounds  Comments: Room air  Abdominal:      General: Bowel sounds are normal       Palpations: Abdomen is soft  Tenderness: There is no abdominal tenderness  Musculoskeletal:      Right lower leg: No edema  Left lower leg: No edema  Skin:     General: Skin is warm     Neurological: Mental Status: She is alert  Comments: Oriented to person and place   Psychiatric:         Mood and Affect: Mood normal          Behavior: Behavior normal           Additional Data:     Labs:  Results from last 7 days   Lab Units 01/28/23  0634 01/25/23  1636 01/25/23  1138   WBC Thousand/uL 9 73   < > 8 56   HEMOGLOBIN g/dL 12 4   < > 13 8   HEMATOCRIT % 38 5   < > 43 5   PLATELETS Thousands/uL 318   < > 222   NEUTROS PCT %  --   --  83*   LYMPHS PCT %  --   --  8*   MONOS PCT %  --   --  8   EOS PCT %  --   --  0    < > = values in this interval not displayed       Results from last 7 days   Lab Units 01/28/23  0634 01/26/23  0508   SODIUM mmol/L 137 140   POTASSIUM mmol/L 5 3 4 6   CHLORIDE mmol/L 109* 113*   CO2 mmol/L 24 23   BUN mg/dL 32* 25   CREATININE mg/dL 0 88 1 12   ANION GAP mmol/L 4 4   CALCIUM mg/dL 8 8 8 4   ALBUMIN g/dL  --  2 1*   TOTAL BILIRUBIN mg/dL  --  0 63   ALK PHOS U/L  --  68   ALT U/L  --  13   AST U/L  --  9   GLUCOSE RANDOM mg/dL 118 82     Results from last 7 days   Lab Units 01/25/23  1636   INR  1 22*                   Lines/Drains:  Invasive Devices     Peripheral Intravenous Line  Duration           Peripheral IV 01/25/23 Left Antecubital 3 days          Drain  Duration           External Urinary Catheter 1 day                      Imaging: Reviewed radiology reports from this admission including: LE venous duplex    Recent Cultures (last 7 days):         Last 24 Hours Medication List:   Current Facility-Administered Medications   Medication Dose Route Frequency Provider Last Rate   • apixaban  5 mg Oral BID Hector Starling, DO     • bupivacaine  5 mL Injection Once Suzy Roberts MD     • melatonin  3 mg Oral HS Barbara Lacy PA-C     • methocarbamol  750 mg Oral Q8H PRN Hector Starling, DO     • metoprolol succinate  25 mg Oral Daily Hector Starling, DO     • polyethylene glycol  17 g Oral Daily Hector Starling, DO          Today, Patient Was Seen By: Bailey Garcia ANGELES    **Please Note: This note may have been constructed using a voice recognition system  **

## 2023-01-28 NOTE — ASSESSMENT & PLAN NOTE
· Lower extremity doppler revealed acute occlusive thrombus from the proximal femoral vein to the posterior tibial veins on the right and acute non-occlusive thrombus in the popliteal and gastrocnemius vein on the left  · On Eliquis as above

## 2023-01-29 VITALS
SYSTOLIC BLOOD PRESSURE: 123 MMHG | DIASTOLIC BLOOD PRESSURE: 65 MMHG | HEART RATE: 51 BPM | HEIGHT: 65 IN | WEIGHT: 125.66 LBS | OXYGEN SATURATION: 93 % | BODY MASS INDEX: 20.94 KG/M2 | TEMPERATURE: 97.5 F | RESPIRATION RATE: 18 BRPM

## 2023-01-29 RX ADMIN — POLYETHYLENE GLYCOL 3350 17 G: 17 POWDER, FOR SOLUTION ORAL at 09:04

## 2023-01-29 RX ADMIN — APIXABAN 5 MG: 5 TABLET, FILM COATED ORAL at 09:03

## 2023-01-29 RX ADMIN — METOPROLOL SUCCINATE 25 MG: 25 TABLET, EXTENDED RELEASE ORAL at 09:03

## 2023-01-29 NOTE — ASSESSMENT & PLAN NOTE
· Patient was noted to be COVID-19 positive on 1/27/23  · Patient is asymptomatic  · Satting 98% on room air   · Contact/airborne precautions

## 2023-01-29 NOTE — CASE MANAGEMENT
Case Management Discharge Planning Note    Patient name Ying Flores  Location [address] [facility] [address] 834/[facility] 530-82 MRN [id number]  : 1925 Date 2023       Current Admission Date: 2023  Current Admission Diagnosis:Ambulatory dysfunction   Patient Active Problem List    Diagnosis Date Noted   • Lower leg D[facility] thromboembolism), [facility] 2023   • COVID 2023   • Pulmonary embolism ([place] [address] ) 2023   • PSVT (paroxysmal supraventricular tachycardia) ([place] [address] ) 2022   • Infiltrate noted on imaging study 2022   • Compression fracture of L3 lumbar vertebra, closed, initial encounter ([place] [address] ) 2020   • Fall 2020   • Age-related osteoporosis without current pathological fracture 2019   • Lower extremity edema 2019   • CKD (chronic kidney disease) stage 3, GFR 30-59 ml/min ([facility]) 2019   • Venous stasis dermatitis of both lower extremities 2019   • Constipation, unspecified 2018   • Diverticulosis of intestine, part unspecified, without perforation or abscess without bleeding 2018   • Generalized muscle weakness 2018   • Polyneuropathy, unspecified 2018   • Ambulatory dysfunction 2018   • Neuropathy 2018   • Balance problem 2017   • Memory deficit 2017      LOS (days): 4  Geometric Mean LOS (GMLOS) (days): 2 50  Days to GMLOS:-1 3     OBJECTIVE:  Risk of Unplanned Readmission Score: 12 56         Current admission status: Inpatient   Preferred Pharmacy:   [id number] [address]  Phone: [phone] Fax: [phone]    Primary Care Provider: KERON Braun    Primary Insurance: MEDICARE  Secondary Insurance:     DISCHARGE DETAILS:                                          Other Referral/Resources/Interventions Provided:  Referral Comments: Noted in Billy received auth yesterday  [facility] message sent   [facility] they asked me to call Tamar Donovan  S/w Joleen & informed CM to call [facility] who's covering toay at [phone]  Left VM

## 2023-01-29 NOTE — ASSESSMENT & PLAN NOTE
· Patient reported being unable to walk 2/2 generalized weakness and right-sided pain  · PT/OT evaluated  · Plan for rehab   CM arranged for [place]

## 2023-01-29 NOTE — CASE MANAGEMENT
Case Management Discharge Planning Note    Patient name Delgado Lopez  Location [address] [place] [address] [facility] [id number]  : 1925 Date 2023       Current Admission Date: 2023  Current Admission Diagnosis:Ambulatory dysfunction   Patient Active Problem List    Diagnosis Date Noted   • Lower leg DVT (deep venous thromboembolism), [facility] ) 2023   • COVID 2023   • Pulmonary embolism ([place] [address] ) 2023   • PSVT (paroxysmal supraventricular tachycardia) ([place] [address] ) 2022   • Infiltrate noted on imaging study 2022   • Compression fracture of L3 lumbar vertebra, closed, initial encounter ([place] [address] ) 2020   • Fall 2020   • Age-related osteoporosis without current pathological fracture 2019   • Lower extremity edema 2019   • CKD (chronic kidney disease) stage 3, GFR 30-59 ml/min ([facility]) 2019   • Venous stasis dermatitis of both lower extremities 2019   • Constipation, unspecified 2018   • Diverticulosis of intestine, part unspecified, without perforation or abscess without bleeding 2018   • Generalized muscle weakness 2018   • Polyneuropathy, unspecified 2018   • Ambulatory dysfunction 2018   • Neuropathy 2018   • Balance problem 2017   • Memory deficit 2017      LOS (days): 4  Geometric Mean LOS (GMLOS) (days): 2 50  Days to GMLOS:-1 4     OBJECTIVE:  Risk of Unplanned Readmission Score: 12 6         Current admission status: Inpatient   Preferred Pharmacy:   [id number] [address] [address]  Phone: [phone] Fax: [phone]    Primary Care Provider: KERON Moeller    Primary Insurance: MEDICARE  Secondary Insurance:     DISCHARGE DETAILS:                                          Other Referral/Resources/Interventions Provided:  Referral Comments: Jack Winters message from [facility] & they can accept today  SL[facility] updated & informed pt is medically cleared  FERNANDEZ ARREOLA set up fro 2pm  PCS on folder  Faciltiy contacts in [facility]  TC to lacie Cordero [phone] & informed him  Agreeable to plan

## 2023-01-29 NOTE — ASSESSMENT & PLAN NOTE
· CT of the abdomen and pelvis on admission incidentally noted partially imaged RLL PE  · Patient was started on heparin GTT on admission and was transitioned to Eliquis the next day  Given the patient's age, fall risk, bleed risk, renal function, weight, she was placed on Eliquis 5mg PO BID   This was discussed with attending physician   · Echo with normal right ventricular systolic function   · 08% on room air

## 2023-01-29 NOTE — DISCHARGE SUMMARY
[address] [facility]  Discharge- [place]  5/16/1925, 80 y o  female MRN: [id number]  Unit/Bed#: [facility] 834-01 Encounter: [id number]  Primary Care Provider: Jocelyne Najjar, CRNP   Date and time admitted to hospital: 1/25/2023 11:07 AM    * Ambulatory dysfunction  Assessment & Plan  · Patient reported being unable to walk 2/2 generalized weakness and right-sided pain  · PT/OT evaluated  · Plan for rehab  CM arranged for [place]       Pulmonary embolism [facility])  Assessment & Plan  · CT of the abdomen and pelvis on admission incidentally noted partially imaged RLL PE  · Patient was started on heparin GTT on admission and was transitioned to Eliquis the next day  Given the patient's age, fall risk, bleed risk, renal function, weight, she was placed on Eliquis 5mg PO BID  This was discussed with attending physician   · Echo with normal right ventricular systolic function   · 64% on room air     Lower leg DVT (deep venous thromboembolism), acute, bilateral (HCC)  Assessment & Plan  · Lower extremity doppler revealed acute occlusive thrombus from the proximal femoral vein to the posterior tibial veins on the right and acute non-occlusive thrombus in the popliteal and gastrocnemius vein on the left  · On Eliquis as above    COVID  Assessment & Plan  · Patient was noted to be COVID-19 positive on 1/27/23  · Patient is asymptomatic  · Satting 98% on room air   · Contact/airborne precautions     CKD (chronic kidney disease) stage 3, GFR 30-59 ml/min [facility])  Assessment & Plan  Lab Results   Component Value Date    EGFR 55 01/28/2023    EGFR 41 01/26/2023    EGFR 32 01/25/2023    CREATININE 0 88 01/28/2023    CREATININE 1 12 01/26/2023    CREATININE 1 35 (H) 01/25/2023     · Baseline creatinine 1 0-1 4  · Currently at baseline    PSVT (paroxysmal supraventricular tachycardia) ([facility])  Assessment & Plan  · Heart rate stable      · Continue Toprol-XL    Medical Problems     Resolved Problems  Date Reviewed: 1/29/2023   None       Discharging Physician / Practitioner: Page Griffith PA-C  PCP: Starr Bailey  Admission Date:   Admission Orders (From admission, onward)     Ordered        01/25/23 1452  [facility] [facility]                      Discharge Date: 01/29/23    Consultations During Hospital Stay:  · Orthopaedics  · PT  · OT  · Case management     Procedures Performed:   · CT abdomen pelvis with contrast  · XR hip/pelvis right  · LE venous duplex  · Echo  · CBC  · CMP/BMP  · COVID/FLU/RSV    Significant Findings / Test Results:   · CT abdomen pelvis with contrast: "Partially imaged right lower lobe pulmonary emboli  Mild right basilar consolidation atelectasis and/or pneumonia  Trace right pleural effusion  No acute pathology in the abdomen or pelvis  Colonic diverticulosis "  · XR hip/pelvis right: "No acute osseous abnormality "  · LE venous duplex:   · "RIGHT LOWER LIMB: Limited  Acute occlusive thrombus noted from the proximal femoral vein to the posterior tibial veins  Augmentations limited due to the presence of thrombus  · LEFT LOWER LIMB: Limited  Acute non occlusive thrombus noted in the popliteal and gastrocnemius vein  Augmentations limited due to the presence of thrombus "  · Echo: LVEF 65%  Grade 1 DD  Right ventricular cavity size and systolic function is normal    · COVID: POSITIVE     Incidental Findings:   · None     Test Results Pending at Discharge (will require follow up): · None     Outpatient Tests Requested:  · Follow up with PCP    Complications:  None    Reason for Admission: ambulatory dysfunction, PE    Hospital Course:   Krystin Downs is a 80 y o  female with CKD and PSVT who originally presented to the hospital on 1/25/2023 due to generalized weakness, poor PO intake, and right sided hip pain  CT abdomen and pelvis revealed no acute pathology in the abdomen or pelvis but did reveal partially imaged RLL PE  She was started on heparin GTT   Echo revealed LVEF 65%, Grade 1 DD, normal right ventricular size and systolic function  She was seen in consult by Ortho regarding the right hip pain  XR of the hip/pelvis with no acute osseous abnormality  She underwent right hip bursa injection for trochanteric bursitis  Her heparin GTT was transitioned to Eliquis 5mg BID (not 10mg BID x 1 week given the patient's advanced age, fall risk, bleed risk, renal function, and weight - this was discussed with attending physician)  LE venous duplex revealed acute DVTs b/l  She was found to be COVID positive  She is satting 98% on room air and is asymptomatic  She was seen by PT and OT  CM arranged for discharged to [facility] for rehab  Please see above list of diagnoses and related plan for additional information  Condition at Discharge: stable    Discharge Day Visit / Exam:   Subjective:    Ms Domi oRy denies complaint  She denies fever, chills, CP, SOB, cough, abdominal pain, leg pain  She reports occasional "tickle" in her throat     Vitals: Blood Pressure: 123/65 (01/29/23 0747)  Pulse: (!) 51 (01/29/23 0747)  Temperature: 97 5 °F (36 4 °C) (01/29/23 0747)  Temp Source: Temporal (01/25/23 2030)  Respirations: 18 (01/29/23 0747)  Height: 5' 5" (165 1 cm) (01/26/23 1320)  Weight - Scale: 57 kg (125 lb 10 6 oz) (01/26/23 1320)  SpO2: 93 % (01/29/23 0747)  Exam:   Physical Exam  Vitals reviewed  Constitutional:       Comments: Patient seen lying in bed, NAD   HENT:      Head:      Comments: [place]  Cardiovascular:      Rate and Rhythm: Normal rate and regular rhythm  Pulmonary:      Effort: Pulmonary effort is normal  No respiratory distress  Breath sounds: Normal breath sounds  Comments: 98% on room air  Abdominal:      General: Bowel sounds are normal       Palpations: Abdomen is soft  Tenderness: There is no abdominal tenderness  Musculoskeletal:      Right lower leg: No edema  Left lower leg: No edema  Skin:     General: Skin is warm     Neurological:      Mental Status: She is alert  Psychiatric:         Mood and Affect: Mood normal          Behavior: Behavior normal         Discussion with Family: Updated  (son) via phone  [facility]    Discharge instructions/Information to patient and family:   See after visit summary for information provided to patient and family  Provisions for Follow-Up Care:  See after visit summary for information related to follow-up care and any pertinent home health orders  Disposition:   [place] 8977: [address] Texted SNF Physician  Terrance Villalpando    Planned Readmission: None     Discharge Statement:  I spent 40 minutes discharging the patient  This time was spent on the day of discharge  I had direct contact with the patient on the day of discharge  Greater than 50% of the total time was spent examining patient, answering all patient questions, arranging and discussing plan of care with patient as well as directly providing post-discharge instructions  Additional time then spent on discharge activities  Discharge Medications:  See after visit summary for reconciled discharge medications provided to patient and/or family        **Please Note: This note may have been constructed using a voice recognition system**

## 2023-01-30 ENCOUNTER — NURSING HOME VISIT (OUTPATIENT)
Dept: GERIATRICS | Facility: OTHER | Age: 88
End: 2023-01-30

## 2023-01-30 DIAGNOSIS — I26.93 SINGLE SUBSEGMENTAL PULMONARY EMBOLISM WITHOUT ACUTE COR PULMONALE (HCC): ICD-10-CM

## 2023-01-30 DIAGNOSIS — U07.1 COVID: Primary | ICD-10-CM

## 2023-01-30 DIAGNOSIS — M62.81 GENERALIZED MUSCLE WEAKNESS: ICD-10-CM

## 2023-01-30 DIAGNOSIS — M81.0 AGE-RELATED OSTEOPOROSIS WITHOUT CURRENT PATHOLOGICAL FRACTURE: ICD-10-CM

## 2023-01-30 DIAGNOSIS — I82.4Z3 LOWER LEG DVT (DEEP VENOUS THROMBOEMBOLISM), ACUTE, BILATERAL (HCC): ICD-10-CM

## 2023-01-30 NOTE — PROGRESS NOTES
Zahra 11  3333 [facility] 27 [facility], 326 [facility] 31  History and Physical    NAME: Laila Lovelace  AGE: 80 y o  SEX: female [id number]    DATE OF ENCOUNTER: 2/7/2023    Code status:  No CPR    Assessment and Plan     1  COVID  - no resp symptoms, not on O2  - gen weakness improving  - encourage po hydration    2  Generalized muscle weakness  - PT/OT ordered  - Fall precautions in place    3  Lower leg DVT (deep venous thromboembolism), acute, bilateral (HCC)  - cont Apixaban 5 mg po bid    4  Single subsegmental pulmonary embolism without acute cor pulmonale (HCC)  - cont Apixaban 5 mg po bid    5  Age-related osteoporosis without current pathological fracture/OA  - cont Vit D supplements  - cont Diclofenac gel and Tylenol      All medications and routine orders were reviewed and updated as needed  Plan discussed with: patient and staff    Chief Complaint     Seen for admission at [address] [place] [address], a 79 y/o female with PMH of CKD3, PSVT got admitted to the hospital with gen weakness, poor po intake and right hip pain  She was found to be Covid positive, asymptomatic  CT abd/pelvis was negative for any abdominal pathology, but showed RLL PE  LE doppler was positive for acute b/l DVTs  She was started on heparin drip, later changed to Eliquis  She was seen by Ortho for hip pain, received right hip bursa injection for trochanteric bursitis, tolerated well  She got discharged to NH post acute for subacute rehab  She was seen and examined at bedside, stable  She found laying in bed comfortably  She is able to give history  She lives at home with granddaughter  She is independent of ADLs  She offers no c/o at this time  Staff have no concerns at this time      HISTORY:  Past Medical History:   Diagnosis Date   • Interstitial cystitis    • Leukopenia    • Neurologic gait dysfunction     Abstraction Dr Halley Andino • Neuropathy    • Neutropenia (HCC)     Abstraction Dr Aylin Leyva   • Osteoarthritis    • Osteoporosis    • Peripheral edema     Abstraction Dr Karen Suarez charts   • Renal cyst    • Syncope     Abstraction Dr Karen Suarez charts     Family History   Problem Relation Age of Onset   • Diabetes Mother    • Lung disease Father    • Cancer Maternal Grandfather    • Lung disease Sister      Social History     Socioeconomic History   • Marital status:      Spouse name: None   • Number of children: None   • Years of education: None   • Highest education level: None   Occupational History   • None   Tobacco Use   • Smoking status: Former     Packs/day: 1 00     Years: 20 00     Pack years: 20 00     Types: Cigarettes     Quit date: 5     Years since quittin 1   • Smokeless tobacco: Never   Vaping Use   • Vaping Use: Never used   Substance and Sexual Activity   • Alcohol use: Yes     Comment: socially   • Drug use: No   • Sexual activity: Not Currently   Other Topics Concern   • None   Social History Narrative    Lives with her son Irma Monique     Social Determinants of Health     Financial Resource Strain: Not on file   Food Insecurity: Not on file   Transportation Needs: Not on file   Physical Activity: Not on file   Stress: Not on file   Social Connections: Not on file   Intimate Partner Violence: Not on file   Housing Stability: Not on file       Allergies:  No Known Allergies    Review of Systems     Review of Systems   Constitutional: Positive for activity change  Negative for fatigue and fever  HENT: Positive for hearing loss  Negative for dental problem and trouble swallowing  Eyes: Negative for photophobia and visual disturbance  Respiratory: Negative for cough and shortness of breath  Cardiovascular: Negative for chest pain, palpitations and leg swelling  Gastrointestinal: Negative for abdominal pain, constipation, diarrhea, nausea and vomiting     Genitourinary: Negative for difficulty urinating and dysuria  Musculoskeletal: Negative for arthralgias and gait problem  Neurological: Positive for weakness  Negative for dizziness and headaches  All other systems reviewed and are negative  as in HPI  Medications and orders     All medications reviewed and updated in Nursing Home EMR  Objective     Vitals: T: 97 6, P: 59, R: 16, BP: 141/74, 98% on RA, Wt: 122 5 lbs    Physical Exam  Vitals and nursing note reviewed  Constitutional:       General: She is not in acute distress  Appearance: Normal appearance  She is well-developed  She is not diaphoretic  HENT:      Head: Normocephalic and atraumatic  Nose: Nose normal       Mouth/Throat:      Mouth: Mucous membranes are moist       Pharynx: Oropharynx is clear  No oropharyngeal exudate  Eyes:      General: No scleral icterus  Right eye: No discharge  Left eye: No discharge  Extraocular Movements: Extraocular movements intact  Conjunctiva/sclera: Conjunctivae normal    Cardiovascular:      Rate and Rhythm: Normal rate and regular rhythm  Heart sounds: Normal heart sounds  No murmur heard  Pulmonary:      Effort: Pulmonary effort is normal  No respiratory distress  Breath sounds: Normal breath sounds  No wheezing  Chest:      Chest wall: No tenderness  Abdominal:      General: Bowel sounds are normal       Palpations: Abdomen is soft  Tenderness: There is no abdominal tenderness  There is no guarding or rebound  Musculoskeletal:         General: No tenderness or deformity  Normal range of motion  Cervical back: Normal range of motion and neck supple  Skin:     General: Skin is warm and dry  Neurological:      Mental Status: She is alert  Cranial Nerves: No cranial nerve deficit  Comments: Oriented to person and place  Able to follow commands     Psychiatric:         Mood and Affect: Mood normal          Behavior: Behavior normal          Pertinent Laboratory/Diagnostic Studies: The following labs/studies were reviewed please see chart or hospital paperwork for details    Ref Range & Units 1/28/23 0634 1/26/23 0508 1/25/23 1138 12/31/22 0624 12/28/22 0504 12/27/22 1346 7/8/22 1134    Sodium 135 - 147 mmol/L 137  140  137  142  139  135  140 R    Potassium 3 5 - 5 3 mmol/L 5 3  4 6  3 4 Low   4 5 CM  4 1  4 6 CM  4 0    Chloride 96 - 108 mmol/L 109 High   113 High   104  108  107  105  103 R    CO2 21 - 32 mmol/L 24  23  27  29  24  23  31    ANION GAP 4 - 13 mmol/L 4  4  6  5  8  7  6    BUN 5 - 25 mg/dL 32 High   25  26 High   24  23  21  16    Creatinine 0 60 - 1 30 mg/dL 0 88  1 12 CM  1 35 High  CM  1 13 CM  0 95 CM  1 07 CM  1 32 High  CM    Comment: Standardized to IDMS reference method   Glucose 65 - 140 mg/dL 118  82 CM  114 CM  94 CM  98 CM  133 CM        Calcium 8 3 - 10 1 mg/dL 8 8  8 4  9 1  8 9  8 6  9 2  9 5    eGFR ml/min/1 73sq m 55  41  32         Ref Range & Units 1/28/23 0634 1/26/23 0508 1/25/23 1636 1/25/23 1138 12/28/22 0504 12/27/22 1346 6/20/22 1154    WBC 4 31 - 10 16 Thousand/uL 9 73  6 92  8 76  8 56  6 79  9 19  4 30 Low     RBC 3 81 - 5 12 Million/uL 4 01  3 79 Low   4 03  4 44  3 91  4 49  4 50    Hemoglobin 11 5 - 15 4 g/dL 12 4  11 6  12 6  13 8  12 6  14 9  14 4    Hematocrit 34 8 - 46 1 % 38 5  37 2  38 9  43 5  38 6  44 8  44 0    MCV 82 - 98 fL 96  98  97  98  99 High   100 High   98    MCH 26 8 - 34 3 pg 30 9  30 6  31 3  31 1  32 2  33 2  32 0    MCHC 31 4 - 37 4 g/dL 32 2  31 2 Low   32 4  31 7  32 6  33 3  32 7    RDW 11 6 - 15 1 % 13 3  13 2  13 0  13 0  13 0  12 5  13 7    Platelets 705 - 418 Thousands/uL 318  200  201  222  207  205  195    MPV 8 9 - 12 7 fL 9 3  9 7  9 0            - Counseling Documentation: patient was counseled regarding: prognosis

## 2023-01-31 NOTE — UTILIZATION REVIEW
NOTIFICATION OF INPATIENT ADMISSION   AUTHORIZATION REQUEST   SERVICING FACILITY:   [facility]  Address: [address] [place] [address], [address] [place] [address]  Tax ID: [id number]  NPI: [id number] ATTENDING PROVIDER:  Attending Name and NPI#: Amairani Kathleen [[id number]]  Address: [address] [place] [address]  Phone: [phone]   ADMISSION INFORMATION:  Place of Service: [place] [address]  Place of Service Code: 21  Inpatient Admission Date/Time: 1/25/23  2:52 PM  Discharge Date/Time: 1/29/2023  4:27 PM  Admitting Diagnosis Code/Description:  Diverticulosis [K57 90]  Hip pain [M25 559]  Pulmonary embolism ([facility] [address] ) [I26 99]  Failure to thrive in adult [R62 7]  Hypothermia, initial encounter [T68  XXXA]  Right-sided back pain [M54 9]     UTILIZATION REVIEW CONTACT:  Rebecca Galvan, Utilization   Network Utilization Review Department  Phone: [phone]  Fax: [phone]  Email: [email or web address]  Contact for approvals/pending authorizations, clinical reviews, and discharge  PHYSICIAN ADVISORY SERVICES:  Medical Necessity Denial & Urgj-hm-Npan Review  Phone: [phone]  Fax: [phone]  Email: [email or web address]

## 2023-01-31 NOTE — UTILIZATION REVIEW
Initial Clinical Review    Admission: Date/Time/Statement:   Admission Orders (From admission, onward)     Ordered        01/25/23 1452  INPATIENT ADMISSION  Once                      Orders Placed This Encounter   Procedures   • INPATIENT ADMISSION     Standing Status:   Standing     Number of Occurrences:   1     Order Specific Question:   Level of Care     Answer:   Med Surg [16]     Order Specific Question:   Estimated length of stay     Answer:   More than 2 Midnights     Order Specific Question:   Certification     Answer:   I certify that inpatient services are medically necessary for this patient for a duration of greater than two midnights  See H&P and MD Progress Notes for additional information about the patient's course of treatment  ED Arrival Information     Expected   -    Arrival   1/25/2023 11:06    Acuity   Urgent            Means of arrival   Ambulance    Escorted by   [facility]    Service   Hospitalist    Admission type   Emergency            Arrival complaint   failure to thrive           Chief Complaint   Patient presents with   • Failure To Thrive     Per EMS, pt recently discharged from rehab; son reported pt has decreased appetite, increased weakness, not acting like herself; pt states "I'm not doing well", reports R sided back pain       Initial Presentation: 80 y o  female with PMH of CKD stage III and paroxysmal  SVT who presents to the ED from home for evaluation of generalized weakness, poor oral intake, and right sided back/hip pain  Patient states she has not been able to walk due to pain, denies any recent injuries or trauma  Patient was recently discharged from rehab and since then she reports  getting weaker  CT AP  noted for incidental finding of pulmonary embolism  PE:  Alert, normal breath sounds       1/25 Plan: Inpatient admission for evaluation and treatment of ambulatory dysfunction, pulmonary embolism:  Consult Orthopedics, PT/OT, standing Robaxin and Tylenol for pain control  Heparin gtt, obtain ECHO to assess for R heart strain  1/25 Orthopedics consult:  R hip pain, exam consistent with trochanteric bursitis  She has radiographic changes consistent arthritis, but this is not demonstrated on her physical exam   Recommended a injection of corticosteroid into the right greater trochanter bursa  Procedure: right hip bursa injection  After sterile preparation of the skin overlying right greater trochanter, a 22g 3inch spinal needle was introduced down to bone  Needle was then pulled back 2mm and a mixture of 2cc 1% lidocaine, 2cc   5% Marcaine, 2cc Betamethasone was introduced into the greater trochanter bursa  Sterile dressing was then applied  Pt tolerated procedure well  1/26 Internal Medicine:  No overnight events, denies pain  S/P CSI to R trochanteric bursa  Heparin drip transitioned to oral Eliqiuis  Check LE venous dopplers to r/o DVT  PE: Awake, alert, hard of hearing  Lungs CTAB  Physical Therapy discharge recommendation is home with home health rehab, pending family assistance  Case Management spoke with patient's son, he feels patient is not at her baseline, needs to be stronger for her to return home  Plan is for patient to return to [address] [place] [address] for rehab  1/27 Internal Medicine: Patient found to be COVID-19 positive  Asymptomatic, room air saturation 99%  ECHO with normal R ventricular systolic function, venous duplex pending  Case Management: Per Leonel Gates at [address] [place] [address] bed available, awaiting authorization  1/28 Internal Medicine:  Lower extremity doppler revealed acute occlusive thrombus from the proximal femoral vein to the posterior tibial veins on the right and acute non-occlusive thrombus in the popliteal and gastrocnemius vein on the left  Continue Eliquis 5 mg PO BID    PE: Oriented to person and place, normal breath sounds, no LE edema       1/29 Discharged to [address] [place] [address]             ED Triage Vitals Temperature Pulse Respirations Blood Pressure SpO2   01/25/23 1116 01/25/23 1116 01/25/23 1116 01/25/23 1116 01/25/23 1116   (!) 95 3 °F (35 2 °C) 67 16 133/63 97 %      Temp Source Heart Rate Source Patient Position - Orthostatic VS BP Location FiO2 (%)   01/25/23 1116 01/25/23 1116 01/25/23 1116 01/25/23 1116 --   Rectal Monitor Lying Right arm       Pain Score       01/25/23 2030       No Pain          Wt Readings from Last 1 Encounters:   01/26/23 57 kg (125 lb 10 6 oz)     Additional Vital Signs:     Date/Time Temp Pulse Resp BP MAP (mmHg) SpO2 O2 Device   01/29/23 07:47:38 97 5 °F (36 4 °C) 51 Abnormal  18 123/65 84 93 % --   01/28/23 22:08:49 97 °F (36 1 °C) Abnormal  -- 20 118/66 83 -- --   01/28/23 2148 -- -- -- -- -- -- None (Room air)   01/28/23 1500 -- -- -- -- -- -- None (Room air)   01/28/23 14:22:28 97 2 °F (36 2 °C) Abnormal  60 16 166/67 100 95 % --   01/28/23 0855 -- 61 -- -- -- -- --   01/28/23 08:07:18 97 3 °F (36 3 °C) Abnormal  50 Abnormal  16 122/63 83 94 % --   01/27/23 22:44:52 97 °F (36 1 °C) Abnormal  50 Abnormal  20 137/66 90 96 % --   01/27/23 2150 -- -- -- -- -- -- None (Room air)   01/27/23 15:53:58 97 3 °F (36 3 °C) Abnormal  67 18 129/59 82 96 % --   01/27/23 0930 -- -- -- -- -- 96 % None (Room air)   01/27/23 08:32:29 97 2 °F (36 2 °C) Abnormal  63 19 128/84 99 94 % --       (All SpO2 on room air)      Date and Time Temp Pulse SpO2 Resp BP   01/27/23 0541 -- -- -- -- --   01/27/23 0346 -- -- -- -- --   01/27/23 0042 -- -- -- -- --   01/26/23 2223 97 °F (36 1 °C) (Abnormal)   63 94 % 20 134/73   01/26/23 1507 97 5 °F (36 4 °C) 59 98 % 16 147/70   01/26/23 1320 -- 59 -- -- 116/54   01/26/23 1041 -- -- -- -- --   01/26/23 1040 -- -- -- -- --   01/26/23 0900 -- -- -- -- --   01/26/23 0824 97 3 °F (36 3 °C) (Abnormal)   53 (Abnormal)   98 % 16 116/54   01/26/23 0557 -- -- -- -- --   01/25/23 2141 -- -- -- -- --   01/25/23 2131 -- -- -- -- --   01/25/23 2126 97 3 °F (36 3 °C) (Abnormal)   79 92 % 22 149/78   01/25/23 2030 97 3 °F (36 3 °C) (Abnormal)   69 95 % 18 133/63   01/25/23 1900 -- 74 93 % 21 119/56   01/25/23 1830 -- 72 94 % 22 116/56   01/25/23 1800 -- 70 94 % 21 111/53   01/25/23 1730 -- 72 93 % 22 111/56   01/25/23 1523 97 °F (36 1 °C) (Abnormal)   -- -- -- --   01/25/23 1500 -- 62 95 % 21 142/62   01/25/23 1415 -- 56 95 % 20 --   01/25/23 1400 -- 56 93 % 20 126/59   01/25/23 1349 95 6 °F (35 3 °C) (Abnormal)   -- -- -- --   01/25/23 1300 -- 70 97 % 24 (Abnormal)   145/62   01/25/23 1230 -- 62 97 % 19 144/73       Pertinent Labs/Diagnostic Test Results:       VAS lower limb venous duplex study, complete bilateral   Final Result by Moiz Bruce MD (01/28 9112)     CONCLUSION:    RIGHT LOWER LIMB: Limited  Acute occlusive thrombus noted from the proximal femoral vein to the posterior  tibial veins  Augmentations limited due to the presence of thrombus  LEFT LOWER LIMB: Limited  Acute non occlusive thrombus noted in the popliteal and gastrocnemius vein  Augmentations limited due to the presence of thrombus  XR hip/pelv 2-3 vws right if performed   Final Result by Sussy Max MD (01/26 4746)      No acute osseous abnormality  CT abdomen pelvis w contrast   Final Result by Felecia Rm MD (01/25 6030)      Partially imaged right lower lobe pulmonary emboli  Mild right basilar consolidation atelectasis and/or pneumonia  Trace right pleural effusion  No acute pathology in the abdomen or pelvis  Colonic diverticulosis  1/26 ECHO:    •  Left Ventricle: Left ventricular cavity size is normal  Wall thickness is normal  There is no concentric hypertrophy  The left ventricular ejection fraction is 65%  Systolic function is normal  Wall motion is normal  Diastolic function is mildly abnormal, consistent with grade I (abnormal) relaxation    •  Right Ventricle: Right ventricular cavity size is normal  Systolic function is normal  Normal tricuspid annular plane systolic excursion (TAPSE) > 1 7 cm  •  Left Atrium: The atrium is mildly dilated  •  Right Atrium: The atrium is mildly dilated  •  Aortic Valve: There is aortic valve sclerosis  •  Tricuspid Valve: There is mild regurgitation  1/25 EKG:    Sinus rhythm with sinus arrhythmia  Right bundle branch block  Left anterior fascicular block   Bifascicular block   Abnormal ECG  When compared with ECG of 28-DEC-2022 14:06,  Vent   rate has decreased BY  31 BPM      Results from last 7 days   Lab Units 01/27/23  0035   SARS-COV-2  Positive*     Results from last 7 days   Lab Units 01/28/23  0634 01/26/23  0508 01/25/23  1636 01/25/23  1138   WBC Thousand/uL 9 73 6 92 8 76 8 56   HEMOGLOBIN g/dL 12 4 11 6 12 6 13 8   HEMATOCRIT % 38 5 37 2 38 9 43 5   PLATELETS Thousands/uL 318 200 201 222   NEUTROS ABS Thousands/µL  --   --   --  7 15         Results from last 7 days   Lab Units 01/28/23  0634 01/26/23  0508 01/25/23  1138   SODIUM mmol/L 137 140 137   POTASSIUM mmol/L 5 3 4 6 3 4*   CHLORIDE mmol/L 109* 113* 104   CO2 mmol/L 24 23 27   ANION GAP mmol/L 4 4 6   BUN mg/dL 32* 25 26*   CREATININE mg/dL 0 88 1 12 1 35*   EGFR ml/min/1 73sq m 55 41 32   CALCIUM mg/dL 8 8 8 4 9 1   MAGNESIUM mg/dL  --  2 3  --    PHOSPHORUS mg/dL  --  3 3  --      Results from last 7 days   Lab Units 01/26/23  0508 01/25/23  1138   AST U/L 9 9   ALT U/L 13 15   ALK PHOS U/L 68 78   TOTAL PROTEIN g/dL 5 7* 7 0   ALBUMIN g/dL 2 1* 2 7*   TOTAL BILIRUBIN mg/dL 0 63 1 07*         Results from last 7 days   Lab Units 01/28/23  0634 01/26/23  0508 01/25/23  1138   GLUCOSE RANDOM mg/dL 118 82 114           Results from last 7 days   Lab Units 01/26/23  0508 01/25/23  2301 01/25/23  1636   PROTIME seconds  --   --  15 6*   INR   --   --  1 22*   PTT seconds 65* 76* 31     Results from last 7 days   Lab Units 01/25/23  1138   TSH 3RD GENERATON uIU/mL 1 410             Results from last 7 days   Lab Units 01/25/23  1138   LIPASE u/L 92     Results from last 7 days   Lab Units 01/25/23  1636 01/25/23  1138   CRP mg/L  --  287 0*   SED RATE mm/hour 53*  --                  Results from last 7 days   Lab Units 01/27/23  0035   INFLUENZA A PCR  Negative   INFLUENZA B PCR  Negative   RSV PCR  Negative               ED Treatment:   Medication Administration from 01/25/2023 1106 to 01/25/2023 2026       Date/Time Order Dose Route Action     01/25/2023 1232 EST sodium chloride 0 9 % bolus 250 mL 0 mL Intravenous Stopped     01/25/2023 1156 EST sodium chloride 0 9 % bolus 250 mL 250 mL Intravenous New Bag     01/25/2023 1230 EST potassium chloride (K-DUR,KLOR-CON) CR tablet 40 mEq 40 mEq Oral Given     01/25/2023 1253 EST iohexol (OMNIPAQUE) 350 MG/ML injection (SINGLE-DOSE) 100 mL 90 mL Intravenous Given     01/25/2023 1806 EST heparin (VTE/PE) high 0 mL Intravenous Hold     01/25/2023 1638 EST heparin (porcine) injection 3,600 Units 3,600 Units Intravenous Given     01/25/2023 1641 EST heparin (porcine) 25,000 units in 0 45% NaCl 250 mL infusion (premix) 12 Units/kg/hr Intravenous New Bag     01/25/2023 1644 EST acetaminophen (TYLENOL) tablet 975 mg 975 mg Oral Given     01/25/2023 1644 EST methocarbamol (ROBAXIN) tablet 750 mg 750 mg Oral Given        Past Medical History:   Diagnosis Date   • Interstitial cystitis    • Leukopenia    • Neurologic gait dysfunction     Abstraction Dr Orlando Fiore charts   • Neuropathy    • Neutropenia ([facility] [address] )     Abstraction Dr Yury Bravo   • Osteoarthritis    • Osteoporosis    • Peripheral edema     Abstraction Dr Orlando Fiore charts   • Renal cyst    • Syncope     Abstraction Dr Orlando Fiore charts     Present on Admission:  • Ambulatory dysfunction  • CKD (chronic kidney disease) stage 3, GFR 30-59 ml/min ([facility])  • PSVT (paroxysmal supraventricular tachycardia) ([facility])      Admitting Diagnosis: Diverticulosis [K57 90]  Hip pain [M25 559]  Pulmonary embolism ([facility] [address] ) [I26 99]  Failure to thrive in adult [R62 7]  Hypothermia, initial encounter [T68  XXXA]  Right-sided back pain [M54 9]  Age/Sex: 80 y o  female       Admission Orders: SCD, PT/OT eval , ECHO  Scheduled Medications:      Current Facility-Administered Medications   Medication Dose Route Frequency   • acetaminophen  975 mg Oral Q8H [address]   • apixaban  5 mg Oral BID   • bupivacaine  5 mL Injection Once   • methocarbamol  750 mg Oral Q8H PRN   • [START ON 1/27/2023] metoprolol succinate  25 mg Oral Daily   • polyethylene glycol  17 g Oral Daily          heparin (porcine) 25,000 units in 0 45% NaCl 250 mL infusion (premix)  Rate: 1 8-12 mL/hr Dose: 3-20 Units/kg/hr  Weight Dosing Info: 60 kg (Order-Specific)  Freq: Titrated Route: IV  Last Dose: Stopped (01/26/23 1432)  Start: 01/25/23 1630 End: 01/26/23 1019    apixaban (ELIQUIS) tablet 5 mg  Dose: 5 mg  Freq: 2 times daily Route: PO  Start: 01/26/23 1030         IP CONSULT TO CASE MANAGEMENT  IP CONSULT TO ORTHOPEDIC SURGERY    Network Utilization Review Department  ATTENTION: Please call with any questions or concerns to [phone] and carefully listen to the prompts so that you are directed to the right person  All voicemails are confidential   Nicole Finder all requests for admission clinical reviews, approved or denied determinations and any other requests to dedicated fax number below belonging to the campus where the patient is receiving treatment   List of dedicated fax numbers for the Facilities:  [address] DENIALS (Administrative/Medical Necessity) [phone]   [address] (Maternity/NICU/Pediatrics) [phone]   [address] [phone]   [place] [address] [phone]   [address] [place] [address] [facility] [address] [place] [facility] 134 894 [facility] [phone]

## 2023-01-31 NOTE — UTILIZATION REVIEW
NOTIFICATION OF ADMISSION DISCHARGE   This is a Notification of Discharge from [address]  Please be advised that this patient has been discharge from our facility  Below you will find the admission and discharge date and time including the patient’s disposition  UTILIZATION REVIEW CONTACT:  Alexis Hernandez  Utilization   Network Utilization Review Department  Phone: [phone] x carefully listen to the prompts  All voicemails are confidential   Email: [email or web address]     ADMISSION INFORMATION  PRESENTATION DATE: 1/25/2023 11:07 AM  OBERVATION ADMISSION DATE:   INPATIENT ADMISSION DATE: 1/25/23  2:52 PM   DISCHARGE DATE: 1/29/2023  4:27 PM   DISPOSITION:Non [facility] SNF/TCU/SNU    IMPORTANT INFORMATION:  Send all requests for admission clinical reviews, approved or denied determinations and any other requests to dedicated fax number below belonging to the campus where the patient is receiving treatment   List of dedicated fax numbers:  [address] DENIALS (Administrative/Medical Necessity) [phone]   [address] (Maternity/NICU/Pediatrics) [phone]   [facility] [phone]   [place] [address] [phone]   [address] [phone]   220 [facility] [phone] 90 [facility] [phone]   146 [facility] 119 [phone]   [facility]  [phone]   4055 [facility] [phone] 412 [facility] 850 [address] [facility] [phone]

## 2023-02-01 ENCOUNTER — NURSING HOME VISIT (OUTPATIENT)
Dept: GERIATRICS | Facility: OTHER | Age: 88
End: 2023-02-01

## 2023-02-01 DIAGNOSIS — I26.93 SINGLE SUBSEGMENTAL PULMONARY EMBOLISM WITHOUT ACUTE COR PULMONALE (HCC): ICD-10-CM
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
## 2023-02-01 DIAGNOSIS — N18.31 STAGE 3A CHRONIC KIDNEY DISEASE (HCC): ICD-10-CM

## 2023-02-01 DIAGNOSIS — I82.4Z3 LOWER LEG DVT (DEEP VENOUS THROMBOEMBOLISM), ACUTE, BILATERAL (HCC): ICD-10-CM

## 2023-02-01 DIAGNOSIS — U07.1 COVID: ICD-10-CM

## 2023-02-01 DIAGNOSIS — R26.2 AMBULATORY DYSFUNCTION: Primary | ICD-10-CM

## 2023-02-01 DIAGNOSIS — I47.1 PSVT (PAROXYSMAL SUPRAVENTRICULAR TACHYCARDIA) (HCC): ICD-10-CM

## 2023-02-01 RX ORDER — ACETAMINOPHEN 325 MG/1
975 TABLET ORAL EVERY 8 HOURS PRN
COMMUNITY

## 2023-02-01 NOTE — PROGRESS NOTES
[facility]  [address]  [phone]  [facility]  Code 31 (STR)          NAME: Nieves Rod  AGE: 80 y o  SEX: female     DATE OF ENCOUNTER: 2/1/2023    Assessment and Plan     1  Ambulatory dysfunction  Assessment & Plan:  Multifactorial-acute and chronic conditions  Recent right trochanter bursitis with injection  Continue PT/OT at [facility]  Encourage hydration and nutrition  Continue fall precautions, assist with ADLs      2  Fall, sequela  Assessment & Plan:  Sustained fall on 1/16 seen in ED, also had mild pain of right hip  During 1/25 admission, x-ray of the right hip and pelvis with no acute abnormality in which she underwent right hip bursa injection for trochanter bursitis  Start Tylenol as needed along with Voltaren gel  Continue PT/OT at [facility] with maintain fall precautions      3  COVID  Assessment & Plan:  Stable   Tested positive on 1/27  Remains asymptomatic  Continue isolation precautions        4  Single subsegmental pulmonary embolism without acute cor pulmonale (HCC)  Assessment & Plan:  Incidental finding during CT of the abdomen and pelvis showing RLL PE  She was transitioned and continues on p o  Eliquis   Continue to monitor for signs and symptoms of bleeding particularly with increased fall risk  Vital signs remained stable, continue to monitor      5  Lower leg DVT (deep venous thromboembolism), acute, bilateral (HCC)  Assessment & Plan:  Recently diagnosed showing acute occlusive thrombus of proximal femoral vein to the posterior tibial veins on the right and acute nonocclusive thrombus in the popliteal and gastrocnemius vein on the left  Continue Eliquis      6  PSVT (paroxysmal supraventricular tachycardia) (HCC)  Assessment & Plan:  Stable has taken Toprol XL in the past  Heart rate trends reviewed while at short-term rehab -average in 60s and 70s with intermittent bradycardia lowest 54  Continue to monitor and trend heart rate      7   Stage 3a chronic kidney disease [facility])  Assessment & Plan:  Lab Results   Component Value Date    EGFR 55 01/28/2023    EGFR 41 01/26/2023    EGFR 32 01/25/2023    CREATININE 0 88 01/28/2023    CREATININE 1 12 01/26/2023    CREATININE 1 35 (H) 01/25/2023   Stable  Baseline creatinine 1 0-1 4  Current creatinine 0 78  Continue to monitor routine BMP with avoidance of nephrotoxins and hypotension         All medications and routine orders were reviewed and updated as needed  Chief Complaint     STR follow up visit      Past Medical and Surgica History      Past Medical History:   Diagnosis Date   • Interstitial cystitis    • Leukopenia    • Neurologic gait dysfunction     Abstraction Dr Shola Dennison charts   • Neuropathy    • Neutropenia [facility])     Abstraction Dr Neftali Walker   • Osteoarthritis    • Osteoporosis    • Peripheral edema     Abstraction Dr Neftali Walker   • Renal cyst    • Syncope     Abstraction Dr Shola Dennison charts     Past Surgical History:   Procedure Laterality Date   • CAPSULOTOMY      Right eye   • CATARACT EXTRACTION Left    • CHALAZION EXCISION     • COLONOSCOPY  2006     No Known Allergies       History of Present Illness     Nelli Baumann is a 59-year-old female admitted to [address] [place] [address] for short term rehab  PMH including but not limited to PE, DVT, CKD, constipation, diverticulosis, venous stasis of bilateral lower extremities, neuropathy, ambulatory dysfunction, seen in collaboration with nursing for medical mgmt and STR follow up  Hospital course:  She was admitted to [facility] from 1/25 through 1/29 for ambulatory dysfunction  She originally presented due to generalized weakness, poor p o  intake, right-sided hip pain, she was found to have bilateral lower extremity DVTs, RLL base PE and was started on Eliquis  She was also found to be COVID-positive on 1/27    Right hip imaging negative for abnormality however she did receive injection related to trochanteric bursitis  She was discharged to short term rehab  Rehab course:  Continues with supportive care at short term rehab including PT/OT  Upon today's assessment, she was seen laying in bed, awake, alert, oriented, NAD  She does report some bilateral lower back pain increased possibly due to being in bed  No active complaints of hip pain  She continues on Eliquis use in setting of DVTs and PE, no signs or symptoms of bleeding, denies any shortness of breath or chest pain  Vital signs remained stable, afebrile, on room air  She admits her appetite remains fair, SNF chart reviewed,she is eating approximately 2-3 meals per day on average 25 to 50% with last BM on 2/1  The patient's allergies, past medical, surgical, social and family history were reviewed and unchanged  Review of Systems     REVIEW OF SYSTEMS:  Per history of present illness, all other systems reviewed and negative  Objective     Vitals: /74, temp 97 6, HR 59, RR 18, O2 98% on room air  Weight 124 8 pounds      Physical Exam  Constitutional:       General: She is not in acute distress  Appearance: Normal appearance  She is not ill-appearing  HENT:      Head: Normocephalic and atraumatic  Right Ear: External ear normal       Left Ear: External ear normal       Mouth/Throat:      Mouth: Mucous membranes are moist       Pharynx: No oropharyngeal exudate or posterior oropharyngeal erythema  Eyes:      General:         Right eye: No discharge  Left eye: No discharge  Extraocular Movements: Extraocular movements intact  Conjunctiva/sclera: Conjunctivae normal    Cardiovascular:      Rate and Rhythm: Normal rate and regular rhythm  Pulses: Normal pulses  Heart sounds: Normal heart sounds  Pulmonary:      Effort: Pulmonary effort is normal  No respiratory distress  Breath sounds: Normal breath sounds  No wheezing  Abdominal:      General: Bowel sounds are normal  There is no distension  Palpations: Abdomen is soft  Tenderness: There is no abdominal tenderness  There is no guarding  Musculoskeletal:         General: No tenderness  Cervical back: Normal range of motion  No tenderness  Right lower leg: No edema  Left lower leg: No edema  Skin:     General: Skin is warm and dry  Findings: No bruising, erythema or rash  Neurological:      General: No focal deficit present  Mental Status: She is alert and oriented to person, place, and time  Mental status is at baseline  Sensory: No sensory deficit  Motor: Weakness present  Gait: Gait abnormal       Comments: Forgetful   Psychiatric:         Mood and Affect: Mood normal          Behavior: Behavior normal          Pertinent Laboratory/Diagnostic Studies:    2/1 CBC, BMP: Hemoglobin 12 6, WBC 6 8, BUN 17, creatinine 0 78, sodium 136, potassium 4 7, EGFR 69    Current Medications   Medications reviewed and updated see facility [facility] for details  Plan discussed with Dr Bhupinder Peres noted agreement with assessment and plan  Please note:  Voice-recognition software may have been used in the preparation of this document  Occasional wrong word or "sound-alike" substitutions may have occurred due to the inherent limitations of voice recognition software  Interpretation should be guided by KERON Estrella  2/1/2023  2:00 PM  Patient: Ying Mark

## 2023-02-02 NOTE — ASSESSMENT & PLAN NOTE
Sustained fall on 1/16 seen in ED, also had mild pain of right hip  During 1/25 admission, x-ray of the right hip and pelvis with no acute abnormality in which she underwent right hip bursa injection for trochanter bursitis  Start Tylenol as needed along with Voltaren gel  Continue PT/OT at [facility] with maintain fall precautions

## 2023-02-02 NOTE — ASSESSMENT & PLAN NOTE
Lab Results   Component Value Date    EGFR 55 01/28/2023    EGFR 41 01/26/2023    EGFR 32 01/25/2023    CREATININE 0 88 01/28/2023    CREATININE 1 12 01/26/2023    CREATININE 1 35 (H) 01/25/2023   Stable  Baseline creatinine 1 0-1 4  Current creatinine 0 78  Continue to monitor routine BMP with avoidance of nephrotoxins and hypotension

## 2023-02-02 NOTE — ASSESSMENT & PLAN NOTE
Multifactorial-acute and chronic conditions  Recent right trochanter bursitis with injection  Continue PT/OT at STR  Encourage hydration and nutrition  Continue fall precautions, assist with ADLs

## 2023-02-02 NOTE — ASSESSMENT & PLAN NOTE
Recently diagnosed showing acute occlusive thrombus of proximal femoral vein to the posterior tibial veins on the right and acute nonocclusive thrombus in the popliteal and gastrocnemius vein on the left  Continue Eliquis

## 2023-02-02 NOTE — ASSESSMENT & PLAN NOTE
Charanjit has taken Toprol XL in the past  Heart rate trends reviewed while at short-term rehab -average in 60s and 70s with intermittent bradycardia lowest 54  Continue to monitor and trend heart rate

## 2023-02-02 NOTE — ASSESSMENT & PLAN NOTE
Incidental finding during CT of the abdomen and pelvis showing RLL PE  She was transitioned and continues on p o  Eliquis   Continue to monitor for signs and symptoms of bleeding particularly with increased fall risk  Vital signs remained stable, continue to monitor

## 2023-02-03 ENCOUNTER — NURSING HOME VISIT (OUTPATIENT)
Dept: GERIATRICS | Facility: OTHER | Age: 88
End: 2023-02-03

## 2023-02-03 VITALS
DIASTOLIC BLOOD PRESSURE: 74 MMHG | HEART RATE: 65 BPM | OXYGEN SATURATION: 95 % | RESPIRATION RATE: 18 BRPM | TEMPERATURE: 97.5 F | SYSTOLIC BLOOD PRESSURE: 138 MMHG

## 2023-02-03 DIAGNOSIS — I82.4Z3 LOWER LEG DVT (DEEP VENOUS THROMBOEMBOLISM), ACUTE, BILATERAL (HCC): ICD-10-CM

## 2023-02-03 DIAGNOSIS — N18.31 STAGE 3A CHRONIC KIDNEY DISEASE (HCC): ICD-10-CM

## 2023-02-03 DIAGNOSIS — R26.2 AMBULATORY DYSFUNCTION: ICD-10-CM

## 2023-02-03 DIAGNOSIS — I26.93 SINGLE SUBSEGMENTAL PULMONARY EMBOLISM WITHOUT ACUTE COR PULMONALE (HCC): Primary | ICD-10-CM

## 2023-02-03 DIAGNOSIS — I47.1 PSVT (PAROXYSMAL SUPRAVENTRICULAR TACHYCARDIA) (HCC): ICD-10-CM

## 2023-02-03 DIAGNOSIS — U07.1 COVID: ICD-10-CM

## 2023-02-03 NOTE — ASSESSMENT & PLAN NOTE
Multifactorial in the setting of advanced age, h/o fall  Continue PT/OT for strength training and stability  Fall/ safety Precautions  assit with ADLs/IADLs as needed  Ensure adequate nutrition/hydration   Monitor CBC/BMP    following for d/c planning

## 2023-02-03 NOTE — PROGRESS NOTES
[facility]  [address]  [phone]  [place] [facility]  Code 31 (STR)        NAME: Will Lorenzo  AGE: 80 y o  SEX: female CODE STATUS: No CPR    DATE OF ENCOUNTER: 2/3/2023    Assessment and Plan     1  Single subsegmental pulmonary embolism without acute cor pulmonale (HCC)  Assessment & Plan:  · Incidental finding of RLL PE during recent hospitalization  · Patient started on Hepatin gtt then transitioned to Eliquis 5 mg BID  · LSC,decreased, 95% on RA  · No s/s of active bleeding reported from SNF staff      2  Lower leg DVT (deep venous thromboembolism), acute, bilateral (HCC)  Assessment & Plan:  · Venous duplex done during recent hospitalization showed acute occlusive thrombus of the proximal femoral vein to the posterior tibial veins on the right and acute nonocclusive thrombus in the popliteal and gastrocnemius vein on the left  · Continue Eliquis 5 mg BID      3  Stage 3a chronic kidney disease [facility])  Assessment & Plan:  Lab Results   Component Value Date    EGFR 55 01/28/2023    EGFR 41 01/26/2023    EGFR 32 01/25/2023    CREATININE 0 88 01/28/2023    CREATININE 1 12 01/26/2023    CREATININE 1 35 (H) 01/25/2023   · per records baseline Cr 1 0-1 4  · 2/1/23 BUN 17/CR 0 78  · Avoid nephrotoxic medications  · Encourage adequate PO nutrition/hydration  · Avoid hypotension  · BMP 2/6/23      4  Ambulatory dysfunction  Assessment & Plan:  Multifactorial in the setting of advanced age, h/o fall  Continue PT/OT for strength training and stability  Fall/ safety Precautions  assit with ADLs/IADLs as needed  Ensure adequate nutrition/hydration   Monitor CBC/BMP    following for d/c planning       5  COVID  Assessment & Plan:  · Incidental finding during hospitalization  · Patient appeared asymptomatic  · No supplemental oxygen required  · LSC, decreased, 95% on RA  · Continue isolation precautions per facility policy      6   PSVT (paroxysmal supraventricular tachycardia) ([facility] [address] )  Assessment & Plan:  · HR trending 54-81  · Morning HR 65  · Patient denies CP/palpitations  · Currently not on Beta-blockers  · Continue to monitor HR         All medications and routine orders were reviewed and updated as needed  Chief Complaint     STR follow up visit    Patient's care was coordinated with nursing facility staff  Recent vitals, labs, and updated medications were review on [facility] system in facility  Past Medical and Surgical History      Past Medical History:   Diagnosis Date   • Interstitial cystitis    • Leukopenia    • Neurologic gait dysfunction     Abstraction Dr Ora Atkinson charts   • Neuropathy    • Neutropenia [facility])     Abstraction Dr Pritesh Beatty   • Osteoarthritis    • Osteoporosis    • Peripheral edema     Abstraction Dr Ora Atkinson charts   • Renal cyst    • Syncope     Abstraction Dr Ora Atkinson charts     Past Surgical History:   Procedure Laterality Date   • CAPSULOTOMY      Right eye   • CATARACT EXTRACTION Left    • CHALAZION EXCISION     • COLONOSCOPY  2006     No Known Allergies       History of Present Illness     XI Lozada is a 80year old female, she is a STR patient of [facility] since 1/29/23  Past Medical Hx including but not limited to osteoporosis, neuropathy, PSVT, osteoarthritis, CKD, compression fracture L3  She was seen in collaboration with nursing for medical mgmt and STR follow up  Hospital Course  Patient was hospitalized 1/25/23 for c/o generalized weakness, poor PO intake, right hip pain  CT of abd/pelvis showed no acute pathology in the abdomen or pelvis but did show RLL PE  Patient was started on Heparin gtt and was later transitioned to Eliquis 5 mg BID  LE venous duplex showed acute DVT B/L LE  Additionally, patient tested positive for Covid but appeared asymptomatic, 98% on RA  Patient was recommended for reahb and discharged to [facility] [place], operated by [facility]    Rehab Course  Raisa was seen and examined at bedside today  Patient is alert and oriented x3  On exam she is resting in bed having her lunch  She does not appear to be in any distress  She states she is sleeping well and has a good appetite, she has been participating in therapy  She is hoping to go home soon  Patient on Eliquis for PE/DVT, no S/S of active bleeding, LSC 95% on room  Repeat BMP 2/6/2023  She denies CP/SOB/N/V/D  Denies lightheadedness, dizziness, headaches, vision changes  Denies any bowel or bladder issues  Per review of SNF records, Patient is eating 3 meals per day, consuming 75 %  Last documented BM 2/1/23  No concerns from nursing at this time  The patient's allergies, past medical, surgical, social and family history were reviewed and unchanged  Review of Systems     Review of Systems   Constitutional: Positive for activity change  Negative for appetite change, chills and fever  HENT: Negative  Negative for congestion  Eyes: Negative  Cardiovascular: Negative  Negative for chest pain and palpitations  Gastrointestinal: Negative  Negative for abdominal distention, abdominal pain, constipation, diarrhea, nausea and vomiting  Endocrine: Negative  Genitourinary: Negative for difficulty urinating and dysuria  Musculoskeletal: Positive for arthralgias  Skin: Negative  Allergic/Immunologic: Negative  Neurological: Positive for weakness  Negative for dizziness and headaches  Hematological: Negative  Psychiatric/Behavioral: Negative for sleep disturbance  Objective     Vitals:   Vitals:    02/03/23 0957   BP: 138/74   Pulse: 65   Resp: 18   Temp: 97 5 °F (36 4 °C)   SpO2: 95%         Physical Exam  Vitals and nursing note reviewed  Constitutional:       General: She is not in acute distress  Appearance: Normal appearance  She is not ill-appearing  HENT:      Head: Normocephalic and atraumatic        Right Ear: External ear normal       Left Ear: External ear normal       Nose: Nose normal  No congestion  Mouth/Throat:      Mouth: Mucous membranes are moist       Pharynx: No oropharyngeal exudate or posterior oropharyngeal erythema  Eyes:      General:         Right eye: No discharge  Left eye: No discharge  Extraocular Movements: Extraocular movements intact  Conjunctiva/sclera: Conjunctivae normal    Cardiovascular:      Rate and Rhythm: Normal rate and regular rhythm  Pulses: Normal pulses  Heart sounds: Normal heart sounds  Pulmonary:      Effort: Pulmonary effort is normal  No respiratory distress  Breath sounds: Normal breath sounds  No wheezing  Abdominal:      General: Bowel sounds are normal  There is no distension  Palpations: Abdomen is soft  Tenderness: There is no abdominal tenderness  There is no guarding  Musculoskeletal:         General: No tenderness  Right lower leg: No edema  Left lower leg: No edema  Skin:     General: Skin is warm and dry  Capillary Refill: Capillary refill takes more than 3 seconds  Findings: No bruising, erythema or rash  Neurological:      General: No focal deficit present  Mental Status: She is alert and oriented to person, place, and time  Mental status is at baseline  Sensory: No sensory deficit  Motor: Weakness present  Gait: Gait abnormal       Comments: Forgetful   Psychiatric:         Mood and Affect: Mood normal          Behavior: Behavior normal          Pertinent Laboratory/Diagnostic Studies:   Reviewed in facility chart-stable      Current Medications   Medications reviewed and updated see facility [facility] for details        Current Outpatient Medications:   •  acetaminophen (TYLENOL) 325 mg tablet, Take 975 mg by mouth every 8 (eight) hours as needed for pain, Disp: , Rfl:   •  apixaban (ELIQUIS) 5 mg, Take 1 tablet (5 mg total) by mouth 2 (two) times a day, Disp: , Rfl: 0  •  Cholecalciferol (VITAMIN D-3) 25 MCG (1000 UT) CAPS, Take by mouth daily, Disp: , Rfl:   •  Diclofenac Sodium (VOLTAREN) 1 %, Apply 2 g topically 2 (two) times a day Apply to lower back and right hip, Disp: , Rfl:      Please note:  Voice-recognition software may have been used in the preparation of this document  Occasional wrong word or "sound-alike" substitutions may have occurred due to the inherent limitations of voice recognition software  Interpretation should be guided by context           [facility] KERON Rodríguez  2/3/2023  3:28 PM

## 2023-02-03 NOTE — ASSESSMENT & PLAN NOTE
· Incidental finding of RLL PE during recent hospitalization  · Patient started on Hepatin gtt then transitioned to Eliquis 5 mg BID  · LSC,decreased, 95% on RA  · No s/s of active bleeding reported from SNF staff

## 2023-02-03 NOTE — ASSESSMENT & PLAN NOTE
· Venous duplex done during recent hospitalization showed acute occlusive thrombus of the proximal femoral vein to the posterior tibial veins on the right and acute nonocclusive thrombus in the popliteal and gastrocnemius vein on the left    · Continue Eliquis 5 mg BID

## 2023-02-03 NOTE — ASSESSMENT & PLAN NOTE
· Incidental finding during hospitalization  · Patient appeared asymptomatic  · No supplemental oxygen required  · LSC, decreased, 95% on RA  · Continue isolation precautions per facility policy

## 2023-02-03 NOTE — ASSESSMENT & PLAN NOTE
Lab Results   Component Value Date    EGFR 55 01/28/2023    EGFR 41 01/26/2023    EGFR 32 01/25/2023    CREATININE 0 88 01/28/2023    CREATININE 1 12 01/26/2023    CREATININE 1 35 (H) 01/25/2023   · per records baseline Cr 1 0-1 4  · 2/1/23 BUN 17/CR 0 78  · Avoid nephrotoxic medications  · Encourage adequate PO nutrition/hydration  · Avoid hypotension  · BMP 2/6/23

## 2023-02-03 NOTE — ASSESSMENT & PLAN NOTE
· HR trending 54-81  · Morning HR 65  · Patient denies CP/palpitations  · Currently not on Beta-blockers  · Continue to monitor HR

## 2023-02-06 ENCOUNTER — NURSING HOME VISIT (OUTPATIENT)
Dept: GERIATRICS | Facility: OTHER | Age: 88
End: 2023-02-06

## 2023-02-06 DIAGNOSIS — I82.4Z3 LOWER LEG DVT (DEEP VENOUS THROMBOEMBOLISM), ACUTE, BILATERAL (HCC): ICD-10-CM

## 2023-02-06 DIAGNOSIS — I47.1 PSVT (PAROXYSMAL SUPRAVENTRICULAR TACHYCARDIA) (HCC): ICD-10-CM

## 2023-02-06 DIAGNOSIS — N18.31 STAGE 3A CHRONIC KIDNEY DISEASE (HCC): ICD-10-CM

## 2023-02-06 DIAGNOSIS — I26.93 SINGLE SUBSEGMENTAL PULMONARY EMBOLISM WITHOUT ACUTE COR PULMONALE (HCC): ICD-10-CM
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
## 2023-02-06 DIAGNOSIS — U07.1 COVID: ICD-10-CM

## 2023-02-06 DIAGNOSIS — R26.2 AMBULATORY DYSFUNCTION: Primary | ICD-10-CM

## 2023-02-06 NOTE — ASSESSMENT & PLAN NOTE
Multifactorial-acute and chronic conditions  Recent right trochanter bursitis with injection  Continue pain regimen of Voltaren gel and PRN Tylenol   Continue PT/OT at STR  Encourage hydration and nutrition  Continue fall precautions, assist with ADLs   following for discharge planning

## 2023-02-06 NOTE — ASSESSMENT & PLAN NOTE
Stable  Recently diagnosed showing acute occlusive thrombus of proximal femoral vein to the posterior tibial veins on the right and acute nonocclusive thrombus in the popliteal and gastrocnemius vein on the left  Continue Eliquis

## 2023-02-06 NOTE — PROGRESS NOTES
[facility]  [address]  [phone]  [facility]  Code 31 (STR)          NAME: Roseanna Mcneal  AGE: 80 y o  SEX: female     DATE OF ENCOUNTER: 2/6/2023    Assessment and Plan     1  Ambulatory dysfunction  Assessment & Plan:  Multifactorial-acute and chronic conditions  Recent right trochanter bursitis with injection  Continue pain regimen of Voltaren gel and PRN Tylenol   Continue PT/OT at STR  Encourage hydration and nutrition  Continue fall precautions, assist with ADLs   following for discharge planning      2  Fall, sequela  Assessment & Plan:  Sustained fall on 1/16 seen in ED, also had mild pain of right hip  During 1/25 admission, x-ray of the right hip and pelvis with no acute abnormality in which she underwent right hip bursa injection for trochanter bursitis  ContinueTylenol as needed along with Voltaren gel  Continue PT/OT at [address] with maintain fall precautions      3  COVID  Assessment & Plan:  Stable   Tested positive on 1/27  Remains asymptomatic  Continue isolation precautions per facility protocol          4  Single subsegmental pulmonary embolism without acute cor pulmonale (HCC)  Assessment & Plan:  Incidental finding during CT of the abdomen and pelvis showing RLL PE  She was transitioned and continues on p o  Eliquis   Denies any cardiac symptoms  Continue to monitor for signs and symptoms of bleeding particularly with increased fall risk  Vital signs remained stable, continue to monitor      5  Lower leg DVT (deep venous thromboembolism), acute, bilateral (HCC)  Assessment & Plan:  Stable  Recently diagnosed showing acute occlusive thrombus of proximal femoral vein to the posterior tibial veins on the right and acute nonocclusive thrombus in the popliteal and gastrocnemius vein on the left  Continue Eliquis      6   PSVT (paroxysmal supraventricular tachycardia) (HCC)  Assessment & Plan:  Stable  Has taken Toprol XL in the past but not currently on  Heart rate trends reviewed while at short-term rehab -average in  Continue to monitor and trend heart rate      7  Stage 3a chronic kidney disease [facility])  Assessment & Plan:  Lab Results   Component Value Date    EGFR 55 01/28/2023    EGFR 41 01/26/2023    EGFR 32 01/25/2023    CREATININE 0 88 01/28/2023    CREATININE 1 12 01/26/2023    CREATININE 1 35 (H) 01/25/2023     Stable  Baseline creatinine 1 0-1 4  Current creatinine 0 78  Continue to monitor routine BMP with avoidance of nephrotoxins and hypotension               All medications and routine orders were reviewed and updated as needed  Chief Complaint     STR follow up visit      Past Medical and Surgica History      Past Medical History:   Diagnosis Date   • Interstitial cystitis    • Leukopenia    • Neurologic gait dysfunction     Abstraction Dr Yazmin Gimenez charts   • Neuropathy    • Neutropenia [facility])     Abstraction Dr Oswaldo Parker   • Osteoarthritis    • Osteoporosis    • Peripheral edema     Abstraction Dr Oswaldo Parker   • Renal cyst    • Syncope     Abstraction Dr Yazmin Gimenez charts     Past Surgical History:   Procedure Laterality Date   • CAPSULOTOMY      Right eye   • CATARACT EXTRACTION Left    • CHALAZION EXCISION     • COLONOSCOPY  2006     No Known Allergies       History of Present Illness     Marlena George is a 42-year-old female admitted to [address] [place] [address] for short term rehab  PMH including but not limited to PE, DVT, CKD, constipation, diverticulosis, venous stasis of bilateral lower extremities, neuropathy, ambulatory dysfunction, seen in collaboration with nursing for medical mgmt and STR follow up  Hospital course:  She was admitted to [facility] from 1/25 through 1/29 for ambulatory dysfunction  She originally presented due to generalized weakness, poor p o  intake, right-sided hip pain, she was found to have bilateral lower extremity DVTs, RLL base PE and was started on Eliquis    She was also found to be COVID-positive on 1/27  Right hip imaging negative for abnormality however she did receive injection related to trochanteric bursitis  She was discharged to short term rehab  Rehab course:  Continues with supportive care at short term rehab including PT/OT  Upon today's assessment, she was seen laying in bed, NAD, denies any questions or concerns  Denies any complaints of pain, continue on Voltaren gel and PRN Tylenol   She continues to remain Covid asymptomatic, on RA  SNF chart reviewed, she is eating approximately 3 meals per day on avg 50-75%, last BM 2/5  She continues on Eliquis for PE and DVT, she denies any SOB or CP, vitals remain stable, no s/s bleeding  The patient's allergies, past medical, surgical, social and family history were reviewed and unchanged  Review of Systems     REVIEW OF SYSTEMS:  Per history of present illness, all other systems reviewed and negative  Objective     Vitals: /73, temp 97 9, HR 70, RR 18, O2 97% on room air  Weight 124 8 pounds      Physical Exam  Constitutional:       General: She is not in acute distress  Appearance: Normal appearance  She is not ill-appearing  Comments: ALFREDO LINO:      Head: Normocephalic and atraumatic  Right Ear: External ear normal       Left Ear: External ear normal       Mouth/Throat:      Mouth: Mucous membranes are moist       Pharynx: No oropharyngeal exudate or posterior oropharyngeal erythema  Eyes:      General:         Right eye: No discharge  Left eye: No discharge  Extraocular Movements: Extraocular movements intact  Conjunctiva/sclera: Conjunctivae normal    Cardiovascular:      Rate and Rhythm: Normal rate and regular rhythm  Pulses: Normal pulses  Heart sounds: Normal heart sounds  Pulmonary:      Effort: Pulmonary effort is normal  No respiratory distress  Breath sounds: Normal breath sounds  No wheezing     Abdominal:      General: Bowel sounds are normal  There is no distension  Palpations: Abdomen is soft  Tenderness: There is no abdominal tenderness  There is no guarding  Musculoskeletal:         General: No tenderness  Cervical back: Normal range of motion  No tenderness  Right lower leg: No edema  Left lower leg: No edema  Skin:     General: Skin is warm and dry  Findings: No bruising, erythema or rash  Neurological:      General: No focal deficit present  Mental Status: She is alert and oriented to person, place, and time  Mental status is at baseline  Sensory: No sensory deficit  Motor: Weakness present  Gait: Gait abnormal       Comments: Forgetful   Psychiatric:         Mood and Affect: Mood normal          Behavior: Behavior normal          Pertinent Laboratory/Diagnostic Studies:    2/1 CBC, BMP: Hemoglobin 12 6, WBC 6 8, BUN 17, creatinine 0 78, sodium 136, potassium 4 7, EGFR 69    Current Medications   Medications reviewed and updated see facility [facility] for details  Plan discussed with Dr Cindy Alejandra noted agreement with assessment and plan  Please note:  Voice-recognition software may have been used in the preparation of this document  Occasional wrong word or "sound-alike" substitutions may have occurred due to the inherent limitations of voice recognition software  Interpretation should be guided by context           KERON Richards  2/6/2023 10:30 AM  Patient: Krystin Downs

## 2023-02-06 NOTE — ASSESSMENT & PLAN NOTE
Sustained fall on 1/16 seen in ED, also had mild pain of right hip  During 1/25 admission, x-ray of the right hip and pelvis with no acute abnormality in which she underwent right hip bursa injection for trochanter bursitis  ContinueTylenol as needed along with Voltaren gel  Continue PT/OT at [facility] with maintain fall precautions

## 2023-02-06 NOTE — ASSESSMENT & PLAN NOTE
Incidental finding during CT of the abdomen and pelvis showing RLL PE  She was transitioned and continues on p o  Eliquis   Denies any cardiac symptoms  Continue to monitor for signs and symptoms of bleeding particularly with increased fall risk  Vital signs remained stable, continue to monitor

## 2023-02-06 NOTE — ASSESSMENT & PLAN NOTE
Charanjit  Has taken Toprol XL in the past but not currently on  Heart rate trends reviewed while at short-term rehab -average in  Continue to monitor and trend heart rate

## 2023-02-06 NOTE — ASSESSMENT & PLAN NOTE
Stable   Tested positive on 1/27  Remains asymptomatic  Continue isolation precautions per facility protocol

## 2023-02-08 ENCOUNTER — NURSING HOME VISIT (OUTPATIENT)
Dept: GERIATRICS | Facility: OTHER | Age: 88
End: 2023-02-08

## 2023-02-08 DIAGNOSIS — R26.2 AMBULATORY DYSFUNCTION: Primary | ICD-10-CM

## 2023-02-08 DIAGNOSIS — U07.1 COVID: ICD-10-CM

## 2023-02-08 DIAGNOSIS — I82.4Z3 LOWER LEG DVT (DEEP VENOUS THROMBOEMBOLISM), ACUTE, BILATERAL (HCC): ICD-10-CM

## 2023-02-08 DIAGNOSIS — I47.1 PSVT (PAROXYSMAL SUPRAVENTRICULAR TACHYCARDIA) (HCC): ICD-10-CM

## 2023-02-08 DIAGNOSIS — N18.31 STAGE 3A CHRONIC KIDNEY DISEASE (HCC): ICD-10-CM
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
## 2023-02-08 DIAGNOSIS — I26.93 SINGLE SUBSEGMENTAL PULMONARY EMBOLISM WITHOUT ACUTE COR PULMONALE (HCC): ICD-10-CM

## 2023-02-08 NOTE — ASSESSMENT & PLAN NOTE
Progressive strength improvement  Continue to ambulate with walker   Multifactorial-acute and chronic conditions  Recent right trochanter bursitis with injection  Continue pain regimen of Voltaren gel and PRN Tylenol   Continue PT/OT at STR  Encourage hydration and nutrition  Continue fall precautions, assist with ADLs   following for discharge planning

## 2023-02-08 NOTE — ASSESSMENT & PLAN NOTE
Stable  Incidental finding during CT of the abdomen and pelvis showing RLL PE  She was transitioned and continues on p o  Eliquis   Denies any cardiac symptoms  Continue to monitor for signs and symptoms of bleeding particularly with increased fall risk  Vital signs remained stable, continue to monitor

## 2023-02-08 NOTE — ASSESSMENT & PLAN NOTE
Charanjit  Has taken Toprol XL in the past but not currently on  Heart rate trends reviewed while at short-term rehab -average in 60s-70s  Continue to monitor and trend heart rate

## 2023-02-08 NOTE — ASSESSMENT & PLAN NOTE
Lab Results   Component Value Date    EGFR 55 01/28/2023    EGFR 41 01/26/2023    EGFR 32 01/25/2023    CREATININE 0 88 01/28/2023    CREATININE 1 12 01/26/2023    CREATININE 1 35 (H) 01/25/2023     Stable  Baseline creatinine 1 0-1 4  2/7 BMP reviewed today, creatinine 0 77, eGFR 70  Continue to monitor routine BMP with avoidance of nephrotoxins and hypotension

## 2023-02-08 NOTE — PROGRESS NOTES
[facility]  [address]  [phone]  [facility]  Code 31 (STR)          NAME: Jammie Montelongo  AGE: 80 y o  SEX: female     DATE OF ENCOUNTER: 2/8/2023    Assessment and Plan     1  Ambulatory dysfunction  Assessment & Plan:  Progressive strength improvement  Continue to ambulate with walker   Multifactorial-acute and chronic conditions  Recent right trochanter bursitis with injection  Continue pain regimen of Voltaren gel and PRN Tylenol   Continue PT/OT at [facility]  Encourage hydration and nutrition  Continue fall precautions, assist with ADLs   following for discharge planning      2  Stage 3a chronic kidney disease [facility])  Assessment & Plan:  Lab Results   Component Value Date    EGFR 55 01/28/2023    EGFR 41 01/26/2023    EGFR 32 01/25/2023    CREATININE 0 88 01/28/2023    CREATININE 1 12 01/26/2023    CREATININE 1 35 (H) 01/25/2023     Stable  Baseline creatinine 1 0-1 4  2/7 BMP reviewed today, creatinine 0 77, eGFR 70  Continue to monitor routine BMP with avoidance of nephrotoxins and hypotension         3  Fall, sequela  Assessment & Plan:  Sustained fall on 1/16 seen in ED, also had mild pain of right hip  During 1/25 admission, x-ray of the right hip and pelvis with no acute abnormality in which she underwent right hip bursa injection for trochanter bursitis  Pain stable, mild intermittent  ContinueTylenol as needed along with Voltaren gel  Continue PT/OT at [facility] with maintain fall precautions      4  COVID  Assessment & Plan:  Stable   Tested positive on 1/27  Remains asymptomatic  Completed isolation precautions      5   Single subsegmental pulmonary embolism without acute cor pulmonale (HCC)  Assessment & Plan:  Stable  Incidental finding during CT of the abdomen and pelvis showing RLL PE  She was transitioned and continues on p o  Eliquis   Denies any cardiac symptoms  Continue to monitor for signs and symptoms of bleeding particularly with increased fall risk  Vital signs remained stable, continue to monitor        6  Lower leg DVT (deep venous thromboembolism), acute, bilateral (HCC)  Assessment & Plan:  Stable  Recently diagnosed showing acute occlusive thrombus of proximal femoral vein to the posterior tibial veins on the right and acute nonocclusive thrombus in the popliteal and gastrocnemius vein on the left  Continue Eliquis      7  PSVT (paroxysmal supraventricular tachycardia) (HCC)  Assessment & Plan:  Stable  Has taken Toprol XL in the past but not currently on  Heart rate trends reviewed while at short-term rehab -average in 60s-70s  Continue to monitor and trend heart rate       All medications and routine orders were reviewed and updated as needed  Chief Complaint     STR follow up visit      Past Medical and Surgica History      Past Medical History:   Diagnosis Date   • Interstitial cystitis    • Leukopenia    • Neurologic gait dysfunction     Abstraction Dr Radha Sinha charts   • Neuropathy    • Neutropenia [facility])     Abstraction Dr Emili Bae   • Osteoarthritis    • Osteoporosis    • Peripheral edema     Abstraction Dr Emili Bae   • Renal cyst    • Syncope     Abstraction Dr Radha Sinha charts     Past Surgical History:   Procedure Laterality Date   • CAPSULOTOMY      Right eye   • CATARACT EXTRACTION Left    • CHALAZION EXCISION     • COLONOSCOPY  2006     No Known Allergies       History of Present Illness     Roseanna Mcneal is a 49-year-old female admitted to [address] for short term rehab  PMH including but not limited to PE, DVT, CKD, constipation, diverticulosis, venous stasis of bilateral lower extremities, neuropathy, ambulatory dysfunction, seen in collaboration with nursing for medical mgmt and STR follow up  Hospital course:  She was admitted to [facility] from 1/25 through 1/29 for ambulatory dysfunction    She originally presented due to generalized weakness, poor p o  intake, right-sided hip pain, she was found to have bilateral lower extremity DVTs, RLL base PE and was started on Eliquis  She was also found to be COVID-positive on 1/27  Right hip imaging negative for abnormality however she did receive injection related to trochanteric bursitis  She was discharged to short term rehab  Rehab course:  Continues with supportive care at short term rehab including PT/OT  She is ambulating with walker  She reports mild intermittent hip pain but overall remains stable  She continues on Voltaren gel and PRN tylenol  Upon today's assessment, she was seen laying in bed, NAD, denies any questions or concerns  She continues to deny any cough or SOB in setting of recent Covid  SNF chart reviewed, she is eating approximately 3 meals per day, on average 75 to 100%, last BM on 2/8  She continues on Eliquis for PE and DVT, she denies any SOB,CP, or palpitations, vitals remain stable, no s/s bleeding  2/7 BMP reviewed, stable  The patient's allergies, past medical, surgical, social and family history were reviewed and unchanged  Review of Systems     REVIEW OF SYSTEMS:  Per history of present illness, all other systems reviewed and negative  Objective     Vitals: /64, temp 97 7, HR 63, RR 18, O2 95% on room air  Weight 126 6 pounds      Physical Exam  Constitutional:       General: She is not in acute distress  Appearance: Normal appearance  She is not ill-appearing  Comments: ALFREDO LINO:      Head: Normocephalic and atraumatic  Right Ear: External ear normal       Left Ear: External ear normal       Mouth/Throat:      Mouth: Mucous membranes are moist       Pharynx: No oropharyngeal exudate or posterior oropharyngeal erythema  Eyes:      General:         Right eye: No discharge  Left eye: No discharge  Extraocular Movements: Extraocular movements intact        Conjunctiva/sclera: Conjunctivae normal    Cardiovascular:      Rate and Rhythm: Normal rate and regular rhythm  Pulses: Normal pulses  Heart sounds: Normal heart sounds  Pulmonary:      Effort: Pulmonary effort is normal  No respiratory distress  Breath sounds: Normal breath sounds  No wheezing  Abdominal:      General: Bowel sounds are normal  There is no distension  Palpations: Abdomen is soft  Tenderness: There is no abdominal tenderness  There is no guarding  Musculoskeletal:         General: No tenderness  Cervical back: Normal range of motion  No tenderness  Right lower leg: No edema  Left lower leg: No edema  Skin:     General: Skin is warm and dry  Findings: No bruising, erythema or rash  Neurological:      General: No focal deficit present  Mental Status: She is alert and oriented to person, place, and time  Mental status is at baseline  Sensory: No sensory deficit  Motor: Weakness present  Gait: Gait abnormal       Comments: Forgetful   Psychiatric:         Mood and Affect: Mood normal          Behavior: Behavior normal          Pertinent Laboratory/Diagnostic Studies:    2/7 BMP: BUN 15, creatinine 0 77, sodium 141, 10 potassium 4 7, calcium 8 1, EGFR 70    2/1 CBC, BMP: Hemoglobin 12 6, WBC 6 8, BUN 17, creatinine 0 78, sodium 136, potassium 4 7, EGFR 69    Current Medications   Medications reviewed and updated see facility [facility] for details  Plan discussed with Dr Hinton Stands  Dr Hinton Stands noted agreement with assessment and plan  Please note:  Voice-recognition software may have been used in the preparation of this document  Occasional wrong word or "sound-alike" substitutions may have occurred due to the inherent limitations of voice recognition software  Interpretation should be guided by context           KERON Light  2/8/2023 12:45 PM  Patient: Gilmer Carpenter

## 2023-02-08 NOTE — ASSESSMENT & PLAN NOTE
Sustained fall on 1/16 seen in ED, also had mild pain of right hip  During 1/25 admission, x-ray of the right hip and pelvis with no acute abnormality in which she underwent right hip bursa injection for trochanter bursitis  Pain stable, mild intermittent  ContinueTylenol as needed along with Voltaren gel  Continue PT/OT at [facility] with maintain fall precautions

## 2023-02-09 ENCOUNTER — NURSING HOME VISIT (OUTPATIENT)
Dept: GERIATRICS | Facility: OTHER | Age: 88
End: 2023-02-09

## 2023-02-09 VITALS
SYSTOLIC BLOOD PRESSURE: 112 MMHG | TEMPERATURE: 97.7 F | WEIGHT: 126.6 LBS | BODY MASS INDEX: 21.07 KG/M2 | HEART RATE: 63 BPM | RESPIRATION RATE: 18 BRPM | OXYGEN SATURATION: 95 % | DIASTOLIC BLOOD PRESSURE: 64 MMHG

## 2023-02-09 DIAGNOSIS — I82.4Z3 LOWER LEG DVT (DEEP VENOUS THROMBOEMBOLISM), ACUTE, BILATERAL (HCC): Primary | ICD-10-CM

## 2023-02-09 DIAGNOSIS — N18.31 STAGE 3A CHRONIC KIDNEY DISEASE (HCC): ICD-10-CM

## 2023-02-09 DIAGNOSIS — U07.1 COVID: ICD-10-CM

## 2023-02-09 DIAGNOSIS — R26.2 AMBULATORY DYSFUNCTION: ICD-10-CM

## 2023-02-09 DIAGNOSIS — I26.99 PULMONARY EMBOLISM (HCC): ICD-10-CM

## 2023-02-09 DIAGNOSIS — I26.93 SINGLE SUBSEGMENTAL PULMONARY EMBOLISM WITHOUT ACUTE COR PULMONALE (HCC): ICD-10-CM

## 2023-02-09 NOTE — ASSESSMENT & PLAN NOTE
Stable  Dopplers w/ acute occlusive thrombus of proximal femoral vein to posterior tibial veins on the right and acute nonocclusive in the popliteal and gastrocnemius vein on the left  Continue eliquis 5mg PO bid  Monitor s/s of bleeding

## 2023-02-09 NOTE — PROGRESS NOTES
[facility]  [address]  [phone]  DISCHARGE SUMMARY  FACILITY: [facility]  Code 31    NAME: Ankit Mabry  AGE: 80 y o  SEX: female   CODE STATUS: No CPR    DATE OF ADMISSION: 1/29/23   DATE OF DISCHARGE: 2/13/23   DISCHARGE DISPOSITION: Stable for discharge to home with family support and home health PT/OT/SN services  Reason for Admission: Patient was admitted to [facility] for rehabilitation after hospitalization for ambulatory dysfunction, b/l LE DVTs, RLL PE, and COVID   Past Medical and Surgical History:   Past Medical History:   Diagnosis Date   • Interstitial cystitis    • Leukopenia    • Neurologic gait dysfunction     Abstraction Dr David Felty charts   • Neuropathy    • Neutropenia [facility])     Abstraction Dr Louise Mcdowell   • Osteoarthritis    • Osteoporosis    • Peripheral edema     Abstraction Dr Louise Mcdowell   • Renal cyst    • Syncope     Abstraction Dr David Felty charts      Past Surgical History:   Procedure Laterality Date   • CAPSULOTOMY      Right eye   • CATARACT EXTRACTION Left    • CHALAZION EXCISION     • COLONOSCOPY  2006       Course of stay:   Ankit Mabry is a 80year old female admitted to [address] [place] [address] for STR following a hospital admission for ambulatory dysfunction  She has a PMH including but not limited to CKD, PE, DVT, constipation, neuropathy, and ambulatory dysfunction  The patient initially presented to the ED w/ generalized weakness, poor oral intake, and right hip pain  XR of the right hip was unrevealing although, she did receive an injection for trochanteric bursitis  She was found to have b/l DVTs of the lower extremities and a RLL PE  She was initiated on eliquis  In addition, she tested positive for COVID on 1/27/23  She was evaluated by PT and recommended for STR  The patient is seen today for anticipated discharge 2/10/23      The patient was seen and examined at bedside today in stable condition  She is alert and oriented x2/3 w/ some forgetfulness  She has no complaints at this time and is looking forward to going home on Monday  She denies CP/SOB/N/V/D  Denies lightheadedness, dizziness, headaches, vision changes  Patient states they are eating well and staying hydrated  Denies any bowel or bladder issues  No concerns from nursing staff  She is medically stable for discharge  Recommend f/u w/ PCP on d/c       ROS:  Review of Systems   Constitutional: Negative  Negative for appetite change, chills, fatigue and fever  HENT: Negative  Negative for congestion, facial swelling, rhinorrhea, sneezing and sore throat  Eyes: Negative  Negative for redness, itching and visual disturbance  Respiratory: Negative  Negative for cough, chest tightness, shortness of breath and wheezing  Cardiovascular: Positive for leg swelling  Negative for chest pain and palpitations  Trace edema   Gastrointestinal: Negative for abdominal distention, abdominal pain, constipation, nausea and vomiting  Genitourinary: Negative  Negative for difficulty urinating, flank pain, frequency and hematuria  Musculoskeletal: Positive for gait problem  Negative for arthralgias, back pain, myalgias and neck stiffness  Skin: Negative for pallor, rash and wound  Neurological: Negative for dizziness, weakness, light-headedness, numbness and headaches  Psychiatric/Behavioral: Negative for agitation, confusion and sleep disturbance  The patient is not nervous/anxious  PHYSICAL EXAM:  VITALS:   Vitals:    02/09/23 1110   BP: 112/64   Pulse: 63   Resp: 18   Temp: 97 7 °F (36 5 °C)   SpO2: 95%        Physical Exam  Vitals reviewed  Constitutional:       General: She is not in acute distress  Appearance: Normal appearance  She is not ill-appearing, toxic-appearing or diaphoretic  HENT:      Head: Normocephalic and atraumatic        Right Ear: External ear normal       Left Ear: External ear normal       Nose: Nose normal  No congestion or rhinorrhea  Mouth/Throat:      Mouth: Mucous membranes are moist       Pharynx: Oropharynx is clear  No oropharyngeal exudate or posterior oropharyngeal erythema  Eyes:      General:         Right eye: No discharge  Left eye: No discharge  Extraocular Movements: Extraocular movements intact  Conjunctiva/sclera: Conjunctivae normal       Pupils: Pupils are equal, round, and reactive to light  Cardiovascular:      Rate and Rhythm: Normal rate and regular rhythm  Pulses: Normal pulses  Heart sounds: Normal heart sounds  No murmur heard  No friction rub  No gallop  Pulmonary:      Effort: Pulmonary effort is normal  No respiratory distress  Breath sounds: Normal breath sounds  No wheezing  Chest:      Chest wall: No tenderness  Abdominal:      General: Abdomen is flat  Bowel sounds are normal  There is no distension  Palpations: Abdomen is soft  There is no mass  Tenderness: There is no abdominal tenderness  There is no guarding  Musculoskeletal:         General: No swelling or tenderness  Normal range of motion  Cervical back: Normal range of motion and neck supple  No rigidity or tenderness  Right lower leg: Edema present  Left lower leg: Edema present  Comments: trace   Skin:     General: Skin is warm and dry  Coloration: Skin is not jaundiced  Findings: No bruising, erythema or rash  Neurological:      General: No focal deficit present  Mental Status: She is alert  Mental status is at baseline  Sensory: No sensory deficit  Motor: No weakness  Coordination: Coordination normal       Gait: Gait abnormal    Psychiatric:         Mood and Affect: Mood normal          Behavior: Behavior normal          Thought Content: Thought content normal          Admission Diagnoses:   1   Lower leg DVT (deep venous thromboembolism), acute, bilateral ([facility] )  Assessment & Plan:  Stable  Dopplers w/ acute occlusive thrombus of proximal femoral vein to posterior tibial veins on the right and acute nonocclusive in the popliteal and gastrocnemius vein on the left  Continue eliquis 5mg PO bid  Monitor s/s of bleeding      2  Single subsegmental pulmonary embolism without acute cor pulmonale (HCC)  Assessment & Plan:  Stable and asymptomatic  Incidental finding on CT  Continue eliquis 5mg PO bid  Continue to monitor cardiac status  Continue to monitor for s/s of bleeding  Continue to monitor VS      3  Stage 3a chronic kidney disease [facility])  Assessment & Plan:  Lab Results   Component Value Date    EGFR 55 01/28/2023    EGFR 41 01/26/2023    EGFR 32 01/25/2023    CREATININE 0 88 01/28/2023    CREATININE 1 12 01/26/2023    CREATININE 1 35 (H) 01/25/2023   Stable  Baseline 1 0-1 4  Avoid nephrotoxins and hypotension  F/u w/ PCP for routine monitoring      4  Ambulatory dysfunction  Assessment & Plan:  Multifactoral  Maintain fall precautions  Continue PT/OT  Encourage use of assistance w/ ambulation and transfers  Ensure adequate nutrition and hydration   for d/c planning      5  COVID  Assessment & Plan:  Tested positive 1/27/23  Stable and asymptomatic  Continue to monitor s/s and respiratory status         Follow-up Recommendations:    • Outpatient Follow up with PCP in the next 2 weeks  • Home Health PT/OT/SN services    Labs and testing performed during stay:    HGB 12 6  WBC 6 8  PLTS 221    BUN 15  CREAT 0 77    K 4 7    Discharge Medications: See discharge medication list which was reviewed and signed        Current Outpatient Medications:   •  acetaminophen (TYLENOL) 325 mg tablet, Take 975 mg by mouth every 8 (eight) hours as needed for pain, Disp: , Rfl:   •  apixaban (ELIQUIS) 5 mg, Take 1 tablet (5 mg total) by mouth 2 (two) times a day, Disp: , Rfl: 0  •  Cholecalciferol (VITAMIN D-3) 25 MCG (1000 UT) CAPS, Take by mouth daily, Disp: , Rfl:   •  Diclofenac Sodium (VOLTAREN) 1 %, Apply 2 g topically 2 (two) times a day Apply to lower back and right hip, Disp: , Rfl:      Discussion with patient/family and further instructions:  -Fall precautions  -Aspiration precautions  -Bleeding precautions  -Monitor for signs/symptoms of infection  -Medication list was reviewed and signed  -DME form was completed    Status at time of discharge: Stable    Plan discussed with Dr Cindy Alejandra noted agreement with assessment and plan  Billing based on time  Time spent on unit, 40 minutes  Time spent counseling pt on debility/condition, 30 minutes  Please note:  Voice-recognition software may have been used in the preparation of this document  Occasional wrong word or "sound-alike" substitutions may have occurred due to the inherent limitations of voice recognition software  Interpretation should be guided by context          KERON Nevarez  2/9/2023

## 2023-02-09 NOTE — ASSESSMENT & PLAN NOTE
Multifactoral  Maintain fall precautions  Continue PT/OT  Encourage use of assistance w/ ambulation and transfers  Ensure adequate nutrition and hydration   for d/c planning

## 2023-02-09 NOTE — ASSESSMENT & PLAN NOTE
Stable and asymptomatic  Incidental finding on CT  Continue eliquis 5mg PO bid  Continue to monitor cardiac status  Continue to monitor for s/s of bleeding  Continue to monitor VS

## 2023-02-09 NOTE — ASSESSMENT & PLAN NOTE
Lab Results   Component Value Date    EGFR 55 01/28/2023    EGFR 41 01/26/2023    EGFR 32 01/25/2023    CREATININE 0 88 01/28/2023    CREATININE 1 12 01/26/2023    CREATININE 1 35 (H) 01/25/2023   Stable  Baseline 1 0-1 4  Avoid nephrotoxins and hypotension  F/u w/ PCP for routine monitoring

## 2023-02-21 ENCOUNTER — OFFICE VISIT (OUTPATIENT)
Dept: FAMILY MEDICINE CLINIC | Facility: CLINIC | Age: 88
End: 2023-02-21

## 2023-02-21 VITALS
TEMPERATURE: 97.1 F | OXYGEN SATURATION: 98 % | HEART RATE: 52 BPM | DIASTOLIC BLOOD PRESSURE: 60 MMHG | SYSTOLIC BLOOD PRESSURE: 100 MMHG | RESPIRATION RATE: 16 BRPM

## 2023-02-21 DIAGNOSIS — M81.0 AGE-RELATED OSTEOPOROSIS WITHOUT CURRENT PATHOLOGICAL FRACTURE: ICD-10-CM

## 2023-02-21 DIAGNOSIS — I26.93 SINGLE SUBSEGMENTAL PULMONARY EMBOLISM WITHOUT ACUTE COR PULMONALE (HCC): ICD-10-CM

## 2023-02-21 DIAGNOSIS — M25.562 CHRONIC PAIN OF LEFT KNEE: ICD-10-CM

## 2023-02-21 DIAGNOSIS — G89.29 CHRONIC PAIN OF LEFT KNEE: ICD-10-CM

## 2023-02-21 DIAGNOSIS — F33.9 EPISODE OF RECURRENT MAJOR DEPRESSIVE DISORDER, UNSPECIFIED DEPRESSION EPISODE SEVERITY (HCC): ICD-10-CM

## 2023-02-21 DIAGNOSIS — I47.1 PSVT (PAROXYSMAL SUPRAVENTRICULAR TACHYCARDIA) (HCC): ICD-10-CM

## 2023-02-21 DIAGNOSIS — I82.4Z3 LOWER LEG DVT (DEEP VENOUS THROMBOEMBOLISM), ACUTE, BILATERAL (HCC): ICD-10-CM

## 2023-02-21 DIAGNOSIS — R60.0 LOWER EXTREMITY EDEMA: ICD-10-CM

## 2023-02-21 DIAGNOSIS — R26.89 BALANCE PROBLEM: ICD-10-CM

## 2023-02-21 DIAGNOSIS — R26.2 AMBULATORY DYSFUNCTION: ICD-10-CM

## 2023-02-21 DIAGNOSIS — N18.31 STAGE 3A CHRONIC KIDNEY DISEASE (HCC): ICD-10-CM

## 2023-02-21 DIAGNOSIS — R41.3 MEMORY DEFICIT: ICD-10-CM

## 2023-02-21 DIAGNOSIS — Z00.00 MEDICARE ANNUAL WELLNESS VISIT, SUBSEQUENT: Primary | ICD-10-CM

## 2023-02-21 RX ORDER — NAPROXEN SODIUM 220 MG
220 TABLET ORAL 2 TIMES DAILY WITH MEALS
COMMUNITY

## 2023-02-21 RX ORDER — FUROSEMIDE 40 MG/1
40 TABLET ORAL
COMMUNITY

## 2023-02-21 RX ORDER — SERTRALINE HYDROCHLORIDE 25 MG/1
25 TABLET, FILM COATED ORAL DAILY
Qty: 90 TABLET | Refills: 3 | Status: SHIPPED | OUTPATIENT
Start: 2023-02-21

## 2023-02-21 NOTE — PROGRESS NOTES
Assessment and Plan:     Problem List Items Addressed This Visit        Cardiovascular and Mediastinum    PSVT (paroxysmal supraventricular tachycardia) ([facility] [address] )     Recommend continuing metoprolol to prevent further SVT  We discussed cardiology follow up, declines  Relevant Medications    METOPROLOL SUCCINATE PO    Pulmonary embolism (HCC)     PE noted on partially imaged RLL on CT abdomen/pelvis  +bilateral lower extremity DVT, in the setting of sedentary lifestyle, recent hospitalization, rehab stay, and COVID-19 virus  Discussed pulmonology follow up, declines  Lower leg DVT (deep venous thromboembolism), acute, bilateral (HCC)     Bilateral lower extremity venous doppler in January:  RIGHT LOWER LIMB: Limited  Acute occlusive thrombus noted from the proximal femoral vein to the posterior  tibial veins  Augmentations limited due to the presence of thrombus  LEFT LOWER LIMB: Limited  Acute non occlusive thrombus noted in the popliteal and gastrocnemius vein  On Eliquis 5 mg twice daily  Musculoskeletal and Integument    Age-related osteoporosis without current pathological fracture     Osteoporosis suspected due to L3 compression fracture after a fall in 2020  She has refused to complete Dexa  Does not desire to take any medications for osteoporosis  Genitourinary    CKD (chronic kidney disease) stage 3, GFR 30-59 ml/min [facility])     Lab Results   Component Value Date    EGFR 55 01/28/2023    EGFR 41 01/26/2023    EGFR 32 01/25/2023    CREATININE 0 88 01/28/2023    CREATININE 1 12 01/26/2023    CREATININE 1 35 (H) 01/25/2023     Stable creatinine, most recent 0 88  Continue to push fluids, at lest 4-6 glasses of water per day  Relevant Medications    furosemide (Lasix) 40 mg tablet       Other    Ambulatory dysfunction     Using Walker at home, wheelchair when out for office visits  Not able to walk long distances   Had PT set up for home, but does not want anyone in her house  She lives with her son and it is increasingly difficult to care for mom  He is considering placement for her in assisted living, which Stephani Loera is not agreeable to and angry when discussing this  We discussed option of home health aid, to come in and help with ADL's meals, etc, to help reduce pressure on Macario  She is agreeable, but her son notes he doesn't think she will be agreeable if someone comes to the house  Information provided for care patrol, who can help navigate options  Lower extremity edema     Not able to stand up on the scale today  Sitting in wheelchair  Taking furosemide only as needed, due to frequent voiding is difficult for both patient and her son to manage getting her into the bathroom so often from a mobility standpoint  Balance problem     Walker at home  Taking time  Recommend participating in home PT  Memory deficit     Completed MMSE today scored 25/30  Mild cognitive impairment  Chronic pain of left knee     Osteoarthritis  Strongly advise to stop aleve, as she is taking Eliquis, aleve increases bleeding risk in combination with Eliquis  Taking aleve reduces pain and allows her to walk better, son is reluctant to stop aleve  Recommend trying Tylenol and voltaren gel, ice as needed for knee pain  Could consider corticosteroid injection, she does not desire to go to any physician office visits, including today's office visit  Stephani Loera just wants to stay home  Relevant Medications    Diclofenac Sodium (VOLTAREN) 1 %    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)     Will trial Sertraline 25 mg daily  Return to office in 4-6 weeks for recheck            Relevant Medications    sertraline (Zoloft) 25 mg tablet   Other Visit Diagnoses     Medicare annual wellness visit, subsequent    -  Primary           Preventive health issues were discussed with patient, and age appropriate screening tests were ordered as noted in patient's After Visit Summary  Personalized health advice and appropriate referrals for health education or preventive services given if needed, as noted in patient's After Visit Summary  History of Present Illness:     Patient presents for a Medicare Wellness Visit    Yen Palacio is a 80year old female presenting today for follow up  She was admitted to the hospital 12/27/2022 through 1/2/202 for SVT  Started on metoprolol  Discharged to rehab, home for 2 days when she was readmitted to the hospital 1/25/23 through 1/29/23 for Bilateral lower extremity DVT, PE, and COVID-19  Discharged to rehab at [facility] 1/29 through 2/13  She presents accompanied by her son today  On 2/14, day after she returned home from rehab, she fell 3 times  No falls since  Walking with walker  Uses wheelchair when out of the house  Left knee pain, taking aleve which is helping  Sleeps in lift chair, can't get herself in and out of the bed  Needs help to use the bathroom  Not showering, just sponge bathing at the sink  Dependent for all IADLS  Needs help with some ADLs  Son gives her medications  He does not feel she would take them if he didn't give them to her  Has good appetite  Usually eats Oatmeal for breakfast, lunch has yogurt and fruit  Dinner usually has soup or a hot dog  Not able to cut her own foods  Is able to otherwise feed herself  Fluid intake poor-- son estimates 1 pint per day  She did not like rehab, wants to be at home  But her son notes it is increasingly more difficult to care for mom at home  Currently has visiting nurses that come 2-3 times per week  Was set up for PT/OT at home  Mom doesn't want anyone in the house  Watches TV all day  Memory not good  She has trouble recalling conversations  When she was in rehab, she didn't remember his visits      Her son does not feel she is capable to make her own decisions  Son feels she is depressed because of her health and decline in independence  Only using furosemide as needed, due to she has to get up to the rest room so frequently when taking furosemide, it is difficult for them to manage  Patient Care Team:  Sami Lord as PCP - General (Family Medicine)  Christiana Homans, MD (Pain Medicine)  Missy Duvall MD (Orthopedic Surgery)  Rubia Sorto MD (Urology)  Coretta Waldrop MD (Ophthalmology)  Keyana Dillon MD (Nephrology)  Frederick Willis MD (Hematology)  Gray Caal DO (Physical Medicine and Rehabilitation)  Ruba Galarza DPM (Podiatry)  Mari Belcher MD (Rheumatology)     Review of Systems:     Review of Systems   Constitutional: Negative  HENT: Negative  Respiratory: Negative  Cardiovascular: Positive for leg swelling  Gastrointestinal: Negative  Genitourinary: Negative  Musculoskeletal: Negative  Skin: Negative  Neurological: Negative  Negative for dizziness  Hematological: Negative  Psychiatric/Behavioral: Negative           Problem List:     Patient Active Problem List   Diagnosis   • Neuropathy   • Ambulatory dysfunction   • Venous stasis dermatitis of both lower extremities   • Age-related osteoporosis without current pathological fracture   • Lower extremity edema   • CKD (chronic kidney disease) stage 3, GFR 30-59 ml/min ([facility])   • Compression fracture of L3 lumbar vertebra, closed, initial encounter ([facility] [address] )   • Fall   • PSVT (paroxysmal supraventricular tachycardia) ([facility])   • Infiltrate noted on imaging study   • Balance problem   • Constipation, unspecified   • Diverticulosis of intestine, part unspecified, without perforation or abscess without bleeding   • Generalized muscle weakness   • Memory deficit   • Polyneuropathy, unspecified   • Pulmonary embolism ([facility])   • COVID   • Lower leg DVT (deep venous thromboembolism), acute, bilateral ([facility])   • Chronic pain of left knee   • Episode of recurrent major depressive disorder [facility])      Past Medical and Surgical History:     Past Medical History:   Diagnosis Date   • Interstitial cystitis    • Leukopenia    • Neurologic gait dysfunction     Abstraction Dr Daquan Olea charts   • Neuropathy    • Neutropenia [facility])     Abstraction Dr Angelia Worthington   • Osteoarthritis    • Osteoporosis    • Peripheral edema     Abstraction Dr Angelia Worthington   • Renal cyst    • Syncope     Abstraction Dr Daquan Olea charts     Past Surgical History:   Procedure Laterality Date   • CAPSULOTOMY      Right eye   • CATARACT EXTRACTION Left    • CHALAZION EXCISION     • COLONOSCOPY        Family History:     Family History   Problem Relation Age of Onset   • Diabetes Mother    • Lung disease Father    • Cancer Maternal Grandfather    • Lung disease Sister       Social History:     Social History     Socioeconomic History   • Marital status:      Spouse name: None   • Number of children: None   • Years of education: None   • Highest education level: None   Occupational History   • None   Tobacco Use   • Smoking status: Former     Packs/day: 1 00     Years: 20 00     Pack years: 20 00     Types: Cigarettes     Quit date: 5     Years since quittin 1   • Smokeless tobacco: Never   Vaping Use   • Vaping Use: Never used   Substance and Sexual Activity   • Alcohol use: Yes     Comment: socially   • Drug use: No   • Sexual activity: Not Currently   Other Topics Concern   • None   Social History Narrative    Lives with her son Dontrell Duarte     Social Determinants of Health     Financial Resource Strain: Low Risk    • Difficulty of Paying Living Expenses: Not hard at all   Food Insecurity: Not on file   Transportation Needs: No Transportation Needs   • Lack of Transportation (Medical): No   • Lack of Transportation (Non-Medical):  No   Physical Activity: Not on file   Stress: Not on file   Social Connections: Not on file   Intimate Partner Violence: Not on file Housing Stability: Not on file      Medications and Allergies:     Current Outpatient Medications   Medication Sig Dispense Refill   • apixaban (ELIQUIS) 5 mg Take 1 tablet (5 mg total) by mouth 2 (two) times a day 60 tablet 0   • Cholecalciferol (VITAMIN D-3) 25 MCG (1000 UT) CAPS Take by mouth daily     • Diclofenac Sodium (VOLTAREN) 1 % Apply 2 g topically 4 (four) times a day As needed to left knee for pain 100 g 1   • furosemide (Lasix) 40 mg tablet Take 40 mg by mouth     • METOPROLOL SUCCINATE PO Take by mouth 5 mg ? • naproxen sodium (Aleve) 220 MG tablet Take 220 mg by mouth 2 (two) times a day with meals     • sertraline (Zoloft) 25 mg tablet Take 1 tablet (25 mg total) by mouth daily 90 tablet 3   • acetaminophen (TYLENOL) 325 mg tablet Take 975 mg by mouth every 8 (eight) hours as needed for pain (Patient not taking: Reported on 2/21/2023)       No current facility-administered medications for this visit  No Known Allergies   Immunizations:     Immunization History   Administered Date(s) Administered   • Influenza, high dose seasonal 0 7 mL 12/03/2021   • Pneumococcal Polysaccharide PPV23 10/19/1999   • Td (adult), Unspecified 06/27/2019   • Td (adult), adsorbed 06/27/2019   • Tuberculin Skin Test-PPD Intradermal 12/05/2018      Health Maintenance: There are no preventive care reminders to display for this patient  Topic Date Due   • Pneumococcal Vaccine: 65+ Years (2 - PCV) 10/19/2000   • DTaP,Tdap,and Td Vaccines (1 - Tdap) 06/28/2019      Medicare Screening Tests and Risk Assessments:     aWli Pro is here for her Subsequent Wellness visit  Health Risk Assessment:   Patient rates overall health as fair  Patient feels that their physical health rating is slightly worse  Patient is satisfied with their life  Eyesight was rated as same  Hearing was rated as same  Patient feels that their emotional and mental health rating is slightly worse  Patients states they are never, rarely angry  Patient states they are sometimes unusually tired/fatigued  Pain experienced in the last 7 days has been none  Patient states that she has experienced no weight loss or gain in last 6 months  Depression Screening:   PHQ-2 Score: 6  PHQ-9 Score: 18      Fall Risk Screening: In the past year, patient has experienced: history of falling in past year    Number of falls: 2 or more  Injured during fall?: No    Feels unsteady when standing or walking?: Yes    Worried about falling?: Yes      Urinary Incontinence Screening:   Patient has leaked urine accidently in the last six months  Home Safety:  Patient has trouble with stairs inside or outside of their home  Patient has working smoke alarms and has no working carbon monoxide detector  Home safety hazards include: none  Nutrition:   Current diet is Regular  Medications:   Patient is currently taking over-the-counter supplements  OTC medications include: see medication list  Patient is not able to manage medications  Activities of Daily Living (ADLs)/Instrumental Activities of Daily Living (IADLs):   Walk and transfer into and out of bed and chair?: No  Dress and groom yourself?: Yes    Bathe or shower yourself?: No    Feed yourself? Yes  Do your laundry/housekeeping?: No  Manage your money, pay your bills and track your expenses?: No  Make your own meals?: No    Do your own shopping?: No    Previous Hospitalizations:   Any hospitalizations or ED visits within the last 12 months?: Yes    How many hospitalizations have you had in the last year?: 1-2    Advance Care Planning:   Living will: Yes    Durable POA for healthcare:  Yes    Advanced directive: Yes      Cognitive Screening:   Provider or family/friend/caregiver concerned regarding cognition?: Yes  Mini-Mental Status Exam (MMSE) Score: 25  Interpretation: MMSE Score 24-30: Normal Cognition     PREVENTIVE SCREENINGS      Cardiovascular Screening:    General: Screening Current      Diabetes Screening:     General: Screening Current      Colorectal Cancer Screening:     General: Screening Not Indicated      Breast Cancer Screening:     General: Screening Not Indicated      Cervical Cancer Screening:    General: Screening Not Indicated      Osteoporosis Screening:    General: Screening Not Indicated and History Osteoporosis      Abdominal Aortic Aneurysm (AAA) Screening:        General: Screening Not Indicated      Lung Cancer Screening:     General: Screening Not Indicated      Hepatitis C Screening:    General: Screening Not Indicated    Screening, Brief Intervention, and Referral to Treatment (SBIRT)    Screening  Typical number of drinks in a day: 0  Typical number of drinks in a week: 0  Interpretation: Low risk drinking behavior  Single Item Drug Screening:  How often have you used an illegal drug (including marijuana) or a prescription medication for non-medical reasons in the past year? never    Single Item Drug Screen Score: 0  Interpretation: Negative screen for possible drug use disorder    Brief Intervention  Alcohol & drug use screenings were reviewed  No concerns regarding substance use disorder identified  No results found  Physical Exam:     /60   Pulse (!) 52   Temp (!) 97 1 °F (36 2 °C) (Temporal)   Resp 16   SpO2 98%     Physical Exam  Vitals and nursing note reviewed  Constitutional:       Appearance: She is ill-appearing (chronically ill appearing)  HENT:      Head: Normocephalic and atraumatic  Ears:      Comments: Hearing aids in place     Mouth/Throat:      Mouth: Mucous membranes are moist       Pharynx: Oropharynx is clear  Eyes:      Conjunctiva/sclera: Conjunctivae normal    Cardiovascular:      Rate and Rhythm: Normal rate and regular rhythm  Heart sounds: No murmur heard  Pulmonary:      Effort: Pulmonary effort is normal  No respiratory distress  Breath sounds: Normal breath sounds  No wheezing or rales     Abdominal:      General: Bowel sounds are normal       Palpations: Abdomen is soft  Musculoskeletal:      Cervical back: Normal range of motion and neck supple  Right lower leg: Edema present  Left lower leg: Edema present  Comments: +2 pitting lower extremity edema bilaterally   Lymphadenopathy:      Cervical: No cervical adenopathy  Skin:     General: Skin is warm and dry  Findings: No rash  Neurological:      Mental Status: She is alert  Psychiatric:         Mood and Affect: Affect is angry  Speech: Speech normal          Cognition and Memory: Memory is impaired  She exhibits impaired recent memory  Comments: Initially uncooperative  Unhappy with discussion regarding her care needs  Depression Screening Follow-up Plan: Patient's depression screening was positive with a PHQ-2 score of 6  Their PHQ-9 score was 18  Patient assessed for underlying major depression  They have no active suicidal ideations  Brief counseling provided and recommend additional follow-up/re-evaluation next office visit    Katie Quintero

## 2023-02-21 NOTE — PATIENT INSTRUCTIONS
Medicare Preventive Visit Patient Instructions  Thank you for completing your Welcome to Medicare Visit or Medicare Annual Wellness Visit today  Your next wellness visit will be due in one year (2/22/2024)  The screening/preventive services that you may require over the next 5-10 years are detailed below  Some tests may not apply to you based off risk factors and/or age  Screening tests ordered at today's visit but not completed yet may show as past due  Also, please note that scanned in results may not display below  Preventive Screenings:  Service Recommendations Previous Testing/Comments   Colorectal Cancer Screening  * Colonoscopy    * Fecal Occult Blood Test (FOBT)/Fecal Immunochemical Test (FIT)  * Fecal DNA/Cologuard Test  * Flexible Sigmoidoscopy Age: 39-70 years old   Colonoscopy: every 10 years (may be performed more frequently if at higher risk)  OR  FOBT/FIT: every 1 year  OR  Cologuard: every 3 years  OR  Sigmoidoscopy: every 5 years  Screening may be recommended earlier than age 39 if at higher risk for colorectal cancer  Also, an individualized decision between you and your healthcare provider will decide whether screening between the ages of 74-80 would be appropriate  Colonoscopy: 10/11/2006  FOBT/FIT: Not on file  Cologuard: Not on file  Sigmoidoscopy: Not on file    Screening Not Indicated     Breast Cancer Screening Age: 36 years old  Frequency: every 1-2 years  Not required if history of left and right mastectomy Mammogram: Not on file        Cervical Cancer Screening Between the ages of 21-29, pap smear recommended once every 3 years  Between the ages of 33-67, can perform pap smear with HPV co-testing every 5 years     Recommendations may differ for women with a history of total hysterectomy, cervical cancer, or abnormal pap smears in past  Pap Smear: Not on file    Screening Not Indicated   Hepatitis C Screening Once for adults born between 1945 and 1965  More frequently in patients at high risk for Hepatitis C Hep C Antibody: Not on file        Diabetes Screening 1-2 times per year if you're at risk for diabetes or have pre-diabetes Fasting glucose: 107 mg/dL (7/8/2022)  A1C: No results in last 5 years (No results in last 5 years)  Screening Current   Cholesterol Screening Once every 5 years if you don't have a lipid disorder  May order more often based on risk factors  Lipid panel: 01/28/2020    Screening Current     Other Preventive Screenings Covered by Medicare:  1  Abdominal Aortic Aneurysm (AAA) Screening: covered once if your at risk  You're considered to be at risk if you have a family history of AAA  2  Lung Cancer Screening: covers low dose CT scan once per year if you meet all of the following conditions: (1) Age 50-69; (2) No signs or symptoms of lung cancer; (3) Current smoker or have quit smoking within the last 15 years; (4) You have a tobacco smoking history of at least 20 pack years (packs per day multiplied by number of years you smoked); (5) You get a written order from a healthcare provider  3  Glaucoma Screening: covered annually if you're considered high risk: (1) You have diabetes OR (2) Family history of glaucoma OR (3)  aged 48 and older OR (3)  American aged 72 and older  3  Osteoporosis Screening: covered every 2 years if you meet one of the following conditions: (1) You're estrogen deficient and at risk for osteoporosis based off medical history and other findings; (2) Have a vertebral abnormality; (3) On glucocorticoid therapy for more than 3 months; (4) Have primary hyperparathyroidism; (5) On osteoporosis medications and need to assess response to drug therapy  · Last bone density test (DXA Scan): Not on file  5  HIV Screening: covered annually if you're between the age of 12-76  Also covered annually if you are younger than 13 and older than 72 with risk factors for HIV infection   For pregnant patients, it is covered up to 3 times per pregnancy  Immunizations:  Immunization Recommendations   Influenza Vaccine Annual influenza vaccination during flu season is recommended for all persons aged >= 6 months who do not have contraindications   Pneumococcal Vaccine   * Pneumococcal conjugate vaccine = PCV13 (Prevnar 13), PCV15 (Vaxneuvance), PCV20 (Prevnar 20)  * Pneumococcal polysaccharide vaccine = PPSV23 (Pneumovax) Adults 25-60 years old: 1-3 doses may be recommended based on certain risk factors  Adults 72 years old: 1-2 doses may be recommended based off what pneumonia vaccine you previously received   Hepatitis B Vaccine 3 dose series if at intermediate or high risk (ex: diabetes, end stage renal disease, liver disease)   Tetanus (Td) Vaccine - COST NOT COVERED BY MEDICARE PART B Following completion of primary series, a booster dose should be given every 10 years to maintain immunity against tetanus  Td may also be given as tetanus wound prophylaxis  Tdap Vaccine - COST NOT COVERED BY MEDICARE PART B Recommended at least once for all adults  For pregnant patients, recommended with each pregnancy  Shingles Vaccine (Shingrix) - COST NOT COVERED BY MEDICARE PART B  2 shot series recommended in those aged 48 and above     Health Maintenance Due:  There are no preventive care reminders to display for this patient  Immunizations Due:      Topic Date Due   • Pneumococcal Vaccine: 65+ Years (2 - PCV) 10/19/2000   • DTaP,Tdap,and Td Vaccines (1 - Tdap) 06/28/2019     Advance Directives   What are advance directives? Advance directives are legal documents that state your wishes and plans for medical care  These plans are made ahead of time in case you lose your ability to make decisions for yourself  Advance directives can apply to any medical decision, such as the treatments you want, and if you want to donate organs  What are the types of advance directives?   There are many types of advance directives, and each state has rules about how to use them  You may choose a combination of any of the following:  · Living will: This is a written record of the treatment you want  You can also choose which treatments you do not want, which to limit, and which to stop at a certain time  This includes surgery, medicine, IV fluid, and tube feedings  · Durable power of  for healthcare [facility]): This is a written record that states who you want to make healthcare choices for you when you are unable to make them for yourself  This person, called a proxy, is usually a family member or a friend  You may choose more than 1 proxy  · Do not resuscitate (DNR) order:  A DNR order is used in case your heart stops beating or you stop breathing  It is a request not to have certain forms of treatment, such as CPR  A DNR order may be included in other types of advance directives  · Medical directive: This covers the care that you want if you are in a coma, near death, or unable to make decisions for yourself  You can list the treatments you want for each condition  Treatment may include pain medicine, surgery, blood transfusions, dialysis, IV or tube feedings, and a ventilator (breathing machine)  · Values history: This document has questions about your views, beliefs, and how you feel and think about life  This information can help others choose the care that you would choose  Why are advance directives important? An advance directive helps you control your care  Although spoken wishes may be used, it is better to have your wishes written down  Spoken wishes can be misunderstood, or not followed  Treatments may be given even if you do not want them  An advance directive may make it easier for your family to make difficult choices about your care  Depression   Depression  is a medical condition that causes feelings of sadness or hopelessness that do not go away  Depression may cause you to lose interest in things you used to enjoy   These feelings may interfere with your daily life  Call your local emergency number (911 in the [address]) if:   · You think about harming yourself or someone else  · You have done something on purpose to hurt yourself  The following resources are available at any time to help you, if needed:   · [address]: 9-392.970.1908 (7-092-049-TQVT)   · Suicide Hotline: 3-272.540.6945 (4-292-LCUYPNZ)   · For a list of international numbers: https://save org/find-help/international-resources/  Treatment for depression may include medicine to relieve depression  Medicine is often used together with therapy  Therapy is a way for you to talk about your feelings and anything that may be causing depression  Therapy can be done alone or in a group  It may also be done with family members or a significant other  · Get regular physical activity  · Create a regular sleep schedule  · Eat a variety of healthy foods  · Do not drink alcohol or use drugs  Fall Prevention    Fall prevention  includes ways to make your home and other areas safer  It also includes ways you can move more carefully to prevent a fall  Health conditions that cause changes in your blood pressure, vision, or muscle strength and coordination may increase your risk for falls  Medicines may also increase your risk for falls if they make you dizzy, weak, or sleepy  Fall prevention tips:   · Stand or sit up slowly  · Use assistive devices as directed  · Wear shoes that fit well and have soles that   · Wear a personal alarm  · Stay active  · Manage your medical conditions  Home Safety Tips:  · Add items to prevent falls in the bathroom  · Keep paths clear  · Install bright lights in your home  · Keep items you use often on shelves within reach  · Paint or place reflective tape on the edges of your stairs  Urinary Incontinence   Urinary incontinence (UI)  is when you lose control of your bladder   UI develops because your bladder cannot store or empty urine properly  The 3 most common types of UI are stress incontinence, urge incontinence, or both  Medicines:   · May be given to help strengthen your bladder control  Report any side effects of medication to your healthcare provider  Do pelvic muscle exercises often:  Your pelvic muscles help you stop urinating  Squeeze these muscles tight for 5 seconds, then relax for 5 seconds  Gradually work up to squeezing for 10 seconds  Do 3 sets of 15 repetitions a day, or as directed  This will help strengthen your pelvic muscles and improve bladder control  Train your bladder:  Go to the bathroom at set times, such as every 2 hours, even if you do not feel the urge to go  You can also try to hold your urine when you feel the urge to go  For example, hold your urine for 5 minutes when you feel the urge to go  As that becomes easier, hold your urine for 10 minutes  Self-care:   · Keep a UI record  Write down how often you leak urine and how much you leak  Make a note of what you were doing when you leaked urine  · Drink liquids as directed  You may need to limit the amount of liquid you drink to help control your urine leakage  Do not drink any liquid right before you go to bed  Limit or do not have drinks that contain caffeine or alcohol  · Prevent constipation  Eat a variety of high-fiber foods  Good examples are high-fiber cereals, beans, vegetables, and whole-grain breads  Walking is the best way to trigger your intestines to have a bowel movement  · Exercise regularly and maintain a healthy weight  Weight loss and exercise will decrease pressure on your bladder and help you control your leakage  · Use a catheter as directed  to help empty your bladder  A catheter is a tiny, plastic tube that is put into your bladder to drain your urine  · Go to behavior therapy as directed  Behavior therapy may be used to help you learn to control your urge to urinate         © Copyright [employer] 2018 Information is for [employer] use only and may not be sold, redistributed or otherwise used for commercial purposes   All illustrations and images included in CareNotes® are the copyrighted property of A D A M , Inc  or [address] [place], [place] [address]pepe julio c

## 2023-02-26 PROBLEM — M25.562 CHRONIC PAIN OF LEFT KNEE: Status: ACTIVE | Noted: 2023-02-26

## 2023-02-26 PROBLEM — G89.29 CHRONIC PAIN OF LEFT KNEE: Status: ACTIVE | Noted: 2023-02-26

## 2023-02-26 PROBLEM — F33.9 EPISODE OF RECURRENT MAJOR DEPRESSIVE DISORDER (HCC): Status: ACTIVE | Noted: 2023-02-26

## 2023-02-26 NOTE — ASSESSMENT & PLAN NOTE
Osteoarthritis  Strongly advise to stop aleve, as she is taking Eliquis, aleve increases bleeding risk in combination with Eliquis  Taking aleve reduces pain and allows her to walk better, son is reluctant to stop aleve  Recommend trying Tylenol and voltaren gel, ice as needed for knee pain  Could consider corticosteroid injection, she does not desire to go to any physician office visits, including today's office visit  Tami Escobedo just wants to stay home

## 2023-02-26 NOTE — ASSESSMENT & PLAN NOTE
Using Walker at home, wheelchair when out for office visits  Not able to walk long distances  Had PT set up for home, but does not want anyone in her house  She lives with her son and it is increasingly difficult to care for mom  He is considering placement for her in assisted living, which Bing Payan is not agreeable to and angry when discussing this  We discussed option of home health aid, to come in and help with ADL's meals, etc, to help reduce pressure on [place]  She is agreeable, but her son notes he doesn't think she will be agreeable if someone comes to the house  Information provided for care patrol, who can help navigate options

## 2023-02-26 NOTE — ASSESSMENT & PLAN NOTE
PE noted on partially imaged RLL on CT abdomen/pelvis  +bilateral lower extremity DVT, in the setting of sedentary lifestyle, recent hospitalization, rehab stay, and COVID-19 virus  Discussed pulmonology follow up, declines

## 2023-02-26 NOTE — ASSESSMENT & PLAN NOTE
Lab Results   Component Value Date    EGFR 55 01/28/2023    EGFR 41 01/26/2023    EGFR 32 01/25/2023    CREATININE 0 88 01/28/2023    CREATININE 1 12 01/26/2023    CREATININE 1 35 (H) 01/25/2023     Stable creatinine, most recent 0 88  Continue to push fluids, at lest 4-6 glasses of water per day

## 2023-02-26 NOTE — ASSESSMENT & PLAN NOTE
Osteoporosis suspected due to L3 compression fracture after a fall in 2020  She has refused to complete Dexa  Does not desire to take any medications for osteoporosis

## 2023-02-26 NOTE — ASSESSMENT & PLAN NOTE
Bilateral lower extremity venous doppler in January:  RIGHT LOWER LIMB: Limited  Acute occlusive thrombus noted from the proximal femoral vein to the posterior  tibial veins  Augmentations limited due to the presence of thrombus  LEFT LOWER LIMB: Limited  Acute non occlusive thrombus noted in the popliteal and gastrocnemius vein  On Eliquis 5 mg twice daily

## 2023-02-26 NOTE — ASSESSMENT & PLAN NOTE
Recommend continuing metoprolol to prevent further SVT  We discussed cardiology follow up, declines

## 2023-02-26 NOTE — ASSESSMENT & PLAN NOTE
Not able to stand up on the scale today  Sitting in wheelchair  Taking furosemide only as needed, due to frequent voiding is difficult for both patient and her son to manage getting her into the bathroom so often from a mobility standpoint

## 2023-02-28 ENCOUNTER — TELEPHONE (OUTPATIENT)
Dept: FAMILY MEDICINE CLINIC | Facility: CLINIC | Age: 88
End: 2023-02-28

## 2023-02-28 NOTE — TELEPHONE ENCOUNTER
Ana is from [facility] and stated that the patient was scooching from her chair to get up and grabbed a hold of the cup cool on the chair and when she looked she had a skin tear on her rt hand below the base of her thumb  She is on blood thinner however Ana washed it and bandaged it and it seems ok for now     She asked patient to call if there are any other issues with it  She just needed to make you aware

## 2023-03-08 ENCOUNTER — TELEPHONE (OUTPATIENT)
Dept: FAMILY MEDICINE CLINIC | Facility: CLINIC | Age: 88
End: 2023-03-08

## 2023-03-08 NOTE — TELEPHONE ENCOUNTER
Ana from [facility] called and stated that the patient is in need of a 3 in 1 bedside commode and would like the script sent to just[facility] at [phone]  She also stated that she was out to patients home today and patients son told her that he is not giving patient all of her medications  He has not given her metoprolol since Friday 3/3/2023 due to him feeling like her BP was too low with a systolic number being around 110  He also stated that she was not taking this prior to being in the hospital however the hospital had her taking it  She advised son to provide the medications as directed until he talks to her Doctors  He also stated that he has not given her her Lasix since Saturday 3/4/23  Patient has no increased SOB or edema  If you have any questions or concerns please call back Ana at number provided

## 2023-03-12 NOTE — TELEPHONE ENCOUNTER
I am aware of medications, as I discussed with son at last office visit  Hilda Briseno has follow up on 3/21

## 2023-03-18 ENCOUNTER — APPOINTMENT (EMERGENCY)
Dept: RADIOLOGY | Facility: HOSPITAL | Age: 88
End: 2023-03-18

## 2023-03-18 ENCOUNTER — HOSPITAL ENCOUNTER (INPATIENT)
Facility: HOSPITAL | Age: 88
LOS: 13 days | Discharge: NURSING, REHAB OR LONG-TERM CARE | End: 2023-03-31
Attending: EMERGENCY MEDICINE | Admitting: INTERNAL MEDICINE

## 2023-03-18 DIAGNOSIS — K92.1 MELENA: ICD-10-CM

## 2023-03-18 DIAGNOSIS — E87.20 LACTIC ACIDOSIS: ICD-10-CM

## 2023-03-18 DIAGNOSIS — D64.9 ANEMIA: Primary | ICD-10-CM

## 2023-03-18 DIAGNOSIS — D62 ACUTE BLOOD LOSS ANEMIA: ICD-10-CM

## 2023-03-18 DIAGNOSIS — K92.2 GI BLEED: ICD-10-CM

## 2023-03-18 DIAGNOSIS — R26.2 AMBULATORY DYSFUNCTION: ICD-10-CM

## 2023-03-18 DIAGNOSIS — G93.41 METABOLIC ENCEPHALOPATHY: ICD-10-CM

## 2023-03-18 PROBLEM — Z86.718 HISTORY OF VENOUS THROMBOEMBOLISM: Status: ACTIVE | Noted: 2023-03-18

## 2023-03-18 PROBLEM — D72.829 LEUKOCYTOSIS: Status: ACTIVE | Noted: 2023-03-18

## 2023-03-18 PROBLEM — R41.89 COGNITIVE IMPAIRMENT: Status: ACTIVE | Noted: 2017-01-01

## 2023-03-18 LAB
ABO GROUP BLD: NORMAL
ALBUMIN SERPL BCP-MCNC: 2.3 G/DL (ref 3.5–5)
ALP SERPL-CCNC: 75 U/L (ref 46–116)
ALT SERPL W P-5'-P-CCNC: 10 U/L (ref 12–78)
AMMONIA PLAS-SCNC: 34 UMOL/L (ref 11–35)
ANION GAP SERPL CALCULATED.3IONS-SCNC: 9 MMOL/L (ref 4–13)
AST SERPL W P-5'-P-CCNC: 8 U/L (ref 5–45)
ATRIAL RATE: 174 BPM
BASE EXCESS BLDA CALC-SCNC: 2 MMOL/L (ref -2–3)
BASOPHILS # BLD MANUAL: 0 THOUSAND/UL (ref 0–0.1)
BASOPHILS NFR MAR MANUAL: 0 % (ref 0–1)
BILIRUB SERPL-MCNC: 0.46 MG/DL (ref 0.2–1)
BLD GP AB SCN SERPL QL: NEGATIVE
BUN SERPL-MCNC: 69 MG/DL (ref 5–25)
CA-I BLD-SCNC: 1.17 MMOL/L (ref 1.12–1.32)
CALCIUM ALBUM COR SERPL-MCNC: 9.6 MG/DL (ref 8.3–10.1)
CALCIUM SERPL-MCNC: 8.2 MG/DL (ref 8.3–10.1)
CFFMA (FUNCTIONAL FIBRINOGEN MAX AMPLITUDE): 30.7 MM (ref 15–32)
CHLORIDE SERPL-SCNC: 105 MMOL/L (ref 96–108)
CKLY30: 0.2 % (ref 0–2.6)
CKR(REACTION TIME): 3.5 MIN (ref 4.6–9.1)
CO2 SERPL-SCNC: 26 MMOL/L (ref 21–32)
CREAT SERPL-MCNC: 1.23 MG/DL (ref 0.6–1.3)
CRTMA(RAPIDTEG MAX AMPLITUDE): 66.2 MM (ref 52–70)
EOSINOPHIL # BLD MANUAL: 0 THOUSAND/UL (ref 0–0.4)
EOSINOPHIL NFR BLD MANUAL: 0 % (ref 0–6)
ERYTHROCYTE [DISTWIDTH] IN BLOOD BY AUTOMATED COUNT: 15.1 % (ref 11.6–15.1)
GFR SERPL CREATININE-BSD FRML MDRD: 36 ML/MIN/1.73SQ M
GLUCOSE SERPL-MCNC: 171 MG/DL (ref 65–140)
GLUCOSE SERPL-MCNC: 179 MG/DL (ref 65–140)
HCO3 BLDA-SCNC: 27.9 MMOL/L (ref 24–30)
HCT VFR BLD AUTO: 27.7 % (ref 34.8–46.1)
HCT VFR BLD AUTO: 27.8 % (ref 34.8–46.1)
HCT VFR BLD CALC: 26 % (ref 34.8–46.1)
HGB BLD-MCNC: 8.7 G/DL (ref 11.5–15.4)
HGB BLD-MCNC: 8.8 G/DL (ref 11.5–15.4)
HGB BLDA-MCNC: 8.8 G/DL (ref 11.5–15.4)
INR PPP: 1.35 (ref 0.84–1.19)
LACTATE SERPL-SCNC: 3.4 MMOL/L (ref 0.5–2)
LACTATE SERPL-SCNC: 3.7 MMOL/L (ref 0.5–2)
LYMPHOCYTES # BLD AUTO: 0.46 THOUSAND/UL (ref 0.6–4.47)
LYMPHOCYTES # BLD AUTO: 3 % (ref 14–44)
MCH RBC QN AUTO: 31.1 PG (ref 26.8–34.3)
MCHC RBC AUTO-ENTMCNC: 31.4 G/DL (ref 31.4–37.4)
MCV RBC AUTO: 99 FL (ref 82–98)
MONOCYTES # BLD AUTO: 0.46 THOUSAND/UL (ref 0–1.22)
MONOCYTES NFR BLD: 3 % (ref 4–12)
NEUTROPHILS # BLD MANUAL: 14.22 THOUSAND/UL (ref 1.85–7.62)
NEUTS BAND NFR BLD MANUAL: 22 % (ref 0–8)
NEUTS SEG NFR BLD AUTO: 70 % (ref 43–75)
PCO2 BLD: 29 MMOL/L (ref 21–32)
PCO2 BLD: 50.2 MM HG (ref 42–50)
PH BLD: 7.35 [PH] (ref 7.3–7.4)
PLATELET # BLD AUTO: 328 THOUSANDS/UL (ref 149–390)
PLATELET BLD QL SMEAR: ADEQUATE
PMV BLD AUTO: 9.8 FL (ref 8.9–12.7)
PO2 BLD: 17 MM HG (ref 35–45)
POLYCHROMASIA BLD QL SMEAR: PRESENT
POTASSIUM BLD-SCNC: 3.8 MMOL/L (ref 3.5–5.3)
POTASSIUM SERPL-SCNC: 3.8 MMOL/L (ref 3.5–5.3)
PROT SERPL-MCNC: 5.1 G/DL (ref 6.4–8.4)
PROTHROMBIN TIME: 16.9 SECONDS (ref 11.6–14.5)
QRS AXIS: -78 DEGREES
QRSD INTERVAL: 122 MS
QT INTERVAL: 412 MS
QTC INTERVAL: 517 MS
RBC # BLD AUTO: 2.8 MILLION/UL (ref 3.81–5.12)
RBC MORPH BLD: PRESENT
RH BLD: POSITIVE
SAO2 % BLD FROM PO2: 22 % (ref 60–85)
SODIUM BLD-SCNC: 140 MMOL/L (ref 136–145)
SODIUM SERPL-SCNC: 140 MMOL/L (ref 135–147)
SPECIMEN EXPIRATION DATE: NORMAL
SPECIMEN SOURCE: ABNORMAL
T WAVE AXIS: 77 DEGREES
VARIANT LYMPHS # BLD AUTO: 2 %
VENTRICULAR RATE: 95 BPM
WBC # BLD AUTO: 15.46 THOUSAND/UL (ref 4.31–10.16)

## 2023-03-18 RX ORDER — ACETAMINOPHEN 325 MG/1
650 TABLET ORAL EVERY 6 HOURS PRN
Status: DISCONTINUED | OUTPATIENT
Start: 2023-03-18 | End: 2023-03-31 | Stop reason: HOSPADM

## 2023-03-18 RX ORDER — SERTRALINE HYDROCHLORIDE 25 MG/1
25 TABLET, FILM COATED ORAL DAILY
Status: DISCONTINUED | OUTPATIENT
Start: 2023-03-18 | End: 2023-03-31 | Stop reason: HOSPADM

## 2023-03-18 RX ORDER — SODIUM CHLORIDE 9 MG/ML
75 INJECTION, SOLUTION INTRAVENOUS CONTINUOUS
Status: DISCONTINUED | OUTPATIENT
Start: 2023-03-18 | End: 2023-03-20

## 2023-03-18 RX ORDER — ONDANSETRON 2 MG/ML
4 INJECTION INTRAMUSCULAR; INTRAVENOUS EVERY 4 HOURS PRN
Status: DISCONTINUED | OUTPATIENT
Start: 2023-03-18 | End: 2023-03-18

## 2023-03-18 RX ORDER — ONDANSETRON 2 MG/ML
4 INJECTION INTRAMUSCULAR; INTRAVENOUS EVERY 4 HOURS PRN
Status: DISCONTINUED | OUTPATIENT
Start: 2023-03-18 | End: 2023-03-31 | Stop reason: HOSPADM

## 2023-03-18 RX ORDER — CALCIUM GLUCONATE 20 MG/ML
1 INJECTION, SOLUTION INTRAVENOUS ONCE
Status: DISCONTINUED | OUTPATIENT
Start: 2023-03-18 | End: 2023-03-18

## 2023-03-18 RX ORDER — MELATONIN
1000 DAILY
Status: DISCONTINUED | OUTPATIENT
Start: 2023-03-19 | End: 2023-03-24

## 2023-03-18 RX ORDER — IODIXANOL 320 MG/ML
100 INJECTION, SOLUTION INTRAVASCULAR
Status: COMPLETED | OUTPATIENT
Start: 2023-03-18 | End: 2023-03-18

## 2023-03-18 RX ORDER — METOPROLOL TARTRATE 5 MG/5ML
2.5 INJECTION INTRAVENOUS EVERY 6 HOURS PRN
Status: DISCONTINUED | OUTPATIENT
Start: 2023-03-18 | End: 2023-03-22

## 2023-03-18 RX ORDER — PANTOPRAZOLE SODIUM 40 MG/10ML
40 INJECTION, POWDER, LYOPHILIZED, FOR SOLUTION INTRAVENOUS EVERY 12 HOURS SCHEDULED
Status: DISCONTINUED | OUTPATIENT
Start: 2023-03-18 | End: 2023-03-18

## 2023-03-18 RX ADMIN — SODIUM CHLORIDE 8 MG/HR: 9 INJECTION, SOLUTION INTRAVENOUS at 14:28

## 2023-03-18 RX ADMIN — SODIUM CHLORIDE 8 MG/HR: 9 INJECTION, SOLUTION INTRAVENOUS at 21:44

## 2023-03-18 RX ADMIN — SODIUM CHLORIDE 75 ML/HR: 0.9 INJECTION, SOLUTION INTRAVENOUS at 13:35

## 2023-03-18 RX ADMIN — IODIXANOL 100 ML: 320 INJECTION, SOLUTION INTRAVASCULAR at 10:48

## 2023-03-18 RX ADMIN — PANTOPRAZOLE SODIUM 40 MG: 40 INJECTION, POWDER, FOR SOLUTION INTRAVENOUS at 09:44

## 2023-03-18 RX ADMIN — ONDANSETRON 4 MG: 2 INJECTION INTRAMUSCULAR; INTRAVENOUS at 12:03

## 2023-03-18 RX ADMIN — SODIUM CHLORIDE 500 ML: 0.9 INJECTION, SOLUTION INTRAVENOUS at 09:25

## 2023-03-18 NOTE — ED PROVIDER NOTES
"History  Chief Complaint   Patient presents with   • Altered Mental Status     Since last night having AMS per son; this AM had episode of emesis and incontinence  Questioning of GI bleed, on eliquis      80year-old female past medical history of DVT on Eliquis, hypertension on metoprolol peripheral edema on Lasix presenting to the ER with new onset dark vomitus since this morning  Patient lives at home with her son, who woke up this morning to find her covered in black vomitus sitting in the bed  Called the ambulance who brought her here  Patient is more confused than normal as per the son and EMS report  Unable to obtain a history from the patient as all she states is \"I feel better\" after vomiting          Prior to Admission Medications   Prescriptions Last Dose Informant Patient Reported? Taking?    Cholecalciferol (VITAMIN D-3) 25 MCG (1000 UT) CAPS   Yes No   Sig: Take by mouth daily   Diclofenac Sodium (VOLTAREN) 1 %   No No   Sig: Apply 2 g topically 4 (four) times a day As needed to left knee for pain   METOPROLOL SUCCINATE PO   Yes No   Sig: Take by mouth 5 mg ?   acetaminophen (TYLENOL) 325 mg tablet   Yes No   Sig: Take 975 mg by mouth every 8 (eight) hours as needed for pain   Patient not taking: Reported on 2/21/2023   apixaban (ELIQUIS) 5 mg   No No   Sig: Take 1 tablet (5 mg total) by mouth 2 (two) times a day   furosemide (Lasix) 40 mg tablet   Yes No   Sig: Take 40 mg by mouth   naproxen sodium (Aleve) 220 MG tablet   Yes No   Sig: Take 220 mg by mouth 2 (two) times a day with meals   sertraline (Zoloft) 25 mg tablet   No No   Sig: Take 1 tablet (25 mg total) by mouth daily      Facility-Administered Medications: None       Past Medical History:   Diagnosis Date   • Interstitial cystitis    • Leukopenia    • Neurologic gait dysfunction     Abstraction Dr Nivia Alexandre charts   • Neuropathy    • Neutropenia [facility])     Abstraction Dr Nivia Alexandre charts   • Osteoarthritis    • Osteoporosis    • Peripheral " edema     Abstraction Dr Frankie Gonzalez   • Renal cyst    • Syncope     Abstraction Dr Adrian Snyder charts       Past Surgical History:   Procedure Laterality Date   • CAPSULOTOMY      Right eye   • CATARACT EXTRACTION Left    • CHALAZION EXCISION     • COLONOSCOPY  2006       Family History   Problem Relation Age of Onset   • Diabetes Mother    • Lung disease Father    • Cancer Maternal Grandfather    • Lung disease Sister      I have reviewed and agree with the history as documented  E-Cigarette/Vaping   • E-Cigarette Use Never User      E-Cigarette/Vaping Substances   • Nicotine No    • THC No    • CBD No    • Flavoring No    • Other No    • Unknown No      Social History     Tobacco Use   • Smoking status: Former     Packs/day: 1 00     Years: 20      Pack years: 20 00     Types: Cigarettes     Quit date: 5     Years since quittin 2   • Smokeless tobacco: Never   Vaping Use   • Vaping Use: Never used   Substance Use Topics   • Alcohol use: Yes     Comment: socially   • Drug use: No        Review of Systems   Unable to perform ROS: Mental status change       Physical Exam  ED Triage Vitals [23 0811]   Temperature Pulse Respirations Blood Pressure SpO2   98 °F (36 7 °C) (!) 53 16 114/56 95 %      Temp Source Heart Rate Source Patient Position - Orthostatic VS BP Location FiO2 (%)   Rectal Monitor -- Right arm --      Pain Score       --             Orthostatic Vital Signs  Vitals:    23 1034 23 1100 23 1130 23 1230   BP: 133/67 117/56 126/63 119/56   Pulse: (!) 118 (!) 106 (!) 114 (!) 118       Physical Exam  Vitals reviewed  HENT:      Head: Normocephalic  Mouth/Throat:      Comments: Dark black vomitus on the chin  Eyes:      Pupils: Pupils are equal, round, and reactive to light  Cardiovascular:      Rate and Rhythm: Regular rhythm  Bradycardia present  Heart sounds: No murmur heard    Pulmonary:      Effort: Pulmonary effort is normal    Abdominal: General: There is no distension  Palpations: Abdomen is soft  Tenderness: There is no abdominal tenderness  Musculoskeletal:         General: Normal range of motion  Skin:     General: Skin is dry  Capillary Refill: Capillary refill takes less than 2 seconds  Coloration: Skin is pale  Comments: Cool to the touch   Neurological:      Mental Status: She is alert  GCS: GCS eye subscore is 3  GCS verbal subscore is 5  GCS motor subscore is 6  Sensory: No sensory deficit  Psychiatric:         Mood and Affect: Mood normal          ED Medications  Medications   acetaminophen (TYLENOL) tablet 650 mg (has no administration in time range)   ondansetron (ZOFRAN) injection 4 mg (has no administration in time range)   pantoprazole (PROTONIX) 80 mg in sodium chloride 0 9 % 100 mL infusion (has no administration in time range)   sodium chloride 0 9 % infusion (75 mL/hr Intravenous New Bag 3/18/23 1335)   cholecalciferol (VITAMIN D3) tablet 1,000 Units (has no administration in time range)   Diclofenac Sodium (VOLTAREN) 1 % topical gel 2 g (2 g Topical Not Given 3/18/23 1333)   sertraline (ZOLOFT) tablet 25 mg (has no administration in time range)   metoprolol (LOPRESSOR) injection 2 5 mg (has no administration in time range)   sodium chloride 0 9 % bolus 500 mL (0 mL Intravenous Stopped 3/18/23 1025)   iodixanol (VISIPAQUE) 320 MG/ML injection 100 mL (100 mL Intravenous Given 3/18/23 1048)       Diagnostic Studies  Results Reviewed     Procedure Component Value Units Date/Time    Lactic acid 2 Hours [[id number]]  (Abnormal) Collected: 03/18/23 1140    Lab Status: Final result Specimen: Blood from Arm, Left Updated: 03/18/23 1223     LACTIC ACID 3 4 mmol/L     Narrative:      Result may be elevated if tourniquet was used during collection      TEG 6 Global Hemostasis with Lysis [[id number]]  (Abnormal) Collected: 03/18/23 0930    Lab Status: Final result Specimen: Blood from Arm, Right Updated: 03/18/23 1048     CKR(REACTION TIME) 3 5 Min      CKLY30 0 2 %      CRTMA(RAPIDTEG MAX AMPLITUDE) 66 2 mm      CFFMA (FUNCTIONAL FIBRINOGEN MAX AMPLITUDE) 30 7 mm     Comprehensive metabolic panel [[id number]]  (Abnormal) Collected: 03/18/23 0930    Lab Status: Final result Specimen: Blood from Arm, Right Updated: 03/18/23 1005     Sodium 140 mmol/L      Potassium 3 8 mmol/L      Chloride 105 mmol/L      CO2 26 mmol/L      ANION GAP 9 mmol/L      BUN 69 mg/dL      Creatinine 1 23 mg/dL      Glucose 179 mg/dL      Calcium 8 2 mg/dL      Corrected Calcium 9 6 mg/dL      AST 8 U/L      ALT 10 U/L      Alkaline Phosphatase 75 U/L      Total Protein 5 1 g/dL      Albumin 2 3 g/dL      Total Bilirubin 0 46 mg/dL      eGFR 36 ml/min/1 73sq m     Narrative:      [facility] [place] guidelines for Chronic Kidney Disease (CKD):   •  Stage 1 with normal or high GFR (GFR > 90 mL/min/1 73 square meters)  •  Stage 2 Mild CKD (GFR = 60-89 mL/min/1 73 square meters)  •  Stage 3A Moderate CKD (GFR = 45-59 mL/min/1 73 square meters)  •  Stage 3B Moderate CKD (GFR = 30-44 mL/min/1 73 square meters)  •  Stage 4 Severe CKD (GFR = 15-29 mL/min/1 73 square meters)  •  Stage 5 End Stage CKD (GFR <15 mL/min/1 73 square meters)  Note: GFR calculation is accurate only with a steady state creatinine    CBC and differential [[id number]]  (Abnormal) Collected: 03/18/23 0912    Lab Status: Final result Specimen: Blood from Arm, Right Updated: 03/18/23 1001     WBC 15 46 Thousand/uL      RBC 2 80 Million/uL      Hemoglobin 8 7 g/dL      Hematocrit 27 7 %      MCV 99 fL      MCH 31 1 pg      MCHC 31 4 g/dL      RDW 15 1 %      MPV 9 8 fL      Platelets 702 Thousands/uL     Narrative: This is an appended report  These results have been appended to a previously verified report      Manual Differential(PHLEBS Do Not Order) [[id number]]  (Abnormal) Collected: 03/18/23 0912    Lab Status: Final result Specimen: Blood from Arm, Right Updated: 03/18/23 1001     Segmented % 70 %      Bands % 22 %      Lymphocytes % 3 %      Monocytes % 3 %      Eosinophils, % 0 %      Basophils % 0 %      Atypical Lymphocytes % 2 %      Absolute Neutrophils 14 22 Thousand/uL      Lymphocytes Absolute 0 46 Thousand/uL      Monocytes Absolute 0 46 Thousand/uL      Eosinophils Absolute 0 00 Thousand/uL      Basophils Absolute 0 00 Thousand/uL      Total Counted --     RBC Morphology Present     Polychromasia Present     Platelet Estimate Adequate    Lactic acid, plasma [[id number]]  (Abnormal) Collected: 03/18/23 0912    Lab Status: Final result Specimen: Blood from Arm, Right Updated: 03/18/23 0955     LACTIC ACID 3 7 mmol/L     Narrative:      Result may be elevated if tourniquet was used during collection  POCT Blood Gas (CG8+) [[id number]]  (Abnormal) Collected: 03/18/23 0932    Lab Status: Final result Specimen: Venous Updated: 03/18/23 0952     ph, Valeriano ISTAT 7 353     pCO2, Valeriano i-STAT 50 2 mm HG      pO2, Valeriano i-STAT 17 0 mm HG      BE, i-STAT 2 mmol/L      HCO3, Valeriano i-STAT 27 9 mmol/L      CO2, i-STAT 29 mmol/L      O2 Sat, i-STAT 22 %      SODIUM, I-STAT 140 mmol/l      Potassium, i-STAT 3 8 mmol/L      Calcium, Ionized i-STAT 1 17 mmol/L      Hct, i-STAT 26 %      Hgb, i-STAT 8 8 g/dl      Glucose, i-STAT 171 mg/dl      Specimen Type VENOUS    Protime-INR [[id number]]  (Abnormal) Collected: 03/18/23 0912    Lab Status: Final result Specimen: Blood from Arm, Right Updated: 03/18/23 0937     Protime 16 9 seconds      INR 1 35    Ammonia [[id number]]  (Normal) Collected: 03/18/23 0912    Lab Status: Final result Specimen: Blood from Arm, Right Updated: 03/18/23 0934     Ammonia 34 umol/L                  CT high volume bleeding scan abdomen pelvis   Final Result by Siva Huddleston MD (03/18 1222)      1  No CT evidence of active high volume gastrointestinal hemorrhage  2   Findings of gastritis and nonspecific enteritis        3   Moderate rectal stool ball       3   Stable 3 4 cm left adnexal cystic lesion  Recommend follow-up pelvic ultrasound in one year  The study was marked in [facility] for immediate notification  Workstation performed: [id number]               Procedures  US Guided Peripheral IV    Date/Time: 3/18/2023 1:16 PM  Performed by: Aminah Lcokett DO  Authorized by: Aminah Lockett DO     Patient location:  ED  Performed by:  Resident  Other Assisting Provider: No    Indications:     Indications: difficulty obtaining IV access      Image availability:  Not saved  Procedure details:     Location:  Right arm (Left arm as well)    Catheter size:  18 gauge    Number of attempts:  2    Successful placement: yes    Post-procedure details:     Post-procedure:  Dressing applied, line sutured and securement device placed    Assessment: free fluid flow and no signs of infiltration      Patient tolerance of procedure: Tolerated well, no immediate complications  Comments:      Placed 3 total ultrasound IVs, given patient moves well obtaining blood from the first IV, and the second IV the patient pulled out  ED Course  ED Course as of 03/18/23 1337   Sat Mar 18, 2023   0908 Blood Pressure(!): 85/55  Hanging cryo,    0945 Hemoglobin(!): 8 7  Previously 12 4, 1 month ago  Acute bleed is cause   0945 POCT INR(!): 1 35   0945 PROTIME(!): 16 9   0945 WBC(!): 15 46  Likely reactive due to GI bleed unlikely infxn  3686 Placed 3 US IVs   0957 LACTIC ACID(!!): 3 7  Elevated likely ISO acute blood loss anemia   0959 Pulse(!): 106  Will transfuse cryo and 1 uPRBC   1025 Calcium(!): 8 2   1229 LACTIC ACID(!!): 3 4   1230 Reached out to CC to see if she is a candidate for MICU admission   1256 Patient appropriate for SD2 as per CC team                             SBIRT 22yo+    Pasquale Hernandez Most Recent Value   SBIRT (25 yo +)    In order to provide better care to our patients, we are screening all of our patients for alcohol and drug use   Would it be okay to ask you these screening questions? No Filed at: 03/18/2023 1105                Medical Decision Making  History and physical concerning for acute GI bleed given patient on Eliquis with a black tarry stool and black vomitus  We will obtain a CBC CMP chest pain Monia for concern of upper GI bleed  We will obtain a test to see if patient needs reversal   We will type and screen crossmatch putting blood on hold  He will obtain lactate PT/INR as well  Plan to reverse patient with cryoprecipitate, and transfused with 1 units of red blood cells given at 3 times melena here in the ED  See ED course for updates  Amount and/or Complexity of Data Reviewed  Labs: ordered  Decision-making details documented in ED Course  Radiology: ordered  Risk  Prescription drug management  Decision regarding hospitalization  Disposition  Final diagnoses:   GI bleed   Lactic acidosis   Anemia     Time reflects when diagnosis was documented in both MDM as applicable and the Disposition within this note     Time User Action Codes Description Comment    3/18/2023  9:19 AM Ferd Slay B Add [K92 2] GI bleed     3/18/2023 12:57 PM Ferd Slay B Add [E87 20] Lactic acidosis     3/18/2023 12:57 PM Ferd Slay B Add [D64 9] Anemia       ED Disposition     ED Disposition   Admit    Condition   Stable    Date/Time   Sat Mar 18, 2023 12:56 PM    Comment   Case was discussed with FIDENCIO and the patient's admission status was agreed to be Admission Status: inpatient status to the service of Dr Félix Gregorio   Follow-up Information    None         Patient's Medications   Discharge Prescriptions    No medications on file     No discharge procedures on file  PDMP Review       Value Time User    PDMP Reviewed  Yes 8/25/2020 10:52 AM Cristal Landeros PA-C           ED Provider  Attending physically available and evaluated Mikey Pillai  NATI managed the patient along with the ED Attending      Electronically Signed by         Cathey Prader,   03/18/23 4913

## 2023-03-18 NOTE — ASSESSMENT & PLAN NOTE
Chronic issue  Holding diuretic (Lasix) in the setting of blood loss anemia with borderline hypotension requiring IV fluids  Compression stockings ordered in lieu of diuretic with holding -> anticipate mild fluctuation in edema due to IV fluids

## 2023-03-18 NOTE — ED NOTES
Critical care team at bedside; Dr Omar Wynn aware of repeat lactic acid  No new interventions at this time   Family at bedside      [facility]rem[facility]  03/18/23 1743

## 2023-03-18 NOTE — ASSESSMENT & PLAN NOTE
Baseline creatinine of approximately 0 9-1 1 -> currently stable at 1 23  Monitor renal function and urine output - limit/avoid nephrotoxins and hypotension as possible - holding Lasix currently

## 2023-03-18 NOTE — CONSULTS
Consultation - [facility]  Wero Blanc 80 y o  female MRN: [id number]  Unit/Bed#: ED 13 Encounter: [id number]        Consults    Reason for Consult / Principal Problem:     GI bleed    ASSESSMENT AND PLAN:       57-year-old female with history significant for DVT on Eliquis, HTN, CKD stage III admitted with acute blood loss anemia with melena concerning for a GI bleed  Suspect upper GI bleed in the setting of coffee-ground emesis and melena along with acute drop in hemoglobin with elevated BUN  Patient has a history of NSAID use which makes peptic ulcer disease high on the differential   Differential diagnosis also includes H  pylori, gastritis, duodenitis, erosive esophagitis  Discussed overall care and expectations with patient's son, Binta Wright, who understands her overall situation and is agreeable to endoscopic evaluation with EGD  1  Monitor hemoglobin and stool output  Transfuse for less than 7   2  Continue IV PPI twice daily  3  N p o  after midnight on Sunday for EGD on Monday  4  Continue to hold Eliquis  Rest of care per primary team   Thank you for this consultation  ______________________________________________________________________    HPI: Wero Blanc is a 57-year-old female with history significant for DVT on Eliquis, HTN, CKD stage III, whom we are asked to see in consultation for GI bleed  History was obtained from patient's son via telephone who reports that he found her with an episode of dark coffee-ground emesis followed by several episodes of melena at home prompting evaluation in the ED  of note, patient had been complaining of intermittent abdominal pain over the last 5 days  She reportedly takes Aleve daily for left knee pain  No prior history of GI bleed  No tobacco, alcohol, illicit drug use  Evaluation in the ED, she she was found to have a 4 g drop in hemoglobin from a baseline of 12-8 7 with elevated BUN to 69 and lactic acidosis    CT imaging revealed circumferential mural thickening of the gastric body and antrum concerning for gastritis, colonic diverticulosis, and bowel thickening of the small bowel concerning for nonspecific enteritis  REVIEW OF SYSTEMS:  10 point ROS reviewed and negative except otherwise noted in the HPI above       Historical Information   Past Medical History:   Diagnosis Date   • Interstitial cystitis    • Leukopenia    • Neurologic gait dysfunction     Abstraction Dr Zoltan Oneal charts   • Neuropathy    • Neutropenia (HCC)     Abstraction Dr Jyoti Norton   • Osteoarthritis    • Osteoporosis    • Peripheral edema     Abstraction Dr Jyoti Norton   • Renal cyst    • Syncope     Abstraction Dr Zoltan Oneal charts     Past Surgical History:   Procedure Laterality Date   • CAPSULOTOMY      Right eye   • CATARACT EXTRACTION Left    • CHALAZION EXCISION     • COLONOSCOPY  2006     Social History   Social History     Substance and Sexual Activity   Alcohol Use Yes    Comment: socially     Social History     Substance and Sexual Activity   Drug Use No     Social History     Tobacco Use   Smoking Status Former   • Packs/day:    • Years:    • Pack years:    • Types: Cigarettes   • Quit date:    • Years since quittin 2   Smokeless Tobacco Never     Family History   Problem Relation Age of Onset   • Diabetes Mother    • Lung disease Father    • Cancer Maternal Grandfather    • Lung disease Sister        Meds/Allergies     (Not in a hospital admission)    Current Facility-Administered Medications   Medication Dose Route Frequency   • acetaminophen (TYLENOL) tablet 650 mg  650 mg Oral Q6H PRN   • [START ON 3/19/2023] cholecalciferol (VITAMIN D3) tablet 1,000 Units  1,000 Units Oral Daily   • Diclofenac Sodium (VOLTAREN) 1 % topical gel 2 g  2 g Topical 4x Daily   • metoprolol (LOPRESSOR) injection 2 5 mg  2 5 mg Intravenous Q6H PRN   • ondansetron (ZOFRAN) injection 4 mg  4 mg Intravenous Q4H PRN   • pantoprazole (PROTONIX) 80 mg in sodium chloride 0 9 % 100 mL infusion  8 mg/hr Intravenous Continuous   • sertraline (ZOLOFT) tablet 25 mg  25 mg Oral Daily   • sodium chloride 0 9 % infusion  75 mL/hr Intravenous Continuous       No Known Allergies      Objective     Blood pressure 120/54, pulse (!) 112, temperature (!) 97 4 °F (36 3 °C), temperature source Oral, resp  rate 16, SpO2 97 %  There is no height or weight on file to calculate BMI        Intake/Output Summary (Last 24 hours) at 3/18/2023 1502  Last data filed at 3/18/2023 1130  Gross per 24 hour   Intake 875 ml   Output --   Net 875 ml         PHYSICAL EXAM:    General: Well-appearing, NAD  Eyes: no conjunctival icterus or pallor  Abdominal: Soft, non-tender, non-distended  Neuro: alert and oriented  Psych: Normal affect    Lab Results:   Admission on 03/18/2023   Component Date Value   • WBC 03/18/2023 15 46 (H)    • RBC 03/18/2023 2 80 (L)    • Hemoglobin 03/18/2023 8 7 (L)    • Hematocrit 03/18/2023 27 7 (L)    • MCV 03/18/2023 99 (H)    • MCH 03/18/2023 31 1    • MCHC 03/18/2023 31 4    • RDW 03/18/2023 15 1    • MPV 03/18/2023 9 8    • Platelets 49/07/6026 328    • Sodium 03/18/2023 140    • Potassium 03/18/2023 3 8    • Chloride 03/18/2023 105    • CO2 03/18/2023 26    • ANION GAP 03/18/2023 9    • BUN 03/18/2023 69 (H)    • Creatinine 03/18/2023 1 23    • Glucose 03/18/2023 179 (H)    • Calcium 03/18/2023 8 2 (L)    • Corrected Calcium 03/18/2023 9 6    • AST 03/18/2023 8    • ALT 03/18/2023 10 (L)    • Alkaline Phosphatase 03/18/2023 75    • Total Protein 03/18/2023 5 1 (L)    • Albumin 03/18/2023 2 3 (L)    • Total Bilirubin 03/18/2023 0 46    • eGFR 03/18/2023 36    • Ammonia 03/18/2023 34    • LACTIC ACID 03/18/2023 3 7 (HH)    • ABO Grouping 03/18/2023 O    • Rh Factor 03/18/2023 Positive    • Antibody Screen 03/18/2023 Negative    • Specimen Expiration Date 03/18/2023 [id number]    • Unit Product Code 03/18/2023 Q0709J80    • Unit Number 03/18/2023 [id number]    • Unit ABO 03/18/2023 O    • Unit [facility] 03/18/2023 POS    • Crossmatch 03/18/2023 Compatible    • Unit Dispense Status 03/18/2023 Issued    • Unit Product Volume 03/18/2023 350    • Protime 03/18/2023 16 9 (H)    • INR 03/18/2023 1 35 (H)    • CKR(REACTION TIME) 03/18/2023 3 5 (L)    • CKLY30 03/18/2023 0 2    • CRTMA(RAPIDTEG MAX AMPLI* 03/18/2023 66 2    • CFFMA (FUNCTIONAL FIBRIN* 03/18/2023 30 7    • Ventricular Rate 03/18/2023 95    • Atrial Rate 03/18/2023 174    • QRSD Interval 03/18/2023 122    • QT Interval 03/18/2023 412    • QTC Interval 03/18/2023 517    • QRS Axis 03/18/2023 -78    • T Wave Axis 03/18/2023 77    • Unit Product Code 03/18/2023 G2704K99    • Unit Number 03/18/2023 [id number]    • Unit ABO 03/18/2023 O    • Unit [facility] 03/18/2023 POS    • Unit Dispense Status 03/18/2023 Issued    • Unit Product Volume 03/18/2023 125    • ph, Valeriano ISTAT 03/18/2023 7  353    • pCO2, Valeriano i-STAT 03/18/2023 50 2 (H)    • pO2, Valeriano i-STAT 03/18/2023 17 0 (L)    • BE, i-STAT 03/18/2023 2    • HCO3, Valeriano i-STAT 03/18/2023 27 9    • CO2, i-STAT 03/18/2023 29    • O2 Sat, i-STAT 03/18/2023 22 (L)    • SODIUM, I-STAT 03/18/2023 140    • Potassium, i-STAT 03/18/2023 3 8    • Calcium, Ionized i-STAT 03/18/2023 1 17    • Hct, i-STAT 03/18/2023 26 (L)    • Hgb, i-STAT 03/18/2023 8 8 (L)    • Glucose, i-STAT 03/18/2023 171 (H)    • Specimen Type 03/18/2023 VENOUS    • LACTIC ACID 03/18/2023 3 4 (HH)    • Segmented % 03/18/2023 70    • Bands % 03/18/2023 22 (H)    • Lymphocytes % 03/18/2023 3 (L)    • Monocytes % 03/18/2023 3 (L)    • Eosinophils, % 03/18/2023 0    • Basophils % 03/18/2023 0    • Atypical Lymphocytes % 03/18/2023 2 (H)    • Absolute Neutrophils 03/18/2023 14 22 (H)    • Lymphocytes Absolute 03/18/2023 0 46 (L)    • Monocytes Absolute 03/18/2023 0 46    • Eosinophils Absolute 03/18/2023 0 00    • Basophils Absolute 03/18/2023 0 00    • RBC Morphology 03/18/2023 Present    • Polychromasia 03/18/2023 Present    • Platelet Estimate 68/14/7935 Adequate        Imaging Studies: I have personally reviewed pertinent imaging studies  SILVANO Schultz    Gastroenterology Fellow  PGY-4  Available on [employer]  3/18/2023 3:02 PM

## 2023-03-18 NOTE — ASSESSMENT & PLAN NOTE
· Baseline hemoglobin between 12-14 -> presents significantly lower at 8 7 today (was 12 4 two months ago)  · Secondary to coffee-ground emesis at home and multiple melanotic stools in the ED in the setting of Eliquis use   · Due to borderline hypotension given IV fluids and transfused a unit of PRBCs and cryoprecipitate in the ED  · Continue serial H/H monitoring - maintain hemodynamics -> hold Lasix and continue IV fluid infusion -> stop NSAID (Naproxen) and anticoagulation (Eliquis)  · Continue Protonix drip  · Iron studies consistent with adequate iron stores  · B12 is low/normal will start supplementation

## 2023-03-18 NOTE — QUICK NOTE
Patient seen and evaluated at bedside this evening with Fellow and Attending  Consulted for Critical Care evaluation  At this time, patient appears well, has not required pressors or additional fluids since receiving CCP, RBC transfusion  Blood pressures are stable, has not had any additional episodes of emesis but has had melanotic stool in ED  Patient states she feels well, alert and active, AAOx3  At this time, vital signs stable, does not appear to have active bleed  Would benefit from admission but do not believe she requires critical care at this time        Goldie Rojas MD  Family Medicine PGY-1

## 2023-03-18 NOTE — ASSESSMENT & PLAN NOTE
Baseline hemoglobin between 12-14 -> presents significantly lower at 8 7 today (was 12 4 two months ago)  Secondary to hematemesis at home and multiple melanotic stools in the ED in the setting of Eliquis use   Due to borderline hypotension given IV fluids and transfused a unit of PRBCs and cryoprecipitate in the ED  Continue serial H/H monitoring - maintain hemodynamics -> hold Lasix and continue IV fluid infusion -> stop NSAID (Naproxen) and anticoagulation (Eliquis)  Initiate a Protonix drip  Will await gastroenterology evaluation

## 2023-03-18 NOTE — ASSESSMENT & PLAN NOTE
"· Found by son with bloody vomitus on shirt - had multiple melanotic stools in the ED  · An almost 4-point drop in hemoglobin today when compared to a few months prior coupled with mild/transient hypotension responsive to IV fluids  · See plan for acute blood loss anemia below  · Currently on clear liquids  · Protonix drip initiated -> holding Eliquis/Naproxen  · Per CT imaging: \"No CT evidence of active high volume gastrointestinal hemorrhage  Findings of gastritis and nonspecific enteritis  Moderate rectal stool ball  Stable 3 4 cm left adnexal cystic lesion  Recommend follow-up pelvic ultrasound in one year  \"  · For EGD on Monday  "

## 2023-03-18 NOTE — H&P
"[facility]  H&P- Kaitlin Beronica 5/16/1925, 80 y o  female MRN: [id number]  Unit/Bed#: ED 13 Encounter: [id number]  Primary Care Provider: KERON Ivan   Date and time admitted to hospital: 3/18/2023  8:00 AM      * GI bleed  Assessment & Plan  Found by son with bloody vomitus on shirt - had multiple melanotic stools in the ED  An almost 4-point drop in hemoglobin today when compared to a few months prior coupled with mild/transient hypotension responsive to IV fluids  See plan for acute blood loss anemia below  Currently on clear liquids -> NPO after midnight for possible gastroenterologic intervention  Protonix drip initiated -> holding Eliquis/Naproxen  Per CT imaging: \"No CT evidence of active high volume gastrointestinal hemorrhage  Findings of gastritis and nonspecific enteritis  Moderate rectal stool ball  Stable 3 4 cm left adnexal cystic lesion  Recommend follow-up pelvic ultrasound in one year  \"    Acute blood loss anemia  Assessment & Plan  Baseline hemoglobin between 12-14 -> presents significantly lower at 8 7 today (was 12 4 two months ago)  Secondary to coffee-ground emesis at home and multiple melanotic stools in the ED in the setting of Eliquis use   Due to borderline hypotension given IV fluids and transfused a unit of PRBCs and cryoprecipitate in the ED  Continue serial H/H monitoring - maintain hemodynamics -> hold Lasix and continue IV fluid infusion -> stop NSAID (Naproxen) and anticoagulation (Eliquis)  Initiate a Protonix drip  Will await gastroenterology evaluation    Leukocytosis  Assessment & Plan  Likely reactive due to acute medical issue(s)  Monitor WBC count    History of venous thromboembolism  Assessment & Plan  History of pulmonary embolism and DVT noted  Holding Eliquis in the setting of GI bleed    Paroxysmal supraventricular tachycardia   Assessment & Plan  Continue Lopressor (w/ holding parameters)    Chronic kidney disease stage 3 " Assessment & Plan  Baseline creatinine of approximately 0 9-1 1 -> currently stable at 1 23  Monitor renal function and urine output - limit/avoid nephrotoxins and hypotension as possible - holding Lasix currently    Lower extremity edema  Assessment & Plan  Chronic issue  Holding diuretic (Lasix) in the setting of blood loss anemia with borderline hypotension requiring IV fluids  Compression stockings ordered in lieu of diuretic with holding -> anticipate mild fluctuation in edema due to IV fluids    Cognitive impairment  Assessment & Plan  Continue Zoloft      DVT Prophylaxis: SCDs - holding Eliquis    Code Status:  DNR/DNI    Discussion with:  Patient, along with family members, at bedside today    Anticipated Length of Stay:  Patient will be admitted on an Inpatient basis with an anticipated length of stay of greater than 2 midnights  Justification for Hospital Stay: GI bleed with acute blood loss anemia requiring blood transfusion, hemoglobin and hemodynamics monitoring, and gastroenterology intervention  Total Time for Visit, including Counseling / Coordination of Care: 75 minutes  Greater than 50% of this total time spent on direct patient counseling and coordination of care  Chief Complaint: Bloody vomit      History of Present Illness:    Charmayne Manna is a 80 y o  female who presents after being found by son covered in bloody vomit  In the ED, she also proceeded to have multiple melanotic stools, as well  Of note, she has a history of pulmonary embolism and DVT, for which she is chronically on anticoagulation with Eliquis  She also has chronic left knee pain for which she is on NSAID therapy with Naproxen  Blood work in the ED revealed evidence of a significantly decreased hemoglobin at 8 7, when compared to her typical baseline of 12-14 (last checked a few months ago)  She was transfused a unit of packed red blood cells and cryoprecipitate    At the time of my encounter, she is resting fairly comfortably in bed but does acknowledge some weakness and fatigue  She currently denies any nausea/vomiting, or abdominal pain/discomfort  Per her son, she does have some chronic cognitive impairment, however, seems a bit more confused today  No other acute complaints at this time  Review of Systems:    Review of Systems - A thorough 12 point review systems was conducted  Pertinent positives and negatives are mentioned in the history of present illness  Past Medical and Surgical History:     Past Medical History:   Diagnosis Date   • Interstitial cystitis    • Leukopenia    • Neurologic gait dysfunction     Abstraction Dr Zoraida Abreu charts   • Neuropathy    • Neutropenia [facility])     Abstraction Dr Spike Peres   • Osteoarthritis    • Osteoporosis    • Peripheral edema     Abstraction Dr Spike Peres   • Renal cyst    • Syncope     Abstraction Dr Zoraida Abreu charts       Past Surgical History:   Procedure Laterality Date   • CAPSULOTOMY      Right eye   • CATARACT EXTRACTION Left    • CHALAZION EXCISION     • COLONOSCOPY  2006         Medications & Allergies:    Prior to Admission medications    Medication Sig Start Date End Date Taking? Authorizing Provider   acetaminophen (TYLENOL) 325 mg tablet Take 975 mg by mouth every 8 (eight) hours as needed for pain  Patient not taking: Reported on 2/21/2023    Historical Provider, MD   apixaban (ELIQUIS) 5 mg Take 1 tablet (5 mg total) by mouth 2 (two) times a day 2/10/23   KERON Chaves   Cholecalciferol (VITAMIN D-3) 25 MCG (1000 UT) CAPS Take by mouth daily    Historical Provider, MD   Diclofenac Sodium (VOLTAREN) 1 % Apply 2 g topically 4 (four) times a day As needed to left knee for pain 2/21/23   KERON Damon   furosemide (Lasix) 40 mg tablet Take 40 mg by mouth    Historical Provider, MD   METOPROLOL SUCCINATE PO Take by mouth 5 mg ?     Historical Provider, MD   naproxen sodium (Aleve) 220 MG tablet Take 220 mg by mouth 2 (two) times a day with meals    Historical Provider, MD   sertraline (Zoloft) 25 mg tablet Take 1 tablet (25 mg total) by mouth daily 23   KERON Damon         Allergies: No Known Allergies      Social History:    Substance Use History:   Social History     Substance and Sexual Activity   Alcohol Use Yes    Comment: socially     Social History     Tobacco Use   Smoking Status Former   • Packs/day: 1 00   • Years: 20 00   • Pack years: 20 00   • Types: Cigarettes   • Quit date: 5   • Years since quittin 2   Smokeless Tobacco Never     Social History     Substance and Sexual Activity   Drug Use No         Family History:    Per medical chart, significant for lung disease in father and sister, for diabetes mellitus in mother, and for an unspecified cancer in maternal grandfather        Physical Exam:     Vitals:   Blood Pressure: 105/55 (23 1600)  Pulse: 104 (23 1600)  Temperature: (!) 97 4 °F (36 3 °C) (23 1130)  Temp Source: Oral (23 1130)  Respirations: 15 (23 1600)  SpO2: 98 % (23 1600)      GENERAL  weak/fatigued   HEAD   Normocephalic - atraumatic   MOUTH   Mucosa moist   NECK   Supple - full range of motion   CARDIAC  mildly tachycardic - S1/S2 positive   PULMONARY    Clear breath sounds bilaterally - nonlabored respirations   ABDOMEN   Soft - currently nontender/nondistended - active bowel sounds   MUSCULOSKELETAL   Motor strength/range of motion deconditioned - R>L lower extremity edema   NEUROLOGIC  cognitive impairment noted - hard of hearing   SKIN   Chronic wrinkles/blemishes    PSYCHIATRIC   Mood/affect stable         Additional Data:     Labs & Recent Cultures:    Results from last 7 days   Lab Units 23  0932 23  0912   WBC Thousand/uL  --  15 46*   HEMOGLOBIN g/dL  --  8 7*   I STAT HEMOGLOBIN g/dl 8 8*  --    HEMATOCRIT %  --  27 7*   HEMATOCRIT, ISTAT % 26*  --    PLATELETS Thousands/uL  --  328   BANDS PCT %  --  22*   LYMPHO PCT %  --  3* MONO PCT %  --  3*   EOS PCT %  --  0     Results from last 7 days   Lab Units 03/18/23  0932 03/18/23  0930   SODIUM mmol/L  --  140   POTASSIUM mmol/L  --  3 8   CHLORIDE mmol/L  --  105   CO2 mmol/L  --  26   CO2, I-STAT mmol/L 29  --    BUN mg/dL  --  69*   CREATININE mg/dL  --  1 23   ANION GAP mmol/L  --  9   CALCIUM mg/dL  --  8 2*   ALBUMIN g/dL  --  2 3*   TOTAL BILIRUBIN mg/dL  --  0 46   ALK PHOS U/L  --  75   ALT U/L  --  10*   AST U/L  --  8   GLUCOSE RANDOM mg/dL  --  179*     Results from last 7 days   Lab Units 03/18/23  0912   INR  1 35*             Results from last 7 days   Lab Units 03/18/23  1140 03/18/23  0912   LACTIC ACID mmol/L 3 4* 3 7*                 Lines/Drains:  Invasive Devices     Peripheral Intravenous Line  Duration           Peripheral IV 03/18/23 Distal;Left;Upper;Ventral (anterior) Arm <1 day    Peripheral IV 03/18/23 Dorsal (posterior); Left Wrist <1 day    Peripheral IV 03/18/23 Right;Ventral (anterior) Forearm <1 day                  Imaging:     CT high volume bleeding scan abdomen pelvis    Result Date: 3/18/2023  Narrative: CT ABDOMEN AND PELVIS - WITHOUT AND WITH IV CONTRAST INDICATION:   GI bleed from both ends  COMPARISON: CT abdomen pelvis 1/25/2023  TECHNIQUE:  CT examination of the abdomen and pelvis was performed both prior to and after the administration of intravenous contrast   Contrast was injected one time intravenously without immediate complication  Scanning through the abdomen was performed in precontrast, arterial, and venous phases according a protocol specifically designed to evaluate for high volume bleeding  Axial, sagittal, and coronal 2D reformatted images were created from the source data and submitted for interpretation  Radiation dose length product (DLP) for this visit:  1467 98 mGy-cm     This examination, like all CT scans performed in the [facility], was performed utilizing techniques to minimize radiation dose exposure, including the use of iterative reconstruction and automated exposure control  IV Contrast:  100 mL of iodixanol (VISIPAQUE)   Enteric Contrast:  Enteric contrast was not administered  FINDINGS: ABDOMEN  BOWEL:  There is no active extravasation of intravenous contrast into the lumen of stomach, small bowel, or large bowel loops  Colonic diverticulosis without evidence of acute diverticulitis  Moderate rectal stool ball  Circumferential mural thickening of the gastric body and gastric antrum, which may relate to gastritis  Bowel thickening of small bowel loops in the pelvis and right lower quadrant, which may relate to a nonspecific enteritis  Age-appropriate atherosclerotic disease in the mesenteric vessels, without complete occlusion  LOWER CHEST:  Bibasilar subsegmental atelectasis  A 7 mm right lower lobe solid pulmonary nodule (3/10), stable since 12/3/2018, therefore consistent with a benign etiology  Stable small hiatal hernia  LIVER/BILIARY TREE:  Unremarkable  GALLBLADDER:  No calcified gallstones  No pericholecystic inflammatory change  SPLEEN:  Unremarkable  PANCREAS:  Atrophic pancreatic parenchyma  No dilated main pancreatic duct  ADRENAL GLANDS:  Unremarkable  KIDNEYS/URETERS:  Bilateral simple renal cysts  No hydronephrosis  APPENDIX:  No findings to suggest appendicitis  ABDOMINOPELVIC CAVITY:  No ascites  No pneumoperitoneum  No lymphadenopathy  VESSELS:  Atherosclerotic changes are present  No evidence of aneurysm  PELVIS REPRODUCTIVE ORGANS: The uterus unremarkable  A 3 4 cm left adnexal cystic lesion (3/290), stable since 12/27/2022  URINARY BLADDER:  Unremarkable  ABDOMINAL WALL/INGUINAL REGIONS:  Unremarkable  OSSEOUS STRUCTURES:  No acute fracture or destructive osseous lesion  Degenerative changes of bilateral hips, symphysis pubis, bilateral sacroiliac joints, and the visualized spine  Moderate superior wedge compression deformity of the L3 vertebral body, unchanged       Impression: 1   No CT evidence of active high volume gastrointestinal hemorrhage  2   Findings of gastritis and nonspecific enteritis  3   Moderate rectal stool ball  3   Stable 3 4 cm left adnexal cystic lesion  Recommend follow-up pelvic ultrasound in one year  The study was marked in [facility] for immediate notification  Workstation performed: [id number]                   ** Please Note: This note is constructed using a voice recognition dictation system  An occasional wrong word/phrase or “sound-a-like” substitution may have been picked up by dictation device due to the inherent limitations of voice recognition software  Read the chart carefully and recognize, using reasonable context, where substitutions may have occurred  **

## 2023-03-18 NOTE — ED ATTENDING ATTESTATION
3/18/2023  IaSrath MD, saw and evaluated the patient  I have discussed the patient with the resident/non-physician practitioner and agree with the resident's/non-physician practitioner's findings, Plan of Care, and MDM as documented in the resident's/non-physician practitioner's note, except where noted  All available labs and Radiology studies were reviewed  I was present for key portions of any procedure(s) performed by the resident/non-physician practitioner and I was immediately available to provide assistance  At this point I agree with the current assessment done in the Emergency Department  I have conducted an independent evaluation of this patient a history and physical is as follows:  Pt is on eloquis for dvt Pt started noted this am to have dark vomit and melanotic stool by family and sent to Ed en route BP noted to drop to 90s Pt not as alert as usual PE: ALert conversant pale skin cool but good cap refill heart reg lungs clear abd soft nontender MDM: last eloquis was yesterday as well as beta blocker MDM: gi bleed  On eloquiswill give [address] blood check labs ct abd to look for brisk bleed    Pt had bp drop transiently to 85 blood hung fluid bolus of 500 cc pt given protonix as well     BP stabilized with reversal and blood gi notified admitted to [facility]   ED Course         Critical Care Time  CriticalCare Time    Date/Time: 3/18/2023 1:53 PM  Performed by: Sarath Scruggs MD  Authorized by:  Sarath Scruggs MD     Critical care provider statement:     Critical care time (minutes):  34    Critical care time was exclusive of:  Separately billable procedures and treating other patients and teaching time    Critical care was necessary to treat or prevent imminent or life-threatening deterioration of the following conditions:  Shock    Critical care was time spent personally by me on the following activities:  Obtaining history from patient or surrogate, evaluation of patient's response to treatment, examination of patient, ordering and performing treatments and interventions and re-evaluation of patient's condition    I assumed direction of critical care for this patient from another provider in my specialty: no

## 2023-03-19 LAB
ABO GROUP BLD BPU: NORMAL
ABO GROUP BLD BPU: NORMAL
ANION GAP SERPL CALCULATED.3IONS-SCNC: 3 MMOL/L (ref 4–13)
BASOPHILS # BLD AUTO: 0.03 THOUSANDS/ÂΜL (ref 0–0.1)
BASOPHILS NFR BLD AUTO: 0 % (ref 0–1)
BPU ID: NORMAL
BPU ID: NORMAL
BUN SERPL-MCNC: 54 MG/DL (ref 5–25)
CALCIUM SERPL-MCNC: 8.3 MG/DL (ref 8.3–10.1)
CHLORIDE SERPL-SCNC: 111 MMOL/L (ref 96–108)
CO2 SERPL-SCNC: 28 MMOL/L (ref 21–32)
CREAT SERPL-MCNC: 0.86 MG/DL (ref 0.6–1.3)
CROSSMATCH: NORMAL
EOSINOPHIL # BLD AUTO: 0.02 THOUSAND/ÂΜL (ref 0–0.61)
EOSINOPHIL NFR BLD AUTO: 0 % (ref 0–6)
ERYTHROCYTE [DISTWIDTH] IN BLOOD BY AUTOMATED COUNT: 16.7 % (ref 11.6–15.1)
FERRITIN SERPL-MCNC: 162 NG/ML (ref 8–388)
FOLATE SERPL-MCNC: 4 NG/ML (ref 3.1–17.5)
GFR SERPL CREATININE-BSD FRML MDRD: 56 ML/MIN/1.73SQ M
GLUCOSE SERPL-MCNC: 101 MG/DL (ref 65–140)
HCT VFR BLD AUTO: 25.8 % (ref 34.8–46.1)
HCT VFR BLD AUTO: 26.4 % (ref 34.8–46.1)
HCT VFR BLD AUTO: 27.5 % (ref 34.8–46.1)
HCT VFR BLD AUTO: 29 % (ref 34.8–46.1)
HGB BLD-MCNC: 8.3 G/DL (ref 11.5–15.4)
HGB BLD-MCNC: 8.6 G/DL (ref 11.5–15.4)
HGB BLD-MCNC: 8.8 G/DL (ref 11.5–15.4)
HGB BLD-MCNC: 9.1 G/DL (ref 11.5–15.4)
IMM GRANULOCYTES # BLD AUTO: 0.15 THOUSAND/UL (ref 0–0.2)
IMM GRANULOCYTES NFR BLD AUTO: 1 % (ref 0–2)
IRON SATN MFR SERPL: 19 % (ref 15–50)
IRON SERPL-MCNC: 36 UG/DL (ref 50–170)
LDH SERPL-CCNC: 165 U/L (ref 81–234)
LYMPHOCYTES # BLD AUTO: 1.38 THOUSANDS/ÂΜL (ref 0.6–4.47)
LYMPHOCYTES NFR BLD AUTO: 9 % (ref 14–44)
MCH RBC QN AUTO: 30.7 PG (ref 26.8–34.3)
MCHC RBC AUTO-ENTMCNC: 32.6 G/DL (ref 31.4–37.4)
MCV RBC AUTO: 94 FL (ref 82–98)
MONOCYTES # BLD AUTO: 0.71 THOUSAND/ÂΜL (ref 0.17–1.22)
MONOCYTES NFR BLD AUTO: 5 % (ref 4–12)
NEUTROPHILS # BLD AUTO: 12.58 THOUSANDS/ÂΜL (ref 1.85–7.62)
NEUTS SEG NFR BLD AUTO: 85 % (ref 43–75)
NRBC BLD AUTO-RTO: 0 /100 WBCS
PLATELET # BLD AUTO: 222 THOUSANDS/UL (ref 149–390)
PMV BLD AUTO: 9.9 FL (ref 8.9–12.7)
POTASSIUM SERPL-SCNC: 3.4 MMOL/L (ref 3.5–5.3)
RBC # BLD AUTO: 2.8 MILLION/UL (ref 3.81–5.12)
SODIUM SERPL-SCNC: 142 MMOL/L (ref 135–147)
TIBC SERPL-MCNC: 188 UG/DL (ref 250–450)
UNIT DISPENSE STATUS: NORMAL
UNIT DISPENSE STATUS: NORMAL
UNIT PRODUCT CODE: NORMAL
UNIT PRODUCT CODE: NORMAL
UNIT PRODUCT VOLUME: 125 ML
UNIT PRODUCT VOLUME: 350 ML
UNIT RH: NORMAL
UNIT RH: NORMAL
VIT B12 SERPL-MCNC: 277 PG/ML (ref 100–900)
WBC # BLD AUTO: 14.87 THOUSAND/UL (ref 4.31–10.16)

## 2023-03-19 RX ORDER — CYANOCOBALAMIN 1000 UG/ML
1000 INJECTION, SOLUTION INTRAMUSCULAR; SUBCUTANEOUS DAILY
Status: COMPLETED | OUTPATIENT
Start: 2023-03-19 | End: 2023-03-21

## 2023-03-19 RX ORDER — POTASSIUM CHLORIDE 20 MEQ/1
20 TABLET, EXTENDED RELEASE ORAL ONCE
Status: COMPLETED | OUTPATIENT
Start: 2023-03-19 | End: 2023-03-19

## 2023-03-19 RX ADMIN — CHOLECALCIFEROL TAB 25 MCG (1000 UNIT) 1000 UNITS: 25 TAB at 09:10

## 2023-03-19 RX ADMIN — ONDANSETRON 4 MG: 2 INJECTION INTRAMUSCULAR; INTRAVENOUS at 09:50

## 2023-03-19 RX ADMIN — SERTRALINE HYDROCHLORIDE 25 MG: 25 TABLET ORAL at 09:09

## 2023-03-19 RX ADMIN — SODIUM CHLORIDE 75 ML/HR: 0.9 INJECTION, SOLUTION INTRAVENOUS at 14:53

## 2023-03-19 RX ADMIN — SODIUM CHLORIDE 8 MG/HR: 9 INJECTION, SOLUTION INTRAVENOUS at 14:53

## 2023-03-19 RX ADMIN — SODIUM CHLORIDE 75 ML/HR: 0.9 INJECTION, SOLUTION INTRAVENOUS at 02:30

## 2023-03-19 RX ADMIN — SODIUM CHLORIDE 8 MG/HR: 9 INJECTION, SOLUTION INTRAVENOUS at 07:22

## 2023-03-19 RX ADMIN — POTASSIUM CHLORIDE 20 MEQ: 1500 TABLET, EXTENDED RELEASE ORAL at 09:10

## 2023-03-19 RX ADMIN — CYANOCOBALAMIN 1000 MCG: 1000 INJECTION, SOLUTION INTRAMUSCULAR at 13:59

## 2023-03-19 NOTE — PROGRESS NOTES
"[facility]  Progress Note - Darius Jack 5/16/1925, 80 y o  female MRN: [id number]  Unit/Bed#: [facility] 801-01 Encounter: [id number]  Primary Care Provider: Jan Cruz   Date and time admitted to hospital: 3/18/2023  8:00 AM    * GI bleed  Assessment & Plan  · Found by son with bloody vomitus on shirt - had multiple melanotic stools in the ED  · An almost 4-point drop in hemoglobin today when compared to a few months prior coupled with mild/transient hypotension responsive to IV fluids  · See plan for acute blood loss anemia below  · Currently on clear liquids  · Protonix drip initiated -> holding Eliquis/Naproxen  · Per CT imaging: \"No CT evidence of active high volume gastrointestinal hemorrhage  Findings of gastritis and nonspecific enteritis  Moderate rectal stool ball  Stable 3 4 cm left adnexal cystic lesion  Recommend follow-up pelvic ultrasound in one year  \"  · For EGD on Monday    Acute blood loss anemia  Assessment & Plan  · Baseline hemoglobin between 12-14 -> presents significantly lower at 8 7 today (was 12 4 two months ago)  · Secondary to coffee-ground emesis at home and multiple melanotic stools in the ED in the setting of Eliquis use   · Due to borderline hypotension given IV fluids and transfused a unit of PRBCs and cryoprecipitate in the ED  · Continue serial H/H monitoring - maintain hemodynamics -> hold Lasix and continue IV fluid infusion -> stop NSAID (Naproxen) and anticoagulation (Eliquis)  · Continue Protonix drip  · Iron studies consistent with adequate iron stores  · B12 is low/normal will start supplementation    Lower extremity edema  Assessment & Plan  Chronic issue  Holding diuretic (Lasix) in the setting of blood loss anemia with borderline hypotension requiring IV fluids  Compression stockings ordered in lieu of diuretic with holding -> anticipate mild fluctuation in edema due to IV fluids    History of venous thromboembolism  Assessment & " Plan  History of pulmonary embolism and DVT noted  Holding Eliquis in the setting of GI bleed    Leukocytosis  Assessment & Plan  Likely reactive due to acute medical issue(s)  Monitor WBC count    Cognitive impairment  Assessment & Plan  Continue Zoloft    Paroxysmal supraventricular tachycardia   Assessment & Plan  Continue Lopressor (w/ holding parameters)    Chronic kidney disease stage 3   Assessment & Plan  Baseline creatinine of approximately 0 9-1 1 -> currently stable at 1 23  Monitor renal function and urine output - limit/avoid nephrotoxins and hypotension as possible - holding Lasix currently          VTE Pharmacologic Prophylaxis:   Moderate Risk (Score 3-4) - Pharmacological DVT Prophylaxis Contraindicated  Sequential Compression Devices Ordered  Patient Centered Rounds: I performed bedside rounds with nursing staff today  Discussions with Specialists or Other Care Team Provider: nurse, KALPESH    Education and Discussions with Family / Patient: Updated  (son) via phone  Total Time Spent on Date of Encounter in care of patient: 25 minutes This time was spent on one or more of the following: performing physical exam; counseling and coordination of care; obtaining or reviewing history; documenting in the medical record; reviewing/ordering tests, medications or procedures; communicating with other healthcare professionals and discussing with patient's family/caregivers  Current Length of Stay: 1 day(s)  Current Patient Status: Inpatient   Certification Statement: The patient will continue to require additional inpatient hospital stay due to EGD tomorrow  Discharge Plan: Anticipate discharge in 24-48 hrs to home      Code Status: Level 3 - DNAR and DNI    Subjective:   Reports nausea, no vomiting or diarrhea since arrival    Objective:     Vitals:   Temp (24hrs), Av 4 °F (36 3 °C), Min:97 °F (36 1 °C), Max:97 7 °F (36 5 °C)    Temp:  [97 °F (36 1 °C)-97 7 °F (36 5 °C)] 97 7 °F (36 5 °C)  HR:  [] 86  Resp:  [15-20] 17  BP: (105-121)/(51-59) 111/54  SpO2:  [97 %-100 %] 100 %  Body mass index is 20 4 kg/m²  Input and Output Summary (last 24 hours): Intake/Output Summary (Last 24 hours) at 3/19/2023 1241  Last data filed at 3/19/2023 0721  Gross per 24 hour   Intake 1364 92 ml   Output --   Net 1364 92 ml       Physical Exam:   Physical Exam  Constitutional:       Appearance: Normal appearance  HENT:      Head: Normocephalic and atraumatic  Nose: Nose normal    Eyes:      Extraocular Movements: Extraocular movements intact  Cardiovascular:      Rate and Rhythm: Normal rate and regular rhythm  Pulmonary:      Effort: Pulmonary effort is normal       Breath sounds: No wheezing or rales  Abdominal:      General: There is no distension  Palpations: Abdomen is soft  Tenderness: There is no abdominal tenderness  Musculoskeletal:      Right lower leg: No edema  Left lower leg: No edema  Neurological:      General: No focal deficit present  Mental Status: She is alert and oriented to person, place, and time  Psychiatric:         Mood and Affect: Mood normal          Behavior: Behavior normal           Additional Data:     Labs:  Results from last 7 days   Lab Units 03/19/23  1152 03/19/23  0437 03/18/23  0932 03/18/23  0912   WBC Thousand/uL  --  14 87*  --  15 46*   HEMOGLOBIN g/dL 8 3* 8 6*   < > 8 7*   I STAT HEMOGLOBIN   --   --    < >  --    HEMATOCRIT % 25 8* 26 4*   < > 27 7*   HEMATOCRIT, ISTAT   --   --    < >  --    PLATELETS Thousands/uL  --  222  --  328   BANDS PCT %  --   --   --  22*   NEUTROS PCT %  --  85*  --   --    LYMPHS PCT %  --  9*  --   --    LYMPHO PCT %  --   --   --  3*   MONOS PCT %  --  5  --   --    MONO PCT %  --   --   --  3*   EOS PCT %  --  0  --  0    < > = values in this interval not displayed       Results from last 7 days   Lab Units 03/19/23  0437 03/18/23  0932 03/18/23  0930   SODIUM mmol/L 142  --  140 POTASSIUM mmol/L 3 4*  --  3 8   CHLORIDE mmol/L 111*  --  105   CO2 mmol/L 28  --  26   CO2, I-STAT   --    < >  --    BUN mg/dL 54*  --  69*   CREATININE mg/dL 0 86  --  1 23   ANION GAP mmol/L 3*  --  9   CALCIUM mg/dL 8 3  --  8 2*   ALBUMIN g/dL  --   --  2 3*   TOTAL BILIRUBIN mg/dL  --   --  0 46   ALK PHOS U/L  --   --  75   ALT U/L  --   --  10*   AST U/L  --   --  8   GLUCOSE RANDOM mg/dL 101  --  179*    < > = values in this interval not displayed  Results from last 7 days   Lab Units 03/18/23  0912   INR  1 35*             Results from last 7 days   Lab Units 03/18/23  1140 03/18/23  0912   LACTIC ACID mmol/L 3 4* 3 7*       Lines/Drains:  Invasive Devices     Peripheral Intravenous Line  Duration           Peripheral IV 03/18/23 Distal;Left;Upper;Ventral (anterior) Arm 1 day    Peripheral IV 03/18/23 Right;Ventral (anterior) Forearm 1 day                      Imaging: No pertinent imaging reviewed  Recent Cultures (last 7 days):         Last 24 Hours Medication List:   Current Facility-Administered Medications   Medication Dose Route Frequency Provider Last Rate   • acetaminophen  650 mg Oral Q6H PRN Handy Tejada MD     • cholecalciferol  1,000 Units Oral Daily Handy Tejada MD     • cyanocobalamin  1,000 mcg Intramuscular Daily Tj Otero MD     • metoprolol  2 5 mg Intravenous Q6H PRN Handy Tejada MD     • ondansetron  4 mg Intravenous Q4H PRN Handy Tejada MD     • pantoprozole (PROTONIX) infusion (Continuous)  8 mg/hr Intravenous Continuous Handy Tejada MD 8 mg/hr (03/19/23 7041)   • sertraline  25 mg Oral Daily Handy Tejada MD     • sodium chloride  75 mL/hr Intravenous Continuous Handy Tejada MD 75 mL/hr (03/19/23 0230)        Today, Patient Was Seen By: Claudia Liu MD    **Please Note: This note may have been constructed using a voice recognition system  **

## 2023-03-19 NOTE — PLAN OF CARE
Problem: Potential for Falls  Goal: Patient will remain free of falls  Description: INTERVENTIONS:  - Educate patient/family on patient safety including physical limitations  - Instruct patient to call for assistance with activity   - Consult OT/PT to assist with strengthening/mobility   - Keep Call bell within reach  - Keep bed low and locked with side rails adjusted as appropriate  - Keep care items and personal belongings within reach  - Initiate and maintain comfort rounds  - Make Fall Risk Sign visible to staff  - Offer Toileting every 2 Hours, in advance of need  - Initiate/Maintain bed alarm  - Apply yellow socks and bracelet for high fall risk patients  - Consider moving patient to room near nurses station  Outcome: Progressing     Problem: Prexisting or High Potential for Compromised Skin Integrity  Goal: Skin integrity is maintained or improved  Description: INTERVENTIONS:  - Identify patients at risk for skin breakdown  - Assess and monitor skin integrity  - Assess and monitor nutrition and hydration status  - Monitor labs   - Assess for incontinence   - Turn and reposition patient  - Assist with mobility/ambulation  - Relieve pressure over bony prominences  - Avoid friction and shearing  - Provide appropriate hygiene as needed including keeping skin clean and dry  - Evaluate need for skin moisturizer/barrier cream  - Collaborate with interdisciplinary team   - Patient/family teaching  - Consider wound care consult   Outcome: Progressing     Problem: CARDIOVASCULAR - ADULT  Goal: Maintains optimal cardiac output and hemodynamic stability  Description: INTERVENTIONS:  - Monitor I/O, vital signs and rhythm  - Monitor for S/S and trends of decreased cardiac output  - Administer and titrate ordered vasoactive medications to optimize hemodynamic stability  - Assess quality of pulses, skin color and temperature  - Assess for signs of decreased coronary artery perfusion  - Instruct patient to report change in severity of symptoms  Outcome: Progressing

## 2023-03-19 NOTE — PROGRESS NOTES
Progress Note -  [facility]  Darius Jack 80 y o  female MRN: [id number]  Unit/Bed#: [facility] 801-01 Encounter: [id number]      ASSESSMENT AND PLAN:      61-year-old female with history significant for DVT on Eliquis, HTN, CKD stage III admitted with coffee ground emesis and melena, found to have acute blood loss anemia with significant drop in hemoglobin concerning for a GI bleed      Suspect upper GI bleed in the setting of coffee-ground emesis and melena along with acute drop in hemoglobin with elevated BUN  Patient has a history of daily NSAID use which makes peptic ulcer disease high on the differential   Differential diagnosis also includes H  pylori, gastritis, duodenitis, erosive esophagitis  Discussed overall care and expectations with patient's son, Ruba Lund, who understands her overall situation and is agreeable to endoscopic evaluation with EGD  Hemoglobin remains stable at 8 6 today  No further evidence of melena today per nursing  1  N p o  after midnight for EGD tomorrow to evaluate for potential source of bleeding  2  Anemia work-up including iron panel, B12, folate, LDH, haptoglobin  3  Continue to monitor stool output and hemoglobin  Transfuse for hemoglobin less than 7   4  Continue Protonix twice daily  Rest of care per primary team     ______________________________________________________________________    Subjective: No acute complaints this morning  REVIEW OF SYSTEMS:  10 point ROS reviewed and negative except otherwise noted in the HPI above       Historical Information   Past Medical History:   Diagnosis Date   • Interstitial cystitis    • Leukopenia    • Neurologic gait dysfunction     Abstraction Dr Jean Mares charts   • Neuropathy    • Neutropenia [facility])     Abstraction Dr Janay Sanchez   • Osteoarthritis    • Osteoporosis    • Peripheral edema     Abstraction Dr Jean Mares charts   • Renal cyst    • Syncope     Abstraction Dr Jean Mares charts     Past Surgical "History:   Procedure Laterality Date   • CAPSULOTOMY      Right eye   • CATARACT EXTRACTION Left    • CHALAZION EXCISION     • COLONOSCOPY       Social History   Social History     Substance and Sexual Activity   Alcohol Use Yes    Comment: socially     Social History     Substance and Sexual Activity   Drug Use No     Social History     Tobacco Use   Smoking Status Former   • Packs/day: 1 00   • Years: 20 00   • Pack years: 20    • Types: Cigarettes   • Quit date: 5   • Years since quittin 2   Smokeless Tobacco Never     Family History   Problem Relation Age of Onset   • Diabetes Mother    • Lung disease Father    • Cancer Maternal Grandfather    • Lung disease Sister        Meds/Allergies     Medications Prior to Admission   Medication   • acetaminophen (TYLENOL) 325 mg tablet   • apixaban (ELIQUIS) 5 mg   • Cholecalciferol (VITAMIN D-3) 25 MCG (1000 UT) CAPS   • Diclofenac Sodium (VOLTAREN) 1 %   • furosemide (Lasix) 40 mg tablet   • METOPROLOL SUCCINATE PO   • naproxen sodium (Aleve) 220 MG tablet   • sertraline (Zoloft) 25 mg tablet     Current Facility-Administered Medications   Medication Dose Route Frequency   • acetaminophen (TYLENOL) tablet 650 mg  650 mg Oral Q6H PRN   • cholecalciferol (VITAMIN D3) tablet 1,000 Units  1,000 Units Oral Daily   • metoprolol (LOPRESSOR) injection 2 5 mg  2 5 mg Intravenous Q6H PRN   • ondansetron (ZOFRAN) injection 4 mg  4 mg Intravenous Q4H PRN   • pantoprazole (PROTONIX) 80 mg in sodium chloride 0 9 % 100 mL infusion  8 mg/hr Intravenous Continuous   • sertraline (ZOLOFT) tablet 25 mg  25 mg Oral Daily   • sodium chloride 0 9 % infusion  75 mL/hr Intravenous Continuous       No Known Allergies      Objective     Blood pressure 111/54, pulse 86, temperature 97 7 °F (36 5 °C), resp  rate 17, height 5' 5\" (1 651 m), weight 55 6 kg (122 lb 9 2 oz), SpO2 100 %  Body mass index is 20 4 kg/m²        Intake/Output Summary (Last 24 hours) at 3/19/2023 1010  Last data " filed at 3/19/2023 0721  Gross per 24 hour   Intake 2239 92 ml   Output --   Net 2239 92 ml         PHYSICAL EXAM:    General: Well-appearing, NAD  Eyes: no conjunctival icterus or pallor  Abdominal: Soft, non-tender, non-distended  Neuro: alert and oriented  Psych: Normal affect      Lab Results:   Admission on 03/18/2023   Component Date Value   • WBC 03/18/2023 15 46 (H)    • RBC 03/18/2023 2 80 (L)    • Hemoglobin 03/18/2023 8 7 (L)    • Hematocrit 03/18/2023 27 7 (L)    • MCV 03/18/2023 99 (H)    • MCH 03/18/2023 31 1    • MCHC 03/18/2023 31 4    • RDW 03/18/2023 15 1    • MPV 03/18/2023 9 8    • Platelets 62/96/5305 328    • Sodium 03/18/2023 140    • Potassium 03/18/2023 3 8    • Chloride 03/18/2023 105    • CO2 03/18/2023 26    • ANION GAP 03/18/2023 9    • BUN 03/18/2023 69 (H)    • Creatinine 03/18/2023 1 23    • Glucose 03/18/2023 179 (H)    • Calcium 03/18/2023 8 2 (L)    • Corrected Calcium 03/18/2023 9 6    • AST 03/18/2023 8    • ALT 03/18/2023 10 (L)    • Alkaline Phosphatase 03/18/2023 75    • Total Protein 03/18/2023 5 1 (L)    • Albumin 03/18/2023 2 3 (L)    • Total Bilirubin 03/18/2023 0 46    • eGFR 03/18/2023 36    • Ammonia 03/18/2023 34    • LACTIC ACID 03/18/2023 3 7 (HH)    • ABO Grouping 03/18/2023 O    • Rh Factor 03/18/2023 Positive    • Antibody Screen 03/18/2023 Negative    • Specimen Expiration Date 03/18/2023 [id number]    • Unit Product Code 03/19/2023 [id number]    • Unit Number 03/19/2023 [id number]    • Unit ABO 03/19/2023 O    • Unit [facility] 03/19/2023 POS    • Crossmatch 03/19/2023 Compatible    • Unit Dispense Status 03/19/2023 Presumed Trans    • Unit Product Volume 03/19/2023 350    • Protime 03/18/2023 16 9 (H)    • INR 03/18/2023 1 35 (H)    • CKR(REACTION TIME) 03/18/2023 3 5 (L)    • CKLY30 03/18/2023 0 2    • CRTMA(RAPIDTEG MAX AMPLI* 03/18/2023 66 2    • CFFMA (FUNCTIONAL FIBRIN* 03/18/2023 30 7    • Ventricular Rate 03/18/2023 95    • Atrial Rate 03/18/2023 174    • QRSD Interval 03/18/2023 122    • QT Interval 03/18/2023 412    • QTC Interval 03/18/2023 517    • QRS Axis 03/18/2023 -78    • T Wave Axis 03/18/2023 77    • Unit Product Code 03/19/2023 P3928I63    • Unit Number 03/19/2023 [id number]    • Unit ABO 03/19/2023 O    • Unit [facility] 03/19/2023 POS    • Unit Dispense Status 03/19/2023 Presumed Trans    • Unit Product Volume 03/19/2023 125    • ph, Valeriano ISTAT 03/18/2023 7  353    • pCO2, Valeriano i-STAT 03/18/2023 50 2 (H)    • pO2, Valeriano i-STAT 03/18/2023 17 0 (L)    • BE, i-STAT 03/18/2023 2    • HCO3, Valeriano i-STAT 03/18/2023 27 9    • CO2, i-STAT 03/18/2023 29    • O2 Sat, i-STAT 03/18/2023 22 (L)    • SODIUM, I-STAT 03/18/2023 140    • Potassium, i-STAT 03/18/2023 3 8    • Calcium, Ionized i-STAT 03/18/2023 1 17    • Hct, i-STAT 03/18/2023 26 (L)    • Hgb, i-STAT 03/18/2023 8 8 (L)    • Glucose, i-STAT 03/18/2023 171 (H)    • Specimen Type 03/18/2023 VENOUS    • LACTIC ACID 03/18/2023 3 4 (HH)    • Segmented % 03/18/2023 70    • Bands % 03/18/2023 22 (H)    • Lymphocytes % 03/18/2023 3 (L)    • Monocytes % 03/18/2023 3 (L)    • Eosinophils, % 03/18/2023 0    • Basophils % 03/18/2023 0    • Atypical Lymphocytes % 03/18/2023 2 (H)    • Absolute Neutrophils 03/18/2023 14 22 (H)    • Lymphocytes Absolute 03/18/2023 0 46 (L)    • Monocytes Absolute 03/18/2023 0 46    • Eosinophils Absolute 03/18/2023 0 00    • Basophils Absolute 03/18/2023 0 00    • RBC Morphology 03/18/2023 Present    • Polychromasia 03/18/2023 Present    • Platelet Estimate 07/44/1235 Adequate    • Hemoglobin 03/18/2023 8 8 (L)    • Hematocrit 03/18/2023 27 8 (L)    • Hemoglobin 03/18/2023 8 8 (L)    • Hematocrit 03/18/2023 27 5 (L)    • Sodium 03/19/2023 142    • Potassium 03/19/2023 3 4 (L)    • Chloride 03/19/2023 111 (H)    • CO2 03/19/2023 28    • ANION GAP 03/19/2023 3 (L)    • BUN 03/19/2023 54 (H)    • Creatinine 03/19/2023 0 86    • Glucose 03/19/2023 101    • Calcium 03/19/2023 8 3    • eGFR 03/19/2023 56 • WBC 03/19/2023 14 87 (H)    • RBC 03/19/2023 2 80 (L)    • Hemoglobin 03/19/2023 8 6 (L)    • Hematocrit 03/19/2023 26 4 (L)    • MCV 03/19/2023 94    • MCH 03/19/2023 30 7    • MCHC 03/19/2023 32 6    • RDW 03/19/2023 16 7 (H)    • MPV 03/19/2023 9 9    • Platelets 39/49/6787 222    • nRBC 03/19/2023 0    • Neutrophils Relative 03/19/2023 85 (H)    • Immat GRANS % 03/19/2023 1    • Lymphocytes Relative 03/19/2023 9 (L)    • Monocytes Relative 03/19/2023 5    • Eosinophils Relative 03/19/2023 0    • Basophils Relative 03/19/2023 0    • Neutrophils Absolute 03/19/2023 12 58 (H)    • Immature Grans Absolute 03/19/2023 0 15    • Lymphocytes Absolute 03/19/2023 1 38    • Monocytes Absolute 03/19/2023 0 71    • Eosinophils Absolute 03/19/2023 0 02    • Basophils Absolute 03/19/2023 0 03        Imaging Studies: I have personally reviewed pertinent imaging studies  SILVANO Brown    Gastroenterology Fellow  PGY-4  Available on [facility]  3/19/2023 10:10 AM

## 2023-03-19 NOTE — PLAN OF CARE
Problem: Potential for Falls  Goal: Patient will remain free of falls  Description: INTERVENTIONS:  - Educate patient/family on patient safety including physical limitations  - Instruct patient to call for assistance with activity   - Consult OT/PT to assist with strengthening/mobility   - Keep Call bell within reach  - Keep bed low and locked with side rails adjusted as appropriate  - Keep care items and personal belongings within reach  - Initiate and maintain comfort rounds  - Make Fall Risk Sign visible to staff  - Offer Toileting every 2 Hours, in advance of need  - Initiate/Maintain bed alarm  - Apply yellow socks and bracelet for high fall risk patients  - Consider moving patient to room near nurses station  Outcome: Progressing     Problem: Prexisting or High Potential for Compromised Skin Integrity  Goal: Skin integrity is maintained or improved  Description: INTERVENTIONS:  - Identify patients at risk for skin breakdown  - Assess and monitor skin integrity  - Assess and monitor nutrition and hydration status  - Monitor labs   - Assess for incontinence   - Turn and reposition patient  - Assist with mobility/ambulation  - Relieve pressure over bony prominences  - Avoid friction and shearing  - Provide appropriate hygiene as needed including keeping skin clean and dry  - Evaluate need for skin moisturizer/barrier cream  - Collaborate with interdisciplinary team   - Patient/family teaching  - Consider wound care consult   Outcome: Progressing     Problem: CARDIOVASCULAR - ADULT  Goal: Maintains optimal cardiac output and hemodynamic stability  Description: INTERVENTIONS:  - Monitor I/O, vital signs and rhythm  - Monitor for S/S and trends of decreased cardiac output  - Administer and titrate ordered vasoactive medications to optimize hemodynamic stability  - Assess quality of pulses, skin color and temperature  - Assess for signs of decreased coronary artery perfusion  - Instruct patient to report change in severity of symptoms  Outcome: Progressing     Problem: GASTROINTESTINAL - ADULT  Goal: Minimal or absence of nausea and/or vomiting  Description: INTERVENTIONS:  - Administer IV fluids if ordered to ensure adequate hydration  - Maintain NPO status until nausea and vomiting are resolved  - Nasogastric tube if ordered  - Administer ordered antiemetic medications as needed  - Provide nonpharmacologic comfort measures as appropriate  - Advance diet as tolerated, if ordered  - Consider nutrition services referral to assist patient with adequate nutrition and appropriate food choices  Outcome: Progressing     Problem: METABOLIC, FLUID AND ELECTROLYTES - ADULT  Goal: Electrolytes maintained within normal limits  Description: INTERVENTIONS:  - Monitor labs and assess patient for signs and symptoms of electrolyte imbalances  - Administer electrolyte replacement as ordered  - Monitor response to electrolyte replacements, including repeat lab results as appropriate  - Instruct patient on fluid and nutrition as appropriate  Outcome: Progressing  Goal: Fluid balance maintained  Description: INTERVENTIONS:  - Monitor labs   - Monitor I/O and WT  - Instruct patient on fluid and nutrition as appropriate  - Assess for signs & symptoms of volume excess or deficit  Outcome: Progressing     Problem: SKIN/TISSUE INTEGRITY - ADULT  Goal: Pressure injury heals and does not worsen  Description: Interventions:  - Implement low air loss mattress or specialty surface (Criteria met)  - Apply silicone foam dressing  - Instruct/assist with weight shifting every 30 minutes when in chair   - Limit chair time to 4 hour intervals  - Use special pressure reducing interventions such as soft care cushion when in chair   - Apply fecal or urinary incontinence containment device   - Perform passive or active ROM every shift  - Turn and reposition patient & offload bony prominences every 2 hours   - Utilize friction reducing device or surface for transfers   - Consider consults to  interdisciplinary teams such as wound care  - Use incontinent care products after each incontinent episode such as moisture barrier  - Consider nutrition services referral as needed  Outcome: Progressing

## 2023-03-19 NOTE — PLAN OF CARE
Problem: Potential for Falls  Goal: Patient will remain free of falls  Description: INTERVENTIONS:  - Educate patient/family on patient safety including physical limitations  - Instruct patient to call for assistance with activity   - Consult OT/PT to assist with strengthening/mobility   - Keep Call bell within reach  - Keep bed low and locked with side rails adjusted as appropriate  - Keep care items and personal belongings within reach  - Initiate and maintain comfort rounds  - Make Fall Risk Sign visible to staff  - Offer Toileting every 2 Hours, in advance of need  - Initiate/Maintain bed alarm  - Apply yellow socks and bracelet for high fall risk patients  - Consider moving patient to room near nurses station  3/19/2023 0744 by Darnell Luna RN  Outcome: Progressing  3/19/2023 0744 by Darnell Luna RN  Outcome: Progressing     Problem: Prexisting or High Potential for Compromised Skin Integrity  Goal: Skin integrity is maintained or improved  Description: INTERVENTIONS:  - Identify patients at risk for skin breakdown  - Assess and monitor skin integrity  - Assess and monitor nutrition and hydration status  - Monitor labs   - Assess for incontinence   - Turn and reposition patient  - Assist with mobility/ambulation  - Relieve pressure over bony prominences  - Avoid friction and shearing  - Provide appropriate hygiene as needed including keeping skin clean and dry  - Evaluate need for skin moisturizer/barrier cream  - Collaborate with interdisciplinary team   - Patient/family teaching  - Consider wound care consult   3/19/2023 0744 by Darnell Luna RN  Outcome: Progressing  3/19/2023 0744 by Darnell Luna RN  Outcome: Progressing     Problem: CARDIOVASCULAR - ADULT  Goal: Maintains optimal cardiac output and hemodynamic stability  Description: INTERVENTIONS:  - Monitor I/O, vital signs and rhythm  - Monitor for S/S and trends of decreased cardiac output  - Administer and titrate ordered vasoactive medications to optimize hemodynamic stability  - Assess quality of pulses, skin color and temperature  - Assess for signs of decreased coronary artery perfusion  - Instruct patient to report change in severity of symptoms  3/19/2023 0744 by Uli Simms RN  Outcome: Progressing  3/19/2023 0744 by Uli Simms RN  Outcome: Progressing     Problem: GASTROINTESTINAL - ADULT  Goal: Minimal or absence of nausea and/or vomiting  Description: INTERVENTIONS:  - Administer IV fluids if ordered to ensure adequate hydration  - Maintain NPO status until nausea and vomiting are resolved  - Nasogastric tube if ordered  - Administer ordered antiemetic medications as needed  - Provide nonpharmacologic comfort measures as appropriate  - Advance diet as tolerated, if ordered  - Consider nutrition services referral to assist patient with adequate nutrition and appropriate food choices  3/19/2023 0744 by Uli Simms RN  Outcome: Progressing  3/19/2023 0744 by Uli Simms RN  Outcome: Progressing     Problem: METABOLIC, FLUID AND ELECTROLYTES - ADULT  Goal: Electrolytes maintained within normal limits  Description: INTERVENTIONS:  - Monitor labs and assess patient for signs and symptoms of electrolyte imbalances  - Administer electrolyte replacement as ordered  - Monitor response to electrolyte replacements, including repeat lab results as appropriate  - Instruct patient on fluid and nutrition as appropriate  3/19/2023 0744 by Uli Simms RN  Outcome: Progressing  3/19/2023 0744 by Uli Simms RN  Outcome: Progressing  Goal: Fluid balance maintained  Description: INTERVENTIONS:  - Monitor labs   - Monitor I/O and WT  - Instruct patient on fluid and nutrition as appropriate  - Assess for signs & symptoms of volume excess or deficit  3/19/2023 0744 by Uli Simms RN  Outcome: Progressing  3/19/2023 0744 by Uli Simms RN  Outcome: Progressing     Problem: SKIN/TISSUE INTEGRITY - ADULT  Goal: Pressure injury heals and does not worsen  Description: Interventions:  - Implement low air loss mattress or specialty surface (Criteria met)  - Apply silicone foam dressing  - Instruct/assist with weight shifting every 30 minutes when in chair   - Limit chair time to 4 hour intervals  - Use special pressure reducing interventions such as soft care cushion when in chair   - Apply fecal or urinary incontinence containment device   - Perform passive or active ROM every shift  - Turn and reposition patient & offload bony prominences every 2 hours   - Utilize friction reducing device or surface for transfers   - Consider consults to  interdisciplinary teams such as wound care  - Use incontinent care products after each incontinent episode such as moisture barrier  - Consider nutrition services referral as needed  3/19/2023 0744 by Francine Loya RN  Outcome: Progressing  3/19/2023 0744 by Francine Loya RN  Outcome: Progressing     Problem: PAIN - ADULT  Goal: Verbalizes/displays adequate comfort level or baseline comfort level  Description: Interventions:  - Encourage patient to monitor pain and request assistance  - Assess pain using appropriate pain scale  - Administer analgesics based on type and severity of pain and evaluate response  - Implement non-pharmacological measures as appropriate and evaluate response  - Consider cultural and social influences on pain and pain management  - Notify physician/advanced practitioner if interventions unsuccessful or patient reports new pain  3/19/2023 0744 by Francine Loya RN  Outcome: Progressing  3/19/2023 0744 by Francine Loya RN  Outcome: Progressing     Problem: INFECTION - ADULT  Goal: Absence or prevention of progression during hospitalization  Description: INTERVENTIONS:  - Assess and monitor for signs and symptoms of infection  - Monitor lab/diagnostic results  - Monitor all insertion sites, i e  indwelling lines, tubes, and drains  - Monitor endotracheal if appropriate and nasal secretions for changes in amount and color  - [place] appropriate cooling/warming therapies per order  - Administer medications as ordered  - Instruct and encourage patient and family to use good hand hygiene technique  - Identify and instruct in appropriate isolation precautions for identified infection/condition  3/19/2023 0744 by Elise Gregorio RN  Outcome: Progressing  3/19/2023 0744 by Elise Gregorio RN  Outcome: Progressing     Problem: DISCHARGE PLANNING  Goal: Discharge to home or other facility with appropriate resources  Description: INTERVENTIONS:  - Identify barriers to discharge w/patient and caregiver  - Arrange for needed discharge resources and transportation as appropriate  - Identify discharge learning needs (meds, wound care, etc )  - Arrange for interpretive services to assist at discharge as needed  - Refer to Case Management Department for coordinating discharge planning if the patient needs post-hospital services based on physician/advanced practitioner order or complex needs related to functional status, cognitive ability, or social support system  3/19/2023 0744 by Elise Gregorio RN  Outcome: Progressing  3/19/2023 0744 by Elise Gregorio RN  Outcome: Progressing     Problem: Knowledge Deficit  Goal: Patient/family/caregiver demonstrates understanding of disease process, treatment plan, medications, and discharge instructions  Description: Complete learning assessment and assess knowledge base    Interventions:  - Provide teaching at level of understanding  - Provide teaching via preferred learning methods  3/19/2023 0744 by Elise Gregorio RN  Outcome: Progressing  3/19/2023 0744 by Elise Gregorio RN  Outcome: Progressing

## 2023-03-20 ENCOUNTER — ANESTHESIA EVENT (INPATIENT)
Dept: GASTROENTEROLOGY | Facility: HOSPITAL | Age: 88
End: 2023-03-20

## 2023-03-20 ENCOUNTER — APPOINTMENT (INPATIENT)
Dept: GASTROENTEROLOGY | Facility: HOSPITAL | Age: 88
End: 2023-03-20

## 2023-03-20 ENCOUNTER — ANESTHESIA (INPATIENT)
Dept: GASTROENTEROLOGY | Facility: HOSPITAL | Age: 88
End: 2023-03-20

## 2023-03-20 LAB
ANION GAP SERPL CALCULATED.3IONS-SCNC: 4 MMOL/L (ref 4–13)
BASOPHILS # BLD AUTO: 0.03 THOUSANDS/ÂΜL (ref 0–0.1)
BASOPHILS NFR BLD AUTO: 0 % (ref 0–1)
BUN SERPL-MCNC: 28 MG/DL (ref 5–25)
CALCIUM SERPL-MCNC: 7.9 MG/DL (ref 8.3–10.1)
CHLORIDE SERPL-SCNC: 112 MMOL/L (ref 96–108)
CO2 SERPL-SCNC: 26 MMOL/L (ref 21–32)
CREAT SERPL-MCNC: 0.69 MG/DL (ref 0.6–1.3)
EOSINOPHIL # BLD AUTO: 0.03 THOUSAND/ÂΜL (ref 0–0.61)
EOSINOPHIL NFR BLD AUTO: 0 % (ref 0–6)
ERYTHROCYTE [DISTWIDTH] IN BLOOD BY AUTOMATED COUNT: 16.4 % (ref 11.6–15.1)
GFR SERPL CREATININE-BSD FRML MDRD: 73 ML/MIN/1.73SQ M
GLUCOSE SERPL-MCNC: 97 MG/DL (ref 65–140)
HAPTOGLOB SERPL-MCNC: 187 MG/DL (ref 41–333)
HCT VFR BLD AUTO: 27.7 % (ref 34.8–46.1)
HCT VFR BLD AUTO: 27.8 % (ref 34.8–46.1)
HGB BLD-MCNC: 8.8 G/DL (ref 11.5–15.4)
HGB BLD-MCNC: 8.8 G/DL (ref 11.5–15.4)
IMM GRANULOCYTES # BLD AUTO: 0.15 THOUSAND/UL (ref 0–0.2)
IMM GRANULOCYTES NFR BLD AUTO: 1 % (ref 0–2)
LYMPHOCYTES # BLD AUTO: 0.87 THOUSANDS/ÂΜL (ref 0.6–4.47)
LYMPHOCYTES NFR BLD AUTO: 8 % (ref 14–44)
MCH RBC QN AUTO: 30.7 PG (ref 26.8–34.3)
MCHC RBC AUTO-ENTMCNC: 31.7 G/DL (ref 31.4–37.4)
MCV RBC AUTO: 97 FL (ref 82–98)
MONOCYTES # BLD AUTO: 0.64 THOUSAND/ÂΜL (ref 0.17–1.22)
MONOCYTES NFR BLD AUTO: 6 % (ref 4–12)
NEUTROPHILS # BLD AUTO: 9.41 THOUSANDS/ÂΜL (ref 1.85–7.62)
NEUTS SEG NFR BLD AUTO: 85 % (ref 43–75)
NRBC BLD AUTO-RTO: 0 /100 WBCS
PLATELET # BLD AUTO: 224 THOUSANDS/UL (ref 149–390)
PMV BLD AUTO: 10.1 FL (ref 8.9–12.7)
POTASSIUM SERPL-SCNC: 3.4 MMOL/L (ref 3.5–5.3)
RBC # BLD AUTO: 2.87 MILLION/UL (ref 3.81–5.12)
SODIUM SERPL-SCNC: 142 MMOL/L (ref 135–147)
WBC # BLD AUTO: 11.13 THOUSAND/UL (ref 4.31–10.16)

## 2023-03-20 RX ORDER — FUROSEMIDE 40 MG/1
40 TABLET ORAL DAILY
Status: DISCONTINUED | OUTPATIENT
Start: 2023-03-21 | End: 2023-03-21

## 2023-03-20 RX ORDER — PANTOPRAZOLE SODIUM 40 MG/1
40 TABLET, DELAYED RELEASE ORAL
Status: DISCONTINUED | OUTPATIENT
Start: 2023-03-20 | End: 2023-03-21

## 2023-03-20 RX ORDER — METOPROLOL SUCCINATE 25 MG/1
25 TABLET, EXTENDED RELEASE ORAL DAILY
Status: DISCONTINUED | OUTPATIENT
Start: 2023-03-20 | End: 2023-03-24

## 2023-03-20 RX ORDER — POTASSIUM CHLORIDE 20 MEQ/1
20 TABLET, EXTENDED RELEASE ORAL 2 TIMES DAILY
Status: DISCONTINUED | OUTPATIENT
Start: 2023-03-20 | End: 2023-03-21

## 2023-03-20 RX ADMIN — SODIUM CHLORIDE 75 ML/HR: 0.9 INJECTION, SOLUTION INTRAVENOUS at 04:51

## 2023-03-20 RX ADMIN — PANTOPRAZOLE SODIUM 40 MG: 40 TABLET, DELAYED RELEASE ORAL at 16:53

## 2023-03-20 RX ADMIN — POTASSIUM CHLORIDE 20 MEQ: 1500 TABLET, EXTENDED RELEASE ORAL at 09:11

## 2023-03-20 RX ADMIN — SERTRALINE HYDROCHLORIDE 25 MG: 25 TABLET ORAL at 09:11

## 2023-03-20 RX ADMIN — SODIUM CHLORIDE 8 MG/HR: 9 INJECTION, SOLUTION INTRAVENOUS at 02:01

## 2023-03-20 RX ADMIN — CYANOCOBALAMIN 1000 MCG: 1000 INJECTION, SOLUTION INTRAMUSCULAR at 09:10

## 2023-03-20 RX ADMIN — POTASSIUM CHLORIDE 20 MEQ: 1500 TABLET, EXTENDED RELEASE ORAL at 16:53

## 2023-03-20 RX ADMIN — METOPROLOL SUCCINATE 25 MG: 25 TABLET, FILM COATED, EXTENDED RELEASE ORAL at 16:53

## 2023-03-20 RX ADMIN — CHOLECALCIFEROL TAB 25 MCG (1000 UNIT) 1000 UNITS: 25 TAB at 09:10

## 2023-03-20 RX ADMIN — APIXABAN 5 MG: 5 TABLET, FILM COATED ORAL at 16:53

## 2023-03-20 NOTE — PLAN OF CARE
Problem: OCCUPATIONAL THERAPY ADULT  Goal: Performs self-care activities at highest level of function for planned discharge setting  See evaluation for individualized goals  Description: Treatment Interventions: ADL retraining, Functional transfer training, Endurance training, Patient/family training, Compensatory technique education, Energy conservation, Activityengagement, Continued evaluation, Equipment evaluation/education          See flowsheet documentation for full assessment, interventions and recommendations  3/20/2023 1245 by FrutoYozio Car, OT  Note: Limitation: Decreased ADL status, Decreased Safe judgement during ADL, Decreased endurance, Decreased self-care trans, Decreased high-level ADLs  Prognosis: Fair  Assessment: Pt is 80 y o  female admitted to [facility] on 3/18/2023 w/ GI Bleed  Pt  has a past medical history of Interstitial cystitis, Leukopenia, Neurologic gait dysfunction, Neuropathy, Neutropenia ([facility] [address] ), Osteoarthritis, Osteoporosis, Peripheral edema, Renal cyst, and Syncope    Pt with active OT orders and activity orders  Pt resides in a one story Home, with 5 VALENTIN  Pt lives with son who is home 24/7  Pt was I w/ ADLs, receives A with IADLs, (-) drove  Pt is currently functioning at S for eating and grooming, min A for UB ADLs, and max A for LB ADLs and toilet-ing  Pt requires max A for transfers and bed mobility  Pt requires mod Ax1 for rolling L and R  Pt is limited 2* pain, endurance, activity tolerance, functional mobility, balance, functional standing tolerance, decreased I w/ ADLS/IADLS and decreased safety awareness  The Areas of Occupational Performance Areas to address w/ pt include: eating, grooming, bathing/shower, toilet hygiene, dressing, health maintenance and functional mobility  Based off of an OT evaulation, assessment, and performance functioning, pt is identified as a high complexity  The patient's raw score on the AM-PAC Daily Activity Inpatient Short Form is 17   A raw score of less than 19 suggests the patient may benefit from discharge to post-acute rehabilitation services  Please refer to the recommendation of the Occupational Therapist for safe discharge planning  OT recommendation for d/c includes post acute rehab  Pt would benefit from continued acute OT services for  3-5x/week to  w/in 10-14 days:  to address functional goals  OT Discharge Recommendation: Post acute rehabilitation services       3/20/2023 1245 by Criss Song OT  Note: Limitation: Decreased ADL status, Decreased Safe judgement during ADL, Decreased endurance, Decreased self-care trans, Decreased high-level ADLs  Prognosis: Fair  Assessment: Pt is 80 y o  female admitted to [facility] on 3/18/2023 w/ GI Bleed  Pt  has a past medical history of Interstitial cystitis, Leukopenia, Neurologic gait dysfunction, Neuropathy, Neutropenia ([facility] [address] ), Osteoarthritis, Osteoporosis, Peripheral edema, Renal cyst, and Syncope    Pt with active OT orders and activity orders  Pt resides in a one story Home, with 5 VALENTIN  Pt lives with son who is home   Pt was I w/ ADLs, receives A with IADLs, (-) drove  Pt is currently functioning at S for eating and grooming, min A for UB ADLs, and max A for LB ADLs and toilet-ing  Pt requires max A for transfers and bed mobility  Pt requires mod Ax1 for rolling L and R  Pt is limited 2* pain, endurance, activity tolerance, functional mobility, balance, functional standing tolerance, decreased I w/ ADLS/IADLS and decreased safety awareness  The Areas of Occupational Performance Areas to address w/ pt include: eating, grooming, bathing/shower, toilet hygiene, dressing, health maintenance and functional mobility  Based off of an OT evaulation, assessment, and performance functioning, pt is identified as a high complexity  The patient's raw score on the AM-PAC Daily Activity Inpatient Short Form is 17   A raw score of less than 19 suggests the patient may benefit from discharge to post-acute rehabilitation services  Please refer to the recommendation of the Occupational Therapist for safe discharge planning  OT recommendation for d/c includes post acute rehab  Pt would benefit from continued acute OT services for  3-5x/week to  w/in 10-14 days:  to address functional goals       OT Discharge Recommendation: Post acute rehabilitation services

## 2023-03-20 NOTE — QUICK NOTE
Hgb remains stable without further signs of coffee ground emesis or melena  We will cancel EGD today  GI will sign off  Please call with questions or concerns

## 2023-03-20 NOTE — OCCUPATIONAL THERAPY NOTE
Occupational Therapy Evaluation     Patient Name: Jennifer Crespo  GYRLQ'W Date: 3/20/2023  Problem List  Principal Problem:    GI bleed  Active Problems:    Lower extremity edema    Chronic kidney disease stage 3     Paroxysmal supraventricular tachycardia     Cognitive impairment    Acute blood loss anemia    Leukocytosis    History of venous thromboembolism    Past Medical History  Past Medical History:   Diagnosis Date    Interstitial cystitis     Leukopenia     Neurologic gait dysfunction     Abstraction Dr Yudi Carter charts    Neuropathy     Neutropenia [facility])     Abstraction Dr Sriram Michelle     Osteoporosis     Peripheral edema     Abstraction Dr Cynthia Plasencia    Renal cyst     Syncope     Abstraction Dr Yudi Carter charts     Past Surgical History  Past Surgical History:   Procedure Laterality Date    CAPSULOTOMY      Right eye    CATARACT EXTRACTION Left     CHALAZION EXCISION      COLONOSCOPY  2006 03/20/23 0856   OT Last Visit   OT Visit Date 03/20/23   Note Type   Note type Evaluation   Pain Assessment   Pain Assessment Tool 0-10   Pain Score No Pain   Restrictions/Precautions   Weight Bearing Precautions Per Order No   Other Precautions Chair Alarm; Bed Alarm;Multiple lines;Telemetry; Fall Risk   Home Living   Type of [address] [place] [address] Av One level;Performs ADLs on one level;Stairs to enter with rails  (5STE,)   Bathroom Shower/Tub Tub/shower unit   [employer] Grab bars in shower;Commode;Tub transfer [address]; Other (Comment)  (walker at baseline)   Additional Comments Pt reports living in a [facility] with 5STE and uses a walker at baseline  Prior Function   Level of [place] Independent with ADLs; Independent with functional mobility; Needs assistance with IADLS   Lives With Son   Receives Help From Family   IADLs Independent with medication management; Family/Friend/Other provides "transportation; Family/Friend/Other provides meals   Falls in the last 6 months 1 to 4  (per pt 1)   Vocational Retired   Lifestyle   Autonomy Pt reports being I in ADLs, receives A with IADLs, and uses a walker at baseline  (-)   Reciprocal Relationships Pt lives with son who is home 24/7  Service to Others Pt is retired  Intrinsic Gratification Pt enjoys reading  General   Family/Caregiver Present No   Subjective   Subjective \"I don't feel good\"   ADL   Where Assessed Edge of bed   Eating Assistance 5  Supervision/Setup   Grooming Assistance 5  Supervision/Setup   UB Bathing Assistance 4  Minimal Assistance   LB Bathing Assistance 2  Maximal Assistance   UB Dressing Assistance 4  Minimal Assistance   LB Dressing Assistance 2  Maximal [address]  2  Maximal Assistance   Functional Assistance 2  Maximal Assistance   Bed Mobility   Rolling R 3  Moderate assistance   Additional items Assist x 1   Rolling L 3  Moderate assistance   Additional items Assist x 1   Supine to Sit 2  Maximal assistance   Additional items Assist x 2   Sit to Supine 2  Maximal assistance   Additional items Assist x 2   Additional Comments Pt was lying supine in bed  Pt was left lying supine in bed at the end of the session with all necessary items within reach and bed alarm on     Transfers   Sit to Stand 2  Maximal assistance   Additional items Assist x 2   Stand to Sit 2  Maximal assistance   Additional items Assist x 2   Additional Comments transfers with RW   Functional Mobility   Additional Comments deferred ambulation at this time due to weakness   Balance   Static Sitting Fair   Static Standing Poor +   Dynamic Standing Poor   Activity Tolerance   Activity Tolerance Patient limited by fatigue;Patient limited by pain   Medical Staff Made Aware PT Nataliya   Nurse Made Aware RN made aware   RUE Assessment   RUE Assessment WFL   LUE Assessment   LUE Assessment WFL   Hand Function   Gross Motor Coordination " Functional   Fine Motor Coordination Functional   Psychosocial   Psychosocial (WDL) WDL   Cognition   Overall Cognitive Status WFL   Arousal/Participation Alert; Responsive;Arousable; Cooperative   Attention Attends with cues to redirect   Orientation Level Oriented to person;Oriented to place;Oriented to situation;Disoriented to time   Memory Decreased recall of precautions   Following Commands Follows one step commands with increased time or repetition   Comments pt was pleasant and cooperative t/o session  Assessment   Limitation Decreased ADL status; Decreased Safe judgement during ADL;Decreased endurance;Decreased self-care trans;Decreased high-level ADLs   Prognosis Fair   Assessment Pt is 80 y o  female admitted to [facility] on 3/18/2023 w/ GI Bleed  Pt  has a past medical history of Interstitial cystitis, Leukopenia, Neurologic gait dysfunction, Neuropathy, Neutropenia ([facility] [address] ), Osteoarthritis, Osteoporosis, Peripheral edema, Renal cyst, and Syncope    Pt with active OT orders and activity orders  Pt resides in a one story Home, with 5 VALENTIN  Pt lives with son who is home 24/7  Pt was I w/ ADLs, receives A with IADLs, (-) drove  Pt is currently functioning at S for eating and grooming, min A for UB ADLs, and max A for LB ADLs and toilet-ing  Pt requires max A for transfers and bed mobility  Pt requires mod Ax1 for rolling L and R  Pt is limited 2* pain, endurance, activity tolerance, functional mobility, balance, functional standing tolerance, decreased I w/ ADLS/IADLS and decreased safety awareness  The Areas of Occupational Performance Areas to address w/ pt include: eating, grooming, bathing/shower, toilet hygiene, dressing, health maintenance and functional mobility  Based off of an OT evaulation, assessment, and performance functioning, pt is identified as a high complexity  The patient's raw score on the AM-PAC Daily Activity Inpatient Short Form is 17   A raw score of less than 19 suggests the patient may benefit from discharge to post-acute rehabilitation services  Please refer to the recommendation of the Occupational Therapist for safe discharge planning  OT recommendation for d/c includes post acute rehab  Pt would benefit from continued acute OT services for  3-5x/week to  w/in 10-14 days:  to address functional goals  Goals   Patient Goals to feel better   LTG Time Frame 10-14   Long Term Goal see goals below   Plan   Treatment Interventions ADL retraining;Functional transfer training; Endurance training;Patient/family training; Compensatory technique education; Energy conservation; Activityengagement;Continued evaluation;Equipment evaluation/education   Goal Expiration Date 23   OT Frequency 3-5x/wk   Recommendation   OT Discharge Recommendation Post acute rehabilitation services   AM-PAC Daily Activity Inpatient   Lower Body Dressing 2   Bathing 2   Toileting 2   Upper Body Dressing 3   Grooming 4   Eating 4   Daily Activity Raw Score 17   Daily Activity Standardized Score (Calc for Raw Score >=11) 37 26   AM-PAC Applied Cognition Inpatient   Following a Speech/Presentation 3   Understanding Ordinary Conversation 4   Taking Medications 4   Remembering Where Things Are Placed or Put Away 3   Remembering List of 4-5 Errands 3   Taking Care of Complicated Tasks 3   Applied Cognition Raw Score 20   Applied Cognition Standardized Score 41 76   End of Consult   Education Provided Yes   Patient Position at End of Consult Supine;Bed/Chair alarm activated; All needs within reach   Nurse Communication Nurse aware of consult       Pt w/ mod I will complete daily grooming tasks w/ adaptive equipment as needed  Pt will increase standing tolerance to 5 mins w/ mod I w/ adaptive equipment as needed  Pt will complete functional transfers w/ S on and off all surfaces w/ equipment as needed  Pt will complete functional mobility w/ S on and off all surfaces w/ equipment as needed       Pt will complete tolieting w/ S while demonstrating safety techniques w/ DME  Pt will complete feeding tasks w/ mod I using adaptive devices  Pt will demonstrate G safety awareness w/ mod I during OT session  Pt will demonstrate LE body dressing w/ S w/ adaptive equipment as needed  Pt will demonstrate UE body dressing w/ mod I w/ adaptive equipment as needed  Pt will increase activity tolerance to G during a 30 min OT tx session  Pt will demonstrate bed mobility skills w/ S    Pt will participate in a formal/functional cognitive assessment as needed to assist with safe discharge planning            Maday Adamson OTR/L

## 2023-03-20 NOTE — ASSESSMENT & PLAN NOTE
"· Found by son with bloody vomitus on shirt - had multiple melanotic stools in the ED  · An almost 4-point drop in hemoglobin today when compared to a few months prior coupled with mild/transient hypotension responsive to IV fluids  · See plan for acute blood loss anemia below  · Protonix drip initiated -> holding Eliquis/Naproxen  · Per CT imaging: \"No CT evidence of active high volume gastrointestinal hemorrhage  Findings of gastritis and nonspecific enteritis  Moderate rectal stool ball  Stable 3 4 cm left adnexal cystic lesion  Recommend follow-up pelvic ultrasound in one year  \"  · EGD cancelled by GI today as she has been stable  · Change protonix to BID  · Cleared to restart Eliquis per GI fellow  · Advance diet and monitor for tolerance  "

## 2023-03-20 NOTE — PROGRESS NOTES
"[facility]  Progress Note - Yury Edil 5/16/1925, 80 y o  female MRN: [id number]  Unit/Bed#: [facility] 801-01 Encounter: [id number]  Primary Care Provider: Ora Chapa   Date and time admitted to hospital: 3/18/2023  8:00 AM    * GI bleed  Assessment & Plan  · Found by son with bloody vomitus on shirt - had multiple melanotic stools in the ED  · An almost 4-point drop in hemoglobin today when compared to a few months prior coupled with mild/transient hypotension responsive to IV fluids  · See plan for acute blood loss anemia below  · Protonix drip initiated -> holding Eliquis/Naproxen  · Per CT imaging: \"No CT evidence of active high volume gastrointestinal hemorrhage  Findings of gastritis and nonspecific enteritis  Moderate rectal stool ball  Stable 3 4 cm left adnexal cystic lesion  Recommend follow-up pelvic ultrasound in one year  \"  · EGD cancelled by GI today as she has been stable  · Change protonix to BID  · Cleared to restart Eliquis per GI fellow  · Advance diet and monitor for tolerance    Acute blood loss anemia  Assessment & Plan  · Baseline hemoglobin between 12-14 -> presents significantly lower at 8 7 today (was 12 4 two months ago)  · Secondary to coffee-ground emesis at home and multiple melanotic stools in the ED in the setting of Eliquis use   · Due to borderline hypotension given IV fluids and transfused a unit of PRBCs and cryoprecipitate in the ED  · Hemoglobin has stabilized, continue advance diet and continue to monitor hb    Lower extremity edema  Assessment & Plan  Chronic issue  Restart lasix    History of venous thromboembolism  Assessment & Plan  History of pulmonary embolism and DVT noted  Restart Eliquis, monitor for tolerance    Leukocytosis  Assessment & Plan  Likely reactive due to acute medical issue(s)  Monitor WBC count    Cognitive impairment  Assessment & Plan  Continue Zoloft    Paroxysmal supraventricular tachycardia   Assessment & " Plan  Restart toprol    Chronic kidney disease stage 3   Assessment & Plan  Baseline creatinine of approximately 0 9-1 1 -> currently at baseline          VTE Pharmacologic Prophylaxis: VTE Score: 3 Moderate Risk (Score 3-4) - Pharmacological DVT Prophylaxis Ordered: apixaban (Eliquis)  Patient Centered Rounds: I performed bedside rounds with nursing staff today  Discussions with Specialists or Other Care Team Provider: nurse, CM, GI    Education and Discussions with Family / Patient: Updated  (son) via phone  Total Time Spent on Date of Encounter in care of patient: 25 minutes This time was spent on one or more of the following: performing physical exam; counseling and coordination of care; obtaining or reviewing history; documenting in the medical record; reviewing/ordering tests, medications or procedures; communicating with other healthcare professionals and discussing with patient's family/caregivers  Current Length of Stay: 2 day(s)  Current Patient Status: Inpatient   Certification Statement: The patient will continue to require additional inpatient hospital stay due to monitor for stability of Hb  Discharge Plan: Anticipate discharge in 24-48 hrs to home  Code Status: Level 3 - DNAR and DNI    Subjective:   Denies any new complaints, limited history gathering due to dementia  Feels hungry    Objective:     Vitals:   Temp (24hrs), Av 3 °F (36 3 °C), Min:96 4 °F (35 8 °C), Max:97 7 °F (36 5 °C)    Temp:  [96 4 °F (35 8 °C)-97 7 °F (36 5 °C)] 97 5 °F (36 4 °C)  HR:  [75-90] 82  Resp:  [14-18] 16  BP: (118-141)/(63-69) 133/64  SpO2:  [92 %-98 %] 98 %  Body mass index is 20 4 kg/m²  Input and Output Summary (last 24 hours): Intake/Output Summary (Last 24 hours) at 3/20/2023 1531  Last data filed at 3/20/2023 0451  Gross per 24 hour   Intake 1610 92 ml   Output 550 ml   Net 1060 92 ml       Physical Exam:   Physical Exam  Constitutional:       Appearance: Normal appearance  HENT:      Head: Normocephalic and atraumatic  Nose: Nose normal    Eyes:      Extraocular Movements: Extraocular movements intact  Cardiovascular:      Rate and Rhythm: Normal rate and regular rhythm  Pulmonary:      Effort: Pulmonary effort is normal    Abdominal:      General: There is no distension  Palpations: Abdomen is soft  Tenderness: There is no abdominal tenderness  Musculoskeletal:      Right lower leg: No edema  Left lower leg: No edema  Skin:     General: Skin is warm and dry  Neurological:      Mental Status: She is alert  Mental status is at baseline  Psychiatric:         Mood and Affect: Mood normal          Behavior: Behavior normal           Additional Data:     Labs:  Results from last 7 days   Lab Units 03/20/23 0442 03/18/23 0932 03/18/23  0912   WBC Thousand/uL 11 13*   < > 15 46*   HEMOGLOBIN g/dL 8 8*   < > 8 7*   I STAT HEMOGLOBIN   --    < >  --    HEMATOCRIT % 27 8*   < > 27 7*   HEMATOCRIT, ISTAT   --    < >  --    PLATELETS Thousands/uL 224   < > 328   BANDS PCT %  --   --  22*   NEUTROS PCT % 85*   < >  --    LYMPHS PCT % 8*   < >  --    LYMPHO PCT %  --   --  3*   MONOS PCT % 6   < >  --    MONO PCT %  --   --  3*   EOS PCT % 0   < > 0    < > = values in this interval not displayed  Results from last 7 days   Lab Units 03/20/23 0442 03/18/23 0932 03/18/23  0930   SODIUM mmol/L 142   < > 140   POTASSIUM mmol/L 3 4*   < > 3 8   CHLORIDE mmol/L 112*   < > 105   CO2 mmol/L 26   < > 26   CO2, I-STAT   --    < >  --    BUN mg/dL 28*   < > 69*   CREATININE mg/dL 0 69   < > 1 23   ANION GAP mmol/L 4   < > 9   CALCIUM mg/dL 7 9*   < > 8 2*   ALBUMIN g/dL  --   --  2 3*   TOTAL BILIRUBIN mg/dL  --   --  0 46   ALK PHOS U/L  --   --  75   ALT U/L  --   --  10*   AST U/L  --   --  8   GLUCOSE RANDOM mg/dL 97   < > 179*    < > = values in this interval not displayed       Results from last 7 days   Lab Units 03/18/23  0912   INR  1 35*             Results from last 7 days   Lab Units 03/18/23  1140 03/18/23  0912   LACTIC ACID mmol/L 3 4* 3 7*       Lines/Drains:  Invasive Devices     Peripheral Intravenous Line  Duration           Peripheral IV 03/18/23 Distal;Left;Upper;Ventral (anterior) Arm 2 days    Peripheral IV 03/18/23 Right;Ventral (anterior) Forearm 2 days                      Imaging: No pertinent imaging reviewed  Recent Cultures (last 7 days):         Last 24 Hours Medication List:   Current Facility-Administered Medications   Medication Dose Route Frequency Provider Last Rate   • acetaminophen  650 mg Oral Q6H PRN Jossy Mcclelland MD     • apixaban  5 mg Oral BID Su Andrea MD     • cholecalciferol  1,000 Units Oral Daily Jossy Mcclelland MD     • cyanocobalamin  1,000 mcg Intramuscular Daily Su Andrea MD     • [START ON 3/21/2023] furosemide  40 mg Oral Daily Su Andrea MD     • metoprolol  2 5 mg Intravenous Q6H PRN Jossy Mcclelland MD     • metoprolol succinate  25 mg Oral Daily Su Andrea MD     • ondansetron  4 mg Intravenous Q4H PRN Jossy Mcclelland MD     • pantoprazole  40 mg Oral BID AC Su Andrea MD     • potassium chloride  20 mEq Oral BID Su Andrea MD     • sertraline  25 mg Oral Daily Jossy Mcclelland MD          Today, Patient Was Seen By: Dania Virk MD    **Please Note: This note may have been constructed using a voice recognition system  **

## 2023-03-20 NOTE — PLAN OF CARE
Problem: PHYSICAL THERAPY ADULT  Goal: Performs mobility at highest level of function for planned discharge setting  See evaluation for individualized goals  Description: Treatment/Interventions: Functional transfer training, Bed mobility, Endurance training, Gait training, Spoke to nursing, OT          See flowsheet documentation for full assessment, interventions and recommendations  Note: Prognosis: Fair  Problem List: Decreased strength, Decreased mobility, Decreased endurance, Pain  Assessment: Pt is a 80 y o  female seen for PT evaluation s/p admit to [facility] on 3/18/2023  Pt was admitted with a primary dx of: GI bleed, Lower extremity edema, Acute blood loss anemia, Cognitive impairment  PT now consulted for assessment of mobility and d/c needs  Pts current comorbidities affecting treatment include: Neuropathy, CKD, Compression fracture L3, balanceproblem, ambulatory dysfunction  Pts current clinical presentation is Unstable/ Unpredictable (high complexity) due to Ongoing medical management for primary dx, Decreased activity tolerance compared to baseline, Fall risk, Increased assistance needed from caregiver at current time  Prior to admission, pt was Ind for mobility with walker, with son assisting with transportation/meals, 1STH with 5 VALENTIN  Berdie Landau Pt at the time of eval, required A ssist X 2 for all mobility  Pt tolerates sitting EOB ~ 5 mins with assist  Pt c/o dizziness upon standing,LOB with posterior lean, unable to maintain standing,required to be seated back on bed,  BP checked in sitting 132/69  Pt verbalizes that dizziness symptoms improved but feeling fatigued the patient assisted back to bed at this time for safety, PT will follow to assess further ambulation/mobility   Pt presents at PT eval functioning below baseline and currently w/ overall mobility deficits 2* to: BLE weakness, impaired balance, decreased endurance, pain, decreased functional mobility tolerance compared to baseline, fall risk  Pt currently at a fall risk 2* to impairments listed above  Pt will continue to benefit from skilled acute PT interventions to address stated impairments; to maximize functional mobility; for ongoing pt/ family training; and DME needs  At conclusion of PT session pt returned BTB, bed alarm engaged, 1 side rails up, all needs in reach and RN notified of session findings/recommendations with phone and call bell within reach  Pt denies any further questions at this time  The patient's AM-PAC Basic Mobility Inpatient Short Form Raw Score is  9  A Raw score of less than or equal to 16 suggests the patient may benefit from discharge to post-acute rehabilitation services  Please also refer to the recommendation of the Physical Therapist for safe discharge planning  Recommend post acute Rehab services upon hospital D/C  Barriers to Discharge: Decreased caregiver support     PT Discharge Recommendation: Post acute rehabilitation services    See flowsheet documentation for full assessment

## 2023-03-20 NOTE — UTILIZATION REVIEW
Initial Clinical Review    Admission: Date/Time/Statement:   Admission Orders (From admission, onward)     Ordered        03/18/23 1258  [address]  Once                      Orders Placed This Encounter   Procedures   • INPATIENT ADMISSION     Standing Status:   Standing     Number of Occurrences:   1     Order Specific Question:   Level of Care     Answer:   Level 2 Stepdown / HOT [14]     Order Specific Question:   Estimated length of stay     Answer:   More than 2 Midnights     Order Specific Question:   Certification     Answer:   I certify that inpatient services are medically necessary for this patient for a duration of greater than two midnights  See H&P and MD Progress Notes for additional information about the patient's course of treatment  ED Arrival Information     Expected   -    Arrival   3/18/2023 08:00    Acuity   Urgent            Means of arrival   Ambulance    Escorted by   [facility]    Service   Hospitalist    Admission type   Emergency            Arrival complaint   Weakness/vomiting           Chief Complaint   Patient presents with   • Altered Mental Status     Since last night having AMS per son; this AM had episode of emesis and incontinence  Questioning of GI bleed, on eliquis        Initial Presentation: 80 y o  female    To ER from home via EMS  presents after being found by son covered in bloody vomit  In the ED, she also proceeded to have multiple melanotic stools, as well  Of note, she has a history of pulmonary embolism and DVT, for which she is chronically on anticoagulation with Eliquis  She also has chronic left knee pain for which she is on NSAID therapy with Naproxen  Blood work in the ED revealed evidence of a significantly decreased hemoglobin at 8 7, when compared to her typical baseline of 12-14 (last checked a few months ago)  She was transfused a unit of packed red blood cells and cryoprecipitate    CTAP  Showed gastritis/enteritis but no active bleed  CKDIII w/baseline Crt of 0 9-1 1      B/l LE edema (chronic)     Admit IP status,  Level 2 Stepdown for  Management of  GIB w/ABLA  Protonix drip initiated -> holding Eliquis/Naproxen       Continue serial H/H monitoring> hold Lasix and continue IV fluid infusion (for hypotension) > stop NSAID (Naproxen) and anticoagulation (Eliquis)  Compression stockings ordered  Consult GI   Continuous cardiopulmonary monitoring  3/18  @  1350 -  Patient states she feels well, alert and active, AAOx3  At this time, vital signs stable, does not appear to have active bleed    Date:  3/19     Day 2:     GI CONSULT:     significant drop in hemoglobin and elevated BUN concerning for upper GI bleeding  Fortunately she has no further melena  Her hemoglobin is stable  Continue Protonix  We will plan EGD for further evaluation tomorrow  Iron studies consistent with adequate iron stores     B12 is low/normal will start supplementation      ED Triage Vitals   Temperature Pulse Respirations Blood Pressure SpO2   03/18/23 0811 03/18/23 0811 03/18/23 0811 03/18/23 0811 03/18/23 0811   98 °F (36 7 °C) (!) 53 16 114/56 95 %      Temp Source Heart Rate Source Patient Position - Orthostatic VS BP Location FiO2 (%)   03/18/23 0811 03/18/23 0811 03/18/23 1900 03/18/23 0811 --   Rectal Monitor Lying Right arm       Pain Score       03/18/23 1400       No Pain          Wt Readings from Last 1 Encounters:   03/18/23 55 6 kg (122 lb 9 2 oz)     Additional Vital Signs:   03/19/23 2238 -- -- -- -- map -- None (Room air)   03/19/23 22:05:33 97 7 °F (36 5 °C) 90 16 118/63 81 97 % --   03/19/23 14:57:40 97 7 °F (36 5 °C) 60 16 116/65 82 99 % --   03/19/23 07:24:29 97 7 °F (36 5 °C) 86 17 111/54 73 100 % --   03/19/23 03:29:26 97 °F (36 1 °C) Abnormal  91 16 111/54 73 100 % --   03/18/23 2320 -- 85 -- -- -- 97 % --   03/18/23 23:18:46 97 5 °F (36 4 °C) 54 Abnormal  16 121/51 74 98 % --   03/18/23 20:49:57 97 5 °F (36 4 °C) 98 20 120/59 79 99 % --   03/18/23 1900 -- 104 16 110/56 78 98 % None (Room air)   03/18/23 1802 -- 102 16 109/56 79 97 % None (Room air)   03/18/23 1702 -- 108 Abnormal  16 110/55 76 97 % None (Room air)   03/18/23 1600 -- 104 15 105/55 76 98 % None (Room air)   03/18/23 1500 -- 102 16 107/52 75 97 % None (Room air)   03/18/23 1400 -- 112 Abnormal  16 120/54 78 97 % None (Room air)   03/18/23 1230 -- 118 Abnormal  16 119/56 81 98 % None (Room air)   03/18/23 1130 97 4 °F (36 3 °C) Abnormal  114 Abnormal  17 126/63 90 95 % None (Room air)   03/18/23 1100 97 4 °F (36 3 °C) Abnormal  106 Abnormal  18 117/56 80 98 % None (Room air)   03/18/23 1034 -- 118 Abnormal  17 133/67 -- -- --   03/18/23 1018 97 4 °F (36 3 °C) Abnormal  102 16 85/55  -- -- --   03/18/23 0949 -- 104 16 117/64 84 98 % None (Room air)   03/18/23 0902 -- 106 Abnormal  -- 107/58 79 99 % --     Pertinent Labs/Diagnostic Test Results:   3/18  EKG - Normal sinus rhythm  Premature atrial complexes  Left axis deviation  Right bundle branch block    CT high volume bleeding scan abdomen pelvis   Final Result by Jose Basurto MD (03/18 1222)      1  No CT evidence of active high volume gastrointestinal hemorrhage  2   Findings of gastritis and nonspecific enteritis  3   Moderate rectal stool ball  3   Stable 3 4 cm left adnexal cystic lesion  Recommend follow-up pelvic ultrasound in one year  The study was marked in [facility] for immediate notification        Workstation performed: [id number]               Results from last 7 days   Lab Units 03/20/23  0009 03/19/23  1836 03/19/23  1152 03/19/23  0437 03/18/23  2356 03/18/23  0932 03/18/23  0912   WBC Thousand/uL  --   --   --  14 87*  --   --  15 46*   HEMOGLOBIN g/dL 8 8* 9 1* 8 3* 8 6* 8 8*   < > 8 7*   I STAT HEMOGLOBIN   --   --   --   --   --    < >  --    HEMATOCRIT % 27 7* 29 0* 25 8* 26 4* 27 5*   < > 27 7*   HEMATOCRIT, ISTAT   --   --   --   --   --    < >  --    PLATELETS Thousands/uL  --   --   --  222  --   --  328   NEUTROS ABS Thousands/µL  --   --   --  12 58*  --   --   --    BANDS PCT %  --   --   --   --   --   --  22*    < > = values in this interval not displayed           Results from last 7 days   Lab Units 03/19/23  0437 03/18/23  0932 03/18/23  0930   SODIUM mmol/L 142  --  140   POTASSIUM mmol/L 3 4*  --  3 8   CHLORIDE mmol/L 111*  --  105   CO2 mmol/L 28  --  26   CO2, I-STAT mmol/L  --  29  --    ANION GAP mmol/L 3*  --  9   BUN mg/dL 54*  --  69*   CREATININE mg/dL 0 86  --  1 23   EGFR ml/min/1 73sq m 56  --  36   CALCIUM mg/dL 8 3  --  8 2*   CALCIUM, IONIZED, ISTAT mmol/L  --  1 17  --      Results from last 7 days   Lab Units 03/18/23  0930 03/18/23  0912   AST U/L 8  --    ALT U/L 10*  --    ALK PHOS U/L 75  --    TOTAL PROTEIN g/dL 5 1*  --    ALBUMIN g/dL 2 3*  --    TOTAL BILIRUBIN mg/dL 0 46  --    AMMONIA umol/L  --  34         Results from last 7 days   Lab Units 03/19/23 0437 03/18/23  0930   GLUCOSE RANDOM mg/dL 101 179*       Results from last 7 days   Lab Units 03/18/23  0932   PH, CHASE I-STAT  7 353   PCO2, CHASE ISTAT mm HG 50 2*   PO2, CHASE ISTAT mm HG 17 0*   HCO3, CHASE ISTAT mmol/L 27 9   I STAT BASE EXC mmol/L 2   I STAT O2 SAT % 22*       Results from last 7 days   Lab Units 03/18/23  0912   PROTIME seconds 16 9*   INR  1 35*       Results from last 7 days   Lab Units 03/18/23  1140 03/18/23  0912   LACTIC ACID mmol/L 3 4* 3 7*       Results from last 7 days   Lab Units 03/19/23  0437   FERRITIN ng/mL 162       ED Treatment:   Medication Administration from 03/18/2023 0759 to 03/18/2023 2027       Date/Time Order Dose Route Action     03/18/2023 0944 EDT pantoprazole (PROTONIX) injection 40 mg 40 mg Intravenous Given     03/18/2023 1203 EDT ondansetron (ZOFRAN) injection 4 mg 4 mg Intravenous Given     03/18/2023 0925 EDT sodium chloride 0 9 % bolus 500 mL 500 mL Intravenous New Bag     03/18/2023 1428 EDT pantoprazole (PROTONIX) 80 mg in sodium chloride 0 9 % 100 mL infusion 8 mg/hr Intravenous New Bag     03/18/2023 1335 EDT sodium chloride 0 9 % infusion 75 mL/hr Intravenous New Bag        Past Medical History:   Diagnosis Date   • Interstitial cystitis    • Leukopenia    • Neurologic gait dysfunction     Abstraction Dr Polo Centeno charts   • Neuropathy    • Neutropenia [facility])     Abstraction Dr Lilli Ramirez   • Osteoarthritis    • Osteoporosis    • Peripheral edema     Abstraction Dr Polo Centeno charts   • Renal cyst    • Syncope     Abstraction Dr Polo Centeno charts     Present on Admission:  • Lower extremity edema    Admitting Diagnosis: Melena [K92 1]  Lactic acidosis [E87 20]  Acute blood loss anemia [D62]  Weakness [R53 1]  Anemia [D64 9]  GI bleed [K92 2]  Age/Sex: 80 y o  female     Admission Orders:  See above orders  PT/OT evals  H&HH q6H  Clear liquid diet   protonix gtt/ ivf NS @  75 cc/hr   Daily wgt,  I/O q shift    Scheduled Medications:  cholecalciferol, 1,000 Units, Oral, Daily  cyanocobalamin, 1,000 mcg, Intramuscular, Daily  sertraline, 25 mg, Oral, Daily      Continuous IV Infusions:  pantoprozole (PROTONIX) infusion (Continuous), 8 mg/hr, Intravenous, Continuous  sodium chloride, 75 mL/hr, Intravenous, Continuous      PRN Meds:  acetaminophen, 650 mg, Oral, Q6H PRN  metoprolol, 2 5 mg, Intravenous, Q6H PRN  ondansetron, 4 mg, Intravenous, Q4H PRN     3/19 @ 0950        IP CONSULT TO GASTROENTEROLOGY    Network Utilization Review Department  ATTENTION: Please call with any questions or concerns to [phone] and carefully listen to the prompts so that you are directed to the right person  All voicemails are confidential   Delmus Goes all requests for admission clinical reviews, approved or denied determinations and any other requests to dedicated fax number below belonging to the campus where the patient is receiving treatment   List of dedicated fax numbers for the Facilities:  FACILITY NAME UR FAX NUMBER   ADMISSION DENIALS (Administrative/Medical Necessity) [phone]   [address] (Maternity/NICU/Pediatrics) Samantha Swanson [address] [place]  [phone]   [address] [facility] [address] [place] [address] [place] [facility] [address] [facility] [facility] [place] [address] [phone]

## 2023-03-20 NOTE — PHYSICAL THERAPY NOTE
Physical Therapy Evaluation     Patient's Name: Ellie Smith    Admitting Diagnosis  Melena [K92 1]  Lactic acidosis [E87 20]  Acute blood loss anemia [D62]  Weakness [R53 1]  Anemia [D64 9]  GI bleed [K92 2]    Problem List  Patient Active Problem List   Diagnosis    Neuropathy    Ambulatory dysfunction    Venous stasis dermatitis of both lower extremities    Age-related osteoporosis without current pathological fracture    Lower extremity edema    Chronic kidney disease stage 3     Compression fracture of L3 lumbar vertebra, closed, initial encounter ([facility] [address] )    Fall    Paroxysmal supraventricular tachycardia     Infiltrate noted on imaging study    Balance problem    Constipation, unspecified    Diverticulosis of intestine, part unspecified, without perforation or abscess without bleeding    Generalized muscle weakness    Cognitive impairment    Polyneuropathy, unspecified    Pulmonary embolism (HCC)    COVID    Lower leg DVT (deep venous thromboembolism), acute, bilateral (HCC)    Chronic pain of left knee    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)    GI bleed    Acute blood loss anemia    Leukocytosis    History of venous thromboembolism       Past Medical History  Past Medical History:   Diagnosis Date    Interstitial cystitis     Leukopenia     Neurologic gait dysfunction     Abstraction Dr Gemini Jasso charts    Neuropathy     Neutropenia [facility])     Abstraction Dr [place]     Osteoporosis     Peripheral edema     Abstraction Dr Bo Human    Renal cyst     Syncope     Abstraction Dr Gemini Jasso charts       Past Surgical History  Past Surgical History:   Procedure Laterality Date    CAPSULOTOMY      Right eye    CATARACT EXTRACTION Left     CHALAZION EXCISION      COLONOSCOPY  2006 03/20/23 0855   PT Last Visit   PT Visit Date 03/20/23   Note Type   Note type Evaluation   Pain Assessment   Pain Assessment Tool 0-10   Pain Score No Pain   Restrictions/Precautions Weight Bearing Precautions Per Order No   Other Precautions Chair Alarm; Bed Alarm;Hard of hearing;Pain; Fall Risk;Multiple lines   Home Living   Type of [address] One level;Stairs to enter with rails  (5 STEwith 2 HR)   Bathroom Shower/Tub Tub/shower unit   [employer] raiser;Grab bars in shower; Tub transfer bench   [address] [facility]   Additional Comments Pt lives with son who is retired  Uses walker at baseline   Prior Function   Level of [place] Independent with ADLs; Independent with functional mobility; Needs assistance with IADLS   Lives With Son   Receives Help From Family   IADLs Independent with medication management; Family/Friend/Other provides transportation; Family/Friend/Other provides meals   Falls in the last 6 months 1 to 4   Vocational Retired   [employer]   Overall Cognitive Status WFL   Attention Attends with cues to redirect   Orientation Level Oriented to person;Oriented to place;Oriented to situation;Disoriented to time   Following Commands Follows one step commands with increased time or repetition   Comments Pt pleasant during session  RLE Assessment   RLE Assessment   (grossly 3/5)   LLE Assessment   LLE Assessment   (grossly 3/5)   Bed Mobility   Rolling R 3  Moderate assistance   Additional items Assist x 1;Bedrails; Increased time required;Verbal cues   Rolling L 3  Moderate assistance   Additional items Assist x 1; Increased time required; Bedrails;Verbal cues   Supine to Sit 2  Maximal assistance   Additional items Assist x 2;HOB elevated; Bedrails; Increased time required;LE management;Verbal cues   Sit to Supine 2  Maximal assistance   Additional items Assist x 2; Increased time required;Verbal cues;LE management   Additional Comments Pt noted to be in bed upon arrival    Transfers   Sit to Stand 2  Maximal assistance   Additional items Assist x 2; Increased time required;Verbal cues   Stand to Sit 2  Maximal assistance   Additional items Assist x 2;Increased time required;Verbal cues   Additional Comments with RW, LOB with posterior lean  Ambulation/Elevation   Ambulation/Elevation Additional Comments Deferred gait assessment at this time  Balance   Static Sitting Fair   Dynamic Sitting Fair -   Static Standing Poor +   Dynamic Standing Poor   Endurance Deficit   Endurance Deficit Yes   Activity Tolerance   Activity Tolerance Patient limited by fatigue;Patient limited by pain   Medical Staff Made Aware DILIA Connor   Nurse Made Aware RN cleared pt for therapy and updated   Assessment   Prognosis Fair   Problem List Decreased strength;Decreased mobility; Decreased endurance;Pain   Assessment Pt is a 80 y o  female seen for PT evaluation s/p admit to [facility] on 3/18/2023  Pt was admitted with a primary dx of: GI bleed, Lower extremity edema, Acute blood loss anemia, Cognitive impairment  PT now consulted for assessment of mobility and d/c needs  Pts current comorbidities affecting treatment include: Neuropathy, CKD, Compression fracture L3, balanceproblem, ambulatory dysfunction  Pts current clinical presentation is Unstable/ Unpredictable (high complexity) due to Ongoing medical management for primary dx, Decreased activity tolerance compared to baseline, Fall risk, Increased assistance needed from caregiver at current time  Prior to admission, pt was Ind for mobility with walker, with son assisting with transportation/meals, 1STH with 5 VALENTIN  Kirstie Gordon Pt at the time of eval, required A ssist X 2 for all mobility  Pt tolerates sitting EOB ~ 5 mins with assist  Pt c/o dizziness upon standing,LOB with posterior lean, unable to maintain standing,required to be seated back on bed,  BP checked in sitting 132/69  Pt verbalizes that dizziness symptoms improved but feeling fatigued the patient assisted back to bed at this time for safety, PT will follow to assess further ambulation/mobility   Pt presents at PT eval functioning below baseline and currently w/ overall mobility deficits 2* to: BLE weakness, impaired balance, decreased endurance, pain, decreased functional mobility tolerance compared to baseline, fall risk  Pt currently at a fall risk 2* to impairments listed above  Pt will continue to benefit from skilled acute PT interventions to address stated impairments; to maximize functional mobility; for ongoing pt/ family training; and DME needs  At conclusion of PT session pt returned BTB, bed alarm engaged, 1 side rails up, all needs in reach and RN notified of session findings/recommendations with phone and call bell within reach  Pt denies any further questions at this time  The patient's AM-PAC Basic Mobility Inpatient Short Form Raw Score is  9  A Raw score of less than or equal to 16 suggests the patient may benefit from discharge to post-acute rehabilitation services  Please also refer to the recommendation of the Physical Therapist for safe discharge planning  Recommend post acute Rehab services upon hospital D/C  Barriers to Discharge Decreased caregiver support   Goals   Patient Goals to feel better   STG Expiration Date 04/03/23   Short Term Goal #1 STG 1  Pt will be able to perform bed mobility tasks with supervision  in order to improve overall functional mobility and assist in safe d/c  STG 2  Pt with sit EOB for at least 25 minutes at Ind level in order to strengthen abdominal musculature and assist in future transfers/ ambulation  STG 3  Pt will be able to perform functional transfer with supervision in order to improve overall functional mobility and assist in safe d/c  STG 4  Pt will improve sitting/standing static/dynamic balance 1/2 grade in order to improve functional mobility and assist in safe d/c  STG 5  Pt will improve LE strength by 1/2 grade in order to improve functional mobility and assist in safe d/c    Plan   Treatment/Interventions Functional transfer training;Bed mobility; Endurance training;Gait training;Spoke to nursing;OT PT Frequency 2-3x/wk   Recommendation   PT Discharge Recommendation Post acute rehabilitation services   AM-PAC Basic Mobility Inpatient   Turning in Flat Bed Without Bedrails 2   Lying on Back to Sitting on Edge of Flat Bed Without Bedrails 2   Moving Bed to Chair 1   Standing Up From Chair Using Arms 2   Walk in Room 1   Climb 3-5 Stairs With Railing 1   Basic Mobility Inpatient Raw Score 9   Turning Head Towards Sound 3   Follow Simple Instructions 3   Low Function Basic Mobility Raw Score 15   Low Function Basic Mobility Standardized Score 23 9   Highest Level Of Mobility   -HLM Goal 3: Sit at edge of bed   JH-HLM Achieved 3: Sit at edge of bed   Modified Tessie Scale   Modified Tessie Scale 4   End of Consult   Patient Position at End of Consult Supine;Bed/Chair alarm activated; All needs within reach         Jean Carlos Devlin, PT DPT

## 2023-03-20 NOTE — PLAN OF CARE
Problem: CARDIOVASCULAR - ADULT  Goal: Maintains optimal cardiac output and hemodynamic stability  Description: INTERVENTIONS:  - Monitor I/O, vital signs and rhythm  - Monitor for S/S and trends of decreased cardiac output  - Administer and titrate ordered vasoactive medications to optimize hemodynamic stability  - Assess quality of pulses, skin color and temperature  - Assess for signs of decreased coronary artery perfusion  - Instruct patient to report change in severity of symptoms  Outcome: Progressing     Problem: GASTROINTESTINAL - ADULT  Goal: Minimal or absence of nausea and/or vomiting  Description: INTERVENTIONS:  - Administer IV fluids if ordered to ensure adequate hydration  - Maintain NPO status until nausea and vomiting are resolved  - Nasogastric tube if ordered  - Administer ordered antiemetic medications as needed  - Provide nonpharmacologic comfort measures as appropriate  - Advance diet as tolerated, if ordered  - Consider nutrition services referral to assist patient with adequate nutrition and appropriate food choices  Outcome: Progressing     Problem: METABOLIC, FLUID AND ELECTROLYTES - ADULT  Goal: Electrolytes maintained within normal limits  Description: INTERVENTIONS:  - Monitor labs and assess patient for signs and symptoms of electrolyte imbalances  - Administer electrolyte replacement as ordered  - Monitor response to electrolyte replacements, including repeat lab results as appropriate  - Instruct patient on fluid and nutrition as appropriate  Outcome: Progressing  Goal: Fluid balance maintained  Description: INTERVENTIONS:  - Monitor labs   - Monitor I/O and WT  - Instruct patient on fluid and nutrition as appropriate  - Assess for signs & symptoms of volume excess or deficit  Outcome: Progressing

## 2023-03-20 NOTE — ASSESSMENT & PLAN NOTE
· Baseline hemoglobin between 12-14 -> presents significantly lower at 8 7 today (was 12 4 two months ago)  · Secondary to coffee-ground emesis at home and multiple melanotic stools in the ED in the setting of Eliquis use   · Due to borderline hypotension given IV fluids and transfused a unit of PRBCs and cryoprecipitate in the ED  · Hemoglobin has stabilized, continue advance diet and continue to monitor hb

## 2023-03-20 NOTE — ANESTHESIA PREPROCEDURE EVALUATION
Procedure:  EGD    Relevant Problems   ANESTHESIA (within normal limits)      CARDIO   (+) Paroxysmal supraventricular tachycardia    (+) Pulmonary embolism (HCC)      ENDO (within normal limits)      GI/HEPATIC   (+) GI bleed      /RENAL   (+) Chronic kidney disease stage 3       GYN (within normal limits)      HEMATOLOGY   (+) Acute blood loss anemia      MUSCULOSKELETAL (within normal limits)      NEURO/PSYCH   (+) 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)   (+) History of venous thromboembolism      PULMONARY (within normal limits)      Nervous and Auditory   (+) Neuropathy   (+) Polyneuropathy, unspecified      Musculoskeletal and Integument   (+) Age-related osteoporosis without current pathological fracture   (+) Compression fracture of L3 lumbar vertebra, closed, initial encounter (HCC)      Other   (+) Chronic pain of left knee   (+) Diverticulosis of intestine, part unspecified, without perforation or abscess without bleeding        Physical Exam    Airway    Mallampati score: II  TM Distance: >3 FB  Neck ROM: full     Dental   No notable dental hx     Cardiovascular  Rhythm: regular, Rate: normal, Cardiovascular exam normal    Pulmonary  Pulmonary exam normal Breath sounds clear to auscultation,     Other Findings        Anesthesia Plan  ASA Score- 3     Anesthesia Type- IV sedation with anesthesia with ASA Monitors  Additional Monitors:   Airway Plan:           Plan Factors-    Chart reviewed  EKG reviewed  Imaging results reviewed  Existing labs reviewed  Patient summary reviewed  Induction- intravenous  Postoperative Plan-     Informed Consent- Anesthetic plan and risks discussed with patient  I personally reviewed this patient with the CRNA  Discussed and agreed on the Anesthesia Plan with the CRNA  Rosa Ramesh           Recent labs personally reviewed:  Lab Results   Component Value Date    WBC 11 13 (H) 03/20/2023    HGB 8 8 (L) 03/20/2023     03/20/2023     Lab Results   Component Value Date    K 3 4 (L) 03/20/2023    BUN 28 (H) 03/20/2023    CREATININE 0 69 03/20/2023    GLUCOSE 171 (H) 03/18/2023     Lab Results   Component Value Date    PTT 65 (H) 01/26/2023      Lab Results   Component Value Date    INR 1 35 (H) 03/18/2023       Blood type O    No results found for: Arianne Waldrop MD, have personally seen and evaluated the patient prior to anesthetic care  I have reviewed the pre-anesthetic record, and other medical records if appropriate to the anesthetic care  If a CRNA is involved in the case, I have reviewed the CRNA assessment, if present, and agree  Risks/benefits and alternatives discussed with patient including possible PONV, sore throat, and possibility of rare anesthetic and surgical emergencies

## 2023-03-20 NOTE — CASE MANAGEMENT
Case Management Assessment & Discharge Planning Note    Patient name Darius Jack  Location [address] [place] [address] 801/PPHP 801-01 MRN [id number]  : 1925 Date 3/20/2023       Current Admission Date: 3/18/2023  Current Admission Diagnosis:GI bleed   Patient Active Problem List    Diagnosis Date Noted   • GI bleed 2023   • Acute blood loss anemia 2023   • Leukocytosis 2023   • History of venous thromboembolism 2023   • Chronic pain of left knee 2023   • Episode of recurrent major depressive disorder ([facility] ) 2023   • Lower leg DVT (deep venous thromboembolism), acute, bilateral ([facility] ) 2023   • COVID 2023   • Pulmonary embolism ([facility] ) 2023   • Paroxysmal supraventricular tachycardia  2022   • Infiltrate noted on imaging study 2022   • Compression fracture of L3 lumbar vertebra, closed, initial encounter ([place] [address] ) 2020   • Fall 2020   • Age-related osteoporosis without current pathological fracture 2019   • Lower extremity edema 2019   • Chronic kidney disease stage 3  2019   • Venous stasis dermatitis of both lower extremities 2019   • Constipation, unspecified 2018   • Diverticulosis of intestine, part unspecified, without perforation or abscess without bleeding 2018   • Generalized muscle weakness 2018   • Polyneuropathy, unspecified 2018   • Ambulatory dysfunction 2018   • Neuropathy 2018   • Balance problem 2017   • Cognitive impairment 2017      LOS (days): 2  Geometric Mean LOS (GMLOS) (days):   Days to GMLOS:     OBJECTIVE:    Risk of Unplanned Readmission Score: 17 08         Current admission status: Inpatient       Preferred Pharmacy:   [facility]/pharmacy #[id number] Davis [address]  [address] [place], [place] [address]  [facility] [address] [place] [address]  Phone: [phone] Fax: [phone]    Primary Care Provider: KERON Isaacs    Primary Insurance: Yoli Patel W [facility] REP  Secondary Insurance: ASSESSMENT:  [address], [address] [facility] Representative - Son   Primary Phone: [phone] (Mobile)                         Readmission Root Cause  30 Day Readmission: No    Patient Information  Admitted from[de-identified] Home  Mental Status: Alert  During Assessment patient was accompanied by: Not accompanied during assessment  Assessment information provided by[de-identified] Son, Other - please comment (Chart review)  Primary Caregiver: Family  Caregiver's Name[de-identified] Reeta Riedel Relationship to Patient[de-identified] Family Member  Caregiver's Telephone Number[de-identified] [phone]  Support Systems: [facility] of Residence: [address] [facility] do you live in?: [place], [address] the [facility] [address] entry access options   Select all that apply : Stairs  Number of steps to enter home : 5  Type of Current Residence: [place]  In the last 12 months, was there a time when you were not able to pay the mortgage or rent on time?: No  In the last 12 months, how many places have you lived?: 1  In the last 12 months, was there a time when you did not have a steady place to sleep or slept in a shelter (including now)?: No  Homeless/housing insecurity resource given?: N/A  Living Arrangements: Lives w/ Son    Activities of Daily Living Prior to Admission  Functional Status: Independent  Completes ADLs independently?: Yes  Ambulates independently?: Yes  Does patient use assisted devices?: Yes  Assisted Devices (DME) used: Elease Andrew  Does patient currently own DME?: Yes  What DME does the patient currently own?: Yris Lewis, Bedside Commode, Shower Chair  Does patient have a history of Outpatient Therapy (PT/OT)?: Yes  Does the patient have a history of Short-Term Rehab?: Yes  Does patient have a history of HHC?: Yes  Does patient currently have [facility]?: No         Patient Information Continued  Income Source: Pension/prison  Does patient have prescription coverage?: Yes  Within the past 12 months, you worried that your food would run out before you got the money to buy more : Never true  Within the past 12 months, the food you bought just didn't last and you didn't have money to get more : Never true  Food insecurity resource given?: N/A  Does patient receive dialysis treatments?: No  Does patient have a history of substance abuse?: No  Does patient have a history of Mental Health Diagnosis?: No         Means of Transportation  Means of Transport to Appts[de-identified] Family transport  In the past 12 months, has lack of transportation kept you from medical appointments or from getting medications?: No  In the past 12 months, has lack of transportation kept you from meetings, work, or from getting things needed for daily living?: No        DISCHARGE DETAILS:    Discharge planning discussed with[de-identified] Pt's son  Freedom of Choice: Yes  Comments - Freedom of Choice: Pt's son agreeable to STR blanket referrals, [address] [place] [address] as preferred facilities due to previous positive experiences  CM contacted family/caregiver?: Yes  Were Treatment Team discharge recommendations reviewed with patient/caregiver?: Yes  Did patient/caregiver verbalize understanding of patient care needs?: Yes  Were patient/caregiver advised of the risks associated with not following Treatment Team discharge recommendations?: Yes    Contacts  Patient Contacts: Martina Martinez  Relationship to Patient[de-identified] Family  Contact Method: Phone  Phone Number: [phone]  Reason/Outcome: Continuity of Care, Discharge [address]         Is the patient interested in [facility] at discharge?: No    DME Referral Provided  Referral made for DME?: No    Other Referral/Resources/Interventions Provided:  Interventions: Short Term Rehab  Referral Comments: STR blanket referrals made  CM will follow           Treatment Team Recommendation: Short Term Rehab  Discharge Destination Plan[de-identified] Short Term Rehab

## 2023-03-21 LAB
ANION GAP SERPL CALCULATED.3IONS-SCNC: 3 MMOL/L (ref 4–13)
BASOPHILS # BLD AUTO: 0.02 THOUSANDS/ÂΜL (ref 0–0.1)
BASOPHILS NFR BLD AUTO: 0 % (ref 0–1)
BUN SERPL-MCNC: 19 MG/DL (ref 5–25)
CALCIUM SERPL-MCNC: 8 MG/DL (ref 8.3–10.1)
CHLORIDE SERPL-SCNC: 114 MMOL/L (ref 96–108)
CO2 SERPL-SCNC: 24 MMOL/L (ref 21–32)
CREAT SERPL-MCNC: 0.62 MG/DL (ref 0.6–1.3)
EOSINOPHIL # BLD AUTO: 0.06 THOUSAND/ÂΜL (ref 0–0.61)
EOSINOPHIL NFR BLD AUTO: 1 % (ref 0–6)
ERYTHROCYTE [DISTWIDTH] IN BLOOD BY AUTOMATED COUNT: 16 % (ref 11.6–15.1)
GFR SERPL CREATININE-BSD FRML MDRD: 75 ML/MIN/1.73SQ M
GLUCOSE SERPL-MCNC: 78 MG/DL (ref 65–140)
HCT VFR BLD AUTO: 25.6 % (ref 34.8–46.1)
HCT VFR BLD AUTO: 30 % (ref 34.8–46.1)
HGB BLD-MCNC: 8 G/DL (ref 11.5–15.4)
HGB BLD-MCNC: 9.6 G/DL (ref 11.5–15.4)
IMM GRANULOCYTES # BLD AUTO: 0.16 THOUSAND/UL (ref 0–0.2)
IMM GRANULOCYTES NFR BLD AUTO: 1 % (ref 0–2)
LYMPHOCYTES # BLD AUTO: 1.02 THOUSANDS/ÂΜL (ref 0.6–4.47)
LYMPHOCYTES NFR BLD AUTO: 8 % (ref 14–44)
MCH RBC QN AUTO: 30.4 PG (ref 26.8–34.3)
MCHC RBC AUTO-ENTMCNC: 31.3 G/DL (ref 31.4–37.4)
MCV RBC AUTO: 97 FL (ref 82–98)
MONOCYTES # BLD AUTO: 0.7 THOUSAND/ÂΜL (ref 0.17–1.22)
MONOCYTES NFR BLD AUTO: 6 % (ref 4–12)
NEUTROPHILS # BLD AUTO: 10.22 THOUSANDS/ÂΜL (ref 1.85–7.62)
NEUTS SEG NFR BLD AUTO: 84 % (ref 43–75)
NRBC BLD AUTO-RTO: 0 /100 WBCS
PLATELET # BLD AUTO: 224 THOUSANDS/UL (ref 149–390)
PMV BLD AUTO: 10.3 FL (ref 8.9–12.7)
POTASSIUM SERPL-SCNC: 3.6 MMOL/L (ref 3.5–5.3)
RBC # BLD AUTO: 2.63 MILLION/UL (ref 3.81–5.12)
SODIUM SERPL-SCNC: 141 MMOL/L (ref 135–147)
WBC # BLD AUTO: 12.18 THOUSAND/UL (ref 4.31–10.16)

## 2023-03-21 RX ORDER — PANTOPRAZOLE SODIUM 40 MG/10ML
40 INJECTION, POWDER, LYOPHILIZED, FOR SOLUTION INTRAVENOUS EVERY 12 HOURS SCHEDULED
Status: DISCONTINUED | OUTPATIENT
Start: 2023-03-21 | End: 2023-03-22

## 2023-03-21 RX ADMIN — POTASSIUM CHLORIDE 20 MEQ: 1500 TABLET, EXTENDED RELEASE ORAL at 08:49

## 2023-03-21 RX ADMIN — FUROSEMIDE 40 MG: 40 TABLET ORAL at 08:49

## 2023-03-21 RX ADMIN — METOPROLOL SUCCINATE 25 MG: 25 TABLET, FILM COATED, EXTENDED RELEASE ORAL at 08:48

## 2023-03-21 RX ADMIN — CHOLECALCIFEROL TAB 25 MCG (1000 UNIT) 1000 UNITS: 25 TAB at 08:49

## 2023-03-21 RX ADMIN — APIXABAN 5 MG: 5 TABLET, FILM COATED ORAL at 08:48

## 2023-03-21 RX ADMIN — PANTOPRAZOLE SODIUM 40 MG: 40 TABLET, DELAYED RELEASE ORAL at 08:49

## 2023-03-21 RX ADMIN — CYANOCOBALAMIN 1000 MCG: 1000 INJECTION, SOLUTION INTRAMUSCULAR at 08:49

## 2023-03-21 RX ADMIN — SERTRALINE HYDROCHLORIDE 25 MG: 25 TABLET ORAL at 08:49

## 2023-03-21 RX ADMIN — PANTOPRAZOLE SODIUM 40 MG: 40 INJECTION, POWDER, FOR SOLUTION INTRAVENOUS at 20:48

## 2023-03-21 NOTE — PROGRESS NOTES
Progress Note- Garo Blank 80 y o  female MRN: [id number]    Unit/Bed#: [facility] 801-01 Encounter: [id number]      Assessment and Plan:    70-year-old female with past medical history of DVT Eliquis, CKD, hypertension amongst other medical issues who is admitted with coffee-ground emesis and melena with significant drop in hemoglobin  Continues to have small melanotic bowel movements after reinitiation of apixaban yesterday, was managed conservatively thus far  1   Melena  Noted on rectal exam   Hemoglobin dropping slightly from 8 8-8 0 however on rechecking at 9 6 without blood transfusion  She is hemodynamically stable  Continue IV PPI  Continue to hold apixaban  Placed on clear liquid at this time, empirically n p o  at midnight  We will consider performing EGD tomorrow if she continues to  have melena, drops hemoglobin requiring blood transfusion or develops hemodynamic instability given her significant medical comorbidities which put her at the higher than average risk of endoscopic procedure    ______________________________________________________________________    Subjective:     Patient seen and examined at bedside  Patient reported to have 3 episodes of small melanotic bowel movements earlier today  Denies any abdominal pain, nausea or vomiting      Medication Administration - last 24 hours from 03/20/2023 1651 to 03/21/2023 1651       Date/Time Order Dose Route Action Action by     03/21/2023 0849 EDT cholecalciferol (VITAMIN D3) tablet 1,000 Units 1,000 Units Oral Given Derinda Beulah     03/21/2023 9972 EDT sertraline (ZOLOFT) tablet 25 mg 25 mg Oral Given Derinda Beulah     03/21/2023 0849 EDT cyanocobalamin injection 1,000 mcg 1,000 mcg Intramuscular Given Derinda Beulah     03/21/2023 0849 EDT potassium chloride (K-DUR,KLOR-CON) CR tablet 20 mEq 20 mEq Oral Given Derinda Crockett     03/20/2023 1702 EDT potassium chloride (K-DUR,KLOR-CON) CR tablet 20 mEq -- Oral Canceled Entry "[facility] [place]     03/20/2023 1653 EDT potassium chloride (K-DUR,KLOR-CON) CR tablet 20 mEq 20 mEq Oral Given [facility] [place]     03/21/2023 0848 EDT apixaban (ELIQUIS) tablet 5 mg 5 mg Oral Given [facility] [place]     03/20/2023 1702 EDT apixaban (ELIQUIS) tablet 5 mg -- Oral Canceled Entry [facility] [place]     03/20/2023 1653 EDT apixaban (ELIQUIS) tablet 5 mg 5 mg Oral Given [facility] [place]     03/21/2023 0849 EDT pantoprazole (PROTONIX) EC tablet 40 mg 40 mg Oral Given [facility] [place]     03/20/2023 1653 EDT pantoprazole (PROTONIX) EC tablet 40 mg 40 mg Oral Given [facility] [place]     03/21/2023 0849 EDT furosemide (LASIX) tablet 40 mg 40 mg Oral Given [facility] [place]     03/21/2023 0848 EDT metoprolol succinate (TOPROL-XL) 24 hr tablet 25 mg 25 mg Oral Given [facility] [place]     03/20/2023 1653 EDT metoprolol succinate (TOPROL-XL) 24 hr tablet 25 mg 25 mg Oral Given Darcy Chaney          Objective:     Vitals: Blood pressure 122/65, pulse 71, temperature (!) 97 3 °F (36 3 °C), resp  rate 12, height 5' 5\" (1 651 m), weight 55 6 kg (122 lb 9 2 oz), SpO2 100 %  ,Body mass index is 20 4 kg/m²  No intake or output data in the 24 hours ending 03/21/23 1651    Physical Exam:   General Appearance: Awake and alert, in no acute distress  Abdomen: Soft, non-tender, non-distended; bowel sounds normal; no masses or no organomegaly    Invasive Devices     Peripheral Intravenous Line  Duration           Peripheral IV 03/18/23 Distal;Left;Upper;Ventral (anterior) Arm 3 days    Peripheral IV 03/18/23 Right;Ventral (anterior) Forearm 3 days                Lab Results:  No results displayed because visit has over 200 results  Imaging Studies: I have personally reviewed pertinent imaging studies        "

## 2023-03-21 NOTE — PROGRESS NOTES
"[facility]  Progress Note - Pushpa Figueroa 5/16/1925, 80 y o  female MRN: [id number]  Unit/Bed#: [facility] 801-01 Encounter: [id number]  Primary Care Provider: Merline Battle   Date and time admitted to hospital: 3/18/2023  8:00 AM    * GI bleed  Assessment & Plan  · Found by son with bloody vomitus on shirt - had multiple melanotic stools in the ED  · An almost 4-point drop in hemoglobin today when compared to a few months prior coupled with mild/transient hypotension responsive to IV fluids  · See plan for acute blood loss anemia below  · Protonix drip initiated -> holding Eliquis/Naproxen  · Per CT imaging: \"No CT evidence of active high volume gastrointestinal hemorrhage  Findings of gastritis and nonspecific enteritis  Moderate rectal stool ball  Stable 3 4 cm left adnexal cystic lesion  Recommend follow-up pelvic ultrasound in one year  \"  · EGD cancelled by GI on 3/20 as she had been stable  Home apixaban restarted per GI recs     · Change protonix to BID  · Advance diet and monitor for tolerance  · Melena restarted 3/21 after apixaban restarted  · Recheck hgb  · Reach out to GI - clears for now, NPO at [facility], considering risks/benefits of intervention and hoping to avoid scope but will consider if hgb drops further and/or HD unstable  · discontinue apixaban     History of venous thromboembolism  Assessment & Plan  History of pulmonary embolism and DVT noted  Restart Eliquis, monitor for tolerance    Leukocytosis  Assessment & Plan  Likely reactive due to acute medical issue(s)  Monitor WBC count    Acute blood loss anemia  Assessment & Plan  · Baseline hemoglobin between 12-14 -> presented w hgb significantly lower at 8 7 (was 12 4 two months ago)  · Secondary to coffee-ground emesis at home and multiple melanotic stools in the ED in the setting of Eliquis use   · Due to borderline hypotension given IV fluids and transfused a unit of PRBCs and cryoprecipitate in the " ED  · Hemoglobin has stabilized, continue advance diet and continue to monitor hb    Cognitive impairment  Assessment & Plan  Continue Zoloft    Paroxysmal supraventricular tachycardia   Assessment & Plan  Restart toprol    Chronic kidney disease stage 3   Assessment & Plan  Baseline creatinine of approximately 0 9-1 1 -> currently at baseline    Lower extremity edema  Assessment & Plan  Chronic issue  Restart lasix        VTE Pharmacologic Prophylaxis: VTE Score: 3 Moderate Risk (Score 3-4) - Pharmacological DVT Prophylaxis Contraindicated  Sequential Compression Devices Ordered  Patient Centered Rounds: I performed bedside rounds with nursing staff today  Discussions with Specialists or Other Care Team Provider: GI    Education and Discussions with Family / Patient: Updated  (son) via phone  Total Time Spent on Date of Encounter in care of patient: 35 minutes This time was spent on one or more of the following: performing physical exam; counseling and coordination of care; obtaining or reviewing history; documenting in the medical record; reviewing/ordering tests, medications or procedures; communicating with other healthcare professionals and discussing with patient's family/caregivers  Current Length of Stay: 3 day(s)  Current Patient Status: Inpatient   Certification Statement: The patient will continue to require additional inpatient hospital stay due to melena  Discharge Plan: Anticipate discharge in 48-72 hrs to rehab facility  Code Status: Level 3 - DNAR and DNI    Subjective:   Denies acute concerns, wants to go home  Not reliable reported of sx due to dementia  Somewhat redirectable       Objective:     Vitals:   Temp (24hrs), Av 4 °F (36 3 °C), Min:97 3 °F (36 3 °C), Max:97 5 °F (36 4 °C)    Temp:  [97 3 °F (36 3 °C)-97 5 °F (36 4 °C)] 97 3 °F (36 3 °C)  HR:  [67-72] 71  Resp:  [12-18] 12  BP: (122-131)/(57-65) 122/65  SpO2:  [98 %-100 %] 100 %  Body mass index is 20 4 kg/m²  Input and Output Summary (last 24 hours):   No intake or output data in the 24 hours ending 03/21/23 1549    Physical Exam:   Physical Exam  Vitals reviewed  Constitutional:       General: She is not in acute distress  Appearance: She is not ill-appearing or toxic-appearing  HENT:      Mouth/Throat:      Mouth: Mucous membranes are moist    Eyes:      General: No scleral icterus  Cardiovascular:      Rate and Rhythm: Normal rate and regular rhythm  Pulmonary:      Effort: Pulmonary effort is normal  No respiratory distress  Breath sounds: Normal breath sounds  Abdominal:      General: Bowel sounds are normal       Palpations: Abdomen is soft  Tenderness: There is no abdominal tenderness  Musculoskeletal:      Right lower leg: No edema  Left lower leg: No edema  Skin:     General: Skin is warm and dry  Neurological:      Mental Status: She is alert  She is disoriented  Psychiatric:      Comments: Somewhat verbally combative, but redirectable  Cooperative with exam             Additional Data:     Labs:  Results from last 7 days   Lab Units 03/21/23 0458 03/18/23  0932 03/18/23  0912   WBC Thousand/uL 12 18*   < > 15 46*   HEMOGLOBIN g/dL 8 0*   < > 8 7*   I STAT HEMOGLOBIN   --    < >  --    HEMATOCRIT % 25 6*   < > 27 7*   HEMATOCRIT, ISTAT   --    < >  --    PLATELETS Thousands/uL 224   < > 328   BANDS PCT %  --   --  22*   NEUTROS PCT % 84*   < >  --    LYMPHS PCT % 8*   < >  --    LYMPHO PCT %  --   --  3*   MONOS PCT % 6   < >  --    MONO PCT %  --   --  3*   EOS PCT % 1   < > 0    < > = values in this interval not displayed       Results from last 7 days   Lab Units 03/21/23 0458 03/18/23  0932 03/18/23  0930   SODIUM mmol/L 141   < > 140   POTASSIUM mmol/L 3 6   < > 3 8   CHLORIDE mmol/L 114*   < > 105   CO2 mmol/L 24   < > 26   CO2, I-STAT   --    < >  --    BUN mg/dL 19   < > 69*   CREATININE mg/dL 0 62   < > 1 23   ANION GAP mmol/L 3*   < > 9   CALCIUM mg/dL 8 0*   < > 8 2*   ALBUMIN g/dL  --   --  2 3*   TOTAL BILIRUBIN mg/dL  --   --  0 46   ALK PHOS U/L  --   --  75   ALT U/L  --   --  10*   AST U/L  --   --  8   GLUCOSE RANDOM mg/dL 78   < > 179*    < > = values in this interval not displayed  Results from last 7 days   Lab Units 03/18/23  0912   INR  1 35*             Results from last 7 days   Lab Units 03/18/23  1140 03/18/23  0912   LACTIC ACID mmol/L 3 4* 3 7*       Lines/Drains:  Invasive Devices     Peripheral Intravenous Line  Duration           Peripheral IV 03/18/23 Distal;Left;Upper;Ventral (anterior) Arm 3 days    Peripheral IV 03/18/23 Right;Ventral (anterior) Forearm 3 days                      Imaging: No pertinent imaging reviewed  Recent Cultures (last 7 days):         Last 24 Hours Medication List:   Current Facility-Administered Medications   Medication Dose Route Frequency Provider Last Rate   • acetaminophen  650 mg Oral Q6H PRN Lauren Iglesias MD     • cholecalciferol  1,000 Units Oral Daily Lauren Iglesias MD     • influenza vaccine  0 7 mL Intramuscular Once Kris Khan MD     • metoprolol  2 5 mg Intravenous Q6H PRN Lauren Iglesias MD     • metoprolol succinate  25 mg Oral Daily Roberto Waters MD     • ondansetron  4 mg Intravenous Q4H PRN Lauren Iglesias MD     • pantoprazole  40 mg Intravenous Q12H Nilson Valadez MD     • sertraline  25 mg Oral Daily Lauren Iglesias MD          Today, Patient Was Seen By: Kris Khan MD    **Please Note: This note may have been constructed using a voice recognition system  **

## 2023-03-21 NOTE — ASSESSMENT & PLAN NOTE
· Baseline hemoglobin between 12-14 -> presented w hgb significantly lower at 8 7 (was 12 4 two months ago)  · Secondary to coffee-ground emesis at home and multiple melanotic stools in the ED in the setting of Eliquis use   · Due to borderline hypotension given IV fluids and transfused a unit of PRBCs and cryoprecipitate in the ED  · Hemoglobin has stabilized, continue advance diet and continue to monitor hb

## 2023-03-21 NOTE — CASE MANAGEMENT
Edelmira Younger 50 received request for authorization from Care Manager    Authorization request for: [facility]  Facility Name:  [facility] NPI: [id number]  Facility MD: Dr Ade Wilder: [id number]  Authorization initiated by contacting insurance: April  Via: Phone ([phone])  Clinicals submitted via: Fax ([phone])

## 2023-03-21 NOTE — PLAN OF CARE
Problem: Potential for Falls  Goal: Patient will remain free of falls  Description: INTERVENTIONS:  - Educate patient/family on patient safety including physical limitations  - Instruct patient to call for assistance with activity   - Consult OT/PT to assist with strengthening/mobility   - Keep Call bell within reach  - Keep bed low and locked with side rails adjusted as appropriate  - Keep care items and personal belongings within reach  - Initiate and maintain comfort rounds  - Make Fall Risk Sign visible to staff  - Offer Toileting every 2 Hours, in advance of need  - Initiate/Maintain bed alarm  - Apply yellow socks and bracelet for high fall risk patients  - Consider moving patient to room near nurses station  Outcome: Progressing     Problem: Prexisting or High Potential for Compromised Skin Integrity  Goal: Skin integrity is maintained or improved  Description: INTERVENTIONS:  - Identify patients at risk for skin breakdown  - Assess and monitor skin integrity  - Assess and monitor nutrition and hydration status  - Monitor labs   - Assess for incontinence   - Turn and reposition patient  - Assist with mobility/ambulation  - Relieve pressure over bony prominences  - Avoid friction and shearing  - Provide appropriate hygiene as needed including keeping skin clean and dry  - Evaluate need for skin moisturizer/barrier cream  - Collaborate with interdisciplinary team   - Patient/family teaching  - Consider wound care consult   Outcome: Progressing     Problem: SKIN/TISSUE INTEGRITY - ADULT  Goal: Pressure injury heals and does not worsen  Description: Interventions:  - Implement low air loss mattress or specialty surface (Criteria met)  - Apply silicone foam dressing  - Instruct/assist with weight shifting every 30 minutes when in chair   - Limit chair time to 4 hour intervals  - Use special pressure reducing interventions such as soft care cushion when in chair   - Apply fecal or urinary incontinence containment device   - Perform passive or active ROM every shift  - Turn and reposition patient & offload bony prominences every 2 hours   - Utilize friction reducing device or surface for transfers   - Consider consults to  interdisciplinary teams such as wound care  - Use incontinent care products after each incontinent episode such as moisture barrier  - Consider nutrition services referral as needed  Outcome: Progressing     Problem: INFECTION - ADULT  Goal: Absence or prevention of progression during hospitalization  Description: INTERVENTIONS:  - Assess and monitor for signs and symptoms of infection  - Monitor lab/diagnostic results  - Monitor all insertion sites, i e  indwelling lines, tubes, and drains  - Monitor endotracheal if appropriate and nasal secretions for changes in amount and color  - [place] appropriate cooling/warming therapies per order  - Administer medications as ordered  - Instruct and encourage patient and family to use good hand hygiene technique  - Identify and instruct in appropriate isolation precautions for identified infection/condition  Outcome: Progressing     Problem: DISCHARGE PLANNING  Goal: Discharge to home or other facility with appropriate resources  Description: INTERVENTIONS:  - Identify barriers to discharge w/patient and caregiver  - Arrange for needed discharge resources and transportation as appropriate  - Identify discharge learning needs (meds, wound care, etc )  - Arrange for interpretive services to assist at discharge as needed  - Refer to Case Management Department for coordinating discharge planning if the patient needs post-hospital services based on physician/advanced practitioner order or complex needs related to functional status, cognitive ability, or social support system  Outcome: Progressing

## 2023-03-21 NOTE — CASE MANAGEMENT
Case Management Discharge Planning Note    Patient name Jeannie Lima  Location [address] [place] [address] [facility] MRN [id number]  : 1925 Date 3/21/2023       Current Admission Date: 3/18/2023  Current Admission Diagnosis:GI bleed   Patient Active Problem List    Diagnosis Date Noted   • GI bleed 2023   • Acute blood loss anemia 2023   • Leukocytosis 2023   • History of venous thromboembolism 2023   • Chronic pain of left knee 2023   • [facility] 2023   • Lower leg DVT (deep venous thromboembolism), acute, bilateral ([facility] ) 2023   • COVID 2023   • Pulmonary embolism ([facility] ) 2023   • Paroxysmal supraventricular tachycardia  2022   • Infiltrate noted on imaging study 2022   • Compression fracture of L3 lumbar vertebra, closed, initial encounter ([place] [address] ) 2020   • Fall 2020   • Age-related osteoporosis without current pathological fracture 2019   • Lower extremity edema 2019   • Chronic kidney disease stage 3  2019   • Venous stasis dermatitis of both lower extremities 2019   • Constipation, unspecified 2018   • Diverticulosis of intestine, part unspecified, without perforation or abscess without bleeding 2018   • Generalized muscle weakness 2018   • Polyneuropathy, unspecified 2018   • Ambulatory dysfunction 2018   • Neuropathy 2018   • Balance problem 2017   • Cognitive impairment 2017      LOS (days): 3  Geometric Mean LOS (GMLOS) (days): 3 00  Days to GMLOS:-0 1     OBJECTIVE:  Risk of Unplanned Readmission Score: 13 88         Current admission status: Inpatient   Preferred Pharmacy:   [address] [place] [address] [address] [place] [address]  Phone: [phone] Fax: [phone]    Primary Care Provider: KERON Kent    Primary Insurance: [facility]  Secondary Insurance:     DISCHARGE DETAILS:               Other Referral/Resources/Interventions Provided:  Referral Comments: CM reviewed list of STR facilities with pt's son, Zakia Kilgore, who decided on [facility] facility in [facility] and tasked insurance auth submission to [facility]

## 2023-03-21 NOTE — ASSESSMENT & PLAN NOTE
"· Found by son with bloody vomitus on shirt - had multiple melanotic stools in the ED  · An almost 4-point drop in hemoglobin today when compared to a few months prior coupled with mild/transient hypotension responsive to IV fluids  · See plan for acute blood loss anemia below  · Protonix drip initiated -> holding Eliquis/Naproxen  · Per CT imaging: \"No CT evidence of active high volume gastrointestinal hemorrhage  Findings of gastritis and nonspecific enteritis  Moderate rectal stool ball  Stable 3 4 cm left adnexal cystic lesion  Recommend follow-up pelvic ultrasound in one year  \"  · EGD cancelled by GI on 3/20 as she had been stable  Home apixaban restarted per GI recs     · Change protonix to BID  · Advance diet and monitor for tolerance  · Melena restarted 3/21 after apixaban restarted  · Recheck hgb  · Reach out to GI - clears for now, NPO at [facility], considering risks/benefits of intervention and hoping to avoid scope but will consider if hgb drops further and/or HD unstable  · discontinue apixaban   "

## 2023-03-21 NOTE — RESTORATIVE TECHNICIAN NOTE
Restorative Technician Note      Patient Name: Darius Jack     Restorative Tech Visit Date: 03/21/23  Note Type: Mobility  Patient Position Upon Consult: Supine  Activity Performed: Transferred  Assistive Device: Other (Comment) (HHAx2; sacral lift)  Education Provided: Yes  Patient Position at End of Consult: Bedside chair;  All needs within reach; Bed/Chair alarm activated    Margi Garay  DPT, Restorative Technician

## 2023-03-22 ENCOUNTER — ANESTHESIA EVENT (INPATIENT)
Dept: GASTROENTEROLOGY | Facility: HOSPITAL | Age: 88
End: 2023-03-22

## 2023-03-22 ENCOUNTER — APPOINTMENT (INPATIENT)
Dept: GASTROENTEROLOGY | Facility: HOSPITAL | Age: 88
End: 2023-03-22

## 2023-03-22 ENCOUNTER — ANESTHESIA (INPATIENT)
Dept: GASTROENTEROLOGY | Facility: HOSPITAL | Age: 88
End: 2023-03-22

## 2023-03-22 PROBLEM — G93.41 METABOLIC ENCEPHALOPATHY: Status: ACTIVE | Noted: 2023-03-22

## 2023-03-22 LAB
ERYTHROCYTE [DISTWIDTH] IN BLOOD BY AUTOMATED COUNT: 15.9 % (ref 11.6–15.1)
HCT VFR BLD AUTO: 25.9 % (ref 34.8–46.1)
HGB BLD-MCNC: 8.3 G/DL (ref 11.5–15.4)
MCH RBC QN AUTO: 30.9 PG (ref 26.8–34.3)
MCHC RBC AUTO-ENTMCNC: 32 G/DL (ref 31.4–37.4)
MCV RBC AUTO: 96 FL (ref 82–98)
PLATELET # BLD AUTO: 275 THOUSANDS/UL (ref 149–390)
PMV BLD AUTO: 10.2 FL (ref 8.9–12.7)
RBC # BLD AUTO: 2.69 MILLION/UL (ref 3.81–5.12)
WBC # BLD AUTO: 12.42 THOUSAND/UL (ref 4.31–10.16)

## 2023-03-22 RX ORDER — LIDOCAINE HYDROCHLORIDE 10 MG/ML
INJECTION, SOLUTION EPIDURAL; INFILTRATION; INTRACAUDAL; PERINEURAL AS NEEDED
Status: DISCONTINUED | OUTPATIENT
Start: 2023-03-22 | End: 2023-03-22

## 2023-03-22 RX ORDER — SODIUM CHLORIDE 9 MG/ML
INJECTION, SOLUTION INTRAVENOUS CONTINUOUS PRN
Status: DISCONTINUED | OUTPATIENT
Start: 2023-03-22 | End: 2023-03-22

## 2023-03-22 RX ORDER — PANTOPRAZOLE SODIUM 40 MG/1
40 TABLET, DELAYED RELEASE ORAL
Status: DISCONTINUED | OUTPATIENT
Start: 2023-03-22 | End: 2023-03-31 | Stop reason: HOSPADM

## 2023-03-22 RX ORDER — FUROSEMIDE 40 MG/1
40 TABLET ORAL DAILY
Status: DISCONTINUED | OUTPATIENT
Start: 2023-03-22 | End: 2023-03-24

## 2023-03-22 RX ORDER — PROPOFOL 10 MG/ML
INJECTION, EMULSION INTRAVENOUS AS NEEDED
Status: DISCONTINUED | OUTPATIENT
Start: 2023-03-22 | End: 2023-03-22

## 2023-03-22 RX ORDER — PROPOFOL 10 MG/ML
INJECTION, EMULSION INTRAVENOUS CONTINUOUS PRN
Status: DISCONTINUED | OUTPATIENT
Start: 2023-03-22 | End: 2023-03-22

## 2023-03-22 RX ADMIN — FUROSEMIDE 40 MG: 40 TABLET ORAL at 16:22

## 2023-03-22 RX ADMIN — PANTOPRAZOLE SODIUM 40 MG: 40 INJECTION, POWDER, FOR SOLUTION INTRAVENOUS at 08:53

## 2023-03-22 RX ADMIN — SODIUM CHLORIDE: 0.9 INJECTION, SOLUTION INTRAVENOUS at 13:20

## 2023-03-22 RX ADMIN — PROPOFOL 20 MG: 10 INJECTION, EMULSION INTRAVENOUS at 13:39

## 2023-03-22 RX ADMIN — INFLUENZA A VIRUS A/VICTORIA/2570/2019 IVR-215 (H1N1) ANTIGEN (FORMALDEHYDE INACTIVATED), INFLUENZA A VIRUS A/DARWIN/9/2021 SAN-010 (H3N2) ANTIGEN (FORMALDEHYDE INACTIVATED), INFLUENZA B VIRUS B/PHUKET/3073/2013 ANTIGEN (FORMALDEHYDE INACTIVATED), AND INFLUENZA B VIRUS B/MICHIGAN/01/2021 ANTIGEN (FORMALDEHYDE INACTIVATED) 0.7 ML: 60; 60; 60; 60 INJECTION, SUSPENSION INTRAMUSCULAR at 08:53

## 2023-03-22 RX ADMIN — PROPOFOL 120 MCG/KG/MIN: 10 INJECTION, EMULSION INTRAVENOUS at 13:37

## 2023-03-22 RX ADMIN — PROPOFOL 30 MG: 10 INJECTION, EMULSION INTRAVENOUS at 13:38

## 2023-03-22 RX ADMIN — ACETAMINOPHEN 650 MG: 325 TABLET ORAL at 16:22

## 2023-03-22 RX ADMIN — LIDOCAINE HYDROCHLORIDE 50 MG: 10 INJECTION, SOLUTION EPIDURAL; INFILTRATION; INTRACAUDAL; PERINEURAL at 13:36

## 2023-03-22 RX ADMIN — PROPOFOL 50 MG: 10 INJECTION, EMULSION INTRAVENOUS at 13:36

## 2023-03-22 RX ADMIN — PANTOPRAZOLE SODIUM 40 MG: 40 TABLET, DELAYED RELEASE ORAL at 16:22

## 2023-03-22 NOTE — PLAN OF CARE
Problem: PHYSICAL THERAPY ADULT  Goal: Performs mobility at highest level of function for planned discharge setting  See evaluation for individualized goals  Description: Treatment/Interventions: Functional transfer training, Bed mobility, Endurance training, Gait training, Spoke to nursing, OT          See flowsheet documentation for full assessment, interventions and recommendations  Outcome: Progressing  Note: Prognosis: Fair  Problem List: Decreased strength, Decreased mobility, Decreased endurance, Pain, Impaired balance, Decreased cognition, Impaired judgement, Decreased safety awareness, Decreased coordination  Assessment: Pt seen for PT treatment session w/ focus on bed mobility training, t/f training, static standing balance instruction, + limited gait training  Pt demonstrated progress compared to IE w/ decreased assistance required for bed mobility + transfers  Pt also able to initiate taking steps this session (goals updated)  However pt significantly impacted by balance impairments - as noted above unable to achieve CoM over Alise even w/ maximum cues  Pt required 4 attempts to ambulate from BSC>recliner positioned directly next to [facility]  From a PT standpoint continue to recommend rehab upon d/c   Barriers to Discharge: Decreased caregiver support     PT Discharge Recommendation: Post acute rehabilitation services    See flowsheet documentation for full assessment

## 2023-03-22 NOTE — CASE MANAGEMENT
Called April @ [phone] and spoke with Marya Perez who stated case is still pending at this time  CM notified

## 2023-03-22 NOTE — ASSESSMENT & PLAN NOTE
"· Found by son with bloody vomitus on shirt - had multiple melanotic stools in the ED  · An almost 4-point drop in hemoglobin today when compared to a few months prior coupled with mild/transient hypotension responsive to IV fluids  · See plan for acute blood loss anemia below  · Protonix drip initiated -> holding Eliquis/Naproxen  · Per CT imaging: \"No CT evidence of active high volume gastrointestinal hemorrhage  Findings of gastritis and nonspecific enteritis  Moderate rectal stool ball  Stable 3 4 cm left adnexal cystic lesion  Recommend follow-up pelvic ultrasound in one year  \"  · EGD cancelled by GI on 3/20 as she had been stable  Home apixaban restarted per GI recs     · Change protonix to BID  · Advance diet and monitor for tolerance  · Melena restarted 3/21 after apixaban restarted  · Hemoglobin stable, EGD this afternoon with GI  · Follow-up EGD results  "

## 2023-03-22 NOTE — PROGRESS NOTES
" Pastoral Care Progress Note    3/22/2023  Patient: Halley Patten : 1925  Admission Date & Time: 3/18/2023 0800  MRN: [id number] CSN: [id number]         initiated visit to pt in consultation with medical team  Pt received  sitting in her chair  No family was present  Pt shared about her wishes to return home soon  Pt shared about her challenges she was facing today  Pt shared that her  does wish to visit her, but she doesn't like him to sit around and \"not talk\", so she told him not to come for a few days  Pt exhibited a sense of humor regarding her situation and appeared to enjoy the encounter   encouraged pt to ask for a  if she wished  Chaplains remain available               Chaplaincy Interventions Utilized:     Exploration: Explored emotional needs & resources and Explored relational needs & resources    Collaboration: Consulted with interdisciplinary team    Relationship Building: Cultivated a relationship of care and support and Listened empathically      Chaplaincy Outcomes Achieved:  Expressed humor and Expressed intermediate hope    Spiritual Coping Strategies Utilized:   Connectedness       23 1300   Clinical Encounter Type   Visited With Patient   Routine Visit Introduction   Referral From Nurse   Referral To          "

## 2023-03-22 NOTE — PLAN OF CARE
Problem: CARDIOVASCULAR - ADULT  Goal: Maintains optimal cardiac output and hemodynamic stability  Description: INTERVENTIONS:  - Monitor I/O, vital signs and rhythm  - Monitor for S/S and trends of decreased cardiac output  - Administer and titrate ordered vasoactive medications to optimize hemodynamic stability  - Assess quality of pulses, skin color and temperature  - Assess for signs of decreased coronary artery perfusion  - Instruct patient to report change in severity of symptoms  Outcome: Progressing     Problem: GASTROINTESTINAL - ADULT  Goal: Minimal or absence of nausea and/or vomiting  Description: INTERVENTIONS:  - Administer IV fluids if ordered to ensure adequate hydration  - Maintain NPO status until nausea and vomiting are resolved  - Nasogastric tube if ordered  - Administer ordered antiemetic medications as needed  - Provide nonpharmacologic comfort measures as appropriate  - Advance diet as tolerated, if ordered  - Consider nutrition services referral to assist patient with adequate nutrition and appropriate food choices  Outcome: Progressing     Problem: METABOLIC, FLUID AND ELECTROLYTES - ADULT  Goal: Electrolytes maintained within normal limits  Description: INTERVENTIONS:  - Monitor labs and assess patient for signs and symptoms of electrolyte imbalances  - Administer electrolyte replacement as ordered  - Monitor response to electrolyte replacements, including repeat lab results as appropriate  - Instruct patient on fluid and nutrition as appropriate  Outcome: Progressing  Goal: Fluid balance maintained  Description: INTERVENTIONS:  - Monitor labs   - Monitor I/O and WT  - Instruct patient on fluid and nutrition as appropriate  - Assess for signs & symptoms of volume excess or deficit  Outcome: Progressing     Problem: PAIN - ADULT  Goal: Verbalizes/displays adequate comfort level or baseline comfort level  Description: Interventions:  - Encourage patient to monitor pain and request assistance  - Assess pain using appropriate pain scale  - Administer analgesics based on type and severity of pain and evaluate response  - Implement non-pharmacological measures as appropriate and evaluate response  - Consider cultural and social influences on pain and pain management  - Notify physician/advanced practitioner if interventions unsuccessful or patient reports new pain  Outcome: Progressing     Problem: INFECTION - ADULT  Goal: Absence or prevention of progression during hospitalization  Description: INTERVENTIONS:  - Assess and monitor for signs and symptoms of infection  - Monitor lab/diagnostic results  - Monitor all insertion sites, i e  indwelling lines, tubes, and drains  - Monitor endotracheal if appropriate and nasal secretions for changes in amount and color  - [place] appropriate cooling/warming therapies per order  - Administer medications as ordered  - Instruct and encourage patient and family to use good hand hygiene technique  - Identify and instruct in appropriate isolation precautions for identified infection/condition  Outcome: Progressing     Problem: DISCHARGE PLANNING  Goal: Discharge to home or other facility with appropriate resources  Description: INTERVENTIONS:  - Identify barriers to discharge w/patient and caregiver  - Arrange for needed discharge resources and transportation as appropriate  - Identify discharge learning needs (meds, wound care, etc )  - Arrange for interpretive services to assist at discharge as needed  - Refer to Case Management Department for coordinating discharge planning if the patient needs post-hospital services based on physician/advanced practitioner order or complex needs related to functional status, cognitive ability, or social support system  Outcome: Progressing     Problem: Knowledge Deficit  Goal: Patient/family/caregiver demonstrates understanding of disease process, treatment plan, medications, and discharge instructions  Description: Complete learning assessment and assess knowledge base    Interventions:  - Provide teaching at level of understanding  - Provide teaching via preferred learning methods  Outcome: Progressing

## 2023-03-22 NOTE — WOUND OSTOMY CARE
Progress Note - Wound   Neville Lemus 80 y o  female MRN: [id number]  Unit/Bed#: [facility] 801-01 Encounter: [id number]      Assessment:   Patient admitted due to GI bleed  History of neuropathy, CKD, cognitive impairment  Wound care consulted for bilateral lower extremity wounds  Patient agreeable to assessment, alert and oriented x 2, incontinent of bowel and bladder, assist of 1-2 to turn for assessment, is OOB to chair with EHOB waffle cushion in place, heels elevated, is an assist with care  1  Bilateral sacrum and buttock- Skin is dry, intact, blanchable pink and blanchable red skin, skin tag noted to proximal sacral region, no wounds or pressure injuries noted on assessment  2  Right anterior tibial covered 100% with a well adhered black scab, no drainage or open aspects noted  Lashawn-wound is dry, intact with ecchymosis scattered  3  Right distal medial tibial- Unclear etiology, suspect due to edema/swelling  Wounds are scattered, oval in shape, partial thickness, approx  30% dry brown scabbing and 70% pink partial thickness tissue, with moderate amount of serous drainage noted  Lashawn-wound is dry, intact, and lower extremity noted with edema  4  Left lateral tibial- Unclear etiology, suspect due to edema/swelling  Wounds are oval in shape, partial thickness, 100% pink tissue, with moderate amount of serous drainage  Lashawn-wounds are dry, intact, and lower extremity noted with edema  5  Bilateral heels- skin is dry, intact, blanchable  Educated patient on importance of frequent offloading of pressure via turning, repositioning, and weight-shifting  Verbalized understanding of plan of care  No induration, fluctuance, odor, warmth, redness, or purulence noted to the above noted wounds  New dressings applied  Patient tolerated well, denies pain to the wounds  Primary nurse aware of plan of care  See flow sheets for more detailed assessment findings  Will follow along      Skin care plans: 1-Hydraguard to bilateral sacrum, buttock and heels BID and PRN   2-Elevate heels to offload pressure  3-Ehob cushion in chair when out of bed  4-Moisturize skin daily with skin nourishing cream    5-Turn/reposition q2h or when medically stable for pressure re-distribution on skin      6-Cleanse B/L legs with soap and water, apply calcium alginate to wound bed, cover with ABD, secure with braden and tape  Change every other day and PRN soilage/displacement  Wound 03/18/23 Pressure Injury Sacrum (Active)   Wound Image   03/22/23 1500   Wound Description Dry; Intact;Pink; Other (Comment) 03/22/23 1500   Pressure Injury Stage     Lashawn-wound Assessment Dry; Intact 03/22/23 1500   Wound Length (cm) 0 cm 03/22/23 1500   Wound Width (cm) 0 cm 03/22/23 1500   Wound Depth (cm) 0 cm 03/22/23 1500   Wound Surface Area (cm^2) 0 cm^2 03/22/23 1500   Wound Volume (cm^3) 0 cm^3 03/22/23 1500   Calculated Wound Volume (cm^3) 0 cm^3 03/22/23 1500   Tunneling 0 cm 03/22/23 1500   Undermining 0 03/22/23 1500   Non-staged Wound Description Not applicable 12/25/67 6438   Treatments Cleansed;Site care 03/22/23 1500   Dressing Moisture barrier 03/22/23 1500   Wound packed? No 03/22/23 1500   Dressing Changed New 03/22/23 1500   Patient Tolerance Tolerated well 03/22/23 1500   Dressing Status         Wound 03/18/23 Venous Ulcer Pretibial Right (Active)   Wound Image   03/22/23 1458   Wound Description Dry;Brown;Black 03/22/23 1458   Lashawn-wound Assessment Intact;Dry; Other (Comment) 03/22/23 1458   Wound Length (cm) 1 3 cm 03/22/23 1458   Wound Width (cm) 1 5 cm 03/22/23 1458   Wound Depth (cm) 0 cm 03/22/23 1458   Wound Surface Area (cm^2) 1 95 cm^2 03/22/23 1458   Wound Volume (cm^3) 0 cm^3 03/22/23 1458   Calculated Wound Volume (cm^3) 0 cm^3 03/22/23 1458   Tunneling 0 cm 03/22/23 1458   Undermining 0 03/22/23 1458   Dressing         Wound 03/22/23 Pretibial Left;Lateral (Active)   Wound Image    03/22/23 1459   Wound Description Pink 03/22/23 1459   Lashawn-wound Assessment Dry; Intact;Edema 03/22/23 1459   Wound Length (cm) 9 cm 03/22/23 1459   Wound Width (cm) 2 2 cm 03/22/23 1459   Wound Depth (cm) 0 1 cm 03/22/23 1459   Wound Surface Area (cm^2) 19 8 cm^2 03/22/23 1459   Wound Volume (cm^3) 1 98 cm^3 03/22/23 1459   Calculated Wound Volume (cm^3) 1 98 cm^3 03/22/23 1459   Tunneling 0 cm 03/22/23 1459   Undermining 0 03/22/23 1459   Drainage Amount Moderate 03/22/23 1459   Drainage Description Serous 03/22/23 1459   Non-staged Wound Description Partial thickness 03/22/23 1459   Treatments Cleansed;Irrigation with NSS;Site care 03/22/23 1459   Dressing Calcium Alginate;ABD;Dry dressing 03/22/23 1459   Wound packed? No 03/22/23 1459   Dressing Changed Changed 03/22/23 1459   Patient Tolerance Tolerated well 03/22/23 1459   Dressing Status Clean;Dry; Intact 03/22/23 1459       Wound 03/22/23 Tibial Right;Medial;Distal (Active)   Wound Image   03/22/23 1500   Wound Description Pink;Brown 03/22/23 1500   Lashawn-wound Assessment Dry; Intact 03/22/23 1500   Wound Length (cm) 1 5 cm 03/22/23 1500   Wound Width (cm) 2 5 cm 03/22/23 1500   Wound Depth (cm) 0 1 cm 03/22/23 1500   Wound Surface Area (cm^2) 3 75 cm^2 03/22/23 1500   Wound Volume (cm^3) 0 375 cm^3 03/22/23 1500   Calculated Wound Volume (cm^3) 0 38 cm^3 03/22/23 1500   Tunneling 0 cm 03/22/23 1500   Undermining 0 03/22/23 1500   Drainage Amount Moderate 03/22/23 1500   Drainage Description Serous 03/22/23 1500   Non-staged Wound Description Partial thickness 03/22/23 1500   Treatments Cleansed;Irrigation with NSS;Site care 03/22/23 1500   Dressing Calcium Alginate;ABD;Dry dressing 03/22/23 1500   Wound packed? No 03/22/23 1500   Dressing Changed Changed 03/22/23 1500   Patient Tolerance Tolerated well 03/22/23 1500   Dressing Status Clean;Dry; Intact 03/22/23 1500       Call or tigertext with any questions  Wound Care will continue to follow    Tj DALEN RN CWON  Wound and Ostomy care

## 2023-03-22 NOTE — ASSESSMENT & PLAN NOTE
· On presentation noted to be more confused than normal per her son  · Likely acute metabolic encephalopathy superimposed on chronic cognitive issues in setting of GI bleed, worsening anemia, hypotension  · Appears closer to baseline, continue to monitor

## 2023-03-22 NOTE — PHYSICAL THERAPY NOTE
"   Physical Therapy Treatment Note    Patient's Name: Carlos Iqbal  : 23 1035   PT Last Visit   PT Visit Date 23   Note Type   Note Type Treatment   Pain Assessment   Pain Assessment Tool 0-10   Pain Score No Pain   Restrictions/Precautions   Weight Bearing Precautions Per Order No   Other Precautions Cognitive; Chair Alarm; Bed Alarm; Fall Risk   General   Chart Reviewed Yes   Response to Previous Treatment Patient unable to report, no changes reported from family or staff   Family/Caregiver Present No   Subjective   Subjective \"I need to go to the bathroom  \"   Bed Mobility   Supine to Sit 3  Moderate assistance   Additional items Assist x 1; Increased time required;Verbal cues; Bedrails   Sit to Supine Unable to assess   Additional Comments Pt greeted in supine  Verbal cues for use of bedrails + technique  Transfers   Sit to Stand 2  Maximal assistance   Additional items Assist x 1; Increased time required;Verbal cues   Stand to Sit 2  Maximal assistance   Additional items Assist x 1; Increased time required;Verbal cues   Additional Comments RW   Ambulation/Elevation   Gait pattern Excessively slow;Retropulsion; Improper Weight shift;Decreased foot clearance;Shuffling;Poor UE support  (B knee hyperextension)   Gait Assistance 2  Maximal assist   Additional items Assist x 1;Verbal cues; Tactile cues  (however pt would need 2 people for further distances than bed>chair)   Assistive Device Rolling walker   Distance 3' + 3'   Ambulation/Elevation Additional Comments 4 attempts to complete ambulation from BSC>recliner; pt w/ extreme difficulty obtaining CoM over Alise (stands w/ posterior lean of buttocks + knees hyperextended); extensive cues required   Balance   Static Sitting Fair -   Dynamic Sitting Poor +   Static Standing Poor   Dynamic Standing Poor -   Ambulatory Poor -  (RW)   Endurance Deficit   Endurance Deficit Yes   Endurance Deficit Description weakness, fatigue, deconditioning " Activity Tolerance   Activity Tolerance Patient limited by fatigue  (cog)   Medical Staff Made Aware restorative Ivonne Pravin   Nurse Made Aware yes - cleared for PT   Exercises   Balance training  Static standing w/ max cues to adjust CoM over Alise  Assessment   Prognosis Fair   Problem List Decreased strength;Decreased mobility; Decreased endurance;Pain; Impaired balance;Decreased cognition; Impaired judgement;Decreased safety awareness;Decreased coordination   Assessment Pt seen for PT treatment session w/ focus on bed mobility training, t/f training, static standing balance instruction, + limited gait training  Pt demonstrated progress compared to IE w/ decreased assistance required for bed mobility + transfers  Pt also able to initiate taking steps this session (goals updated)  However pt significantly impacted by balance impairments - as noted above unable to achieve CoM over Alise even w/ maximum cues  Pt required 4 attempts to ambulate from BS>recliner positioned directly next to [facility]  From a PT standpoint continue to recommend rehab upon d/c    Barriers to Discharge Decreased caregiver support   Goals   Patient Goals to go to the bathroom   Short Term Goal #2 STG 6  Pt will ambulate w/ ' w/ CGA to increase independences w/ functional mobility  PT Treatment Day 1   Plan   Treatment/Interventions Functional transfer training;LE strengthening/ROM; Therapeutic exercise; Endurance training;Patient/family training;Equipment eval/education; Bed mobility;Gait training; Compensatory technique education;Spoke to nursing  (balance training)   Progress Progressing toward goals   PT Frequency 2-3x/wk   Recommendation   PT Discharge Recommendation Post acute rehabilitation services   AM-PAC Basic Mobility Inpatient   Turning in Flat Bed Without Bedrails 2   Lying on Back to Sitting on Edge of Flat Bed Without Bedrails 2   Moving Bed to Chair 2   Standing Up From Chair Using Arms 2   Walk in Room 1   Climb 3-5 Stairs With Hortencia 1   Basic Mobility Inpatient Raw Score 10   Highest Level Of Mobility   -HLM Goal 4: Move to chair/commode   -HLM Achieved 4: Move to chair/commode   Education   Education Provided Mobility training;Assistive device   Patient Reinforcement needed   End of Consult   Patient Position at End of Consult Bedside chair;Bed/Chair alarm activated; All needs within reach  (on waffle cushion)       Moe Cuff, PT, DPT

## 2023-03-22 NOTE — ANESTHESIA POSTPROCEDURE EVALUATION
Post-Op Assessment Note    CV Status:  Stable  Pain Score: 0    Pain management: adequate     Mental Status:  Arousable and sleepy   Hydration Status:  Euvolemic   PONV Controlled:  Controlled   Airway Patency:  Patent      Post Op Vitals Reviewed: Yes      Staff: CRNA         There were no known notable events for this encounter      /57 (03/22/23 1400)    Temp (!) 96 8 °F (36 °C) (03/22/23 1400)    Pulse 65 (03/22/23 1400)   Resp 16 (03/22/23 1400)    SpO2 99 % (03/22/23 1400)

## 2023-03-22 NOTE — RESTORATIVE TECHNICIAN NOTE
Restorative Technician Note      Patient Name: Jennifer Crespo     Restorative Tech Visit Date: 03/22/23  Note Type: Mobility  Patient Position Upon Consult: Bedside chair  Activity Performed: Transferred  Assistive Device: Other (Comment) (Ax2 HHA with sacral support)  Education Provided: Yes  Patient Position at End of Consult: Supine;  All needs within reach; Bed/Chair alarm activated    Gracie Cordero  DPT, Restorative Technician

## 2023-03-22 NOTE — PROGRESS NOTES
"[facility]  Progress Note - Yury Edil 5/16/1925, 80 y o  female MRN: [id number]  Unit/Bed#: [facility] 801-01 Encounter: [id number]  Primary Care Provider: Ora Chapa   Date and time admitted to hospital: 3/18/2023  8:00 AM    * GI bleed  Assessment & Plan  · Found by son with bloody vomitus on shirt - had multiple melanotic stools in the ED  · An almost 4-point drop in hemoglobin today when compared to a few months prior coupled with mild/transient hypotension responsive to IV fluids  · See plan for acute blood loss anemia below  · Protonix drip initiated -> holding Eliquis/Naproxen  · Per CT imaging: \"No CT evidence of active high volume gastrointestinal hemorrhage  Findings of gastritis and nonspecific enteritis  Moderate rectal stool ball  Stable 3 4 cm left adnexal cystic lesion  Recommend follow-up pelvic ultrasound in one year  \"  · EGD cancelled by GI on 3/20 as she had been stable  Home apixaban restarted per GI recs     · Change protonix to BID  · Advance diet and monitor for tolerance  · Melena restarted 3/21 after apixaban restarted  · Hemoglobin stable, EGD this afternoon with GI  · Follow-up EGD results    Metabolic encephalopathy  Assessment & Plan  · On presentation noted to be more confused than normal per her son  · Likely acute metabolic encephalopathy superimposed on chronic cognitive issues in setting of GI bleed, worsening anemia, hypotension  · Appears closer to baseline, continue to monitor    History of venous thromboembolism  Assessment & Plan  History of pulmonary embolism and DVT noted  Continue to hold home apixaban    Leukocytosis  Assessment & Plan  Likely reactive due to acute medical issue(s)  Monitor WBC count    Acute blood loss anemia  Assessment & Plan  · Baseline hemoglobin between 12-14 -> presented w hgb significantly lower at 8 7 (was 12 4 two months ago)  · Secondary to coffee-ground emesis at home and multiple melanotic stools in the " ED in the setting of Eliquis use   · Due to borderline hypotension given IV fluids and transfused a unit of PRBCs and cryoprecipitate in the ED  · Hemoglobin has stabilized, continue to monitor    Cognitive impairment  Assessment & Plan  Continue Zoloft    Paroxysmal supraventricular tachycardia   Assessment & Plan  Restart toprol    Chronic kidney disease stage 3   Assessment & Plan  Baseline creatinine of approximately 0 9-1 1 -> currently at baseline    Lower extremity edema  Assessment & Plan  Chronic issue  Restart home lasix  Wound care consult        VTE Pharmacologic Prophylaxis: VTE Score: 3 Moderate Risk (Score 3-4) - Pharmacological DVT Prophylaxis Contraindicated  Sequential Compression Devices Ordered  Patient Centered Rounds: d/w RN  Discussions with Specialists or Other Care Team Provider: GI    Education and Discussions with Family / Patient: Updated  (son) via phone  Total Time Spent on Date of Encounter in care of patient: 35 minutes This time was spent on one or more of the following: performing physical exam; counseling and coordination of care; obtaining or reviewing history; documenting in the medical record; reviewing/ordering tests, medications or procedures; communicating with other healthcare professionals and discussing with patient's family/caregivers  Current Length of Stay: 4 day(s)  Current Patient Status: Inpatient   Certification Statement: The patient will continue to require additional inpatient hospital stay due to As above  Discharge Plan: Anticipate discharge in 48-72 hrs to rehab facility  Code Status: Level 3 - DNAR and DNI    Subjective:   Denies acute issues, appears confused similar to prior       Objective:     Vitals:   Temp (24hrs), Av °F (36 1 °C), Min:96 1 °F (35 6 °C), Max:97 3 °F (36 3 °C)    Temp:  [96 1 °F (35 6 °C)-97 3 °F (36 3 °C)] 96 8 °F (36 °C)  HR:  [63-71] 63  Resp:  [12-20] 16  BP: (120-150)/(56-70) 150/70  SpO2:  [98 %-100 %] 98 %  Body mass index is 20 4 kg/m²  Input and Output Summary (last 24 hours): Intake/Output Summary (Last 24 hours) at 3/22/2023 1431  Last data filed at 3/22/2023 1401  Gross per 24 hour   Intake 207 34 ml   Output 0 ml   Net 207 34 ml       Physical Exam:   Physical Exam  Vitals reviewed  Constitutional:       General: She is not in acute distress  Appearance: She is not ill-appearing or toxic-appearing  HENT:      Mouth/Throat:      Mouth: Mucous membranes are moist    Eyes:      General: No scleral icterus  Cardiovascular:      Rate and Rhythm: Normal rate and regular rhythm  Pulmonary:      Effort: Pulmonary effort is normal  No respiratory distress  Breath sounds: Normal breath sounds  Abdominal:      General: Bowel sounds are normal       Palpations: Abdomen is soft  Tenderness: There is no abdominal tenderness  Musculoskeletal:      Right lower leg: Edema present  Left lower leg: No edema  Comments: Venous stasis noted   Skin:     General: Skin is warm and dry  Neurological:      Mental Status: She is alert  She is disoriented  Psychiatric:      Comments: Colmer than yesterday            Additional Data:     Labs:  Results from last 7 days   Lab Units 03/22/23  0506 03/21/23  1554 03/21/23  0458 03/18/23  0932 03/18/23  0912   WBC Thousand/uL 12 42*  --  12 18*   < > 15 46*   HEMOGLOBIN g/dL 8 3*   < > 8 0*   < > 8 7*   I STAT HEMOGLOBIN   --   --   --    < >  --    HEMATOCRIT % 25 9*   < > 25 6*   < > 27 7*   HEMATOCRIT, ISTAT   --   --   --    < >  --    PLATELETS Thousands/uL 275  --  224   < > 328   BANDS PCT %  --   --   --   --  22*   NEUTROS PCT %  --   --  84*   < >  --    LYMPHS PCT %  --   --  8*   < >  --    LYMPHO PCT %  --   --   --   --  3*   MONOS PCT %  --   --  6   < >  --    MONO PCT %  --   --   --   --  3*   EOS PCT %  --   --  1   < > 0    < > = values in this interval not displayed       Results from last 7 days   Lab Units 03/21/23  0458 03/18/23  0932 03/18/23  0930   SODIUM mmol/L 141   < > 140   POTASSIUM mmol/L 3 6   < > 3 8   CHLORIDE mmol/L 114*   < > 105   CO2 mmol/L 24   < > 26   CO2, I-STAT   --    < >  --    BUN mg/dL 19   < > 69*   CREATININE mg/dL 0 62   < > 1 23   ANION GAP mmol/L 3*   < > 9   CALCIUM mg/dL 8 0*   < > 8 2*   ALBUMIN g/dL  --   --  2 3*   TOTAL BILIRUBIN mg/dL  --   --  0 46   ALK PHOS U/L  --   --  75   ALT U/L  --   --  10*   AST U/L  --   --  8   GLUCOSE RANDOM mg/dL 78   < > 179*    < > = values in this interval not displayed  Results from last 7 days   Lab Units 03/18/23  0912   INR  1 35*             Results from last 7 days   Lab Units 03/18/23  1140 03/18/23  0912   LACTIC ACID mmol/L 3 4* 3 7*       Lines/Drains:  Invasive Devices     Peripheral Intravenous Line  Duration           Peripheral IV 03/22/23 Right Antecubital <1 day                      Imaging: No pertinent imaging reviewed  Recent Cultures (last 7 days):         Last 24 Hours Medication List:   Current Facility-Administered Medications   Medication Dose Route Frequency Provider Last Rate   • acetaminophen  650 mg Oral Q6H PRN Dar Rubin MD     • cholecalciferol  1,000 Units Oral Daily Dar Rubin MD     • furosemide  40 mg Oral Daily Christine Pace MD     • metoprolol succinate  25 mg Oral Daily Carlie Morris MD     • ondansetron  4 mg Intravenous Q4H PRN Dar Rubin MD     • pantoprazole  40 mg Oral BID AC Christine Pace MD     • sertraline  25 mg Oral Daily Dar Rubin MD          Today, Patient Was Seen By: Christine Pace MD    **Please Note: This note may have been constructed using a voice recognition system  **

## 2023-03-22 NOTE — RESTORATIVE TECHNICIAN NOTE
Restorative Technician Note      Patient Name: Garo Blank     Restorative Tech Visit Date: 03/22/23  Note Type: Mobility  Patient Position Upon Consult: Bedside chair  Activity Performed: Transferred  Assistive Device: Other (Comment) (Ax2 HHA with sacral support stand pivot to stretcher)  Education Provided: Yes  Patient Position at End of Consult: Supine; All needs within reach;  Other (comment) (Left in the care of GI transport)    Chapis Potter  DPT, Restorative Technician

## 2023-03-22 NOTE — ASSESSMENT & PLAN NOTE
· Baseline hemoglobin between 12-14 -> presented w hgb significantly lower at 8 7 (was 12 4 two months ago)  · Secondary to coffee-ground emesis at home and multiple melanotic stools in the ED in the setting of Eliquis use   · Due to borderline hypotension given IV fluids and transfused a unit of PRBCs and cryoprecipitate in the ED  · Hemoglobin has stabilized, continue to monitor

## 2023-03-23 PROBLEM — Z71.89 GOALS OF CARE, COUNSELING/DISCUSSION: Status: ACTIVE | Noted: 2023-03-23

## 2023-03-23 LAB
ANION GAP SERPL CALCULATED.3IONS-SCNC: 4 MMOL/L (ref 4–13)
BASOPHILS # BLD AUTO: 0.04 THOUSANDS/ÂΜL (ref 0–0.1)
BASOPHILS NFR BLD AUTO: 0 % (ref 0–1)
BUN SERPL-MCNC: 17 MG/DL (ref 5–25)
CALCIUM SERPL-MCNC: 8 MG/DL (ref 8.3–10.1)
CHLORIDE SERPL-SCNC: 109 MMOL/L (ref 96–108)
CO2 SERPL-SCNC: 28 MMOL/L (ref 21–32)
CREAT SERPL-MCNC: 0.94 MG/DL (ref 0.6–1.3)
EOSINOPHIL # BLD AUTO: 0.11 THOUSAND/ÂΜL (ref 0–0.61)
EOSINOPHIL NFR BLD AUTO: 1 % (ref 0–6)
ERYTHROCYTE [DISTWIDTH] IN BLOOD BY AUTOMATED COUNT: 15.8 % (ref 11.6–15.1)
GFR SERPL CREATININE-BSD FRML MDRD: 51 ML/MIN/1.73SQ M
GLUCOSE SERPL-MCNC: 90 MG/DL (ref 65–140)
HCT VFR BLD AUTO: 27.2 % (ref 34.8–46.1)
HGB BLD-MCNC: 8.4 G/DL (ref 11.5–15.4)
IMM GRANULOCYTES # BLD AUTO: 0.2 THOUSAND/UL (ref 0–0.2)
IMM GRANULOCYTES NFR BLD AUTO: 2 % (ref 0–2)
LYMPHOCYTES # BLD AUTO: 0.95 THOUSANDS/ÂΜL (ref 0.6–4.47)
LYMPHOCYTES NFR BLD AUTO: 9 % (ref 14–44)
MCH RBC QN AUTO: 30 PG (ref 26.8–34.3)
MCHC RBC AUTO-ENTMCNC: 30.9 G/DL (ref 31.4–37.4)
MCV RBC AUTO: 97 FL (ref 82–98)
MONOCYTES # BLD AUTO: 0.68 THOUSAND/ÂΜL (ref 0.17–1.22)
MONOCYTES NFR BLD AUTO: 6 % (ref 4–12)
NEUTROPHILS # BLD AUTO: 8.69 THOUSANDS/ÂΜL (ref 1.85–7.62)
NEUTS SEG NFR BLD AUTO: 82 % (ref 43–75)
NRBC BLD AUTO-RTO: 0 /100 WBCS
PLATELET # BLD AUTO: 287 THOUSANDS/UL (ref 149–390)
PMV BLD AUTO: 10.2 FL (ref 8.9–12.7)
POTASSIUM SERPL-SCNC: 3.3 MMOL/L (ref 3.5–5.3)
RBC # BLD AUTO: 2.8 MILLION/UL (ref 3.81–5.12)
SODIUM SERPL-SCNC: 141 MMOL/L (ref 135–147)
WBC # BLD AUTO: 10.67 THOUSAND/UL (ref 4.31–10.16)

## 2023-03-23 PROCEDURE — 0DB68ZX EXCISION OF STOMACH, VIA NATURAL OR ARTIFICIAL OPENING ENDOSCOPIC, DIAGNOSTIC: ICD-10-PCS | Performed by: INTERNAL MEDICINE

## 2023-03-23 PROCEDURE — 30233N1 TRANSFUSION OF NONAUTOLOGOUS RED BLOOD CELLS INTO PERIPHERAL VEIN, PERCUTANEOUS APPROACH: ICD-10-PCS | Performed by: INTERNAL MEDICINE

## 2023-03-23 RX ORDER — POTASSIUM CHLORIDE 20 MEQ/1
20 TABLET, EXTENDED RELEASE ORAL 2 TIMES DAILY
Status: DISPENSED | OUTPATIENT
Start: 2023-03-23 | End: 2023-03-24

## 2023-03-23 RX ADMIN — METOPROLOL SUCCINATE 25 MG: 25 TABLET, FILM COATED, EXTENDED RELEASE ORAL at 08:27

## 2023-03-23 RX ADMIN — SERTRALINE HYDROCHLORIDE 25 MG: 25 TABLET ORAL at 08:27

## 2023-03-23 RX ADMIN — CHOLECALCIFEROL TAB 25 MCG (1000 UNIT) 1000 UNITS: 25 TAB at 08:27

## 2023-03-23 RX ADMIN — POTASSIUM CHLORIDE 20 MEQ: 1500 TABLET, EXTENDED RELEASE ORAL at 18:21

## 2023-03-23 RX ADMIN — PANTOPRAZOLE SODIUM 40 MG: 40 TABLET, DELAYED RELEASE ORAL at 18:21

## 2023-03-23 RX ADMIN — FUROSEMIDE 40 MG: 40 TABLET ORAL at 08:27

## 2023-03-23 RX ADMIN — PANTOPRAZOLE SODIUM 40 MG: 40 TABLET, DELAYED RELEASE ORAL at 06:02

## 2023-03-23 NOTE — PLAN OF CARE
Problem: PHYSICAL THERAPY ADULT  Goal: Performs mobility at highest level of function for planned discharge setting  See evaluation for individualized goals  Description: Treatment/Interventions: Functional transfer training, Bed mobility, Endurance training, Gait training, Spoke to nursing, OT          See flowsheet documentation for full assessment, interventions and recommendations  Outcome: Progressing  Note: Prognosis: Fair  Problem List: Decreased strength, Decreased endurance, Decreased mobility, Pain  Assessment: Pt seen for PT treatment session this date  Pt noted to be OOB in chair, sleeping, awakens to stimuli  Pt states fatigued but participates  Pt completes 1 STS with assist X 2 from chair with rocío HHA, but unable to step  Attempted SPS transfer from chair back to bed with max assist 2  Pt assisted back to bed  Pt rolls in bed with Max assist with 1-2   Pt required assistance for thuy care ,PT focused on bed mobility and improved functional mobility  Pt was left back in bed at the end of PT session with all needs in reach  Pt would benefit from continued PT services while in hospital to address remaining limitations  The patient's AM-PAC Basic Mobility Inpatient Short Form Raw Score is 10  A Raw score of less than or equal to 16 suggests the patient may benefit from discharge to post-acute rehabilitation services  Please also refer to the recommendation of the Physical Therapist for safe discharge planning  Post d/c rec is Rehab at this time  Barriers to Discharge: Decreased caregiver support     PT Discharge Recommendation: Post acute rehabilitation services    See flowsheet documentation for full assessment

## 2023-03-23 NOTE — PROGRESS NOTES
Pastoral Care Progress Note    3/23/2023  Patient: Courtney Pickett : 1925  Admission Date & Time: 3/18/2023 0800  MRN: [id number] CSN: [id number]                      23 1800   Patient Spiritual Encounters   Spiritual Encounter Notes Referred to Dev Odor by previous   Unable to assess  Attempted visit x3  Sleeping

## 2023-03-23 NOTE — ANESTHESIA PREPROCEDURE EVALUATION
Procedure:  EGD    Relevant Problems   CARDIO   (+) Lower leg DVT (deep venous thromboembolism), acute, bilateral (HCC)   (+) Paroxysmal supraventricular tachycardia    (+) Pulmonary embolism (HCC)      GI/HEPATIC   (+) GI bleed      /RENAL   (+) Chronic kidney disease stage 3       HEMATOLOGY   (+) Acute blood loss anemia      NEURO/PSYCH   (+) 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)   (+) History of venous thromboembolism      Nervous and Auditory   (+) Metabolic encephalopathy   (+) Neuropathy   (+) Polyneuropathy, unspecified      Musculoskeletal and Integument   (+) Age-related osteoporosis without current pathological fracture   (+) Compression fracture of L3 lumbar vertebra, closed, initial encounter ([facility])   (+) Venous stasis dermatitis of both lower extremities      Other   (+) Ambulatory dysfunction   (+) Balance problem   (+) Chronic pain of left knee   (+) Cognitive impairment   (+) Constipation, unspecified   (+) Diverticulosis of intestine, part unspecified, without perforation or abscess without bleeding   (+) Fall   (+) Generalized muscle weakness      Lab Results   Component Value Date    SODIUM 141 03/23/2023    K 3 3 (L) 03/23/2023     (H) 03/23/2023    CO2 28 03/23/2023    AGAP 4 03/23/2023    BUN 17 03/23/2023    CREATININE 0 94 03/23/2023    GLUC 90 03/23/2023    GLUF 107 (H) 07/08/2022    CALCIUM 8 0 (L) 03/23/2023    AST 8 03/18/2023    ALT 10 (L) 03/18/2023    ALKPHOS 75 03/18/2023    TP 5 1 (L) 03/18/2023    TBILI 0 46 03/18/2023    EGFR 51 03/23/2023     Lab Results   Component Value Date    WBC 10 67 (H) 03/23/2023    HGB 8 4 (L) 03/23/2023    HCT 27 2 (L) 03/23/2023    MCV 97 03/23/2023     03/23/2023         Physical Exam    Airway    Mallampati score: II  TM Distance: >3 FB  Neck ROM: limited     Dental       Cardiovascular      Pulmonary      Other Findings        Anesthesia Plan  ASA Score- 3     Anesthesia Type- IV sedation with anesthesia with ASA Monitors  Additional Monitors:   Airway Plan:           Plan Factors-Exercise tolerance (METS): >4 METS  Chart reviewed  EKG reviewed  Imaging results reviewed  Existing labs reviewed  Patient summary reviewed  Induction- intravenous  Postoperative Plan-     Informed Consent- Anesthetic plan and risks discussed with patient, son and healthcare power of   I personally reviewed this patient with the CRNA  Discussed and agreed on the Anesthesia Plan with the CRNA  Kristina Johnson

## 2023-03-23 NOTE — RESTORATIVE TECHNICIAN NOTE
Restorative Technician Note      Patient Name: Annette Nicholas     Restorative Tech Visit Date: 03/23/23  Note Type: Mobility  Patient Position Upon Consult: Bedside chair  Activity Performed: Transferred  Assistive Device: Other (Comment) [facility] x2 to commode and back to bedside chair)  Education Provided: Yes  Patient Position at End of Consult: Bedside chair;  All needs within reach; Bed/Chair alarm activated    Antione Puente  DPT, Restorative Technician

## 2023-03-23 NOTE — CONSULTS
Consultation - Palliative and Supportive Care   Shawn Herrera 80 y o  female [id number]    Patient Active Problem List   Diagnosis   • Neuropathy   • Ambulatory dysfunction   • Venous stasis dermatitis of both lower extremities   • Age-related osteoporosis without current pathological fracture   • Lower extremity edema   • Chronic kidney disease stage 3    • Compression fracture of L3 lumbar vertebra, closed, initial encounter ([facility])   • Fall   • Paroxysmal supraventricular tachycardia    • Infiltrate noted on imaging study   • Balance problem   • Constipation, unspecified   • Diverticulosis of intestine, part unspecified, without perforation or abscess without bleeding   • Generalized muscle weakness   • Cognitive impairment   • Polyneuropathy, unspecified   • Pulmonary embolism ([facility])   • COVID   • Lower leg DVT (deep venous thromboembolism), acute, bilateral ([facility])   • Chronic pain of left knee   • Episode of recurrent major depressive disorder ([facility])   • GI bleed   • Acute blood loss anemia   • Leukocytosis   • History of venous thromboembolism   • Metabolic encephalopathy     Active issues specifically addressed today include:   Melena  Anemia  Goals of care    Plan:  1  Symptom management -   -  No current symptoms    2  Goals -  -  Limits set at DNAR/DNI   - Patient states she would not want blood products  -     Code Status: DNAR/DNI  - Level 3   Decisional apparatus:  Patient is not competent on my exam today  If competence is lost, patient's substitute decision maker would default to son  by [place]  Advance Directive / Living Will / POLST:  None on file      I have reviewed the patient's controlled substance dispensing history in the Prescription Drug Monitoring Program in compliance with the [facility] regulations before prescribing any controlled substances  We appreciate the invitation to be involved in this patient's care  We will continue to follow    Please do not hesitate to reach our on call provider through our clinic answering service at  should you have acute symptom control concerns  KERON Cabral  Palliative and Supportive Care  Clinic/Answering Service: [phone]  You can find me on [employer]! IDENTIFICATION:  Inpatient consult to Palliative Care  Consult performed by: KERON Keyes  Consult ordered by: Flavio Coleman MD        Physician Requesting Consult: Flavio Coleman,*  Reason for Consult / Principal Problem: Goals of care  Hx and PE limited by: No limitations    HISTORY OF PRESENT ILLNESS:       Courtney Pickett is a 80 y o  female with a past medical history significant for DVT, CKD stage III, SVT who presented with GI bleed with associated anemia  Anticoagulation has been stopped and the patient was started on Protonix  EGD was completed on 322 which showed some nonbleeding duodenal ulcers  Palliative care was consulted on 323 as the patient had been making requests to die  Further conversation with primary team led to palliative consult as the patient identifies she does not want aggressive cares going forward and would want to be allowed to die a natural death  The patient was seen sitting in bed  She states that she does not want aggressive medical intervention such as blood transfusion even if a blood transfusion would prolong life     She states she would want to be allowed to die a natural death  She is frustrated because she does not want to be in the hospital   When discussing end-of-life cares the patient states she would not want hospice  The patient does not have a palliative or hospice qualifying diagnosis at this point in time  Support given to patient  Will call her son to follow-up  Review of Systems   Constitutional: Positive for malaise/fatigue  All other systems reviewed and are negative        Past Medical History:   Diagnosis Date   • Interstitial cystitis    • Leukopenia    • Neurologic gait dysfunction     Abstraction Dr Shelby Workman charts   • Neuropathy    • Neutropenia [facility])     Abstraction Dr Elysia Mitchell   • Osteoarthritis    • Osteoporosis    • Peripheral edema     Abstraction Dr Shelby Workman charts   • Renal cyst    • Syncope     Abstraction Dr Shelby Workman charts     Past Surgical History:   Procedure Laterality Date   • CAPSULOTOMY      Right eye   • CATARACT EXTRACTION Left    • CHALAZION EXCISION     • COLONOSCOPY       Social History     Socioeconomic History   • Marital status:      Spouse name: Not on file   • Number of children: Not on file   • Years of education: Not on file   • Highest education level: Not on file   Occupational History   • Not on file   Tobacco Use   • Smoking status: Former     Packs/day: 1 00     Years: 20 00     Pack years: 20 00     Types: Cigarettes     Quit date: 5     Years since quittin 2   • Smokeless tobacco: Never   Vaping Use   • Vaping Use: Never used   Substance and Sexual Activity   • Alcohol use: Yes     Comment: socially   • Drug use: No   • Sexual activity: Not Currently   Other Topics Concern   • Not on file   Social History Narrative    Lives with her son Rickie Givens     Social Determinants of Health     Financial Resource Strain: Low Risk    • Difficulty of Paying Living Expenses: Not hard at all   Food Insecurity: No Food Insecurity   • Worried About [address] in the Last Year: Never true   • Ran Out of Food in the Last Year: Never true   Transportation Needs: No Transportation Needs   • Lack of Transportation (Medical): No   • Lack of Transportation (Non-Medical):  No   Physical Activity: Not on file   Stress: Not on file   Social Connections: Not on file   Intimate Partner Violence: Not on file   Housing Stability: Low Risk    • Unable to Pay for Housing in the Last Year: No   • Number of Places Lived in the Last Year: 1   • Unstable Housing in the Last Year: No     Family History   Problem Relation Age of Onset   • Diabetes Mother    • Lung disease Father    • Cancer Maternal Grandfather    • Lung disease Sister        MEDICATIONS / ALLERGIES:    all current active meds have been reviewed    No Known Allergies    OBJECTIVE:    Physical Exam  Physical Exam  Vitals and nursing note reviewed  Constitutional:       General: She is not in acute distress  Appearance: She is well-developed  HENT:      Head: Normocephalic and atraumatic  Eyes:      Conjunctiva/sclera: Conjunctivae normal    Cardiovascular:      Rate and Rhythm: Normal rate and regular rhythm  Heart sounds: No murmur heard  Pulmonary:      Effort: Pulmonary effort is normal  No respiratory distress  Breath sounds: Normal breath sounds  Abdominal:      Palpations: Abdomen is soft  Tenderness: There is no abdominal tenderness  Musculoskeletal:         General: No swelling  Cervical back: Neck supple  Skin:     General: Skin is warm and dry  Coloration: Skin is pale  Neurological:      Mental Status: She is alert  Psychiatric:         Mood and Affect: Mood normal        Lab Results: I have personally reviewed pertinent labs  Counseling / Coordination of Care    Total floor / unit time spent today 45+  minutes  Greater than 50% of total time was spent with the patient and / or family counseling and / or coordination of care  A description of the counseling / coordination of care: Chart review, goals of care, collaboration with primary team, information given, goals of care conversations with limits placed on care

## 2023-03-23 NOTE — CASE MANAGEMENT
Case Management Discharge Planning Note    Patient name Mikey Pillai  Location [address] [address]01 MRN [id number]  : 1925 Date 3/23/2023       Current Admission Date: 3/18/2023  Current Admission Diagnosis:GI bleed   Patient Active Problem List    Diagnosis Date Noted   • Metabolic encephalopathy    • GI bleed 2023   • Acute blood loss anemia 2023   • Leukocytosis 2023   • History of venous thromboembolism 2023   • Chronic pain of left knee 2023   • Episode of recurrent major depressive disorder ([facility] ) 2023   • Lower leg DVT (deep venous thromboembolism), acute, bilateral ([facility] ) 2023   • COVID 2023   • Pulmonary embolism ([place] [address] ) 2023   • Paroxysmal supraventricular tachycardia  2022   • Infiltrate noted on imaging study 2022   • Compression fracture of L3 lumbar vertebra, closed, initial encounter ([place] [address] ) 2020   • Fall 2020   • Age-related osteoporosis without current pathological fracture 2019   • Lower extremity edema 2019   • Chronic kidney disease stage 3  2019   • Venous stasis dermatitis of both lower extremities 2019   • Constipation, unspecified 2018   • Diverticulosis of intestine, part unspecified, without perforation or abscess without bleeding 2018   • Generalized muscle weakness 2018   • Polyneuropathy, unspecified 2018   • Ambulatory dysfunction 2018   • Neuropathy 2018   • Balance problem 2017   • Cognitive impairment 2017      LOS (days): 5  Geometric Mean LOS (GMLOS) (days): 4 50  Days to GMLOS:-0 5     OBJECTIVE:  Risk of Unplanned Readmission Score: 14         Current admission status: Inpatient   Preferred Pharmacy:   [address] [place] [address] [address] [place] [address]  Phone: [phone] Fax: [phone]    Primary Care Provider: KERON Perez    Primary Insurance: [facility] REP  Secondary Insurance:     [address] Number: [id number]

## 2023-03-23 NOTE — PLAN OF CARE
Problem: OCCUPATIONAL THERAPY ADULT  Goal: Performs self-care activities at highest level of function for planned discharge setting  See evaluation for individualized goals  Description: Treatment Interventions: ADL retraining, Functional transfer training, Endurance training, Patient/family training, Compensatory technique education, Energy conservation, Activityengagement, Continued evaluation, Equipment evaluation/education          See flowsheet documentation for full assessment, interventions and recommendations  Outcome: Not Progressing  Note: Limitation: Decreased ADL status, Decreased Safe judgement during ADL, Decreased endurance, Decreased self-care trans, Decreased high-level ADLs  Prognosis: Fair  Assessment: Pt greeted up in recliner chair for OT treatment on 3/23/2023 focusing on maximizing independence with ADLs  Pt max Ax2 with functional SPT from recliner chair to EOB  Pt requires max Ax2 with bed mobility and max Ax1 with rolling R/L  Pt engages in ADL routine supine in bed: max A UB dressing and total assist with toileting  Limitations that impact functional performance include decreased ADL status, decreased UE ROM, decreased UE strength, decreased safe judgement during ADLs, decreased cognition, decreased endurance, decreased self care transfers, decreased high level ADLs and pain  Occupational performance areas to address ADL retraining, functional transfer training, UE strengthening/ROM, endurance training, cognitive reorientation, Pt/caregiver education, equipment evaluation/education, compensatory technique education, energy conservation and activity engagement   Pt would benefit from continued skilled OT services while in hospital to maximize independence with ADLs  Will continue to follow Pt's goals and progress  Pt would benefit from post acute rehabilitation services upon DC to maximize safety and independence with ADLs and functional tasks of choice       OT Discharge Recommendation: Post acute rehabilitation services

## 2023-03-23 NOTE — PLAN OF CARE
Problem: Potential for Falls  Goal: Patient will remain free of falls  Description: INTERVENTIONS:  - Educate patient/family on patient safety including physical limitations  - Instruct patient to call for assistance with activity   - Consult OT/PT to assist with strengthening/mobility   - Keep Call bell within reach  - Keep bed low and locked with side rails adjusted as appropriate  - Keep care items and personal belongings within reach  - Initiate and maintain comfort rounds  - Make Fall Risk Sign visible to staff  - Offer Toileting every 2 Hours, in advance of need  - Initiate/Maintain bed alarm  - Apply yellow socks and bracelet for high fall risk patients  - Consider moving patient to room near nurses station  Outcome: Progressing     Problem: Prexisting or High Potential for Compromised Skin Integrity  Goal: Skin integrity is maintained or improved  Description: INTERVENTIONS:  - Identify patients at risk for skin breakdown  - Assess and monitor skin integrity  - Assess and monitor nutrition and hydration status  - Monitor labs   - Assess for incontinence   - Turn and reposition patient  - Assist with mobility/ambulation  - Relieve pressure over bony prominences  - Avoid friction and shearing  - Provide appropriate hygiene as needed including keeping skin clean and dry  - Evaluate need for skin moisturizer/barrier cream  - Collaborate with interdisciplinary team   - Patient/family teaching  - Consider wound care consult   Outcome: Progressing     Problem: CARDIOVASCULAR - ADULT  Goal: Maintains optimal cardiac output and hemodynamic stability  Description: INTERVENTIONS:  - Monitor I/O, vital signs and rhythm  - Monitor for S/S and trends of decreased cardiac output  - Administer and titrate ordered vasoactive medications to optimize hemodynamic stability  - Assess quality of pulses, skin color and temperature  - Assess for signs of decreased coronary artery perfusion  - Instruct patient to report change in severity of symptoms  Outcome: Progressing     Problem: GASTROINTESTINAL - ADULT  Goal: Minimal or absence of nausea and/or vomiting  Description: INTERVENTIONS:  - Administer IV fluids if ordered to ensure adequate hydration  - Maintain NPO status until nausea and vomiting are resolved  - Nasogastric tube if ordered  - Administer ordered antiemetic medications as needed  - Provide nonpharmacologic comfort measures as appropriate  - Advance diet as tolerated, if ordered  - Consider nutrition services referral to assist patient with adequate nutrition and appropriate food choices  Outcome: Progressing     Problem: METABOLIC, FLUID AND ELECTROLYTES - ADULT  Goal: Electrolytes maintained within normal limits  Description: INTERVENTIONS:  - Monitor labs and assess patient for signs and symptoms of electrolyte imbalances  - Administer electrolyte replacement as ordered  - Monitor response to electrolyte replacements, including repeat lab results as appropriate  - Instruct patient on fluid and nutrition as appropriate  Outcome: Progressing  Goal: Fluid balance maintained  Description: INTERVENTIONS:  - Monitor labs   - Monitor I/O and WT  - Instruct patient on fluid and nutrition as appropriate  - Assess for signs & symptoms of volume excess or deficit  Outcome: Progressing     Problem: SKIN/TISSUE INTEGRITY - ADULT  Goal: Pressure injury heals and does not worsen  Description: Interventions:  - Implement low air loss mattress or specialty surface (Criteria met)  - Apply silicone foam dressing  - Instruct/assist with weight shifting every 30 minutes when in chair   - Limit chair time to 4 hour intervals  - Use special pressure reducing interventions such as soft care cushion when in chair   - Apply fecal or urinary incontinence containment device   - Perform passive or active ROM every shift  - Turn and reposition patient & offload bony prominences every 2 hours   - Utilize friction reducing device or surface for transfers   - Consider consults to  interdisciplinary teams such as wound care  - Use incontinent care products after each incontinent episode such as moisture barrier  - Consider nutrition services referral as needed  Outcome: Progressing     Problem: PAIN - ADULT  Goal: Verbalizes/displays adequate comfort level or baseline comfort level  Description: Interventions:  - Encourage patient to monitor pain and request assistance  - Assess pain using appropriate pain scale  - Administer analgesics based on type and severity of pain and evaluate response  - Implement non-pharmacological measures as appropriate and evaluate response  - Consider cultural and social influences on pain and pain management  - Notify physician/advanced practitioner if interventions unsuccessful or patient reports new pain  Outcome: Progressing     Problem: INFECTION - ADULT  Goal: Absence or prevention of progression during hospitalization  Description: INTERVENTIONS:  - Assess and monitor for signs and symptoms of infection  - Monitor lab/diagnostic results  - Monitor all insertion sites, i e  indwelling lines, tubes, and drains  - Monitor endotracheal if appropriate and nasal secretions for changes in amount and color  - [place] appropriate cooling/warming therapies per order  - Administer medications as ordered  - Instruct and encourage patient and family to use good hand hygiene technique  - Identify and instruct in appropriate isolation precautions for identified infection/condition  Outcome: Progressing     Problem: DISCHARGE PLANNING  Goal: Discharge to home or other facility with appropriate resources  Description: INTERVENTIONS:  - Identify barriers to discharge w/patient and caregiver  - Arrange for needed discharge resources and transportation as appropriate  - Identify discharge learning needs (meds, wound care, etc )  - Arrange for interpretive services to assist at discharge as needed  - Refer to Case Management Department for coordinating discharge planning if the patient needs post-hospital services based on physician/advanced practitioner order or complex needs related to functional status, cognitive ability, or social support system  Outcome: Progressing     Problem: Knowledge Deficit  Goal: Patient/family/caregiver demonstrates understanding of disease process, treatment plan, medications, and discharge instructions  Description: Complete learning assessment and assess knowledge base  Interventions:  - Provide teaching at level of understanding  - Provide teaching via preferred learning methods  Outcome: Progressing     Problem: MOBILITY - ADULT  Goal: Maintain or return to baseline ADL function  Description: INTERVENTIONS:  -  Assess patient's ability to carry out ADLs; assess patient's baseline for ADL function and identify physical deficits which impact ability to perform ADLs (bathing, care of mouth/teeth, toileting, grooming, dressing, etc )  - Assess/evaluate cause of self-care deficits   - Assess range of motion  - Assess patient's mobility; develop plan if impaired  - Assess patient's need for assistive devices and provide as appropriate  - Encourage maximum independence but intervene and supervise when necessary  - Involve family in performance of ADLs  - Assess for home care needs following discharge   - Consider OT consult to assist with ADL evaluation and planning for discharge  - Provide patient education as appropriate  Outcome: Progressing  Goal: Maintains/Returns to pre admission functional level  Description: INTERVENTIONS:  - Perform BMAT or MOVE assessment daily    - Set and communicate daily mobility goal to care team and patient/family/caregiver     - Collaborate with rehabilitation services on mobility goals if consulted  - Patient assisted with turn/pivot out of bed to the chair as able, limited mobility at baseline  -OOB to chair with heavy assist of two for as long as patient able to tolerate  -Incontinence care completed as needed  - Out of bed for toileting  - Record patient progress and toleration of activity level   Outcome: Progressing

## 2023-03-23 NOTE — CASE MANAGEMENT
Lead Care Mgmt Liaison received notification that SNF authorization is pending >24 hours  Call made into [place] P# [phone] to check status of authorization and/or check timeframe for authorization determination  Per John Patino (call ref John Patino 3/23/2023), authorization has been received and is marked as pending  Unable to give projected timeframe for authorizations, however notified they typically take 24-48 hours  Notified DCS Staff, CM Director Lilly Garcia, and Care Manager Jeanie Morales of update

## 2023-03-23 NOTE — OCCUPATIONAL THERAPY NOTE
Occupational Therapy Progress Note     Patient Name: Kandy Huber  WGHYA'I Date: 3/23/2023  Problem List  Principal Problem:    GI bleed  Active Problems:    Lower extremity edema    Chronic kidney disease stage 3     Paroxysmal supraventricular tachycardia     Cognitive impairment    Acute blood loss anemia    Leukocytosis    History of venous thromboembolism    Metabolic encephalopathy            03/23/23 1120   OT Last Visit   OT Visit Date 03/23/23   Note Type   Note Type Treatment   Pain Assessment   Pain Assessment Tool 0-10   Pain Score No Pain   Restrictions/Precautions   Weight Bearing Precautions Per Order No   Other Precautions Cognitive; Chair Alarm; Bed Alarm; Fall Risk;Pain   Lifestyle   Autonomy Pt reports being I in ADLs, receives A with IADLs, and uses a walker at baseline  (-)   Reciprocal Relationships Pt lives with son who is home 24/7  Service to Others Pt is retired  Intrinsic Gratification Pt enjoys reading  ADL   Where Assessed Supine, bed   UB Dressing Assistance 2  Maximal Assistance   UB Dressing Deficit Setup;Supervision/safety; Increased time to complete   Toileting Assistance  1  Total Assistance   Toileting Deficit Setup;Supervison/safety; Increased time to complete   Toileting Comments Pt incontinent of urine and bowel upon return supine in bed  Total assist hygiene  Pt with bloody BM and RN aware  Bed Mobility   Rolling R 2  Maximal assistance   Additional items Assist x 1   Rolling L 2  Maximal assistance   Additional items Assist x 1   Sit to Supine 2  Maximal assistance   Additional items Assist x 2; Increased time required;Verbal cues;LE management   Additional Comments Pt greeted OOB in recliner chair  Transfers   Sit to Stand 2  Maximal assistance   Additional items Assist x 2; Increased time required;Verbal cues   Stand to Sit 2  Maximal assistance   Additional items Assist x 2; Increased time required;Verbal cues   Stand pivot 2  Maximal assistance   Additional "items Assist x 2; Increased time required;Verbal cues   Additional Comments Pt max Ax2 with functional STS transfers with B/L HHA- one therapist anterior/one therapist posterior   Subjective   Subjective (S)  \"I don't care what you do  I just want to die  \" KALPESH hobbs updated  Cognition   Overall Cognitive Status WFL   Arousal/Participation Cooperative;Responsive;Lethargic   Attention Attends with cues to redirect   Orientation Level Oriented to person;Oriented to place; Disoriented to situation;Disoriented to time   Memory Decreased recall of precautions   Following Commands Follows one step commands with increased time or repetition   Comments Pt pleasant and cooperative during OT session  Pt lethargic and requires cues to remain awake  Activity Tolerance   Activity Tolerance Patient limited by fatigue   Medical Staff Made Aware Portions of tx completed with PT 2* to Pt's medical complexity and decreased endurance  OT focus on maximizing independence with ADLs  Assessment   Assessment Pt greeted up in recliner chair for OT treatment on 3/23/2023 focusing on maximizing independence with ADLs  Pt max Ax2 with functional SPT from recliner chair to EOB  Pt requires max Ax2 with bed mobility and max Ax1 with rolling R/L  Pt engages in ADL routine supine in bed: max A UB dressing and total assist with toileting  Limitations that impact functional performance include decreased ADL status, decreased UE ROM, decreased UE strength, decreased safe judgement during ADLs, decreased cognition, decreased endurance, decreased self care transfers, decreased high level ADLs and pain  Occupational performance areas to address ADL retraining, functional transfer training, UE strengthening/ROM, endurance training, cognitive reorientation, Pt/caregiver education, equipment evaluation/education, compensatory technique education, energy conservation and activity engagement    Pt would benefit from continued skilled OT services while in " hospital to maximize independence with ADLs  Will continue to follow Pt's goals and progress  Pt would benefit from post acute rehabilitation services upon DC to maximize safety and independence with ADLs and functional tasks of choice  Plan   Treatment Interventions ADL retraining;Functional transfer training;UE strengthening/ROM; Endurance training;Cognitive reorientation;Patient/family training;Equipment evaluation/education; Compensatory technique education; Energy conservation; Activityengagement   Goal Expiration Date 04/03/23   OT Frequency 3-5x/wk   Recommendation   OT Discharge Recommendation Post acute rehabilitation services   Additional Comments  The patient's raw score on the AM-PAC Daily Activity Inpatient Short Form is 12  A raw score of less than 19 suggests the patient may benefit from discharge to post-acute rehabilitation services  Please refer to the recommendation of the Occupational Therapist for safe discharge planning   AM-PAC Daily Activity Inpatient   Lower Body Dressing 2   Bathing 2   Toileting 1   Upper Body Dressing 2   Grooming 2   Eating 3   Daily Activity Raw Score 12   Daily Activity Standardized Score (Calc for Raw Score >=11) 30 6   AM-PAC Applied Cognition Inpatient   Following a Speech/Presentation 3   Understanding Ordinary Conversation 4   Taking Medications 2   Remembering Where Things Are Placed or Put Away 2   Remembering List of 4-5 Errands 2   Taking Care of Complicated Tasks 1   Applied Cognition Raw Score 14   Applied Cognition Standardized Score 32 02   End of Consult   Education Provided Yes   Patient Position at End of Consult Bed/Chair alarm activated; All needs within reach; Supine   Nurse Communication Nurse aware of consult       Sneha Page MS, OTR/L

## 2023-03-23 NOTE — PROGRESS NOTES
"[address]  Progress Note  Name: Rocío Manley  MRN: [id number]  Unit/Bed#: [facility] 801-01 I Date of Admission: 3/18/2023   Date of Service: 3/23/2023 I Hospital Day: 5    Assessment/Plan   Goals of care, counseling/discussion  Assessment & Plan  · On 3/23, patient stated she did not want any treatment focused care, wanted to transition to hospice and was ready to die  · Unclear how much of this is related to frustration from hospitalization and loss of independence, and unclear if patient has hospice qualifying diagnosis currently, palliative care consulted for further assistance    * GI bleed  Assessment & Plan  · Found by son with bloody vomitus on shirt - had multiple melanotic stools in the ED  · An almost 4-point drop in hemoglobin today when compared to a few months prior coupled with mild/transient hypotension responsive to IV fluids  · See plan for acute blood loss anemia below  · Protonix drip initiated -> holding Eliquis/Naproxen  · Per CT imaging: \"No CT evidence of active high volume gastrointestinal hemorrhage  Findings of gastritis and nonspecific enteritis  Moderate rectal stool ball  Stable 3 4 cm left adnexal cystic lesion  Recommend follow-up pelvic ultrasound in one year  \"  · EGD cancelled by GI on 3/20 as she had been stable  Home apixaban restarted per GI recs  · Melena restarted 3/21 after apixaban restarted  EGD done 3/22 with some duodenal ulcers, no evidence of recent bleeding  · Advance to regular diet, continue to monitor  · Risk/benefit discussion had with patient and her son regarding blood thinner, we will hold off on restarting this and will likely not restart as patient continues to have black stools      Metabolic encephalopathy  Assessment & Plan  · On presentation noted to be more confused than normal per her son  · Likely acute metabolic encephalopathy superimposed on chronic cognitive issues in setting of GI bleed, worsening anemia, " hypotension  · Appears closer to baseline, continue to monitor    History of venous thromboembolism  Assessment & Plan  History of pulmonary embolism and DVT noted  Continue to hold home apixaban -based off of risks/benefit discussion with patient and her son on 3/23, unlikely that we will restart this at all especially as PEs were incidentally found  Leukocytosis  Assessment & Plan  Likely reactive due to acute medical issue(s)  Monitor WBC count    Acute blood loss anemia  Assessment & Plan  · Baseline hemoglobin between 12-14 -> presented w hgb significantly lower at 8 7 (was 12 4 two months ago)  · Secondary to coffee-ground emesis at home and multiple melanotic stools in the ED in the setting of Eliquis use   · Due to borderline hypotension given IV fluids and transfused a unit of PRBCs and cryoprecipitate in the ED  · Hemoglobin has stabilized, continue to monitor    Cognitive impairment  Assessment & Plan  Continue Zoloft    Paroxysmal supraventricular tachycardia   Assessment & Plan  Restart toprol    Chronic kidney disease stage 3   Assessment & Plan  Baseline creatinine of approximately 0 9-1 1 -> currently at baseline    Lower extremity edema  Assessment & Plan  Chronic issue  Restart home lasix  Wound care consult               VTE Pharmacologic Prophylaxis: VTE Score: 3 Moderate Risk (Score 3-4) - Pharmacological DVT Prophylaxis Contraindicated  Sequential Compression Devices Ordered  Patient Centered Rounds: I performed bedside rounds with nursing staff today  Discussions with Specialists or Other Care Team Provider: Palliative care, GI    Education and Discussions with Family / Patient: Updated  (son) at bedside      Total Time Spent on Date of Encounter in care of patient: 35 minutes This time was spent on one or more of the following: performing physical exam; counseling and coordination of care; obtaining or reviewing history; documenting in the medical record; "reviewing/ordering tests, medications or procedures; communicating with other healthcare professionals and discussing with patient's family/caregivers  Current Length of Stay: 5 day(s)  Current Patient Status: Inpatient   Certification Statement: The patient will continue to require additional inpatient hospital stay due to As above  Discharge Plan: Anticipate discharge in 24-48 hrs to Pending goals of care    Code Status: Level 3 - DNAR and DNI    Subjective:   Frustrated with care, states \"I just want to die\", and asked for \"injection\" that I can give her to help her die right now  Explained this would be murder and we cannot do this  Attempted to tease out whether or not this was due to frustration with loss of independence in hospitalization, versus actually wanting to change goals of care  Difficult to understand patient at times  Son was at bedside, would be in agreement with changing goals of care  Explained palliative care, they will see patient later  Discussed that unfortunately patient overall is healthy, do not think she will be imminently dying despite this possibly being her wish, and unclear if she has hospice qualifying diagnosis  Objective:     Vitals:   Temp (24hrs), Av 3 °F (36 3 °C), Min:97 °F (36 1 °C), Max:97 5 °F (36 4 °C)    Temp:  [97 °F (36 1 °C)-97 5 °F (36 4 °C)] 97 5 °F (36 4 °C)  HR:  [62-77] 77  Resp:  [12-16] 14  BP: (110-119)/(60-62) 110/62  SpO2:  [91 %-100 %] 100 %  Body mass index is 20 4 kg/m²  Input and Output Summary (last 24 hours): Intake/Output Summary (Last 24 hours) at 3/23/2023 182  Last data filed at 3/22/2023 1940  Gross per 24 hour   Intake --   Output 150 ml   Net -150 ml       Physical Exam:   Physical Exam  Vitals reviewed  Constitutional:       General: She is not in acute distress  Appearance: She is not ill-appearing or toxic-appearing     HENT:      Mouth/Throat:      Mouth: Mucous membranes are moist    Eyes:      General: No " scleral icterus  Cardiovascular:      Rate and Rhythm: Normal rate and regular rhythm  Pulmonary:      Effort: Pulmonary effort is normal  No respiratory distress  Breath sounds: Normal breath sounds  Abdominal:      General: Bowel sounds are normal       Palpations: Abdomen is soft  Tenderness: There is no abdominal tenderness  Musculoskeletal:      Right lower leg: Edema present  Left lower leg: Edema present  Comments: Venous stasis noted   Skin:     General: Skin is warm and dry  Neurological:      Mental Status: She is alert  She is disoriented  Psychiatric:         Behavior: Behavior normal       Comments: Calm and cooperative with care, verbally irritable at times            Additional Data:     Labs:  Results from last 7 days   Lab Units 03/23/23 0453 03/18/23 0932 03/18/23 0912   WBC Thousand/uL 10 67*   < > 15 46*   HEMOGLOBIN g/dL 8 4*   < > 8 7*   I STAT HEMOGLOBIN   --    < >  --    HEMATOCRIT % 27 2*   < > 27 7*   HEMATOCRIT, ISTAT   --    < >  --    PLATELETS Thousands/uL 287   < > 328   BANDS PCT %  --   --  22*   NEUTROS PCT % 82*   < >  --    LYMPHS PCT % 9*   < >  --    LYMPHO PCT %  --   --  3*   MONOS PCT % 6   < >  --    MONO PCT %  --   --  3*   EOS PCT % 1   < > 0    < > = values in this interval not displayed  Results from last 7 days   Lab Units 03/23/23 0453 03/18/23 0932 03/18/23  0930   SODIUM mmol/L 141   < > 140   POTASSIUM mmol/L 3 3*   < > 3 8   CHLORIDE mmol/L 109*   < > 105   CO2 mmol/L 28   < > 26   CO2, I-STAT   --    < >  --    BUN mg/dL 17   < > 69*   CREATININE mg/dL 0 94   < > 1 23   ANION GAP mmol/L 4   < > 9   CALCIUM mg/dL 8 0*   < > 8 2*   ALBUMIN g/dL  --   --  2 3*   TOTAL BILIRUBIN mg/dL  --   --  0 46   ALK PHOS U/L  --   --  75   ALT U/L  --   --  10*   AST U/L  --   --  8   GLUCOSE RANDOM mg/dL 90   < > 179*    < > = values in this interval not displayed       Results from last 7 days   Lab Units 03/18/23 0912   INR  1 35* Results from last 7 days   Lab Units 03/18/23  1140 03/18/23  0912   LACTIC ACID mmol/L 3 4* 3 7*       Lines/Drains:  Invasive Devices     Peripheral Intravenous Line  Duration           Peripheral IV 03/22/23 Right Antecubital 1 day                      Imaging: No pertinent imaging reviewed  Recent Cultures (last 7 days):         Last 24 Hours Medication List:   Current Facility-Administered Medications   Medication Dose Route Frequency Provider Last Rate   • acetaminophen  650 mg Oral Q6H PRN Yung Castañeda DO     • cholecalciferol  1,000 Units Oral Daily Katty Dennis, DO     • furosemide  40 mg Oral Daily Kirby Cushing, MD     • metoprolol succinate  25 mg Oral Daily Kattydelia Collins, DO     • ondansetron  4 mg Intravenous Q4H PRN Yung Castañeda DO     • pantoprazole  40 mg Oral BID AC Kirby Cushing, MD     • potassium chloride  20 mEq Oral BID Kirby Cushing, MD     • sertraline  25 mg Oral Daily Yung Castañeda DO          Today, Patient Was Seen By: Kirby Cushing, MD    **Please Note: This note may have been constructed using a voice recognition system  **

## 2023-03-23 NOTE — ASSESSMENT & PLAN NOTE
· On 3/23, patient stated she did not want any treatment focused care, wanted to transition to hospice and was ready to die    · Unclear how much of this is related to frustration from hospitalization and loss of independence, and unclear if patient has hospice qualifying diagnosis currently, palliative care consulted for further assistance

## 2023-03-23 NOTE — RESTORATIVE TECHNICIAN NOTE
Restorative Technician Note      Patient Name: Amara Bell     Restorative Tech Visit Date: 03/23/23  Note Type: Mobility  Patient Position Upon Consult: Supine  Activity Performed: Stood; Dangled; Other (Comment) (ADLs EOB; Ax1 stand pivot transfer to chair)  Education Provided: Yes  Patient Position at End of Consult: Bedside chair;  All needs within reach; Bed/Chair alarm activated    Bailee Guevara  DPT, Restorative Technician

## 2023-03-23 NOTE — ASSESSMENT & PLAN NOTE
"· Found by son with bloody vomitus on shirt - had multiple melanotic stools in the ED  · An almost 4-point drop in hemoglobin today when compared to a few months prior coupled with mild/transient hypotension responsive to IV fluids  · See plan for acute blood loss anemia below  · Protonix drip initiated -> holding Eliquis/Naproxen  · Per CT imaging: \"No CT evidence of active high volume gastrointestinal hemorrhage  Findings of gastritis and nonspecific enteritis  Moderate rectal stool ball  Stable 3 4 cm left adnexal cystic lesion  Recommend follow-up pelvic ultrasound in one year  \"  · EGD cancelled by GI on 3/20 as she had been stable  Home apixaban restarted per GI recs  · Melena restarted 3/21 after apixaban restarted  EGD done 3/22 with some duodenal ulcers, no evidence of recent bleeding  · Advance to regular diet, continue to monitor  · Risk/benefit discussion had with patient and her son regarding blood thinner, we will hold off on restarting this and will likely not restart as patient continues to have black stools    "

## 2023-03-23 NOTE — CASE MANAGEMENT
Edelmira Younger 50 has received approved authorization from insurance:   [facility] in by Cindy Jon P#  [phone]  Authorization received for: [facility]  Facility: [place] [id number] #: [id number]  Start of Care: 3/23  Next Review Date: 3/28  Care Coordinator:   JUDAH#: [phone]  Submit next review to: [phone] [id number]   Care Manager notified: Trina Moya

## 2023-03-23 NOTE — ASSESSMENT & PLAN NOTE
History of pulmonary embolism and DVT noted  Continue to hold home apixaban -based off of risks/benefit discussion with patient and her son on 3/23, unlikely that we will restart this at all especially as PEs were incidentally found

## 2023-03-23 NOTE — PHYSICAL THERAPY NOTE
PHYSICAL THERAPY NOTE          Patient Name: Trent Desai  PHZRB'M Date: 3/23/2023     03/23/23 1121   PT Last Visit   PT Visit Date 03/23/23   Note Type   Note Type Treatment   Education   Education Provided Yes   End of Consult   Patient Position at End of Consult Supine;Bed/Chair alarm activated; All needs within reach   Pain Assessment   Pain Assessment Tool 0-10   Pain Score No Pain   Restrictions/Precautions   Weight Bearing Precautions Per Order No   Other Precautions Chair Alarm; Bed Alarm; Fall Risk;Pain   General   Chart Reviewed Yes   Cognition   Overall Cognitive Status [facility]   Arousal/Participation Alert; Responsive   Attention Attends with cues to redirect   Orientation Level Oriented to person;Oriented to place; Disoriented to situation;Disoriented to time   Following Commands Follows one step commands with increased time or repetition   Comments Pt pleasant during session   Pt drowsy but arousable, requires cueing  Bed Mobility   Rolling R 2  Maximal assistance   Additional items Assist x 1;Bedrails; Increased time required;Verbal cues   Rolling L 2  Maximal assistance   Additional items Assist x 1; Increased time required; Bedrails;Verbal cues   Supine to Sit Unable to assess   Sit to Supine 2  Maximal assistance   Additional items Assist x 2; Increased time required;LE management;Verbal cues   Additional Comments Pt OOB in chair upon arrival  Assisted back to bed  Transfers   Sit to Stand 2  Maximal assistance   Additional items Assist x 2; Increased time required;Verbal cues   Stand to Sit 2  Maximal assistance   Additional items Assist x 2;Verbal cues   Stand pivot 2  Maximal assistance   Additional items Assist x 2; Increased time required;Verbal cues   Additional Comments Pt assisted back to bed with max assist X 2( 1 anteriorly and 1 posteriorly) SPS transfer, HHA b/l   Balance   Static Sitting Fair -   Dynamic Sitting Poor +   Static Standing Poor +   Dynamic Standing Poor   Endurance Deficit   Endurance Deficit Yes   Activity Tolerance   Activity Tolerance Patient limited by fatigue   Medical Staff Made Aware OT   Exercises   Heelslides 5 reps; Supine;AAROM; Bilateral   Ankle Pumps 10 reps; Supine;Bilateral   Assessment   Prognosis Fair   Problem List Decreased strength;Decreased endurance;Decreased mobility;Pain   Assessment Pt seen for PT treatment session this date  Pt noted to be OOB in chair, sleeping, awakens to stimuli  Pt states fatigued but participates  Pt completes 1 STS with assist X 2 from chair with rocío HHA, but unable to step  Attempted SPS transfer from chair back to bed with max assist 2  Pt assisted back to bed  Pt rolls in bed with Max assist with 1-2   Pt required assistance for thuy care ,PT focused on bed mobility and improved functional mobility  Pt was left back in bed at the end of PT session with all needs in reach  Pt would benefit from continued PT services while in hospital to address remaining limitations  The patient's AM-PAC Basic Mobility Inpatient Short Form Raw Score is 10  A Raw score of less than or equal to 16 suggests the patient may benefit from discharge to post-acute rehabilitation services  Please also refer to the recommendation of the Physical Therapist for safe discharge planning  Post d/c rec is Rehab at this time  Barriers to Discharge Decreased caregiver support   Goals   Patient Goals none stated   STG Expiration Date 04/03/23   Plan   Treatment/Interventions Patient/family training;Gait training;OT;Spoke to nursing; Functional transfer training; Therapeutic exercise   Progress Slow progress, decreased activity tolerance   PT Frequency 2-3x/wk   Recommendation   PT Discharge Recommendation Post acute rehabilitation services   AM-PAC Basic Mobility Inpatient   Turning in Flat Bed Without Bedrails 2   Lying on Back to Sitting on Edge of Flat Bed Without Bedrails 2   Moving Bed to Chair 2   Standing Up From Chair Using Arms 2   Walk in Room 1   Climb 3-5 Stairs With Railing 1   Basic Mobility Inpatient Raw Score 10   Turning Head Towards Sound 3   Follow Simple Instructions 3   Low Function Basic Mobility Raw Score 16   Low Function Basic Mobility Standardized Score 25 72   Highest Level Of Mobility   JH-HLM Goal 4: Move to chair/commode   JH-HLM Achieved 4: Move to chair/commode   Jenn Shahid PT DPT

## 2023-03-24 ENCOUNTER — HOME CARE VISIT (OUTPATIENT)
Dept: HOME HEALTH SERVICES | Facility: HOME HEALTHCARE | Age: 88
End: 2023-03-24

## 2023-03-24 LAB
ANION GAP SERPL CALCULATED.3IONS-SCNC: 2 MMOL/L (ref 4–13)
BUN SERPL-MCNC: 15 MG/DL (ref 5–25)
CALCIUM SERPL-MCNC: 8 MG/DL (ref 8.3–10.1)
CHLORIDE SERPL-SCNC: 108 MMOL/L (ref 96–108)
CO2 SERPL-SCNC: 30 MMOL/L (ref 21–32)
CREAT SERPL-MCNC: 1.02 MG/DL (ref 0.6–1.3)
ERYTHROCYTE [DISTWIDTH] IN BLOOD BY AUTOMATED COUNT: 15.9 % (ref 11.6–15.1)
GFR SERPL CREATININE-BSD FRML MDRD: 46 ML/MIN/1.73SQ M
GLUCOSE SERPL-MCNC: 87 MG/DL (ref 65–140)
HCT VFR BLD AUTO: 25.1 % (ref 34.8–46.1)
HGB BLD-MCNC: 7.9 G/DL (ref 11.5–15.4)
MCH RBC QN AUTO: 30.2 PG (ref 26.8–34.3)
MCHC RBC AUTO-ENTMCNC: 31.5 G/DL (ref 31.4–37.4)
MCV RBC AUTO: 96 FL (ref 82–98)
PLATELET # BLD AUTO: 297 THOUSANDS/UL (ref 149–390)
PMV BLD AUTO: 9.8 FL (ref 8.9–12.7)
POTASSIUM SERPL-SCNC: 3.3 MMOL/L (ref 3.5–5.3)
RBC # BLD AUTO: 2.62 MILLION/UL (ref 3.81–5.12)
SODIUM SERPL-SCNC: 140 MMOL/L (ref 135–147)
WBC # BLD AUTO: 9.54 THOUSAND/UL (ref 4.31–10.16)

## 2023-03-24 RX ADMIN — METOPROLOL SUCCINATE 25 MG: 25 TABLET, FILM COATED, EXTENDED RELEASE ORAL at 09:50

## 2023-03-24 RX ADMIN — PANTOPRAZOLE SODIUM 40 MG: 40 TABLET, DELAYED RELEASE ORAL at 06:00

## 2023-03-24 RX ADMIN — FUROSEMIDE 40 MG: 40 TABLET ORAL at 09:50

## 2023-03-24 RX ADMIN — CHOLECALCIFEROL TAB 25 MCG (1000 UNIT) 1000 UNITS: 25 TAB at 09:50

## 2023-03-24 RX ADMIN — SERTRALINE HYDROCHLORIDE 25 MG: 25 TABLET ORAL at 09:50

## 2023-03-24 NOTE — QUICK NOTE
Patient's hemoglobin has been fluctuating from 8 4-7 9 this morning however within acceptable limits  Consider discontinuation of anticoagulation and antiplatelet agents given her advanced age if permissible  Goals of care discussion ongoing with primary team   GI team will sign off for now  Please call us or reconsult if any questions or concerns arise

## 2023-03-24 NOTE — ASSESSMENT & PLAN NOTE
Chronic issue  Discontinued home Lasix as patient does not want any life prolonging medications and does not like urinating frequently

## 2023-03-24 NOTE — CASE MANAGEMENT
Case Management Discharge Planning Note    Patient name Adrian Speaker  Location [address] [place] [address] [facility] MRN [id number]  : 1925 Date 3/24/2023       Current Admission Date: 3/18/2023  Current Admission Diagnosis:GI bleed   Patient Active Problem List    Diagnosis Date Noted   • Goals of care, counseling/discussion    • Metabolic encephalopathy    • GI bleed 2023   • Acute blood loss anemia 2023   • Leukocytosis 2023   • History of venous thromboembolism 2023   • Chronic pain of left knee 2023   • Episode of recurrent major depressive disorder ([facility] ) 2023   • Lower leg DVT (deep venous thromboembolism), acute, bilateral ([facility] ) 2023   • COVID 2023   • Pulmonary embolism ([facility] ) 2023   • Paroxysmal supraventricular tachycardia  2022   • Infiltrate noted on imaging study 2022   • Compression fracture of L3 lumbar vertebra, closed, initial encounter ([facility] ) 2020   • Fall 2020   • Age-related osteoporosis without current pathological fracture 2019   • Lower extremity edema 2019   • Chronic kidney disease stage 3  2019   • Venous stasis dermatitis of both lower extremities 2019   • Constipation, unspecified 2018   • Diverticulosis of intestine, part unspecified, without perforation or abscess without bleeding 2018   • Generalized muscle weakness 2018   • Polyneuropathy, unspecified 2018   • Ambulatory dysfunction 2018   • Neuropathy 2018   • Balance problem 2017   • Cognitive impairment 2017      LOS (days): 6  Geometric Mean LOS (GMLOS) (days): 4 50  Days to GMLOS:-1 6     OBJECTIVE:  Risk of Unplanned Readmission Score: 14 06         Current admission status: Inpatient   Preferred Pharmacy:   [address] [place] [address] [address] [place] [address]  Phone: [phone] Fax: [phone]    Primary Care Provider: Katie MITCHELL KERON Garcia    Primary Insurance: [facility] REP  Secondary Insurance:     DISCHARGE DETAILS:                 Other Referral/Resources/Interventions Provided:  Referral Comments: Pt and her son currently discussing the possibility of hospice with her treatment team   CM made referral to [facility] and messaged hospice liaison for an evaluation  CM spoke with pt's son about DCP involving hospice, who confirmed he would not be able to care for her if [facility]  KALPESH explained the process of transitioning into LTC at a SNF, which would involved pt paying OOP until she qualifies for MA   Pt's son explained he does not believe she currently has much in assets and might be eligible immediately  CM explained referrals could be made to attempt to have the pt admitted with a STR authorization when medically cleared and transition into LTC and hospice could then be considered  If not, pt could be admitted directly into LTC and hospice if a facility agrees to take her MA-pending  CM made referrals in [place], faxed optioning paperwork [address] (in the event pt needs a LOC determination), and will follow

## 2023-03-24 NOTE — Clinical Note
Hi Dr Joseph Dinh, received a referral for pt at [address] [place] [address]  She has been followed by [facility] who noted pt does not have a palliative or hospice qualifying dx  Clive Halley  81 yo  5 16   PMHX: DVT CKD3 SVT who presented with GI bleed with associated anemia  Anticoagulation has been stopped and the patient was started on Protonix  EGD was completed on 3 22 which showed some nonbleeding duodenal ulcers  Pt had been making requests to die and [facility] was consulted  GI signed off today noting HB stable  Hb has been 8 4 to 7 9  PPS 40  Not seeing a terminal dx  Please let me know what you think

## 2023-03-24 NOTE — HOSPICE NOTE
RECEIVED HOSPICE REFERRAL  REVIEWED CASE WITH DR SHAHEED Salinas  PER DR ALMANZAR PT DOES NOT HAVE A QUALIFYING HOSPICE DX AT THIS TIME  April BERNADETTE CABRALES AND MULU FOSTER CM UPDATED VIA  SPOKE TO SON SARAVANAN PRYOR VIA [facility] AND UPDATED HIM

## 2023-03-24 NOTE — PROGRESS NOTES
Pastoral Care Progress Note    3/24/2023  Patient: Bree Barney : 1925  Admission Date & Time: 3/18/2023 0800  MRN: [id number] [facility]: [id number]      Did follow up visit at suggestion of   Pt pleasant, didn't seem sad or depressed, yet made clear that she's old, her body is done, and she is ready to die  I listened, expressed sympathy and understanding of her perspective  We talked a bit about the spiritual dimension  She wasn't raised in a Evangel[employer], yet says she is ready to go to God and leave earthly life  She seems at peace, and is frustrated to be stuck in a hospital bed  With her agreement, I prayed for her, for which she was grateful                 Chaplaincy Interventions Utilized:     Exploration: Explored emotional needs & resources, Explored relational needs & resources, and Explored spiritual needs & resources    Collaboration: Consulted with interdisciplinary team    Relationship Building: Cultivated a relationship of care and support    Ritual: Provided prayer    Chaplaincy Outcomes Achieved:  Emotional resources utilized, Expressed gratitude, and Expressed humor       23 1300   Clinical Encounter Type   Visited With Patient   Routine Visit Follow-up   Referral From    Referral To

## 2023-03-24 NOTE — ASSESSMENT & PLAN NOTE
"· Found by son with bloody vomitus on shirt - had multiple melanotic stools in the ED  · An almost 4-point drop in hemoglobin today when compared to a few months prior coupled with mild/transient hypotension responsive to IV fluids  · See plan for acute blood loss anemia below  · Protonix drip initiated -> holding Eliquis/Naproxen  · Per CT imaging: \"No CT evidence of active high volume gastrointestinal hemorrhage  Findings of gastritis and nonspecific enteritis  Moderate rectal stool ball  Stable 3 4 cm left adnexal cystic lesion  Recommend follow-up pelvic ultrasound in one year  \"  · EGD cancelled by GI on 3/20 as she had been stable  Home apixaban restarted per GI recs  · Melena restarted 3/21 after apixaban restarted  EGD done 3/22 with some duodenal ulcers, no evidence of recent bleeding  · Advance to regular diet, continue to monitor  · Goals of care discussion with patient and her son 3/23 and 3/24  Risk of any further procedures not acceptable to patient  Risk of restarting blood thinners also not acceptable  Patient would like to focus on comfort, is not interested in disease-focused care  She would not want a blood transfusion  · Currently does not have hospice qualifying diagnosis, will continue to trend CBC    She has been having ongoing melena despite off of anticoagulation since 3/21  "

## 2023-03-24 NOTE — ASSESSMENT & PLAN NOTE
· Baseline hemoglobin between 12-14 -> presented w hgb significantly lower at 8 7 (was 12 4 two months ago)  · Secondary to coffee-ground emesis at home and multiple melanotic stools in the ED in the setting of Eliquis use   · Due to borderline hypotension given IV fluids and transfused a unit of PRBCs and cryoprecipitate in the ED  · Per goals of care discussion on 3/24, patient would not want any blood transfusions or any other life prolonging treatments  Wants to focus on comfort and die a natural death

## 2023-03-24 NOTE — ASSESSMENT & PLAN NOTE
Discontinued home metoprolol as patient does not want any life prolonging treatments or medications

## 2023-03-24 NOTE — PROGRESS NOTES
"[address] [facility]  Progress Note  Name: Kiran Paz  MRN: [id number]  Unit/Bed#: [facility] 801-01 I Date of Admission: 3/18/2023   Date of Service: 3/24/2023 I Hospital Day: 6    Assessment/Plan   Goals of care, counseling/discussion  Assessment & Plan  · On 3/23, patient stated she did not want any treatment focused care, wanted to transition to hospice and was ready to die  · Unclear how much of this is related to frustration from hospitalization and loss of independence, and unclear if patient has hospice qualifying diagnosis currently, palliative care consulted for further assistance    * GI bleed  Assessment & Plan  · Found by son with bloody vomitus on shirt - had multiple melanotic stools in the ED  · An almost 4-point drop in hemoglobin today when compared to a few months prior coupled with mild/transient hypotension responsive to IV fluids  · See plan for acute blood loss anemia below  · Protonix drip initiated -> holding Eliquis/Naproxen  · Per CT imaging: \"No CT evidence of active high volume gastrointestinal hemorrhage  Findings of gastritis and nonspecific enteritis  Moderate rectal stool ball  Stable 3 4 cm left adnexal cystic lesion  Recommend follow-up pelvic ultrasound in one year  \"  · EGD cancelled by GI on 3/20 as she had been stable  Home apixaban restarted per GI recs  · Melena restarted 3/21 after apixaban restarted  EGD done 3/22 with some duodenal ulcers, no evidence of recent bleeding  · Advance to regular diet, continue to monitor  · Goals of care discussion with patient and her son 3/23 and 3/24  Risk of any further procedures not acceptable to patient  Risk of restarting blood thinners also not acceptable  Patient would like to focus on comfort, is not interested in disease-focused care  She would not want a blood transfusion  · Currently does not have hospice qualifying diagnosis, will continue to trend CBC    She has been having ongoing melena " despite off of anticoagulation since 4/58    Metabolic encephalopathy  Assessment & Plan  · On presentation noted to be more confused than normal per her son  · Likely acute metabolic encephalopathy superimposed on chronic cognitive issues in setting of GI bleed, worsening anemia, hypotension  · Appears closer to baseline, continue to monitor    History of venous thromboembolism  Assessment & Plan  History of pulmonary embolism and DVT noted, patient on apixaban at home  Discussed with patient and her son on 3/23 and 3/24, will not restart apixaban given ongoing GI bleeding and shifting goals of care  Leukocytosis  Assessment & Plan  Likely reactive due to acute medical issue(s)    Acute blood loss anemia  Assessment & Plan  · Baseline hemoglobin between 12-14 -> presented w hgb significantly lower at 8 7 (was 12 4 two months ago)  · Secondary to coffee-ground emesis at home and multiple melanotic stools in the ED in the setting of Eliquis use   · Due to borderline hypotension given IV fluids and transfused a unit of PRBCs and cryoprecipitate in the ED  · Per goals of care discussion on 3/24, patient would not want any blood transfusions or any other life prolonging treatments  Wants to focus on comfort and die a natural death  Cognitive impairment  Assessment & Plan  Continue Zoloft    Paroxysmal supraventricular tachycardia   Assessment & Plan  Discontinued home metoprolol as patient does not want any life prolonging treatments or medications  Chronic kidney disease stage 3   Assessment & Plan  Baseline creatinine of approximately 0 9-1 1 -> currently at baseline    Lower extremity edema  Assessment & Plan  Chronic issue  Discontinued home Lasix as patient does not want any life prolonging medications and does not like urinating frequently  VTE Pharmacologic Prophylaxis: VTE Score: 3 Moderate Risk (Score 3-4) - Pharmacological DVT Prophylaxis Contraindicated   Sequential Compression Devices Ordered  Patient Centered Rounds: Discussed with RN  Discussions with Specialists or Other Care Team Provider: Palliative care    Education and Discussions with Family / Patient: Updated  (son) at bedside  Total Time Spent on Date of Encounter in care of patient: 35 minutes This time was spent on one or more of the following: performing physical exam; counseling and coordination of care; obtaining or reviewing history; documenting in the medical record; reviewing/ordering tests, medications or procedures; communicating with other healthcare professionals and discussing with patient's family/caregivers  Current Length of Stay: 6 day(s)  Current Patient Status: Inpatient   Certification Statement: The patient will continue to require additional inpatient hospital stay due to As above  Discharge Plan: Anticipate discharge in 48-72 hrs to discharge location to be determined pending rehab evaluations  Code Status: Level 3 - DNAR and DNI    Subjective:   Patient states she is ready to die  Reviewed her organ function is pretty intact, however course of GI bleed difficult to determine but could become life limiting in the future  She would like hospice care  Her son does not feel like he can care for her at home and she agrees that this would not be safe  Ongoing discussions with palliative care and case management regarding appropriate disposition  Objective:     Vitals:   Temp (24hrs), Av 5 °F (36 4 °C), Min:97 3 °F (36 3 °C), Max:97 7 °F (36 5 °C)    Temp:  [97 3 °F (36 3 °C)-97 7 °F (36 5 °C)] 97 5 °F (36 4 °C)  HR:  [62-71] 71  Resp:  [12-20] 20  BP: ()/(46-55) 118/55  SpO2:  [98 %-100 %] 99 %  Body mass index is 20 4 kg/m²  Input and Output Summary (last 24 hours): Intake/Output Summary (Last 24 hours) at 3/24/2023 1823  Last data filed at 3/24/2023 0900  Gross per 24 hour   Intake 220 ml   Output --   Net 220 ml       Physical Exam:   Physical Exam  Vitals reviewed  Constitutional:       General: She is not in acute distress  Appearance: She is not ill-appearing or toxic-appearing  HENT:      Mouth/Throat:      Mouth: Mucous membranes are moist    Eyes:      General: No scleral icterus  Pulmonary:      Effort: Pulmonary effort is normal  No respiratory distress  Musculoskeletal:      Comments: Venous stasis noted   Skin:     General: Skin is dry  Neurological:      Mental Status: She is alert  She is disoriented  Comments: Hard of hearing   Psychiatric:         Behavior: Behavior normal       Comments: Calm and cooperative with care            Additional Data:     Labs:  Results from last 7 days   Lab Units 03/24/23  0551 03/23/23  0453 03/18/23  0932 03/18/23  0912   WBC Thousand/uL 9 54 10 67*   < > 15 46*   HEMOGLOBIN g/dL 7 9* 8 4*   < > 8 7*   I STAT HEMOGLOBIN   --   --    < >  --    HEMATOCRIT % 25 1* 27 2*   < > 27 7*   HEMATOCRIT, ISTAT   --   --    < >  --    PLATELETS Thousands/uL 297 287   < > 328   BANDS PCT %  --   --   --  22*   NEUTROS PCT %  --  82*   < >  --    LYMPHS PCT %  --  9*   < >  --    LYMPHO PCT %  --   --   --  3*   MONOS PCT %  --  6   < >  --    MONO PCT %  --   --   --  3*   EOS PCT %  --  1   < > 0    < > = values in this interval not displayed  Results from last 7 days   Lab Units 03/24/23  0551 03/18/23  0932 03/18/23  0930   SODIUM mmol/L 140   < > 140   POTASSIUM mmol/L 3 3*   < > 3 8   CHLORIDE mmol/L 108   < > 105   CO2 mmol/L 30   < > 26   CO2, I-STAT   --    < >  --    BUN mg/dL 15   < > 69*   CREATININE mg/dL 1 02   < > 1 23   ANION GAP mmol/L 2*   < > 9   CALCIUM mg/dL 8 0*   < > 8 2*   ALBUMIN g/dL  --   --  2 3*   TOTAL BILIRUBIN mg/dL  --   --  0 46   ALK PHOS U/L  --   --  75   ALT U/L  --   --  10*   AST U/L  --   --  8   GLUCOSE RANDOM mg/dL 87   < > 179*    < > = values in this interval not displayed       Results from last 7 days   Lab Units 03/18/23  0912   INR  1 35*             Results from last 7 days Lab Units 03/18/23  1140 03/18/23  0912   LACTIC ACID mmol/L 3 4* 3 7*       Lines/Drains:  Invasive Devices     Peripheral Intravenous Line  Duration           Peripheral IV 03/22/23 Right Antecubital 2 days                      Imaging: No pertinent imaging reviewed  Recent Cultures (last 7 days):         Last 24 Hours Medication List:   Current Facility-Administered Medications   Medication Dose Route Frequency Provider Last Rate   • acetaminophen  650 mg Oral Q6H PRN Kattydelia Richardsonel, DO     • ondansetron  4 mg Intravenous Q4H PRN Katty Collins, DO     • pantoprazole  40 mg Oral BID AC Angelica Redding MD     • sertraline  25 mg Oral Daily Samantha Bello DO          Today, Patient Was Seen By: Angelica Redding MD    **Please Note: This note may have been constructed using a voice recognition system  **

## 2023-03-24 NOTE — PLAN OF CARE
Problem: Potential for Falls  Goal: Patient will remain free of falls  Description: INTERVENTIONS:  - Educate patient/family on patient safety including physical limitations  - Instruct patient to call for assistance with activity   - Consult OT/PT to assist with strengthening/mobility   - Keep Call bell within reach  - Keep bed low and locked with side rails adjusted as appropriate  - Keep care items and personal belongings within reach  - Initiate and maintain comfort rounds  - Make Fall Risk Sign visible to staff  - Offer Toileting every 2 Hours, in advance of need  - Initiate/Maintain bed alarm  - Apply yellow socks and bracelet for high fall risk patients  - Consider moving patient to room near nurses station  Outcome: Progressing     Problem: Prexisting or High Potential for Compromised Skin Integrity  Goal: Skin integrity is maintained or improved  Description: INTERVENTIONS:  - Identify patients at risk for skin breakdown  - Assess and monitor skin integrity  - Assess and monitor nutrition and hydration status  - Monitor labs   - Assess for incontinence   - Turn and reposition patient  - Assist with mobility/ambulation  - Relieve pressure over bony prominences  - Avoid friction and shearing  - Provide appropriate hygiene as needed including keeping skin clean and dry  - Evaluate need for skin moisturizer/barrier cream  - Collaborate with interdisciplinary team   - Patient/family teaching  - Consider wound care consult   Outcome: Progressing     Problem: CARDIOVASCULAR - ADULT  Goal: Maintains optimal cardiac output and hemodynamic stability  Description: INTERVENTIONS:  - Monitor I/O, vital signs and rhythm  - Monitor for S/S and trends of decreased cardiac output  - Administer and titrate ordered vasoactive medications to optimize hemodynamic stability  - Assess quality of pulses, skin color and temperature  - Assess for signs of decreased coronary artery perfusion  - Instruct patient to report change in severity of symptoms  Outcome: Progressing     Problem: GASTROINTESTINAL - ADULT  Goal: Minimal or absence of nausea and/or vomiting  Description: INTERVENTIONS:  - Administer IV fluids if ordered to ensure adequate hydration  - Maintain NPO status until nausea and vomiting are resolved  - Nasogastric tube if ordered  - Administer ordered antiemetic medications as needed  - Provide nonpharmacologic comfort measures as appropriate  - Advance diet as tolerated, if ordered  - Consider nutrition services referral to assist patient with adequate nutrition and appropriate food choices  Outcome: Progressing     Problem: METABOLIC, FLUID AND ELECTROLYTES - ADULT  Goal: Electrolytes maintained within normal limits  Description: INTERVENTIONS:  - Monitor labs and assess patient for signs and symptoms of electrolyte imbalances  - Administer electrolyte replacement as ordered  - Monitor response to electrolyte replacements, including repeat lab results as appropriate  - Instruct patient on fluid and nutrition as appropriate  Outcome: Progressing  Goal: Fluid balance maintained  Description: INTERVENTIONS:  - Monitor labs   - Monitor I/O and WT  - Instruct patient on fluid and nutrition as appropriate  - Assess for signs & symptoms of volume excess or deficit  Outcome: Progressing     Problem: SKIN/TISSUE INTEGRITY - ADULT  Goal: Pressure injury heals and does not worsen  Description: Interventions:  - Implement low air loss mattress or specialty surface (Criteria met)  - Apply silicone foam dressing  - Instruct/assist with weight shifting every 30 minutes when in chair   - Limit chair time to 4 hour intervals  - Use special pressure reducing interventions such as soft care cushion when in chair   - Apply fecal or urinary incontinence containment device   - Perform passive or active ROM every shift  - Turn and reposition patient & offload bony prominences every 2 hours   - Utilize friction reducing device or surface for transfers   - Consider consults to  interdisciplinary teams such as wound care  - Use incontinent care products after each incontinent episode such as moisture barrier  - Consider nutrition services referral as needed  Outcome: Progressing     Problem: PAIN - ADULT  Goal: Verbalizes/displays adequate comfort level or baseline comfort level  Description: Interventions:  - Encourage patient to monitor pain and request assistance  - Assess pain using appropriate pain scale  - Administer analgesics based on type and severity of pain and evaluate response  - Implement non-pharmacological measures as appropriate and evaluate response  - Consider cultural and social influences on pain and pain management  - Notify physician/advanced practitioner if interventions unsuccessful or patient reports new pain  Outcome: Progressing     Problem: INFECTION - ADULT  Goal: Absence or prevention of progression during hospitalization  Description: INTERVENTIONS:  - Assess and monitor for signs and symptoms of infection  - Monitor lab/diagnostic results  - Monitor all insertion sites, i e  indwelling lines, tubes, and drains  - Monitor endotracheal if appropriate and nasal secretions for changes in amount and color  - Padroni appropriate cooling/warming therapies per order  - Administer medications as ordered  - Instruct and encourage patient and family to use good hand hygiene technique  - Identify and instruct in appropriate isolation precautions for identified infection/condition  Outcome: Progressing     Problem: DISCHARGE PLANNING  Goal: Discharge to home or other facility with appropriate resources  Description: INTERVENTIONS:  - Identify barriers to discharge w/patient and caregiver  - Arrange for needed discharge resources and transportation as appropriate  - Identify discharge learning needs (meds, wound care, etc )  - Arrange for interpretive services to assist at discharge as needed  - Refer to Case Management Department for coordinating discharge planning if the patient needs post-hospital services based on physician/advanced practitioner order or complex needs related to functional status, cognitive ability, or social support system  Outcome: Progressing     Problem: Knowledge Deficit  Goal: Patient/family/caregiver demonstrates understanding of disease process, treatment plan, medications, and discharge instructions  Description: Complete learning assessment and assess knowledge base  Interventions:  - Provide teaching at level of understanding  - Provide teaching via preferred learning methods  Outcome: Progressing     Problem: MOBILITY - ADULT  Goal: Maintain or return to baseline ADL function  Description: INTERVENTIONS:  -  Assess patient's ability to carry out ADLs; assess patient's baseline for ADL function and identify physical deficits which impact ability to perform ADLs (bathing, care of mouth/teeth, toileting, grooming, dressing, etc )  - Assess/evaluate cause of self-care deficits   - Assess range of motion  - Assess patient's mobility; develop plan if impaired  - Assess patient's need for assistive devices and provide as appropriate  - Encourage maximum independence but intervene and supervise when necessary  - Involve family in performance of ADLs  - Assess for home care needs following discharge   - Consider OT consult to assist with ADL evaluation and planning for discharge  - Provide patient education as appropriate  Outcome: Progressing  Goal: Maintains/Returns to pre admission functional level  Description: INTERVENTIONS:  - Perform BMAT or MOVE assessment daily    - Set and communicate daily mobility goal to care team and patient/family/caregiver  - Collaborate with rehabilitation services on mobility goals if consulted  - Patient limited to ambulating/getting out of bed   Allowing turning/repositioning with wedges and pillows at this time  -Baseline did not have much mobility at home  -Plan for short term rehab  - Out of bed for toileting  - Record patient progress and toleration of activity level   Outcome: Progressing

## 2023-03-25 LAB
ERYTHROCYTE [DISTWIDTH] IN BLOOD BY AUTOMATED COUNT: 16.3 % (ref 11.6–15.1)
HCT VFR BLD AUTO: 23.9 % (ref 34.8–46.1)
HGB BLD-MCNC: 7.7 G/DL (ref 11.5–15.4)
MCH RBC QN AUTO: 30.6 PG (ref 26.8–34.3)
MCHC RBC AUTO-ENTMCNC: 32.2 G/DL (ref 31.4–37.4)
MCV RBC AUTO: 95 FL (ref 82–98)
PLATELET # BLD AUTO: 317 THOUSANDS/UL (ref 149–390)
PMV BLD AUTO: 9.9 FL (ref 8.9–12.7)
RBC # BLD AUTO: 2.52 MILLION/UL (ref 3.81–5.12)
WBC # BLD AUTO: 9.1 THOUSAND/UL (ref 4.31–10.16)

## 2023-03-25 RX ADMIN — ACETAMINOPHEN 650 MG: 325 TABLET ORAL at 16:59

## 2023-03-25 RX ADMIN — PANTOPRAZOLE SODIUM 40 MG: 40 TABLET, DELAYED RELEASE ORAL at 06:09

## 2023-03-25 RX ADMIN — SERTRALINE HYDROCHLORIDE 25 MG: 25 TABLET ORAL at 08:00

## 2023-03-25 RX ADMIN — PANTOPRAZOLE SODIUM 40 MG: 40 TABLET, DELAYED RELEASE ORAL at 16:59

## 2023-03-25 NOTE — PLAN OF CARE
Problem: Potential for Falls  Goal: Patient will remain free of falls  Description: INTERVENTIONS:  - Educate patient/family on patient safety including physical limitations  - Instruct patient to call for assistance with activity   - Consult OT/PT to assist with strengthening/mobility   - Keep Call bell within reach  - Keep bed low and locked with side rails adjusted as appropriate  - Keep care items and personal belongings within reach  - Initiate and maintain comfort rounds  - Make Fall Risk Sign visible to staff  - Offer Toileting every 2 Hours, in advance of need  - Initiate/Maintain bed alarm  - Apply yellow socks and bracelet for high fall risk patients  - Consider moving patient to room near nurses station  Outcome: Progressing     Problem: Prexisting or High Potential for Compromised Skin Integrity  Goal: Skin integrity is maintained or improved  Description: INTERVENTIONS:  - Identify patients at risk for skin breakdown  - Assess and monitor skin integrity  - Assess and monitor nutrition and hydration status  - Monitor labs   - Assess for incontinence   - Turn and reposition patient  - Assist with mobility/ambulation  - Relieve pressure over bony prominences  - Avoid friction and shearing  - Provide appropriate hygiene as needed including keeping skin clean and dry  - Evaluate need for skin moisturizer/barrier cream  - Collaborate with interdisciplinary team   - Patient/family teaching  - Consider wound care consult   Outcome: Progressing     Problem: CARDIOVASCULAR - ADULT  Goal: Maintains optimal cardiac output and hemodynamic stability  Description: INTERVENTIONS:  - Monitor I/O, vital signs and rhythm  - Monitor for S/S and trends of decreased cardiac output  - Administer and titrate ordered vasoactive medications to optimize hemodynamic stability  - Assess quality of pulses, skin color and temperature  - Assess for signs of decreased coronary artery perfusion  - Instruct patient to report change in severity of symptoms  Outcome: Progressing     Problem: GASTROINTESTINAL - ADULT  Goal: Minimal or absence of nausea and/or vomiting  Description: INTERVENTIONS:  - Administer IV fluids if ordered to ensure adequate hydration  - Maintain NPO status until nausea and vomiting are resolved  - Nasogastric tube if ordered  - Administer ordered antiemetic medications as needed  - Provide nonpharmacologic comfort measures as appropriate  - Advance diet as tolerated, if ordered  - Consider nutrition services referral to assist patient with adequate nutrition and appropriate food choices  Outcome: Progressing     Problem: METABOLIC, FLUID AND ELECTROLYTES - ADULT  Goal: Electrolytes maintained within normal limits  Description: INTERVENTIONS:  - Monitor labs and assess patient for signs and symptoms of electrolyte imbalances  - Administer electrolyte replacement as ordered  - Monitor response to electrolyte replacements, including repeat lab results as appropriate  - Instruct patient on fluid and nutrition as appropriate  Outcome: Progressing  Goal: Fluid balance maintained  Description: INTERVENTIONS:  - Monitor labs   - Monitor I/O and WT  - Instruct patient on fluid and nutrition as appropriate  - Assess for signs & symptoms of volume excess or deficit  Outcome: Progressing     Problem: SKIN/TISSUE INTEGRITY - ADULT  Goal: Pressure injury heals and does not worsen  Description: Interventions:  - Implement low air loss mattress or specialty surface (Criteria met)  - Apply silicone foam dressing  - Instruct/assist with weight shifting every 30 minutes when in chair   - Limit chair time to 4 hour intervals  - Use special pressure reducing interventions such as soft care cushion when in chair   - Apply fecal or urinary incontinence containment device   - Perform passive or active ROM every shift  - Turn and reposition patient & offload bony prominences every 2 hours   - Utilize friction reducing device or surface for transfers   - Consider consults to  interdisciplinary teams such as wound care  - Use incontinent care products after each incontinent episode such as moisture barrier  - Consider nutrition services referral as needed  Outcome: Progressing     Problem: PAIN - ADULT  Goal: Verbalizes/displays adequate comfort level or baseline comfort level  Description: Interventions:  - Encourage patient to monitor pain and request assistance  - Assess pain using appropriate pain scale  - Administer analgesics based on type and severity of pain and evaluate response  - Implement non-pharmacological measures as appropriate and evaluate response  - Consider cultural and social influences on pain and pain management  - Notify physician/advanced practitioner if interventions unsuccessful or patient reports new pain  Outcome: Progressing     Problem: INFECTION - ADULT  Goal: Absence or prevention of progression during hospitalization  Description: INTERVENTIONS:  - Assess and monitor for signs and symptoms of infection  - Monitor lab/diagnostic results  - Monitor all insertion sites, i e  indwelling lines, tubes, and drains  - Monitor endotracheal if appropriate and nasal secretions for changes in amount and color  - [place] appropriate cooling/warming therapies per order  - Administer medications as ordered  - Instruct and encourage patient and family to use good hand hygiene technique  - Identify and instruct in appropriate isolation precautions for identified infection/condition  Outcome: Progressing     Problem: DISCHARGE PLANNING  Goal: Discharge to home or other facility with appropriate resources  Description: INTERVENTIONS:  - Identify barriers to discharge w/patient and caregiver  - Arrange for needed discharge resources and transportation as appropriate  - Identify discharge learning needs (meds, wound care, etc )  - Arrange for interpretive services to assist at discharge as needed  - Refer to Case Management Department for coordinating discharge planning if the patient needs post-hospital services based on physician/advanced practitioner order or complex needs related to functional status, cognitive ability, or social support system  Outcome: Progressing     Problem: Knowledge Deficit  Goal: Patient/family/caregiver demonstrates understanding of disease process, treatment plan, medications, and discharge instructions  Description: Complete learning assessment and assess knowledge base  Interventions:  - Provide teaching at level of understanding  - Provide teaching via preferred learning methods  Outcome: Progressing     Problem: MOBILITY - ADULT  Goal: Maintain or return to baseline ADL function  Description: INTERVENTIONS:  -  Assess patient's ability to carry out ADLs; assess patient's baseline for ADL function and identify physical deficits which impact ability to perform ADLs (bathing, care of mouth/teeth, toileting, grooming, dressing, etc )  - Assess/evaluate cause of self-care deficits   - Assess range of motion  - Assess patient's mobility; develop plan if impaired  - Assess patient's need for assistive devices and provide as appropriate  - Encourage maximum independence but intervene and supervise when necessary  - Involve family in performance of ADLs  - Assess for home care needs following discharge   - Consider OT consult to assist with ADL evaluation and planning for discharge  - Provide patient education as appropriate  Outcome: Progressing  Goal: Maintains/Returns to pre admission functional level  Description: INTERVENTIONS:  - Perform BMAT or MOVE assessment daily    - Set and communicate daily mobility goal to care team and patient/family/caregiver  - Collaborate with rehabilitation services on mobility goals if consulted  - Perform Range of Motion 3 times a day  - Reposition patient every 2 hours    - Dangle patient 3 times a day  - Stand patient 3 times a day  - Ambulate patient 3 times a day  - Out of bed to chair 3 times a day   - Out of bed for meals 3 times a day  - Out of bed for toileting  - Record patient progress and toleration of activity level   Outcome: Progressing

## 2023-03-25 NOTE — ASSESSMENT & PLAN NOTE
"· Found by son with bloody vomitus on shirt - had multiple melanotic stools in the ED  · An almost 4-point drop in hemoglobin today when compared to a few months prior coupled with mild/transient hypotension responsive to IV fluids  · See plan for acute blood loss anemia below  · Protonix drip initiated -> holding Eliquis/Naproxen  · Per CT imaging: \"No CT evidence of active high volume gastrointestinal hemorrhage  Findings of gastritis and nonspecific enteritis  Moderate rectal stool ball  Stable 3 4 cm left adnexal cystic lesion  Recommend follow-up pelvic ultrasound in one year  \"  · EGD cancelled by GI on 3/20 as she had been stable  Home apixaban restarted per GI recs  · Melena restarted 3/21 after apixaban restarted  EGD done 3/22 with some duodenal ulcers, no evidence of recent bleeding  · Advance to regular diet, continue to monitor  · Goals of care discussion with patient and her son 3/23 and 3/24  Risk of any further procedures not acceptable to patient  Risk of restarting blood thinners also not acceptable  Patient would like to focus on comfort, is not interested in disease-focused care  She would not want a blood transfusion  · Currently does not have hospice qualifying diagnosis, will continue to trend CBC  If stabilizes over the next day or so, then likely will be appropriate for discharge to rehab if she becomes agreeable  If does not stabilize, then we will reengage hospice team as this might become a qualifying diagnosis    "

## 2023-03-25 NOTE — ASSESSMENT & PLAN NOTE
· On 3/23, patient stated she did not want any treatment focused care, wanted to transition to hospice and was ready to die    · Patient clear she does not want any invasion or treatment-focused care

## 2023-03-25 NOTE — PLAN OF CARE
Problem: Potential for Falls  Goal: Patient will remain free of falls  Description: INTERVENTIONS:  - Educate patient/family on patient safety including physical limitations  - Instruct patient to call for assistance with activity   - Consult OT/PT to assist with strengthening/mobility   - Keep Call bell within reach  - Keep bed low and locked with side rails adjusted as appropriate  - Keep care items and personal belongings within reach  - Initiate and maintain comfort rounds  - Make Fall Risk Sign visible to staff  - Offer Toileting every 2 Hours, in advance of need  - Initiate/Maintain bed alarm  - Apply yellow socks and bracelet for high fall risk patients  - Consider moving patient to room near nurses station  Outcome: Progressing     Problem: Prexisting or High Potential for Compromised Skin Integrity  Goal: Skin integrity is maintained or improved  Description: INTERVENTIONS:  - Identify patients at risk for skin breakdown  - Assess and monitor skin integrity  - Assess and monitor nutrition and hydration status  - Monitor labs   - Assess for incontinence   - Turn and reposition patient  - Assist with mobility/ambulation  - Relieve pressure over bony prominences  - Avoid friction and shearing  - Provide appropriate hygiene as needed including keeping skin clean and dry  - Evaluate need for skin moisturizer/barrier cream  - Collaborate with interdisciplinary team   - Patient/family teaching  - Consider wound care consult   Outcome: Progressing     Problem: CARDIOVASCULAR - ADULT  Goal: Maintains optimal cardiac output and hemodynamic stability  Description: INTERVENTIONS:  - Monitor I/O, vital signs and rhythm  - Monitor for S/S and trends of decreased cardiac output  - Administer and titrate ordered vasoactive medications to optimize hemodynamic stability  - Assess quality of pulses, skin color and temperature  - Assess for signs of decreased coronary artery perfusion  - Instruct patient to report change in severity of symptoms  Outcome: Progressing     Problem: GASTROINTESTINAL - ADULT  Goal: Minimal or absence of nausea and/or vomiting  Description: INTERVENTIONS:  - Administer IV fluids if ordered to ensure adequate hydration  - Maintain NPO status until nausea and vomiting are resolved  - Nasogastric tube if ordered  - Administer ordered antiemetic medications as needed  - Provide nonpharmacologic comfort measures as appropriate  - Advance diet as tolerated, if ordered  - Consider nutrition services referral to assist patient with adequate nutrition and appropriate food choices  Outcome: Progressing     Problem: METABOLIC, FLUID AND ELECTROLYTES - ADULT  Goal: Electrolytes maintained within normal limits  Description: INTERVENTIONS:  - Monitor labs and assess patient for signs and symptoms of electrolyte imbalances  - Administer electrolyte replacement as ordered  - Monitor response to electrolyte replacements, including repeat lab results as appropriate  - Instruct patient on fluid and nutrition as appropriate  Outcome: Progressing  Goal: Fluid balance maintained  Description: INTERVENTIONS:  - Monitor labs   - Monitor I/O and WT  - Instruct patient on fluid and nutrition as appropriate  - Assess for signs & symptoms of volume excess or deficit  Outcome: Progressing     Problem: SKIN/TISSUE INTEGRITY - ADULT  Goal: Pressure injury heals and does not worsen  Description: Interventions:  - Implement low air loss mattress or specialty surface (Criteria met)  - Apply silicone foam dressing  - Instruct/assist with weight shifting every 30 minutes when in chair   - Limit chair time to 4 hour intervals  - Use special pressure reducing interventions such as soft care cushion when in chair   - Apply fecal or urinary incontinence containment device   - Perform passive or active ROM every shift  - Turn and reposition patient & offload bony prominences every 2 hours   - Utilize friction reducing device or surface for transfers   - Consider consults to  interdisciplinary teams such as wound care  - Use incontinent care products after each incontinent episode such as moisture barrier  - Consider nutrition services referral as needed  Outcome: Progressing     Problem: PAIN - ADULT  Goal: Verbalizes/displays adequate comfort level or baseline comfort level  Description: Interventions:  - Encourage patient to monitor pain and request assistance  - Assess pain using appropriate pain scale  - Administer analgesics based on type and severity of pain and evaluate response  - Implement non-pharmacological measures as appropriate and evaluate response  - Consider cultural and social influences on pain and pain management  - Notify physician/advanced practitioner if interventions unsuccessful or patient reports new pain  Outcome: Progressing     Problem: INFECTION - ADULT  Goal: Absence or prevention of progression during hospitalization  Description: INTERVENTIONS:  - Assess and monitor for signs and symptoms of infection  - Monitor lab/diagnostic results  - Monitor all insertion sites, i e  indwelling lines, tubes, and drains  - Monitor endotracheal if appropriate and nasal secretions for changes in amount and color  - [place] appropriate cooling/warming therapies per order  - Administer medications as ordered  - Instruct and encourage patient and family to use good hand hygiene technique  - Identify and instruct in appropriate isolation precautions for identified infection/condition  Outcome: Progressing     Problem: DISCHARGE PLANNING  Goal: Discharge to home or other facility with appropriate resources  Description: INTERVENTIONS:  - Identify barriers to discharge w/patient and caregiver  - Arrange for needed discharge resources and transportation as appropriate  - Identify discharge learning needs (meds, wound care, etc )  - Arrange for interpretive services to assist at discharge as needed  - Refer to Case Management Department for coordinating discharge planning if the patient needs post-hospital services based on physician/advanced practitioner order or complex needs related to functional status, cognitive ability, or social support system  Outcome: Progressing     Problem: Knowledge Deficit  Goal: Patient/family/caregiver demonstrates understanding of disease process, treatment plan, medications, and discharge instructions  Description: Complete learning assessment and assess knowledge base  Interventions:  - Provide teaching at level of understanding  - Provide teaching via preferred learning methods  Outcome: Progressing     Problem: MOBILITY - ADULT  Goal: Maintain or return to baseline ADL function  Description: INTERVENTIONS:  -  Assess patient's ability to carry out ADLs; assess patient's baseline for ADL function and identify physical deficits which impact ability to perform ADLs (bathing, care of mouth/teeth, toileting, grooming, dressing, etc )  - Assess/evaluate cause of self-care deficits   - Assess range of motion  - Assess patient's mobility; develop plan if impaired  - Assess patient's need for assistive devices and provide as appropriate  - Encourage maximum independence but intervene and supervise when necessary  - Involve family in performance of ADLs  - Assess for home care needs following discharge   - Consider OT consult to assist with ADL evaluation and planning for discharge  - Provide patient education as appropriate  Outcome: Progressing  Goal: Maintains/Returns to pre admission functional level  Description: INTERVENTIONS:  - Perform BMAT or MOVE assessment daily    - Set and communicate daily mobility goal to care team and patient/family/caregiver     - Collaborate with rehabilitation services on mobility goals if consulted  - Out of bed for toileting  - Record patient progress and toleration of activity level   Outcome: Progressing

## 2023-03-25 NOTE — PROGRESS NOTES
"[facility]  Progress Note  Name: Brando Talbert  MRN: [id number]  Unit/Bed#: [facility]PHP 801-01 I Date of Admission: 3/18/2023   Date of Service: 3/25/2023 I Hospital Day: 7    Assessment/Plan   Goals of care, counseling/discussion  Assessment & Plan  · On 3/23, patient stated she did not want any treatment focused care, wanted to transition to hospice and was ready to die  · Patient clear she does not want any invasion or treatment-focused care     * GI bleed  Assessment & Plan  · Found by son with bloody vomitus on shirt - had multiple melanotic stools in the ED  · An almost 4-point drop in hemoglobin today when compared to a few months prior coupled with mild/transient hypotension responsive to IV fluids  · See plan for acute blood loss anemia below  · Protonix drip initiated -> holding Eliquis/Naproxen  · Per CT imaging: \"No CT evidence of active high volume gastrointestinal hemorrhage  Findings of gastritis and nonspecific enteritis  Moderate rectal stool ball  Stable 3 4 cm left adnexal cystic lesion  Recommend follow-up pelvic ultrasound in one year  \"  · EGD cancelled by GI on 3/20 as she had been stable  Home apixaban restarted per GI recs  · Melena restarted 3/21 after apixaban restarted  EGD done 3/22 with some duodenal ulcers, no evidence of recent bleeding  · Advance to regular diet, continue to monitor  · Goals of care discussion with patient and her son 3/23 and 3/24  Risk of any further procedures not acceptable to patient  Risk of restarting blood thinners also not acceptable  Patient would like to focus on comfort, is not interested in disease-focused care  She would not want a blood transfusion  · Currently does not have hospice qualifying diagnosis, will continue to trend CBC  If stabilizes over the next day or so, then likely will be appropriate for discharge to rehab if she becomes agreeable    If does not stabilize, then we will reengage hospice team " as this might become a qualifying diagnosis  Metabolic encephalopathy  Assessment & Plan  · On presentation noted to be more confused than normal per her son  · Likely acute metabolic encephalopathy superimposed on chronic cognitive issues in setting of GI bleed, worsening anemia, hypotension  · Appears closer to baseline, continue to monitor    History of venous thromboembolism  Assessment & Plan  History of pulmonary embolism and DVT noted, patient on apixaban at home  Discussed with patient and her son on 3/23 and 3/24, will not restart apixaban given ongoing GI bleeding and shifting goals of care  Leukocytosis  Assessment & Plan  Likely reactive due to acute medical issue(s)    Acute blood loss anemia  Assessment & Plan  · Baseline hemoglobin between 12-14 -> presented w hgb significantly lower at 8 7 (was 12 4 two months ago)  · Secondary to coffee-ground emesis at home and multiple melanotic stools in the ED in the setting of Eliquis use   · Due to borderline hypotension given IV fluids and transfused a unit of PRBCs and cryoprecipitate in the ED  · Per goals of care discussion on 3/24, patient would not want any blood transfusions or any other life prolonging treatments  Wants to focus on comfort and die a natural death  Cognitive impairment  Assessment & Plan  Continue Zoloft    Paroxysmal supraventricular tachycardia   Assessment & Plan  Discontinued home metoprolol as patient does not want any life prolonging treatments or medications  Chronic kidney disease stage 3   Assessment & Plan  Baseline creatinine of approximately 0 9-1 1 -> currently at baseline    Lower extremity edema  Assessment & Plan  Chronic issue  Discontinued home Lasix as patient does not want any life prolonging medications and does not like urinating frequently  VTE Pharmacologic Prophylaxis: VTE Score: 3 Moderate Risk (Score 3-4) - Pharmacological DVT Prophylaxis Contraindicated   Sequential Compression Devices Ordered  Patient Centered Rounds: d/w RN  Discussions with Specialists or Other Care Team Provider: none    Education and Discussions with Family / Patient: Updated  (son) via phone  Total Time Spent on Date of Encounter in care of patient: 25 minutes This time was spent on one or more of the following: performing physical exam; counseling and coordination of care; obtaining or reviewing history; documenting in the medical record; reviewing/ordering tests, medications or procedures; communicating with other healthcare professionals and discussing with patient's family/caregivers  Current Length of Stay: 7 day(s)  Current Patient Status: Inpatient   Certification Statement: The patient will continue to require additional inpatient hospital stay due to as above  Discharge Plan: Anticipate discharge in 48-72 hrs to discharge location to be determined pending rehab evaluations  Code Status: Level 3 - DNAR and DNI    Subjective:   Denies acute concerns, feels tired and frustrated overall  Hard of hearing  Objective:     Vitals:   Temp (24hrs), Av 4 °F (36 3 °C), Min:97 3 °F (36 3 °C), Max:97 5 °F (36 4 °C)    Temp:  [97 3 °F (36 3 °C)-97 5 °F (36 4 °C)] 97 5 °F (36 4 °C)  HR:  [67-68] 67  Resp:  [16-20] 20  BP: (113-117)/(54-55) 117/54  SpO2:  [98 %-100 %] 100 %  Body mass index is 20 4 kg/m²  Input and Output Summary (last 24 hours): Intake/Output Summary (Last 24 hours) at 3/25/2023 1619  Last data filed at 3/25/2023 0900  Gross per 24 hour   Intake 180 ml   Output --   Net 180 ml       Physical Exam:   Physical Exam  Vitals reviewed  Constitutional:       General: She is not in acute distress  Appearance: She is not ill-appearing or toxic-appearing  HENT:      Mouth/Throat:      Mouth: Mucous membranes are moist    Eyes:      General: No scleral icterus  Pulmonary:      Effort: Pulmonary effort is normal  No respiratory distress     Musculoskeletal:      Comments: Venous stasis noted   Skin:     General: Skin is dry  Neurological:      Mental Status: She is alert  She is disoriented  Comments: Hard of hearing   Psychiatric:         Behavior: Behavior normal       Comments: Calm and cooperative with care            Additional Data:     Labs:  Results from last 7 days   Lab Units 03/25/23  0446 03/24/23  0551 03/23/23  0453   WBC Thousand/uL 9 10   < > 10 67*   HEMOGLOBIN g/dL 7 7*   < > 8 4*   HEMATOCRIT % 23 9*   < > 27 2*   PLATELETS Thousands/uL 317   < > 287   NEUTROS PCT %  --   --  82*   LYMPHS PCT %  --   --  9*   MONOS PCT %  --   --  6   EOS PCT %  --   --  1    < > = values in this interval not displayed  Results from last 7 days   Lab Units 03/24/23  0551   SODIUM mmol/L 140   POTASSIUM mmol/L 3 3*   CHLORIDE mmol/L 108   CO2 mmol/L 30   BUN mg/dL 15   CREATININE mg/dL 1 02   ANION GAP mmol/L 2*   CALCIUM mg/dL 8 0*   GLUCOSE RANDOM mg/dL 87                       Lines/Drains:  Invasive Devices     Peripheral Intravenous Line  Duration           Peripheral IV 03/22/23 Right Antecubital 3 days                      Imaging: No pertinent imaging reviewed  Recent Cultures (last 7 days):         Last 24 Hours Medication List:   Current Facility-Administered Medications   Medication Dose Route Frequency Provider Last Rate   • acetaminophen  650 mg Oral Q6H PRN Katty Collins DO     • ondansetron  4 mg Intravenous Q4H PRN Katty Collins DO     • pantoprazole  40 mg Oral BID AC Mayco Parker MD     • sertraline  25 mg Oral Daily Ashley Harding DO          Today, Patient Was Seen By: Mayco Parker MD    **Please Note: This note may have been constructed using a voice recognition system  **

## 2023-03-26 PROBLEM — D72.829 LEUKOCYTOSIS: Status: RESOLVED | Noted: 2023-03-18 | Resolved: 2023-03-26

## 2023-03-26 LAB
ERYTHROCYTE [DISTWIDTH] IN BLOOD BY AUTOMATED COUNT: 16.6 % (ref 11.6–15.1)
HCT VFR BLD AUTO: 23.2 % (ref 34.8–46.1)
HGB BLD-MCNC: 7.4 G/DL (ref 11.5–15.4)
MCH RBC QN AUTO: 30.6 PG (ref 26.8–34.3)
MCHC RBC AUTO-ENTMCNC: 31.9 G/DL (ref 31.4–37.4)
MCV RBC AUTO: 96 FL (ref 82–98)
PLATELET # BLD AUTO: 325 THOUSANDS/UL (ref 149–390)
PMV BLD AUTO: 10 FL (ref 8.9–12.7)
RBC # BLD AUTO: 2.42 MILLION/UL (ref 3.81–5.12)
WBC # BLD AUTO: 6.06 THOUSAND/UL (ref 4.31–10.16)

## 2023-03-26 RX ADMIN — PANTOPRAZOLE SODIUM 40 MG: 40 TABLET, DELAYED RELEASE ORAL at 16:57

## 2023-03-26 RX ADMIN — SERTRALINE HYDROCHLORIDE 25 MG: 25 TABLET ORAL at 09:40

## 2023-03-26 NOTE — PLAN OF CARE
Problem: Potential for Falls  Goal: Patient will remain free of falls  Description: INTERVENTIONS:  - Educate patient/family on patient safety including physical limitations  - Instruct patient to call for assistance with activity   - Consult OT/PT to assist with strengthening/mobility   - Keep Call bell within reach  - Keep bed low and locked with side rails adjusted as appropriate  - Keep care items and personal belongings within reach  - Initiate and maintain comfort rounds  - Make Fall Risk Sign visible to staff  - Offer Toileting every 2 Hours, in advance of need  - Initiate/Maintain bed alarm  - Apply yellow socks and bracelet for high fall risk patients  - Consider moving patient to room near nurses station  Outcome: Progressing     Problem: Prexisting or High Potential for Compromised Skin Integrity  Goal: Skin integrity is maintained or improved  Description: INTERVENTIONS:  - Identify patients at risk for skin breakdown  - Assess and monitor skin integrity  - Assess and monitor nutrition and hydration status  - Monitor labs   - Assess for incontinence   - Turn and reposition patient  - Assist with mobility/ambulation  - Relieve pressure over bony prominences  - Avoid friction and shearing  - Provide appropriate hygiene as needed including keeping skin clean and dry  - Evaluate need for skin moisturizer/barrier cream  - Collaborate with interdisciplinary team   - Patient/family teaching  - Consider wound care consult   Outcome: Progressing     Problem: SKIN/TISSUE INTEGRITY - ADULT  Goal: Pressure injury heals and does not worsen  Description: Interventions:  - Implement low air loss mattress or specialty surface (Criteria met)  - Apply silicone foam dressing  - Instruct/assist with weight shifting every 30 minutes when in chair   - Limit chair time to 4 hour intervals  - Use special pressure reducing interventions such as soft care cushion when in chair   - Apply fecal or urinary incontinence containment device   - Perform passive or active ROM every shift  - Turn and reposition patient & offload bony prominences every 2 hours   - Utilize friction reducing device or surface for transfers   - Consider consults to  interdisciplinary teams such as wound care  - Use incontinent care products after each incontinent episode such as moisture barrier  - Consider nutrition services referral as needed  Outcome: Progressing     Problem: DISCHARGE PLANNING  Goal: Discharge to home or other facility with appropriate resources  Description: INTERVENTIONS:  - Identify barriers to discharge w/patient and caregiver  - Arrange for needed discharge resources and transportation as appropriate  - Identify discharge learning needs (meds, wound care, etc )  - Arrange for interpretive services to assist at discharge as needed  - Refer to Case Management Department for coordinating discharge planning if the patient needs post-hospital services based on physician/advanced practitioner order or complex needs related to functional status, cognitive ability, or social support system  Outcome: Progressing

## 2023-03-26 NOTE — ASSESSMENT & PLAN NOTE
History of pulmonary embolism and DVT noted, patient on apixaban at home  Discussed with patient and her son on 3/23 and 3/24, will not restart apixaban given ongoing GI bleeding and shifting goals of care

## 2023-03-26 NOTE — PROGRESS NOTES
"[facility] [facility]  Progress Note  Name: Marga Devi  MRN: [id number]  Unit/Bed#: [facility]PHP 801-01 I Date of Admission: 3/18/2023   Date of Service: 3/26/2023 I Hospital Day: 8    Assessment/Plan   Goals of care, counseling/discussion  Assessment & Plan  · On 3/23, patient stated she did not want any treatment focused care, wanted to transition to hospice and was ready to die  · Patient clear she does not want any invasion or treatment-focused care     * GI bleed  Assessment & Plan  · Found by son with bloody vomitus on shirt - had multiple melanotic stools in the ED  · An almost 4-point drop in hemoglobin today when compared to a few months prior coupled with mild/transient hypotension responsive to IV fluids  · See plan for acute blood loss anemia below  · Protonix drip initiated -> holding Eliquis/Naproxen  · Per CT imaging: \"No CT evidence of active high volume gastrointestinal hemorrhage  Findings of gastritis and nonspecific enteritis  Moderate rectal stool ball  Stable 3 4 cm left adnexal cystic lesion  Recommend follow-up pelvic ultrasound in one year  \"  · EGD cancelled by GI on 3/20 as she had been stable  Home apixaban restarted per GI recs  · Melena restarted 3/21 after apixaban restarted  EGD done 3/22 with some duodenal ulcers, no evidence of recent bleeding  · Advance to regular diet, continue to monitor  · Goals of care discussion with patient and her son 3/23 and 3/24  Risk of any further procedures not acceptable to patient  Risk of restarting blood thinners also not acceptable  Patient would like to focus on comfort, is not interested in disease-focused care  She would not want a blood transfusion  · Currently does not have hospice qualifying diagnosis, will continue to trend CBC  If stabilizes over the next day or so, then likely will be appropriate for discharge to rehab if she becomes agreeable    If does not stabilize, then we will reengage hospice team " as this might become a qualifying diagnosis  Metabolic encephalopathy  Assessment & Plan  · On presentation noted to be more confused than normal per her son  · Likely acute metabolic encephalopathy superimposed on chronic cognitive issues in setting of GI bleed, worsening anemia, hypotension  · Appears closer to baseline, continue to monitor    History of venous thromboembolism  Assessment & Plan  History of pulmonary embolism and DVT noted, patient on apixaban at home  Discussed with patient and her son on 3/23 and 3/24, will not restart apixaban given ongoing GI bleeding and shifting goals of care  Acute blood loss anemia  Assessment & Plan  · Baseline hemoglobin between 12-14 -> presented w hgb significantly lower at 8 7 (was 12 4 two months ago)  · Secondary to coffee-ground emesis at home and multiple melanotic stools in the ED in the setting of Eliquis use   · Due to borderline hypotension given IV fluids and transfused a unit of PRBCs and cryoprecipitate in the ED  · Per goals of care discussion on 3/24, patient would not want any blood transfusions or any other life prolonging treatments  Wants to focus on comfort and die a natural death  Cognitive impairment  Assessment & Plan  Continue Zoloft    Paroxysmal supraventricular tachycardia   Assessment & Plan  Discontinued home metoprolol as patient does not want any life prolonging treatments or medications  Chronic kidney disease stage 3   Assessment & Plan  Baseline creatinine of approximately 0 9-1 1 -> currently at baseline    Lower extremity edema  Assessment & Plan  Chronic issue  Discontinued home Lasix as patient does not want any life prolonging medications and does not like urinating frequently  VTE Pharmacologic Prophylaxis: VTE Score: 3 Moderate Risk (Score 3-4) - Pharmacological DVT Prophylaxis Ordered: heparin      Patient Centered Rounds: Discussed with RN  Discussions with Specialists or Other Care Team Provider: None    Education and Discussions with Family / Patient: Attempted to update  (son) via phone  Unable to contact  Total Time Spent on Date of Encounter in care of patient: 25 minutes This time was spent on one or more of the following: performing physical exam; counseling and coordination of care; obtaining or reviewing history; documenting in the medical record; reviewing/ordering tests, medications or procedures; communicating with other healthcare professionals and discussing with patient's family/caregivers  Current Length of Stay: 8 day(s)  Current Patient Status: Inpatient   Certification Statement: The patient will continue to require additional inpatient hospital stay due to As above  Discharge Plan: Anticipate discharge in 24-48 hrs to rehab facility  Code Status: Level 3 - DNAR and DNI    Subjective:   Denies acute concerns  States she is ready to die, reviewed that despite this feeling she is in relatively good health  Reviewed that we will continue to trend her hemoglobin, but given recent brown stool might be the GI bleed has stabilized  Objective:     Vitals:   Temp (24hrs), Av 3 °F (36 3 °C), Min:97 2 °F (36 2 °C), Max:97 5 °F (36 4 °C)    Temp:  [97 2 °F (36 2 °C)-97 5 °F (36 4 °C)] 97 5 °F (36 4 °C)  HR:  [62-75] 75  Resp:  [16-20] 18  BP: (109-134)/(52-60) 134/60  SpO2:  [97 %-100 %] 97 %  Body mass index is 20 4 kg/m²  Input and Output Summary (last 24 hours): Intake/Output Summary (Last 24 hours) at 3/26/2023 1703  Last data filed at 3/26/2023 1225  Gross per 24 hour   Intake 180 ml   Output --   Net 180 ml       Physical Exam:   Physical Exam  Vitals reviewed  Constitutional:       General: She is not in acute distress  Appearance: She is not ill-appearing or toxic-appearing  HENT:      Mouth/Throat:      Mouth: Mucous membranes are moist    Eyes:      General: No scleral icterus    Pulmonary:      Effort: Pulmonary effort is normal  No respiratory distress  Musculoskeletal:      Comments: Venous stasis noted   Skin:     General: Skin is dry  Neurological:      Mental Status: She is alert  She is disoriented  Comments: Hard of hearing   Psychiatric:         Behavior: Behavior normal       Comments: Calm and cooperative with care            Additional Data:     Labs:  Results from last 7 days   Lab Units 03/26/23  0434 03/24/23  0551 03/23/23  0453   WBC Thousand/uL 6 06   < > 10 67*   HEMOGLOBIN g/dL 7 4*   < > 8 4*   HEMATOCRIT % 23 2*   < > 27 2*   PLATELETS Thousands/uL 325   < > 287   NEUTROS PCT %  --   --  82*   LYMPHS PCT %  --   --  9*   MONOS PCT %  --   --  6   EOS PCT %  --   --  1    < > = values in this interval not displayed  Results from last 7 days   Lab Units 03/24/23  0551   SODIUM mmol/L 140   POTASSIUM mmol/L 3 3*   CHLORIDE mmol/L 108   CO2 mmol/L 30   BUN mg/dL 15   CREATININE mg/dL 1 02   ANION GAP mmol/L 2*   CALCIUM mg/dL 8 0*   GLUCOSE RANDOM mg/dL 87                       Lines/Drains:  Invasive Devices     Peripheral Intravenous Line  Duration           Peripheral IV 03/22/23 Right Antecubital 4 days                      Imaging: No pertinent imaging reviewed  Recent Cultures (last 7 days):         Last 24 Hours Medication List:   Current Facility-Administered Medications   Medication Dose Route Frequency Provider Last Rate   • acetaminophen  650 mg Oral Q6H PRN Katty Collins, DO     • ondansetron  4 mg Intravenous Q4H PRN Katty Collins, DO     • pantoprazole  40 mg Oral BID AC Lars Castro MD     • sertraline  25 mg Oral Daily Benjamin Martinez DO          Today, Patient Was Seen By: Lars Castro MD    **Please Note: This note may have been constructed using a voice recognition system  **

## 2023-03-26 NOTE — PROGRESS NOTES
Removed dressings to b/l legs  Varies dried scabs noted on both legs  2 small areas on left leg became irritated by removing Maxsorb dressing  Pulled the scab and caused a small amount of bleeding  I will leave dressings off for now    Will speak with MD about possibly just letting legs diandra

## 2023-03-27 LAB
ERYTHROCYTE [DISTWIDTH] IN BLOOD BY AUTOMATED COUNT: 16.4 % (ref 11.6–15.1)
HCT VFR BLD AUTO: 25 % (ref 34.8–46.1)
HGB BLD-MCNC: 7.9 G/DL (ref 11.5–15.4)
MCH RBC QN AUTO: 30.2 PG (ref 26.8–34.3)
MCHC RBC AUTO-ENTMCNC: 31.6 G/DL (ref 31.4–37.4)
MCV RBC AUTO: 95 FL (ref 82–98)
PLATELET # BLD AUTO: 355 THOUSANDS/UL (ref 149–390)
PMV BLD AUTO: 9.5 FL (ref 8.9–12.7)
RBC # BLD AUTO: 2.62 MILLION/UL (ref 3.81–5.12)
WBC # BLD AUTO: 6 THOUSAND/UL (ref 4.31–10.16)

## 2023-03-27 RX ADMIN — PANTOPRAZOLE SODIUM 40 MG: 40 TABLET, DELAYED RELEASE ORAL at 09:01

## 2023-03-27 RX ADMIN — ACETAMINOPHEN 650 MG: 325 TABLET ORAL at 09:01

## 2023-03-27 RX ADMIN — SERTRALINE HYDROCHLORIDE 25 MG: 25 TABLET ORAL at 09:01

## 2023-03-27 NOTE — UTILIZATION REVIEW
Continued Stay Review    Date: 3/25/23                          Current Patient Class: inpatient  Current Level of Care: med surg    HPI:89 y o  female initially admitted on 3/18/23     Assessment/Plan:  Pt c/o feeling tired and overall frustrated  Pt disoriented, calm and cooperative  Goals of care discussion with patient and her son 3/23 and 3/24  Risk of any further procedures not acceptable to patient  Risk of restarting blood thinners also not acceptable  Patient would like to focus on comfort, is not interested in disease-focused care  She would not want a blood transfusion  Currently does not have hospice qualifying diagnosis, will continue to trend CBC  If stabilizes over the next day or so, then likely will be appropriate for discharge to rehab if she becomes agreeable  If does not stabilize, then we will reengage hospice team as this might become a qualifying diagnosis  Advance diet to regular and monitor  Mentation appears to be at baseline, continue to monitor       Vital Signs:   Date/Time Temp Pulse Resp BP MAP (mmHg) SpO2 O2 Device   03/27/23 0810 -- -- -- -- -- -- None (Room air)   03/27/23 07:12:32 97 5 °F (36 4 °C) 73 17 114/84 94 98 % --   03/26/23 23:10:47 97 2 °F (36 2 °C) Abnormal  119 Abnormal  20 131/62 85 97 % --   03/26/23 14:46:44 97 5 °F (36 4 °C) 75 18 134/60 85 97 % --   03/26/23 0935 -- -- -- -- -- 100 % None (Room air)   03/26/23 07:09:50 97 2 °F (36 2 °C) Abnormal  66 16 110/53 72 100 % --   03/25/23 22:41:38 97 2 °F (36 2 °C) Abnormal  62 20 109/52 71 98 % --   03/25/23 15:09:28 97 5 °F (36 4 °C) 67 20 117/54 75 100 % --   03/25/23 07:00:21 97 3 °F (36 3 °C) Abnormal  67 16 113/55 74 100 % --       Pertinent Labs/Diagnostic Results:       Results from last 7 days   Lab Units 03/27/23  0508 03/26/23  0434 03/25/23  0446 03/24/23  0551 03/23/23  0453 03/21/23  1554 03/21/23  0458   WBC Thousand/uL 6 00 6 06 9 10 9 54 10 67*   < > 12 18*   HEMOGLOBIN g/dL 7 9* 7 4* 7 7* 7 9* 8 4* < > 8 0*   HEMATOCRIT % 25 0* 23 2* 23 9* 25 1* 27 2*   < > 25 6*   PLATELETS Thousands/uL 355 325 317 297 287   < > 224   NEUTROS ABS Thousands/µL  --   --   --   --  8 69*  --  10 22*    < > = values in this interval not displayed  Results from last 7 days   Lab Units 03/24/23  0551 03/23/23  0453 03/21/23  0458   SODIUM mmol/L 140 141 141   POTASSIUM mmol/L 3 3* 3 3* 3 6   CHLORIDE mmol/L 108 109* 114*   CO2 mmol/L 30 28 24   ANION GAP mmol/L 2* 4 3*   BUN mg/dL 15 17 19   CREATININE mg/dL 1 02 0 94 0 62   EGFR ml/min/1 73sq m 46 51 75   CALCIUM mg/dL 8 0* 8 0* 8 0*     Results from last 7 days   Lab Units 03/24/23  0551 03/23/23  0453 03/21/23  0458   GLUCOSE RANDOM mg/dL 87 90 78       Medications:   Scheduled Medications:  pantoprazole, 40 mg, Oral, BID AC  sertraline, 25 mg, Oral, Daily      Continuous IV Infusions: none     PRN Meds:  acetaminophen, 650 mg, Oral, Q6H PRN  ondansetron, 4 mg, Intravenous, Q4H PRN        Discharge Plan: tbd, pending rehab placement    Network Utilization Review Department  ATTENTION: Please call with any questions or concerns to [phone] and carefully listen to the prompts so that you are directed to the right person  All voicemails are confidential   Bita Ling all requests for admission clinical reviews, approved or denied determinations and any other requests to dedicated fax number below belonging to the campus where the patient is receiving treatment   List of dedicated fax numbers for the Facilities:  [address] [place] [address] DENIALS (Administrative/Medical Necessity) [phone]   [address] [place] [address] (Maternity/NICU/Pediatrics) [phone]   [address] [place]  [phone]   [facility] [address] [phone] [address] [facility] [place] [address] [phone]

## 2023-03-27 NOTE — CASE MANAGEMENT
Case Management Discharge Planning Note    Patient name Kaitlin Loco  [facility] MRN [id number]  : 1925 Date 3/27/2023       Current Admission Date: 3/18/2023  Current Admission Diagnosis:GI bleed   Patient Active Problem List    Diagnosis Date Noted   • Goals of care, counseling/discussion    • Metabolic encephalopathy    • GI bleed 2023   • Acute blood loss anemia 2023   • History of venous thromboembolism 2023   • Chronic pain of left knee 2023   • Episode of recurrent major depressive disorder ([facility] ) 2023   • Lower leg DVT (deep venous thromboembolism), acute, bilateral ([facility] ) 2023   • COVID 2023   • [facility] 2023   • Paroxysmal supraventricular tachycardia  2022   • Infiltrate noted on imaging study 2022   • Compression fracture of L3 lumbar vertebra, closed, initial encounter ([facility] ) 2020   • Fall 2020   • Age-related osteoporosis without current pathological fracture 2019   • Lower extremity edema 2019   • Chronic kidney disease stage 3  2019   • Venous stasis dermatitis of both lower extremities 2019   • Constipation, unspecified 2018   • Diverticulosis of intestine, part unspecified, without perforation or abscess without bleeding 2018   • Generalized muscle weakness 2018   • Polyneuropathy, unspecified 2018   • Ambulatory dysfunction 2018   • Neuropathy 2018   • Balance problem 2017   • Cognitive impairment 2017      LOS (days): 9  Geometric Mean LOS (GMLOS) (days): 4 50  Days to GMLOS:-4 7     OBJECTIVE:  Risk of Unplanned Readmission Score: 12 36         Current admission status: Inpatient   Preferred Pharmacy:   [address] [place] [address] [address] [place] [address]  Phone: [phone] Fax: [phone]    Primary Care Provider: KERON Ivan    Primary Insurance: BLUE CROSS [employer] REP  Secondary Insurance:     DISCHARGE DETAILS:          Comments - Freedom of Choice: CM spoke with pt's son, Mathew Arevalo, over the phone to confirm DCP and review available STR facilities  Pt's son stated a preference for [facility] as a secondary choice  Randi confirmed bed availabilty  CM reserved facility in [place] and will request insurace authorization when updated PT/OT notes are available

## 2023-03-27 NOTE — PLAN OF CARE
Problem: Potential for Falls  Goal: Patient will remain free of falls  Description: INTERVENTIONS:  - Educate patient/family on patient safety including physical limitations  - Instruct patient to call for assistance with activity   - Consult OT/PT to assist with strengthening/mobility   - Keep Call bell within reach  - Keep bed low and locked with side rails adjusted as appropriate  - Keep care items and personal belongings within reach  - Initiate and maintain comfort rounds  - Make Fall Risk Sign visible to staff  - Offer Toileting every 2 Hours, in advance of need  - Initiate/Maintain bed alarm  - Apply yellow socks and bracelet for high fall risk patients  - Consider moving patient to room near nurses station  Outcome: Progressing     Problem: Prexisting or High Potential for Compromised Skin Integrity  Goal: Skin integrity is maintained or improved  Description: INTERVENTIONS:  - Identify patients at risk for skin breakdown  - Assess and monitor skin integrity  - Assess and monitor nutrition and hydration status  - Monitor labs   - Assess for incontinence   - Turn and reposition patient  - Assist with mobility/ambulation  - Relieve pressure over bony prominences  - Avoid friction and shearing  - Provide appropriate hygiene as needed including keeping skin clean and dry  - Evaluate need for skin moisturizer/barrier cream  - Collaborate with interdisciplinary team   - Patient/family teaching  - Consider wound care consult   Outcome: Progressing     Problem: PAIN - ADULT  Goal: Verbalizes/displays adequate comfort level or baseline comfort level  Description: Interventions:  - Encourage patient to monitor pain and request assistance  - Assess pain using appropriate pain scale  - Administer analgesics based on type and severity of pain and evaluate response  - Implement non-pharmacological measures as appropriate and evaluate response  - Consider cultural and social influences on pain and pain management  - Notify physician/advanced practitioner if interventions unsuccessful or patient reports new pain  Outcome: Progressing

## 2023-03-27 NOTE — ASSESSMENT & PLAN NOTE
Discontinued home metoprolol as patient does not want any life prolonging treatments or medications  Can restart if patient having tachycardia and it is bothersome to her

## 2023-03-27 NOTE — ASSESSMENT & PLAN NOTE
· On 3/23, patient stated she did not want any treatment focused care, wanted to transition to hospice and was ready to die  · Patient clear she does not want any invasion or treatment-focused care, however she does not qualify for hospice due to her overall health  · If patient were to decompensate or require any invasive treatments, instead she would like to transition to hospice  Otherwise, she is amenable to rehab

## 2023-03-27 NOTE — ASSESSMENT & PLAN NOTE
History of pulmonary embolism and DVT in January 2023 noted, patient on apixaban at home  Discussed with patient and her son on 3/23 and 3/24, will not restart apixaban as patient and son do not feel that the benefits outweigh the risks

## 2023-03-27 NOTE — ASSESSMENT & PLAN NOTE
"· Found by son with bloody vomitus on shirt - had multiple melanotic stools in the ED  · An almost 4-point drop in hemoglobin today when compared to a few months prior coupled with mild/transient hypotension responsive to IV fluids  · See plan for acute blood loss anemia below  · Protonix drip initiated -> holding Eliquis/Naproxen  · Per CT imaging: \"No CT evidence of active high volume gastrointestinal hemorrhage  Findings of gastritis and nonspecific enteritis  Moderate rectal stool ball  Stable 3 4 cm left adnexal cystic lesion  Recommend follow-up pelvic ultrasound in one year  \"  · EGD cancelled by GI on 3/20 as she had been stable  Home apixaban restarted per GI recs  · Melena restarted 3/21 after apixaban restarted  EGD done 3/22 with some duodenal ulcers, no evidence of recent bleeding  · Advance to regular diet, continue to monitor  · Goals of care discussion with patient and her son 3/23 and 3/24  Risk of any further procedures not acceptable to patient  Risk of restarting blood thinners also not acceptable  Patient would like to focus on comfort, is not interested in disease-focused care  She would not want a blood transfusion  She is too healthy to qualify for hospice currently and her GI bleed has stabilized therefore she is agreeable to discharge to rehab  · If GI bleed recurs and patient becomes unstable, reengage hospice team as this might become a qualifying diagnosis    · Patient would not want a blood transfusion  "

## 2023-03-27 NOTE — ASSESSMENT & PLAN NOTE
· Baseline hemoglobin between 12-14 -> presented w hgb significantly lower at 8 7 (was 12 4 two months ago)  · Secondary to coffee-ground emesis at home and multiple melanotic stools in the ED in the setting of Eliquis use   · Due to borderline hypotension given IV fluids and transfused a unit of PRBCs and cryoprecipitate in the ED  · Per goals of care discussion on 3/24, patient would not want any blood transfusions or any other life prolonging treatments  Wants to die a natural death

## 2023-03-27 NOTE — PROGRESS NOTES
"[address] [facility]  Progress Note  Name: Kiran Paz  MRN: [id number]  Unit/Bed#: [facility] 801-01 I Date of Admission: 3/18/2023   Date of Service: 3/27/2023 I Hospital Day: 9    Assessment/Plan   Goals of care, counseling/discussion  Assessment & Plan  · On 3/23, patient stated she did not want any treatment focused care, wanted to transition to hospice and was ready to die  · Patient clear she does not want any invasion or treatment-focused care, however she does not qualify for hospice due to her overall health  · If patient were to decompensate or require any invasive treatments, instead she would like to transition to hospice  Otherwise, she is amenable to rehab  * GI bleed  Assessment & Plan  · Found by son with bloody vomitus on shirt - had multiple melanotic stools in the ED  · An almost 4-point drop in hemoglobin today when compared to a few months prior coupled with mild/transient hypotension responsive to IV fluids  · See plan for acute blood loss anemia below  · Protonix drip initiated -> holding Eliquis/Naproxen  · Per CT imaging: \"No CT evidence of active high volume gastrointestinal hemorrhage  Findings of gastritis and nonspecific enteritis  Moderate rectal stool ball  Stable 3 4 cm left adnexal cystic lesion  Recommend follow-up pelvic ultrasound in one year  \"  · EGD cancelled by GI on 3/20 as she had been stable  Home apixaban restarted per GI recs  · Melena restarted 3/21 after apixaban restarted  EGD done 3/22 with some duodenal ulcers, no evidence of recent bleeding  · Advance to regular diet, continue to monitor  · Goals of care discussion with patient and her son 3/23 and 3/24  Risk of any further procedures not acceptable to patient  Risk of restarting blood thinners also not acceptable  Patient would like to focus on comfort, is not interested in disease-focused care  She would not want a blood transfusion    She is too healthy to qualify for " hospice currently and her GI bleed has stabilized therefore she is agreeable to discharge to rehab  · If GI bleed recurs and patient becomes unstable, reengage hospice team as this might become a qualifying diagnosis  · Patient would not want a blood transfusion    Metabolic encephalopathy  Assessment & Plan  · On presentation noted to be more confused than normal per her son  · Likely acute metabolic encephalopathy superimposed on chronic cognitive issues in setting of GI bleed, worsening anemia, hypotension  · Appears closer to baseline, continue to monitor    History of venous thromboembolism  Assessment & Plan  History of pulmonary embolism and DVT in January 2023 noted, patient on apixaban at home  Discussed with patient and her son on 3/23 and 3/24, will not restart apixaban as patient and son do not feel that the benefits outweigh the risks  Acute blood loss anemia  Assessment & Plan  · Baseline hemoglobin between 12-14 -> presented w hgb significantly lower at 8 7 (was 12 4 two months ago)  · Secondary to coffee-ground emesis at home and multiple melanotic stools in the ED in the setting of Eliquis use   · Due to borderline hypotension given IV fluids and transfused a unit of PRBCs and cryoprecipitate in the ED  · Per goals of care discussion on 3/24, patient would not want any blood transfusions or any other life prolonging treatments  Wants to die a natural death  Cognitive impairment  Assessment & Plan  Continue Zoloft    Paroxysmal supraventricular tachycardia   Assessment & Plan  Discontinued home metoprolol as patient does not want any life prolonging treatments or medications  Can restart if patient having tachycardia and it is bothersome to her      Chronic kidney disease stage 3   Assessment & Plan  Baseline creatinine of approximately 0 9-1 1 -> currently at baseline    Lower extremity edema  Assessment & Plan  Chronic issue  Discontinued home Lasix as patient does not want any life prolonging medications and does not like urinating frequently  VTE Pharmacologic Prophylaxis: VTE Score: 3 Moderate Risk (Score 3-4) - Pharmacological DVT Prophylaxis Contraindicated  Sequential Compression Devices Ordered  Patient Centered Rounds: Discussed with RN  Discussions with Specialists or Other Care Team Provider: Palliative care    Education and Discussions with Family / Patient: Updated  (son) at bedside  Total Time Spent on Date of Encounter in care of patient: 25 minutes This time was spent on one or more of the following: performing physical exam; counseling and coordination of care; obtaining or reviewing history; documenting in the medical record; reviewing/ordering tests, medications or procedures; communicating with other healthcare professionals and discussing with patient's family/caregivers  Current Length of Stay: 9 day(s)  Current Patient Status: Inpatient   Certification Statement: The patient will continue to require additional inpatient hospital stay due to Rehab placement  Discharge Plan: Anticipate discharge in 24-48 hrs to rehab facility  Code Status: Level 3 - DNAR and DNI    Subjective:   Denies acute concerns  Remains hard of hearing  Seen at bedside with palliative care, later on with her son  Is amenable to rehab  Objective:     Vitals:   Temp (24hrs), Av 4 °F (36 3 °C), Min:97 2 °F (36 2 °C), Max:97 5 °F (36 4 °C)    Temp:  [97 2 °F (36 2 °C)-97 5 °F (36 4 °C)] 97 5 °F (36 4 °C)  HR:  [] 86  Resp:  [17-20] 17  BP: (114-131)/(60-84) 116/60  SpO2:  [96 %-98 %] 96 %  Body mass index is 20 4 kg/m²  Input and Output Summary (last 24 hours): Intake/Output Summary (Last 24 hours) at 3/27/2023 1613  Last data filed at 3/26/2023 1800  Gross per 24 hour   Intake 120 ml   Output --   Net 120 ml       Physical Exam:   Physical Exam  Vitals reviewed  Constitutional:       General: She is not in acute distress       Appearance: She is not ill-appearing or toxic-appearing  HENT:      Mouth/Throat:      Mouth: Mucous membranes are moist    Eyes:      General: No scleral icterus  Pulmonary:      Effort: Pulmonary effort is normal  No respiratory distress  Musculoskeletal:      Comments: Venous stasis noted   Skin:     General: Skin is dry  Neurological:      Mental Status: She is alert  She is disoriented  Comments: Hard of hearing   Psychiatric:         Behavior: Behavior normal       Comments: Calm and cooperative with care            Additional Data:     Labs:  Results from last 7 days   Lab Units 03/27/23  0508 03/24/23  0551 03/23/23  0453   WBC Thousand/uL 6 00   < > 10 67*   HEMOGLOBIN g/dL 7 9*   < > 8 4*   HEMATOCRIT % 25 0*   < > 27 2*   PLATELETS Thousands/uL 355   < > 287   NEUTROS PCT %  --   --  82*   LYMPHS PCT %  --   --  9*   MONOS PCT %  --   --  6   EOS PCT %  --   --  1    < > = values in this interval not displayed  Results from last 7 days   Lab Units 03/24/23  0551   SODIUM mmol/L 140   POTASSIUM mmol/L 3 3*   CHLORIDE mmol/L 108   CO2 mmol/L 30   BUN mg/dL 15   CREATININE mg/dL 1 02   ANION GAP mmol/L 2*   CALCIUM mg/dL 8 0*   GLUCOSE RANDOM mg/dL 87                       Lines/Drains:  Invasive Devices     Peripheral Intravenous Line  Duration           Peripheral IV 03/27/23 Left Antecubital <1 day                      Imaging: No pertinent imaging reviewed      Recent Cultures (last 7 days):         Last 24 Hours Medication List:   Current Facility-Administered Medications   Medication Dose Route Frequency Provider Last Rate   • acetaminophen  650 mg Oral Q6H PRN Kattydelia Collins, DO     • ondansetron  4 mg Intravenous Q4H PRN Katty Collins, DO     • pantoprazole  40 mg Oral BID AC Ibeth Marcum MD     • sertraline  25 mg Oral Daily Niki Diallo DO          Today, Patient Was Seen By: Ibeth Marcum MD    **Please Note: This note may have been constructed using a voice recognition system  **

## 2023-03-27 NOTE — PLAN OF CARE
Problem: Potential for Falls  Goal: Patient will remain free of falls  Description: INTERVENTIONS:  - Educate patient/family on patient safety including physical limitations  - Instruct patient to call for assistance with activity   - Consult OT/PT to assist with strengthening/mobility   - Keep Call bell within reach  - Keep bed low and locked with side rails adjusted as appropriate  - Keep care items and personal belongings within reach  - Initiate and maintain comfort rounds  - Make Fall Risk Sign visible to staff  - Offer Toileting every 2 Hours, in advance of need  - Initiate/Maintain bed alarm  - Apply yellow socks and bracelet for high fall risk patients  - Consider moving patient to room near nurses station  Outcome: Progressing     Problem: Prexisting or High Potential for Compromised Skin Integrity  Goal: Skin integrity is maintained or improved  Description: INTERVENTIONS:  - Identify patients at risk for skin breakdown  - Assess and monitor skin integrity  - Assess and monitor nutrition and hydration status  - Monitor labs   - Assess for incontinence   - Turn and reposition patient  - Assist with mobility/ambulation  - Relieve pressure over bony prominences  - Avoid friction and shearing  - Provide appropriate hygiene as needed including keeping skin clean and dry  - Evaluate need for skin moisturizer/barrier cream  - Collaborate with interdisciplinary team   - Patient/family teaching  - Consider wound care consult   Outcome: Progressing     Problem: DISCHARGE PLANNING  Goal: Discharge to home or other facility with appropriate resources  Description: INTERVENTIONS:  - Identify barriers to discharge w/patient and caregiver  - Arrange for needed discharge resources and transportation as appropriate  - Identify discharge learning needs (meds, wound care, etc )  - Arrange for interpretive services to assist at discharge as needed  - Refer to Case Management Department for coordinating discharge planning if the patient needs post-hospital services based on physician/advanced practitioner order or complex needs related to functional status, cognitive ability, or social support system  Outcome: Progressing

## 2023-03-27 NOTE — PROGRESS NOTES
"Progress note - Palliative and Supportive Care   Jennifer Crespo 80 y o  female [id number]    Patient Active Problem List   Diagnosis   • Neuropathy   • Ambulatory dysfunction   • Venous stasis dermatitis of both lower extremities   • Age-related osteoporosis without current pathological fracture   • Lower extremity edema   • Chronic kidney disease stage 3    • Compression fracture of L3 lumbar vertebra, closed, initial encounter ([facility])   • Fall   • Paroxysmal supraventricular tachycardia    • Infiltrate noted on imaging study   • Balance problem   • Constipation, unspecified   • Diverticulosis of intestine, part unspecified, without perforation or abscess without bleeding   • Generalized muscle weakness   • Cognitive impairment   • Polyneuropathy, unspecified   • Pulmonary embolism ([facility])   • COVID   • Lower leg DVT (deep venous thromboembolism), acute, bilateral ([facility])   • Chronic pain of left knee   • Episode of recurrent major depressive disorder ([facility])   • GI bleed   • Acute blood loss anemia   • History of venous thromboembolism   • Metabolic encephalopathy   • Goals of care, counseling/discussion     Active issues specifically addressed today include:  ABLA - resolved  Goals of care  Palliative care encounter      Plan:  1  Symptom management -   a) Pain    -No symptoms at this time per patient  b) Psychosocial    -Support provided    2) Goals of Care / Advanced Care Planning   Code Status: DNR/DNI - Level 3   -Patient does not want blood products   -Will sign off at this time  Goals are clear with clear limits to interventions  Decisional apparatus:  Patient slightly confused this morning on exam today  Was able to recite the reasoning for her hospital admission and her current medical issue keeping her at the hospital  She was easily redirectable and was oriented to herself, location, month, but thought year was \"8968\"     If competence is lost, patient's substitute decision maker would default to son, Gordo Feliciano, " by [place]  Advance Directive / Living Will / POLST:  None on file    [address] [facility] DonnaDO sai  Palliative and Supportive Care  Clinic/Answering Service: [phone]  You can find me on [employer]Connect  Chief Complaint  Chief Complaint   Patient presents with   • Altered Mental Status     Since last night having AMS per son; this AM had episode of emesis and incontinence  Questioning of GI bleed, on eliquis        Subjective:  Patient seen and examined this morning awake and resting in bed  No acute distress or discomfort  She states she is doing well and comfortable without any complaints  She does voice wanting to return home as soon as able, but is open to going to rehab for strengthening and ultimately return home with son  She states she had a bowel movement yesterday (3/26/2023) but does not recall any blood/black stools  Dr Tashi Doty did join the conversation and patient again confirmed to go to rehab for strengthening with plans to ultimately go home  Hemoglobin levels reviewed with patient as they have remained stable without any further signs of an acute bleed  Review of Systems   Constitutional: Negative for chills, decreased appetite, fever and malaise/fatigue  Cardiovascular: Negative for chest pain and leg swelling  Respiratory: Negative for cough and shortness of breath  Musculoskeletal: Negative  Negative for myalgias  Gastrointestinal: Negative for abdominal pain, constipation, diarrhea, melena, nausea and vomiting  Genitourinary: Negative for dysuria  Neurological: Positive for focal weakness (admits to weakness in legs)  Negative for headaches  Psychiatric/Behavioral: The patient does not have insomnia            MEDICATIONS / ALLERGIES:     all current active meds have been reviewed and current meds:   Current Facility-Administered Medications   Medication Dose Route Frequency   • acetaminophen (TYLENOL) tablet 650 mg  650 mg Oral Q6H PRN   • ondansetron (ZOFRAN) injection 4 mg  4 mg Intravenous Q4H PRN   • pantoprazole (PROTONIX) EC tablet 40 mg  40 mg Oral BID AC   • sertraline (ZOLOFT) tablet 25 mg  25 mg Oral Daily       No Known Allergies    OBJECTIVE:    Physical Exam  Physical Exam  Vitals reviewed  Constitutional:       General: She is not in acute distress  Appearance: She is not ill-appearing or diaphoretic  Comments: Thin elderly lady  No acute distress or discomfort  HENT:      Head: Normocephalic and atraumatic  Eyes:      General: No scleral icterus  Extraocular Movements: Extraocular movements intact  Cardiovascular:      Rate and Rhythm: Normal rate  Pulmonary:      Effort: Pulmonary effort is normal  No respiratory distress  Breath sounds: No wheezing  Abdominal:      General: There is no distension  Palpations: Abdomen is soft  Musculoskeletal:         General: No swelling or tenderness  Skin:     General: Skin is warm and dry  Coloration: Skin is pale  Comments: 2 small 2-3 cm crusted scabs of the bilateral anterior shin   Neurological:      Mental Status: She is alert  Psychiatric:         Mood and Affect: Mood normal  Mood is not anxious or depressed  Speech: Speech normal          Behavior: Behavior normal  Behavior is not agitated  Lab Results:   I have personally reviewed pertinent labs  , CBC:   Lab Results   Component Value Date    WBC 6 00 03/27/2023    HGB 7 9 (L) 03/27/2023    HCT 25 0 (L) 03/27/2023    MCV 95 03/27/2023     03/27/2023    MCH 30 2 03/27/2023    MCHC 31 6 03/27/2023    RDW 16 4 (H) 03/27/2023    MPV 9 5 03/27/2023   , CMP: No results found for: SODIUM, K, CL, CO2, ANIONGAP, BUN, CREATININE, GLUCOSE, CALCIUM, AST, ALT, ALKPHOS, PROT, BILITOT, EGFR, BMP:No results found for: SODIUM, K, CL, CO2, BUN, CREATININE, GLUC, GLUF, CALCIUM, AGAP, EGFR  Imaging Studies: Reviewed  EKG, Pathology, and Other Studies: Reviewed    Counseling / Coordination of Care  Total "floor / unit time spent today 20 minutes  Greater than 50% of total time was spent with the patient and / or family counseling and / or coordination of care  A description of the counseling / coordination of care: goals of care, symptom evaluation    This note was shared  Jessica Pro DO  Hospice and Palliative Care Fellow  Palliative & Supportive Care  Clinic/Answering Service: [phone]  You can find me on [employer]  Portions of this document may have been created using dictation software and as such some \"sound alike\" terms may have been generated by the system  Do not hesitate to contact me with any questions or clarifications      "

## 2023-03-28 DIAGNOSIS — A04.8 H. PYLORI INFECTION: Primary | ICD-10-CM

## 2023-03-28 LAB
ERYTHROCYTE [DISTWIDTH] IN BLOOD BY AUTOMATED COUNT: 16.7 % (ref 11.6–15.1)
HCT VFR BLD AUTO: 25.2 % (ref 34.8–46.1)
HGB BLD-MCNC: 8.1 G/DL (ref 11.5–15.4)
MCH RBC QN AUTO: 30.9 PG (ref 26.8–34.3)
MCHC RBC AUTO-ENTMCNC: 32.1 G/DL (ref 31.4–37.4)
MCV RBC AUTO: 96 FL (ref 82–98)
PLATELET # BLD AUTO: 342 THOUSANDS/UL (ref 149–390)
PMV BLD AUTO: 9.6 FL (ref 8.9–12.7)
RBC # BLD AUTO: 2.62 MILLION/UL (ref 3.81–5.12)
WBC # BLD AUTO: 6.66 THOUSAND/UL (ref 4.31–10.16)

## 2023-03-28 RX ORDER — POTASSIUM CHLORIDE 14.9 MG/ML
20 INJECTION INTRAVENOUS ONCE
Status: DISCONTINUED | OUTPATIENT
Start: 2023-03-28 | End: 2023-03-28

## 2023-03-28 RX ORDER — BISMUTH SUBSALICYLATE 262 MG/1
524 TABLET, CHEWABLE ORAL
Qty: 112 TABLET | Refills: 0 | Status: SHIPPED | OUTPATIENT
Start: 2023-03-28 | End: 2023-03-31

## 2023-03-28 RX ORDER — OMEPRAZOLE 40 MG/1
40 CAPSULE, DELAYED RELEASE ORAL 2 TIMES DAILY
Qty: 28 CAPSULE | Refills: 0 | Status: SHIPPED | OUTPATIENT
Start: 2023-03-28 | End: 2023-03-31

## 2023-03-28 RX ORDER — METRONIDAZOLE 250 MG/1
500 TABLET ORAL EVERY 6 HOURS
Qty: 112 TABLET | Refills: 0 | Status: SHIPPED | OUTPATIENT
Start: 2023-03-28 | End: 2023-03-31

## 2023-03-28 RX ORDER — TETRACYCLINE HYDROCHLORIDE 250 MG/1
500 CAPSULE ORAL 4 TIMES DAILY
Qty: 112 CAPSULE | Refills: 0 | Status: SHIPPED | OUTPATIENT
Start: 2023-03-28 | End: 2023-03-31

## 2023-03-28 RX ADMIN — PANTOPRAZOLE SODIUM 40 MG: 40 TABLET, DELAYED RELEASE ORAL at 06:27

## 2023-03-28 RX ADMIN — PANTOPRAZOLE SODIUM 40 MG: 40 TABLET, DELAYED RELEASE ORAL at 17:08

## 2023-03-28 RX ADMIN — ACETAMINOPHEN 650 MG: 325 TABLET ORAL at 08:05

## 2023-03-28 RX ADMIN — SERTRALINE HYDROCHLORIDE 25 MG: 25 TABLET ORAL at 08:04

## 2023-03-28 NOTE — PLAN OF CARE
Problem: Potential for Falls  Goal: Patient will remain free of falls  Description: INTERVENTIONS:  - Educate patient/family on patient safety including physical limitations  - Instruct patient to call for assistance with activity   - Consult OT/PT to assist with strengthening/mobility   - Keep Call bell within reach  - Keep bed low and locked with side rails adjusted as appropriate  - Keep care items and personal belongings within reach  - Initiate and maintain comfort rounds  - Make Fall Risk Sign visible to staff  - Offer Toileting every 2 Hours, in advance of need  - Initiate/Maintain bed alarm  - Apply yellow socks and bracelet for high fall risk patients  - Consider moving patient to room near nurses station  Outcome: Progressing     Problem: Prexisting or High Potential for Compromised Skin Integrity  Goal: Skin integrity is maintained or improved  Description: INTERVENTIONS:  - Identify patients at risk for skin breakdown  - Assess and monitor skin integrity  - Assess and monitor nutrition and hydration status  - Monitor labs   - Assess for incontinence   - Turn and reposition patient  - Assist with mobility/ambulation  - Relieve pressure over bony prominences  - Avoid friction and shearing  - Provide appropriate hygiene as needed including keeping skin clean and dry  - Evaluate need for skin moisturizer/barrier cream  - Collaborate with interdisciplinary team   - Patient/family teaching  - Consider wound care consult   Outcome: Progressing     Problem: CARDIOVASCULAR - ADULT  Goal: Maintains optimal cardiac output and hemodynamic stability  Description: INTERVENTIONS:  - Monitor I/O, vital signs and rhythm  - Monitor for S/S and trends of decreased cardiac output  - Administer and titrate ordered vasoactive medications to optimize hemodynamic stability  - Assess quality of pulses, skin color and temperature  - Assess for signs of decreased coronary artery perfusion  - Instruct patient to report change in severity of symptoms  Outcome: Progressing     Problem: GASTROINTESTINAL - ADULT  Goal: Minimal or absence of nausea and/or vomiting  Description: INTERVENTIONS:  - Administer IV fluids if ordered to ensure adequate hydration  - Maintain NPO status until nausea and vomiting are resolved  - Nasogastric tube if ordered  - Administer ordered antiemetic medications as needed  - Provide nonpharmacologic comfort measures as appropriate  - Advance diet as tolerated, if ordered  - Consider nutrition services referral to assist patient with adequate nutrition and appropriate food choices  Outcome: Progressing     Problem: METABOLIC, FLUID AND ELECTROLYTES - ADULT  Goal: Electrolytes maintained within normal limits  Description: INTERVENTIONS:  - Monitor labs and assess patient for signs and symptoms of electrolyte imbalances  - Administer electrolyte replacement as ordered  - Monitor response to electrolyte replacements, including repeat lab results as appropriate  - Instruct patient on fluid and nutrition as appropriate  Outcome: Progressing  Goal: Fluid balance maintained  Description: INTERVENTIONS:  - Monitor labs   - Monitor I/O and WT  - Instruct patient on fluid and nutrition as appropriate  - Assess for signs & symptoms of volume excess or deficit  Outcome: Progressing     Problem: SKIN/TISSUE INTEGRITY - ADULT  Goal: Pressure injury heals and does not worsen  Description: Interventions:  - Implement low air loss mattress or specialty surface (Criteria met)  - Apply silicone foam dressing  - Instruct/assist with weight shifting every 30 minutes when in chair   - Limit chair time to 4 hour intervals  - Use special pressure reducing interventions such as soft care cushion when in chair   - Apply fecal or urinary incontinence containment device   - Perform passive or active ROM every shift  - Turn and reposition patient & offload bony prominences every 2 hours   - Utilize friction reducing device or surface for transfers   - Consider consults to  interdisciplinary teams such as wound care  - Use incontinent care products after each incontinent episode such as moisture barrier  - Consider nutrition services referral as needed  Outcome: Progressing     Problem: PAIN - ADULT  Goal: Verbalizes/displays adequate comfort level or baseline comfort level  Description: Interventions:  - Encourage patient to monitor pain and request assistance  - Assess pain using appropriate pain scale  - Administer analgesics based on type and severity of pain and evaluate response  - Implement non-pharmacological measures as appropriate and evaluate response  - Consider cultural and social influences on pain and pain management  - Notify physician/advanced practitioner if interventions unsuccessful or patient reports new pain  Outcome: Progressing     Problem: INFECTION - ADULT  Goal: Absence or prevention of progression during hospitalization  Description: INTERVENTIONS:  - Assess and monitor for signs and symptoms of infection  - Monitor lab/diagnostic results  - Monitor all insertion sites, i e  indwelling lines, tubes, and drains  - Monitor endotracheal if appropriate and nasal secretions for changes in amount and color  - [place] appropriate cooling/warming therapies per order  - Administer medications as ordered  - Instruct and encourage patient and family to use good hand hygiene technique  - Identify and instruct in appropriate isolation precautions for identified infection/condition  Outcome: Progressing     Problem: DISCHARGE PLANNING  Goal: Discharge to home or other facility with appropriate resources  Description: INTERVENTIONS:  - Identify barriers to discharge w/patient and caregiver  - Arrange for needed discharge resources and transportation as appropriate  - Identify discharge learning needs (meds, wound care, etc )  - Arrange for interpretive services to assist at discharge as needed  - Refer to Case Management Department for coordinating discharge planning if the patient needs post-hospital services based on physician/advanced practitioner order or complex needs related to functional status, cognitive ability, or social support system  Outcome: Progressing     Problem: Knowledge Deficit  Goal: Patient/family/caregiver demonstrates understanding of disease process, treatment plan, medications, and discharge instructions  Description: Complete learning assessment and assess knowledge base  Interventions:  - Provide teaching at level of understanding  - Provide teaching via preferred learning methods  Outcome: Progressing     Problem: MOBILITY - ADULT  Goal: Maintain or return to baseline ADL function  Description: INTERVENTIONS:  -  Assess patient's ability to carry out ADLs; assess patient's baseline for ADL function and identify physical deficits which impact ability to perform ADLs (bathing, care of mouth/teeth, toileting, grooming, dressing, etc )  - Assess/evaluate cause of self-care deficits   - Assess range of motion  - Assess patient's mobility; develop plan if impaired  - Assess patient's need for assistive devices and provide as appropriate  - Encourage maximum independence but intervene and supervise when necessary  - Involve family in performance of ADLs  - Assess for home care needs following discharge   - Consider OT consult to assist with ADL evaluation and planning for discharge  - Provide patient education as appropriate  Outcome: Progressing  Goal: Maintains/Returns to pre admission functional level  Description: INTERVENTIONS:  - Perform BMAT or MOVE assessment daily    - Set and communicate daily mobility goal to care team and patient/family/caregiver     - Collaborate with rehabilitation services on mobility goals if consulted  - Out of bed for toileting  - Record patient progress and toleration of activity level   Outcome: Progressing

## 2023-03-28 NOTE — PLAN OF CARE
Problem: OCCUPATIONAL THERAPY ADULT  Goal: Performs self-care activities at highest level of function for planned discharge setting  See evaluation for individualized goals  Description: Treatment Interventions: ADL retraining, Functional transfer training, Endurance training, Patient/family training, Compensatory technique education, Energy conservation, Activityengagement, Continued evaluation, Equipment evaluation/education          See flowsheet documentation for full assessment, interventions and recommendations  Note: Limitation: Decreased ADL status, Decreased Safe judgement during ADL, Decreased endurance, Decreased self-care trans, Decreased high-level ADLs  Prognosis: Fair  Assessment: Pt seen for skilled OT treatment This AM  Parts of session seen as co-treatment with Physical Therapy due to pt being highly limited by medical complexities, cognition, safety awareness, weakness, standing tolerance, activity tolerance, standing balance and seated balance impacting overall engagement in functional activity  Co-treatment aimed to maximize function, activity tolerance, and safety; both OT and PT goals addressed separately throughout session  While seated at EOB, pt able to wash own face, hands, needing assist for putting on socks and hospital pants  Pt required assist of 2 for sit to stand  Once in stance, she was very shaky with jello legs, unable to keep feet on the ground, needing a quick SPT to chair  Pt reports she was trying to march in place, however, she was clearly unable to hold self up  Attempted another sit to stand, after education on keeping feet on the floor  Pt still unable to stand upright, needing max assist of 2  Once seated in chair she was able to complete grooming including oral care by herself  From OT standpoint, pt can benefit from STR when dcing from acute care  OT will continue to follow while here as per POC       OT Discharge Recommendation: Post acute rehabilitation services

## 2023-03-28 NOTE — PLAN OF CARE
Problem: PHYSICAL THERAPY ADULT  Goal: Performs mobility at highest level of function for planned discharge setting  See evaluation for individualized goals  Description: Treatment/Interventions: Functional transfer training, Bed mobility, Endurance training, Gait training, Spoke to nursing, OT          See flowsheet documentation for full assessment, interventions and recommendations  Outcome: Progressing  Note: Prognosis: Fair  Problem List: Decreased strength, Decreased endurance, Impaired balance, Decreased mobility, Decreased cognition  Assessment: Pt seen for PT treatment session this date  Therapy session focused on bed mobility, functional transfers, and ambulation in order to improve overall mobility and independence  Pt follows simple one step commands with repetition and requires cueing to complete task  Pt andra to get to EOB with Mod A X 1  Pt maintains sitting with supervision  Pt tolerates b/l LE therapeutic exercise in supine and sitting with cueing  Pt performs multiple STS transfers with B/L HHA, VC and TCs required for postural correction, foot placement, pt has difficulty maintaining balance, rocío HK flexed   Pt assisted from bed to chair with assist X 2  Pt making steady progress toward goals  Pt was left in recliner at the end of PT session with all needs in reach, in care of OT  Pt would benefit from continued PT services while in hospital to address remaining limitations  The patient's AM-PAC Basic Mobility Inpatient Short Form Raw Score is 10  A Raw score of less than or equal to 16 suggests the patient may benefit from discharge to post-acute rehabilitation services  Please also refer to the recommendation of the Physical Therapist for safe discharge planning  Barriers to Discharge: Decreased caregiver support     PT Discharge Recommendation: Post acute rehabilitation services    See flowsheet documentation for full assessment

## 2023-03-28 NOTE — OCCUPATIONAL THERAPY NOTE
Occupational Therapy Treatment Note      Cleopatra Paez    3/28/2023    Principal Problem:    GI bleed  Active Problems:    Lower extremity edema    Chronic kidney disease stage 3     Paroxysmal supraventricular tachycardia     Cognitive impairment    Acute blood loss anemia    History of venous thromboembolism    Metabolic encephalopathy    Goals of care, counseling/discussion      Past Medical History:   Diagnosis Date    Interstitial cystitis     Leukocytosis 3/18/2023    Leukopenia     Neurologic gait dysfunction     Abstraction Dr Malorie Huang charts    Neuropathy     Neutropenia [facility])     Abstraction Dr Mindy Trimble     Osteoporosis     Peripheral edema     Abstraction Dr Priscilla Rocha    Renal cyst     Syncope     Abstraction Dr Malorie Huang charts       Past Surgical History:   Procedure Laterality Date    CAPSULOTOMY      Right eye    CATARACT EXTRACTION Left     CHALAZION EXCISION      COLONOSCOPY  2006        03/28/23 1012   OT Last Visit   OT Visit Date 03/28/23   Note Type   Note Type Treatment for insurance authorization   Pain Assessment   Pain Assessment Tool 0-10   Pain Score No Pain   Restrictions/Precautions   Weight Bearing Precautions Per Order No   Other Precautions Chair Alarm; Bed Alarm; Fall Risk;Pain   Lifestyle   Autonomy Pt reports being I in ADLs, receives A with IADLs, and uses a walker at baseline  (-)   Reciprocal Relationships Pt lives with son who is home 24/7  Service to Others Pt is retired  Intrinsic Gratification Pt enjoys reading  ADL   Where Assessed Edge of bed   Eating Comments pt can feed self   Grooming Assistance 5  Supervision/Setup   Grooming Deficit Setup; Wash/dry hands; Wash/dry face; Teeth care;Brushing hair   Grooming Comments needs assist to braid hair  able to wash own face/hands, brush teeth   UB Bathing Assistance 4  Minimal Assistance   UB Bathing Deficit Setup; Increased time to complete; Chest;Right arm;Left arm; Abdomen   LB Bathing "Assistance 2  Maximal Assistance   LB Bathing Deficit Increased time to complete   UB Dressing Assistance 4  Minimal Assistance   UB Dressing Deficit Increased time to complete   LB Dressing Assistance 2  Maximal Assistance   LB Dressing Deficit Don/doff R sock; Don/doff L sock; Thread RLE into pants; Thread LLE into pants;Pull up over hips   Functional Standing Tolerance   Comments pt able to stand for a few seconds, then becomes very shaky, lifts her legs off the floor and needs total assist of 2 to stay upright  pt states she dosent know what is happening to her  Bed Mobility   Additional Comments pt seated at EOB w PT upon arrival    Transfers   Sit to Stand 3  Moderate assistance   Additional items Assist x 2; Increased time required;Verbal cues   Stand to Sit 3  Moderate assistance   Additional items Assist x 2; Increased time required;Verbal cues   Stand pivot 2  Maximal assistance   Additional items Assist x 2;Verbal cues   Additional Comments pt noticed to be lifting her legs off the floor when attempting to transfer, needing a quick pivot to the chair   Subjective   Subjective \"I was trying to march in place\"   Cognition   Overall Cognitive Status Impaired   Arousal/Participation Alert; Cooperative   Attention Attends with cues to redirect   Orientation Level Oriented X4   Memory Decreased recall of precautions   Following Commands Follows one step commands without difficulty   Comments pt with decreased probl solving  Activity Tolerance   Activity Tolerance Patient tolerated treatment well   Assessment   Assessment Pt seen for skilled OT treatment This AM  Parts of session seen as co-treatment with Physical Therapy due to pt being highly limited by medical complexities, cognition, safety awareness, weakness, standing tolerance, activity tolerance, standing balance and seated balance impacting overall engagement in functional activity   Co-treatment aimed to maximize function, activity tolerance, and safety; " both OT and PT goals addressed separately throughout session  While seated at EOB, pt able to wash own face, hands, needing assist for putting on socks and hospital pants  Pt required assist of 2 for sit to stand  Once in stance, she was very shaky with jello legs, unable to keep feet on the ground, needing a quick SPT to chair  Pt reports she was trying to march in place, however, she was clearly unable to hold self up  Attempted another sit to stand, after education on keeping feet on the floor  Pt still unable to stand upright, needing max assist of 2  Once seated in chair she was able to complete grooming including oral care by herself  From OT standpoint, pt can benefit from STR when dcing from acute care  OT will continue to follow while here as per POC  Plan   Treatment Interventions ADL retraining;Functional transfer training;Patient/family training; Compensatory technique education; Energy conservation; Activityengagement   Goal Expiration Date 04/03/23   OT Frequency 3-5x/wk   Recommendation   OT Discharge Recommendation Post acute rehabilitation services   AM-PAC Daily Activity Inpatient   Lower Body Dressing 2   Bathing 2   Toileting 2   Upper Body Dressing 2   Grooming 3   Eating 4   Daily Activity Raw Score 15   Daily Activity Standardized Score (Calc for Raw Score >=11) 34 69

## 2023-03-28 NOTE — PHYSICAL THERAPY NOTE
PHYSICAL THERAPY NOTE          Patient Name: Wero Blanc  VPUNC'L Date: 3/28/2023     03/28/23 1005   PT Last Visit   PT Visit Date 03/28/23   Note Type   Note Type Treatment for insurance authorization   End of Consult   Patient Position at End of Consult Bedside chair; All needs within reach;Bed/Chair alarm activated   Pain Assessment   Pain Score No Pain   Restrictions/Precautions   Other Precautions Chair Alarm; Bed Alarm;Cognitive; Fall Risk   General   Chart Reviewed Yes   Cognition   Overall Cognitive Status Impaired   Arousal/Participation Alert; Responsive   Attention Attends with cues to redirect   Following Commands Follows one step commands with increased time or repetition   Comments Pt pleasant during session, requires cueing to complete task   Bed Mobility   Supine to Sit 3  Moderate assistance   Additional items Assist x 1; Increased time required;Verbal cues;LE management;HOB elevated; Bedrails   Sit to Supine Unable to assess   Additional Comments Pt noted to be in bed upon arrival  Pt assisted to EOB with Mod A x 1  Transfers   Sit to Stand 3  Moderate assistance   Additional items Assist x 2; Increased time required;Verbal cues   Stand to Sit 3  Moderate assistance   Additional items Increased time required;Verbal cues   Stand pivot 2  Maximal assistance   Additional items Assist x 2; Increased time required;Verbal cues   Additional Comments Pt unable to maintain feet on floor for transfers, SPS from bed to chair with max assist x 2  Balance   Static Sitting Fair +   Dynamic Sitting Fair   Static Standing Poor +   Dynamic Standing Poor   Endurance Deficit   Endurance Deficit Yes   Activity Tolerance   Activity Tolerance Patient limited by fatigue   Medical Staff Made Aware DILIA Harrell   Nurse Made Aware RN cleared pt for therapy   Exercises   Heelslides 10 reps; Supine;AAROM; Bilateral   Hip Flexion 5 reps;Sitting;AROM; Bilateral   Knee AROM Long Arc Quad 15 reps; Sitting;AROM; Bilateral   Ankle Pumps 20 reps; Sitting;Bilateral;Supine   Assessment   Prognosis Fair   Problem List Decreased strength;Decreased endurance; Impaired balance;Decreased mobility; Decreased cognition   Assessment Pt seen for PT treatment session this date  Therapy session focused on bed mobility, functional transfers, and ambulation in order to improve overall mobility and independence  Pt follows simple one step commands with repetition and requires cueing to complete task  Pt andra to get to EOB with Mod A X 1  Pt maintains sitting with supervision  Pt tolerates b/l LE therapeutic exercise in supine and sitting with cueing  Pt performs multiple STS transfers with B/L HHA, VC and TCs required for postural correction, foot placement, pt has difficulty maintaining balance, rocío HK flexed   Pt assisted from bed to chair with assist X 2  Pt making steady progress toward goals  Pt was left in recliner at the end of PT session with all needs in reach, in care of OT  Pt would benefit from continued PT services while in hospital to address remaining limitations  The patient's AM-PAC Basic Mobility Inpatient Short Form Raw Score is 10  A Raw score of less than or equal to 16 suggests the patient may benefit from discharge to post-acute rehabilitation services  Please also refer to the recommendation of the Physical Therapist for safe discharge planning  Barriers to Discharge Decreased caregiver support   Goals   STG Expiration Date 04/03/23   Plan   Treatment/Interventions Bed mobility; Functional transfer training; Therapeutic exercise;Spoke to nursing;OT   Progress Progressing toward goals   PT Frequency 2-3x/wk   Recommendation   PT Discharge Recommendation Post acute rehabilitation services   AM-PAC Basic Mobility Inpatient   Turning in Flat Bed Without Bedrails 2   Lying on Back to Sitting on Edge of Flat Bed Without Bedrails 2   Moving Bed to Chair 2   Standing Up From Chair Using Arms 2   Walk in Room 1   Climb 3-5 Stairs With Railing 1   Basic Mobility Inpatient Raw Score 10   Turning Head Towards Sound 3   Follow Simple Instructions 3   Low Function Basic Mobility Raw Score 16   Low Function Basic Mobility Standardized Score 25 72   Highest Level Of Mobility   JH-HLM Goal 4: Move to chair/commode   JH-HLM Achieved 4: Move to chair/commode   Education   Education Provided Mobility training   Patient Reinforcement needed   End of Consult   Patient Position at End of Consult Supine;Bed/Chair alarm activated; All needs within reach     Cash Ramirez PT DPT

## 2023-03-28 NOTE — CASE MANAGEMENT
Edelmira Younger 50 received request for authorization from Care Manager    Authorization request for: Arkellykumar George   ZWO:[id number]  Facility MD Dr Trent Leon [phone]  Authorization initiated by contacting insurance:  Via:   Pending Reference #:none generated  Clinicals submitted via: fax to [phone] per Reji at [facility] JOSEFINA SIDHU JR  CANCER [facility]

## 2023-03-28 NOTE — PROGRESS NOTES
[facility] [facility]  Progress Note  Name: Rohit Morales  MRN: [id number]  Unit/Bed#: [facility] 801-01 I Date of Admission: 3/18/2023   Date of Service: 3/28/2023 I Hospital Day: 10    Assessment/Plan   Goals of care, counseling/discussion  Assessment & Plan  · On 3/23, patient stated she did not want any treatment focused care, wanted to transition to hospice and was ready to die  · Patient clear she does not want any invasion or treatment-focused care, however she does not qualify for hospice due to her overall health  · If patient were to decompensate or require any invasive treatments, instead she would like to transition to hospice  Otherwise, she is amenable to rehab  Metabolic encephalopathy  Assessment & Plan  · On presentation noted to be more confused than normal per her son  · Likely acute metabolic encephalopathy superimposed on chronic cognitive issues in setting of GI bleed, worsening anemia, hypotension  · Appears closer to baseline, continue to monitor    History of venous thromboembolism  Assessment & Plan  History of pulmonary embolism and DVT in January 2023 noted, patient on apixaban at home  Discussed with patient and her son on 3/23 and 3/24, will not restart apixaban as patient and son do not feel that the benefits outweigh the risks  Acute blood loss anemia  Assessment & Plan  · Baseline hemoglobin between 12-14 -> presented w hgb significantly lower at 8 7 (was 12 4 two months ago)  · Secondary to coffee-ground emesis at home and multiple melanotic stools in the ED in the setting of Eliquis use   · Due to borderline hypotension given IV fluids and transfused a unit of PRBCs and cryoprecipitate in the ED  · Per goals of care discussion on 3/24, patient would not want any blood transfusions or any other life prolonging treatments  Wants to die a natural death      Cognitive impairment  Assessment & Plan  Continue Zoloft    Paroxysmal supraventricular "tachycardia   Assessment & Plan  Discontinued home metoprolol as patient does not want any life prolonging treatments or medications  Can restart if patient having tachycardia and it is bothersome to her  Chronic kidney disease stage 3   Assessment & Plan  Baseline creatinine of approximately 0 9-1 1 -> currently at baseline    Lower extremity edema  Assessment & Plan  Chronic issue  Discontinued home Lasix as patient does not want any life prolonging medications and does not like urinating frequently  * GI bleed  Assessment & Plan  · Found by son with bloody vomitus on shirt - had multiple melanotic stools in the ED  · An almost 4-point drop in hemoglobin today when compared to a few months prior coupled with mild/transient hypotension responsive to IV fluids  · See plan for acute blood loss anemia below  · Protonix drip initiated -> holding Eliquis/Naproxen  · Per CT imaging: \"No CT evidence of active high volume gastrointestinal hemorrhage  Findings of gastritis and nonspecific enteritis  Moderate rectal stool ball  Stable 3 4 cm left adnexal cystic lesion  Recommend follow-up pelvic ultrasound in one year  \"  · EGD cancelled by GI on 3/20 as she had been stable  Home apixaban restarted per GI recs  · Melena restarted 3/21 after apixaban restarted  EGD done 3/22 with some duodenal ulcers, no evidence of recent bleeding  · Advance to regular diet, continue to monitor  · Goals of care discussion with patient and her son 3/23 and 3/24  Risk of any further procedures not acceptable to patient  Risk of restarting blood thinners also not acceptable  Patient would like to focus on comfort, is not interested in disease-focused care  She would not want a blood transfusion  She is too healthy to qualify for hospice currently and her GI bleed has stabilized therefore she is agreeable to discharge to rehab    · If GI bleed recurs and patient becomes unstable, reengage hospice team as this might become a " "qualifying diagnosis  · Patient would not want a blood transfusion               VTE Pharmacologic Prophylaxis: VTE Score: 3 Moderate Risk (Score 3-4) - Pharmacological DVT Prophylaxis Ordered: heparin  Patient Centered Rounds: I performed bedside rounds with nursing staff today  Discussions with Specialists or Other Care Team Provider: Discussed with CM  Education and Discussions with Family / Patient: will update family   Total Time Spent on Date of Encounter in care of patient: 30 min  This time was spent on one or more of the following: performing physical exam; counseling and coordination of care; obtaining or reviewing history; documenting in the medical record; reviewing/ordering tests, medications or procedures; communicating with other healthcare professionals and discussing with patient's family/caregivers  Current Length of Stay: 10 day(s)  Current Patient Status: Inpatient   Certification Statement: medically stable for DC  Discharge Plan: as per Cm     Code Status: Level 3 - DNAR and DNI    Subjective:   Patient seen and examined  Feeling \"good\"    Objective:     Vitals:   Temp (24hrs), Av 3 °F (36 3 °C), Min:97 2 °F (36 2 °C), Max:97 5 °F (36 4 °C)    Temp:  [97 2 °F (36 2 °C)-97 5 °F (36 4 °C)] 97 5 °F (36 4 °C)  HR:  [69-91] 91  Resp:  [16-20] 16  BP: (108-132)/(57-59) 108/59  SpO2:  [96 %-100 %] 96 %  Body mass index is 20 4 kg/m²  Input and Output Summary (last 24 hours): Intake/Output Summary (Last 24 hours) at 3/28/2023 1704  Last data filed at 3/28/2023 0544  Gross per 24 hour   Intake --   Output 200 ml   Net -200 ml       Physical Exam:   Physical Exam  Constitutional:       General: She is not in acute distress  Appearance: She is not ill-appearing, toxic-appearing or diaphoretic  HENT:      Head: Normocephalic  Eyes:      Pupils: Pupils are equal, round, and reactive to light  Cardiovascular:      Rate and Rhythm: Normal rate        Heart sounds: Murmur " heard    Pulmonary:      Effort: No respiratory distress  Breath sounds: No wheezing or rales  Abdominal:      General: There is no distension  Palpations: There is no mass  Tenderness: There is no abdominal tenderness  There is no right CVA tenderness, left CVA tenderness, guarding or rebound  Skin:     Capillary Refill: Capillary refill takes less than 2 seconds  Coloration: Skin is pale  Skin is not jaundiced  Findings: No bruising or lesion  Neurological:      General: No focal deficit present  Mental Status: She is alert  Cranial Nerves: No cranial nerve deficit  Sensory: No sensory deficit  Motor: No weakness  Coordination: Coordination normal       Gait: Gait normal    Psychiatric:         Mood and Affect: Mood normal           Additional Data:     Labs:  Results from last 7 days   Lab Units 03/28/23  0439 03/24/23  0551 03/23/23  0453   WBC Thousand/uL 6 66   < > 10 67*   HEMOGLOBIN g/dL 8 1*   < > 8 4*   HEMATOCRIT % 25 2*   < > 27 2*   PLATELETS Thousands/uL 342   < > 287   NEUTROS PCT %  --   --  82*   LYMPHS PCT %  --   --  9*   MONOS PCT %  --   --  6   EOS PCT %  --   --  1    < > = values in this interval not displayed  Results from last 7 days   Lab Units 03/24/23  0551   SODIUM mmol/L 140   POTASSIUM mmol/L 3 3*   CHLORIDE mmol/L 108   CO2 mmol/L 30   BUN mg/dL 15   CREATININE mg/dL 1 02   ANION GAP mmol/L 2*   CALCIUM mg/dL 8 0*   GLUCOSE RANDOM mg/dL 87                       Lines/Drains:  Invasive Devices     Peripheral Intravenous Line  Duration           Peripheral IV 03/27/23 Left Antecubital 1 day                      Imaging: No pertinent imaging reviewed      Recent Cultures (last 7 days):         Last 24 Hours Medication List:   Current Facility-Administered Medications   Medication Dose Route Frequency Provider Last Rate   • acetaminophen  650 mg Oral Q6H PRN Katty Collins DO     • ondansetron  4 mg Intravenous Q4H PRN Tevin Mart DO     • pantoprazole  40 mg Oral BID MARC Lloyd MD     • sertraline  25 mg Oral Daily Yosi Garcia DO          Today, Patient Was Seen By: Nick Wells MD    **Please Note: This note may have been constructed using a voice recognition system  **

## 2023-03-29 RX ADMIN — SERTRALINE HYDROCHLORIDE 25 MG: 25 TABLET ORAL at 08:09

## 2023-03-29 RX ADMIN — PANTOPRAZOLE SODIUM 40 MG: 40 TABLET, DELAYED RELEASE ORAL at 06:00

## 2023-03-29 RX ADMIN — PANTOPRAZOLE SODIUM 40 MG: 40 TABLET, DELAYED RELEASE ORAL at 16:58

## 2023-03-29 NOTE — PLAN OF CARE
Problem: Potential for Falls  Goal: Patient will remain free of falls  Description: INTERVENTIONS:  - Educate patient/family on patient safety including physical limitations  - Instruct patient to call for assistance with activity   - Consult OT/PT to assist with strengthening/mobility   - Keep Call bell within reach  - Keep bed low and locked with side rails adjusted as appropriate  - Keep care items and personal belongings within reach  - Initiate and maintain comfort rounds  - Make Fall Risk Sign visible to staff  - Offer Toileting every 2 Hours, in advance of need  - Initiate/Maintain bed alarm  - Apply yellow socks and bracelet for high fall risk patients  - Consider moving patient to room near nurses station  Outcome: Progressing     Problem: Prexisting or High Potential for Compromised Skin Integrity  Goal: Skin integrity is maintained or improved  Description: INTERVENTIONS:  - Identify patients at risk for skin breakdown  - Assess and monitor skin integrity  - Assess and monitor nutrition and hydration status  - Monitor labs   - Assess for incontinence   - Turn and reposition patient  - Assist with mobility/ambulation  - Relieve pressure over bony prominences  - Avoid friction and shearing  - Provide appropriate hygiene as needed including keeping skin clean and dry  - Evaluate need for skin moisturizer/barrier cream  - Collaborate with interdisciplinary team   - Patient/family teaching  - Consider wound care consult   Outcome: Progressing     Problem: CARDIOVASCULAR - ADULT  Goal: Maintains optimal cardiac output and hemodynamic stability  Description: INTERVENTIONS:  - Monitor I/O, vital signs and rhythm  - Monitor for S/S and trends of decreased cardiac output  - Administer and titrate ordered vasoactive medications to optimize hemodynamic stability  - Assess quality of pulses, skin color and temperature  - Assess for signs of decreased coronary artery perfusion  - Instruct patient to report change in severity of symptoms  Outcome: Progressing     Problem: GASTROINTESTINAL - ADULT  Goal: Minimal or absence of nausea and/or vomiting  Description: INTERVENTIONS:  - Administer IV fluids if ordered to ensure adequate hydration  - Maintain NPO status until nausea and vomiting are resolved  - Nasogastric tube if ordered  - Administer ordered antiemetic medications as needed  - Provide nonpharmacologic comfort measures as appropriate  - Advance diet as tolerated, if ordered  - Consider nutrition services referral to assist patient with adequate nutrition and appropriate food choices  Outcome: Progressing     Problem: METABOLIC, FLUID AND ELECTROLYTES - ADULT  Goal: Electrolytes maintained within normal limits  Description: INTERVENTIONS:  - Monitor labs and assess patient for signs and symptoms of electrolyte imbalances  - Administer electrolyte replacement as ordered  - Monitor response to electrolyte replacements, including repeat lab results as appropriate  - Instruct patient on fluid and nutrition as appropriate  Outcome: Progressing  Goal: Fluid balance maintained  Description: INTERVENTIONS:  - Monitor labs   - Monitor I/O and WT  - Instruct patient on fluid and nutrition as appropriate  - Assess for signs & symptoms of volume excess or deficit  Outcome: Progressing     Problem: SKIN/TISSUE INTEGRITY - ADULT  Goal: Pressure injury heals and does not worsen  Description: Interventions:  - Implement low air loss mattress or specialty surface (Criteria met)  - Apply silicone foam dressing  - Instruct/assist with weight shifting every 30 minutes when in chair   - Limit chair time to 4 hour intervals  - Use special pressure reducing interventions such as soft care cushion when in chair   - Apply fecal or urinary incontinence containment device   - Perform passive or active ROM every shift  - Turn and reposition patient & offload bony prominences every 2 hours   - Utilize friction reducing device or surface for transfers   - Consider consults to  interdisciplinary teams such as wound care  - Use incontinent care products after each incontinent episode such as moisture barrier  - Consider nutrition services referral as needed  Outcome: Progressing     Problem: PAIN - ADULT  Goal: Verbalizes/displays adequate comfort level or baseline comfort level  Description: Interventions:  - Encourage patient to monitor pain and request assistance  - Assess pain using appropriate pain scale  - Administer analgesics based on type and severity of pain and evaluate response  - Implement non-pharmacological measures as appropriate and evaluate response  - Consider cultural and social influences on pain and pain management  - Notify physician/advanced practitioner if interventions unsuccessful or patient reports new pain  Outcome: Progressing     Problem: INFECTION - ADULT  Goal: Absence or prevention of progression during hospitalization  Description: INTERVENTIONS:  - Assess and monitor for signs and symptoms of infection  - Monitor lab/diagnostic results  - Monitor all insertion sites, i e  indwelling lines, tubes, and drains  - Monitor endotracheal if appropriate and nasal secretions for changes in amount and color  - [place] appropriate cooling/warming therapies per order  - Administer medications as ordered  - Instruct and encourage patient and family to use good hand hygiene technique  - Identify and instruct in appropriate isolation precautions for identified infection/condition  Outcome: Progressing     Problem: DISCHARGE PLANNING  Goal: Discharge to home or other facility with appropriate resources  Description: INTERVENTIONS:  - Identify barriers to discharge w/patient and caregiver  - Arrange for needed discharge resources and transportation as appropriate  - Identify discharge learning needs (meds, wound care, etc )  - Arrange for interpretive services to assist at discharge as needed  - Refer to Case Management Department for coordinating discharge planning if the patient needs post-hospital services based on physician/advanced practitioner order or complex needs related to functional status, cognitive ability, or social support system  Outcome: Progressing     Problem: Knowledge Deficit  Goal: Patient/family/caregiver demonstrates understanding of disease process, treatment plan, medications, and discharge instructions  Description: Complete learning assessment and assess knowledge base  Interventions:  - Provide teaching at level of understanding  - Provide teaching via preferred learning methods  Outcome: Progressing     Problem: MOBILITY - ADULT  Goal: Maintain or return to baseline ADL function  Description: INTERVENTIONS:  -  Assess patient's ability to carry out ADLs; assess patient's baseline for ADL function and identify physical deficits which impact ability to perform ADLs (bathing, care of mouth/teeth, toileting, grooming, dressing, etc )  - Assess/evaluate cause of self-care deficits   - Assess range of motion  - Assess patient's mobility; develop plan if impaired  - Assess patient's need for assistive devices and provide as appropriate  - Encourage maximum independence but intervene and supervise when necessary  - Involve family in performance of ADLs  - Assess for home care needs following discharge   - Consider OT consult to assist with ADL evaluation and planning for discharge  - Provide patient education as appropriate  Outcome: Progressing  Goal: Maintains/Returns to pre admission functional level  Description: INTERVENTIONS:  - Perform BMAT or MOVE assessment daily    - Set and communicate daily mobility goal to care team and patient/family/caregiver  - Collaborate with rehabilitation services on mobility goals if consulted  - Perform Range of Motion 3 times a day  - Reposition patient every 2 hours    - Dangle patient 3 times a day  - Stand patient 3 times a day  - Out of bed to chair 3 times a day   - Out of bed for meals 3 times a day  - Out of bed for toileting  - Record patient progress and toleration of activity level   Outcome: Progressing

## 2023-03-29 NOTE — WOUND OSTOMY CARE
Progress Note - Wound   Raisa Pilar Outhouse 80 y o  female MRN: [id number]  Unit/Bed#: [facility] 801-01 Encounter: [id number]        Assessment:   Patient seen today for wound care follow up assessment  B/L leg wounds have resolved    Sacral/buttocks and B/L heels intact and blanching     Orders listed below and wound care will sign off, call or tiger text with questions  Bedside nurse updated of findings and orders  Flowsheets below with pictures and measurements  Skin care plans:  1-Hydraguard to bilateral sacrum, buttock and heels BID and PRN  2-Elevate heels to offload pressure  3-Ehob cushion in chair when out of bed  4-Moisturize skin daily with skin nourishing cream   5-Turn/reposition q2h or when medically stable for pressure re-distribution on skin  Wound 03/18/23 Pressure Injury Sacrum (Active)   Wound Image   03/22/23 1500   Wound Description Non-blanchable erythema;Pink 03/29/23 0807   Lashawn-wound Assessment Clean;Dry; Intact 03/29/23 0807   Wound Length (cm) 0 cm 03/22/23 1500   Wound Width (cm) 0 cm 03/22/23 1500   Wound Depth (cm) 0 cm 03/22/23 1500   Wound Surface Area (cm^2) 0 cm^2 03/22/23 1500   Wound Volume (cm^3) 0 cm^3 03/22/23 1500   Calculated Wound Volume (cm^3) 0 cm^3 03/22/23 1500   Tunneling 0 cm 03/22/23 1500   Undermining 0 03/22/23 1500   Non-staged Wound Description Not applicable 01/78/65 7703   Treatments Cleansed 03/29/23 0807   Dressing Moisture barrier 03/29/23 0807   Wound packed? No 03/24/23 1945   Dressing Changed New 03/22/23 1500   Patient Tolerance Tolerated well 03/22/23 1500   Dressing Status Clean;Dry; Intact 03/27/23 2000       Wound 03/18/23 Venous Ulcer Pretibial Right (Active)   Wound Image   03/29/23 1116   Wound Description Black; Brown 03/29/23 0807   Lashawn-wound Assessment Fragile;[address] 03/29/23 0807   Wound Length (cm) 1 3 cm 03/22/23 1458   Wound Width (cm) 1 5 cm 03/22/23 1458   Wound Depth (cm) 0 cm 03/22/23 1458   Wound Surface Area (cm^2) 1 95 cm^2 03/22/23 1458   Wound Volume (cm^3) 0 cm^3 03/22/23 1458   Calculated Wound Volume (cm^3) 0 cm^3 03/22/23 1458   Tunneling 0 cm 03/22/23 1458   Undermining 0 03/22/23 1458   Drainage Amount None 03/29/23 0807   Dressing Dry dressing;ABD;Calcium Alginate 03/29/23 0807   Dressing Changed New 03/29/23 0807   Patient Tolerance Tolerated well 03/29/23 0807   Dressing Status Clean;Dry; Intact 03/29/23 0807       Wound 03/22/23 Pretibial Left;Lateral (Active)   Wound Image   03/29/23 1116   Wound Description Pink;Fragile 03/29/23 0807   Lashawn-wound Assessment Erythema 03/29/23 0807   Wound Length (cm) 9 cm 03/22/23 1459   Wound Width (cm) 2 2 cm 03/22/23 1459   Wound Depth (cm) 0 1 cm 03/22/23 1459   Wound Surface Area (cm^2) 19 8 cm^2 03/22/23 1459   Wound Volume (cm^3) 1 98 cm^3 03/22/23 1459   Calculated Wound Volume (cm^3) 1 98 cm^3 03/22/23 1459   Tunneling 0 cm 03/22/23 1459   Undermining 0 03/22/23 1459   Drainage Amount None 03/29/23 0807   Drainage Description Serous 03/22/23 1459   Non-staged Wound Description Partial thickness 03/22/23 1459   Treatments Cleansed;Site care 03/29/23 0807   Dressing ABD;Dry dressing;Calcium Alginate 03/29/23 0807   Wound packed? No 03/22/23 1459   Dressing Changed New 03/29/23 0807   Patient Tolerance Tolerated well 03/29/23 0807   Dressing Status Clean;Dry; Intact 03/29/23 0807       Wound 03/22/23 Tibial Right;Medial;Distal (Active)   Wound Image   03/22/23 1500   Wound Description Black; Brown 03/29/23 0807   Lashawn-wound Assessment Erythema;Fragile 03/29/23 0807   Wound Length (cm) 1 5 cm 03/22/23 1500   Wound Width (cm) 2 5 cm 03/22/23 1500   Wound Depth (cm) 0 1 cm 03/22/23 1500   Wound Surface Area (cm^2) 3 75 cm^2 03/22/23 1500   Wound Volume (cm^3) 0 375 cm^3 03/22/23 1500   Calculated Wound Volume (cm^3) 0 38 cm^3 03/22/23 1500   Tunneling 0 cm 03/22/23 1500   Undermining 0 03/22/23 1500   Drainage Amount None 03/29/23 0807   Drainage Description Serous 03/22/23 1500   Non-staged Wound Description Partial thickness 03/22/23 1500   Treatments Cleansed;Site care 03/29/23 0807   Dressing ABD;Dry dressing;Calcium Alginate 03/29/23 0807   Wound packed? No 03/22/23 1500   Dressing Changed New 03/29/23 0807   Patient Tolerance Tolerated well 03/29/23 0807   Dressing Status Clean;Dry; Intact 03/29/23 0807         Ambreen DALEN, RN, [employer]

## 2023-03-29 NOTE — RESTORATIVE TECHNICIAN NOTE
Restorative Technician Note      Patient Name: Wero Blanc     Restorative Tech Visit Date: 03/29/23  Note Type: Mobility  Patient Position Upon Consult: Supine  Activity Performed: Transferred; Other (Comment); Stood; Dangled (bed>commode>chair)  Assistive Device: Other (Comment) (Ax1; 2nd person to perform hygienge tasks while restorative stands and transfers pt)  Education Provided: Yes  Patient Position at End of Consult: Bedside chair;  All needs within reach; Bed/Chair alarm activated    Lanie Blank  DPT, Restorative Technician

## 2023-03-29 NOTE — CASE MANAGEMENT
Called [facility] to check status on request initiated 03/28/23- per the rep they did not get the fax  I gave her time that it was faxed to them via [employer]   And has a green checkmark that it went through  Reverified that I had the correct fax # [phone]     Had to re-fax info to [employer]

## 2023-03-29 NOTE — RESTORATIVE TECHNICIAN NOTE
Restorative Technician Note      Patient Name: David Heck     Restorative Tech Visit Date: 03/29/23  Note Type: Mobility  Patient Position Upon Consult: Bedside chair  Activity Performed: Transferred  Assistive Device: Other (Comment) [facility] x2 with sacral lift)  Education Provided: Yes  Patient Position at End of Consult: All needs within reach; Supine;  Other (comment) (left in the care of RN)    Sudhakar Green  DPT, Restorative Technician

## 2023-03-29 NOTE — PROGRESS NOTES
[address] [facility]  Progress Note  Name: Kalyani Cohn  MRN: [id number]  [facility] I Date of Admission: 3/18/2023   Date of Service: 3/29/2023 I Hospital Day: 11    Assessment/Plan   Goals of care, counseling/discussion  Assessment & Plan  · On 3/23, patient stated she did not want any treatment focused care, wanted to transition to hospice and was ready to die  · Patient clear she does not want any invasion or treatment-focused care, however she does not qualify for hospice due to her overall health  · If patient were to decompensate or require any invasive treatments, instead she would like to transition to hospice  Otherwise, she is amenable to rehab  Metabolic encephalopathy  Assessment & Plan  · On presentation noted to be more confused than normal per her son  · Likely acute metabolic encephalopathy superimposed on chronic cognitive issues in setting of GI bleed, worsening anemia, hypotension  · Appears closer to baseline, continue to monitor    History of venous thromboembolism  Assessment & Plan  History of pulmonary embolism and DVT in January 2023 noted, patient on apixaban at home  Discussed with patient and her son on 3/23 and 3/24, will not restart apixaban as patient and son do not feel that the benefits outweigh the risks  Acute blood loss anemia  Assessment & Plan  · Baseline hemoglobin between 12-14 -> presented w hgb significantly lower at 8 7 (was 12 4 two months ago)  · Secondary to coffee-ground emesis at home and multiple melanotic stools in the ED in the setting of Eliquis use   · Due to borderline hypotension given IV fluids and transfused a unit of PRBCs and cryoprecipitate in the ED  · Per goals of care discussion on 3/24, patient would not want any blood transfusions or any other life prolonging treatments  Wants to die a natural death      Cognitive impairment  Assessment & Plan  Continue Zoloft    Paroxysmal supraventricular "tachycardia   Assessment & Plan  Discontinued home metoprolol as patient does not want any life prolonging treatments or medications  Can restart if patient having tachycardia and it is bothersome to her  Chronic kidney disease stage 3   Assessment & Plan  Baseline creatinine of approximately 0 9-1 1 -> currently at baseline    Lower extremity edema  Assessment & Plan  Chronic issue  Discontinued home Lasix as patient does not want any life prolonging medications and does not like urinating frequently  * GI bleed  Assessment & Plan  · Found by son with bloody vomitus on shirt - had multiple melanotic stools in the ED  · An almost 4-point drop in hemoglobin today when compared to a few months prior coupled with mild/transient hypotension responsive to IV fluids  · See plan for acute blood loss anemia below  · Protonix drip initiated -> holding Eliquis/Naproxen  · Per CT imaging: \"No CT evidence of active high volume gastrointestinal hemorrhage  Findings of gastritis and nonspecific enteritis  Moderate rectal stool ball  Stable 3 4 cm left adnexal cystic lesion  Recommend follow-up pelvic ultrasound in one year  \"  · EGD cancelled by GI on 3/20 as she had been stable  Home apixaban restarted per GI recs  · Melena restarted 3/21 after apixaban restarted  EGD done 3/22 with some duodenal ulcers, no evidence of recent bleeding  · Advance to regular diet, continue to monitor  · Goals of care discussion with patient and her son 3/23 and 3/24  Risk of any further procedures not acceptable to patient  Risk of restarting blood thinners also not acceptable  Patient would like to focus on comfort, is not interested in disease-focused care  She would not want a blood transfusion  She is too healthy to qualify for hospice currently and her GI bleed has stabilized therefore she is agreeable to discharge to rehab    · If GI bleed recurs and patient becomes unstable, reengage hospice team as this might become a " "qualifying diagnosis  · Patient would not want a blood transfusion               VTE Pharmacologic Prophylaxis: VTE Score: 3 Moderate Risk (Score 3-4) - Pharmacological DVT Prophylaxis Ordered: heparin  Patient Centered Rounds: I performed bedside rounds with nursing staff today  Discussions with Specialists or Other Care Team Provider: Discussed with CM - no authorization  Checked again with CM at 3:30 pm - no auth  Education and Discussions with Family / Patient: Updated  (son) via phone  Total Time Spent on Date of Encounter in care of patient: 30 min  This time was spent on one or more of the following: performing physical exam; counseling and coordination of care; obtaining or reviewing history; documenting in the medical record; reviewing/ordering tests, medications or procedures; communicating with other healthcare professionals and discussing with patient's family/caregivers  Current Length of Stay: 11 day(s)  Current Patient Status: Inpatient   Certification Statement: The patient will continue to require additional inpatient hospital stay due to awaiting insurance auth   Discharge Plan: as per CM  medically stable for DC     Code Status: Level 3 - DNAR and DNI    Subjective:   Patient seen and examined  Feeling \"okay\"  No complaints      Objective:     Vitals:   Temp (24hrs), Av 6 °F (36 4 °C), Min:97 3 °F (36 3 °C), Max:97 7 °F (36 5 °C)    Temp:  [97 3 °F (36 3 °C)-97 7 °F (36 5 °C)] 97 7 °F (36 5 °C)  HR:  [78-87] 78  Resp:  [17-18] 18  BP: (130-147)/(52-59) 130/58  SpO2:  [97 %-99 %] 99 %  Body mass index is 20 4 kg/m²  Input and Output Summary (last 24 hours): Intake/Output Summary (Last 24 hours) at 3/29/2023 1830  Last data filed at 3/29/2023 1401  Gross per 24 hour   Intake --   Output 300 ml   Net -300 ml       Physical Exam:   Physical Exam  Constitutional:       General: She is not in acute distress       Appearance: She is not ill-appearing, " toxic-appearing or diaphoretic  HENT:      Head: Normocephalic  Eyes:      Pupils: Pupils are equal, round, and reactive to light  Cardiovascular:      Rate and Rhythm: Normal rate  Pulmonary:      Effort: No respiratory distress  Breath sounds: No stridor  No wheezing, rhonchi or rales  Chest:      Chest wall: No tenderness  Abdominal:      General: There is no distension  Palpations: There is no mass  Tenderness: There is no abdominal tenderness  There is no right CVA tenderness, guarding or rebound  Hernia: No hernia is present  Skin:     Coloration: Skin is pale  Skin is not jaundiced  Findings: No lesion  Neurological:      Mental Status: She is alert  Cranial Nerves: No cranial nerve deficit  Motor: No weakness  Gait: Gait normal    Psychiatric:         Mood and Affect: Mood normal           Additional Data:     Labs:  Results from last 7 days   Lab Units 03/28/23  0439 03/24/23  0551 03/23/23  0453   WBC Thousand/uL 6 66   < > 10 67*   HEMOGLOBIN g/dL 8 1*   < > 8 4*   HEMATOCRIT % 25 2*   < > 27 2*   PLATELETS Thousands/uL 342   < > 287   NEUTROS PCT %  --   --  82*   LYMPHS PCT %  --   --  9*   MONOS PCT %  --   --  6   EOS PCT %  --   --  1    < > = values in this interval not displayed  Results from last 7 days   Lab Units 03/24/23  0551   SODIUM mmol/L 140   POTASSIUM mmol/L 3 3*   CHLORIDE mmol/L 108   CO2 mmol/L 30   BUN mg/dL 15   CREATININE mg/dL 1 02   ANION GAP mmol/L 2*   CALCIUM mg/dL 8 0*   GLUCOSE RANDOM mg/dL 87                       Lines/Drains:  Invasive Devices     Peripheral Intravenous Line  Duration           Peripheral IV 03/27/23 Left Antecubital 2 days                      Imaging: No pertinent imaging reviewed      Recent Cultures (last 7 days):         Last 24 Hours Medication List:   Current Facility-Administered Medications   Medication Dose Route Frequency Provider Last Rate   • acetaminophen  650 mg Oral Q6H PRN Katty DO Dennis     • ondansetron  4 mg Intravenous Q4H PRN Shmaar Hood DO     • pantoprazole  40 mg Oral BID AC Liam Soler MD     • sertraline  25 mg Oral Daily Shamar Hood DO          Today, Patient Was Seen By: Charito Carter MD    **Please Note: This note may have been constructed using a voice recognition system  **

## 2023-03-29 NOTE — PROGRESS NOTES
Pastoral Care Progress Note    3/29/2023  Patient: Usman Melchor : 1925  Admission Date & Time: 3/18/2023 0800  MRN: [id number] CSN: [id number]       attempted visit to pt, but pt was asleep  Chaplains remain available                 Chaplaincy Interventions Utilized:         Relationship Building: Cultivated a relationship of care and support     23 1100   Clinical Encounter Type   Visited With Patient not available   Routine Visit Follow-up   Referral From Nurse   Referral To

## 2023-03-30 LAB
FLUAV RNA RESP QL NAA+PROBE: NEGATIVE
FLUBV RNA RESP QL NAA+PROBE: NEGATIVE
RSV RNA RESP QL NAA+PROBE: NEGATIVE
SARS-COV-2 RNA RESP QL NAA+PROBE: NEGATIVE

## 2023-03-30 RX ADMIN — PANTOPRAZOLE SODIUM 40 MG: 40 TABLET, DELAYED RELEASE ORAL at 06:07

## 2023-03-30 RX ADMIN — SERTRALINE HYDROCHLORIDE 25 MG: 25 TABLET ORAL at 08:09

## 2023-03-30 RX ADMIN — PANTOPRAZOLE SODIUM 40 MG: 40 TABLET, DELAYED RELEASE ORAL at 16:58

## 2023-03-30 NOTE — ASSESSMENT & PLAN NOTE
· History of pulmonary embolism and DVT in January 2023 noted, patient on apixaban at home  · Discussed with patient and her son on 3/23 and 3/24, will not restart apixaban as patient and son do not feel that the benefits outweigh the risks

## 2023-03-30 NOTE — CASE MANAGEMENT
Case Management Discharge Planning Note    Patient name Kaitlin Loco  [facility] MRN [id number]  : 1925 Date 3/30/2023       Current Admission Date: 3/18/2023  Current Admission Diagnosis:GI bleed   Patient Active Problem List    Diagnosis Date Noted   • Goals of care, counseling/discussion    • Metabolic encephalopathy    • GI bleed 2023   • Acute blood loss anemia 2023   • History of venous thromboembolism 2023   • Chronic pain of left knee 2023   • Episode of recurrent major depressive disorder ([facility] ) 2023   • Lower leg DVT (deep venous thromboembolism), acute, bilateral ([facility] ) 2023   • COVID 2023   • [facility]   • Paroxysmal supraventricular tachycardia  2022   • Infiltrate noted on imaging study 2022   • Compression fracture of L3 lumbar vertebra, closed, initial encounter ([facility] ) 2020   • Fall 2020   • Age-related osteoporosis without current pathological fracture 2019   • Lower extremity edema 2019   • Chronic kidney disease stage 3  2019   • Venous stasis dermatitis of both lower extremities 2019   • Constipation, unspecified 2018   • Diverticulosis of intestine, part unspecified, without perforation or abscess without bleeding 2018   • Generalized muscle weakness 2018   • Polyneuropathy, unspecified 2018   • Ambulatory dysfunction 2018   • Neuropathy 2018   • Balance problem 2017   • Cognitive impairment 2017      LOS (days): 12  Geometric Mean LOS (GMLOS) (days): 4 50  Days to GMLOS:-7 6     OBJECTIVE:  Risk of Unplanned Readmission Score: 13 52         Current admission status: Inpatient   Preferred Pharmacy:   [address] [place] [address], [address] [place] [address] [address] [place] [address]  Phone: [phone] Fax: [phone]    Primary Care Provider: KERON Ivan    Primary Insurance: BLUE CROSS [employer] REP  Secondary Insurance:     DISCHARGE DETAILS:    Discharge planning discussed with[de-identified] pt sonMathew        Contacts  Contact Method: Phone  Phone Number: [phone]  Reason/Outcome: Discharge Planning       Other Referral/Resources/Interventions Provided:  Interventions: Short Term Rehab     Transport at Discharge : [facility]  Dispatcher Contacted: Yes  Number/Name of Dispatcher: Jayden Estes by [employer] and Unit #): pending acceptance in Roundtrip  ETA of Transport (Date): 03/31/23  ETA of Transport (Time): 1000      IMM Given (Date):: 03/30/23  IMM reviewed with patient and caregiver, patient and caregiver agrees with discharge determination      IMM Given to[de-identified] Family  Family notified[de-identified] Mathew Brunson

## 2023-03-30 NOTE — ASSESSMENT & PLAN NOTE
· Discontinued home metoprolol as patient does not want any life prolonging treatments or medications  Can restart if patient having tachycardia and it is bothersome to her

## 2023-03-30 NOTE — UTILIZATION REVIEW
Continued Stay Review    Date: 3/30/23                          Current Patient Class:  IP  Current Level of Care:  MS    HPI:89 y o  female initially admitted on 3/18/23  GI bleed  Pt not interested in treatment focused care, does not qualify for hospice at this time  If patient were to decompensate or require any invasive treatments, instead she would like to transition to hospice  Otherwise, she is amenable to rehab  Assessment/Plan:   Pt awaiting insurance auth for rehab , medically stable   Pt w/o complaints   Per PT, making steady progress toward goals  Performs multiple STS transfers during session with max assist X 1, using RW, cueing required for foot and hand placement , posture correction  Pt also completes SPS transfer from chair to and from commode with max assist, assistance from 2nd person required for hygiene care       Vital Signs:   Date/Time Temp Pulse Resp BP MAP (mmHg) SpO2 O2 Device   03/30/23 15:04:21 97 3 °F (36 3 °C) Abnormal  83 16 117/59 78 96 % --   03/30/23 0900 -- -- -- -- -- -- None (Room air)   03/30/23 08:14:19 97 7 °F (36 5 °C) 82 16 120/63 82 95 % --   03/29/23 22:57:57 97 9 °F (36 6 °C) 72 17 96/51 64 Abnormal  100 % --   03/29/23 14:52:21 97 7 °F (36 5 °C) 78 18 130/58 82 99 % --   03/29/23 08:55:47 97 7 °F (36 5 °C) 87 18 138/59 85 98 % --   03/28/23 21:40:57 97 3 °F (36 3 °C) Abnormal  80 17 147/52 84 97 % --   03/28/23 14:59:51 97 5 °F (36 4 °C) 91 16 108/59 75 96 %      Pertinent Labs/Diagnostic Results:   Results from last 7 days   Lab Units 03/30/23  1011   SARS-COV-2  Negative     Results from last 7 days   Lab Units 03/28/23  0439 03/27/23  0508 03/26/23  0434 03/25/23  0446 03/24/23  0551   WBC Thousand/uL 6 66 6 00 6 06 9 10 9 54   HEMOGLOBIN g/dL 8 1* 7 9* 7 4* 7 7* 7 9*   HEMATOCRIT % 25 2* 25 0* 23 2* 23 9* 25 1*   PLATELETS Thousands/uL 342 355 325 317 297         Results from last 7 days   Lab Units 03/24/23  0551   SODIUM mmol/L 140   POTASSIUM mmol/L 3 3* CHLORIDE mmol/L 108   CO2 mmol/L 30   ANION GAP mmol/L 2*   BUN mg/dL 15   CREATININE mg/dL 1 02   EGFR ml/min/1 73sq m 46   CALCIUM mg/dL 8 0*             Results from last 7 days   Lab Units 03/24/23  0551   GLUCOSE RANDOM mg/dL 87               Results from last 7 days   Lab Units 03/30/23  1011   INFLUENZA A PCR  Negative   INFLUENZA B PCR  Negative   RSV PCR  Negative               Medications:   Scheduled Medications:  pantoprazole, 40 mg, Oral, BID AC  sertraline, 25 mg, Oral, Daily      Continuous IV Infusions:     PRN Meds:  acetaminophen, 650 mg, Oral, Q6H PRN  ondansetron, 4 mg, Intravenous, Q4H PRN        Discharge Plan: TBD    Network Utilization Review Department  ATTENTION: Please call with any questions or concerns to [phone] and carefully listen to the prompts so that you are directed to the right person  All voicemails are confidential   Ruby Khan all requests for admission clinical reviews, approved or denied determinations and any other requests to dedicated fax number below belonging to the campus where the patient is receiving treatment   List of dedicated fax numbers for the Facilities:  [address] DENIALS (Administrative/Medical Necessity) [phone]   [address] (Maternity/NICU/Pediatrics) [phone]   0 Reyna Gooden [phone]   [facility] [phone]   [address] [place] [id number] [place] [address] [place] [address] [phone]   [address] Maryanne Yap [facility] 134 815 [facility] [phone]

## 2023-03-30 NOTE — PHYSICAL THERAPY NOTE
PHYSICAL THERAPY NOTE          Patient Name: Carlos Iqbal  MDYSY'B Date: 3/30/2023     03/30/23 1200   PT Last Visit   PT Visit Date 03/30/23   Note Type   Note Type Treatment   Education   Education Provided Yes   End of Consult   Patient Position at End of Consult Bedside chair;Bed/Chair alarm activated; All needs within reach   Pain Assessment   Pain Assessment Tool 0-10   Pain Score No Pain   Restrictions/Precautions   Weight Bearing Precautions Per Order No   Other Precautions Chair Alarm; Bed Alarm;Cognitive; Fall Risk;Pain;Hard of hearing   General   Chart Reviewed Yes   Cognition   Overall Cognitive Status Impaired   Arousal/Participation Alert; Responsive   Attention Attends with cues to redirect   Following Commands Follows one step commands with increased time or repetition   Comments Pt pleasant during session  Bed Mobility   Supine to Sit Unable to assess   Additional Comments Pt noted to be in OOB in chair upon arrival    Transfers   Sit to Stand 2  Maximal assistance   Additional items Assist x 1; Increased time required;Verbal cues   Stand to Sit 2  Maximal assistance   Additional items Assist x 1; Increased time required;Verbal cues   Stand pivot 2  Maximal assistance   Additional items Increased time required;Verbal cues; Assist x 1   Additional Comments Pt performs 2 STS transfers with max assist X 1 and RW  Pt also performed SPS transfer from chair to and from commode  Ambulation/Elevation   Ambulation/Elevation Additional Comments Deferred   Balance   Static Sitting Fair   Dynamic Sitting Fair -   Static Standing Poor +   Dynamic Standing Poor   Endurance Deficit   Endurance Deficit Yes   Activity Tolerance   Activity Tolerance Patient limited by fatigue   Nurse Made Aware RN cleared pt for therapy   Exercises   Hip Flexion AAROM; Sitting;15 reps;Bilateral   Knee AROM Long Arc Quad AROM;15 reps; Sitting;Bilateral Ankle Pumps Sitting;20 reps;Bilateral;AROM   UE Exercise Sitting;AROM; Bilateral;10 reps   Assessment   Prognosis Fair   Problem List Decreased endurance;Decreased strength;Decreased mobility;Pain; Impaired hearing;Decreased cognition   Assessment Pt seen for PT treatment session this date  Therapy session focused on functional transfers, and ambulation in order to improve overall mobility and independence  Pt OOB in chair upon arrival  Pt performs multiple STS transfers during session with max assist X 1, using RW, cueing required for foot and hand placement , posture correction  Pt also completes SPS transfer from chair to and from commode with max assist, assistance from 2nd person required for hygiene care  Pt able to maintain standing to perform pericare with assist  Pt additionally tolerates therapeutic exercise in chair  Pt making steady progress toward goals  Pt was left in recliner at the end of PT session with all needs in reach  Pt would benefit from continued PT services while in hospital to address remaining limitations  The patient's AM-[facility] Basic Mobility Inpatient Short Form Raw Score is 10  A Raw score of less than or equal to 16 suggests the patient may benefit from discharge to post-acute rehabilitation services  Please also refer to the recommendation of the Physical Therapist for safe discharge planning  Barriers to Discharge Decreased caregiver support   Goals   Patient Goals to go to the bathroom   STG Expiration Date 04/03/23   Plan   Treatment/Interventions Therapeutic exercise; Functional transfer training;Bed mobility;Gait training;OT   Progress Slow progress, decreased activity tolerance   PT Frequency 2-3x/wk   Recommendation   PT Discharge Recommendation Post acute rehabilitation services   AM-PAC Basic Mobility Inpatient   Turning in Flat Bed Without Bedrails 2   Lying on Back to Sitting on Edge of Flat Bed Without Bedrails 2   Moving Bed to Chair 2   Standing Up From Chair Using Arms 2   Walk in Room 1   Climb 3-5 Stairs With Railing 1   Basic Mobility Inpatient Raw Score 10   Turning Head Towards Sound 3   Follow Simple Instructions 3   Low Function Basic Mobility Raw Score 16   Low Function Basic Mobility Standardized Score 25 72   Highest Level Of Mobility   JH-HLM Goal 4: Move to chair/commode   JH-HLM Achieved 4: Move to chair/commode   Education   Education Provided Mobility training   Patient Reinforcement needed     David Aviles PT DPT

## 2023-03-30 NOTE — PLAN OF CARE
Problem: PHYSICAL THERAPY ADULT  Goal: Performs mobility at highest level of function for planned discharge setting  See evaluation for individualized goals  Description: Treatment/Interventions: Functional transfer training, Bed mobility, Endurance training, Gait training, Spoke to nursing, OT          See flowsheet documentation for full assessment, interventions and recommendations  Outcome: Progressing  Note: Prognosis: Fair  Problem List: Decreased endurance, Decreased strength, Decreased mobility, Pain, Impaired hearing, Decreased cognition  Assessment: Pt seen for PT treatment session this date  Therapy session focused on functional transfers, and ambulation in order to improve overall mobility and independence  Pt OOB in chair upon arrival  Pt performs multiple STS transfers during session with max assist X 1, using RW, cueing required for foot and hand placement , posture correction  Pt also completes SPS transfer from chair to and from commode with max assist, assistance from 2nd person required for hygiene care  Pt able to maintain standing to perform pericare with assist  Pt additionally tolerates therapeutic exercise in chair  Pt making steady progress toward goals  Pt was left in recliner at the end of PT session with all needs in reach  Pt would benefit from continued PT services while in hospital to address remaining limitations  The patient's AM-PAC Basic Mobility Inpatient Short Form Raw Score is 10  A Raw score of less than or equal to 16 suggests the patient may benefit from discharge to post-acute rehabilitation services  Please also refer to the recommendation of the Physical Therapist for safe discharge planning  Barriers to Discharge: Decreased caregiver support     PT Discharge Recommendation: Post acute rehabilitation services    See flowsheet documentation for full assessment

## 2023-03-30 NOTE — PROGRESS NOTES
[facility]  Progress Note  Name: Beckie Lai  MRN: [id number]  Unit/Bed#: [facility] 801-01 I Date of Admission: 3/18/2023   Date of Service: 3/30/2023 I Hospital Day: 12    Assessment/Plan   * GI bleed  Assessment & Plan  · Found by son with bloody vomitus on shirt - had multiple melanotic stools in the ED  An almost 4-point drop in hemoglobin an admission when compared to a few months prior  May have been precipitated by NSAID use  · PPI drip initiated on admission  · EGD cancelled by GI on 3/20 as she had been stable  Home apixaban restarted per GI recs  · Melena restarted 3/21 after apixaban restarted  EGD done 3/22 with some duodenal ulcers, no evidence of recent bleeding  · Advance to regular diet, continue to monitor  · Goals of care discussion with patient and her son 3/23 and 3/24  Risk of any further procedures not acceptable to patient  Risk of restarting blood thinners also not acceptable  Patient would like to focus on comfort, is not interested in disease-focused care  She would not want a blood transfusion  She is too healthy to qualify for hospice currently and her GI bleed has stabilized therefore she is agreeable to discharge to rehab  · If GI bleed recurs and patient becomes unstable, reengage hospice team as this might become a qualifying diagnosis  · Patient would not want a blood transfusion    Goals of care, counseling/discussion  Assessment & Plan  · On 3/23, patient stated she did not want any treatment focused care, wanted to transition to hospice and was ready to pass  · Patient clear she does not want any invasion or treatment-focused care, however she does not qualify for hospice due to her overall health  · If patient were to decompensate or require any invasive treatments, instead she would like to transition to hospice  Otherwise, she is amenable to rehab      History of venous thromboembolism  Assessment & Plan  · History of pulmonary embolism and DVT in January 2023 noted, patient on apixaban at home  · Discussed with patient and her son on 3/23 and 3/24, will not restart apixaban as patient and son do not feel that the benefits outweigh the risks  Acute blood loss anemia  Assessment & Plan  · Baseline hemoglobin between 12-14 -> presented w hgb significantly lower at 8 7 (was 12 4 two months ago)  · Secondary to coffee-ground emesis at home and multiple melanotic stools in the ED in the setting of Eliquis and NSAID use     · Per goals of care discussion on 3/24, patient would not want any blood transfusions or any other life prolonging treatments  Wants to die a natural death  Metabolic encephalopathy  Assessment & Plan  · On presentation noted to be more confused than normal per her son  · Likely acute metabolic encephalopathy superimposed on chronic cognitive issues in setting of GI bleed, worsening anemia, hypotension  · Appears closer to baseline, continue to monitor    Cognitive impairment  Assessment & Plan  · Continue Zoloft    Paroxysmal supraventricular tachycardia   Assessment & Plan  · Discontinued home metoprolol as patient does not want any life prolonging treatments or medications  Can restart if patient having tachycardia and it is bothersome to her  Chronic kidney disease stage 3   Assessment & Plan  · Baseline creatinine of approximately 0 9-1 1 -> currently at baseline    Lower extremity edema  Assessment & Plan  · Chronic issue  · Discontinued home Lasix as patient does not want any life prolonging medications and does not like urinating frequently  VTE Pharmacologic Prophylaxis: VTE Score: 3 Moderate Risk (Score 3-4) - Pharmacological DVT Prophylaxis Contraindicated  Sequential Compression Devices Ordered  Patient Centered Rounds: I performed bedside rounds with nursing staff today    Discussions with Specialists or Other Care Team Provider: KALPESH     Education and Discussions with Family / Patient: Patient declined call to   Total Time Spent on Date of Encounter in care of patient: 35 minutes This time was spent on one or more of the following: performing physical exam; counseling and coordination of care; obtaining or reviewing history; documenting in the medical record; reviewing/ordering tests, medications or procedures; communicating with other healthcare professionals and discussing with patient's family/caregivers  Current Length of Stay: 12 day(s)  Current Patient Status: Inpatient   Certification Statement: The patient will continue to require additional inpatient hospital stay due to awaiting rehab placement  Discharge Plan: today or tmw to rehab    Code Status: Level 3 - DNAR and DNI    Subjective:   Patient seen and examined  Doing well  Resting comfortably in bedside chair  No complaints  Awaiting rehab  Objective:     Vitals:   Temp (24hrs), Av 6 °F (36 4 °C), Min:97 3 °F (36 3 °C), Max:97 9 °F (36 6 °C)    Temp:  [97 3 °F (36 3 °C)-97 9 °F (36 6 °C)] 97 3 °F (36 3 °C)  HR:  [72-83] 83  Resp:  [16-17] 16  BP: ()/(51-63) 117/59  SpO2:  [95 %-100 %] 96 %  Body mass index is 20 4 kg/m²  Input and Output Summary (last 24 hours): Intake/Output Summary (Last 24 hours) at 3/30/2023 1508  Last data filed at 3/30/2023 1300  Gross per 24 hour   Intake 340 ml   Output 150 ml   Net 190 ml       PHYSICAL EXAM:    Vitals signs reviewed  Constitutional   Awake and cooperative  NAD  Head/Neck   Normocephalic  Atraumatic  HEENT   No scleral icterus  EOMI  Heart   Regular rate and rhythm  No murmers  Lungs   Clear to auscultation bilaterally  Respirations unlaboured  Abdomen   Soft  Nontender  Nondistended  Skin   Skin color normal  No rashes  Extremities   No deformities  No peripheral edema  Neuro   Alert and oriented  No new deficits  Psych   Mood stable   Affect normal          Additional Data:     Labs:  Results from last 7 days   Lab Units 03/28/23  0439   WBC Thousand/uL 6 66   HEMOGLOBIN g/dL 8 1*   HEMATOCRIT % 25 2*   PLATELETS Thousands/uL 342     Results from last 7 days   Lab Units 03/24/23  0551   SODIUM mmol/L 140   POTASSIUM mmol/L 3 3*   CHLORIDE mmol/L 108   CO2 mmol/L 30   BUN mg/dL 15   CREATININE mg/dL 1 02   ANION GAP mmol/L 2*   CALCIUM mg/dL 8 0*   GLUCOSE RANDOM mg/dL 87                       Lines/Drains:  Invasive Devices     Peripheral Intravenous Line  Duration           Peripheral IV 03/27/23 Left Antecubital 3 days                      Imaging: No pertinent imaging reviewed  Recent Cultures (last 7 days):         Last 24 Hours Medication List:   Current Facility-Administered Medications   Medication Dose Route Frequency Provider Last Rate   • acetaminophen  650 mg Oral Q6H PRN Katty Collins DO     • ondansetron  4 mg Intravenous Q4H PRN Katty Collins DO     • pantoprazole  40 mg Oral BID AC Edwina Jorgensen MD     • sertraline  25 mg Oral Daily Elly Barclay DO          Today, Patient Was Seen By: Phil Spatz Stars[place], DO    **Please Note: This note may have been constructed using a voice recognition system  **

## 2023-03-30 NOTE — ASSESSMENT & PLAN NOTE
· Discontinued home metoprolol as patient does not want any life prolonging treatments or medications  Can restart if patient having tachycardia and it is bothersome to her  The mother chose not to exclusively breastfeed upon admission.

## 2023-03-30 NOTE — ASSESSMENT & PLAN NOTE
· On 3/23, patient stated she did not want any treatment focused care, wanted to transition to hospice and was ready to pass  · Patient clear she does not want any invasion or treatment-focused care, however she does not qualify for hospice due to her overall health  · If patient were to decompensate or require any invasive treatments, instead she would like to transition to hospice  Otherwise, she is amenable to rehab

## 2023-03-30 NOTE — ASSESSMENT & PLAN NOTE
· Found by son with bloody vomitus on shirt - had multiple melanotic stools in the ED  An almost 4-point drop in hemoglobin an admission when compared to a few months prior  May have been precipitated by NSAID use  · PPI drip initiated on admission  · EGD cancelled by GI on 3/20 as she had been stable  Home apixaban restarted per GI recs  · Melena restarted 3/21 after apixaban restarted  EGD done 3/22 with some duodenal ulcers, no evidence of recent bleeding  · Advance to regular diet, continue to monitor  · Goals of care discussion with patient and her son 3/23 and 3/24  Risk of any further procedures not acceptable to patient  Risk of restarting blood thinners also not acceptable  Patient would like to focus on comfort, is not interested in disease-focused care  She would not want a blood transfusion  She is too healthy to qualify for hospice currently and her GI bleed has stabilized therefore she is agreeable to discharge to rehab  · If GI bleed recurs and patient becomes unstable, reengage hospice team as this might become a qualifying diagnosis    · Patient would not want a blood transfusion

## 2023-03-30 NOTE — PROGRESS NOTES
Attempted to visit patient and introduced self  Built a relationship of care and support  Patient thanked for stop by her room     03/30/23 1500   Clinical Encounter Type   Visited With Patient   Routine Visit Introduction

## 2023-03-30 NOTE — RESTORATIVE TECHNICIAN NOTE
Restorative Technician Note      Patient Name: Trent Desai     Restorative Tech Visit Date: 03/30/23  Note Type: Mobility  Patient Position Upon Consult: Supine  Activity Performed: Transferred  Assistive Device: Other (Comment) (HHAx2 with sacral lift)  Education Provided: Yes  Patient Position at End of Consult: Bedside chair;  All needs within reach; Bed/Chair alarm activated    Marlen Phillips  DPT, Restorative Technician

## 2023-03-30 NOTE — ASSESSMENT & PLAN NOTE
· Baseline hemoglobin between 12-14 -> presented w hgb significantly lower at 8 7 (was 12 4 two months ago)  · Secondary to coffee-ground emesis at home and multiple melanotic stools in the ED in the setting of Eliquis and NSAID use     · Per goals of care discussion on 3/24, patient would not want any blood transfusions or any other life prolonging treatments  Wants to die a natural death

## 2023-03-30 NOTE — ASSESSMENT & PLAN NOTE
· Chronic issue  · Discontinued home Lasix as patient does not want any life prolonging medications and does not like urinating frequently

## 2023-03-31 VITALS
OXYGEN SATURATION: 97 % | BODY MASS INDEX: 20.42 KG/M2 | HEIGHT: 65 IN | WEIGHT: 122.58 LBS | RESPIRATION RATE: 14 BRPM | SYSTOLIC BLOOD PRESSURE: 106 MMHG | TEMPERATURE: 97.5 F | HEART RATE: 72 BPM | DIASTOLIC BLOOD PRESSURE: 57 MMHG

## 2023-03-31 RX ORDER — PANTOPRAZOLE SODIUM 40 MG/1
TABLET, DELAYED RELEASE ORAL
Refills: 0
Start: 2023-03-31

## 2023-03-31 RX ADMIN — SERTRALINE HYDROCHLORIDE 25 MG: 25 TABLET ORAL at 09:07

## 2023-03-31 RX ADMIN — PANTOPRAZOLE SODIUM 40 MG: 40 TABLET, DELAYED RELEASE ORAL at 06:30

## 2023-03-31 NOTE — CASE MANAGEMENT
Edelmira Younger 50 has received approved authorization from insurance:     Called in by Rep: Kusum Klein P#  [phone]  Authorization received for: SNF  Facility: [facility] #: [id number]  Start of Care: 03/31/23  Next Review Date:04/06/23 by [address] Coordinator:None assigned     Submit next review via fax to [phone]   Care Manager notified: Ana Colon

## 2023-03-31 NOTE — CASE MANAGEMENT
Case Management Discharge Planning Note    Patient name Kaitlin Loco  [facility] MRN [id number]  : 1925 Date 3/31/2023       Current Admission Date: 3/18/2023  Current Admission Diagnosis:GI bleed   Patient Active Problem List    Diagnosis Date Noted   • Goals of care, counseling/discussion    • Metabolic encephalopathy    • GI bleed 2023   • Acute blood loss anemia 2023   • History of venous thromboembolism 2023   • Chronic pain of left knee 2023   • [facility] 2023   • Lower leg DVT (deep venous thromboembolism), acute, bilateral ([facility] ) 2023   • COVID 2023   • Pulmonary embolism ([facility] ) 2023   • Paroxysmal supraventricular tachycardia  2022   • Infiltrate noted on imaging study 2022   • Compression fracture of L3 lumbar vertebra, closed, initial encounter [facility] ) 2020   • Fall 2020   • Age-related osteoporosis without current pathological fracture 2019   • Lower extremity edema 2019   • Chronic kidney disease stage 3  2019   • Venous stasis dermatitis of both lower extremities 2019   • Constipation, unspecified 2018   • Diverticulosis of intestine, part unspecified, without perforation or abscess without bleeding 2018   • Generalized muscle weakness 2018   • Polyneuropathy, unspecified 2018   • Ambulatory dysfunction 2018   • Neuropathy 2018   • Balance problem 2017   • Cognitive impairment 2017      LOS (days): 13  Geometric Mean LOS (GMLOS) (days): 4 50  Days to GMLOS:-8 5     OBJECTIVE:  Risk of Unplanned Readmission Score: 12 59         Current admission status: Inpatient   Preferred Pharmacy:   [address] [place] [address] [address] [place] [address]  Phone: [phone] Fax: [phone]    Primary Care Provider: KERON Ivan    Primary Insurance: [id number] REP  Secondary Insurance:     [address] Number: [id number] ([address] [place] [address])

## 2023-03-31 NOTE — DISCHARGE SUMMARY
[address] [facility]  Discharge- Víctor Rizo 5/16/1925, 80 y o  female MRN: [id number]  Unit/Bed#: [facility] 801-01 Encounter: [id number]  Primary Care Provider: Mick Ortiz   Date and time admitted to hospital: 3/18/2023  8:00 AM    * GI bleed  Assessment & Plan  · Found by son with bloody vomitus on shirt - had multiple melanotic stools in the ED  An almost 4-point drop in hemoglobin an admission when compared to a few months prior  May have been precipitated by NSAID use  · PPI drip initiated on admission  · EGD cancelled by GI on 3/20 as she had been stable  Home apixaban restarted per GI recs  · Melena restarted 3/21 after apixaban restarted  EGD done 3/22 with some duodenal ulcers, no evidence of recent bleeding  · Advance to regular diet, continue to monitor  · Goals of care discussion with patient and her son 3/23 and 3/24  Risk of any further procedures not acceptable to patient  Risk of restarting blood thinners also not acceptable  Patient would like to focus on comfort, is not interested in disease-focused care  She would not want a blood transfusion  She is too healthy to qualify for hospice currently and her GI bleed has stabilized therefore she is agreeable to discharge to rehab  · If GI bleed recurs and patient becomes unstable, reengage hospice team as this might become a qualifying diagnosis  · Patient would not want a blood transfusion    Goals of care, counseling/discussion  Assessment & Plan  · On 3/23, patient stated she did not want any treatment focused care, wanted to transition to hospice and was ready to pass  · Patient clear she does not want any invasion or treatment-focused care, however she does not qualify for hospice due to her overall health  · If patient were to decompensate or require any invasive treatments, instead she would like to transition to hospice  Otherwise, she is amenable to rehab      History of venous thromboembolism  Assessment & Plan  · History of pulmonary embolism and DVT in January 2023 noted, patient on apixaban at home  · Discussed with patient and her son on 3/23 and 3/24, will not restart apixaban as patient and son do not feel that the benefits outweigh the risks  Acute blood loss anemia  Assessment & Plan  · Baseline hemoglobin between 12-14 -> presented w hgb significantly lower at 8 7 (was 12 4 two months ago)  · Secondary to coffee-ground emesis at home and multiple melanotic stools in the ED in the setting of Eliquis and NSAID use     · Per goals of care discussion on 3/24, patient would not want any blood transfusions or any other life prolonging treatments  Wants to die a natural death  Metabolic encephalopathy  Assessment & Plan  · On presentation noted to be more confused than normal per her son  · Likely acute metabolic encephalopathy superimposed on chronic cognitive issues in setting of GI bleed, worsening anemia, hypotension  · Appears closer to baseline, continue to monitor    Cognitive impairment  Assessment & Plan  · Continue Zoloft    Paroxysmal supraventricular tachycardia   Assessment & Plan  · Discontinued home metoprolol as patient does not want any life prolonging treatments or medications  Can restart if patient having tachycardia and it is bothersome to her  Chronic kidney disease stage 3   Assessment & Plan  · Baseline creatinine of approximately 0 9-1 1 -> currently at baseline    Lower extremity edema  Assessment & Plan  · Chronic issue  · Discontinued home Lasix as patient does not want any life prolonging medications and does not like urinating frequently        Medical Problems     Resolved Problems  Date Reviewed: 3/30/2023          Resolved    Leukocytosis 3/26/2023     Resolved by  Justin Richard MD        Discharging Physician / Practitioner: Lana Rodgers DO  PCP: [facility], [address]  Admission Date:   Admission Orders (From admission, onward)     Ordered        03/18/23 1258  INPATIENT ADMISSION  Once                      Discharge Date: 03/31/23    Consultations During Hospital Stay:  · Gastroenterology  · Palliative Care    Procedures Performed:   · EGD 3/21 which showed clean based ulcers    Significant Findings / Test Results:   · Acute blood loss anemia secondary to melena/gastric ulcerations    Incidental Findings:   · none    Test Results Pending at Discharge (will require follow up):   · none     Outpatient Tests Requested:  · none    Complications:  none    Reason for Admission: gastrointestinal bleeding    Hospital Course:   Víctor Rizo is a 80 y o  female patient who originally presented to the hospital on 3/18/2023 due to hematemesis  She was admitted to the hospitalist service and was seen by gastroenterology  She had continued bleeding during her hospitalization, and underwent an EGD on 3/21  This showed clean-based gastric ulcerations  She was treated with PPI therapy and her bleeding eventually resolved  Palliative care was consulted during her hospitalization per patient request   After several goals of care discussions, patient opted for hospice  Unfortunately after a hospice review, she did not meet criteria for hospice  Most of her medications were discontinued per her preference, aside from ongoing PPI therapy  With any decline in the future patient would like to pursue hospice  Otherwise she will be discharged to rehab at [facility] for ongoing care  She hopes to return home thereafter  Please see above list of diagnoses and related plan for additional information  Condition at Discharge: good    Discharge Day Visit / Exam:   Subjective: Patient seen and examined on the day of discharge  No complaints  No events overnight  No evidence of further bleeding    Vitals: Blood Pressure: 106/57 (03/31/23 0710)  Pulse: 72 (03/31/23 0710)  Temperature: 97 5 °F (36 4 °C) (03/31/23 0710)  Temp Source: "Tympanic (03/22/23 1400)  Respirations: 14 (03/31/23 0710)  Height: 5' 5\" (165 1 cm) (03/18/23 2049)  Weight - Scale: 55 6 kg (122 lb 9 2 oz) (03/18/23 2049)  SpO2: 97 % (03/31/23 0710)    PHYSICAL EXAM:    Vitals signs reviewed  Constitutional   Awake and cooperative  NAD  Head/Neck   Normocephalic  Atraumatic  HEENT   No scleral icterus  EOMI  Heart   Regular rate and rhythm  No murmers  Lungs   Clear to auscultation bilaterally  Respirations unlaboured  Abdomen   Soft  Nontender  Nondistended  Skin   Skin color normal  No rashes  Extremities   No deformities  No peripheral edema  Neuro   Alert and oriented  No new deficits  Psych   Mood stable  Affect normal          Discussion with Family: Patient declined call to   Discharge instructions/Information to patient and family:   See after visit summary for information provided to patient and family  Provisions for Follow-Up Care:  See after visit summary for information related to follow-up care and any pertinent home health orders  Disposition:   [facility]    Planned Readmission: NO     Discharge Statement:  I spent 40 minutes discharging the patient  This time was spent on the day of discharge  I had direct contact with the patient on the day of discharge  Greater than 50% of the total time was spent examining patient, answering all patient questions, arranging and discussing plan of care with patient as well as directly providing post-discharge instructions  Additional time then spent on discharge activities  Discharge Medications:  See after visit summary for reconciled discharge medications provided to patient and/or family        **Please Note: This note may have been constructed using a voice recognition system**    "

## 2023-03-31 NOTE — PLAN OF CARE
Problem: Potential for Falls  Goal: Patient will remain free of falls  Description: INTERVENTIONS:  - Educate patient/family on patient safety including physical limitations  - Instruct patient to call for assistance with activity   - Consult OT/PT to assist with strengthening/mobility   - Keep Call bell within reach  - Keep bed low and locked with side rails adjusted as appropriate  - Keep care items and personal belongings within reach  - Initiate and maintain comfort rounds  - Make Fall Risk Sign visible to staff  - Offer Toileting every 2 Hours, in advance of need  - Initiate/Maintain bed alarm  - Apply yellow socks and bracelet for high fall risk patients  - Consider moving patient to room near nurses station  Outcome: Progressing     Problem: Prexisting or High Potential for Compromised Skin Integrity  Goal: Skin integrity is maintained or improved  Description: INTERVENTIONS:  - Identify patients at risk for skin breakdown  - Assess and monitor skin integrity  - Assess and monitor nutrition and hydration status  - Monitor labs   - Assess for incontinence   - Turn and reposition patient  - Assist with mobility/ambulation  - Relieve pressure over bony prominences  - Avoid friction and shearing  - Provide appropriate hygiene as needed including keeping skin clean and dry  - Evaluate need for skin moisturizer/barrier cream  - Collaborate with interdisciplinary team   - Patient/family teaching  - Consider wound care consult   Outcome: Progressing     Problem: Knowledge Deficit  Goal: Patient/family/caregiver demonstrates understanding of disease process, treatment plan, medications, and discharge instructions  Description: Complete learning assessment and assess knowledge base    Interventions:  - Provide teaching at level of understanding  - Provide teaching via preferred learning methods  Outcome: Progressing     Problem: Nutrition/Hydration-ADULT  Goal: Nutrient/Hydration intake appropriate for improving, restoring or maintaining nutritional needs  Description: Monitor and assess patient's nutrition/hydration status for malnutrition  Collaborate with interdisciplinary team and initiate plan and interventions as ordered  Monitor patient's weight and dietary intake as ordered or per policy  Utilize nutrition screening tool and intervene as necessary  Determine patient's food preferences and provide high-protein, high-caloric foods as appropriate       INTERVENTIONS:  - Monitor oral intake, urinary output, labs, and treatment plans  - Assess nutrition and hydration status and recommend course of action  - Evaluate amount of meals eaten  - Assist patient with eating if necessary   - Allow adequate time for meals  - Recommend/ encourage appropriate diets, oral nutritional supplements, and vitamin/mineral supplements  - Order, calculate, and assess calorie counts as needed  - Recommend, monitor, and adjust tube feedings and TPN/PPN based on assessed needs  - Assess need for intravenous fluids  - Provide specific nutrition/hydration education as appropriate  - Include patient/family/caregiver in decisions related to nutrition  Outcome: Progressing

## 2023-04-03 ENCOUNTER — NURSING HOME VISIT (OUTPATIENT)
Dept: GERIATRICS | Facility: OTHER | Age: 88
End: 2023-04-03

## 2023-04-03 DIAGNOSIS — Z87.19 HISTORY OF GI BLEED: ICD-10-CM

## 2023-04-03 DIAGNOSIS — R54 FRAILTY SYNDROME IN GERIATRIC PATIENT: ICD-10-CM

## 2023-04-03 DIAGNOSIS — A08.4 VIRAL GASTROENTERITIS: ICD-10-CM

## 2023-04-03 DIAGNOSIS — D62 ACUTE BLOOD LOSS ANEMIA: Primary | ICD-10-CM

## 2023-04-03 DIAGNOSIS — G89.29 CHRONIC PAIN OF LEFT KNEE: ICD-10-CM

## 2023-04-03 DIAGNOSIS — E87.6 HYPOKALEMIA: ICD-10-CM

## 2023-04-03 DIAGNOSIS — N18.31 STAGE 3A CHRONIC KIDNEY DISEASE (HCC): ICD-10-CM

## 2023-04-03 DIAGNOSIS — K25.3 ACUTE GASTRIC ULCER WITHOUT HEMORRHAGE OR PERFORATION: ICD-10-CM

## 2023-04-03 DIAGNOSIS — F33.9 EPISODE OF RECURRENT MAJOR DEPRESSIVE DISORDER, UNSPECIFIED DEPRESSION EPISODE SEVERITY (HCC): ICD-10-CM

## 2023-04-03 DIAGNOSIS — R41.89 COGNITIVE IMPAIRMENT: ICD-10-CM

## 2023-04-03 DIAGNOSIS — R60.0 LOWER EXTREMITY EDEMA: ICD-10-CM

## 2023-04-03 DIAGNOSIS — Z86.718 HISTORY OF DVT OF LOWER EXTREMITY: ICD-10-CM

## 2023-04-03 DIAGNOSIS — Z71.89 GOALS OF CARE, COUNSELING/DISCUSSION: ICD-10-CM

## 2023-04-03 DIAGNOSIS — M25.562 CHRONIC PAIN OF LEFT KNEE: ICD-10-CM

## 2023-04-03 DIAGNOSIS — Z66 DNR (DO NOT RESUSCITATE): ICD-10-CM

## 2023-04-03 NOTE — PROGRESS NOTES
Zahra [address]  [address]  Facility: [address] [facility] and [address] [address]    HISTORY AND PHYSICAL    NAME: Adrian Speaker  AGE: 80 y o  SEX: female    DATE OF ENCOUNTER: 4/3/2023    Code status:  DNR    Assessment and Plan     1  Acute blood loss anemia  Assessment & Plan:  · April 2023: Baseline hemoglobin: 12-14; admission hemoglobin/hematocrit: 8 7/27 7; discharge hemoglobin/hematocrit: 8 1/25 2  · She received a transfusion of 1 unit of packed red blood cells and 1 unit of cryoprecipitate in the emergency room  · Secondary to GI bleed, 3 gastric ulcers are likely the culprit  · We will continue with clinical and periodic laboratory monitoring for change in her condition      2  Acute gastric ulcer without hemorrhage or perforation  Assessment & Plan:  · EGD of March 21, 2023: 3 clean-based ulcers in the body of the stomach without hemorrhage  · Seen in consultation by GI service during her March 2023 hospitalization  · We will continue prior to admission twice daily PPI therapy for 7 weeks with transition to once daily afterwards  · H  pylori staining on biopsy inconclusive  · To consider stool for H  pylori, if will change therapy  · We will continue with monitoring for change in her condition      3  Stage 3a chronic kidney disease [facility])  Assessment & Plan:  · March 2023: Creatinine 1 02, EGFR 46  · We will continue with avoidance of nephrotoxic medications and supplements  · We will continue with clinical and periodic laboratory monitoring for change in her condition      4   Chronic pain of left knee  Assessment & Plan:  · We will continue with multimodal pain management  Secondary to her decreased level of functioning from baseline, she will be admitted to the subacute rehabilitation facility and seen in consultation by a multidisciplinary rehabilitation team for evaluation and treatment to assist her in returning to her prior level of functioning  Had been using NSAIDs prior to hospitalization (PCP had counseled against)  We will continue with monitoring for change in her condition      5  History of GI bleed  Assessment & Plan:  · March 2023: Requiring hospitalization  Received transfusion of 1 unit of packed red blood cells and 1 unit of cryoprecipitate  · Likely secondary to 3 gastric ulcers found on EGD (were clean-based without evidence of hemorrhage)  · We will continue twice daily PPI therapy for several weeks followed by once daily therapy, indefinitely  · We will continue with monitoring for change in her condition      6  Lower extremity edema  Assessment & Plan:  · Prior to hospitalization furosemide has been discontinued  · Please see hospital documentation  · We will continue with monitoring for change in her condition      7  Goals of care, counseling/discussion  Assessment & Plan:  · Seen in consultation by the palliative and supportive care service during her hospitalization with shared decision making for comfort directed care and not for disease directed care (discussion between palliative and supportive care service, patient, and her son)  · We will continue with monitoring for change in her condition      8  History of DVT of lower extremity  Assessment & Plan:  · Lower extremity doppler revealed acute occlusive thrombus from the proximal femoral vein to the posterior tibial veins on the right and acute non-occlusive thrombus in the popliteal and gastrocnemius vein on the left (January 2023)  · In shared decision-making with hospital physicians, patient and son decided risk of anticoagulation outweigh benefits  · We will continue with monitoring for change in her condition      9  Cognitive impairment  Assessment & Plan:  · Reported by her son to hospital physicians as a pre-existing condition prior to her March 2023 hospitalization   · February 21, 2023: Mini-Mental status exam: 25/30   · April 1, 2023:  Mini-Mental "status exam of 23/30  Secondary to her decreased level of functioning from baseline, she will be admitted to the subacute rehabilitation facility and seen in consultation by a multidisciplinary rehabilitation team for evaluation and treatment to assist her in returning to her prior level of functioning  We will continue with monitoring for change in her condition      10  Viral gastroenteritis  Assessment & Plan:  · She notes improved symptoms from this morning  · We will continue with supportive care  · We will continue with monitoring for change in her condition      11  Hypokalemia  Assessment & Plan:  · Follow-up laboratory testing has been ordered      12  Episode of recurrent major depressive disorder, unspecified depression episode severity ([facility] [address] )  Assessment & Plan:  · We will continue prior to admission sertraline which was started by her PCP on February 21, 2023  · We will continue with monitoring for change in her condition      13  Frailty syndrome in geriatric patient  Assessment & Plan:  · Secondary to her acute on chronic medical conditions  · We will continue with 24/7 care/support of her ADLs at the subacute rehabilitation facility  Secondary to her decreased level of functioning from baseline, she will be admitted to the subacute rehabilitation facility and seen in consultation by a multidisciplinary rehabilitation team for evaluation and treatment to assist her in returning to her prior level of functioning  · We will continue with monitoring for change in her condition      14  DNR (do not resuscitate)  Assessment & Plan:  · As reviewed with patient during my visit, she wishes for her resuscitation status to be \"DO NOT RESUSCITATE\"      See recent hospital discharge summary note for further information  All medications and routine orders were reviewed and updated as needed  Plan discussed with: Patient and nursing staff      Chief Complaint     She is seen for a visit to perform a history and " physical exam to be admitted to the subacute rehabilitation facility  History of Present Illness     She is a pleasant 80year-old woman with the comorbidities of hypertension, peripheral edema involving her legs, chronic left knee pain, mild cognitive impairment, and recent DVT (January 2023) who is seen with nursing staff for a visit to perform a history and physical exam to be admitted to the subacute rehabilitation facility  She presented to the emergency room March 18, 2023 after being found by her son with black vomitus  She arrived at the emergency room via EMS  Evaluation in the emergency room found her with acute blood loss anemia  She had several episodes of melanotic stools in the emergency room  She was admitted to the [facility] from March 18, 2023 through March 31, 2023  She was seen in consultation by the gastroenterology, palliative and supportive care, wound, and therapy services during her hospitalization  EGD performed on March 22, 2023 revealed 3 clean-based gastric ulcers without hemorrhage  She received transfusion of 1 unit of packed red blood cells and 1 unit of cryoprecipitate in the emergency room  Her hemoglobin remained stable during her hospitalization  In discussions with the primary and palliative and supportive care services, the patient and her son elected against disease directed care and for comfort directed care  Hospice consultation found no disease qualifying conditions, at this time  Therapy recommended a postacute care rehabilitation stay  She was transferred to the subacute rehabilitation facility on March 31, 2023  She and nursing staff report 1 episode of nausea with vomiting earlier today  Viral gastroenteritis is present on the nursing unit  Currently, she denies feeling nauseous  She denies abdominal pain  She reports feeling well  She denies chest pain, shortness of breath, and abdominal pain    She notes some skin irritation at the base of her spine  Nursing reports that there are no open areas on her back  HISTORY:  Past Medical History:   Diagnosis Date   • History of venous thromboembolism 3/18/2023   • Interstitial cystitis    • Leukocytosis 3/18/2023   • Leukopenia    • Neurologic gait dysfunction     Abstraction Dr Meme Parham charts   • Neuropathy    • Neutropenia [facility])     Abstraction Dr Alva Henson   • Osteoarthritis    • Osteoporosis    • Peripheral edema     Abstraction Dr Meme Parham charts   • Renal cyst    • Syncope     Abstraction Dr Meme Parham charts     Past Surgical History:   Procedure Laterality Date   • CAPSULOTOMY      Right eye   • CATARACT EXTRACTION Left    • CHALAZION EXCISION     • COLONOSCOPY     From  Family History   Problem Relation Age of Onset   • Diabetes Mother    • Lung disease Father    • Cancer Maternal Grandfather    • Lung disease Sister      Social History     Socioeconomic History   • Marital status:       Spouse name: Not on file   • Number of children: Not on file   • Years of education: Not on file   • Highest education level: Not on file   Occupational History   • Not on file   Tobacco Use   • Smoking status: Former     Packs/day: 1 00     Years: 20 00     Pack years: 20 00     Types: Cigarettes     Quit date: 5     Years since quittin 2   • Smokeless tobacco: Never   Vaping Use   • Vaping Use: Never used   Substance and Sexual Activity   • Alcohol use: Yes     Comment: socially   • Drug use: No   • Sexual activity: Not Currently   Other Topics Concern   • Not on file   Social History Narrative    Lives with her son Meseret Cordoba     Social Determinants of Health     Financial Resource Strain: Low Risk    • Difficulty of Paying Living Expenses: Not hard at all   Food Insecurity: No Food Insecurity   • Worried About Running Out of Food in the Last Year: Never true   • Ran Out of Food in the Last Year: Never true   Transportation Needs: No Transportation Needs   • Lack of Transportation (Medical): No   • Lack of Transportation (Non-Medical): No   Physical Activity: Not on file   Stress: Not on file   Social Connections: Not on file   Intimate Partner Violence: Not on file   Housing Stability: Low Risk    • Unable to Pay for Housing in the Last Year: No   • Number of Places Lived in the Last Year: 1   • Unstable Housing in the Last Year: No       Allergies:  No Known Allergies    Review of Systems     Review of Systems   Respiratory: Negative for shortness of breath  Cardiovascular: Negative for chest pain  Gastrointestinal: Negative for abdominal pain  See HPI   Genitourinary: Negative for difficulty urinating  Skin:        See HPI       Medications and orders     All medications reviewed and updated in Nursing Home EMR  Objective     Vitals: Weight 113 pounds, temperature 98 °F, pulse 76, blood pressure 129/76, pulse oximetry 96% on room air  Physical Exam  Vitals reviewed  Exam conducted with a chaperone present  Constitutional:       General: She is awake  She is not in acute distress  Appearance: She is well-developed  She is not toxic-appearing or diaphoretic  Comments: She appears comfortable lying in bed, younger than her stated age, and frail  HENT:      Head: Normocephalic  Eyes:      General: No scleral icterus  Conjunctiva/sclera: Conjunctivae normal    Cardiovascular:      Rate and Rhythm: Normal rate and regular rhythm  Heart sounds: Normal heart sounds  No murmur heard  No friction rub  No gallop  Comments: There is no edema in her legs  Pulmonary:      Effort: No respiratory distress  Breath sounds: Normal breath sounds  No stridor  No wheezing, rhonchi or rales  Abdominal:      General: There is no distension  Palpations: Abdomen is soft  There is no mass  Tenderness: There is no abdominal tenderness  There is no guarding  Hernia: No hernia is present  Neurological:      Mental Status: She is alert  Comments: Cognition grossly intact  Psychiatric:         Mood and Affect: Mood normal          Behavior: Behavior normal  Behavior is cooperative  Pertinent Laboratory/Diagnostic Studies: The following labs/studies were reviewed please see chart or hospital paperwork for details  Lab Results   Component Value Date    WBC 6 66 03/28/2023    HGB 8 1 (L) 03/28/2023    HCT 25 2 (L) 03/28/2023    MCV 96 03/28/2023     03/28/2023     Lab Results   Component Value Date    SODIUM 140 03/24/2023    K 3 3 (L) 03/24/2023     03/24/2023    CO2 30 03/24/2023    BUN 15 03/24/2023    CREATININE 1 02 03/24/2023    GLUC 87 03/24/2023    CALCIUM 8 0 (L) 03/24/2023 March 21, 2023:    PROCEDURE: EGD     DETAILS OF PROCEDURE:   Patient was taken to the procedure room where a time out was performed to confirm correct patient and correct procedure  The patient underwent monitored anesthesia care, which was administered by an anesthesia professional  The patient's blood pressure, heart rate, level of consciousness, respirations and oxygen were monitored throughout the procedure  The scope was advanced to the second part of the duodenum  Retroflexion was performed in the fundus  The patient experienced no blood loss  The procedure was not difficult  The patient tolerated the procedure well  There were no apparent complications       ANESTHESIA INFORMATION:  ASA: ASA status not filed in the log  Anesthesia Type: IV Sedation with Anesthesia     MEDICATIONS:  No administrations occurring from 1334 to 1356 on 03/22/23         FINDINGS:  • The esophagus appeared normal  Z-line is 39 cm from the incisors  White material resembling pill/meal residue seen throughout the esophagus and successfully pushed into the stomach with the scope    • 3 ulcers in the body of the stomach with clean base (Dev III)  • Mild abnormal mucosa in the stomach  • Performed forceps biopsies to rule out H  pylori in the greater curve of "the stomach, lesser curve of the stomach, incisura and antrum  • The 1st part of the duodenum and 2nd part of the duodenum appeared normal       March 18, 2023:    CT high-volume bleeding scan abdomen and pelvis:    IMPRESSION:     1  No CT evidence of active high volume gastrointestinal hemorrhage      2  Findings of gastritis and nonspecific enteritis      3  Moderate rectal stool ball      3   Stable 3 4 cm left adnexal cystic lesion  Recommend follow-up pelvic ultrasound in one year      The study was marked in EPIC for immediate notification  March 18, 2023:    Twelve-lead ECG:    Narrative & Impression     Age and gender specific ECG analysis   Normal sinus rhythm  Premature atrial complexes  Left axis deviation  Right bundle branch block  Abnormal ECG  When compared with ECG of 25-JAN-2023 11:19,  Previous ECG has undetermined rhythm, needs review  T wave inversion now evident in Anterior leads  QT has lengthened  Confirmed by Jade Casper ([id number]) on 3/18/2023 10:59:47 AM     January 26, 2023:    2D transthoracic echocardiogram:    Interpretation Summary       •  Left Ventricle: Left ventricular cavity size is normal  Wall thickness is normal  There is no concentric hypertrophy  The left ventricular ejection fraction is 65%  Systolic function is normal  Wall motion is normal  Diastolic function is mildly abnormal, consistent with grade I (abnormal) relaxation  •  Right Ventricle: Right ventricular cavity size is normal  Systolic function is normal  Normal tricuspid annular plane systolic excursion (TAPSE) > 1 7 cm  •  Left Atrium: The atrium is mildly dilated  •  Right Atrium: The atrium is mildly dilated  •  Aortic Valve: There is aortic valve sclerosis  •  Tricuspid Valve: There is mild regurgitation       - Her admission order summary was reviewed and signed  Portions of the record may have been created with voice recognition software    Occasional wrong word or \"sound a like\" " substitutions may have occurred due to the inherent limitations of voice recognition software  Read the chart carefully and recognize, using context, where substitutions have occurred      PATI Rajan   4/4/2023 10:15 AM

## 2023-04-04 PROBLEM — K25.3 ACUTE GASTRIC ULCER WITHOUT HEMORRHAGE OR PERFORATION: Status: ACTIVE | Noted: 2023-04-04

## 2023-04-04 PROBLEM — Z86.718 HISTORY OF DVT OF LOWER EXTREMITY: Status: ACTIVE | Noted: 2023-01-28

## 2023-04-04 PROBLEM — Z87.19 HISTORY OF GI BLEED: Status: ACTIVE | Noted: 2023-03-18

## 2023-04-04 PROBLEM — R54 FRAILTY SYNDROME IN GERIATRIC PATIENT: Status: ACTIVE | Noted: 2023-04-04

## 2023-04-04 PROBLEM — N94.9 ADNEXAL CYST: Status: ACTIVE | Noted: 2023-04-04

## 2023-04-04 PROBLEM — E87.6 HYPOKALEMIA: Status: ACTIVE | Noted: 2023-04-04

## 2023-04-04 PROBLEM — I51.89 GRADE I DIASTOLIC DYSFUNCTION: Status: ACTIVE | Noted: 2023-04-04

## 2023-04-04 PROBLEM — I49.1 ATRIAL PREMATURE CONTRACTIONS: Status: ACTIVE | Noted: 2023-04-04

## 2023-04-04 PROBLEM — A08.4 VIRAL GASTROENTERITIS: Status: ACTIVE | Noted: 2023-04-04

## 2023-04-04 PROBLEM — Z86.718 HISTORY OF VENOUS THROMBOEMBOLISM: Status: RESOLVED | Noted: 2023-03-18 | Resolved: 2023-04-04

## 2023-04-04 PROBLEM — Z66 DNR (DO NOT RESUSCITATE): Status: ACTIVE | Noted: 2023-04-03

## 2023-04-04 NOTE — ASSESSMENT & PLAN NOTE
· EGD of March 21, 2023: 3 clean-based ulcers in the body of the stomach without hemorrhage  · Seen in consultation by GI service during her March 2023 hospitalization  · We will continue prior to admission twice daily PPI therapy for 7 weeks with transition to once daily afterwards  · H  pylori staining on biopsy inconclusive  · To consider stool for H  pylori, if will change therapy  · We will continue with monitoring for change in her condition

## 2023-04-04 NOTE — ASSESSMENT & PLAN NOTE
· March 2023: Creatinine 1 02, EGFR 46  · We will continue with avoidance of nephrotoxic medications and supplements  · We will continue with clinical and periodic laboratory monitoring for change in her condition

## 2023-04-04 NOTE — ASSESSMENT & PLAN NOTE
· Prior to hospitalization furosemide has been discontinued  · Please see hospital documentation  · We will continue with monitoring for change in her condition

## 2023-04-04 NOTE — UTILIZATION REVIEW
NOTIFICATION OF ADMISSION DISCHARGE   This is a Notification of Discharge from [facility]  Please be advised that this patient has been discharge from our facility  Below you will find the admission and discharge date and time including the patient’s disposition  UTILIZATION REVIEW CONTACT:  Noam Garcia  Utilization   Network Utilization Review Department  Phone: [phone] x carefully listen to the prompts  All voicemails are confidential   Email: [email or web address]     ADMISSION INFORMATION  PRESENTATION DATE: 3/18/2023  8:00 AM  OBERVATION ADMISSION DATE:   INPATIENT ADMISSION DATE: 3/18/23 12:58 PM   DISCHARGE DATE: 3/31/2023  2:57 PM   DISPOSITION:Non [facility] SNF/TCU/SNU    IMPORTANT INFORMATION:  Send all requests for admission clinical reviews, approved or denied determinations and any other requests to dedicated fax number below belonging to the campus where the patient is receiving treatment   List of dedicated fax numbers:  [address] DENIALS (Administrative/Medical Necessity) [phone]   [address] (Maternity/NICU/Pediatrics) [phone]   [facility] [phone]   [place] [address] [phone]   [address] [phone]   [facility] [phone]   [address] [facility] [phone]   [address] [place] [address] [phone]   [facility]  [phone]   [facility] [phone] [facility] [address] [facility] [phone]

## 2023-04-04 NOTE — ASSESSMENT & PLAN NOTE
· Seen in consultation by the palliative and supportive care service during her hospitalization with shared decision making for comfort directed care and not for disease directed care (discussion between palliative and supportive care service, patient, and her son)  · We will continue with monitoring for change in her condition

## 2023-04-04 NOTE — ASSESSMENT & PLAN NOTE
· March 2023: Requiring hospitalization  Received transfusion of 1 unit of packed red blood cells and 1 unit of cryoprecipitate    · Likely secondary to 3 gastric ulcers found on EGD (were clean-based without evidence of hemorrhage)  · We will continue twice daily PPI therapy for several weeks followed by once daily therapy, indefinitely  · We will continue with monitoring for change in her condition

## 2023-04-04 NOTE — ASSESSMENT & PLAN NOTE
· Reported by her son to hospital physicians as a pre-existing condition prior to her March 2023 hospitalization   · February 21, 2023: Mini-Mental status exam: 25/30   · April 1, 2023:  Mini-Mental status exam of 23/30  Secondary to her decreased level of functioning from baseline, she will be admitted to the subacute rehabilitation facility and seen in consultation by a multidisciplinary rehabilitation team for evaluation and treatment to assist her in returning to her prior level of functioning  We will continue with monitoring for change in her condition

## 2023-04-04 NOTE — ASSESSMENT & PLAN NOTE
"· As reviewed with patient during my visit, she wishes for her resuscitation status to be \"DO NOT RESUSCITATE\"  "

## 2023-04-04 NOTE — ASSESSMENT & PLAN NOTE
· We will continue prior to admission sertraline which was started by her PCP on February 21, 2023  · We will continue with monitoring for change in her condition

## 2023-04-04 NOTE — ASSESSMENT & PLAN NOTE
· She notes improved symptoms from this morning  · We will continue with supportive care  · We will continue with monitoring for change in her condition

## 2023-04-04 NOTE — ASSESSMENT & PLAN NOTE
· Lower extremity doppler revealed acute occlusive thrombus from the proximal femoral vein to the posterior tibial veins on the right and acute non-occlusive thrombus in the popliteal and gastrocnemius vein on the left (January 2023)  · In shared decision-making with hospital physicians, patient and son decided risk of anticoagulation outweigh benefits  · We will continue with monitoring for change in her condition

## 2023-04-04 NOTE — ASSESSMENT & PLAN NOTE
· April 2023: Baseline hemoglobin: 12-14; admission hemoglobin/hematocrit: 8 7/27 7; discharge hemoglobin/hematocrit: 8 1/25 2  · She received a transfusion of 1 unit of packed red blood cells and 1 unit of cryoprecipitate in the emergency room  · Secondary to GI bleed, 3 gastric ulcers are likely the culprit  · We will continue with clinical and periodic laboratory monitoring for change in her condition

## 2023-04-04 NOTE — ASSESSMENT & PLAN NOTE
· We will continue with multimodal pain management  Secondary to her decreased level of functioning from baseline, she will be admitted to the subacute rehabilitation facility and seen in consultation by a multidisciplinary rehabilitation team for evaluation and treatment to assist her in returning to her prior level of functioning  Had been using NSAIDs prior to hospitalization (PCP had counseled against)  We will continue with monitoring for change in her condition

## 2023-04-04 NOTE — ASSESSMENT & PLAN NOTE
· Secondary to her acute on chronic medical conditions  · We will continue with 24/7 care/support of her ADLs at the subacute rehabilitation facility  Secondary to her decreased level of functioning from baseline, she will be admitted to the subacute rehabilitation facility and seen in consultation by a multidisciplinary rehabilitation team for evaluation and treatment to assist her in returning to her prior level of functioning  · We will continue with monitoring for change in her condition

## 2023-04-21 ENCOUNTER — NURSING HOME VISIT (OUTPATIENT)
Dept: GERIATRICS | Facility: OTHER | Age: 88
End: 2023-04-21

## 2023-04-21 DIAGNOSIS — I82.4Y9 DEEP VEIN THROMBOSIS (DVT) OF PROXIMAL LOWER EXTREMITY, UNSPECIFIED CHRONICITY, UNSPECIFIED LATERALITY (HCC): Primary | ICD-10-CM

## 2023-04-21 DIAGNOSIS — Z66 DNR (DO NOT RESUSCITATE): ICD-10-CM

## 2023-04-23 PROBLEM — I82.409 DVT (DEEP VENOUS THROMBOSIS) (HCC): Status: ACTIVE | Noted: 2023-04-23

## 2023-04-23 PROBLEM — U07.1 COVID: Status: RESOLVED | Noted: 2023-01-27 | Resolved: 2023-04-23

## 2023-04-23 PROBLEM — R93.89 INFILTRATE NOTED ON IMAGING STUDY: Status: RESOLVED | Noted: 2022-12-27 | Resolved: 2023-04-23

## 2023-04-23 NOTE — ASSESSMENT & PLAN NOTE
Patient with known hx of rocío LE DVT's on prior duplex from Jan  Reported right proximal femoral to posterior tibial clot and left popliteal and gastroc clot  She was previously on eliquis but this had to be discontinued after a GI bleed  It was resumed briefly in the past but the melena started again  Patient and son both agreed that risk of anticoagulation outweighed the benefits so she has not been on anticoagulation  Staff stated her right LE has been looking more swollen recently  Repeat duplex done for comparison  New duplex reports LEFT LE femoral clot  Will continue off anticoagulation due to hx of recurrent GI bleeding    Will continue to monitor for change in condition

## 2023-04-23 NOTE — PROGRESS NOTES
[place] [phone]) [address]  Code 31      NAME: Adrian Speaker  AGE: 80 y o  SEX: female [id number]    DATE OF ENCOUNTER: 4/21/23    CODE STATUS: DNR    Assessment and Plan     Problem List Items Addressed This Visit        Cardiovascular and Mediastinum    DVT (deep venous thrombosis) ([facility] [address] ) - Primary     Patient with known hx of rocío LE DVT's on prior duplex from Jan  Reported right proximal femoral to posterior tibial clot and left popliteal and gastroc clot  She was previously on eliquis but this had to be discontinued after a GI bleed  It was resumed briefly in the past but the melena started again  Patient and son both agreed that risk of anticoagulation outweighed the benefits so she has not been on anticoagulation  Staff stated her right LE has been looking more swollen recently  Repeat duplex done for comparison  New duplex reports LEFT LE femoral clot  Will continue off anticoagulation due to hx of recurrent GI bleeding  Will continue to monitor for change in condition            Other    DNR (do not resuscitate)       No orders of the defined types were placed in this encounter  Chief Complaint     Chief Complaint   Patient presents with   • Geriatric Evaluation     Right foot swelling       History of Present Illness   80year old female being seen today in collaboration with nursing for right foot swelling  She has a known hx of DVT but eliquis was discontinued due to recurrent GI bleeding  She denies pain  No CP/SOB       The following portions of the patient's history were reviewed and updated as appropriate: allergies, current medications, past family history, past medical history, past social history, past surgical history and problem list     Review of Systems   Review of Systems   Constitutional: Negative  Respiratory: Negative  Cardiovascular: Positive for leg swelling   Negative for chest pain    Musculoskeletal: Positive for arthralgias and gait problem  Active Problem List     Patient Active Problem List   Diagnosis   • Neuropathy   • Ambulatory dysfunction   • Venous stasis dermatitis of both lower extremities   • Age-related osteoporosis without current pathological fracture   • Lower extremity edema   • Chronic kidney disease stage 3    • Compression fracture of L3 lumbar vertebra, closed, initial encounter ([facility])   • Fall   • Paroxysmal supraventricular tachycardia    • Balance problem   • Constipation, unspecified   • Diverticulosis of intestine, part unspecified, without perforation or abscess without bleeding   • Generalized muscle weakness   • Cognitive impairment   • Polyneuropathy, unspecified   • Pulmonary embolism ([facility])   • History of DVT of lower extremity   • Chronic pain of left knee   • Episode of recurrent major depressive disorder ([facility])   • History of GI bleed   • Acute blood loss anemia   • Metabolic encephalopathy   • Goals of care, counseling/discussion   • DNR (do not resuscitate)   • Viral gastroenteritis   • Hypokalemia   • Frailty syndrome in geriatric patient   • Adnexal cyst   • Atrial premature contractions   • Grade I diastolic dysfunction   • Acute gastric ulcer without hemorrhage or perforation   • [facility]         Objective     There were no vitals taken for this visit  Physical Exam  Vitals reviewed  Constitutional:       General: She is not in acute distress  Appearance: She is not diaphoretic  HENT:      Head: Normocephalic and atraumatic  Mouth/Throat:      Mouth: Mucous membranes are moist    Cardiovascular:      Rate and Rhythm: Normal rate  Pulmonary:      Effort: Pulmonary effort is normal  No respiratory distress  Abdominal:      General: Bowel sounds are normal       Palpations: Abdomen is soft     Musculoskeletal:      Comments: Right LE pedal and pretibial edema   Skin:     General: Skin is warm and dry    Neurological:      Mental Status: She is alert  Mental status is at baseline  Pertinent Laboratory/Diagnostic Studies:    Reviewed duplex results    Current Medications   Medications reviewed and updated in facility chart

## 2023-05-05 ENCOUNTER — NURSING HOME VISIT (OUTPATIENT)
Dept: GERIATRICS | Facility: OTHER | Age: 88
End: 2023-05-05

## 2023-05-05 DIAGNOSIS — E87.6 HYPOKALEMIA: ICD-10-CM

## 2023-05-05 DIAGNOSIS — Z66 DNR (DO NOT RESUSCITATE): ICD-10-CM

## 2023-05-05 DIAGNOSIS — F33.9 EPISODE OF RECURRENT MAJOR DEPRESSIVE DISORDER, UNSPECIFIED DEPRESSION EPISODE SEVERITY (HCC): ICD-10-CM

## 2023-05-05 DIAGNOSIS — R54 FRAILTY SYNDROME IN GERIATRIC PATIENT: ICD-10-CM

## 2023-05-05 DIAGNOSIS — N18.31 STAGE 3A CHRONIC KIDNEY DISEASE (HCC): ICD-10-CM

## 2023-05-05 DIAGNOSIS — K25.3 ACUTE GASTRIC ULCER WITHOUT HEMORRHAGE OR PERFORATION: Primary | ICD-10-CM

## 2023-05-05 DIAGNOSIS — I26.93 SINGLE SUBSEGMENTAL PULMONARY EMBOLISM WITHOUT ACUTE COR PULMONALE (HCC): ICD-10-CM

## 2023-05-06 VITALS
DIASTOLIC BLOOD PRESSURE: 79 MMHG | WEIGHT: 124.9 LBS | TEMPERATURE: 97.7 F | BODY MASS INDEX: 20.78 KG/M2 | SYSTOLIC BLOOD PRESSURE: 136 MMHG | HEART RATE: 80 BPM
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
## 2023-05-06 PROBLEM — A08.4 VIRAL GASTROENTERITIS: Status: RESOLVED | Noted: 2023-04-04 | Resolved: 2023-05-06

## 2023-05-06 NOTE — ASSESSMENT & PLAN NOTE
Previously on oral anticoagulation but had recurrent GI bleeds on blood thinners  After risks/benefits discussed with family they decided to discontinue anticoagulation

## 2023-05-06 NOTE — ASSESSMENT & PLAN NOTE
Lab Results   Component Value Date    EGFR 46 03/24/2023    EGFR 51 03/23/2023    EGFR 75 03/21/2023    CREATININE 1 02 03/24/2023    CREATININE 0 94 03/23/2023    CREATININE 0 62 03/21/2023 4/4/23  Creat 0 99, at baseline  Avoid nephrotoxins

## 2023-05-06 NOTE — ASSESSMENT & PLAN NOTE
Hospitalization in March for acute GI bleed requiring blood transfusion  Hgb stable 8 8 recently, up from 8 1  Continue to monitor  Continue PPI

## 2023-05-06 NOTE — ASSESSMENT & PLAN NOTE
Being followed by nutrition services  Has had 10 lbs weight gain since admission to SNF  Continue nutritional supplements  Monitor weights and I&O

## 2023-05-06 NOTE — PROGRESS NOTES
[facility]  [address]  [phone]) [address]  Code  32      NAME: Kassandra Vang  AGE: 80 y o  SEX: female [id number]    DATE OF ENCOUNTER: 5/5/23    CODE STATUS: DNR    Assessment and Plan     Problem List Items Addressed This Visit        Digestive    Acute gastric ulcer without hemorrhage or perforation - Primary     Hospitalization in March for acute GI bleed requiring blood transfusion  Hgb stable 8 8 recently, up from 8 1  Continue to monitor  Continue PPI            Cardiovascular and Mediastinum    Pulmonary embolism ([facility] [address] )     Previously on oral anticoagulation but had recurrent GI bleeds on blood thinners  After risks/benefits discussed with family they decided to discontinue anticoagulation            Genitourinary    Chronic kidney disease stage 3      Lab Results   Component Value Date    EGFR 46 03/24/2023    EGFR 51 03/23/2023    EGFR 75 03/21/2023    CREATININE 1 02 03/24/2023    CREATININE 0 94 03/23/2023    CREATININE 0 62 03/21/2023 4/4/23  Creat 0 99, at baseline  Avoid nephrotoxins            Other    Episode of recurrent major depressive disorder ([facility] [address] )     Mood stable  Continue sertraline         DNR (do not resuscitate)    Hypokalemia     Resolved  K 4 4  Continue to monitor         Frailty syndrome in geriatric patient     Being followed by nutrition services  Has had 10 lbs weight gain since admission to SNF  Continue nutritional supplements  Monitor weights and I&O            No orders of the defined types were placed in this encounter  Chief Complaint     Chief Complaint   Patient presents with    Geriatric Evaluation     Follow up       History of Present Illness   80year old female being seen today in collaboration with nursing for follow up for chronic conditions  Patient feels well and has no complaints  No reports of GI bleeding  Labs stable   Nursing has no concerns       The following portions of the patient's history were reviewed and updated as appropriate: allergies, current medications, past family history, past medical history, past social history, past surgical history and problem list     Review of Systems   Review of Systems   Constitutional: Negative  HENT: Negative  Eyes: Negative  Respiratory: Negative  Cardiovascular: Negative  Gastrointestinal: Negative  Endocrine: Negative  Genitourinary: Negative  Musculoskeletal: Positive for arthralgias and gait problem  Allergic/Immunologic: Negative  Hematological: Negative  Psychiatric/Behavioral: Negative             Active Problem List     Patient Active Problem List   Diagnosis    Neuropathy    Ambulatory dysfunction    Venous stasis dermatitis of both lower extremities    Age-related osteoporosis without current pathological fracture    Lower extremity edema    Chronic kidney disease stage 3     [facility]    Paroxysmal supraventricular tachycardia     Balance problem    Constipation, unspecified    Diverticulosis of intestine, part unspecified, without perforation or abscess without bleeding    Generalized muscle weakness    Cognitive impairment    Polyneuropathy, unspecified    [facility]    History of DVT of lower extremity    Chronic pain of left knee    Episode of recurrent major depressive disorder [facility]    History of GI bleed    Acute blood loss anemia    Metabolic encephalopathy    Goals of care, counseling/discussion    DNR (do not resuscitate)    Hypokalemia    Frailty syndrome in geriatric patient    Adnexal cyst    Atrial premature contractions    Grade I diastolic dysfunction    Acute gastric ulcer without hemorrhage or perforation    DVT (deep venous thrombosis) ([facility])         Objective     /79   Pulse 80   Temp 97 7 °F (36 5 °C)   Wt 56 7 kg (124 lb 14 4 oz)   BMI 20 78 kg/m²     Physical Exam  Vitals reviewed  Constitutional:       General: She is not in acute distress  Appearance: She is not ill-appearing or diaphoretic  HENT:      Head: Normocephalic and atraumatic  Nose: Nose normal       Mouth/Throat:      Mouth: Mucous membranes are moist       Pharynx: Oropharynx is clear  Eyes:      Conjunctiva/sclera: Conjunctivae normal    Cardiovascular:      Rate and Rhythm: Normal rate  Pulmonary:      Effort: Pulmonary effort is normal  No respiratory distress  Breath sounds: Normal breath sounds  Abdominal:      General: Abdomen is flat  Bowel sounds are normal  There is no distension  Palpations: Abdomen is soft  Tenderness: There is no abdominal tenderness  Musculoskeletal:         General: No deformity or signs of injury  Skin:     General: Skin is warm and dry  Findings: No bruising or erythema  Neurological:      Mental Status: She is alert  Mental status is at baseline  Psychiatric:         Mood and Affect: Mood normal          Behavior: Behavior normal          Pertinent Laboratory/Diagnostic Studies:    4/4/23  hgb 8 8  Creat 0 99  K 4 4    Current Medications   Medications reviewed and updated in facility chart

## 2023-05-10 ENCOUNTER — TELEPHONE (OUTPATIENT)
Dept: OTHER | Facility: OTHER | Age: 88
End: 2023-05-10

## 2023-05-15 ENCOUNTER — NURSING HOME VISIT (OUTPATIENT)
Dept: GERIATRICS | Facility: OTHER | Age: 88
End: 2023-05-15

## 2023-05-15 DIAGNOSIS — S81.801A WOUND OF RIGHT LOWER EXTREMITY, INITIAL ENCOUNTER: Primary | ICD-10-CM

## 2023-05-16 VITALS
HEART RATE: 85 BPM | BODY MASS INDEX: 18.89 KG/M2 | DIASTOLIC BLOOD PRESSURE: 66 MMHG | TEMPERATURE: 97.8 F | WEIGHT: 113.5 LBS | SYSTOLIC BLOOD PRESSURE: 150 MMHG

## 2023-05-16 PROBLEM — S81.801A LEG WOUND, RIGHT: Status: ACTIVE | Noted: 2023-05-16

## 2023-05-16 NOTE — ASSESSMENT & PLAN NOTE
Patient was noted to have a superficial skin tear of her posterior right calf last week  Initially there was no drainage or redness  Now nursing reporting surrounding cellulitis  Patient does complain of increased pain in the area  No fever  Being followed by wound care team  Continue local wound care   Start doxycycline for 7 days

## 2023-05-16 NOTE — PROGRESS NOTES
[place] [phone] [address]  Code  32      NAME: Fransisca Liu  AGE: 80 y o  SEX: female [id number]    DATE OF ENCOUNTER: 5/15/23    CODE STATUS: DNR    Assessment and Plan     Problem List Items Addressed This Visit        Other    Leg wound, right - Primary     Patient was noted to have a superficial skin tear of her posterior right calf last week  Initially there was no drainage or redness  Now nursing reporting surrounding cellulitis  Patient does complain of increased pain in the area  No fever  Being followed by wound care team  Continue local wound care  Start doxycycline for 7 days            No orders of the defined types were placed in this encounter  Chief Complaint     Chief Complaint   Patient presents with   • Geriatric Evaluation     Right calf wound       History of Present Illness   80year old female being seen today in collaboration with nursing and wound care for right posterior calf wound  Initial skin tear was a week ago, has been getting local wound care  Now with increased pain, swelling and redness       The following portions of the patient's history were reviewed and updated as appropriate: allergies, current medications, past family history, past medical history, past social history, past surgical history and problem list     Review of Systems   Review of Systems   Constitutional: Negative  Musculoskeletal: Positive for gait problem  Skin: Positive for color change and wound  Psychiatric/Behavioral: Negative             Active Problem List     Patient Active Problem List   Diagnosis   • Neuropathy   • Ambulatory dysfunction   • Venous stasis dermatitis of both lower extremities   • Age-related osteoporosis without current pathological fracture   • Lower extremity edema   • Chronic kidney disease stage 3    • Compression fracture of L3 lumbar vertebra, closed, initial encounter ([facility] [address] ) • Paroxysmal supraventricular tachycardia    • Balance problem   • Constipation, unspecified   • Diverticulosis of intestine, part unspecified, without perforation or abscess without bleeding   • Generalized muscle weakness   • Cognitive impairment   • Polyneuropathy, unspecified   • Pulmonary embolism (HCC)   • History of DVT of lower extremity   • Chronic pain of left knee   • Episode of recurrent major depressive disorder ([facility])   • History of GI bleed   • Acute blood loss anemia   • Metabolic encephalopathy   • Goals of care, counseling/discussion   • DNR (do not resuscitate)   • Hypokalemia   • Frailty syndrome in geriatric patient   • Adnexal cyst   • Atrial premature contractions   • Grade I diastolic dysfunction   • Acute gastric ulcer without hemorrhage or perforation   • DVT (deep venous thrombosis) ([facility])   • Leg wound, right         Objective     /66   Pulse 85   Temp 97 8 °F (36 6 °C)   Wt 51 5 kg (113 lb 8 oz)   BMI 18 89 kg/m²     Physical Exam  Vitals reviewed  Constitutional:       General: She is not in acute distress  Appearance: She is not ill-appearing or diaphoretic  Musculoskeletal:         General: No deformity  Skin:     Comments: Superficial skin wound right posterior calf  Surrounding swelling and cellulitis   Neurological:      Mental Status: She is alert  Pertinent Laboratory/Diagnostic Studies:    none    Current Medications   Medications reviewed and updated in facility chart

## 2023-05-22 ENCOUNTER — NURSING HOME VISIT (OUTPATIENT)
Dept: GERIATRICS | Facility: OTHER | Age: 88
End: 2023-05-22

## 2023-05-22 VITALS
WEIGHT: 113.5 LBS | HEART RATE: 74 BPM | TEMPERATURE: 97.6 F | DIASTOLIC BLOOD PRESSURE: 68 MMHG | BODY MASS INDEX: 18.89 KG/M2 | SYSTOLIC BLOOD PRESSURE: 145 MMHG

## 2023-05-22 DIAGNOSIS — S81.801D WOUND OF RIGHT LOWER EXTREMITY, SUBSEQUENT ENCOUNTER: Primary | ICD-10-CM

## 2023-05-22 NOTE — ASSESSMENT & PLAN NOTE
Follow up for right leg wound  Was seen by wound care team this morning  Wound itself is no longer draining but still with surrounding erythema and swelling  Discussed with wound care nurse  Was on doxy for almost 1 week   Due to continued erythema and swelling will switch to bactrim to include MRSA coverage  Continue to follow closely

## 2023-05-22 NOTE — PROGRESS NOTES
[place] [phone] [address]  Code 32      NAME: Sherri Bolivar  AGE: 80 y o  SEX: female [id number]    DATE OF ENCOUNTER: 5/22/2023    CODE STATUS: DNR    Assessment and Plan     Problem List Items Addressed This Visit        Other    Leg wound, right - Primary     Follow up for right leg wound  Was seen by wound care team this morning  Wound itself is no longer draining but still with surrounding erythema and swelling  Discussed with wound care nurse  Was on doxy for almost 1 week  Due to continued erythema and swelling will switch to bactrim to include MRSA coverage  Continue to follow closely            No orders of the defined types were placed in this encounter  Chief Complaint     Chief Complaint   Patient presents with   • Geriatric Evaluation     Right leg wound       History of Present Illness   80year old female resident of [facility] being seen today at the request of wound care  She was seen initially last week for a right posterior leg wound  She was started on doxy in collaboration with wound care  She now has no drainage but still has erythema and swelling       The following portions of the patient's history were reviewed and updated as appropriate: allergies, current medications, past family history, past medical history, past social history, past surgical history and problem list     Review of Systems   Review of Systems   Constitutional: Negative  Cardiovascular: Positive for leg swelling  Musculoskeletal: Positive for arthralgias and gait problem  Skin: Positive for color change and wound            Active Problem List     Patient Active Problem List   Diagnosis   • Neuropathy   • Ambulatory dysfunction   • Venous stasis dermatitis of both lower extremities   • Age-related osteoporosis without current pathological fracture   • Lower extremity edema   • Chronic kidney disease stage 3    • Compression fracture of L3 lumbar vertebra, closed, initial encounter ([facility] [address] )   • Paroxysmal supraventricular tachycardia    • Balance problem   • Constipation, unspecified   • Diverticulosis of intestine, part unspecified, without perforation or abscess without bleeding   • Generalized muscle weakness   • Cognitive impairment   • Polyneuropathy, unspecified   • Pulmonary embolism (HCC)   • History of DVT of lower extremity   • Chronic pain of left knee   • Episode of recurrent major depressive disorder ([facility])   • History of GI bleed   • Acute blood loss anemia   • Metabolic encephalopathy   • Goals of care, counseling/discussion   • DNR (do not resuscitate)   • Hypokalemia   • Frailty syndrome in geriatric patient   • Adnexal cyst   • Atrial premature contractions   • Grade I diastolic dysfunction   • Acute gastric ulcer without hemorrhage or perforation   • DVT (deep venous thrombosis) ([facility])   • Leg wound, right         Objective     /68   Pulse 74   Temp 97 6 °F (36 4 °C)   Wt 51 5 kg (113 lb 8 oz)   BMI 18 89 kg/m²     Physical Exam  Vitals reviewed  Constitutional:       General: She is not in acute distress  Appearance: She is not ill-appearing or diaphoretic  Skin:     Comments: Right posterior calf wound with no drainage  Has surrounding erythema and swelling   Neurological:      Mental Status: She is alert  Pertinent Laboratory/Diagnostic Studies:    none    Current Medications   Medications reviewed and updated in facility chart

## 2023-05-24 ENCOUNTER — NURSING HOME VISIT (OUTPATIENT)
Dept: GERIATRICS | Facility: OTHER | Age: 88
End: 2023-05-24

## 2023-05-24 DIAGNOSIS — I82.412 DEEP VEIN THROMBOSIS (DVT) OF FEMORAL VEIN OF LEFT LOWER EXTREMITY, UNSPECIFIED CHRONICITY (HCC): Primary | ICD-10-CM

## 2023-05-24 DIAGNOSIS — S81.801D WOUND OF RIGHT LOWER EXTREMITY, SUBSEQUENT ENCOUNTER: ICD-10-CM

## 2023-05-25 VITALS
DIASTOLIC BLOOD PRESSURE: 56 MMHG | BODY MASS INDEX: 18.89 KG/M2 | HEART RATE: 80 BPM | TEMPERATURE: 98.2 F | SYSTOLIC BLOOD PRESSURE: 123 MMHG | WEIGHT: 113.5 LBS

## 2023-05-25 NOTE — PROGRESS NOTES
[facility]  [address]  [phone]) [address]  Code 32      NAME: Bre Hillman  AGE: 80 y o  SEX: female [id number]    DATE OF ENCOUNTER: 5/24/23    CODE STATUS: DNR    Assessment and Plan     Problem List Items Addressed This Visit        Cardiovascular and Mediastinum    DVT (deep venous thrombosis) ([address] ) - Primary     Recent duplex showed left femoral DVT  Hx of rocío DVT's earlier this year  Had been on eliquis but developed GI bleeding  Patient and son agreed that risks of anticoagulation outweigh benefits            Other    Leg wound, right     Asked to eval patient for increased right foot swelling  She has known chronic posterior calf wound that is being followed by wound care  Her antibiotics were recently changed to bactrim due to ongoing erythema and swelling  The erythema is significantly improved  Swelling in posterior calf improved but now has swelling in the foot  Likely dependent as she was sitting out in her wheelchair today  Continue bactrim per wound care  Elevate leg when able  Continue to monitor            No orders of the defined types were placed in this encounter  Chief Complaint     Chief Complaint   Patient presents with   • Geriatric Evaluation     Follow up right calf wound       History of Present Illness   80year old female resident of [address] [place] [address] being seen today in collaboration with nursing for follow up  She has a known hx of DVT, not on anticoagulation due to recurrent GI bleed  She has a right posterior calf wound that is being followed by wound care  Currently on bactrim   Has swelling in the right foot today after being OOB in her wheelchair       The following portions of the patient's history were reviewed and updated as appropriate: allergies, current medications, past family history, past medical history, past social history, past surgical history and problem list     Review of Systems   Review of Systems   Constitutional: Negative  Cardiovascular: Positive for leg swelling  Musculoskeletal: Positive for gait problem  Skin: Positive for wound  Active Problem List     Patient Active Problem List   Diagnosis   • Neuropathy   • Ambulatory dysfunction   • Venous stasis dermatitis of both lower extremities   • Age-related osteoporosis without current pathological fracture   • Lower extremity edema   • Chronic kidney disease stage 3    • Compression fracture of L3 lumbar vertebra, closed, initial encounter ([facility])   • Paroxysmal supraventricular tachycardia    • Balance problem   • Constipation, unspecified   • Diverticulosis of intestine, part unspecified, without perforation or abscess without bleeding   • Generalized muscle weakness   • Cognitive impairment   • Polyneuropathy, unspecified   • Pulmonary embolism ([facility])   • History of DVT of lower extremity   • Chronic pain of left knee   • Episode of recurrent major depressive disorder ([facility])   • History of GI bleed   • Acute blood loss anemia   • Metabolic encephalopathy   • Goals of care, counseling/discussion   • DNR (do not resuscitate)   • Hypokalemia   • Frailty syndrome in geriatric patient   • Adnexal cyst   • Atrial premature contractions   • Grade I diastolic dysfunction   • Acute gastric ulcer without hemorrhage or perforation   • DVT (deep venous thrombosis) ([facility])   • Leg wound, right         Objective     /56   Pulse 80   Temp 98 2 °F (36 8 °C)   Wt 51 5 kg (113 lb 8 oz)   BMI 18 89 kg/m²     Physical Exam  Vitals reviewed  Constitutional:       General: She is not in acute distress  Appearance: She is not ill-appearing or diaphoretic  Musculoskeletal:      Comments: Right foot pedal edema  No calf tenderness  Skin:     General: Skin is warm  Comments: Erythema right posterior calf improved   Neurological:      Mental Status: She is alert           Pertinent Laboratory/Diagnostic Studies:    Reviewed duplex    Current Medications   Medications reviewed and updated in facility chart

## 2023-05-25 NOTE — ASSESSMENT & PLAN NOTE
Recent duplex showed left femoral DVT  Hx of rocío DVT's earlier this year  Had been on eliquis but developed GI bleeding  Patient and son agreed that risks of anticoagulation outweigh benefits

## 2023-05-25 NOTE — ASSESSMENT & PLAN NOTE
Asked to eval patient for increased right foot swelling  She has known chronic posterior calf wound that is being followed by wound care  Her antibiotics were recently changed to bactrim due to ongoing erythema and swelling  The erythema is significantly improved  Swelling in posterior calf improved but now has swelling in the foot  Likely dependent as she was sitting out in her wheelchair today  Continue bactrim per wound care  Elevate leg when able   Continue to monitor

## 2023-06-12 ENCOUNTER — NURSING HOME VISIT (OUTPATIENT)
Dept: GERIATRICS | Facility: OTHER | Age: 88
End: 2023-06-12
Payer: COMMERCIAL

## 2023-06-12 DIAGNOSIS — D62 ACUTE BLOOD LOSS ANEMIA: ICD-10-CM

## 2023-06-12 DIAGNOSIS — I82.5Y3 CHRONIC DEEP VEIN THROMBOSIS (DVT) OF PROXIMAL VEIN OF BOTH LOWER EXTREMITIES (HCC): ICD-10-CM

## 2023-06-12 DIAGNOSIS — R54 FRAILTY SYNDROME IN GERIATRIC PATIENT: ICD-10-CM

## 2023-06-12 DIAGNOSIS — Z87.11 HISTORY OF GASTRIC ULCER: ICD-10-CM

## 2023-06-12 DIAGNOSIS — N18.31 STAGE 3A CHRONIC KIDNEY DISEASE (HCC): Primary | ICD-10-CM

## 2023-06-12 DIAGNOSIS — Z66 DNR (DO NOT RESUSCITATE): ICD-10-CM

## 2023-06-12 DIAGNOSIS — F32.1 CURRENT MODERATE EPISODE OF MAJOR DEPRESSIVE DISORDER WITHOUT PRIOR EPISODE (HCC): ICD-10-CM

## 2023-06-12 PROCEDURE — 99309 SBSQ NF CARE MODERATE MDM 30: CPT | Performed by: INTERNAL MEDICINE

## 2023-06-14 PROBLEM — F32.1 CURRENT MODERATE EPISODE OF MAJOR DEPRESSIVE DISORDER WITHOUT PRIOR EPISODE (HCC): Status: ACTIVE | Noted: 2023-06-14

## 2023-06-14 PROBLEM — N18.31 STAGE 3A CHRONIC KIDNEY DISEASE (HCC): Status: ACTIVE | Noted: 2019-03-24

## 2023-06-14 PROBLEM — I82.5Y3 CHRONIC DEEP VEIN THROMBOSIS (DVT) OF PROXIMAL VEIN OF BOTH LOWER EXTREMITIES (HCC): Status: ACTIVE | Noted: 2023-04-23

## 2023-06-14 PROBLEM — Z87.11 HISTORY OF GASTRIC ULCER: Status: ACTIVE | Noted: 2023-04-04

## 2023-06-14 RX ORDER — PANTOPRAZOLE SODIUM 40 MG/1
40 TABLET, DELAYED RELEASE ORAL DAILY
COMMUNITY
Start: 2023-05-20

## 2023-06-15 NOTE — PROGRESS NOTES
Zahra [address]  [address]  Facility: [address] [facility] and [address] [address]  Follow-up visit    NAME: Alexandra Abraham  AGE: 80 y o  SEX: female    DATE OF ENCOUNTER: 6/12/2023    Code status:  DNR    Assessment and Plan     1  Stage 3a chronic kidney disease [facility])  Assessment & Plan:  · March 2023: Creatinine 1 02, EGFR 46  · April 4, 2023: Creatinine 0 99, EGFR 52  · June 2023: Baseline creatinine 0 9-1 1  · We will continue with avoidance of nephrotoxic medications and supplements  · We will continue with clinical and periodic laboratory monitoring for change in her condition      2  Frailty syndrome in geriatric patient  Assessment & Plan:  · Secondary to her chronic medical conditions  · She continues to require 24/7 care/support of her ADLs  · I recommend continued care/support of her ADLs as a long-term resident at the nursing facility  · We will continue to monitor for change in condition      3  History of gastric ulcer  Assessment & Plan:  · 3 clean-based ulcers seen on EGD performed on March 21, 2023   · We will continue with lifelong pantoprazole  · We will check stool for H  pylori antigen  · We will continue with monitoring for change in her condition      4  Current moderate episode of major depressive disorder without prior episode [facility])  Assessment & Plan:  · Nursing staff reports that she is doing well with her current psychotropic medication regimen  · We will continue her care in collaboration with the facility psychiatry service  · We will continue with monitoring for change in her condition      5   Chronic deep vein thrombosis (DVT) of proximal vein of both lower extremities (HCC)  Assessment & Plan:  · As previously care planne during her hospitalization in shared decision-making with her son, informed refusal for anticoagulation  · We will continue with clinical and periodic laboratory monitoring for change in her condition      6  Acute blood loss anemia  Assessment & Plan:  · Secondary to GI bleed  · Hospital discharge H/H: 8 1/25 2  · April 4, 2023: H/H: 8 8/26 7  · She is asymptomatic and doing well  · We will continue with clinical and periodic laboratory monitoring for change in her condition      7  DNR (do not resuscitate)    See my history and physical note of April 3, 2023 for further information  All medications and routine orders were reviewed and updated as needed  Plan discussed with: Nursing staff  Chief Complaint     She is seen for a follow-up visit to update the care and treatment of her chronic medical conditions  History of Present Illness     She is a pleasant 80-year-old woman who is seen with nursing staff for a follow-up visit to update the care and treatment of her chronic medical conditions, including stage IIIa chronic kidney disease, frailty secondary to her chronic medical conditions, history of gastric ulcer, and major depressive disorder  Nursing staff reports that her overall clinical condition is stable, that her appetite is stable, that she is sleeping well, and that she is having no difficulty with constipation  Nursing reports that she is not exhibiting any behaviors that are distressing to her or disruptive to the nursing unit  In fact, nursing reports that her mood is improved since June 1, 2023  She denies chest pain and shortness of breath  The following portions of the patient's history were reviewed and updated as appropriate: current medications, past family history, past medical history, past social history, past surgical history and problem list     Allergies:  No Known Allergies    Review of Systems     Review of Systems   Respiratory: Negative for shortness of breath  Cardiovascular: Negative for chest pain  Medications and orders     All medications reviewed and updated in Nursing Home EMR        Objective     Vitals: Monthly vitals: Weight 120 4 "pounds, pulse 80, blood pressure 146/60  Physical Exam  Vitals reviewed  Exam conducted with a chaperone present  Constitutional:       General: She is awake  She is not in acute distress  Appearance: She is well-developed  She is not toxic-appearing or diaphoretic  Comments: She appears comfortable lying in bed, her stated age, and frail  Cardiovascular:      Rate and Rhythm: Normal rate and regular rhythm  Heart sounds: Normal heart sounds  No murmur heard  No friction rub  No gallop  Comments: There is trace right dorsal pedal edema  Pulmonary:      Effort: No respiratory distress  Breath sounds: Normal breath sounds  No stridor  No wheezing, rhonchi or rales  Neurological:      Mental Status: She is alert  Psychiatric:         Mood and Affect: Mood normal          Behavior: Behavior normal  Behavior is cooperative  Pertinent Laboratory/Diagnostic Studies: The following labs were reviewed please see chart or hospital paperwork for details  April 4, 2023:    CBC with differential: WBC count 6 9, hemoglobin 8 8, hematocrit 26 7, platelet count 620,190, MCV 94    BMP: Sodium 139, potassium 4 4, BUN 20, creatinine 0 99, fasting blood sugar 84, EGFR 52    - Her order summary was reviewed and signed  Portions of the record may have been created with voice recognition software  Occasional wrong word or \"sound a like\" substitutions may have occurred due to the inherent limitations of voice recognition software  Read the chart carefully and recognize, using context, where substitutions have occurred      PATI Bernardo   6/14/2023 11:36 PM        "

## 2023-06-15 NOTE — ASSESSMENT & PLAN NOTE
· Secondary to GI bleed  · Hospital discharge H/H: 8 1/25 2  · April 4, 2023: H/H: 8 8/26 7  · She is asymptomatic and doing well  · We will continue with clinical and periodic laboratory monitoring for change in her condition

## 2023-06-15 NOTE — ASSESSMENT & PLAN NOTE
· March 2023: Creatinine 1 02, EGFR 46  · April 4, 2023: Creatinine 0 99, EGFR 52  · June 2023: Baseline creatinine 0 9-1 1  · We will continue with avoidance of nephrotoxic medications and supplements  · We will continue with clinical and periodic laboratory monitoring for change in her condition

## 2023-06-15 NOTE — ASSESSMENT & PLAN NOTE
· 3 clean-based ulcers seen on EGD performed on March 21, 2023   · We will continue with lifelong pantoprazole  · We will check stool for H  pylori antigen  · We will continue with monitoring for change in her condition

## 2023-06-15 NOTE — ASSESSMENT & PLAN NOTE
· As previously care planne during her hospitalization in shared decision-making with her son, informed refusal for anticoagulation  · We will continue with clinical and periodic laboratory monitoring for change in her condition

## 2023-06-15 NOTE — ASSESSMENT & PLAN NOTE
· Secondary to her chronic medical conditions  · She continues to require 24/7 care/support of her ADLs  · I recommend continued care/support of her ADLs as a long-term resident at the nursing facility  · We will continue to monitor for change in condition

## 2023-07-10 ENCOUNTER — NURSING HOME VISIT (OUTPATIENT)
Dept: GERIATRICS | Facility: OTHER | Age: 88
End: 2023-07-10
Payer: COMMERCIAL

## 2023-07-10 VITALS — WEIGHT: 128.3 LBS | BODY MASS INDEX: 21.35 KG/M2

## 2023-07-10 DIAGNOSIS — Z87.11 HISTORY OF GASTRIC ULCER: ICD-10-CM

## 2023-07-10 DIAGNOSIS — R41.89 COGNITIVE IMPAIRMENT: Primary | ICD-10-CM

## 2023-07-10 DIAGNOSIS — S81.801D WOUND OF RIGHT LOWER EXTREMITY, SUBSEQUENT ENCOUNTER: ICD-10-CM

## 2023-07-10 DIAGNOSIS — K59.00 CONSTIPATION, UNSPECIFIED CONSTIPATION TYPE: ICD-10-CM

## 2023-07-10 DIAGNOSIS — F33.9 EPISODE OF RECURRENT MAJOR DEPRESSIVE DISORDER, UNSPECIFIED DEPRESSION EPISODE SEVERITY (HCC): ICD-10-CM

## 2023-07-10 DIAGNOSIS — R26.2 AMBULATORY DYSFUNCTION: ICD-10-CM

## 2023-07-10 PROCEDURE — 99308 SBSQ NF CARE LOW MDM 20: CPT

## 2023-07-11 RX ORDER — ACETAMINOPHEN 325 MG/1
650 TABLET ORAL EVERY 4 HOURS PRN
COMMUNITY

## 2023-07-11 NOTE — ASSESSMENT & PLAN NOTE
· Mood stable at time of assessment  · Pleasant mood  · Continue sertraline 75 mg po daily  · Monitor for changes

## 2023-07-11 NOTE — ASSESSMENT & PLAN NOTE
· Daily reported bowel movements  · Last reported bowel movement was today  · Continue prn mineral oil enema, sorbitol solution, bisacodyl suppository  · Encourage increased po hydration  · Encourage patient to be OOB for meals

## 2023-07-11 NOTE — ASSESSMENT & PLAN NOTE
· Continue to provide 24/7 supportive care at nursing facility  · Provide frequent reorientation to person, place, date/time, surroundings  · Patient is alert and oriented x 2 at time of assessment  · Encourage participation with group activities  · Monitor bowel and bladder output

## 2023-07-11 NOTE — ASSESSMENT & PLAN NOTE
· Patient currently at [facility]   [facility]  · Patient seen laying comfortably in bed  · States she uses a wheelchair  · With staff assist  · Continue fall precautions  · Continue ultra low bed with fall mats  · Ensure call bell and personal belongings are within reach

## 2023-07-11 NOTE — PROGRESS NOTES
[address], [address] [place] [address] [address]  [phone]    NAME: Lana Damico  AGE: 80 y.o. SEX: female    Progress Note    Location: [facility]  POS: 28 ([facility])      Chief complaint / Reason for visit:  Follow-up visit    Assessment/Plan:    Ambulatory dysfunction  · Patient currently at [address]  · Patient seen laying comfortably in bed  · States she uses a wheelchair  · With staff assist  · Continue fall precautions  · Continue ultra low bed with fall mats  · Ensure call bell and personal belongings are within reach    Constipation, unspecified  · Daily reported bowel movements  · Last reported bowel movement was today  · Continue prn mineral oil enema, sorbitol solution, bisacodyl suppository  · Encourage increased po hydration  · Encourage patient to be OOB for meals    History of gastric ulcer  · Continue pantoprazole 40 mg po tablet daily  · No concerns at this time  · Patient has no complaints    Leg wound, right  · Healing wound on right leg  · Continue wound care per facility wound care RN  · Monitor site daily    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)  · Mood stable at time of assessment  · Pleasant mood  · Continue sertraline 75 mg po daily  · Monitor for changes    Cognitive impairment  · Continue to provide 24/7 supportive care at nursing facility  · Provide frequent reorientation to person, place, date/time, surroundings  · Patient is alert and oriented x 2 at time of assessment  · Encourage participation with group activities  · Monitor bowel and bladder output      This is a 80 y.o. female seen today at [facility]  [address] Medical history includes, not limited to, CKD stage 3, DVT, GERD, depression, and abnormalities of gait and mobility. Upon entering patient's room she is seen resting comfortably in bed. Alert and oriented x 2, pleasant mood. Able to answer most questions appropriately. She denies pain.   States nursing staff assists her in the wheelchair and help her move around. States she has been eating and drinking well. Wound on right leg is healing well. Nursing staff who is present at time of assessment help to place prevalon boots on bilateral legs. Informed patient we would like her to wear the boots to help prevent breakdown on her heels. Patient labs, vitals and current orders reviewed with nursing staff and in [facility]. Nursing and prior provider notes reviewed on this visit. Discussed visit with PCP and nursing staff/ supervisor. Review of Systems   Constitutional: Positive for activity change. Negative for appetite change, fatigue, fever and unexpected weight change. HENT: Negative for congestion. Eyes: Negative for pain, discharge and visual disturbance. Respiratory: Negative for cough and shortness of breath. Cardiovascular: Negative for chest pain and palpitations. Gastrointestinal: Negative for abdominal pain, constipation and diarrhea. Genitourinary: Negative for difficulty urinating, dysuria, hematuria and urgency. Musculoskeletal: Positive for gait problem. Negative for arthralgias. Skin: Positive for wound (right leg). Negative for color change. Neurological: Negative for syncope and weakness. Psychiatric/Behavioral: Negative for agitation, behavioral problems, confusion and sleep disturbance. All other systems reviewed and are negative. ALLERGY: Reviewed, unchanged  No Known Allergies    HISTORY:  Medical Hx: Reviewed, unchanged  Family Hx: Reviewed, unchanged  Soc Hx: Reviewed,  unchanged      Physical Exam  Vitals reviewed. Constitutional:       General: She is not in acute distress. Appearance: Normal appearance. She is not ill-appearing. HENT:      Head: Normocephalic. Right Ear: Tympanic membrane normal.      Left Ear: Tympanic membrane normal.      Nose: Nose normal. No congestion.       Mouth/Throat:      Mouth: Mucous membranes are moist.      Pharynx: Oropharynx is clear. Eyes:      General:         Right eye: No discharge. Left eye: No discharge. Extraocular Movements: Extraocular movements intact. Conjunctiva/sclera: Conjunctivae normal.      Pupils: Pupils are equal, round, and reactive to light. Cardiovascular:      Rate and Rhythm: Normal rate and regular rhythm. Pulses: Normal pulses. Heart sounds: Normal heart sounds. Pulmonary:      Effort: Pulmonary effort is normal. No respiratory distress. Breath sounds: Normal breath sounds. Chest:      Chest wall: No tenderness. Abdominal:      General: Bowel sounds are normal.      Palpations: Abdomen is soft. Tenderness: There is no abdominal tenderness. Musculoskeletal:         General: No swelling. Normal range of motion. Cervical back: Normal range of motion. Right lower leg: No edema. Left lower leg: No edema. Skin:     General: Skin is warm and dry. Comments: Wound on right leg   Neurological:      Mental Status: She is alert. Mental status is at baseline. Motor: No weakness. Comments: Alert and oriented x 2   Psychiatric:         Mood and Affect: Mood normal.         Behavior: Behavior normal.         Thought Content: Thought content normal.            Laboratory results / Imaging reviewed: Hard copy/ies in medical chart:    There were no vitals filed for this visit. Current Medications: All medications reviewed and updated in Nursing Home Chart    Please note: This note was completed in part utilizing a voice-recognition software may have been used in the preparation of this document. Grammatical errors, random word insertion, spelling mistakes, and incomplete sentences may be an occasional consequence of the system secondary to software limitations, ambient noise and hardware issues. Occasional wrong word or "sound-alike" substitutions may have occurred due to the inherent limitations of voice recognition software.  At the time of dictation, efforts were made to edit, clarify and/or correct errors. Interpretation should be guided by context. Please read the chart carefully and recognize, using context, where substitutions have occurred. If you have any questions or concerns about the context, text or information contained within the body of this dictation, please contact myself, the provider, for further clarification.       KERON Gudino  7/11/2023

## 2023-07-11 NOTE — ASSESSMENT & PLAN NOTE
· Continue pantoprazole 40 mg po tablet daily  · No concerns at this time  · Patient has no complaints

## 2023-07-11 NOTE — ASSESSMENT & PLAN NOTE
· Healing wound on right leg  · Continue wound care per facility wound care RN  · Monitor site daily

## 2023-08-10 ENCOUNTER — NURSING HOME VISIT (OUTPATIENT)
Dept: GERIATRICS | Facility: OTHER | Age: 88
End: 2023-08-10
Payer: COMMERCIAL

## 2023-08-10 VITALS
WEIGHT: 128.2 LBS | HEART RATE: 80 BPM | BODY MASS INDEX: 21.33 KG/M2 | TEMPERATURE: 98 F | DIASTOLIC BLOOD PRESSURE: 60 MMHG | SYSTOLIC BLOOD PRESSURE: 146 MMHG

## 2023-08-10 DIAGNOSIS — M25.512 ACUTE PAIN OF LEFT SHOULDER: Primary | ICD-10-CM

## 2023-08-10 PROCEDURE — 99308 SBSQ NF CARE LOW MDM 20: CPT | Performed by: PHYSICIAN ASSISTANT

## 2023-08-10 NOTE — ASSESSMENT & PLAN NOTE
1 week hx of left shoulder pain/decreased ROM  Today was the first it was reported to nursing  She is right hand dominant  She states she has been working with PT/OT and has been doing standing exercises holding onto the railing. She thins she may have injured herself doing exercises but she does not remember a specific injury. Aides state she has been unable to lift her left arm up over her head to do any ADL's, this is different from prior.    Will order xray and follow up with results

## 2023-08-10 NOTE — PROGRESS NOTES
[place] [id number] [phone]  Code 32      NAME: Anu Lombardo  AGE: 80 y.o. SEX: female [phone]    DATE OF ENCOUNTER: 8/10/2023    CODE STATUS: DNR    Assessment and Plan     Problem List Items Addressed This Visit        Other    Left shoulder pain - Primary     1 week hx of left shoulder pain/decreased ROM  Today was the first it was reported to nursing  She is right hand dominant  She states she has been working with PT/OT and has been doing standing exercises holding onto the railing. She thins she may have injured herself doing exercises but she does not remember a specific injury. Aides state she has been unable to lift her left arm up over her head to do any ADL's, this is different from prior. Will order xray and follow up with results            No orders of the defined types were placed in this encounter. Chief Complaint     Chief Complaint   Patient presents with   • Geriatric Evaluation     Left shoulder pain       History of Present Illness   80year old female resident of [address] [place] [address] being seen today in collaboration with nursing for left shoulder pain for 1 week. Aides told nursing today that she has not been able to raise her left arm over her head for ADL's. She states she has been doing standing exercises with PT and is using a railing to pull herself up from a chair. She has FROM of elbow/wrist/hand. The following portions of the patient's history were reviewed and updated as appropriate: allergies, current medications, past family history, past medical history, past social history, past surgical history and problem list.    Review of Systems   Review of Systems   Constitutional: Positive for activity change. Musculoskeletal: Positive for arthralgias. Neurological: Negative.            Active Problem List     Patient Active Problem List   Diagnosis   • Neuropathy • Ambulatory dysfunction   • Venous stasis dermatitis of both lower extremities   • Age-related osteoporosis without current pathological fracture   • Lower extremity edema   • Stage 3a chronic kidney disease ([facility])   • Compression fracture of L3 lumbar vertebra, closed, initial encounter ([facility])   • Paroxysmal supraventricular tachycardia    • Balance problem   • Constipation, unspecified   • Diverticulosis of intestine, part unspecified, without perforation or abscess without bleeding   • Generalized muscle weakness   • Cognitive impairment   • Polyneuropathy, unspecified   • Pulmonary embolism ([facility])   • History of DVT of lower extremity   • Chronic pain of left knee   • Episode of recurrent major depressive disorder ([address])   • History of GI bleed   • Acute blood loss anemia   • Metabolic encephalopathy   • Goals of care, counseling/discussion   • DNR (do not resuscitate)   • Hypokalemia   • Frailty syndrome in geriatric patient   • Adnexal cyst   • Atrial premature contractions   • Grade I diastolic dysfunction   • History of gastric ulcer   • Chronic deep vein thrombosis (DVT) of proximal vein of both lower extremities ([facility])   • Leg wound, right   • Current moderate episode of major depressive disorder without prior episode ([facility])   • Left shoulder pain         Objective     /60   Pulse 80   Temp 98 °F (36.7 °C)   Wt 58.2 kg (128 lb 3.2 oz)   BMI 21.33 kg/m²     Physical Exam  Vitals reviewed. Constitutional:       General: She is not in acute distress. Appearance: She is not diaphoretic. HENT:      Head: Normocephalic and atraumatic. Pulmonary:      Effort: Pulmonary effort is normal. No respiratory distress. Musculoskeletal:      Comments: Left shoulder-no pain over clavicle or AC joint. There is pain over ant/lat shoulder. No deformity, swelling, ecchymosis or erythema. Unable to forward flex shoulder. FROM of elbow/wrist and hand   Neurological:      Mental Status: She is alert. Pertinent Laboratory/Diagnostic Studies:    xrays pending    Current Medications   Medications reviewed and updated in facility chart.

## 2023-09-11 ENCOUNTER — NURSING HOME VISIT (OUTPATIENT)
Dept: GERIATRICS | Facility: OTHER | Age: 88
End: 2023-09-11
Payer: COMMERCIAL

## 2023-09-11 DIAGNOSIS — R60.0 LOWER EXTREMITY EDEMA: ICD-10-CM

## 2023-09-11 DIAGNOSIS — Z87.11 HISTORY OF GASTRIC ULCER: ICD-10-CM

## 2023-09-11 DIAGNOSIS — N18.31 STAGE 3A CHRONIC KIDNEY DISEASE (HCC): ICD-10-CM

## 2023-09-11 DIAGNOSIS — R54 FRAILTY SYNDROME IN GERIATRIC PATIENT: Primary | ICD-10-CM

## 2023-09-11 DIAGNOSIS — I82.5Y3 CHRONIC DEEP VEIN THROMBOSIS (DVT) OF PROXIMAL VEIN OF BOTH LOWER EXTREMITIES (HCC): ICD-10-CM

## 2023-09-11 DIAGNOSIS — F33.1 MODERATE EPISODE OF RECURRENT MAJOR DEPRESSIVE DISORDER (HCC): ICD-10-CM

## 2023-09-11 DIAGNOSIS — R41.89 COGNITIVE IMPAIRMENT: ICD-10-CM

## 2023-09-11 DIAGNOSIS — D62 ACUTE BLOOD LOSS ANEMIA: ICD-10-CM

## 2023-09-11 PROCEDURE — 99309 SBSQ NF CARE MODERATE MDM 30: CPT | Performed by: INTERNAL MEDICINE

## 2023-09-13 RX ORDER — ACETAMINOPHEN 325 MG/1
650 TABLET ORAL 2 TIMES DAILY
COMMUNITY
Start: 2023-08-16

## 2023-09-13 NOTE — ASSESSMENT & PLAN NOTE
· February 21, 2023: Mini-Mental status exam: 25/30   · April 1, 2023: Mini-Mental status exam performed at rehabilitation facility: 23/30. (Reported by her son to hospital physicians as a pre-existing condition prior to her March 2023 hospitalization)  · August 14, 2023:  MMSE: 21  · We will continue to provide a safe, secure, structure, and supportive environment at the nursing facility  · We will continue with 24/7 care/support of her ADLs  · We will continue with monitoring for change in her condition

## 2023-09-13 NOTE — ASSESSMENT & PLAN NOTE
· She is being followed by the facility psychiatry service with last visit being on September 6, 2023  · Nursing staff reports that she is doing well on her current psychotropic medication regimen  · We will continue her care in collaboration with the facility psychiatry service  · We will continue with monitoring for change in her condition

## 2023-09-13 NOTE — ASSESSMENT & PLAN NOTE
· April 2023: Baseline hemoglobin: 12-14; admission hemoglobin/hematocrit: 8.7/27.7; discharge hemoglobin/hematocrit: 8.1/25.2  · April 4, 2023: H/H/MCV: 8.8/26.7/94  · June 16, 2023: H/H/MCV: 10.1/31.   Laboratory values improving with treatment of gastric ulcers  · We will continue with clinical and periodic laboratory monitoring for change in her condition

## 2023-09-13 NOTE — ASSESSMENT & PLAN NOTE
· As previously noted, furosemide was discontinued during hospitalization  · We will continue with monitoring for change in her condition and restart furosemide, if needed

## 2023-09-13 NOTE — PROGRESS NOTES
[address]  Facility: [address]  32  Follow-up visit    NAME: Dwight Clayton  AGE: 80 y.o. SEX: female    DATE OF ENCOUNTER: 9/11/2023    Code status:  DNR    Assessment and Plan     1. Frailty syndrome in geriatric patient  Assessment & Plan:  · Secondary to her chronic medical conditions  · She continues to require 24/7 care/support of her ADLs  · I recommend continued care/support of her ADLs as a long-term resident at the nursing facility  · We will continue to monitor for change in her condition      2. Acute blood loss anemia  Assessment & Plan:  · April 2023: Baseline hemoglobin: 12-14; admission hemoglobin/hematocrit: 8.7/27.7; discharge hemoglobin/hematocrit: 8.1/25.2  · April 4, 2023: H/H/MCV: 8.8/26.7/94  · June 16, 2023: H/H/MCV: 10.1/31. Laboratory values improving with treatment of gastric ulcers  · We will continue with clinical and periodic laboratory monitoring for change in her condition      3. Lower extremity edema  Assessment & Plan:  · As previously noted, furosemide was discontinued during hospitalization  · We will continue with monitoring for change in her condition and restart furosemide, if needed      4. Stage 3a chronic kidney disease [facility])  Assessment & Plan:  · June 2023: Baseline creatinine 0.9-1.1  · June 16, 2023: Creatinine 0.92, EGFR 56, ECrCl 31  · She is doing well with avoidance of nephrotoxic medications and supplements  · We will continue with clinical and periodic laboratory monitoring for change in her condition      5. Cognitive impairment  Assessment & Plan:  · February 21, 2023: Mini-Mental status exam: 25/30   · April 1, 2023: Mini-Mental status exam performed at rehabilitation facility: 23/30. (Reported by her son to hospital physicians as a pre-existing condition prior to her March 2023 hospitalization)  · August 14, 2023:  MMSE: 21  · We will continue to provide a safe, secure, structure, and supportive environment at the nursing facility  · We will continue with 24/7 care/support of her ADLs  · We will continue with monitoring for change in her condition      6. Chronic deep vein thrombosis (DVT) of proximal vein of both lower extremities (HCC)  Assessment & Plan:  · As previously care plan, she is not on anticoagulation  · We will continue with monitoring for change in her condition      7. Moderate episode of recurrent major depressive disorder [facility])  Assessment & Plan:  · She is being followed by the facility psychiatry service with last visit being on September 6, 2023  · Nursing staff reports that she is doing well on her current psychotropic medication regimen  · We will continue her care in collaboration with the [facility] psychiatry service  · We will continue with monitoring for change in her condition      8. History of gastric ulcer  Assessment & Plan:  · EGD of March 21, 2023: 3 clean-based ulcers in the body of the stomach without hemorrhage (H. pylori staining on biopsy result inconclusive)  · June 16, 2023: H. pylori stool antigen: Not detected  · She is doing well with pantoprazole  · We will continue with monitoring for change in her condition      See my note of June 12, 2023 for further information. All medications and routine orders were reviewed and updated as needed. Plan discussed with: Nursing staff. Chief Complaint     She is seen for a follow-up visit to update the care and treatment of her chronic medical conditions. History of Present Illness     She is a 24-year-old woman who is seen with nursing staff for a follow-up visit to update the care and treatment of her chronic medical conditions, including frailty secondary to her chronic medical conditions, acute blood loss anemia, lower extremity edema, and stage IIIa chronic kidney disease. When asked, she has no complaints for me during the visit.   She denies chest pain and shortness of breath. Nursing staff reports that her overall functional status is stable, that her appetite is good, that she is sleeping well, and that she is having no difficulty with her bowel movements. Nursing staff endorses that she is not exhibiting any behaviors that are distressing to her or disruptive to the nursing unit. Nursing staff reports that her ankles are chronically swollen. The following portions of the patient's history were reviewed and updated as appropriate: current medications, past family history, past medical history, past social history, past surgical history and problem list.    Allergies:  No Known Allergies    Review of Systems     Review of Systems   Respiratory: Negative for shortness of breath. Cardiovascular: Negative for chest pain. Medications and orders     All medications reviewed and updated in custodial EMR. Objective     Vitals: Monthly vitals: Weight 128 pounds (increased 15 pounds in the last 6 months)    Physical Exam  Vitals reviewed. Exam conducted with a chaperone present. Constitutional:       General: She is awake. She is not in acute distress. Appearance: She is well-developed. She is not toxic-appearing or diaphoretic. Comments: She appears comfortable sitting in her wheelchair, stated age, and frail. Cardiovascular:      Rate and Rhythm: Normal rate and regular rhythm. Heart sounds: Normal heart sounds. No murmur heard. No friction rub. No gallop. Comments: Bilateral ankle edema: Right greater than left  Pulmonary:      Effort: Pulmonary effort is normal. No respiratory distress. Breath sounds: Normal breath sounds. No stridor. No wheezing, rhonchi or rales. Neurological:      Mental Status: She is alert. Psychiatric:         Behavior: Behavior is cooperative. Pertinent Laboratory/Diagnostic Studies: The following labs were reviewed please see chart or hospital paperwork for details.     June 16, 2023:    H. pylori stool antigen: Not detected    CBC with differential: WBC count 5.1, hemoglobin 10.1, hematocrit 31.4, platelet count 077,273, MCV 80    BMP: Sodium 140, potassium 4.3, BUN 19, creatinine 0.92, fasting blood sugar 92, EGFR 56, ECrCl 31    - Her order summary was reviewed and signed. Portions of the record may have been created with voice recognition software. Occasional wrong word or "sound a like" substitutions may have occurred due to the inherent limitations of voice recognition software. Read the chart carefully and recognize, using context, where substitutions have occurred.     Gerri Moreno M.D.  9/13/2023 8:13 AM

## 2023-09-13 NOTE — ASSESSMENT & PLAN NOTE
· As previously care plan, she is not on anticoagulation  · We will continue with monitoring for change in her condition

## 2023-09-13 NOTE — ASSESSMENT & PLAN NOTE
· EGD of March 21, 2023: 3 clean-based ulcers in the body of the stomach without hemorrhage (H. pylori staining on biopsy result inconclusive)  · June 16, 2023: H. pylori stool antigen: Not detected  · She is doing well with pantoprazole  · We will continue with monitoring for change in her condition

## 2023-09-13 NOTE — ASSESSMENT & PLAN NOTE
· June 2023: Baseline creatinine 0.9-1.1  · June 16, 2023: Creatinine 0.92, EGFR 56, ECrCl 31  · She is doing well with avoidance of nephrotoxic medications and supplements  · We will continue with clinical and periodic laboratory monitoring for change in her condition

## 2023-10-04 ENCOUNTER — NURSING HOME VISIT (OUTPATIENT)
Dept: GERIATRICS | Facility: OTHER | Age: 88
End: 2023-10-04
Payer: COMMERCIAL

## 2023-10-04 VITALS
TEMPERATURE: 98 F | WEIGHT: 133 LBS | DIASTOLIC BLOOD PRESSURE: 67 MMHG | SYSTOLIC BLOOD PRESSURE: 154 MMHG | HEART RATE: 65 BPM | RESPIRATION RATE: 18 BRPM | BODY MASS INDEX: 22.13 KG/M2

## 2023-10-04 DIAGNOSIS — M79.89 SWELLING OF LEFT HAND: Primary | ICD-10-CM

## 2023-10-04 PROCEDURE — 99308 SBSQ NF CARE LOW MDM 20: CPT | Performed by: PHYSICIAN ASSISTANT

## 2023-10-04 NOTE — ASSESSMENT & PLAN NOTE
Nursing reports new onset left hand swelling  No known injury  She states she doesn't have pain but does have stiffness and "can't make a fist"  She does have swelling in her hands and fingers  No erythema or ecchymosis  Will order xrays and a venous duplex  Follow up with results of studies  Order OT consult for ROM

## 2023-10-04 NOTE — PROGRESS NOTES
[address]  [phone] [address] [place]  Code  32      NAME: Lalita Aragon  AGE: 80 y.o. SEX: female [phone]    DATE OF ENCOUNTER: 10/4/2023    CODE STATUS: DNR    Assessment and Plan     Problem List Items Addressed This Visit        Other    Swelling of left hand - Primary     Nursing reports new onset left hand swelling  No known injury  She states she doesn't have pain but does have stiffness and "can't make a fist"  She does have swelling in her hands and fingers  No erythema or ecchymosis  Will order xrays and a venous duplex  Follow up with results of studies  Order OT consult for ROM            No orders of the defined types were placed in this encounter. Chief Complaint     Chief Complaint   Patient presents with   • Geriatric Evaluation     Left hand swelling       History of Present Illness   80year old female being seen today in collaboration with nursing after they reported increased swelling in her left hand/fingers. She denies pain but has stiffness and decreased ROM. No injury. No redness or ecchymosis       The following portions of the patient's history were reviewed and updated as appropriate: allergies, current medications, past family history, past medical history, past social history, past surgical history and problem list.    Review of Systems   Review of Systems   Constitutional: Negative. Respiratory: Negative. Musculoskeletal: Positive for joint swelling. Skin: Negative.            Active Problem List     Patient Active Problem List   Diagnosis   • Neuropathy   • Ambulatory dysfunction   • Venous stasis dermatitis of both lower extremities   • Age-related osteoporosis without current pathological fracture   • Lower extremity edema   • Stage 3a chronic kidney disease (HCC)   • Compression fracture of L3 lumbar vertebra, closed, initial encounter ([facility])   • Paroxysmal supraventricular tachycardia    • Balance problem   • Constipation, unspecified   • Diverticulosis of intestine, part unspecified, without perforation or abscess without bleeding   • Generalized muscle weakness   • Cognitive impairment   • Polyneuropathy, unspecified   • Pulmonary embolism (HCC)   • History of DVT of lower extremity   • Chronic pain of left knee   • 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)   • History of GI bleed   • Acute blood loss anemia   • Metabolic encephalopathy   • Goals of care, counseling/discussion   • DNR (do not resuscitate)   • Hypokalemia   • Frailty syndrome in geriatric patient   • Adnexal cyst   • Atrial premature contractions   • Grade I diastolic dysfunction   • History of gastric ulcer   • Chronic deep vein thrombosis (DVT) of proximal vein of both lower extremities (HCC)   • Leg wound, right   • Current moderate episode of major depressive disorder without prior episode (HCC)   • Left shoulder pain   • Swelling of left hand         Objective     /67   Pulse 65   Temp 98 °F (36.7 °C)   Resp 18   Wt 60.3 kg (133 lb)   BMI 22.13 kg/m²     Physical Exam  Vitals reviewed. Constitutional:       General: She is not in acute distress. Appearance: She is not ill-appearing or diaphoretic. Musculoskeletal:      Comments: Left hand/fingers with swelling. Decreased ROM due to stiffness   Skin:     General: Skin is warm and dry. Findings: No bruising or erythema. Neurological:      Mental Status: She is alert. Mental status is at baseline. Pertinent Laboratory/Diagnostic Studies:    Xrays/duplex pending    Current Medications   Medications reviewed and updated in facility chart.

## 2023-10-23 ENCOUNTER — NURSING HOME VISIT (OUTPATIENT)
Dept: GERIATRICS | Facility: OTHER | Age: 88
End: 2023-10-23
Payer: COMMERCIAL

## 2023-10-23 VITALS
OXYGEN SATURATION: 92 % | HEART RATE: 79 BPM | TEMPERATURE: 98 F | DIASTOLIC BLOOD PRESSURE: 76 MMHG | SYSTOLIC BLOOD PRESSURE: 174 MMHG

## 2023-10-23 DIAGNOSIS — M81.0 AGE-RELATED OSTEOPOROSIS WITHOUT CURRENT PATHOLOGICAL FRACTURE: Primary | ICD-10-CM

## 2023-10-23 DIAGNOSIS — I10 HYPERTENSION, UNSPECIFIED TYPE: ICD-10-CM

## 2023-10-23 DIAGNOSIS — M25.512 ACUTE PAIN OF LEFT SHOULDER: ICD-10-CM

## 2023-10-23 PROBLEM — M19.012 PRIMARY OSTEOARTHRITIS OF LEFT SHOULDER: Status: ACTIVE | Noted: 2023-10-23

## 2023-10-23 PROCEDURE — 99309 SBSQ NF CARE MODERATE MDM 30: CPT

## 2023-10-23 RX ORDER — MENTHOL 1.4 %
ADHESIVE PATCH, MEDICATED TOPICAL
Start: 2023-10-23

## 2023-10-23 RX ORDER — LIDOCAINE 50 MG/G
1 PATCH TOPICAL DAILY
Start: 2023-10-23

## 2023-10-23 NOTE — ASSESSMENT & PLAN NOTE
Continue tylenol BID and PRN   Continue bengay to right shoulder and left arm   Will order lidocaine patch to left shoulder   Monitor for adequate pain relief

## 2023-10-23 NOTE — PROGRESS NOTES
[address] acute visit  POS 32    NAME: Patricio Jacobs  AGE: 80 y.o. SEX: female  : 1925     DATE: 10/23/2023    CODE STATUS: DNR     Assessment and Plan:     Problem List Items Addressed This Visit       Age-related osteoporosis without current pathological fracture - Primary    Relevant Medications    Menthol-Methyl Salicylate (CECIL MCCALLUM GREASELESS) 10-15 % greaseless cream    lidocaine (Lidoderm) 5 %    Left shoulder pain     Patient complaining of left shoulder pain radiating down to left fingers  Has tried tylenol and bengay for pain relief with minimal relief   She had recent imaging of left shoulder and left hand revealing osteoarthritis w/o fracture  Will order lidocaine patch to left shoulder         Relevant Medications    Menthol-Methyl Salicylate (CECIL MCCALLUM GREASELESS) 10-15 % greaseless cream    lidocaine (Lidoderm) 5 %    Hypertension     Last 3 blood pressures elevated  Most recent /76  Not on antihypertensive medications  Could be due to pain   Once pain is adequately controlled if BP remains elevated consider starting low dose ACE or CCB                 History of Present Illness:     Patient being seen for acute complaints of left shoulder pain. She reports left shoulder pain has been going on since her last fall. She reports pain radiates down to her left hand and fingers. She reports minimal relief with Tylenol and does not see any relief with Bengay. Recent imaging was reviewed and negative for acute fracture. The following portions of the patient's history were reviewed and updated as appropriate: allergies, current medications, past family history, past medical history, past social history, past surgical history and problem list.     Review of Systems:     Review of Systems   Constitutional:  Negative for chills and fever. HENT:  Negative for ear pain and sore throat. Eyes:  Negative for pain and visual disturbance. Respiratory:  Negative for cough and shortness of breath. Cardiovascular:  Negative for chest pain and palpitations. Gastrointestinal:  Negative for abdominal pain and vomiting. Genitourinary:  Negative for dysuria and hematuria. Musculoskeletal:  Positive for arthralgias. Negative for back pain. Skin:  Negative for color change and rash. Neurological:  Negative for seizures and syncope. All other systems reviewed and are negative.        Problem List:     Patient Active Problem List   Diagnosis    Neuropathy    Ambulatory dysfunction    Venous stasis dermatitis of both lower extremities    Age-related osteoporosis without current pathological fracture    Lower extremity edema    Stage 3a chronic kidney disease ([facility])    Compression fracture of L3 lumbar vertebra, closed, initial encounter ([facility])    Paroxysmal supraventricular tachycardia     Balance problem    Constipation, unspecified    Diverticulosis of intestine, part unspecified, without perforation or abscess without bleeding    Generalized muscle weakness    Cognitive impairment    Polyneuropathy, unspecified    Pulmonary embolism ([facility])    History of DVT of lower extremity    Chronic pain of left knee    Episode of recurrent major depressive disorder ([facility])    History of GI bleed    Acute blood loss anemia    Metabolic encephalopathy    Goals of care, counseling/discussion    DNR (do not resuscitate)    Hypokalemia    Frailty syndrome in geriatric patient    Adnexal cyst    Atrial premature contractions    Grade I diastolic dysfunction    History of gastric ulcer    Chronic deep vein thrombosis (DVT) of proximal vein of both lower extremities ([facility])    Leg wound, right    Current moderate episode of major depressive disorder without prior episode ([facility])    Left shoulder pain    Swelling of left hand    Primary osteoarthritis of left shoulder    Hypertension        Objective:     BP (!) 174/76 Pulse 79   Temp 98 °F (36.7 °C)   SpO2 92%     Physical Exam  Vitals and nursing note reviewed. Constitutional:       General: She is not in acute distress. Appearance: She is well-developed. HENT:      Head: Normocephalic and atraumatic. Eyes:      Conjunctiva/sclera: Conjunctivae normal.   Cardiovascular:      Rate and Rhythm: Normal rate and regular rhythm. Heart sounds: No murmur heard. Pulmonary:      Effort: Pulmonary effort is normal. No respiratory distress. Breath sounds: Normal breath sounds. Abdominal:      Palpations: Abdomen is soft. Tenderness: There is no abdominal tenderness. Musculoskeletal:         General: No swelling, tenderness or signs of injury. Left shoulder: Decreased range of motion. Cervical back: Neck supple. Skin:     General: Skin is warm and dry. Capillary Refill: Capillary refill takes less than 2 seconds. Neurological:      Mental Status: She is alert and oriented to person, place, and time. Mental status is at baseline. Motor: Weakness present. Psychiatric:         Mood and Affect: Mood normal.         Behavior: Behavior normal.         Pertinent Laboratory/Diagnostic Studies:    Laboratory Results: I have personally reviewed the pertinent laboratory results/reports     Radiology/Other Diagnostic Testing Results: I have personally reviewed pertinent reports.       Jenise Brown, [address] [place] [address] Medicine

## 2023-10-23 NOTE — ASSESSMENT & PLAN NOTE
Patient complaining of left shoulder pain radiating down to left fingers  Has tried tylenol and bengay for pain relief with minimal relief   She had recent imaging of left shoulder and left hand revealing osteoarthritis w/o fracture  Will order lidocaine patch to left shoulder

## 2023-10-23 NOTE — ASSESSMENT & PLAN NOTE
Last 3 blood pressures elevated  Most recent /76  Not on antihypertensive medications  Could be due to pain   Once pain is adequately controlled if BP remains elevated consider starting low dose ACE or CCB

## 2023-11-10 ENCOUNTER — NURSING HOME VISIT (OUTPATIENT)
Dept: GERIATRICS | Facility: OTHER | Age: 88
End: 2023-11-10
Payer: COMMERCIAL

## 2023-11-10 VITALS
DIASTOLIC BLOOD PRESSURE: 76 MMHG | SYSTOLIC BLOOD PRESSURE: 174 MMHG | HEART RATE: 79 BPM | RESPIRATION RATE: 18 BRPM | BODY MASS INDEX: 21.98 KG/M2 | WEIGHT: 132.1 LBS | TEMPERATURE: 98 F

## 2023-11-10 DIAGNOSIS — Z66 DNR (DO NOT RESUSCITATE): ICD-10-CM

## 2023-11-10 DIAGNOSIS — R60.0 LOWER EXTREMITY EDEMA: ICD-10-CM

## 2023-11-10 DIAGNOSIS — I47.10 PAROXYSMAL SUPRAVENTRICULAR TACHYCARDIA: Primary | ICD-10-CM

## 2023-11-10 DIAGNOSIS — I82.5Y3 CHRONIC DEEP VEIN THROMBOSIS (DVT) OF PROXIMAL VEIN OF BOTH LOWER EXTREMITIES (HCC): ICD-10-CM

## 2023-11-10 DIAGNOSIS — M19.012 PRIMARY OSTEOARTHRITIS OF LEFT SHOULDER: ICD-10-CM

## 2023-11-10 DIAGNOSIS — Z87.19 HISTORY OF GI BLEED: ICD-10-CM

## 2023-11-10 DIAGNOSIS — F33.1 MODERATE EPISODE OF RECURRENT MAJOR DEPRESSIVE DISORDER (HCC): ICD-10-CM

## 2023-11-10 PROBLEM — M79.89 SWELLING OF LEFT HAND: Status: RESOLVED | Noted: 2023-10-04 | Resolved: 2023-11-10

## 2023-11-10 PROBLEM — S81.801A LEG WOUND, RIGHT: Status: RESOLVED | Noted: 2023-05-16 | Resolved: 2023-11-10

## 2023-11-10 PROBLEM — S32.030A COMPRESSION FRACTURE OF L3 LUMBAR VERTEBRA, CLOSED, INITIAL ENCOUNTER (HCC): Status: RESOLVED | Noted: 2020-08-22 | Resolved: 2023-11-10

## 2023-11-10 PROCEDURE — 99309 SBSQ NF CARE MODERATE MDM 30: CPT | Performed by: PHYSICIAN ASSISTANT

## 2023-11-11 NOTE — ASSESSMENT & PLAN NOTE
Previously on metoprolol but this was discontinued previously  Rate controlled off medications  Continue to monitor

## 2023-11-11 NOTE — ASSESSMENT & PLAN NOTE
Follows with psychiatry service  Mood stable  She enjoys attending facility activities and conversing with other residents  Continue sertraline

## 2023-11-11 NOTE — ASSESSMENT & PLAN NOTE
Was previously on lasix but this was discontinued during a hospitalization  LE edema controlled  Continue to monitor

## 2023-11-11 NOTE — PROGRESS NOTES
[address]  [phone] [address] [place]  Code 32      NAME: Shelley Late  AGE: 80 y.o. SEX: female [phone]    DATE OF ENCOUNTER: 11/10/2023    CODE STATUS: DNR    Assessment and Plan     Problem List Items Addressed This Visit          Cardiovascular and Mediastinum    Paroxysmal supraventricular tachycardia  - Primary     Previously on metoprolol but this was discontinued previously  Rate controlled off medications  Continue to monitor         Chronic deep vein thrombosis (DVT) of proximal vein of both lower extremities ([address])     Per prior records, not on anticoagulation because bleeding risk outweighed benefits of anticoagulation            Musculoskeletal and Integument    Primary osteoarthritis of left shoulder     Has discomfort intermittently  Continue lidocaine patch, bengay, tylenol              Other    Lower extremity edema     Was previously on lasix but this was discontinued during a hospitalization  LE edema controlled  Continue to monitor          Episode of recurrent major depressive disorder ([address])     Follows with psychiatry service  Mood stable  She enjoys attending facility activities and conversing with other residents  Continue sertraline         History of GI bleed     No recent signs of GI bleeding  Continue PPI  Continue periodic monitoring of HGB         DNR (do not resuscitate)       No orders of the defined types were placed in this encounter. Chief Complaint     Chief Complaint   Patient presents with    Geriatric Evaluation     Follow up       History of Present Illness   80year old female resident of [address] [place] [address] being seen today in collaboration with nursing for follow up for chronic conditions. She feels well and has no complaints. She enjoys attending facility activities.  Nursing has no concerns         The following portions of the patient's history were reviewed and updated as appropriate: allergies, current medications, past family history, past medical history, past social history, past surgical history and problem list.    Review of Systems   Review of Systems   Constitutional: Negative. HENT: Negative. Eyes: Negative. Respiratory: Negative. Cardiovascular:  Positive for leg swelling. Gastrointestinal: Negative. Endocrine: Negative. Genitourinary: Negative. Musculoskeletal:  Positive for arthralgias and gait problem. Hematological: Negative. Psychiatric/Behavioral: Negative.             Active Problem List     Patient Active Problem List   Diagnosis    Neuropathy    Ambulatory dysfunction    Venous stasis dermatitis of both lower extremities    Age-related osteoporosis without current pathological fracture    Lower extremity edema    Stage 3a chronic kidney disease (HCC)    Paroxysmal supraventricular tachycardia     Balance problem    Constipation, unspecified    Diverticulosis of intestine, part unspecified, without perforation or abscess without bleeding    Generalized muscle weakness    Cognitive impairment    Polyneuropathy, unspecified    Pulmonary embolism (HCC)    History of DVT of lower extremity    Chronic pain of left knee    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)    History of GI bleed    Acute blood loss anemia    Metabolic encephalopathy    Goals of care, counseling/discussion    DNR (do not resuscitate)    Hypokalemia    Frailty syndrome in geriatric patient    Adnexal cyst    Atrial premature contractions    Grade I diastolic dysfunction    History of gastric ulcer    Chronic deep vein thrombosis (DVT) of proximal vein of both lower extremities (HCC)    Current moderate episode of major depressive disorder without prior episode (HCC)    Left shoulder pain    Primary osteoarthritis of left shoulder    Hypertension         Objective     BP (!) 174/76   Pulse 79   Temp 98 °F (36.7 °C)   Resp 18   Wt 59.9 kg (132 lb 1.6 oz) BMI 21.98 kg/m²     Physical Exam    Pertinent Laboratory/Diagnostic Studies:        Current Medications   Medications reviewed and updated in facility chart.

## 2023-11-11 NOTE — ASSESSMENT & PLAN NOTE
Per prior records, not on anticoagulation because bleeding risk outweighed benefits of anticoagulation

## 2023-12-11 ENCOUNTER — NURSING HOME VISIT (OUTPATIENT)
Dept: GERIATRICS | Facility: OTHER | Age: 88
End: 2023-12-11
Payer: COMMERCIAL

## 2023-12-11 VITALS
WEIGHT: 137.8 LBS | BODY MASS INDEX: 22.93 KG/M2 | TEMPERATURE: 98 F | SYSTOLIC BLOOD PRESSURE: 161 MMHG | DIASTOLIC BLOOD PRESSURE: 74 MMHG | HEART RATE: 72 BPM

## 2023-12-11 DIAGNOSIS — M79.602 LEFT ARM PAIN: Primary | ICD-10-CM

## 2023-12-11 PROCEDURE — 99308 SBSQ NF CARE LOW MDM 20: CPT | Performed by: PHYSICIAN ASSISTANT

## 2023-12-11 RX ORDER — GABAPENTIN 100 MG/1
100 CAPSULE ORAL 2 TIMES DAILY
Status: ON HOLD | COMMUNITY

## 2023-12-11 NOTE — PROGRESS NOTES
[address]  [phone]) [place]  Code 32      NAME: Mariama Harris  AGE: 80 y.o. SEX: female [phone]    DATE OF ENCOUNTER: 12/11/2023    CODE STATUS: DNR    Assessment and Plan     Problem List Items Addressed This Visit          Other    Left arm pain - Primary     Pain with known chronic pain due to shoulder arthritis. Has some relief with tylenol, bengay, and PT  Now states that she has pain that radiates between her shoulders and all the way down her left arm. Worse with movement, ROM is limited due to pain  She states this started when she was doing exercises many months ago  She denies neck pain and has full ROM of her neck  She denies numbness/tingling but does admit to "shooting pain" all the way down her arm  Cspine xray does show multilevel degenerative disc disease with spurring. May have component of neurogenic pain. Will try low dose gabapentin as this is an ongoing complaint and is affecting her ADL's            No orders of the defined types were placed in this encounter. Chief Complaint     Chief Complaint   Patient presents with    Geriatric Evaluation     Left posterior shoulder/arm pain       History of Present Illness   80year old female with ongoing pain of her right arm. She states now it seems to originate from her lower neck area and radiates down the entire left arm. Pain is worse with ROM and she is having more trouble with ADL's         The following portions of the patient's history were reviewed and updated as appropriate: allergies, current medications, past family history, past medical history, past social history, past surgical history and problem list.    Review of Systems   Review of Systems   Constitutional: Negative. Respiratory: Negative. Cardiovascular:  Positive for leg swelling. Musculoskeletal:  Positive for arthralgias and myalgias. Negative for neck stiffness. Active Problem List     Patient Active Problem List   Diagnosis    Neuropathy    Ambulatory dysfunction    Venous stasis dermatitis of both lower extremities    Age-related osteoporosis without current pathological fracture    Lower extremity edema    Stage 3a chronic kidney disease (HCC)    Paroxysmal supraventricular tachycardia     Balance problem    Constipation, unspecified    Diverticulosis of intestine, part unspecified, without perforation or abscess without bleeding    Generalized muscle weakness    Cognitive impairment    Polyneuropathy, unspecified    Pulmonary embolism (HCC)    History of DVT of lower extremity    Chronic pain of left knee    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)    History of GI bleed    Acute blood loss anemia    Metabolic encephalopathy    Goals of care, counseling/discussion    DNR (do not resuscitate)    Hypokalemia    Frailty syndrome in geriatric patient    Adnexal cyst    Atrial premature contractions    Grade I diastolic dysfunction    History of gastric ulcer    Chronic deep vein thrombosis (DVT) of proximal vein of both lower extremities (HCC)    Current moderate episode of major depressive disorder without prior episode ([facility])    Left shoulder pain    Primary osteoarthritis of left shoulder    Hypertension    Left arm pain         Objective     /74   Pulse 72   Temp 98 °F (36.7 °C)   Wt 62.5 kg (137 lb 12.8 oz)   BMI 22.93 kg/m²     Physical Exam  Vitals reviewed. Constitutional:       General: She is not in acute distress. Appearance: She is not ill-appearing or diaphoretic. Musculoskeletal:      Right lower leg: Edema present. Left lower leg: Edema present. Comments: Tenderness in left trapezius muscle. Decreased ROM left shoulder. ROM left shoulder causes shooting pain down to fingers. Full ROM neck with no pain. Left hand slightly swollen   Neurological:      Mental Status: She is alert.          Pertinent Laboratory/Diagnostic Studies:    Xray C spine shows multilevel degen disc disease with spurring    Current Medications   Medications reviewed and updated in facility chart.

## 2023-12-11 NOTE — ASSESSMENT & PLAN NOTE
Pain with known chronic pain due to shoulder arthritis. Has some relief with tylenol, bengay, and PT  Now states that she has pain that radiates between her shoulders and all the way down her left arm. Worse with movement, ROM is limited due to pain  She states this started when she was doing exercises many months ago  She denies neck pain and has full ROM of her neck  She denies numbness/tingling but does admit to "shooting pain" all the way down her arm  Cspine xray does show multilevel degenerative disc disease with spurring. May have component of neurogenic pain.  Will try low dose gabapentin as this is an ongoing complaint and is affecting her ADL's

## 2023-12-21 ENCOUNTER — APPOINTMENT (EMERGENCY)
Dept: RADIOLOGY | Facility: HOSPITAL | Age: 88
DRG: 177 | End: 2023-12-21
Payer: COMMERCIAL

## 2023-12-21 ENCOUNTER — HOSPITAL ENCOUNTER (INPATIENT)
Facility: HOSPITAL | Age: 88
LOS: 5 days | Discharge: NURSING, REHAB OR LONG-TERM CARE | DRG: 177 | End: 2023-12-26
Attending: EMERGENCY MEDICINE | Admitting: INTERNAL MEDICINE
Payer: COMMERCIAL

## 2023-12-21 DIAGNOSIS — R09.02 HYPOXIA: Primary | ICD-10-CM

## 2023-12-21 DIAGNOSIS — U07.1 COVID: ICD-10-CM

## 2023-12-21 DIAGNOSIS — J90 PLEURAL EFFUSION, RIGHT: ICD-10-CM

## 2023-12-21 DIAGNOSIS — R03.0 ELEVATED BP WITHOUT DIAGNOSIS OF HYPERTENSION: ICD-10-CM

## 2023-12-21 DIAGNOSIS — U07.1 COVID-19 VIRUS INFECTION: ICD-10-CM

## 2023-12-21 DIAGNOSIS — J96.01 ACUTE HYPOXIC RESPIRATORY FAILURE (HCC): ICD-10-CM

## 2023-12-21 LAB
2HR DELTA HS TROPONIN: 2 NG/L
ANION GAP SERPL CALCULATED.3IONS-SCNC: 8 MMOL/L
ATRIAL RATE: 153 BPM
ATRIAL RATE: 87 BPM
BASOPHILS # BLD AUTO: 0.01 THOUSANDS/ÂΜL (ref 0–0.1)
BASOPHILS NFR BLD AUTO: 0 % (ref 0–1)
BUN SERPL-MCNC: 20 MG/DL (ref 5–25)
CA-I BLD-SCNC: 0.98 MMOL/L (ref 1.12–1.32)
CALCIUM SERPL-MCNC: 8.6 MG/DL (ref 8.4–10.2)
CARDIAC TROPONIN I PNL SERPL HS: 11 NG/L
CARDIAC TROPONIN I PNL SERPL HS: 13 NG/L
CHLORIDE SERPL-SCNC: 103 MMOL/L (ref 96–108)
CK SERPL-CCNC: 28 U/L (ref 26–192)
CO2 SERPL-SCNC: 25 MMOL/L (ref 21–32)
CREAT SERPL-MCNC: 0.77 MG/DL (ref 0.6–1.3)
CRP SERPL QL: 59.7 MG/L
D DIMER PPP FEU-MCNC: 2.68 UG/ML FEU
EOSINOPHIL # BLD AUTO: 0.01 THOUSAND/ÂΜL (ref 0–0.61)
EOSINOPHIL NFR BLD AUTO: 0 % (ref 0–6)
ERYTHROCYTE [DISTWIDTH] IN BLOOD BY AUTOMATED COUNT: 18.3 % (ref 11.6–15.1)
FLUAV RNA RESP QL NAA+PROBE: NEGATIVE
FLUBV RNA RESP QL NAA+PROBE: NEGATIVE
GFR SERPL CREATININE-BSD FRML MDRD: 64 ML/MIN/1.73SQ M
GLUCOSE SERPL-MCNC: 106 MG/DL (ref 65–140)
HCT VFR BLD AUTO: 39.3 % (ref 34.8–46.1)
HGB BLD-MCNC: 11.5 G/DL (ref 11.5–15.4)
HIV 1+2 AB+HIV1 P24 AG SERPL QL IA: NORMAL
HIV1 P24 AG SER QL: NORMAL
IMM GRANULOCYTES # BLD AUTO: 0.02 THOUSAND/UL (ref 0–0.2)
IMM GRANULOCYTES NFR BLD AUTO: 0 % (ref 0–2)
LACTATE SERPL-SCNC: 0.7 MMOL/L (ref 0.5–2)
LYMPHOCYTES # BLD AUTO: 0.6 THOUSANDS/ÂΜL (ref 0.6–4.47)
LYMPHOCYTES NFR BLD AUTO: 8 % (ref 14–44)
MAGNESIUM SERPL-MCNC: 2 MG/DL (ref 1.9–2.7)
MCH RBC QN AUTO: 23.3 PG (ref 26.8–34.3)
MCHC RBC AUTO-ENTMCNC: 29.3 G/DL (ref 31.4–37.4)
MCV RBC AUTO: 80 FL (ref 82–98)
MONOCYTES # BLD AUTO: 0.85 THOUSAND/ÂΜL (ref 0.17–1.22)
MONOCYTES NFR BLD AUTO: 12 % (ref 4–12)
NEUTROPHILS # BLD AUTO: 5.86 THOUSANDS/ÂΜL (ref 1.85–7.62)
NEUTS SEG NFR BLD AUTO: 80 % (ref 43–75)
NRBC BLD AUTO-RTO: 0 /100 WBCS
P AXIS: 56 DEGREES
PLATELET # BLD AUTO: 221 THOUSANDS/UL (ref 149–390)
PMV BLD AUTO: 9.3 FL (ref 8.9–12.7)
POTASSIUM SERPL-SCNC: 4.2 MMOL/L (ref 3.5–5.3)
PR INTERVAL: 188 MS
PROCALCITONIN SERPL-MCNC: 0.17 NG/ML
QRS AXIS: -72 DEGREES
QRS AXIS: -78 DEGREES
QRSD INTERVAL: 128 MS
QRSD INTERVAL: 128 MS
QT INTERVAL: 298 MS
QT INTERVAL: 378 MS
QTC INTERVAL: 454 MS
QTC INTERVAL: 469 MS
RBC # BLD AUTO: 4.94 MILLION/UL (ref 3.81–5.12)
RSV RNA RESP QL NAA+PROBE: NEGATIVE
SARS-COV-2 RNA RESP QL NAA+PROBE: POSITIVE
SODIUM SERPL-SCNC: 136 MMOL/L (ref 135–147)
T WAVE AXIS: 45 DEGREES
T WAVE AXIS: 85 DEGREES
VENTRICULAR RATE: 149 BPM
VENTRICULAR RATE: 87 BPM
WBC # BLD AUTO: 7.35 THOUSAND/UL (ref 4.31–10.16)

## 2023-12-21 PROCEDURE — 86803 HEPATITIS C AB TEST: CPT

## 2023-12-21 PROCEDURE — G1004 CDSM NDSC: HCPCS

## 2023-12-21 PROCEDURE — 86705 HEP B CORE ANTIBODY IGM: CPT

## 2023-12-21 PROCEDURE — 80048 BASIC METABOLIC PNL TOTAL CA: CPT

## 2023-12-21 PROCEDURE — 0241U HB NFCT DS VIR RESP RNA 4 TRGT: CPT

## 2023-12-21 PROCEDURE — 86704 HEP B CORE ANTIBODY TOTAL: CPT

## 2023-12-21 PROCEDURE — 86140 C-REACTIVE PROTEIN: CPT

## 2023-12-21 PROCEDURE — 84484 ASSAY OF TROPONIN QUANT: CPT

## 2023-12-21 PROCEDURE — 94760 N-INVAS EAR/PLS OXIMETRY 1: CPT

## 2023-12-21 PROCEDURE — 83605 ASSAY OF LACTIC ACID: CPT

## 2023-12-21 PROCEDURE — 94760 N-INVAS EAR/PLS OXIMETRY 1: CPT | Performed by: SOCIAL WORKER

## 2023-12-21 PROCEDURE — 85025 COMPLETE CBC W/AUTO DIFF WBC: CPT

## 2023-12-21 PROCEDURE — 36415 COLL VENOUS BLD VENIPUNCTURE: CPT

## 2023-12-21 PROCEDURE — 71275 CT ANGIOGRAPHY CHEST: CPT

## 2023-12-21 PROCEDURE — 93005 ELECTROCARDIOGRAM TRACING: CPT

## 2023-12-21 PROCEDURE — 87806 HIV AG W/HIV1&2 ANTB W/OPTIC: CPT

## 2023-12-21 PROCEDURE — 82550 ASSAY OF CK (CPK): CPT

## 2023-12-21 PROCEDURE — 92610 EVALUATE SWALLOWING FUNCTION: CPT

## 2023-12-21 PROCEDURE — 82330 ASSAY OF CALCIUM: CPT

## 2023-12-21 PROCEDURE — 83735 ASSAY OF MAGNESIUM: CPT

## 2023-12-21 PROCEDURE — 85379 FIBRIN DEGRADATION QUANT: CPT

## 2023-12-21 PROCEDURE — 99291 CRITICAL CARE FIRST HOUR: CPT | Performed by: EMERGENCY MEDICINE

## 2023-12-21 PROCEDURE — 99284 EMERGENCY DEPT VISIT MOD MDM: CPT

## 2023-12-21 PROCEDURE — 87340 HEPATITIS B SURFACE AG IA: CPT

## 2023-12-21 PROCEDURE — 84145 PROCALCITONIN (PCT): CPT

## 2023-12-21 RX ORDER — LABETALOL HYDROCHLORIDE 5 MG/ML
10 INJECTION, SOLUTION INTRAVENOUS EVERY 6 HOURS PRN
Status: DISCONTINUED | OUTPATIENT
Start: 2023-12-21 | End: 2023-12-26

## 2023-12-21 RX ORDER — BENZONATATE 100 MG/1
200 CAPSULE ORAL 3 TIMES DAILY
Status: DISCONTINUED | OUTPATIENT
Start: 2023-12-21 | End: 2023-12-26 | Stop reason: HOSPADM

## 2023-12-21 RX ORDER — LABETALOL HYDROCHLORIDE 5 MG/ML
10 INJECTION, SOLUTION INTRAVENOUS EVERY 6 HOURS
Status: DISCONTINUED | OUTPATIENT
Start: 2023-12-21 | End: 2023-12-21

## 2023-12-21 RX ORDER — ACETAMINOPHEN 325 MG/1
650 TABLET ORAL ONCE
Status: DISCONTINUED | OUTPATIENT
Start: 2023-12-21 | End: 2023-12-21

## 2023-12-21 RX ORDER — ACETAMINOPHEN 10 MG/ML
1000 INJECTION, SOLUTION INTRAVENOUS EVERY 8 HOURS PRN
Status: DISCONTINUED | OUTPATIENT
Start: 2023-12-21 | End: 2023-12-26 | Stop reason: HOSPADM

## 2023-12-21 RX ORDER — LIDOCAINE HYDROCHLORIDE 20 MG/ML
15 SOLUTION OROPHARYNGEAL ONCE
Status: COMPLETED | OUTPATIENT
Start: 2023-12-21 | End: 2023-12-21

## 2023-12-21 RX ORDER — PANTOPRAZOLE SODIUM 40 MG/1
40 TABLET, DELAYED RELEASE ORAL DAILY
Status: DISCONTINUED | OUTPATIENT
Start: 2023-12-21 | End: 2023-12-26 | Stop reason: HOSPADM

## 2023-12-21 RX ORDER — GABAPENTIN 100 MG/1
100 CAPSULE ORAL 2 TIMES DAILY
Status: DISCONTINUED | OUTPATIENT
Start: 2023-12-21 | End: 2023-12-26 | Stop reason: HOSPADM

## 2023-12-21 RX ORDER — ENOXAPARIN SODIUM 100 MG/ML
40 INJECTION SUBCUTANEOUS DAILY
Status: DISCONTINUED | OUTPATIENT
Start: 2023-12-21 | End: 2023-12-22

## 2023-12-21 RX ORDER — GUAIFENESIN 600 MG/1
1200 TABLET, EXTENDED RELEASE ORAL EVERY 12 HOURS SCHEDULED
Status: DISCONTINUED | OUTPATIENT
Start: 2023-12-21 | End: 2023-12-26 | Stop reason: HOSPADM

## 2023-12-21 RX ORDER — ACETAMINOPHEN 325 MG/1
650 TABLET ORAL EVERY 4 HOURS PRN
Status: DISCONTINUED | OUTPATIENT
Start: 2023-12-21 | End: 2023-12-21

## 2023-12-21 RX ORDER — DOXYCYCLINE HYCLATE 100 MG/1
100 CAPSULE ORAL EVERY 12 HOURS SCHEDULED
Status: DISCONTINUED | OUTPATIENT
Start: 2023-12-21 | End: 2023-12-26 | Stop reason: HOSPADM

## 2023-12-21 RX ORDER — DEXAMETHASONE SODIUM PHOSPHATE 10 MG/ML
6 INJECTION, SOLUTION INTRAMUSCULAR; INTRAVENOUS EVERY 12 HOURS SCHEDULED
Status: DISCONTINUED | OUTPATIENT
Start: 2023-12-21 | End: 2023-12-26

## 2023-12-21 RX ADMIN — LIDOCAINE HYDROCHLORIDE 15 ML: 20 SOLUTION ORAL at 09:41

## 2023-12-21 RX ADMIN — ACETAMINOPHEN 1000 MG: 10 INJECTION INTRAVENOUS at 19:33

## 2023-12-21 RX ADMIN — IOHEXOL 85 ML: 350 INJECTION, SOLUTION INTRAVENOUS at 11:19

## 2023-12-21 RX ADMIN — ENOXAPARIN SODIUM 40 MG: 40 INJECTION SUBCUTANEOUS at 16:22

## 2023-12-21 RX ADMIN — DEXAMETHASONE SODIUM PHOSPHATE 6 MG: 10 INJECTION, SOLUTION INTRAMUSCULAR; INTRAVENOUS at 22:08

## 2023-12-21 RX ADMIN — Medication 10 MG: at 19:14

## 2023-12-21 RX ADMIN — CEFTRIAXONE SODIUM 1000 MG: 10 INJECTION, POWDER, FOR SOLUTION INTRAVENOUS at 16:20

## 2023-12-21 NOTE — ASSESSMENT & PLAN NOTE
Patient with known hx of rocío LE DVT's on prior duplex from Jan. Reported right proximal femoral to posterior tibial clot and left popliteal and gastroc clot. She was previously on Eliquis but this had to be discontinued after a GI bleed (3/2023). It was resumed briefly in the past but the melena started again. Patient and son both agreed that risk of anticoagulation outweighed the benefits so she has not been on anticoagulation    Plan  Discussed with son - agreeable to heparin while admitted as part of COVID pathway (will stop as of 12/26 given improvement)

## 2023-12-21 NOTE — ASSESSMENT & PLAN NOTE
February 21, 2023: Mini-Mental status exam: 25/30 April 1, 2023: Mini-Mental status exam performed at rehabilitation facility: 23/30.  (Reported by her son to hospital physicians as a pre-existing condition prior to her March 2023 hospitalization)    August 14, 2023: MMSE: 21    Patient remains pleasantly demented. Today oriented to self and month but less so situation or date. Roughly at baseline per my discussion with her son.    Plan  Continue to monitor for changes in mentation  Non-pharmacologic measures to prevent delirium

## 2023-12-21 NOTE — ASSESSMENT & PLAN NOTE
Patient presented from [facility] due to worsening hypoxia. Found to be COVID-positive but sent to ED due to worsening respiratory status. During course of initial evaluation in ED, event concerning for aspiration occurred and patient subsequently required HFNC. CT chest with large effusion and debris in RLL bronchus. Fortunately, initial lactate, procal, troponin, WBC all within normal limits. Patient admitted and started on severe COVID pathway    12/26 - patient continues to report improvement in her breathing. Now back on RA and tolerating this well.     Plan  Continue mid-flow nasal cannula and wean as able for oxygen level over 90%  Continue COVID severe pathway  Will start dexamethasone taper today (2 mg daily decrease)  Ceftriaxone 1 g every 24 hours - stop today  Doxycycline 100 mg every 12 hours - stop today  S/p Tocilizumab 12/22  Will stop heparin infusion today  Continue guaifenesin and benzonatate on d/c  Per SLP, dysphagia 2 with thin liquids

## 2023-12-21 NOTE — ASSESSMENT & PLAN NOTE
Patient has history of neuropathy - no particular complaints related to this at present.    Plan  Continue PTA gabapentin

## 2023-12-21 NOTE — ASSESSMENT & PLAN NOTE
Patient with history of pSVT, for which she was previously on treatment with metoprolol. However, she has been off of this for some time. Currently denying any cardiac complaints and appears to be in normal sinus rhythm with controlled rates.    Plan  Continue to monitor

## 2023-12-21 NOTE — SPEECH THERAPY NOTE
Speech-Language Pathology Bedside Swallow Evaluation    Patient Name: Raisa Sinha    Today's Date: 12/21/2023     Problem List  Active Problems:  There are no active Hospital Problems.    Past Medical History  Past Medical History:   Diagnosis Date    Compression fracture of L3 lumbar vertebra, closed, initial encounter ([facility]) 08/22/2020    COVID 01/27/2023    Fall 08/22/2020    History of venous thromboembolism 03/18/2023    Infiltrate noted on imaging study 12/27/2022    Interstitial cystitis     Leg wound, right 05/16/2023    Leukocytosis 03/18/2023    Leukopenia     Neurologic gait dysfunction     Abstraction Dr. Arguelles's charts    Neuropathy     Neutropenia ([facility])     Abstraction Dr. Arguelles's charts    Osteoarthritis     Osteoporosis     Peripheral edema     Abstraction Dr. Arguelles's charts    Renal cyst     Syncope     Abstraction Dr. Arguelles's charts    Viral gastroenteritis 04/04/2023       Past Surgical History  Past Surgical History:   Procedure Laterality Date    CAPSULOTOMY      Right eye    CATARACT EXTRACTION Left     CHALAZION EXCISION      COLONOSCOPY  2006       Summary  Pt is on a regular diet at baseline at her SNF. Per RN, pt was drinking water when she began coughing and became short of breath requiring HFNC. She presented today with s/s suggestive of at least moderate oropharyngeal dysphagia. Symptoms or concerns included  reduced bolus manipulation and control,   suspected pharyngeal swallow delay and suspected decreased hyolaryngeal elevation upon palpation. Cough and increased vocal wetness noted with all trials. Pt also reported pain in her throat when swallowing. Discussed with RN, recommend primarily NPO for now, ST will f/u tomorrow to re assess. Likely will need MBS once covid precautions are lifted.      Risk/s for Aspiration: mod    Recommended Diet: NPO except medications   Recommended Form of Meds: crushed with puree   Aspiration precautions and swallowing strategies: upright posture and  small bites/sips  Other Recommendations: Continue frequent oral care      Current Medical Status per ED note 12/21/23   89-year-old female with history of PE sent in from assisted living facility for hypoxia.  Per EMS staff reports congestive cough x several days and recent positive COVID.  Patient reports shortness of breath and sore throat.  Denies chest pain, abdominal pain, nausea, or vomiting.     Special Studies:  CTA chest 12/21/23 IMPRESSION:  With mild compromise by motion and partial right lower lobe atelectasis, no pulmonary embolus.  Large right pleural effusion with moderate rounded atelectasis in the right lower lobe.  Debris occluding the right lower lobe bronchi.      Social/Education/Vocational Hx:  Pt lives in SNF/ECF    Swallow Information   Current Symptoms/Concerns: coughing with po and change in respiratory status  Current Diet: regular diet and thin liquids   Baseline Diet: regular diet and thin liquids      Baseline Assessment   Behavior/Cognition: alert  Speech/Language Status: able to participate in basic conversation and able to follow commands inconsistently  Patient Positioning: upright in bed  Pain Status/Interventions/Response to Interventions: No report of or nonverbal indications of pain.       Swallow Mechanism Exam  Facial: symmetrical  Labial: decreased strength  Lingual: decreased coordination  Velum: symmetrical  Mandible: adequate ROM  Dentition: limited dentition  Vocal quality:weak   Volitional Cough: weak   Respiratory Status: HFNC      Consistencies Assessed and Performance   Consistencies Administered: thin liquids, nectar thick, and puree    Oral Stage: moderate, decreased bolus propulsion, decreased bolus formation, and suspected decreased control of thin liquids     Pharyngeal Stage: moderate, suspected pharyngeal swallow delay, and suspected decreased hyolaryngeal elevation upon palpation, as well as effortful and painful swallows    Esophageal Concerns: none  reported      Summary and Recommendations (see above)    Results Reviewed with: patient and RN     Treatment Recommended: yes     Frequency of treatment: 2-3x/week      Dysphagia LTG  -Patient will demonstrate safe and effective oral intake (without overt s/s significant oral/pharyngeal dysphagia including s/s penetration or aspiration) for the highest appropriate diet level.     Short Term Goals:  -Pt will tolerate the least restrictive diet with the least restrictive liquid consistency without s/s of aspiration x72hrs.    -Patient will tolerate trials of upgraded food and/or liquid texture with no significant s/s of oral or pharyngeal dysphagia including aspiration across 1-3 diagnostic sessions     -Patient will comply with a Video/Modified Barium Swallow study for more complete assessment of swallowing anatomy/physiology/aspiration risk and to assess efficacy of treatment techniques so as to best guide treatment plan

## 2023-12-21 NOTE — Clinical Note
Case was discussed with FIDENCIO and the patient's admission status was agreed to be Admission Status: inpatient status to the service of Dr. De Dios .

## 2023-12-21 NOTE — ED PROVIDER NOTES
History  Chief Complaint   Patient presents with    Decreased Oxygen Level     Pt coming from [facility]. Per staff, O2 sat in the high 80s. EMS had patient on 4 LNC satting at 96%. Room air sat in ED 91%.      89-year-old female with history of PE sent in from assisted living facility for hypoxia.  Per EMS staff reports congestive cough x several days and recent positive COVID.  Patient reports shortness of breath and sore throat.  Denies chest pain, abdominal pain, nausea, or vomiting.        Prior to Admission Medications   Prescriptions Last Dose Informant Patient Reported? Taking?   Menthol-Methyl Salicylate (CECIL MCCALLUM GREASELESS) 10-15 % greaseless cream   No No   Sig: Apply topically daily at bedtime   acetaminophen (TYLENOL) 325 mg tablet   Yes No   Sig: Take 650 mg by mouth every 4 (four) hours as needed for mild pain or fever   acetaminophen (TYLENOL) 325 mg tablet   Yes No   Sig: Take 650 mg by mouth 2 (two) times a day   gabapentin (NEURONTIN) 100 mg capsule   Yes No   Sig: Take 100 mg by mouth 2 (two) times a day   lidocaine (Lidoderm) 5 %   No No   Sig: Apply 1 patch topically over 12 hours daily Remove & Discard patch within 12 hours or as directed by MD   pantoprazole (PROTONIX) 40 mg tablet   Yes No   Sig: Take 40 mg by mouth daily   sertraline (ZOLOFT) 50 mg tablet   Yes No   Sig: Take 75 mg by mouth daily      Facility-Administered Medications: None       Past Medical History:   Diagnosis Date    Compression fracture of L3 lumbar vertebra, closed, initial encounter (HCC) 08/22/2020    COVID 01/27/2023    Fall 08/22/2020    History of venous thromboembolism 03/18/2023    Infiltrate noted on imaging study 12/27/2022    Interstitial cystitis     Leg wound, right 05/16/2023    Leukocytosis 03/18/2023    Leukopenia     Neurologic gait dysfunction     Abstraction Dr. Arguelles's charts    Neuropathy     Neutropenia ([facility])     Abstraction Dr. Arguelles's charts    Osteoarthritis     Osteoporosis     Peripheral  edema     Abstraction Dr. Arguelles's charts    Renal cyst     Syncope     Abstraction Dr. Arguelles's charts    Viral gastroenteritis 2023       Past Surgical History:   Procedure Laterality Date    CAPSULOTOMY      Right eye    CATARACT EXTRACTION Left     CHALAZION EXCISION      COLONOSCOPY  2006       Family History   Problem Relation Age of Onset    Diabetes Mother     Lung disease Father     Cancer Maternal Grandfather     Lung disease Sister      I have reviewed and agree with the history as documented.    E-Cigarette/Vaping    E-Cigarette Use Never User      E-Cigarette/Vaping Substances    Nicotine No     THC No     CBD No     Flavoring No     Other No     Unknown No      Social History     Tobacco Use    Smoking status: Former     Current packs/day: 0.00     Average packs/day: 1 pack/day for 20.0 years (20.0 ttl pk-yrs)     Types: Cigarettes     Start date:      Quit date:      Years since quittin.9    Smokeless tobacco: Never   Vaping Use    Vaping status: Never Used   Substance Use Topics    Alcohol use: Yes     Comment: socially    Drug use: No        Review of Systems   Constitutional:  Negative for chills and fever.   HENT:  Negative for ear pain and sore throat.    Eyes:  Negative for pain and visual disturbance.   Respiratory:  Positive for cough and shortness of breath.    Cardiovascular:  Negative for chest pain and palpitations.   Gastrointestinal:  Negative for abdominal pain and vomiting.   Genitourinary:  Negative for dysuria and hematuria.   Musculoskeletal:  Negative for arthralgias and back pain.   Skin:  Negative for color change and rash.   Neurological:  Negative for seizures and syncope.   All other systems reviewed and are negative.      Physical Exam  ED Triage Vitals   Temperature Pulse Respirations Blood Pressure SpO2   23 0911 23 0910 23 0910 23 0910 23 0910   98.4 °F (36.9 °C) 88 20 (!) 179/81 97 %      Temp Source Heart Rate Source Patient  Position - Orthostatic VS BP Location FiO2 (%)   12/21/23 0911 12/21/23 0910 12/21/23 0910 12/21/23 0910 --   Oral Monitor Lying Right arm       Pain Score       12/21/23 0910       No Pain             Orthostatic Vital Signs  Vitals:    12/21/23 1200 12/21/23 1454 12/21/23 1515 12/21/23 1545   BP: 157/75 (!) 264/140 (S) (!) 240/154 (!) 176/84   Pulse: 76 (!) 148 (!) 108 90   Patient Position - Orthostatic VS: Lying Lying Lying Lying       Physical Exam  Vitals and nursing note reviewed.   Constitutional:       General: She is in acute distress.      Appearance: Normal appearance. She is ill-appearing. She is not toxic-appearing or diaphoretic.   HENT:      Head: Normocephalic and atraumatic.      Right Ear: External ear normal.      Left Ear: External ear normal.      Nose: Nose normal.      Mouth/Throat:      Mouth: Mucous membranes are moist.      Pharynx: Oropharynx is clear. Posterior oropharyngeal erythema present.      Comments: Erythema in the posterior pharynx  Sounds of upper airway congestion  Eyes:      General:         Right eye: No discharge.         Left eye: No discharge.      Extraocular Movements: Extraocular movements intact.      Conjunctiva/sclera: Conjunctivae normal.   Cardiovascular:      Rate and Rhythm: Normal rate and regular rhythm.      Pulses: Normal pulses.      Heart sounds: Normal heart sounds. No murmur heard.     No friction rub.   Pulmonary:      Effort: Respiratory distress present.      Breath sounds: Rhonchi present. No wheezing or rales.   Chest:      Chest wall: No tenderness.   Abdominal:      General: Abdomen is flat. Bowel sounds are normal. There is no distension.      Palpations: Abdomen is soft.      Tenderness: There is no abdominal tenderness. There is no guarding.   Musculoskeletal:         General: Normal range of motion.      Cervical back: Normal range of motion and neck supple. No rigidity.      Right lower leg: No edema.      Left lower leg: No edema.   Skin:      General: Skin is warm and dry.      Capillary Refill: Capillary refill takes less than 2 seconds.      Coloration: Skin is not pale.   Neurological:      Mental Status: She is alert and oriented to person, place, and time.   Psychiatric:         Mood and Affect: Mood normal.         Behavior: Behavior normal.         ED Medications  Medications   acetaminophen (TYLENOL) tablet 650 mg (650 mg Oral Not Given 12/21/23 0928)   gabapentin (NEURONTIN) capsule 100 mg (has no administration in time range)   pantoprazole (PROTONIX) EC tablet 40 mg (has no administration in time range)   sertraline (ZOLOFT) tablet 75 mg (has no administration in time range)   acetaminophen (TYLENOL) tablet 650 mg (has no administration in time range)   enoxaparin (LOVENOX) subcutaneous injection 40 mg (has no administration in time range)   guaiFENesin (MUCINEX) 12 hr tablet 1,200 mg (has no administration in time range)   benzonatate (TESSALON PERLES) capsule 200 mg (has no administration in time range)   ceftriaxone (ROCEPHIN) 1 g/50 mL in dextrose IVPB (has no administration in time range)   doxycycline hyclate (VIBRAMYCIN) capsule 100 mg (has no administration in time range)   dexamethasone (PF) (DECADRON) injection 6 mg (has no administration in time range)   Lidocaine Viscous HCl (XYLOCAINE) 2 % mucosal solution 15 mL (15 mL Swish & Spit Given 12/21/23 0941)   iohexol (OMNIPAQUE) 350 MG/ML injection (MULTI-DOSE) 85 mL (85 mL Intravenous Given 12/21/23 1119)       Diagnostic Studies  Results Reviewed       Procedure Component Value Units Date/Time    CK [[id number]]     Lab Status: No result Specimen: Blood     C-reactive protein [[id number]]     Lab Status: No result Specimen: Blood     Platelet count [[id number]]     Lab Status: No result Specimen: Blood     HS Troponin I 2hr [[id number]]  (Normal) Collected: 12/21/23 1138    Lab Status: Final result Specimen: Blood from Arm, Right Updated: 12/21/23 1216     hs TnI 2hr 13 ng/L      Delta  2hr hsTnI 2 ng/L     FLU/RSV/COVID - if FLU/RSV clinically relevant [[id number]]  (Abnormal) Collected: 12/21/23 1039    Lab Status: Final result Specimen: Nares from Nose Updated: 12/21/23 1128     SARS-CoV-2 Positive     INFLUENZA A PCR Negative     INFLUENZA B PCR Negative     RSV PCR Negative    Narrative:      FOR PEDIATRIC PATIENTS - copy/paste COVID Guidelines URL to browser: [email or web address]    SARS-CoV-2 assay is a Nucleic Acid Amplification assay intended for the  qualitative detection of nucleic acid from SARS-CoV-2 in nasopharyngeal  swabs. Results are for the presumptive identification of SARS-CoV-2 RNA.    Positive results are indicative of infection with SARS-CoV-2, the virus  causing COVID-19, but do not rule out bacterial infection or co-infection  with other viruses. Laboratories within the [place] and its  territories are required to report all positive results to the appropriate  public health authorities. Negative results do not preclude SARS-CoV-2  infection and should not be used as the sole basis for treatment or other  patient management decisions. Negative results must be combined with  clinical observations, patient history, and epidemiological information.  This test has not been FDA cleared or approved.    This test has been authorized by FDA under an Emergency Use Authorization  (EUA). This test is only authorized for the duration of time the  declaration that circumstances exist justifying the authorization of the  emergency use of an in vitro diagnostic tests for detection of SARS-CoV-2  virus and/or diagnosis of COVID-19 infection under section 564(b)(1) of  the Act, 21 U.S.C. 360bbb-3(b)(1), unless the authorization is terminated  or revoked sooner. The test has been validated but independent review by FDA  and CLIA is pending.    Test performed using [employer]: This RT-PCR assay targets N2,  a region unique to  SARS-CoV-2. A conserved region in the E-gene was chosen  for pan-Sarbecovirus detection which includes SARS-CoV-2.    According to CMS-2020-01-R, this platform meets the definition of high-throughput technology.    Procalcitonin [[id number]]  (Normal) Collected: 12/21/23 0930    Lab Status: Final result Specimen: Blood from Arm, Left Updated: 12/21/23 1105     Procalcitonin 0.17 ng/ml     Lactic acid, plasma (w/reflex if result > 2.0) [[id number]]  (Normal) Collected: 12/21/23 0930    Lab Status: Final result Specimen: Blood from Arm, Left Updated: 12/21/23 1022     LACTIC ACID 0.7 mmol/L     Narrative:      Result may be elevated if tourniquet was used during collection.    Basic metabolic panel [[id number]] Collected: 12/21/23 0930    Lab Status: Final result Specimen: Blood from Arm, Left Updated: 12/21/23 1022     Sodium 136 mmol/L      Potassium 4.2 mmol/L      Chloride 103 mmol/L      CO2 25 mmol/L      ANION GAP 8 mmol/L      BUN 20 mg/dL      Creatinine 0.77 mg/dL      Glucose 106 mg/dL      Calcium 8.6 mg/dL      eGFR 64 ml/min/1.73sq m     Narrative:      National Kidney Disease Foundation guidelines for Chronic Kidney Disease (CKD):     Stage 1 with normal or high GFR (GFR > 90 mL/min/1.73 square meters)    Stage 2 Mild CKD (GFR = 60-89 mL/min/1.73 square meters)    Stage 3A Moderate CKD (GFR = 45-59 mL/min/1.73 square meters)    Stage 3B Moderate CKD (GFR = 30-44 mL/min/1.73 square meters)    Stage 4 Severe CKD (GFR = 15-29 mL/min/1.73 square meters)    Stage 5 End Stage CKD (GFR <15 mL/min/1.73 square meters)  Note: GFR calculation is accurate only with a steady state creatinine    HS Troponin 0hr (reflex protocol) [[id number]]  (Normal) Collected: 12/21/23 0930    Lab Status: Final result Specimen: Blood from Arm, Left Updated: 12/21/23 1006     hs TnI 0hr 11 ng/L     CBC and differential [[id number]]  (Abnormal) Collected: 12/21/23 0930    Lab Status: Final result Specimen: Blood from Arm, Left Updated:  12/21/23 0940     WBC 7.35 Thousand/uL      RBC 4.94 Million/uL      Hemoglobin 11.5 g/dL      Hematocrit 39.3 %      MCV 80 fL      MCH 23.3 pg      MCHC 29.3 g/dL      RDW 18.3 %      MPV 9.3 fL      Platelets 221 Thousands/uL      nRBC 0 /100 WBCs      Neutrophils Relative 80 %      Immat GRANS % 0 %      Lymphocytes Relative 8 %      Monocytes Relative 12 %      Eosinophils Relative 0 %      Basophils Relative 0 %      Neutrophils Absolute 5.86 Thousands/µL      Immature Grans Absolute 0.02 Thousand/uL      Lymphocytes Absolute 0.60 Thousands/µL      Monocytes Absolute 0.85 Thousand/µL      Eosinophils Absolute 0.01 Thousand/µL      Basophils Absolute 0.01 Thousands/µL                    CTA ED chest PE Study   Final Result by Nyasia Mortensen MD (12/21 1247)      With mild compromise by motion and partial right lower lobe atelectasis, no pulmonary embolus.      Large right pleural effusion with moderate rounded atelectasis in the right lower lobe.      Debris occluding the right lower lobe bronchi.            Workstation performed: [id number]               Procedures  POC Cardiac US    Date/Time: 12/21/2023 9:39 AM    Performed by: Brian Purcell MD  Authorized by: Brian Purcell MD    Patient location:  ED  Procedure details:     Exam Type:  Diagnostic    Indications: dyspnea      Assessment / Evaluation for: cardiac function and pericardial effusion      Exam Type: initial exam      Image quality: diagnostic      Image availability:  Images available in PACS and video obtained  Patient Details:     Cardiac Rhythm:  Regular    Mechanical ventilation: No    Cardiac findings:     Echo technique: limited 2D      Views obtained: parasternal long axis and parasternal short axis      Pericardial effusion: absent      Tamponade physiology: absent      Wall motion: normal      LV systolic function: normal      RV dilation: none    Pulmonary findings:     Left Lung Findings: left lung sliding      Right  lung findings: right lung sliding      B-lines: no B-lines present    IVC findings:     IVC Size: small      IVC Inspiratory Collapse: normal    Interpretation:     Fluid Status:  Euvolemic        ED Course             HEART Risk Score      Flowsheet Row Most Recent Value   Heart Score Risk Calculator    History 0 Filed at: 12/21/2023 1334   ECG 0 Filed at: 12/21/2023 1334   Age 2 Filed at: 12/21/2023 1334   Risk Factors 1 Filed at: 12/21/2023 1334   Troponin 0 Filed at: 12/21/2023 1334   HEART Score 3 Filed at: 12/21/2023 1334                        SBIRT 20yo+      Flowsheet Row Most Recent Value   Initial Alcohol Screen: US AUDIT-C     1. How often do you have a drink containing alcohol? 0 Filed at: 12/21/2023 0911   2. How many drinks containing alcohol do you have on a typical day you are drinking?  0 Filed at: 12/21/2023 0911   3a. Male UNDER 65: How often do you have five or more drinks on one occasion? 0 Filed at: 12/21/2023 0911   3b. FEMALE Any Age, or MALE 65+: How often do you have 4 or more drinks on one occassion? 0 Filed at: 12/21/2023 0911   Audit-C Score 0 Filed at: 12/21/2023 0911   ZANE: How many times in the past year have you...    Used an illegal drug or used a prescription medication for non-medical reasons? Never Filed at: 12/21/2023 0911            Wells' Criteria for PE      Flowsheet Row Most Recent Value   Wells' Criteria for PE    Clinical signs and symptoms of DVT 0 Filed at: 12/21/2023 1334   PE is primary diagnosis or equally likely 0 Filed at: 12/21/2023 1334   HR >100 0 Filed at: 12/21/2023 1334   Immobilization at least 3 days or Surgery in the previous 4 weeks 0 Filed at: 12/21/2023 1334   Previous, objectively diagnosed PE or DVT 1.5 Filed at: 12/21/2023 1334   Hemoptysis 0 Filed at: 12/21/2023 1334   Malignancy with treatment within 6 months or palliative 0 Filed at: 12/21/2023 1334   Wells' Criteria Total 1.5 Filed at: 12/21/2023 1865              Medical Decision  Making  89-year-old female presents with mild respiratory distress.  Differential diagnosis includes but is not limited to pneumonia, viral URI, pulmonary embolism, ACS, pleural effusion, pneumothorax.    Plan: Labs, CTA PE study, ECG    Amount and/or Complexity of Data Reviewed  Labs: ordered.  Radiology: ordered.  ECG/medicine tests: ordered.    Risk  OTC drugs.  Prescription drug management.  Decision regarding hospitalization.          Disposition  Final diagnoses:   COVID   Pleural effusion, right   Hypoxia     Time reflects when diagnosis was documented in both MDM as applicable and the Disposition within this note       Time User Action Codes Description Comment    12/21/2023 12:55 PM Brian Purcell [U07.1] COVID     12/21/2023 12:55 PM Brian Purcell [J90] Pleural effusion, right     12/21/2023 12:55 PM Brian Purcell [R09.02] Hypoxia     12/21/2023 12:55 PM Brian Purcell [U07.1] COVID     12/21/2023 12:55 PM Brian Purcell [R09.02] Hypoxia           ED Disposition       ED Disposition   Admit    Condition   Stable    Date/Time   Thu Dec 21, 2023 1819    Comment   Case was discussed with SOD and the patient's admission status was agreed to be Admission Status: inpatient status to the service of Dr. Gamez.               Follow-up Information    None         Patient's Medications   Discharge Prescriptions    No medications on file     No discharge procedures on file.    PDMP Review         Value Time User    PDMP Reviewed  Yes 8/25/2020 10:52 AM Stephani Landeros PA-C             ED Provider  Attending physically available and evaluated Raisa Sinha. I managed the patient along with the ED Attending.    Electronically Signed by           Brian Purcell MD  12/21/23 1993       Brian Purcell MD  12/21/23 0462

## 2023-12-21 NOTE — H&P
INTERNAL MEDICINE RESIDENCY ADMISSION H&P     Name: Raisa Sinha   Age & Sex: 89 y.o. female   MRN: [id number]  Unit/Bed#: ED 25   Encounter: [id number]  Primary Care Provider: Demian Champion MD    Code Status: Level 3 - DNAR and DNI  Admission Status: INPATIENT   Disposition: Patient requires Level 2 Step Down     Admit to team: SOD Team A    ASSESSMENT/PLAN     Principal Problem:    COVID-19 virus infection  Active Problems:    Acute hypoxic respiratory failure (HCC)    Neuropathy    Paroxysmal supraventricular tachycardia     Cognitive impairment    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)    Chronic deep vein thrombosis (DVT) of proximal vein of both lower extremities (HCC)      * COVID-19 virus infection  Assessment & Plan  Patient presented from [facility] due to worsening hypoxia  Patient reported shortness of breath  Patient was tested positive for COVID  Patient required high flow nasal cannula in the ED  Lactic acid is 0.7, Pro-Ruben is 0.17  Troponin is normal white cell count is normal  Plan  Continue high flow nasal cannula and wean of 2 maintain oxygen level over 90%  Patient will be admitted to stepdown level 2  Starting COVID severe pathway  Dexamethasone 6 mg every 12 hours  Ceftriaxone 1 g every 24 hours  Doxycycline 100 mg every 12 hours  Follow-up on CRP, D-dimer, CK  Follow-up on HIV and chronic hepatitis panel in order to give Tocilizumab  Consider giving Tocilizumab tomorrow morning if the HIV and chronic hepatitis tests are negative as it should be given within 24 hours of presentation.  Consider anticoagulation if the D-dimer is elevated  Continue prophylactic dose of anticoagulation for now  Continue guaifenesin and benzonatate      Acute hypoxic respiratory failure (HCC)  Assessment & Plan  Please see assessment and plan under COVID 19 infection    Chronic deep vein thrombosis (DVT) of proximal vein of both lower extremities (HCC)  Assessment &  Plan  Patient with known hx of rocío LE DVT's on prior duplex from Jan. Reported right proximal femoral to posterior tibial clot and left popliteal and gastroc clot.   She was previously on eliquis but this had to be discontinued after a GI bleed. It was resumed briefly in the past but the melena started again. Patient and son both agreed that risk of anticoagulation outweighed the benefits so she has not been on anticoagulation    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)  Assessment & Plan  Continue sertraline 75 mg once daily    Cognitive impairment  Assessment & Plan  February 21, 2023: Mini-Mental status exam: 25/30 April 1, 2023: Mini-Mental status exam performed at rehabilitation facility: 23/30.  (Reported by her son to hospital physicians as a pre-existing condition prior to her March 2023 hospitalization)  August 14, 2023: MMSE: 21  Patient is alert oriented x 3 on my exam    Paroxysmal supraventricular tachycardia   Assessment & Plan  Previously on metoprolol but this was discontinued previously  Rate controlled off medications  Continue to monitor    Neuropathy  Assessment & Plan  Patient has history of neuropathy  Continue gabapentin        VTE Pharmacologic Prophylaxis: Enoxaparin (Lovenox)  VTE Mechanical Prophylaxis: sequential compression device    CHIEF COMPLAINT     Chief Complaint   Patient presents with    Decreased Oxygen Level     Pt coming from [facility]. Per staff, O2 sat in the high 80s. EMS had patient on 4 LNC satting at 96%. Room air sat in ED 91%.       HISTORY OF PRESENT ILLNESS   89-year-old female patient with past medical history of CKD, SVT, PE/DVT who is not currently on anticoagulation, depression who presented to the ED from [facility] with cough, shortness of breath and worsening hypoxia.  Patient was tested positive in the ED for COVID.  Patient required high flow nasal cannula.  In the ED vital signs blood pressure 179/81, heart rate 88,  respiration 20, oxygen saturation is 92% on high flow cell cannula.  Showed, white cell count 7.35, hemoglobin 11.5, creatinine 0.77, COVID test is positive, Pro-Ruben 0.17, lactic acid 0.7, troponin is negative.  CT chest showed no pulmonary emboli and showed large right pleural effusion with moderate round atelectasis in the right lower lobe.  Patient is level 3 DNR/DNI  Patient will be admitted to stepdown 2 for treatment of severe COVID.    REVIEW OF SYSTEMS     Review of Systems   Constitutional:  Negative for activity change, chills, diaphoresis, fatigue, fever and unexpected weight change.   HENT:  Negative for dental problem, drooling, ear pain, facial swelling, hearing loss, nosebleeds, postnasal drip, sinus pressure, sinus pain, sore throat and voice change.    Eyes:  Negative for photophobia, pain, redness and visual disturbance.   Respiratory:  Positive for cough and shortness of breath. Negative for apnea, choking, chest tightness, wheezing and stridor.    Cardiovascular:  Negative for chest pain, palpitations and leg swelling.   Gastrointestinal:  Negative for abdominal pain, anal bleeding, blood in stool, constipation, diarrhea, nausea and rectal pain.   Endocrine: Negative for heat intolerance, polydipsia, polyphagia and polyuria.   Genitourinary:  Negative for decreased urine volume, difficulty urinating, dyspareunia, enuresis, flank pain, frequency, menstrual problem, pelvic pain, vaginal bleeding and vaginal discharge.   Musculoskeletal:  Negative for back pain, gait problem, joint swelling, myalgias, neck pain and neck stiffness.   Skin:  Negative for color change, pallor, rash and wound.   Neurological:  Negative for tremors, facial asymmetry, speech difficulty, light-headedness, numbness and headaches.   Psychiatric/Behavioral:  Negative for agitation, confusion, dysphoric mood, hallucinations, self-injury, sleep disturbance and suicidal ideas. The patient is not nervous/anxious and is not  hyperactive.      OBJECTIVE     Vitals:    23 1515 23 1527 23 1545 23 1600   BP: (S) (!) 240/154  (!) 176/84 (!) 172/85   BP Location: Right arm  Right arm Right arm   Pulse: (!) 108  90 88   Resp: (!) 32  20 20   Temp:       TempSrc:       SpO2: 98% 93% 92% 92%      Temperature:   Temp (24hrs), Av.6 °F (37.6 °C), Min:98.4 °F (36.9 °C), Max:101.1 °F (38.4 °C)    Temperature: 99.4 °F (37.4 °C)  Intake & Output:  I/O       None          Weights:        There is no height or weight on file to calculate BMI.  Weight (last 2 days)       None          Physical Exam  Constitutional:       General: She is not in acute distress.     Appearance: She is not ill-appearing, toxic-appearing or diaphoretic.   HENT:      Head: Normocephalic and atraumatic.      Mouth/Throat:      Mouth: Mucous membranes are moist.      Pharynx: No oropharyngeal exudate or posterior oropharyngeal erythema.   Eyes:      General: No scleral icterus.        Right eye: No discharge.         Left eye: No discharge.      Pupils: Pupils are equal, round, and reactive to light.   Neck:      Vascular: No carotid bruit.   Cardiovascular:      Rate and Rhythm: Normal rate and regular rhythm.      Pulses: Normal pulses.      Heart sounds: No murmur heard.     No friction rub. No gallop.   Pulmonary:      Effort: Pulmonary effort is normal. No respiratory distress.      Breath sounds: No stridor. No wheezing, rhonchi or rales.   Chest:      Chest wall: No tenderness.   Abdominal:      General: Abdomen is flat. Bowel sounds are normal. There is no distension.      Palpations: Abdomen is soft. There is no mass.      Tenderness: There is no abdominal tenderness. There is no right CVA tenderness, left CVA tenderness, guarding or rebound.      Hernia: No hernia is present.   Musculoskeletal:         General: No swelling, tenderness, deformity or signs of injury. Normal range of motion.      Cervical back: Normal range of motion. No  rigidity or tenderness.      Right lower leg: No edema.      Left lower leg: No edema.   Lymphadenopathy:      Cervical: No cervical adenopathy.   Skin:     General: Skin is warm.      Coloration: Skin is not jaundiced or pale.      Findings: No bruising, erythema, lesion or rash.   Neurological:      General: No focal deficit present.      Mental Status: She is alert and oriented to person, place, and time.      Cranial Nerves: No cranial nerve deficit.      Sensory: No sensory deficit.      Motor: No weakness.      Coordination: Coordination normal.   Psychiatric:         Mood and Affect: Mood normal.         Behavior: Behavior normal.         Thought Content: Thought content normal.         Judgment: Judgment normal.       PAST MEDICAL HISTORY     Past Medical History:   Diagnosis Date    Compression fracture of L3 lumbar vertebra, closed, initial encounter ([facility]) 08/22/2020    COVID 01/27/2023    Fall 08/22/2020    History of venous thromboembolism 03/18/2023    Infiltrate noted on imaging study 12/27/2022    Interstitial cystitis     Leg wound, right 05/16/2023    Leukocytosis 03/18/2023    Leukopenia     Neurologic gait dysfunction     Abstraction Dr. Arguelles's charts    Neuropathy     Neutropenia ([facility])     Abstraction Dr. Arguelles's charts    Osteoarthritis     Osteoporosis     Peripheral edema     Abstraction Dr. Arguelles's charts    Renal cyst     Syncope     Abstraction Dr. Arguelles's charts    Viral gastroenteritis 04/04/2023     PAST SURGICAL HISTORY     Past Surgical History:   Procedure Laterality Date    CAPSULOTOMY      Right eye    CATARACT EXTRACTION Left     CHALAZION EXCISION      COLONOSCOPY  2006     SOCIAL & FAMILY HISTORY     Social History     Substance and Sexual Activity   Alcohol Use Yes    Comment: socially       Social History     Substance and Sexual Activity   Drug Use No     Social History     Tobacco Use   Smoking Status Former    Current packs/day: 0.00    Average packs/day: 1 pack/day for  20.0 years (20.0 ttl pk-yrs)    Types: Cigarettes    Start date:     Quit date:     Years since quittin.9   Smokeless Tobacco Never     Family History   Problem Relation Age of Onset    Diabetes Mother     Lung disease Father     Cancer Maternal Grandfather     Lung disease Sister      LABORATORY DATA     Labs: I have personally reviewed pertinent reports.    Results from last 7 days   Lab Units 23  0930   WBC Thousand/uL 7.35   HEMOGLOBIN g/dL 11.5   HEMATOCRIT % 39.3   PLATELETS Thousands/uL 221   NEUTROS PCT % 80*   MONOS PCT % 12   EOS PCT % 0      Results from last 7 days   Lab Units 23  0930   POTASSIUM mmol/L 4.2   CHLORIDE mmol/L 103   CO2 mmol/L 25   BUN mg/dL 20   CREATININE mg/dL 0.77   CALCIUM mg/dL 8.6                  Results from last 7 days   Lab Units 23  0930   LACTIC ACID mmol/L 0.7         Micro:  Lab Results   Component Value Date    BLOODCX No Growth After 5 Days. 2022    BLOODCX No Growth After 5 Days. 2022    URINECX >100,000 cfu/ml Escherichia coli (A) 2018     IMAGING & DIAGNOSTIC TESTS     Imaging: I have personally reviewed pertinent reports.    CTA ED chest PE Study    Result Date: 2023  Impression: With mild compromise by motion and partial right lower lobe atelectasis, no pulmonary embolus. Large right pleural effusion with moderate rounded atelectasis in the right lower lobe. Debris occluding the right lower lobe bronchi. Workstation performed: [id number]     EKG, Pathology, and Other Studies: I have personally reviewed pertinent reports.     ALLERGIES   No Known Allergies  MEDICATIONS PRIOR TO ARRIVAL     Prior to Admission medications    Medication Sig Start Date End Date Taking? Authorizing Provider   acetaminophen (TYLENOL) 325 mg tablet Take 650 mg by mouth every 4 (four) hours as needed for mild pain or fever    Historical Provider, MD   acetaminophen (TYLENOL) 325 mg tablet Take 650 mg by mouth 2 (two) times a day 23    Historical Provider, MD   gabapentin (NEURONTIN) 100 mg capsule Take 100 mg by mouth 2 (two) times a day    Historical Provider, MD   lidocaine (Lidoderm) 5 % Apply 1 patch topically over 12 hours daily Remove & Discard patch within 12 hours or as directed by MD 10/23/23   KERON Ferrell   Menthol-Methyl Salicylate (CECIL MCCALLUM GREASELESS) 10-15 % greaseless cream Apply topically daily at bedtime 10/23/23   KERON Ferrell   pantoprazole (PROTONIX) 40 mg tablet Take 40 mg by mouth daily 5/20/23   Historical Provider, MD   sertraline (ZOLOFT) 50 mg tablet Take 75 mg by mouth daily 4/29/23   Historical Provider, MD     MEDICATIONS ADMINISTERED IN LAST 24 HOURS     Medication Administration - last 24 hours from 12/20/2023 1707 to 12/21/2023 1707         Date/Time Order Dose Route Action Action by     12/21/2023 0928 EST acetaminophen (TYLENOL) tablet 650 mg 650 mg Oral Not Given Sadaf Cleaning RN     12/21/2023 0941 EST Lidocaine Viscous HCl (XYLOCAINE) 2 % mucosal solution 15 mL 15 mL Swish & Spit Given Sadaf Cleaning RN     12/21/2023 1119 EST iohexol (OMNIPAQUE) 350 MG/ML injection (MULTI-DOSE) 85 mL 85 mL Intravenous Given Deborah Sifuentes     12/21/2023 1608 EST pantoprazole (PROTONIX) EC tablet 40 mg 40 mg Oral Not Given Sadaf Cleaning RN     12/21/2023 1608 EST sertraline (ZOLOFT) tablet 75 mg 75 mg Oral Not Given Sadaf Cleaning RN     12/21/2023 1622 EST enoxaparin (LOVENOX) subcutaneous injection 40 mg 40 mg Subcutaneous Given Sadaf Cleaning RN     12/21/2023 1609 EST benzonatate (TESSALON PERLES) capsule 200 mg 200 mg Oral Not Given Sadaf Cleaning RN     12/21/2023 1620 EST ceftriaxone (ROCEPHIN) 1 g/50 mL in dextrose IVPB 1,000 mg Intravenous New Bag Sadaf Cleaning RN          CURRENT MEDICATIONS     Current Facility-Administered Medications   Medication Dose Route Frequency Provider Last Rate    acetaminophen  650 mg Oral Once Brian Purcell MD      acetaminophen  650 mg Oral  "Q4H PRN Tad Kern,       benzonatate  200 mg Oral TID Tad Kern, DO      cefTRIAXone  1,000 mg Intravenous Q24H Tad Kern, DO 1,000 mg (12/21/23 1620)    dexamethasone  6 mg Intravenous Q12H [facility] Tad Kern, DO      doxycycline hyclate  100 mg Oral Q12H [facility] Tad Kern, DO      enoxaparin  40 mg Subcutaneous Daily Tad Kern, DO      gabapentin  100 mg Oral BID Tad Kern, DO      guaiFENesin  1,200 mg Oral Q12H [facility] Tad Kern, DO      pantoprazole  40 mg Oral Daily Tad Kern, DO      sertraline  75 mg Oral Daily Tad Kern, DO          acetaminophen, 650 mg, Q4H PRN        Admission Time  I spent 45 minutes admitting the patient.  This involved direct patient contact where I performed a full history and physical, reviewing previous records, and reviewing laboratory and other diagnostic studies.    Portions of the record may have been created with voice recognition software.  Occasional wrong word or \"sound a like\" substitutions may have occurred due to the inherent limitations of voice recognition software.  Read the chart carefully and recognize, using context, where substitutions have occurred.    ==  Tad Kern DO  [facility]  Internal Medicine Residency PGY-2   "

## 2023-12-21 NOTE — ED NOTES
Pt satting between 88-92% on room air. RN put patient back on 2 LNC and satting @ 95%.      Sadaf Cleaning RN  12/21/23 7070

## 2023-12-22 PROBLEM — N17.9 AKI (ACUTE KIDNEY INJURY) (HCC): Status: ACTIVE | Noted: 2023-12-22

## 2023-12-22 LAB
ALBUMIN SERPL BCP-MCNC: 3.6 G/DL (ref 3.5–5)
ALP SERPL-CCNC: 72 U/L (ref 34–104)
ALT SERPL W P-5'-P-CCNC: 10 U/L (ref 7–52)
ANION GAP SERPL CALCULATED.3IONS-SCNC: 10 MMOL/L
APTT PPP: 53 SECONDS (ref 23–37)
AST SERPL W P-5'-P-CCNC: 12 U/L (ref 13–39)
BASOPHILS # BLD MANUAL: 0 THOUSAND/UL (ref 0–0.1)
BASOPHILS NFR MAR MANUAL: 0 % (ref 0–1)
BILIRUB SERPL-MCNC: 0.44 MG/DL (ref 0.2–1)
BUN SERPL-MCNC: 30 MG/DL (ref 5–25)
CALCIUM SERPL-MCNC: 8.8 MG/DL (ref 8.4–10.2)
CHLORIDE SERPL-SCNC: 101 MMOL/L (ref 96–108)
CO2 SERPL-SCNC: 25 MMOL/L (ref 21–32)
CREAT SERPL-MCNC: 1.07 MG/DL (ref 0.6–1.3)
CRP SERPL QL: 147.4 MG/L
EOSINOPHIL # BLD MANUAL: 0 THOUSAND/UL (ref 0–0.4)
EOSINOPHIL NFR BLD MANUAL: 0 % (ref 0–6)
ERYTHROCYTE [DISTWIDTH] IN BLOOD BY AUTOMATED COUNT: 18.4 % (ref 11.6–15.1)
ERYTHROCYTE [DISTWIDTH] IN BLOOD BY AUTOMATED COUNT: 19.9 % (ref 11.6–15.1)
GFR SERPL CREATININE-BSD FRML MDRD: 43 ML/MIN/1.73SQ M
GLUCOSE SERPL-MCNC: 173 MG/DL (ref 65–140)
HBV CORE AB SER QL: NORMAL
HBV CORE IGM SER QL: NORMAL
HBV SURFACE AG SER QL: NORMAL
HCT VFR BLD AUTO: 37.7 % (ref 34.8–46.1)
HCT VFR BLD AUTO: 38.2 % (ref 34.8–46.1)
HCV AB SER QL: NORMAL
HGB BLD-MCNC: 11.4 G/DL (ref 11.5–15.4)
HGB BLD-MCNC: 11.6 G/DL (ref 11.5–15.4)
HYPERCHROMIA BLD QL SMEAR: PRESENT
INR PPP: 1.18 (ref 0.84–1.19)
LYMPHOCYTES # BLD AUTO: 0.38 THOUSAND/UL (ref 0.6–4.47)
LYMPHOCYTES # BLD AUTO: 4 % (ref 14–44)
MAGNESIUM SERPL-MCNC: 2 MG/DL (ref 1.9–2.7)
MCH RBC QN AUTO: 24.2 PG (ref 26.8–34.3)
MCH RBC QN AUTO: 24.3 PG (ref 26.8–34.3)
MCHC RBC AUTO-ENTMCNC: 30.2 G/DL (ref 31.4–37.4)
MCHC RBC AUTO-ENTMCNC: 30.4 G/DL (ref 31.4–37.4)
MCV RBC AUTO: 80 FL (ref 82–98)
MCV RBC AUTO: 80 FL (ref 82–98)
MONOCYTES # BLD AUTO: 0.23 THOUSAND/UL (ref 0–1.22)
MONOCYTES NFR BLD: 3 % (ref 4–12)
NEUTROPHILS # BLD MANUAL: 7.06 THOUSAND/UL (ref 1.85–7.62)
NEUTS BAND NFR BLD MANUAL: 5 % (ref 0–8)
NEUTS SEG NFR BLD AUTO: 87 % (ref 43–75)
OVALOCYTES BLD QL SMEAR: PRESENT
PHOSPHATE SERPL-MCNC: 5.4 MG/DL (ref 2.3–4.1)
PLATELET # BLD AUTO: 171 THOUSANDS/UL (ref 149–390)
PLATELET # BLD AUTO: 216 THOUSANDS/UL (ref 149–390)
PLATELET BLD QL SMEAR: ADEQUATE
PMV BLD AUTO: 10.2 FL (ref 8.9–12.7)
PMV BLD AUTO: 9.2 FL (ref 8.9–12.7)
POIKILOCYTOSIS BLD QL SMEAR: PRESENT
POTASSIUM SERPL-SCNC: 4.4 MMOL/L (ref 3.5–5.3)
PROCALCITONIN SERPL-MCNC: 4.49 NG/ML
PROT SERPL-MCNC: 7.1 G/DL (ref 6.4–8.4)
PROTHROMBIN TIME: 14.8 SECONDS (ref 11.6–14.5)
RBC # BLD AUTO: 4.69 MILLION/UL (ref 3.81–5.12)
RBC # BLD AUTO: 4.8 MILLION/UL (ref 3.81–5.12)
RBC MORPH BLD: PRESENT
SODIUM SERPL-SCNC: 136 MMOL/L (ref 135–147)
VARIANT LYMPHS # BLD AUTO: 1 %
WBC # BLD AUTO: 7.67 THOUSAND/UL (ref 4.31–10.16)
WBC # BLD AUTO: 7.88 THOUSAND/UL (ref 4.31–10.16)

## 2023-12-22 PROCEDURE — 85730 THROMBOPLASTIN TIME PARTIAL: CPT | Performed by: STUDENT IN AN ORGANIZED HEALTH CARE EDUCATION/TRAINING PROGRAM

## 2023-12-22 PROCEDURE — 84145 PROCALCITONIN (PCT): CPT

## 2023-12-22 PROCEDURE — 84100 ASSAY OF PHOSPHORUS: CPT

## 2023-12-22 PROCEDURE — 87081 CULTURE SCREEN ONLY: CPT | Performed by: INTERNAL MEDICINE

## 2023-12-22 PROCEDURE — 85027 COMPLETE CBC AUTOMATED: CPT | Performed by: STUDENT IN AN ORGANIZED HEALTH CARE EDUCATION/TRAINING PROGRAM

## 2023-12-22 PROCEDURE — 85007 BL SMEAR W/DIFF WBC COUNT: CPT

## 2023-12-22 PROCEDURE — 86140 C-REACTIVE PROTEIN: CPT

## 2023-12-22 PROCEDURE — 97163 PT EVAL HIGH COMPLEX 45 MIN: CPT

## 2023-12-22 PROCEDURE — 92526 ORAL FUNCTION THERAPY: CPT

## 2023-12-22 PROCEDURE — 80053 COMPREHEN METABOLIC PANEL: CPT

## 2023-12-22 PROCEDURE — 97167 OT EVAL HIGH COMPLEX 60 MIN: CPT

## 2023-12-22 PROCEDURE — 83735 ASSAY OF MAGNESIUM: CPT

## 2023-12-22 PROCEDURE — 85027 COMPLETE CBC AUTOMATED: CPT

## 2023-12-22 PROCEDURE — 87147 CULTURE TYPE IMMUNOLOGIC: CPT | Performed by: INTERNAL MEDICINE

## 2023-12-22 PROCEDURE — 97535 SELF CARE MNGMENT TRAINING: CPT

## 2023-12-22 PROCEDURE — XW033H5 INTRODUCTION OF TOCILIZUMAB INTO PERIPHERAL VEIN, PERCUTANEOUS APPROACH, NEW TECHNOLOGY GROUP 5: ICD-10-PCS | Performed by: INTERNAL MEDICINE

## 2023-12-22 PROCEDURE — 97530 THERAPEUTIC ACTIVITIES: CPT

## 2023-12-22 PROCEDURE — 99223 1ST HOSP IP/OBS HIGH 75: CPT | Performed by: INTERNAL MEDICINE

## 2023-12-22 PROCEDURE — 85610 PROTHROMBIN TIME: CPT | Performed by: STUDENT IN AN ORGANIZED HEALTH CARE EDUCATION/TRAINING PROGRAM

## 2023-12-22 PROCEDURE — NC001 PR NO CHARGE: Performed by: INTERNAL MEDICINE

## 2023-12-22 PROCEDURE — 94760 N-INVAS EAR/PLS OXIMETRY 1: CPT

## 2023-12-22 PROCEDURE — 94664 DEMO&/EVAL PT USE INHALER: CPT

## 2023-12-22 RX ORDER — HEPARIN SODIUM 10000 [USP'U]/100ML
3-20 INJECTION, SOLUTION INTRAVENOUS
Status: DISCONTINUED | OUTPATIENT
Start: 2023-12-22 | End: 2023-12-26

## 2023-12-22 RX ORDER — SODIUM CHLORIDE, SODIUM GLUCONATE, SODIUM ACETATE, POTASSIUM CHLORIDE, MAGNESIUM CHLORIDE, SODIUM PHOSPHATE, DIBASIC, AND POTASSIUM PHOSPHATE .53; .5; .37; .037; .03; .012; .00082 G/100ML; G/100ML; G/100ML; G/100ML; G/100ML; G/100ML; G/100ML
1000 INJECTION, SOLUTION INTRAVENOUS ONCE
Status: COMPLETED | OUTPATIENT
Start: 2023-12-22 | End: 2023-12-22

## 2023-12-22 RX ORDER — ALBUTEROL SULFATE 2.5 MG/3ML
2.5 SOLUTION RESPIRATORY (INHALATION) EVERY 4 HOURS PRN
Status: DISCONTINUED | OUTPATIENT
Start: 2023-12-22 | End: 2023-12-26

## 2023-12-22 RX ADMIN — DEXAMETHASONE SODIUM PHOSPHATE 6 MG: 10 INJECTION, SOLUTION INTRAMUSCULAR; INTRAVENOUS at 08:51

## 2023-12-22 RX ADMIN — BENZONATATE 200 MG: 100 CAPSULE ORAL at 17:53

## 2023-12-22 RX ADMIN — DEXAMETHASONE SODIUM PHOSPHATE 6 MG: 10 INJECTION, SOLUTION INTRAMUSCULAR; INTRAVENOUS at 20:24

## 2023-12-22 RX ADMIN — ENOXAPARIN SODIUM 40 MG: 40 INJECTION SUBCUTANEOUS at 08:51

## 2023-12-22 RX ADMIN — SODIUM CHLORIDE, SODIUM GLUCONATE, SODIUM ACETATE, POTASSIUM CHLORIDE, MAGNESIUM CHLORIDE, SODIUM PHOSPHATE, DIBASIC, AND POTASSIUM PHOSPHATE 1000 ML: .53; .5; .37; .037; .03; .012; .00082 INJECTION, SOLUTION INTRAVENOUS at 08:52

## 2023-12-22 RX ADMIN — GABAPENTIN 100 MG: 100 CAPSULE ORAL at 17:54

## 2023-12-22 RX ADMIN — BENZONATATE 200 MG: 100 CAPSULE ORAL at 20:24

## 2023-12-22 RX ADMIN — HEPARIN SODIUM 12 UNITS/KG/HR: 10000 INJECTION, SOLUTION INTRAVENOUS at 11:25

## 2023-12-22 RX ADMIN — TOCILIZUMAB 500 MG: 20 INJECTION, SOLUTION, CONCENTRATE INTRAVENOUS at 13:21

## 2023-12-22 RX ADMIN — GUAIFENESIN 1200 MG: 600 TABLET, EXTENDED RELEASE ORAL at 20:24

## 2023-12-22 RX ADMIN — CEFTRIAXONE SODIUM 1000 MG: 10 INJECTION, POWDER, FOR SOLUTION INTRAVENOUS at 17:53

## 2023-12-22 RX ADMIN — DOXYCYCLINE 100 MG: 100 CAPSULE ORAL at 20:24

## 2023-12-22 NOTE — CASE MANAGEMENT
Case Management Assessment & Discharge Planning Note    Patient name Raisa Sinha  [id number]  : 1925 Date 2023       Current Admission Date: 2023  Current Admission Diagnosis:COVID-19 virus infection   Patient Active Problem List    Diagnosis Date Noted    DAVID (acute kidney injury) (HCC) 2023    Acute hypoxic respiratory failure (HCC) 2023    Left arm pain 2023    Primary osteoarthritis of left shoulder 10/23/2023    Hypertension 10/23/2023    Left shoulder pain 08/10/2023    Current moderate episode of major depressive disorder without prior episode (HCC) 2023    Chronic deep vein thrombosis (DVT) of proximal vein of both lower extremities ([facility]) 2023    Hypokalemia 2023    Frailty syndrome in geriatric patient 2023    Adnexal cyst 2023    Atrial premature contractions 2023    Grade I diastolic dysfunction 2023    History of gastric ulcer 2023    DNR (do not resuscitate) 2023    Goals of care, counseling/discussion 2023    Metabolic encephalopathy 2023    History of GI bleed 2023    Acute blood loss anemia 2023    Chronic pain of left knee 2023    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) 2023    History of DVT of lower extremity 2023    COVID-19 virus infection 2023    Pulmonary embolism (HCC) 2023    Paroxysmal supraventricular tachycardia  2022    Age-related osteoporosis without current pathological fracture 2019    Lower extremity edema 2019    Stage 3a chronic kidney disease (HCC) 2019    Venous stasis dermatitis of both lower extremities 2019    Constipation, unspecified 2018    Diverticulosis of intestine, part unspecified, without perforation or abscess without bleeding 2018    Generalized muscle weakness 2018    Polyneuropathy, unspecified 2018    Ambulatory  dysfunction 12/04/2018    Neuropathy 12/03/2018    Balance problem 01/01/2017    Cognitive impairment 01/01/2017      LOS (days): 1  Geometric Mean LOS (GMLOS) (days): 5  Days to GMLOS:4     OBJECTIVE:    Risk of Unplanned Readmission Score: 20.21         Current admission status: Inpatient       Preferred Pharmacy:   [id number] - [place], [place] - [address]  [address] [facility] [address]  Phone: [phone] Fax: [phone]    Primary Care Provider: Demian Champion MD    Primary Insurance: BLUE CROSS [employer] REP  Secondary Insurance:     ASSESSMENT:  Active Health Care Proxies       Macario Sinha Health Care Representative - Son   Primary Phone: [phone] (Mobile)                           Readmission Root Cause  30 Day Readmission: No    Patient Information  Admitted from:: Facility (Pt resides at [facility])  Mental Status: Other (Comment) (s/w Pts son Macario due to Pts isolation status)  During Assessment patient was accompanied by: Son  Assessment information provided by:: Boy, Other - please comment (RN at [facility])  Primary Caregiver: Other (Comment)  Caregiver's Name:: Staff at [facility]  Caregiver's Relationship to Patient:: Other (Specify)  Caregiver's Telephone Number:: [phone]  Support Systems: Boy, Other (Comment)  County of Residence: [place]  What [facility] do you live in?: [place]  Home entry access options. Select all that apply.: Elevator  Type of Current Residence: Facility  Upon entering residence, is there a bedroom on the main floor (no further steps)?: Yes  Upon entering residence, is there a bathroom on the main floor (no further steps)?: Yes  Living Arrangements: Lives Alone (At [facility])    Activities of Daily Living Prior to Admission  Functional Status: Assistance  Completes ADLs independently?: No  Level of ADL dependence: Total Dependent  Ambulates independently?: No  Level of ambulatory dependence: Total Dependent  Does patient use  assisted devices?: Yes  Assisted Devices (DME) used: Wheelchair, Bola lift  Does patient currently own DME?: Yes  What DME does the patient currently own?: Wheelchair  Does patient have a history of Outpatient Therapy (PT/OT)?: Yes  Does the patient have a history of Short-Term Rehab?: Yes  Does patient have a history of HHC?: No         Patient Information Continued  Income Source: Pension/detention  Does patient have prescription coverage?: Yes  Does patient receive dialysis treatments?: No  Does patient have a history of substance abuse?: No  Does patient have a history of Mental Health Diagnosis?: Yes (depression)  Is patient receiving treatment for mental health?: Yes  Has patient received inpatient treatment related to mental health in the last 2 years?: No         Means of Transportation  Means of Transport to Appts:: Other (Comment) ([facility] provides transportation if needed)      Housing Stability: Low Risk  (12/22/2023)    Housing Stability Vital Sign     Unable to Pay for Housing in the Last Year: No     Number of Places Lived in the Last Year: 2     Unstable Housing in the Last Year: No   Food Insecurity: No Food Insecurity (12/22/2023)    Hunger Vital Sign     Worried About Running Out of Food in the Last Year: Never true     Ran Out of Food in the Last Year: Never true   Transportation Needs: No Transportation Needs (12/22/2023)    PRAPARE - Transportation     Lack of Transportation (Medical): No     Lack of Transportation (Non-Medical): No   Utilities: Not At Risk (12/22/2023)    [facility] Utilities     Threatened with loss of utilities: No       DISCHARGE DETAILS:    Discharge planning discussed with:: Pts son, Macario and Nursing staff at [facility]  [place] of Choice: Yes     CM contacted family/caregiver?: Yes (SonMacario)      Contacts  Patient Contacts: Macario Sinha  Relationship to Patient:: Family  Contact Method: Phone  Phone Number: [phone]  Reason/Outcome: Discharge Planning, Continuity of  Care      Other Referral/Resources/Interventions Provided:  Referral Comments: [facility] [place]    TC to Pts son Macario and Coleen RN @ [facility] to gather baseline info for this Pt.  Macario states that the Pt has resideed at [facility] since April. Macario would like for the Pt to return once medically ready. Coleen states that the Pt is totally dependent w/ adls at baseline, requires a marii lift to transfer, uses a WC at baseline, has limited range of motion. Pt is on a regular diet at [facility]. Pt is confused at baseline.      [facility] [place]

## 2023-12-22 NOTE — UTILIZATION REVIEW
NOTIFICATION OF INPATIENT ADMISSION      AUTHORIZATION REQUEST   SERVICING FACILITY:   [facility]  Address: [address] [place] [address]  Tax ID: [id number]  NPI: [id number] ATTENDING PROVIDER:  Attending Name and NPI#: Shilpa Gamez Md [[id number]]  Address: [address] [place] [address]  Phone: [phone]   ADMISSION INFORMATION:  Place of Service: Inpatient [facility]  Place of Service Code: 21  Inpatient Admission Date/Time: 12/21/23  1:28 PM  Discharge Date/Time: No discharge date for patient encounter.  Admitting Diagnosis Code/Description:  Hypoxia [R09.02]  Pleural effusion, right [J90]  COVID [U07.1]     UTILIZATION REVIEW CONTACT:  Darcie Martinez Utilization   Network Utilization Review Department  Phone: [phone]  Fax: [phone]  Email: [email or web address]  Contact for approvals/pending authorizations, clinical reviews, and discharge.     PHYSICIAN ADVISORY SERVICES:  Medical Necessity Denial & Adcz-kn-Mpmw Review  Phone: [phone]  Fax: [phone]  Email: [email or web address]     DISCHARGE SUPPORT TEAM:  For Patients Discharge Needs & Updates  Phone: [phone] opt. 2 Fax: [phone]  Email: [email or web address]

## 2023-12-22 NOTE — QUICK NOTE
Nephrology was asked to review Alistair Sinha's chart for line placement.  She has a history of CKD IIIA baseline creatinine 0.9-1.1 dating back to 2017 fluctuating EGFR 43 to mid 50 range.  Patient does not follow with nephrology as outpatient.  Okay for midline placement from renal standpoint

## 2023-12-22 NOTE — ASSESSMENT & PLAN NOTE
Patient with bump in creatinine to 1.07 on 12/22. Does have a history of CKD, with baseline per Nephro's note approximately 0.9-1.1. Renal function as of 12/26 stable with creatinine of 0.89     Plan  Continue to monitor renal function; avoid nephrotoxins

## 2023-12-22 NOTE — ED ATTENDING ATTESTATION
12/21/2023  I, Bimal Blankenship MD, saw and evaluated the patient. I have discussed the patient with the resident and agree with the resident's findings, Plan of Care, and MDM as documented in the resident's note, except where noted. All available labs and Radiology studies were reviewed.  I was present for key portions of any procedure(s) performed by the resident and I was immediately available to provide assistance.    At this point I agree with the current assessment done in the Emergency Department.  I have conducted an independent evaluation of this patient a history and physical is as follows:    89 yo female with a history of CKD, SVT, prior PE/DVT (not currently anticoagulated), and osteoporosis brought to the ED by EMS from her nursing facility for evaluation of new hypoxia. Per report the patient has had URI symptoms for several days including a cough, congestion, and rhinorrhea. She tested (+) for COVID earlier today. This morning she became acutely more short of breath and staff recorded an oxygen saturation in the 80s so 9-1-1 was called. The patient reports a sore throat and shortness of breath. No chest pain, nausea, vomiting, or diaphoresis. No new LE swelling or pain. No other appreciable complaints.    ROS: per resident physician note    Gen: (+) respiratory distress, AA&Ox3  HEENT: PERRL, EOMI  Throat: (+) posterior oropharyngeal erythema  Neck: supple  CV: RRR  Lungs: (+) diffuse rales/rhonchi, (+) poor air movement, (+) tachypnea  Abdomen: soft, NT/ND  Ext: no swelling or deformity  Neuro: 5/5 strength all extremities, sensation grossly intact  Skin: no rash    ED Course  The patient arrives in acute respiratory distress with 4 liters nasal cannula in place. (+) Known COVID infection. Lungs with diffuse rales and rhonchi. Viral URI vs pneumonia vs PE vs pleural effusion vs PTX? Will check EKG, basic labs, troponin, CK, lactate, viral panel, and CT PE protocol. High flow oxygen administered,  will continue to monitor closely in the ED. The patient will require admission --> ICU vs step-down unit?      Critical Care Time  CriticalCare Time    Date/Time: 12/21/2023 9:32 PM    Performed by: Bimal Blankenship MD  Authorized by: Bimal Blankenship MD    Critical care provider statement:     Critical care time (minutes):  60    Critical care start time:  12/21/2023 12:00 PM    Critical care end time:  12/21/2023 1:00 PM    Critical care time was exclusive of:  Separately billable procedures and treating other patients and teaching time    Critical care was necessary to treat or prevent imminent or life-threatening deterioration of the following conditions:  Respiratory failure    Critical care was time spent personally by me on the following activities:  Obtaining history from patient or surrogate, development of treatment plan with patient or surrogate, discussions with consultants, discussions with primary provider, evaluation of patient's response to treatment, examination of patient, interpretation of cardiac output measurements, ordering and performing treatments and interventions, ordering and review of laboratory studies, ordering and review of radiographic studies, re-evaluation of patient's condition and review of old charts    I assumed direction of critical care for this patient from another provider in my specialty: no    Comments:      Patient persistently hypoxic requiring high flow oxygen, deep suctioning, and respiratory therapy.

## 2023-12-22 NOTE — SPEECH THERAPY NOTE
Speech-Language Pathology Progress Note    Patient Name: Raisa Sinha    Today's Date: 12/22/2023    Subjective:  Pt was awake and alert. She was sitting upright in bed. Patient stated she's feeling a bit better today.     Objective:  Pt was seen today for dysphagia therapy. She was made NPO yesterday d/t s/s aspiration. Pt was. Seen today for repeat assessment. Textures offered today included puree, regular solid, and thin liquid via straw.  Swallow function:   Bolus retrieval and control were adequate.  Mastication and AP transfer were slow but functional. Pharyngeal swallow initiation was mildly delayed. Hyolaryngeal excursion was mildly reduced. No s/s aspiration occurred during session today.    Assessment:  Swallow function is improved today. Okay to resume PO diet.    Plan:  Begin  dysphagia 2 mechanical soft and thin liquids. Continue ST follow up. Subsequent sessions to focus on maximizing PO intake safety and determining potential for diet texture advancement.

## 2023-12-22 NOTE — UTILIZATION REVIEW
Initial Clinical Review    Admission: Date/Time/Statement:   Admission Orders (From admission, onward)       Ordered        12/21/23 1546  Inpatient Admission  Once            12/21/23 1328  INPATIENT ADMISSION  Once,   Status:  Canceled                          Orders Placed This Encounter   Procedures    Inpatient Admission     Standing Status:   Standing     Number of Occurrences:   1     Order Specific Question:   Level of Care     Answer:   Level 2 Stepdown / HOT [14]     Order Specific Question:   Estimated length of stay     Answer:   More than 2 Midnights     Order Specific Question:   Certification     Answer:   I certify that inpatient services are medically necessary for this patient for a duration of greater than two midnights. See H&P and MD Progress Notes for additional information about the patient's course of treatment.     ED Arrival Information       Expected   -    Arrival   12/21/2023 09:02    Acuity   Urgent              Means of arrival   Ambulance    Escorted by   [facility]    Service   [facility]    Admission type   Emergency              Arrival complaint   respiratory distress             Chief Complaint   Patient presents with    Decreased Oxygen Level     Pt coming from [facility]. Per staff, O2 sat in the high 80s. EMS had patient on 4 LNC satting at 96%. Room air sat in ED 91%.        Initial Presentation: 89 y.o. female to ED by EMS from Nursing facility pmh CKD, SVT, known PE/DVT  not currently on anticoagulation, depression presents w O2 POX in high 80s, sore throat, cough w SOB per Nursing staff; EMS utilizing 4 L NC    EXAM  Covid pcr+, hypertensive; acutely distressed appearing, requiring HFNC for POX=92%,  CTA reveals large R pleural effusion w MOD atelectasis in RLL, debris in RLL    Inpatient SD2 due to Severe Covid infection  HFNC, Severe Covid pathway, IV Steroids, IV Remdesivir, IV Ceftriaxone, Doxycycline; follow inflammatory marker labs; follow up HIV/  chronic hep labs to give Tociizumab; AC if D Dimer elevated cont ppx AC for now, guaifenesin & benzonatate. Off metoprolol watch HR, cont gabapentin  Patient status level 3 DNR/DNI.   Date: 12/22/2023   Day 2:   Cont with productive cough & self-suctioning.   Requires  HFNC  this morning (60%/45 L/min). Procal, CRP, d-dimer all elevated this morning but WBC remain normal.   Start start Tocilizumab today, IV Heparin GTT due to elevated D Dimer; dysphagia 2 w thin liq diet, IVF bolus due to DAVID, monitor CR w labs. Non pharmacologic measures to prevent delirium, consult to Nephrology for midline placement due to DAVID on CKD 3A , PT when able  ED Triage Vitals   Temperature Pulse Respirations Blood Pressure SpO2   12/21/23 0911 12/21/23 0910 12/21/23 0910 12/21/23 0910 12/21/23 0910   98.4 °F (36.9 °C) 88 20 (!) 179/81 97 %      Temp Source Heart Rate Source Patient Position - Orthostatic VS BP Location FiO2 (%)   12/21/23 0911 12/21/23 0910 12/21/23 0910 12/21/23 0910 12/22/23 0000   Oral Monitor Lying Right arm 60      Pain Score       12/21/23 0910       No Pain          Wt Readings from Last 1 Encounters:   12/11/23 62.5 kg (137 lb 12.8 oz)     Additional Vital Signs:   Date/Time Temp Pulse Resp BP MAP (mmHg) SpO2 FiO2 (%) Calculated FIO2 (%) - Nasal Cannula O2 Flow Rate (L/min) Nasal Cannula O2 Flow Rate (L/min) O2 Device O2 Interface Device Patient Position - Orthostatic VS   12/22/23 1216 -- 65 17 146/65 94 98 % -- -- -- -- High flow nasal cannula -- Lying   12/22/23 0901 97.5 °F (36.4 °C) 61 16 128/61 88 96 % 40 -- 45 L/min -- High flow nasal cannula HFNC prongs Lying   12/22/23 0800 97.5 °F (36.4 °C) -- -- -- -- -- -- -- -- -- -- -- --   12/22/23 0400 97.9 °F (36.6 °C) 62 20 108/53 77 97 % 60 -- 45 L/min -- High flow nasal cannula -- Lying   12/22/23 0200 -- 71 21 134/59 85 98 % 60 -- 45 L/min -- High flow nasal cannula HFNC prongs Lying   12/22/23 0000 97.9 °F (36.6 °C) 71 21 113/60 81 99 % 60 -- 45 L/min --  High flow nasal cannula -- Lying   12/21/23 2200 98 °F (36.7 °C) 68 22 108/53 76 -- -- -- -- -- -- -- Lying   12/21/23 2000 101.3 °F (38.5 °C) Abnormal  -- -- -- -- -- -- -- -- -- -- -- --   12/21/23 1940 -- -- -- -- -- 98 % -- -- -- -- -- HFNC prongs --   12/21/23 1930 -- 84 26 Abnormal  179/75 Abnormal  108 -- -- -- -- -- -- -- --   12/21/23 1918 102.6 °F (39.2 °C) Abnormal  77 -- -- -- -- -- -- -- -- -- -- --   12/21/23 19:11:13 101.7 °F (38.7 °C) Abnormal  -- 26 Abnormal  203/95 Abnormal  -- 95 % -- -- -- -- -- -- --   12/21/23 1700 -- 94 20 182/87 Abnormal  124 97 % -- -- -- -- High flow nasal cannula -- Lying   12/21/23 1600 -- 88 20 172/85 Abnormal  121 92 % -- -- -- -- High flow nasal cannula -- Lying   12/21/23 1545 -- 90 20 176/84 Abnormal  121 92 % -- -- -- -- High flow nasal cannula -- Lying   12/21/23 1527 -- -- -- -- -- 93 % -- -- -- -- High flow nasal cannula HFNC prongs --   12/21/23 1515 -- 108 Abnormal  32 Abnormal  240/154 Abnormal    182 98 % -- -- -- -- Non-rebreather mask -- Lying   BP: pt being suctioned at 12/21/23 1515   12/21/23 1454 -- 148 Abnormal   60 Abnormal  264/140 Abnormal  -- 96 % -- 80 -- 15 L/min Non-rebreather mask -- Lying   Pulse: Dr. Purcell made aware at 12/21/23 1454   12/21/23 1200 -- 76 16 157/75 108 92 % -- -- -- -- None (Room air) -- Lying   12/21/23 1107 99.4 °F (37.4 °C) -- -- -- -- -- -- -- -- -- -- -- --   12/21/23 1100 -- 80 21 147/78 106 97 % -- 28 -- 2 L/min Nasal cannula -- Lying   12/21/23 1000 -- 84 22 134/70 94 98 % -- 28 -- 2 L/min Nasal cannula -- Lying   12/21/23 0922 101.1 °F (38.4 °C) Abnormal  -- -- -- -- -- -- -- -- -- -- -- --   12/21/23 0911 98.4 °F (36.9 °C) -- -- -- -- -- -- -- -- -- -- -- --   12/21/23 0910 -- 88 20 179/81 Abnormal  117 97 % -- 28 -- 2 L/min Nasal cannula -- Lying     Pertinent Labs/Diagnostic Test Results:   CTA ED chest PE Study   Final Result by Nyasia Mortensen MD (12/21 6343)      With mild compromise by motion and partial  "right lower lobe atelectasis, no pulmonary embolus.      Large right pleural effusion with moderate rounded atelectasis in the right lower lobe.      Debris occluding the right lower lobe bronchi.            Workstation performed: [id number]           Results from last 7 days   Lab Units 12/21/23  1039   SARS-COV-2  Positive*     Results from last 7 days   Lab Units 12/22/23  1124 12/22/23  0519 12/21/23  0930   WBC Thousand/uL 7.88 7.67 7.35   HEMOGLOBIN g/dL 11.4* 11.6 11.5   HEMATOCRIT % 37.7 38.2 39.3   PLATELETS Thousands/uL 171 216 221   NEUTROS ABS Thousands/µL  --   --  5.86   BANDS PCT %  --  5  --          Results from last 7 days   Lab Units 12/22/23  0519 12/21/23  1846 12/21/23  1622 12/21/23  0930   SODIUM mmol/L 136  --   --  136   POTASSIUM mmol/L 4.4  --   --  4.2   CHLORIDE mmol/L 101  --   --  103   CO2 mmol/L 25  --   --  25   ANION GAP mmol/L 10  --   --  8   BUN mg/dL 30*  --   --  20   CREATININE mg/dL 1.07  --   --  0.77   EGFR ml/min/1.73sq m 43  --   --  64   CALCIUM mg/dL 8.8  --   --  8.6   CALCIUM, IONIZED mmol/L  --  0.98*  --   --    MAGNESIUM mg/dL 2.0  --  2.0  --    PHOSPHORUS mg/dL 5.4*  --   --   --      Results from last 7 days   Lab Units 12/22/23  0519   AST U/L 12*   ALT U/L 10   ALK PHOS U/L 72   TOTAL PROTEIN g/dL 7.1   ALBUMIN g/dL 3.6   TOTAL BILIRUBIN mg/dL 0.44         Results from last 7 days   Lab Units 12/22/23  0519 12/21/23  0930   GLUCOSE RANDOM mg/dL 173* 106             No results found for: \"BETA-HYDROXYBUTYRATE\"               Results from last 7 days   Lab Units 12/21/23  0930   CK TOTAL U/L 28     Results from last 7 days   Lab Units 12/21/23  1138 12/21/23  0930   HS TNI 0HR ng/L  --  11   HS TNI 2HR ng/L 13  --    HSTNI D2 ng/L 2  --      Results from last 7 days   Lab Units 12/21/23  1622   D-DIMER QUANTITATIVE ug/ml FEU 2.68*             Results from last 7 days   Lab Units 12/22/23  0519 12/21/23  0930   PROCALCITONIN ng/ml 4.49* 0.17     Results from " last 7 days   Lab Units 12/21/23  0930   LACTIC ACID mmol/L 0.7                             Results from last 7 days   Lab Units 12/21/23  1622   HEP B S AG  Non-reactive   HEP C AB  Non-reactive   HEP B C IGM  Non-reactive   HEP B C TOTAL AB  Non-reactive         Results from last 7 days   Lab Units 12/22/23  0519 12/21/23  0930   CRP mg/L 147.4* 59.7*                 Results from last 7 days   Lab Units 12/21/23  1039   INFLUENZA A PCR  Negative   INFLUENZA B PCR  Negative   RSV PCR  Negative     12/21/2023 ecg=  Normal sinus rhythm  Right bundle branch block  Left anterior fascicular block   Bifascicular block   Possible Lateral infarct , age undetermined  Abnormal ECG  When compared with ECG of 18-MAR-2023 08:31,  Premature atrial complexes are no longer Present  Confirmed by Jacinto Harkins ([id number]) on 12/21/2023 8:34:20 PM    ED Treatment:   Medication Administration from 12/21/2023 0902 to 12/21/2023 1905         Date/Time Order Dose Route Action     12/21/2023 0928 EST acetaminophen (TYLENOL) tablet 650 mg 650 mg Oral Not Given     12/21/2023 0941 EST Lidocaine Viscous HCl (XYLOCAINE) 2 % mucosal solution 15 mL 15 mL Swish & Spit Given     12/21/2023 1622 EST enoxaparin (LOVENOX) subcutaneous injection 40 mg 40 mg Subcutaneous Given     12/21/2023 1609 EST benzonatate (TESSALON PERLES) capsule 200 mg 200 mg Oral Not Given     12/21/2023 1650 EST ceftriaxone (ROCEPHIN) 1 g/50 mL in dextrose IVPB 0 mg Intravenous Stopped     12/21/2023 1620 EST ceftriaxone (ROCEPHIN) 1 g/50 mL in dextrose IVPB 1,000 mg Intravenous New Bag          Past Medical History:   Diagnosis Date    Compression fracture of L3 lumbar vertebra, closed, initial encounter ([facility]) 08/22/2020    COVID 01/27/2023    Fall 08/22/2020    History of venous thromboembolism 03/18/2023    Infiltrate noted on imaging study 12/27/2022    Interstitial cystitis     Leg wound, right 05/16/2023    Leukocytosis 03/18/2023    Leukopenia     Neurologic gait  dysfunction     Abstraction Dr. Arguelles's charts    Neuropathy     Neutropenia (HCC)     Abstraction Dr. Arguelles's charts    Osteoarthritis     Osteoporosis     Peripheral edema     Abstraction Dr. Arguelles's charts    Renal cyst     Syncope     Abstraction Dr. Arguelles's charts    Viral gastroenteritis 04/04/2023     Present on Admission:   Paroxysmal supraventricular tachycardia    Cognitive impairment   Neuropathy   COVID-19 virus infection   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)   Chronic deep vein thrombosis (DVT) of proximal vein of both lower extremities (HCC)      Admitting Diagnosis: Hypoxia [R09.02]  Pleural effusion, right [J90]  COVID [U07.1]  Age/Sex: 89 y.o. female  Admission Orders:  Contact & airborne isolation  POX goal > 90%   HFNC   Oob  Dysphagia diet  SPEECH  SCD    Scheduled Medications:  benzonatate, 200 mg, Oral, TID  cefTRIAXone, 1,000 mg, Intravenous, Q24H  dexamethasone, 6 mg, Intravenous, Q12H EDDIE  doxycycline hyclate, 100 mg, Oral, Q12H EDDIE  gabapentin, 100 mg, Oral, BID  guaiFENesin, 1,200 mg, Oral, Q12H EDDIE  pantoprazole, 40 mg, Oral, Daily  sertraline, 75 mg, Oral, Daily  tocilizumab (ACTEMRA) 500 mg in sodium chloride 0.9 % 75 mL IVPB, 8 mg/kg, Intravenous, Once    Continuous IV Infusions:  heparin (porcine), 3-20 Units/kg/hr (Order-Specific), Intravenous, Titrated    PRN Meds:  acetaminophen, 1,000 mg, Intravenous, Q8H PRN  albuterol, 2.5 mg, Nebulization, Q4H PRN  labetalol, 10 mg, Intravenous, Q6H PRN  phenol, 1 spray, Mouth/Throat, Q2H PRN      IP CONSULT TO CASE MANAGEMENT    Network Utilization Review Department  ATTENTION: Please call with any questions or concerns to [phone] and carefully listen to the prompts so that you are directed to the right person. All voicemails are confidential.   For Discharge needs, contact Care Management DC Support Team at [phone] opt. [phone]  Send all requests for admission clinical reviews, approved or denied determinations and any  other requests to dedicated fax number below belonging to the campus where the patient is receiving treatment. List of dedicated fax numbers for the Facilities:  FACILITY NAME UR FAX NUMBER   ADMISSION DENIALS (Administrative/Medical Necessity) [phone]   DISCHARGE SUPPORT TEAM (NETWORK) [phone]   PARENT CHILD HEALTH (Maternity/NICU/Pediatrics) [phone]   [facility] [phone]   [facility] [phone]   [facility] [phone]   [facility] [phone]   [facility] [phone]   [facility] [phone]   [facility] [phone]   [facility] [phone]   [facility] [phone]   [facility] [phone]   [facility] [phone]

## 2023-12-22 NOTE — RESPIRATORY THERAPY NOTE
resp   12/22/23 0901   Respiratory Assessment   Assessment Type Assess only   General Appearance Alert;Awake   Respiratory Pattern Normal   Chest Assessment Chest expansion symmetrical   Bilateral Breath Sounds Coarse   Suction Oral  (independant)   Resp Comments Pt cont on HFNC, FiO2 titrated to 40% at this time SpO2 96-97, MD at bedside. Plan is to cont pt on HFNC for tachypnea. NT suctioing not indicated at this time, pt uses yankauer independantly and has productive cough, will hold off on NT suctioing.At this time pt tolerating HF settings, no resp distress noted, Spo2 WNL. Will cont to monitor per resp protocol.   Non-Invasive Information   O2 Interface Device HFNC prongs   Non-Invasive Ventilation Mode HFNC (High flow)   SpO2 96 %   $ Pulse Oximetry Spot Check Charge Completed   Non-Invasive Settings   FiO2 (%) 40   Flow (lpm) 45   Temperature (Set) 31   Non-Invasive Readings   Skin Intervention Skin intact   Heater Temperature (Obs) 31   Maintenance   Water bag changed Yes   Cough Description   Sputum Amount Large   Sputum Color White;Yellow   Sputum Consistency Thick   Sputum How Obtained Spontaneous cough

## 2023-12-22 NOTE — PROGRESS NOTES
INTERNAL MEDICINE RESIDENCY PROGRESS NOTE     Name: Raisa Sinha   Age & Sex: 89 y.o. female   MRN: [id number]  Unit/Bed#: [facility] 505-01   Encounter: [id number]  Team: SOD Team A    PATIENT INFORMATION     Name: Raisa Sinha   Age & Sex: 89 y.o. female   MRN: [id number]  Hospital Stay Days: 1    ASSESSMENT/PLAN     Principal Problem:    COVID-19 virus infection  Active Problems:    Neuropathy    Paroxysmal supraventricular tachycardia     Cognitive impairment    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)    Chronic deep vein thrombosis (DVT) of proximal vein of both lower extremities (HCC)    Acute hypoxic respiratory failure (HCC)    DAVID (acute kidney injury) (HCC)      * COVID-19 virus infection  Assessment & Plan  Patient presented from [facility] due to worsening hypoxia. Found to be COVID-positive but sent to ED due to worsening respiratory status. During course of initial evaluation in ED, event concerning for aspiration occurred and patient subsequently required HFNC. CT chest wit large effusion and debris in RLL bronchus. Fortunately, initial lactate, procal, troponin, WBC all within normal limits. Patient admitted and started on severe COVID pathway    Patient without respiratory issues overnight - still with productive cough for which she has been self-suctioning. Still on HFNC as of this morning (60%/45 L/min). Procal, CRP, d-dimer all elevated this morning but WBC remain normal.    Plan  Continue high flow nasal cannula and wean of 2 maintain oxygen level over 90% - decreased to 40%/45 this morning by RT  Continue COVID severe pathway  Dexamethasone 6 mg every 12 hours  Ceftriaxone 1 g every 24 hours  Doxycycline 100 mg every 12 hours  Will start Tocilizumab today given negative status for HIV/Hep  Will start heparin infusion given elevated d-dimer  Continue guaifenesin and benzonatate  Per SLP, switch diet to dysphagia 2 with thin liquids      DAVID (acute kidney  injury) ([facility])  Assessment & Plan  Patient with bump in creatinine to 1.07 today from 0.77 yesterday. Does have a history of CKD, but baseline unclear (recent labs show readings between 0.6-1).     Plan  Will run fluid bolus today  Continue to monitor renal function; avoid nephrotoxins    Acute hypoxic respiratory failure (HCC)  Assessment & Plan  Please see assessment and plan under COVID 19 infection    Chronic deep vein thrombosis (DVT) of proximal vein of both lower extremities ([facility])  Assessment & Plan  Patient with known hx of rocío LE DVT's on prior duplex from Jan. Reported right proximal femoral to posterior tibial clot and left popliteal and gastroc clot. She was previously on Eliquis but this had to be discontinued after a GI bleed (3/2023). It was resumed briefly in the past but the melena started again. Patient and son both agreed that risk of anticoagulation outweighed the benefits so she has not been on anticoagulation    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)  Assessment & Plan  Continue sertraline 75 mg once daily    Cognitive impairment  Assessment & Plan  February 21, 2023: Mini-Mental status exam: 25/30 April 1, 2023: Mini-Mental status exam performed at rehabilitation facility: 23/30.  (Reported by her son to hospital physicians as a pre-existing condition prior to her March 2023 hospitalization)    August 14, 2023: MMSE: 21    Patient today appears pleasantly demented. Oriented primarily to self but with limited insight into current hospitalization (unaware she had COVID, did not know what COVID was, etc)    Plan  Continue to monitor for changes in mentation  Non-pharmacologic measures to prevent delirium    Paroxysmal supraventricular tachycardia   Assessment & Plan  Patient with history of pSVT, for which she was previously on treatment with metoprolol. However, she has been off of this for some time. Currently denying any cardiac complaints and appears to be in normal sinus  rhythm with controlled rates.    Plan  Continue to monitor    Neuropathy  Assessment & Plan  Patient has history of neuropathy - no particular complaints related to this at present.    Plan  Continue PTA gabapentin        Disposition: Remain admitted pending treatment for severe COVID     SUBJECTIVE     Patient seen and examined. No acute events overnight. This morning, patient reports that her main complaint is frustration from being in the hospital. She states that she was unaware she had COVID and questions what this is. In regards to physical complaints, her main complaint is ongoing congestion. She feels that her breathing has been easier since being on the high flow.    OBJECTIVE     Vitals:    23 0400 23 0800 23 0901 23 1216   BP: 108/53  128/61 146/65   BP Location: Left arm  Left arm Left arm   Pulse: 62  61 65   Resp:   16 17   Temp: 97.9 °F (36.6 °C) 97.5 °F (36.4 °C) 97.5 °F (36.4 °C)    TempSrc: Oral Oral Oral    SpO2: 97%  96% 98%      Temperature:   Temp (24hrs), Av.3 °F (37.4 °C), Min:97.5 °F (36.4 °C), Max:102.6 °F (39.2 °C)    Temperature: 97.5 °F (36.4 °C)  Intake & Output:  I/O          0701   0700  0701   0700    P.O.  0    IV Piggyback  150    Total Intake  150    Urine  350    Stool  0    Total Output  350    Net  -200          Unmeasured Urine Occurrence  2 x    Unmeasured Stool Occurrence  3 x          Weights:        There is no height or weight on file to calculate BMI.  Weight (last 2 days)       None          Physical Exam  Vitals and nursing note reviewed.   Constitutional:       Appearance: She is ill-appearing.   HENT:      Head: Normocephalic and atraumatic.      Right Ear: External ear normal.      Left Ear: External ear normal.      Nose: Congestion present.      Comments: HFNC in place     Mouth/Throat:      Mouth: Mucous membranes are moist.      Pharynx: Oropharynx is clear.   Eyes:      Extraocular Movements: Extraocular  movements intact.      Pupils: Pupils are equal, round, and reactive to light.   Cardiovascular:      Rate and Rhythm: Normal rate and regular rhythm.      Heart sounds: Normal heart sounds.   Pulmonary:      Effort: Pulmonary effort is normal. No respiratory distress.      Breath sounds: Rhonchi (Bilateral, diffuse) present.   Abdominal:      General: Bowel sounds are normal. There is no distension.      Palpations: Abdomen is soft.      Tenderness: There is no abdominal tenderness.   Musculoskeletal:      Right lower leg: No edema.      Left lower leg: No edema.   Skin:     General: Skin is warm and dry.      Capillary Refill: Capillary refill takes less than 2 seconds.   Neurological:      Mental Status: She is alert. Mental status is at baseline.      Comments: Oriented to self, less so to situation. Pleasantly demented otherwise   Psychiatric:         Mood and Affect: Mood normal.         Behavior: Behavior normal.       LABORATORY DATA     Labs: I have personally reviewed pertinent reports.  Results from last 7 days   Lab Units 12/22/23  1124 12/22/23  0519 12/21/23  0930   WBC Thousand/uL 7.88 7.67 7.35   HEMOGLOBIN g/dL 11.4* 11.6 11.5   HEMATOCRIT % 37.7 38.2 39.3   PLATELETS Thousands/uL 171 216 221   NEUTROS PCT %  --   --  80*   MONOS PCT %  --   --  12   MONO PCT %  --  3*  --    EOS PCT %  --  0 0      Results from last 7 days   Lab Units 12/22/23  0519 12/21/23  0930   POTASSIUM mmol/L 4.4 4.2   CHLORIDE mmol/L 101 103   CO2 mmol/L 25 25   BUN mg/dL 30* 20   CREATININE mg/dL 1.07 0.77   CALCIUM mg/dL 8.8 8.6   ALK PHOS U/L 72  --    ALT U/L 10  --    AST U/L 12*  --      Results from last 7 days   Lab Units 12/22/23  0519 12/21/23  1622   MAGNESIUM mg/dL 2.0 2.0     Results from last 7 days   Lab Units 12/22/23  0519   PHOSPHORUS mg/dL 5.4*          Results from last 7 days   Lab Units 12/21/23  0930   LACTIC ACID mmol/L 0.7           IMAGING & DIAGNOSTIC TESTING     Radiology Results: I have  "personally reviewed pertinent reports.  CTA ED chest PE Study    Result Date: 12/21/2023  Impression: With mild compromise by motion and partial right lower lobe atelectasis, no pulmonary embolus. Large right pleural effusion with moderate rounded atelectasis in the right lower lobe. Debris occluding the right lower lobe bronchi. Workstation performed: [id number]     Other Diagnostic Testing: I have personally reviewed pertinent reports.    ACTIVE MEDICATIONS     Current Facility-Administered Medications   Medication Dose Route Frequency    acetaminophen (Ofirmev) injection 1,000 mg  1,000 mg Intravenous Q8H PRN    albuterol inhalation solution 2.5 mg  2.5 mg Nebulization Q4H PRN    benzonatate (TESSALON PERLES) capsule 200 mg  200 mg Oral TID    ceftriaxone (ROCEPHIN) 1 g/50 mL in dextrose IVPB  1,000 mg Intravenous Q24H    dexamethasone (PF) (DECADRON) injection 6 mg  6 mg Intravenous Q12H EDDIE    doxycycline hyclate (VIBRAMYCIN) capsule 100 mg  100 mg Oral Q12H EDDIE    gabapentin (NEURONTIN) capsule 100 mg  100 mg Oral BID    guaiFENesin (MUCINEX) 12 hr tablet 1,200 mg  1,200 mg Oral Q12H EDDIE    heparin (porcine) 25,000 units in 0.45% NaCl 250 mL infusion (premix)  3-20 Units/kg/hr (Order-Specific) Intravenous Titrated    labetalol (NORMODYNE) injection 10 mg  10 mg Intravenous Q6H PRN    pantoprazole (PROTONIX) EC tablet 40 mg  40 mg Oral Daily    phenol (CHLORASEPTIC) 1.4 % mucosal liquid 1 spray  1 spray Mouth/Throat Q2H PRN    sertraline (ZOLOFT) tablet 75 mg  75 mg Oral Daily    tocilizumab (ACTEMRA) 500 mg in sodium chloride 0.9 % 75 mL IVPB  8 mg/kg Intravenous Once       VTE Pharmacologic Prophylaxis: Heparin  VTE Mechanical Prophylaxis: sequential compression device    Portions of the record may have been created with voice recognition software.  Occasional wrong word or \"sound a like\" substitutions may have occurred due to the inherent limitations of voice recognition software.  Read the chart carefully " and recognize, using context, where substitutions have occurred.  ==  Rigoberto Santana MD  [facility]  Internal Medicine Residency PGY-1

## 2023-12-22 NOTE — OCCUPATIONAL THERAPY NOTE
Occupational Therapy Evaluation     Patient Name: Raisa Sinha  Today's Date: 12/22/2023  Problem List  Principal Problem:    COVID-19 virus infection  Active Problems:    Neuropathy    Paroxysmal supraventricular tachycardia     Cognitive impairment    Episode of recurrent major depressive disorder ([facility])    Chronic deep vein thrombosis (DVT) of proximal vein of both lower extremities ([facility])    Acute hypoxic respiratory failure (HCC)    DAVID (acute kidney injury) ([facility])    Past Medical History  Past Medical History:   Diagnosis Date    Compression fracture of L3 lumbar vertebra, closed, initial encounter ([facility]) 08/22/2020    COVID 01/27/2023    Fall 08/22/2020    History of venous thromboembolism 03/18/2023    Infiltrate noted on imaging study 12/27/2022    Interstitial cystitis     Leg wound, right 05/16/2023    Leukocytosis 03/18/2023    Leukopenia     Neurologic gait dysfunction     Abstraction Dr. Arguelles's charts    Neuropathy     Neutropenia ([facility])     Abstraction Dr. Arguelles's charts    Osteoarthritis     Osteoporosis     Peripheral edema     Abstraction Dr. Arguelles's charts    Renal cyst     Syncope     Abstraction Dr. Arguelles's charts    Viral gastroenteritis 04/04/2023     Past Surgical History  Past Surgical History:   Procedure Laterality Date    CAPSULOTOMY      Right eye    CATARACT EXTRACTION Left     CHALAZION EXCISION      COLONOSCOPY  2006 12/22/23 0846   OT Last Visit   OT Visit Date 12/22/23   Note Type   Note type Evaluation   Pain Assessment   Pain Assessment Tool 0-10   Pain Score 8   Pain Location/Orientation Orientation: Left;Location: Arm;Location: Shoulder  (reports this is baseline)   Patient's Stated Pain Goal No pain   Hospital Pain Intervention(s) Ambulation/increased activity;Emotional support   Restrictions/Precautions   Weight Bearing Precautions Per Order No   Other Precautions Contact/isolation;Airborne/isolation;Cognitive;Chair Alarm;Bed Alarm;Aspiration;Fall  "Risk;Telemetry;O2;Hard of hearing  (COVID+, hfnc, NPO)   Home Living   Type of Home SNF  ([facility])   Home Layout One level   Additional Comments pt very confused at the time of this evaluation- unable to relay info about home set-up. Per CM, pt resides at [facility], where she was total A.   Prior Function   Level of [place] Needs assistance with functional mobility;Needs assistance with ADLs;Needs assistance with IADLS   Lives With Facility staff   Receives Help From Personal care attendant   IADLs Family/Friend/Other provides meals;Family/Friend/Other provides transportation;Family/Friend/Other provides medication management   Falls in the last 6 months 0   Vocational Retired   Lifestyle   Autonomy Per , pt is total A for ADL/IADL, uses marii for transfers.   Subjective   Subjective \"I can't believe I can't walk\" - pt is very confused   ADL   Where Assessed Edge of bed   Eating Assistance 4  Minimal Assistance   Grooming Assistance 3  Moderate Assistance   UB Bathing Assistance 2  Maximal Assistance   LB Bathing Assistance 1  Total Assistance   UB Dressing Assistance 2  Maximal Assistance   LB Dressing Assistance 1  Total Assistance   Toileting Assistance  1  Total Assistance   Functional Assistance 1  Total Assistance   Bed Mobility   Rolling R 3  Moderate assistance   Additional items Assist x 1;Increased time required;Verbal cues   Rolling L 3  Moderate assistance   Additional items Assist x 1;Increased time required;Verbal cues   Supine to Sit 2  Maximal assistance   Additional items Assist x 2;Increased time required;Verbal cues;LE management   Sit to Supine 2  Maximal assistance   Additional items Assist x 2;Increased time required;Verbal cues;LE management   Additional Comments found and left supine in bed with all needs in reach and alarm on. rolled to R and L for perineal hygiene.   Transfers   Sit to Stand 2  Maximal assistance   Additional items Assist x 2;Increased time " required;Verbal cues   Stand to Sit 2  Maximal assistance   Additional items Assist x 2;Increased time required;Verbal cues   Additional Comments c b/l HHA and knee blocking, vc for hand placement. Unable to clear buttock. found out post eval that pt is dependent for adl/iadl and uses marii for transfers.   Balance   Static Sitting Poor -   Dynamic Sitting Poor -   Static Standing Zero   Activity Tolerance   Activity Tolerance Patient limited by fatigue   Medical Staff Made Aware [facility]PT [employer] [facility]' pts med complexity, comorbidities and regression from baseline.   Nurse Made Aware ok per RN   RUE Assessment   RUE Assessment X  (limited ROM @ baseline)   LUE Assessment   LUE Assessment X  (limited ROM @ baseline)   Hand Function   Gross Motor Coordination Functional   Fine Motor Coordination Functional   Psychosocial   Psychosocial (WDL) X   Cognition   Overall Cognitive Status Impaired   Arousal/Participation Alert;Responsive   Attention Difficulty attending to directions   Orientation Level Oriented to person;Oriented to place;Disoriented to situation   Memory Decreased recall of precautions;Decreased recall of recent events   Following Commands Follows one step commands with increased time or repetition   Comments pt pleasant, does require consistent vc for redirection, has overall poor safety awareness and insight to condition t/o session. per EMR, pt w cognitive impairment at baseline.   Assessment   Prognosis Guarded   Assessment Pt is a 89 y.o. female admitted 12/21/23 w inc SOB, coughing, was found to be COVID+. Pt w active OT eval and treat orders at this time. Notified post eval that pt is total A at baseline for ADL/IADL, uses marii lift for transfers and resides at [facility]. PMH includes  has a past medical history of Compression fracture of L3 lumbar vertebra, closed, initial encounter ([facility]), COVID, Fall, History of venous thromboembolism, Infiltrate noted on imaging study, Interstitial  cystitis, Leg wound, right, Leukocytosis, Leukopenia, Neurologic gait dysfunction, Neuropathy, Neutropenia (HCC), Osteoarthritis, Osteoporosis, Peripheral edema, Renal cyst, Syncope, and Viral gastroenteritis. F/u tx session focusing on toileting; pt total A this time for perineal hygiene and mod Ax1 to roll from R to L. From OT standpoint, pt is functioning at baseline level and does not appear to require additional acute OT at this time.  The patient's raw score on the AM-PAC Daily Activity inpatient short form is 10, standardized score is  , less than 39.4. Patients at this level are likely to benefit from discharge to post-acute rehabilitation services. Please refer to the recommendation of the Occupational Therapist for safe discharge planning. From OT perspective, pt should D/C to [facility] w same level of assistance as prior when medically stable. Acute OT no longer rq at this time, D/C OT.   Goals   Patient Goals lay back down   Discharge Recommendation   Rehab Resource Intensity Level, OT No post-acute rehabilitation needs   AM-PAC Daily Activity Inpatient   Lower Body Dressing 1   Bathing 1   Toileting 2   Upper Body Dressing 2   Grooming 2   Eating 2   Daily Activity Raw Score 10   AM-PAC Applied Cognition Inpatient   Following a Speech/Presentation 3   Understanding Ordinary Conversation 3   Taking Medications 1   Remembering Where Things Are Placed or Put Away 1   Remembering List of 4-5 Errands 1   Taking Care of Complicated Tasks 1   Applied Cognition Raw Score 10   Applied Cognition Standardized Score 24.98   Modified [place]ie Scale   Modified [place] Scale 5   Additional Treatment Session   Start Time 0830   End Time 0845   Treatment Assessment f/u tx session focusing on toileting- required total A for perineal hygiene, mod A to roll to R and L.   End of Consult   Education Provided Yes   Patient Position at End of Consult Supine;All needs within reach;Bed/Chair alarm activated   Nurse Communication  Nurse aware of consult       Marya Kramer, MOT, OTR/L

## 2023-12-22 NOTE — PROCEDURES
Midline Insertion    Date/Time: 12/22/2023 3:08 PM    Performed by: Tigist James RN  Authorized by: Shilpa Gamez MD    Patient location:  Bedside  Other Assisting Provider: Yes (comment) (STEW Walls)    Consent:     Consent obtained: None required.    Risks discussed:  Arterial puncture, bleeding, infection, incorrect placement, nerve damage and pneumothorax    Alternatives discussed:  No treatment, delayed treatment and alternative treatment  Universal protocol:     Procedure explained and questions answered to patient or proxy's satisfaction: yes      Relevant documents present and verified: yes      Site/side marked: yes      Immediately prior to procedure, a time out was called: yes      Patient identity confirmed:  Verbally with patient, arm band, provided demographic data and hospital-assigned identification number  Pre-procedure details:     Hand hygiene: Hand hygiene performed prior to insertion      Sterile barrier technique: All elements of maximal sterile technique followed      Skin preparation:  ChloraPrep    Skin preparation agent: Skin preparation agent completely dried prior to procedure    Indications:     Midline indications: new site    Anesthesia (see MAR for exact dosages):     Anesthesia method:  None  Procedure details:     Location:  Right basilic    Laterality:  Right    Site selection rationale:  Largest, most accessible vein    Catheter size:  18 gauge    Landmarks identified: yes      Ultrasound guidance: yes      Ultrasound image availability:  Not saved    Sterile ultrasound techniques: Sterile gel and sterile probe covers were used      Number of attempts:  1    Successful placement: yes      Catheter length (cm):  10    Exposed catheter length (cm):  0    Arm circumference (cm):  24    Lot number:  [id number] exp: 9/30/2024  Post-procedure details:     Post-procedure:  Dressing applied and securement device placed    Assessment:  Blood return through all ports and free fluid  flow    Post-procedure complications: none      Patient tolerance of procedure:  Tolerated well, no immediate complications

## 2023-12-22 NOTE — PHYSICAL THERAPY NOTE
Physical Therapy Evaluation    Patient's Name: Raisa Sinha    Admitting Diagnosis  Hypoxia [R09.02]  Pleural effusion, right [J90]  COVID [U07.1]    Problem List  Patient Active Problem List   Diagnosis    Neuropathy    Ambulatory dysfunction    Venous stasis dermatitis of both lower extremities    Age-related osteoporosis without current pathological fracture    Lower extremity edema    Stage 3a chronic kidney disease (HCC)    Paroxysmal supraventricular tachycardia     Balance problem    Constipation, unspecified    Diverticulosis of intestine, part unspecified, without perforation or abscess without bleeding    Generalized muscle weakness    Cognitive impairment    Polyneuropathy, unspecified    Pulmonary embolism (HCC)    COVID-19 virus infection    History of DVT of lower extremity    Chronic pain of left knee    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)    History of GI bleed    Acute blood loss anemia    Metabolic encephalopathy    Goals of care, counseling/discussion    DNR (do not resuscitate)    Hypokalemia    Frailty syndrome in geriatric patient    Adnexal cyst    Atrial premature contractions    Grade I diastolic dysfunction    History of gastric ulcer    Chronic deep vein thrombosis (DVT) of proximal vein of both lower extremities (HCC)    Current moderate episode of major depressive disorder without prior episode (HCC)    Left shoulder pain    Primary osteoarthritis of left shoulder    Hypertension    Left arm pain    Acute hypoxic respiratory failure (HCC)       Past Medical History  Past Medical History:   Diagnosis Date    Compression fracture of L3 lumbar vertebra, closed, initial encounter ([facility]) 08/22/2020    COVID 01/27/2023    Fall 08/22/2020    History of venous thromboembolism 03/18/2023    Infiltrate noted on imaging study 12/27/2022    Interstitial cystitis     Leg wound, right 05/16/2023    Leukocytosis 03/18/2023    Leukopenia     Neurologic gait dysfunction      "Abstraction Dr. Arguelles's charts    Neuropathy     Neutropenia (HCC)     Abstraction Dr. Arguelles's charts    Osteoarthritis     Osteoporosis     Peripheral edema     Abstraction Dr. Arguelles's charts    Renal cyst     Syncope     Abstraction Dr. Arguelles's charts    Viral gastroenteritis 04/04/2023       Past Surgical History  Past Surgical History:   Procedure Laterality Date    CAPSULOTOMY      Right eye    CATARACT EXTRACTION Left     CHALAZION EXCISION      COLONOSCOPY  2006        12/22/23 0845   PT Last Visit   PT Visit Date 12/22/23   Note Type   Note type Evaluation  (+ treatment)   Pain Assessment   Pain Assessment Tool 0-10   Pain Score 8   Pain Location/Orientation Orientation: Left;Location: Shoulder   Hospital Pain Intervention(s)   (noted that pain patch was present)   Restrictions/Precautions   Weight Bearing Precautions Per Order No   Other Precautions Airborne/isolation;Cognitive;Chair Alarm;Bed Alarm;Aspiration;Multiple lines;Telemetry;O2;Fall Risk;Pain;Hard of hearing  (HFNC, NPO, COVID-19)   Home Living   Type of Home SNF  ([facility])   Home Layout One level   Additional Comments (S)  Pt unable to provide SH on evaluation. CM later called for SH: pt is resident of the LTC section at , marii lift for t/f, limited ROM, full assist for ADL's, and confused at baseline.   General   Family/Caregiver Present No   Cognition   Arousal/Participation Responsive   Orientation Level Oriented to person;Oriented to place;Disoriented to situation  (oriented to general time, correct year, \"the 4th\" for date, unable to state month when asked but did say \"yes\" when asked if it is December)   Memory Decreased short term memory;Decreased recall of recent events;Decreased recall of precautions   Following Commands Follows one step commands inconsistently   Comments hx of cognitive impairment per EMR   RLE Assessment   RLE Assessment   (decreased knee flexion ROM, grossly 3-/5)   LLE Assessment   LLE Assessment   (decreased " knee flexion ROM, grossly 3-/5)   Coordination   Movements are Fluid and Coordinated 0   Coordination and Movement Description retropulsive, right lateral lean seated EOB, poor motor planning w/ transfers   Bed Mobility   Rolling R 3  Moderate assistance   Additional items Assist x 1;Bedrails;Increased time required;Verbal cues   Rolling L 3  Moderate assistance   Additional items Assist x 1;Bedrails;Increased time required;Verbal cues   Supine to Sit 2  Maximal assistance   Additional items Assist x 2;Increased time required;Verbal cues;LE management   Sit to Supine 2  Maximal assistance   Additional items Assist x 2;Increased time required;Verbal cues;LE management   Additional Comments Pt greeted in supine.   Transfers   Sit to Stand 2  Maximal assistance   Additional items Assist x 2;Increased time required;Verbal cues  (elevated bed)   Stand to Sit 2  Maximal assistance   Additional items Assist x 2;Increased time required;Verbal cues;Bed elevated   Additional Comments B HHA + B knee block   Ambulation/Elevation   Gait Assistance 2  Maximal assist   Additional items Assist x 2;Verbal cues;Tactile cues   Assistive Device   (B HHA + B Knee block)   Distance 1 lateral step w/ RLE   Balance   Static Sitting Poor -   Dynamic Sitting Poor -   Static Standing Zero   Endurance Deficit   Endurance Deficit Yes   Activity Tolerance   Activity Tolerance Patient limited by fatigue   Medical Staff Made Aware KALPESH Vega   Nurse Made Aware yes - cleared for therapy   Assessment   Assessment Pt is 89 y.o. female seen for a PT evaluation s/p admit to [facility] on 12/21/2023. Pt presenting w/ COVID-19. Please see above for other active problem list / PMH.     PT now consulted to assess functional mobility and needs for safe d/c planning. Prior to admission, pt was a LTC resident, marii lift for t/f.     Currently pt requires MaxAx2 for supine<>sit; ModAx1 for rolling; MaxAx2 for sit<>stand transfers. Pt  presents near functional baseline. Recommend for nsg + restorative staff to slide pt to drop arm chair while in-house. No further skilled acute PT needs. Will d/c from caseload.   Goals   Patient Goals none expressed   Plan   PT Frequency   (d/c PT)   Discharge Recommendation   Rehab Resource Intensity Level, PT No post-acute rehabilitation needs   AM-PAC Basic Mobility Inpatient   Turning in Flat Bed Without Bedrails 2   Lying on Back to Sitting on Edge of Flat Bed Without Bedrails 1   Moving Bed to Chair 1   Standing Up From Chair Using Arms 1   Walk in Room 1   Climb 3-5 Stairs With Railing 1   Basic Mobility Inpatient Raw Score 7   Highest Level Of Mobility   -[facility] Goal 2: Bed activities/Dependent transfer   -[facility] Achieved 3: Sit at edge of bed   Additional Treatment Session   Start Time 0830   End Time 0845   Treatment Assessment Pt incontinent of bowel w/ t/f. After instructing pt to return to supine, instructed pt in rolling side<>side for hygiene and changing of pad. Upon attempting to replace Purewick pt incontinent of urine. Instructed pt in further rolling side<>side for hygiene + changing of pads.   End of Consult   Patient Position at End of Consult Supine;Bed/Chair alarm activated;All needs within reach  (all lines in tact)     America Stiles, PT, DPT

## 2023-12-23 PROBLEM — N18.9 ACUTE KIDNEY INJURY SUPERIMPOSED ON CKD: Status: ACTIVE | Noted: 2023-12-22

## 2023-12-23 LAB
ALBUMIN SERPL BCP-MCNC: 3.5 G/DL (ref 3.5–5)
ALP SERPL-CCNC: 57 U/L (ref 34–104)
ALT SERPL W P-5'-P-CCNC: 9 U/L (ref 7–52)
ANION GAP SERPL CALCULATED.3IONS-SCNC: 7 MMOL/L
APTT PPP: 56 SECONDS (ref 23–37)
APTT PPP: 66 SECONDS (ref 23–37)
APTT PPP: 68 SECONDS (ref 23–37)
AST SERPL W P-5'-P-CCNC: 12 U/L (ref 13–39)
BASOPHILS # BLD AUTO: 0.01 THOUSANDS/ÂΜL (ref 0–0.1)
BASOPHILS NFR BLD AUTO: 0 % (ref 0–1)
BILIRUB SERPL-MCNC: 0.4 MG/DL (ref 0.2–1)
BUN SERPL-MCNC: 39 MG/DL (ref 5–25)
CALCIUM SERPL-MCNC: 8.7 MG/DL (ref 8.4–10.2)
CHLORIDE SERPL-SCNC: 101 MMOL/L (ref 96–108)
CO2 SERPL-SCNC: 28 MMOL/L (ref 21–32)
CREAT SERPL-MCNC: 0.83 MG/DL (ref 0.6–1.3)
CRP SERPL QL: 115.2 MG/L
EOSINOPHIL # BLD AUTO: 0 THOUSAND/ÂΜL (ref 0–0.61)
EOSINOPHIL NFR BLD AUTO: 0 % (ref 0–6)
ERYTHROCYTE [DISTWIDTH] IN BLOOD BY AUTOMATED COUNT: 18.4 % (ref 11.6–15.1)
GFR SERPL CREATININE-BSD FRML MDRD: 58 ML/MIN/1.73SQ M
GLUCOSE SERPL-MCNC: 120 MG/DL (ref 65–140)
HCT VFR BLD AUTO: 36.6 % (ref 34.8–46.1)
HGB BLD-MCNC: 11.1 G/DL (ref 11.5–15.4)
IMM GRANULOCYTES # BLD AUTO: 0.04 THOUSAND/UL (ref 0–0.2)
IMM GRANULOCYTES NFR BLD AUTO: 1 % (ref 0–2)
LYMPHOCYTES # BLD AUTO: 0.65 THOUSANDS/ÂΜL (ref 0.6–4.47)
LYMPHOCYTES NFR BLD AUTO: 9 % (ref 14–44)
MAGNESIUM SERPL-MCNC: 2.4 MG/DL (ref 1.9–2.7)
MCH RBC QN AUTO: 23.9 PG (ref 26.8–34.3)
MCHC RBC AUTO-ENTMCNC: 30.3 G/DL (ref 31.4–37.4)
MCV RBC AUTO: 79 FL (ref 82–98)
MONOCYTES # BLD AUTO: 0.35 THOUSAND/ÂΜL (ref 0.17–1.22)
MONOCYTES NFR BLD AUTO: 5 % (ref 4–12)
NEUTROPHILS # BLD AUTO: 6.07 THOUSANDS/ÂΜL (ref 1.85–7.62)
NEUTS SEG NFR BLD AUTO: 85 % (ref 43–75)
NRBC BLD AUTO-RTO: 0 /100 WBCS
PHOSPHATE SERPL-MCNC: 4 MG/DL (ref 2.3–4.1)
PLATELET # BLD AUTO: 251 THOUSANDS/UL (ref 149–390)
PMV BLD AUTO: 9.5 FL (ref 8.9–12.7)
POTASSIUM SERPL-SCNC: 4.3 MMOL/L (ref 3.5–5.3)
PROCALCITONIN SERPL-MCNC: 3.75 NG/ML
PROT SERPL-MCNC: 6.8 G/DL (ref 6.4–8.4)
RBC # BLD AUTO: 4.64 MILLION/UL (ref 3.81–5.12)
SODIUM SERPL-SCNC: 136 MMOL/L (ref 135–147)
WBC # BLD AUTO: 7.12 THOUSAND/UL (ref 4.31–10.16)

## 2023-12-23 PROCEDURE — 85025 COMPLETE CBC W/AUTO DIFF WBC: CPT

## 2023-12-23 PROCEDURE — 86140 C-REACTIVE PROTEIN: CPT

## 2023-12-23 PROCEDURE — 85730 THROMBOPLASTIN TIME PARTIAL: CPT | Performed by: INTERNAL MEDICINE

## 2023-12-23 PROCEDURE — 84100 ASSAY OF PHOSPHORUS: CPT

## 2023-12-23 PROCEDURE — 84145 PROCALCITONIN (PCT): CPT

## 2023-12-23 PROCEDURE — 99232 SBSQ HOSP IP/OBS MODERATE 35: CPT | Performed by: INTERNAL MEDICINE

## 2023-12-23 PROCEDURE — 83735 ASSAY OF MAGNESIUM: CPT

## 2023-12-23 PROCEDURE — 80053 COMPREHEN METABOLIC PANEL: CPT

## 2023-12-23 PROCEDURE — 94760 N-INVAS EAR/PLS OXIMETRY 1: CPT

## 2023-12-23 RX ADMIN — DOXYCYCLINE 100 MG: 100 CAPSULE ORAL at 08:04

## 2023-12-23 RX ADMIN — CEFTRIAXONE SODIUM 1000 MG: 10 INJECTION, POWDER, FOR SOLUTION INTRAVENOUS at 15:52

## 2023-12-23 RX ADMIN — BENZONATATE 200 MG: 100 CAPSULE ORAL at 16:29

## 2023-12-23 RX ADMIN — PANTOPRAZOLE SODIUM 40 MG: 40 TABLET, DELAYED RELEASE ORAL at 08:05

## 2023-12-23 RX ADMIN — GUAIFENESIN 1200 MG: 600 TABLET, EXTENDED RELEASE ORAL at 20:08

## 2023-12-23 RX ADMIN — BENZONATATE 200 MG: 100 CAPSULE ORAL at 20:08

## 2023-12-23 RX ADMIN — DEXAMETHASONE SODIUM PHOSPHATE 6 MG: 10 INJECTION, SOLUTION INTRAMUSCULAR; INTRAVENOUS at 20:09

## 2023-12-23 RX ADMIN — GABAPENTIN 100 MG: 100 CAPSULE ORAL at 16:29

## 2023-12-23 RX ADMIN — BENZONATATE 200 MG: 100 CAPSULE ORAL at 08:05

## 2023-12-23 RX ADMIN — DEXAMETHASONE SODIUM PHOSPHATE 6 MG: 10 INJECTION, SOLUTION INTRAMUSCULAR; INTRAVENOUS at 08:05

## 2023-12-23 RX ADMIN — DOXYCYCLINE 100 MG: 100 CAPSULE ORAL at 20:08

## 2023-12-23 RX ADMIN — HEPARIN SODIUM 16 UNITS/KG/HR: 10000 INJECTION, SOLUTION INTRAVENOUS at 11:42

## 2023-12-23 RX ADMIN — SERTRALINE HYDROCHLORIDE 75 MG: 50 TABLET ORAL at 08:05

## 2023-12-23 RX ADMIN — GABAPENTIN 100 MG: 100 CAPSULE ORAL at 08:05

## 2023-12-23 RX ADMIN — GUAIFENESIN 1200 MG: 600 TABLET, EXTENDED RELEASE ORAL at 08:05

## 2023-12-23 NOTE — UTILIZATION REVIEW
Date: 12/23/23    Day 3: Has surpassed a 2nd midnight with active treatments and services, which include supplemental O2, IV heparin drip, labs, PT when able. See initial review completed by Corinne Sousa on 12/22/23.     CELE Richard RN  12/23/2023  8:36 AM

## 2023-12-23 NOTE — PROGRESS NOTES
INTERNAL MEDICINE RESIDENCY PROGRESS NOTE     Name: Raisa Sinha   Age & Sex: 89 y.o. female   MRN: [id number]  Unit/Bed#: [facility] 505-01   Encounter: [id number]  Team: SOD Team A    PATIENT INFORMATION     Name: Raisa Sinha   Age & Sex: 89 y.o. female   MRN: [id number]  Hospital Stay Days: 2    ASSESSMENT/PLAN     Principal Problem:    COVID-19 virus infection  Active Problems:    Neuropathy    Paroxysmal supraventricular tachycardia     Cognitive impairment    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)    Chronic deep vein thrombosis (DVT) of proximal vein of both lower extremities (HCC)    Acute hypoxic respiratory failure (HCC)    Acute kidney injury superimposed on CKD       * COVID-19 virus infection  Assessment & Plan  Patient presented from [facility] due to worsening hypoxia. Found to be COVID-positive but sent to ED due to worsening respiratory status. During course of initial evaluation in ED, event concerning for aspiration occurred and patient subsequently required HFNC. CT chest wit large effusion and debris in RLL bronchus. Fortunately, initial lactate, procal, troponin, WBC all within normal limits. Patient admitted and started on severe COVID pathway    Patient without respiratory issues overnight - overall feels that breathing has improved since coming to the hospital. Still with bilateral rhonchi but improved from prior exams.     Plan  Continue high flow nasal cannula and wean as able for oxygen level over 90%  Continue COVID severe pathway  Dexamethasone 6 mg every 12 hours  Ceftriaxone 1 g every 24 hours  Doxycycline 100 mg every 12 hours  S/p Tocilizumab yesterday  Continue heparin infusion  Continue guaifenesin and benzonatate  Per SLP, dysphagia 2 with thin liquids      Acute kidney injury superimposed on CKD   Assessment & Plan  Patient with bump in creatinine to 1.07 on 12/22 from 0.77 yesterday. Does have a history of CKD, with baseline per Nephro's  note from yesterday approximately 0.9-1.1. Renal function as of 12/23 improved with creatinine 0.83 today following fluid bolus yesterday.     Plan  Continue to monitor renal function; avoid nephrotoxins    Acute hypoxic respiratory failure (HCC)  Assessment & Plan  Please see assessment and plan under COVID 19 infection    Chronic deep vein thrombosis (DVT) of proximal vein of both lower extremities (HCC)  Assessment & Plan  Patient with known hx of rocío LE DVT's on prior duplex from Jan. Reported right proximal femoral to posterior tibial clot and left popliteal and gastroc clot. She was previously on Eliquis but this had to be discontinued after a GI bleed (3/2023). It was resumed briefly in the past but the melena started again. Patient and son both agreed that risk of anticoagulation outweighed the benefits so she has not been on anticoagulation    Plan  Discussed with son - agreeable to heparin while admitted as part of COVID pathway    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)  Assessment & Plan  Continue sertraline 75 mg once daily    Cognitive impairment  Assessment & Plan  February 21, 2023: Mini-Mental status exam: 25/30 April 1, 2023: Mini-Mental status exam performed at rehabilitation facility: 23/30.  (Reported by her son to hospital physicians as a pre-existing condition prior to her March 2023 hospitalization)    August 14, 2023: MMSE: 21    Patient today appears pleasantly demented. Oriented primarily to self but with limited insight into current hospitalization (unaware she had COVID, did not know what COVID was, etc). Roughly consistent with her baseline per son.    Plan  Continue to monitor for changes in mentation  Non-pharmacologic measures to prevent delirium    Paroxysmal supraventricular tachycardia   Assessment & Plan  Patient with history of pSVT, for which she was previously on treatment with metoprolol. However, she has been off of this for some time. Currently denying any  "cardiac complaints and appears to be in normal sinus rhythm with controlled rates.    Plan  Continue to monitor    Neuropathy  Assessment & Plan  Patient has history of neuropathy - no particular complaints related to this at present.    Plan  Continue PTA gabapentin        Disposition: remain admitted for continued inpatient care     SUBJECTIVE     Patient seen and examined. No acute events overnight. This morning, patient reports that she is upset by being in the hospital. She again states that she clay not understand why she was admitted and for what. However, she does state that her breathing has gotten easier since coming to the hospital. She does intermittently get a sensation of mucus getting \"stuck in my chest\" and reports that she has not been coughing up as much as she was previously. She otherwise denies any chest pain, SOB, abdominal pain, or other symptoms.    OBJECTIVE     Vitals:    23 0000 23 0300 23 0334 23 0802   BP: 128/86  145/63 138/61   BP Location:   Left arm Left arm   Pulse: 60  (!) 50 68   Resp: 18  19 18   Temp: 98.5 °F (36.9 °C)  (!) 97.2 °F (36.2 °C) (!) 97.4 °F (36.3 °C)   TempSrc: Oral  Oral Oral   SpO2: 98% 98% 99% 97%      Temperature:   Temp (24hrs), Av.7 °F (36.5 °C), Min:97.2 °F (36.2 °C), Max:98.5 °F (36.9 °C)    Temperature: (!) 97.4 °F (36.3 °C)  Intake & Output:  I/O          0701   0700  0701   0700    P.O. 0 236    NG/GT  0    IV Piggyback 150 50    Total Intake 150 286    Urine 350 515    Stool 0 0    Total Output 350 515    Net -200 -229          Unmeasured Urine Occurrence 2 x 1 x    Unmeasured Stool Occurrence 3 x 1 x          Weights:        There is no height or weight on file to calculate BMI.  Weight (last 2 days)       None          Physical Exam  Vitals and nursing note reviewed.   Constitutional:       General: She is not in acute distress.     Appearance: She is ill-appearing (Less ill-appearing than previously). "   HENT:      Head: Normocephalic and atraumatic.      Right Ear: External ear normal.      Left Ear: External ear normal.      Ears:      Comments: Hard of hearing     Nose: Nose normal.      Comments: HFNC in place     Mouth/Throat:      Mouth: Mucous membranes are moist.      Pharynx: Oropharynx is clear.   Eyes:      Extraocular Movements: Extraocular movements intact.      Conjunctiva/sclera: Conjunctivae normal.      Pupils: Pupils are equal, round, and reactive to light.   Cardiovascular:      Rate and Rhythm: Normal rate and regular rhythm.      Heart sounds: Normal heart sounds. No murmur heard.  Pulmonary:      Effort: Pulmonary effort is normal. No respiratory distress.      Breath sounds: Rhonchi (Bilateral, improved relative to yesterday) present.   Abdominal:      General: Abdomen is flat. Bowel sounds are normal. There is no distension.      Palpations: Abdomen is soft.      Tenderness: There is no abdominal tenderness.   Musculoskeletal:      Cervical back: Neck supple.      Right lower leg: No edema.      Left lower leg: No edema.   Skin:     General: Skin is warm and dry.      Capillary Refill: Capillary refill takes less than 2 seconds.   Neurological:      Mental Status: She is alert. Mental status is at baseline.      Comments: Oriented to self, birthdate, place but not situation and events of her hospitalization   Psychiatric:         Mood and Affect: Mood normal.         Behavior: Behavior normal.       LABORATORY DATA     Labs: I have personally reviewed pertinent reports.  Results from last 7 days   Lab Units 12/23/23  0826 12/22/23  1124 12/22/23  0519 12/21/23  0930   WBC Thousand/uL 7.12 7.88 7.67 7.35   HEMOGLOBIN g/dL 11.1* 11.4* 11.6 11.5   HEMATOCRIT % 36.6 37.7 38.2 39.3   PLATELETS Thousands/uL 251 171 216 221   NEUTROS PCT % 85*  --   --  80*   MONOS PCT % 5  --   --  12   MONO PCT %  --   --  3*  --    EOS PCT % 0  --  0 0      Results from last 7 days   Lab Units 12/23/23  0826  12/22/23  0519 12/21/23  0930   POTASSIUM mmol/L 4.3 4.4 4.2   CHLORIDE mmol/L 101 101 103   CO2 mmol/L 28 25 25   BUN mg/dL 39* 30* 20   CREATININE mg/dL 0.83 1.07 0.77   CALCIUM mg/dL 8.7 8.8 8.6   ALK PHOS U/L 57 72  --    ALT U/L 9 10  --    AST U/L 12* 12*  --      Results from last 7 days   Lab Units 12/23/23  0826 12/22/23  0519 12/21/23  1622   MAGNESIUM mg/dL 2.4 2.0 2.0     Results from last 7 days   Lab Units 12/23/23  0826 12/22/23  0519   PHOSPHORUS mg/dL 4.0 5.4*      Results from last 7 days   Lab Units 12/23/23  0826 12/23/23  0119 12/22/23  1815   INR   --   --  1.18   PTT seconds 66* 56* 53*     Results from last 7 days   Lab Units 12/21/23  0930   LACTIC ACID mmol/L 0.7           IMAGING & DIAGNOSTIC TESTING     Radiology Results: I have personally reviewed pertinent reports.  CTA ED chest PE Study    Result Date: 12/21/2023  Impression: With mild compromise by motion and partial right lower lobe atelectasis, no pulmonary embolus. Large right pleural effusion with moderate rounded atelectasis in the right lower lobe. Debris occluding the right lower lobe bronchi. Workstation performed: [id number]     Other Diagnostic Testing: I have personally reviewed pertinent reports.    ACTIVE MEDICATIONS     Current Facility-Administered Medications   Medication Dose Route Frequency    acetaminophen (Ofirmev) injection 1,000 mg  1,000 mg Intravenous Q8H PRN    albuterol inhalation solution 2.5 mg  2.5 mg Nebulization Q4H PRN    benzonatate (TESSALON PERLES) capsule 200 mg  200 mg Oral TID    ceftriaxone (ROCEPHIN) 1 g/50 mL in dextrose IVPB  1,000 mg Intravenous Q24H    dexamethasone (PF) (DECADRON) injection 6 mg  6 mg Intravenous Q12H EDDIE    doxycycline hyclate (VIBRAMYCIN) capsule 100 mg  100 mg Oral Q12H EDDIE    gabapentin (NEURONTIN) capsule 100 mg  100 mg Oral BID    guaiFENesin (MUCINEX) 12 hr tablet 1,200 mg  1,200 mg Oral Q12H EDDIE    heparin (porcine) 25,000 units in 0.45% NaCl 250 mL infusion  "(premix)  3-20 Units/kg/hr (Order-Specific) Intravenous Titrated    labetalol (NORMODYNE) injection 10 mg  10 mg Intravenous Q6H PRN    pantoprazole (PROTONIX) EC tablet 40 mg  40 mg Oral Daily    phenol (CHLORASEPTIC) 1.4 % mucosal liquid 1 spray  1 spray Mouth/Throat Q2H PRN    sertraline (ZOLOFT) tablet 75 mg  75 mg Oral Daily       VTE Pharmacologic Prophylaxis: Heparin  VTE Mechanical Prophylaxis: sequential compression device    Portions of the record may have been created with voice recognition software.  Occasional wrong word or \"sound a like\" substitutions may have occurred due to the inherent limitations of voice recognition software.  Read the chart carefully and recognize, using context, where substitutions have occurred.  ==  Rigoberto Santana MD  [facility]  Internal Medicine Residency PGY-1      "

## 2023-12-23 NOTE — QUICK NOTE
Called patient's son, Macario, and provided medical updates. All questions answered at this time.

## 2023-12-24 LAB
ALBUMIN SERPL BCP-MCNC: 3.5 G/DL (ref 3.5–5)
ALP SERPL-CCNC: 56 U/L (ref 34–104)
ALT SERPL W P-5'-P-CCNC: 10 U/L (ref 7–52)
ANION GAP SERPL CALCULATED.3IONS-SCNC: 8 MMOL/L
APTT PPP: 56 SECONDS (ref 23–37)
APTT PPP: 75 SECONDS (ref 23–37)
APTT PPP: 97 SECONDS (ref 23–37)
AST SERPL W P-5'-P-CCNC: 13 U/L (ref 13–39)
BASOPHILS # BLD AUTO: 0 THOUSANDS/ÂΜL (ref 0–0.1)
BASOPHILS NFR BLD AUTO: 0 % (ref 0–1)
BILIRUB SERPL-MCNC: 0.38 MG/DL (ref 0.2–1)
BUN SERPL-MCNC: 39 MG/DL (ref 5–25)
CALCIUM SERPL-MCNC: 9 MG/DL (ref 8.4–10.2)
CHLORIDE SERPL-SCNC: 102 MMOL/L (ref 96–108)
CO2 SERPL-SCNC: 28 MMOL/L (ref 21–32)
CREAT SERPL-MCNC: 0.77 MG/DL (ref 0.6–1.3)
CRP SERPL QL: 63.4 MG/L
EOSINOPHIL # BLD AUTO: 0 THOUSAND/ÂΜL (ref 0–0.61)
EOSINOPHIL NFR BLD AUTO: 0 % (ref 0–6)
ERYTHROCYTE [DISTWIDTH] IN BLOOD BY AUTOMATED COUNT: 18.2 % (ref 11.6–15.1)
GFR SERPL CREATININE-BSD FRML MDRD: 64 ML/MIN/1.73SQ M
GLUCOSE SERPL-MCNC: 132 MG/DL (ref 65–140)
HCT VFR BLD AUTO: 35.9 % (ref 34.8–46.1)
HGB BLD-MCNC: 10.5 G/DL (ref 11.5–15.4)
IMM GRANULOCYTES # BLD AUTO: 0.02 THOUSAND/UL (ref 0–0.2)
IMM GRANULOCYTES NFR BLD AUTO: 0 % (ref 0–2)
LYMPHOCYTES # BLD AUTO: 0.62 THOUSANDS/ÂΜL (ref 0.6–4.47)
LYMPHOCYTES NFR BLD AUTO: 9 % (ref 14–44)
MAGNESIUM SERPL-MCNC: 2.6 MG/DL (ref 1.9–2.7)
MCH RBC QN AUTO: 23.4 PG (ref 26.8–34.3)
MCHC RBC AUTO-ENTMCNC: 29.2 G/DL (ref 31.4–37.4)
MCV RBC AUTO: 80 FL (ref 82–98)
MONOCYTES # BLD AUTO: 0.29 THOUSAND/ÂΜL (ref 0.17–1.22)
MONOCYTES NFR BLD AUTO: 4 % (ref 4–12)
MRSA NOSE QL CULT: ABNORMAL
MRSA NOSE QL CULT: ABNORMAL
NEUTROPHILS # BLD AUTO: 6.24 THOUSANDS/ÂΜL (ref 1.85–7.62)
NEUTS SEG NFR BLD AUTO: 87 % (ref 43–75)
NRBC BLD AUTO-RTO: 0 /100 WBCS
PHOSPHATE SERPL-MCNC: 3.4 MG/DL (ref 2.3–4.1)
PLATELET # BLD AUTO: 254 THOUSANDS/UL (ref 149–390)
PLATELET # BLD AUTO: 254 THOUSANDS/UL (ref 149–390)
PMV BLD AUTO: 9.2 FL (ref 8.9–12.7)
PMV BLD AUTO: 9.2 FL (ref 8.9–12.7)
POTASSIUM SERPL-SCNC: 4.1 MMOL/L (ref 3.5–5.3)
PROCALCITONIN SERPL-MCNC: 2.44 NG/ML
PROT SERPL-MCNC: 6.8 G/DL (ref 6.4–8.4)
RBC # BLD AUTO: 4.48 MILLION/UL (ref 3.81–5.12)
SODIUM SERPL-SCNC: 138 MMOL/L (ref 135–147)
WBC # BLD AUTO: 7.17 THOUSAND/UL (ref 4.31–10.16)

## 2023-12-24 PROCEDURE — 99232 SBSQ HOSP IP/OBS MODERATE 35: CPT | Performed by: INTERNAL MEDICINE

## 2023-12-24 PROCEDURE — 80053 COMPREHEN METABOLIC PANEL: CPT | Performed by: INTERNAL MEDICINE

## 2023-12-24 PROCEDURE — 84100 ASSAY OF PHOSPHORUS: CPT | Performed by: INTERNAL MEDICINE

## 2023-12-24 PROCEDURE — 84145 PROCALCITONIN (PCT): CPT | Performed by: INTERNAL MEDICINE

## 2023-12-24 PROCEDURE — 94760 N-INVAS EAR/PLS OXIMETRY 1: CPT

## 2023-12-24 PROCEDURE — 85025 COMPLETE CBC W/AUTO DIFF WBC: CPT | Performed by: INTERNAL MEDICINE

## 2023-12-24 PROCEDURE — 83735 ASSAY OF MAGNESIUM: CPT | Performed by: INTERNAL MEDICINE

## 2023-12-24 PROCEDURE — 86140 C-REACTIVE PROTEIN: CPT | Performed by: INTERNAL MEDICINE

## 2023-12-24 PROCEDURE — 85049 AUTOMATED PLATELET COUNT: CPT | Performed by: INTERNAL MEDICINE

## 2023-12-24 PROCEDURE — 85730 THROMBOPLASTIN TIME PARTIAL: CPT | Performed by: INTERNAL MEDICINE

## 2023-12-24 RX ADMIN — DEXAMETHASONE SODIUM PHOSPHATE 6 MG: 10 INJECTION, SOLUTION INTRAMUSCULAR; INTRAVENOUS at 08:41

## 2023-12-24 RX ADMIN — BENZONATATE 200 MG: 100 CAPSULE ORAL at 08:40

## 2023-12-24 RX ADMIN — GABAPENTIN 100 MG: 100 CAPSULE ORAL at 17:18

## 2023-12-24 RX ADMIN — PANTOPRAZOLE SODIUM 40 MG: 40 TABLET, DELAYED RELEASE ORAL at 08:40

## 2023-12-24 RX ADMIN — GUAIFENESIN 1200 MG: 600 TABLET, EXTENDED RELEASE ORAL at 08:40

## 2023-12-24 RX ADMIN — BENZONATATE 200 MG: 100 CAPSULE ORAL at 15:38

## 2023-12-24 RX ADMIN — DEXAMETHASONE SODIUM PHOSPHATE 6 MG: 10 INJECTION, SOLUTION INTRAMUSCULAR; INTRAVENOUS at 20:16

## 2023-12-24 RX ADMIN — CEFTRIAXONE SODIUM 1000 MG: 10 INJECTION, POWDER, FOR SOLUTION INTRAVENOUS at 15:37

## 2023-12-24 RX ADMIN — GUAIFENESIN 1200 MG: 600 TABLET, EXTENDED RELEASE ORAL at 20:16

## 2023-12-24 RX ADMIN — Medication 10 MG: at 22:36

## 2023-12-24 RX ADMIN — GABAPENTIN 100 MG: 100 CAPSULE ORAL at 08:40

## 2023-12-24 RX ADMIN — HEPARIN SODIUM 14 UNITS/KG/HR: 10000 INJECTION, SOLUTION INTRAVENOUS at 12:25

## 2023-12-24 RX ADMIN — DOXYCYCLINE 100 MG: 100 CAPSULE ORAL at 20:16

## 2023-12-24 RX ADMIN — BENZONATATE 200 MG: 100 CAPSULE ORAL at 20:16

## 2023-12-24 RX ADMIN — DOXYCYCLINE 100 MG: 100 CAPSULE ORAL at 08:40

## 2023-12-24 RX ADMIN — SERTRALINE HYDROCHLORIDE 75 MG: 50 TABLET ORAL at 08:40

## 2023-12-24 NOTE — QUICK NOTE
Called patients son, updating them about ongoing treatment plan and any patient updates.      Mahamed Shannon MD  Internal Medicine Residency PGY-1  [facility] [place]  Available on [employer]

## 2023-12-24 NOTE — PLAN OF CARE
Problem: Potential for Falls  Goal: Patient will remain free of falls  Description: INTERVENTIONS:  - Educate patient/family on patient safety including physical limitations  - Instruct patient to call for assistance with activity   - Consult OT/PT to assist with strengthening/mobility   - Keep Call bell within reach  - Keep bed low and locked with side rails adjusted as appropriate  - Keep care items and personal belongings within reach  - Initiate and maintain comfort rounds  - Make Fall Risk Sign visible to staff  - Apply yellow socks and bracelet for high fall risk patients  - Consider moving patient to room near nurses station  Outcome: Progressing     Problem: Prexisting or High Potential for Compromised Skin Integrity  Goal: Skin integrity is maintained or improved  Description: INTERVENTIONS:  - Identify patients at risk for skin breakdown  - Assess and monitor skin integrity  - Assess and monitor nutrition and hydration status  - Monitor labs   - Assess for incontinence   - Turn and reposition patient  - Assist with mobility/ambulation  - Relieve pressure over bony prominences  - Avoid friction and shearing  - Provide appropriate hygiene as needed including keeping skin clean and dry  - Evaluate need for skin moisturizer/barrier cream  - Collaborate with interdisciplinary team   - Patient/family teaching  - Consider wound care consult   Outcome: Progressing     Problem: Nutrition/Hydration-ADULT  Goal: Nutrient/Hydration intake appropriate for improving, restoring or maintaining nutritional needs  Description: Monitor and assess patient's nutrition/hydration status for malnutrition. Collaborate with interdisciplinary team and initiate plan and interventions as ordered.  Monitor patient's weight and dietary intake as ordered or per policy. Utilize nutrition screening tool and intervene as necessary. Determine patient's food preferences and provide high-protein, high-caloric foods as appropriate.      INTERVENTIONS:  - Monitor oral intake, urinary output, labs, and treatment plans  - Assess nutrition and hydration status and recommend course of action  - Evaluate amount of meals eaten  - Assist patient with eating if necessary   - Allow adequate time for meals  - Recommend/ encourage appropriate diets, oral nutritional supplements, and vitamin/mineral supplements  - Order, calculate, and assess calorie counts as needed  - Recommend, monitor, and adjust tube feedings and TPN/PPN based on assessed needs  - Assess need for intravenous fluids  - Provide specific nutrition/hydration education as appropriate  - Include patient/family/caregiver in decisions related to nutrition  Outcome: Progressing     Problem: RESPIRATORY - ADULT  Goal: Achieves optimal ventilation and oxygenation  Description: INTERVENTIONS:  - Assess for changes in respiratory status  - Assess for changes in mentation and behavior  - Position to facilitate oxygenation and minimize respiratory effort  - Oxygen administered by appropriate delivery if ordered  - Initiate smoking cessation education as indicated  - Encourage broncho-pulmonary hygiene including cough, deep breathe, Incentive Spirometry  - Assess the need for suctioning and aspirate as needed  - Assess and instruct to report SOB or any respiratory difficulty  - Respiratory Therapy support as indicated  Outcome: Progressing

## 2023-12-24 NOTE — PHYSICAL THERAPY NOTE
"                                                            PHYSICAL THERAPY SCREEN          Patient Name: Raisa Sinha  Today's Date: 12/24/2023 12/24/23 1056   PT Last Visit   PT Visit Date 12/24/23   Note Type   Note type Screen       PT orders received and chart reviewed. Per evaluation dated 12/22/23, patient is a LTC resident at [facility] and is dependent at baseline with marii lift for all transfers. Per PT note, \"Pt presents near functional baseline. Recommend for nsg + restorative staff to slide pt to drop arm chair while in-house. No further skilled acute PT needs. Will d/c from caseload.\" At this time, PT will d/c inp PT orders. Thank you.    Crystal Juarez, PT, DPT    "

## 2023-12-24 NOTE — PROGRESS NOTES
INTERNAL MEDICINE RESIDENCY PROGRESS NOTE     Name: Raisa Sinha   Age & Sex: 89 y.o. female   MRN: [id number]  Unit/Bed#: [facility] 505-01   Encounter: [id number]  Team: SOD Team A    PATIENT INFORMATION     Name: Raisa Sinha   Age & Sex: 89 y.o. female   MRN: [id number]  Hospital Stay Days: 3    ASSESSMENT/PLAN     Principal Problem:    COVID-19 virus infection  Active Problems:    Neuropathy    Paroxysmal supraventricular tachycardia     Cognitive impairment    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)    Chronic deep vein thrombosis (DVT) of proximal vein of both lower extremities (HCC)    Acute hypoxic respiratory failure (HCC)    Acute kidney injury superimposed on CKD       * COVID-19 virus infection  Assessment & Plan  Patient presented from [facility] due to worsening hypoxia. Found to be COVID-positive but sent to ED due to worsening respiratory status. During course of initial evaluation in ED, event concerning for aspiration occurred and patient subsequently required HFNC. CT chest wit large effusion and debris in RLL bronchus. Fortunately, initial lactate, procal, troponin, WBC all within normal limits. Patient admitted and started on severe COVID pathway    Patient without respiratory issues overnight - overall feels that breathing has improved since coming to the hospital. Still with bilateral rhonchi but improved from prior exams.     Plan  Continue high flow nasal cannula and wean as able for oxygen level over 90%  Continue COVID severe pathway  Dexamethasone 6 mg every 12 hours  Ceftriaxone 1 g every 24 hours  Doxycycline 100 mg every 12 hours  S/p Tocilizumab yesterday  Continue heparin infusion  Continue guaifenesin and benzonatate  Per SLP, dysphagia 2 with thin liquids      Acute kidney injury superimposed on CKD   Assessment & Plan  Patient with bump in creatinine to 1.07 on 12/22. Does have a history of CKD, with baseline per Nephro's note approximately  0.9-1.1. Renal function as of 12/24 improved with creatinine 0.77     Plan  Continue to monitor renal function; avoid nephrotoxins    Acute hypoxic respiratory failure (HCC)  Assessment & Plan  Please see assessment and plan under COVID 19 infection    Chronic deep vein thrombosis (DVT) of proximal vein of both lower extremities (HCC)  Assessment & Plan  Patient with known hx of rocío LE DVT's on prior duplex from Jan. Reported right proximal femoral to posterior tibial clot and left popliteal and gastroc clot. She was previously on Eliquis but this had to be discontinued after a GI bleed (3/2023). It was resumed briefly in the past but the melena started again. Patient and son both agreed that risk of anticoagulation outweighed the benefits so she has not been on anticoagulation    Plan  Discussed with son - agreeable to heparin while admitted as part of COVID pathway    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)  Assessment & Plan  Continue sertraline 75 mg once daily    Cognitive impairment  Assessment & Plan  February 21, 2023: Mini-Mental status exam: 25/30 April 1, 2023: Mini-Mental status exam performed at rehabilitation facility: 23/30.  (Reported by her son to hospital physicians as a pre-existing condition prior to her March 2023 hospitalization)    August 14, 2023: MMSE: 21    Patient today appears pleasantly demented. Oriented primarily to self but with limited insight into current hospitalization (unaware she had COVID, did not know what COVID was, etc). Roughly consistent with her baseline per son.    Plan  Continue to monitor for changes in mentation  Non-pharmacologic measures to prevent delirium    Paroxysmal supraventricular tachycardia   Assessment & Plan  Patient with history of pSVT, for which she was previously on treatment with metoprolol. However, she has been off of this for some time. Currently denying any cardiac complaints and appears to be in normal sinus rhythm with  controlled rates.    Plan  Continue to monitor    Neuropathy  Assessment & Plan  Patient has history of neuropathy - no particular complaints related to this at present.    Plan  Continue PTA gabapentin        Disposition: Nursing facility     SUBJECTIVE     Patient seen and examined. No acute events overnight. Pt's oxygen requirements are decreasing. Plan to wean to mid flow today.     OBJECTIVE     Vitals:    23 2238 23 0300 23 0330 23 0845   BP: 154/72 164/79  161/76   BP Location: Left arm Left arm  Left arm   Pulse: (!) 51 (!) 49  56   Resp: 16 17  18   Temp: 97.6 °F (36.4 °C) 97.7 °F (36.5 °C)  97.7 °F (36.5 °C)   TempSrc: Oral Oral  Oral   SpO2: 98% 98% 97% 100%      Temperature:   Temp (24hrs), Av.8 °F (36.6 °C), Min:97.6 °F (36.4 °C), Max:98.2 °F (36.8 °C)    Temperature: 97.7 °F (36.5 °C)  Intake & Output:  I/O          0701   0700  0701   0700  0701   0700    P.O. 236 660     I.V.  261.6     NG/GT 0      IV Piggyback 50 50     Total Intake 286 971.6     Urine 515 1625     Stool 0 0     Total Output 515 1625     Net -229 -653.4            Unmeasured Urine Occurrence 1 x 1 x     Unmeasured Stool Occurrence 1 x 1 x           Weights:        There is no height or weight on file to calculate BMI.  Weight (last 2 days)       None          Physical Exam  Vitals reviewed.   Constitutional:       General: She is not in acute distress.     Appearance: Normal appearance.   HENT:      Head: Normocephalic and atraumatic.      Mouth/Throat:      Mouth: Mucous membranes are moist.      Pharynx: Oropharynx is clear.   Eyes:      Extraocular Movements: Extraocular movements intact.      Conjunctiva/sclera: Conjunctivae normal.      Pupils: Pupils are equal, round, and reactive to light.   Cardiovascular:      Rate and Rhythm: Normal rate and regular rhythm.      Pulses: Normal pulses.      Heart sounds: Normal heart sounds.   Pulmonary:      Effort: Pulmonary  effort is normal.      Breath sounds: Rhonchi present.   Abdominal:      General: Abdomen is flat. Bowel sounds are normal.      Palpations: Abdomen is soft.   Musculoskeletal:      Right lower leg: No edema.      Left lower leg: No edema.   Skin:     General: Skin is warm and dry.      Capillary Refill: Capillary refill takes less than 2 seconds.   Neurological:      General: No focal deficit present.      Mental Status: She is alert and oriented to person, place, and time. Mental status is at baseline.       LABORATORY DATA     Labs: I have personally reviewed pertinent reports.  Results from last 7 days   Lab Units 12/24/23  0429 12/23/23  0826 12/22/23  1124 12/22/23  0519 12/21/23  0930   WBC Thousand/uL 7.17 7.12 7.88 7.67 7.35   HEMOGLOBIN g/dL 10.5* 11.1* 11.4* 11.6 11.5   HEMATOCRIT % 35.9 36.6 37.7 38.2 39.3   PLATELETS Thousands/uL 254  254 251 171 216 221   NEUTROS PCT % 87* 85*  --   --  80*   MONOS PCT % 4 5  --   --  12   MONO PCT %  --   --   --  3*  --    EOS PCT % 0 0  --  0 0      Results from last 7 days   Lab Units 12/24/23  0429 12/23/23  0826 12/22/23  0519   POTASSIUM mmol/L 4.1 4.3 4.4   CHLORIDE mmol/L 102 101 101   CO2 mmol/L 28 28 25   BUN mg/dL 39* 39* 30*   CREATININE mg/dL 0.77 0.83 1.07   CALCIUM mg/dL 9.0 8.7 8.8   ALK PHOS U/L 56 57 72   ALT U/L 10 9 10   AST U/L 13 12* 12*     Results from last 7 days   Lab Units 12/24/23  0429 12/23/23  0826 12/22/23  0519   MAGNESIUM mg/dL 2.6 2.4 2.0     Results from last 7 days   Lab Units 12/24/23  0429 12/23/23  0826 12/22/23  0519   PHOSPHORUS mg/dL 3.4 4.0 5.4*      Results from last 7 days   Lab Units 12/24/23  0429 12/23/23  1602 12/23/23  0826 12/23/23  0119 12/22/23  1815   INR   --   --   --   --  1.18   PTT seconds 97* 68* 66*   < > 53*    < > = values in this interval not displayed.     Results from last 7 days   Lab Units 12/21/23  0930   LACTIC ACID mmol/L 0.7           IMAGING & DIAGNOSTIC TESTING     Radiology Results: I have  "personally reviewed pertinent reports.  CTA ED chest PE Study    Result Date: 12/21/2023  Impression: With mild compromise by motion and partial right lower lobe atelectasis, no pulmonary embolus. Large right pleural effusion with moderate rounded atelectasis in the right lower lobe. Debris occluding the right lower lobe bronchi. Workstation performed: [id number]     Other Diagnostic Testing: I have personally reviewed pertinent reports.    ACTIVE MEDICATIONS     Current Facility-Administered Medications   Medication Dose Route Frequency    acetaminophen (Ofirmev) injection 1,000 mg  1,000 mg Intravenous Q8H PRN    albuterol inhalation solution 2.5 mg  2.5 mg Nebulization Q4H PRN    benzonatate (TESSALON PERLES) capsule 200 mg  200 mg Oral TID    ceftriaxone (ROCEPHIN) 1 g/50 mL in dextrose IVPB  1,000 mg Intravenous Q24H    dexamethasone (PF) (DECADRON) injection 6 mg  6 mg Intravenous Q12H EDDIE    doxycycline hyclate (VIBRAMYCIN) capsule 100 mg  100 mg Oral Q12H EDDIE    gabapentin (NEURONTIN) capsule 100 mg  100 mg Oral BID    guaiFENesin (MUCINEX) 12 hr tablet 1,200 mg  1,200 mg Oral Q12H EDDIE    heparin (porcine) 25,000 units in 0.45% NaCl 250 mL infusion (premix)  3-20 Units/kg/hr (Order-Specific) Intravenous Titrated    labetalol (NORMODYNE) injection 10 mg  10 mg Intravenous Q6H PRN    pantoprazole (PROTONIX) EC tablet 40 mg  40 mg Oral Daily    phenol (CHLORASEPTIC) 1.4 % mucosal liquid 1 spray  1 spray Mouth/Throat Q2H PRN    sertraline (ZOLOFT) tablet 75 mg  75 mg Oral Daily       VTE Pharmacologic Prophylaxis: Heparin  VTE Mechanical Prophylaxis: sequential compression device    Portions of the record may have been created with voice recognition software.  Occasional wrong word or \"sound a like\" substitutions may have occurred due to the inherent limitations of voice recognition software.  Read the chart carefully and recognize, using context, where substitutions have occurred.  ==  Mahamed Shannon MD  [facility]  Internal Medicine Residency PGY-1

## 2023-12-25 PROBLEM — R03.0 ELEVATED BP WITHOUT DIAGNOSIS OF HYPERTENSION: Status: ACTIVE | Noted: 2023-12-25

## 2023-12-25 LAB
ANION GAP SERPL CALCULATED.3IONS-SCNC: 6 MMOL/L
APTT PPP: 104 SECONDS (ref 23–37)
APTT PPP: 71 SECONDS (ref 23–37)
APTT PPP: 76 SECONDS (ref 23–37)
APTT PPP: 78 SECONDS (ref 23–37)
BASOPHILS # BLD AUTO: 0.01 THOUSANDS/ÂΜL (ref 0–0.1)
BASOPHILS NFR BLD AUTO: 0 % (ref 0–1)
BUN SERPL-MCNC: 34 MG/DL (ref 5–25)
CALCIUM SERPL-MCNC: 8.8 MG/DL (ref 8.4–10.2)
CHLORIDE SERPL-SCNC: 105 MMOL/L (ref 96–108)
CO2 SERPL-SCNC: 30 MMOL/L (ref 21–32)
CREAT SERPL-MCNC: 0.87 MG/DL (ref 0.6–1.3)
EOSINOPHIL # BLD AUTO: 0 THOUSAND/ÂΜL (ref 0–0.61)
EOSINOPHIL NFR BLD AUTO: 0 % (ref 0–6)
ERYTHROCYTE [DISTWIDTH] IN BLOOD BY AUTOMATED COUNT: 18.1 % (ref 11.6–15.1)
GFR SERPL CREATININE-BSD FRML MDRD: 55 ML/MIN/1.73SQ M
GLUCOSE SERPL-MCNC: 121 MG/DL (ref 65–140)
HCT VFR BLD AUTO: 33.6 % (ref 34.8–46.1)
HGB BLD-MCNC: 9.7 G/DL (ref 11.5–15.4)
IMM GRANULOCYTES # BLD AUTO: 0.06 THOUSAND/UL (ref 0–0.2)
IMM GRANULOCYTES NFR BLD AUTO: 1 % (ref 0–2)
LYMPHOCYTES # BLD AUTO: 0.59 THOUSANDS/ÂΜL (ref 0.6–4.47)
LYMPHOCYTES NFR BLD AUTO: 9 % (ref 14–44)
MAGNESIUM SERPL-MCNC: 2.3 MG/DL (ref 1.9–2.7)
MCH RBC QN AUTO: 23.1 PG (ref 26.8–34.3)
MCHC RBC AUTO-ENTMCNC: 28.9 G/DL (ref 31.4–37.4)
MCV RBC AUTO: 80 FL (ref 82–98)
MONOCYTES # BLD AUTO: 0.41 THOUSAND/ÂΜL (ref 0.17–1.22)
MONOCYTES NFR BLD AUTO: 6 % (ref 4–12)
NEUTROPHILS # BLD AUTO: 5.85 THOUSANDS/ÂΜL (ref 1.85–7.62)
NEUTS SEG NFR BLD AUTO: 84 % (ref 43–75)
NRBC BLD AUTO-RTO: 0 /100 WBCS
PHOSPHATE SERPL-MCNC: 3.9 MG/DL (ref 2.3–4.1)
PLATELET # BLD AUTO: 233 THOUSANDS/UL (ref 149–390)
PMV BLD AUTO: 9.7 FL (ref 8.9–12.7)
POTASSIUM SERPL-SCNC: 4.8 MMOL/L (ref 3.5–5.3)
PROCALCITONIN SERPL-MCNC: 1.3 NG/ML
RBC # BLD AUTO: 4.2 MILLION/UL (ref 3.81–5.12)
SODIUM SERPL-SCNC: 141 MMOL/L (ref 135–147)
WBC # BLD AUTO: 6.92 THOUSAND/UL (ref 4.31–10.16)

## 2023-12-25 PROCEDURE — 85730 THROMBOPLASTIN TIME PARTIAL: CPT | Performed by: INTERNAL MEDICINE

## 2023-12-25 PROCEDURE — 94760 N-INVAS EAR/PLS OXIMETRY 1: CPT

## 2023-12-25 PROCEDURE — 84145 PROCALCITONIN (PCT): CPT

## 2023-12-25 PROCEDURE — 83735 ASSAY OF MAGNESIUM: CPT

## 2023-12-25 PROCEDURE — 84100 ASSAY OF PHOSPHORUS: CPT

## 2023-12-25 PROCEDURE — 80048 BASIC METABOLIC PNL TOTAL CA: CPT

## 2023-12-25 PROCEDURE — 85730 THROMBOPLASTIN TIME PARTIAL: CPT

## 2023-12-25 PROCEDURE — 85025 COMPLETE CBC W/AUTO DIFF WBC: CPT

## 2023-12-25 RX ORDER — AMLODIPINE BESYLATE 2.5 MG/1
2.5 TABLET ORAL DAILY
Status: DISCONTINUED | OUTPATIENT
Start: 2023-12-25 | End: 2023-12-26 | Stop reason: HOSPADM

## 2023-12-25 RX ADMIN — AMLODIPINE BESYLATE 2.5 MG: 2.5 TABLET ORAL at 11:41

## 2023-12-25 RX ADMIN — CEFTRIAXONE SODIUM 1000 MG: 10 INJECTION, POWDER, FOR SOLUTION INTRAVENOUS at 16:48

## 2023-12-25 RX ADMIN — BENZONATATE 200 MG: 100 CAPSULE ORAL at 09:19

## 2023-12-25 RX ADMIN — DEXAMETHASONE SODIUM PHOSPHATE 6 MG: 10 INJECTION, SOLUTION INTRAMUSCULAR; INTRAVENOUS at 09:20

## 2023-12-25 RX ADMIN — GUAIFENESIN 1200 MG: 600 TABLET, EXTENDED RELEASE ORAL at 09:19

## 2023-12-25 RX ADMIN — GABAPENTIN 100 MG: 100 CAPSULE ORAL at 17:15

## 2023-12-25 RX ADMIN — BENZONATATE 200 MG: 100 CAPSULE ORAL at 21:13

## 2023-12-25 RX ADMIN — DOXYCYCLINE 100 MG: 100 CAPSULE ORAL at 21:13

## 2023-12-25 RX ADMIN — HEPARIN SODIUM 14 UNITS/KG/HR: 10000 INJECTION, SOLUTION INTRAVENOUS at 14:35

## 2023-12-25 RX ADMIN — PANTOPRAZOLE SODIUM 40 MG: 40 TABLET, DELAYED RELEASE ORAL at 09:19

## 2023-12-25 RX ADMIN — GABAPENTIN 100 MG: 100 CAPSULE ORAL at 09:19

## 2023-12-25 RX ADMIN — DEXAMETHASONE SODIUM PHOSPHATE 6 MG: 10 INJECTION, SOLUTION INTRAMUSCULAR; INTRAVENOUS at 21:13

## 2023-12-25 RX ADMIN — GUAIFENESIN 1200 MG: 600 TABLET, EXTENDED RELEASE ORAL at 21:13

## 2023-12-25 RX ADMIN — BENZONATATE 200 MG: 100 CAPSULE ORAL at 16:47

## 2023-12-25 RX ADMIN — SERTRALINE HYDROCHLORIDE 75 MG: 50 TABLET ORAL at 09:19

## 2023-12-25 RX ADMIN — DOXYCYCLINE 100 MG: 100 CAPSULE ORAL at 09:19

## 2023-12-25 NOTE — QUICK NOTE
Called patient's son, Macario, and provided medical updates. All questions answered at this time. Will continue to call and update as able going forward.

## 2023-12-25 NOTE — ASSESSMENT & PLAN NOTE
Since admission, patient noted to have elevated BP readings as high as 180s/100s, overall now improved following addition of amlodipine. Suspect component of steroid-mediated HTN.     Plan  Continue PRN labetalol while admitted  Continue amlodipine 2.5 mg  Follow-up outpatient

## 2023-12-25 NOTE — PROGRESS NOTES
INTERNAL MEDICINE RESIDENCY PROGRESS NOTE     Name: Raisa Sinha   Age & Sex: 89 y.o. female   MRN: [id number]  Unit/Bed#: [facility] 505-01   Encounter: [id number]  Team: SOD Team A    PATIENT INFORMATION     Name: Raisa Sinha   Age & Sex: 89 y.o. female   MRN: [id number]  Hospital Stay Days: 4    ASSESSMENT/PLAN     Principal Problem:    COVID-19 virus infection  Active Problems:    Neuropathy    Paroxysmal supraventricular tachycardia     Cognitive impairment    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)    Chronic deep vein thrombosis (DVT) of proximal vein of both lower extremities (HCC)    Acute hypoxic respiratory failure (HCC)    Acute kidney injury superimposed on CKD     Elevated BP without diagnosis of hypertension      * COVID-19 virus infection  Assessment & Plan  Patient presented from [facility] due to worsening hypoxia. Found to be COVID-positive but sent to ED due to worsening respiratory status. During course of initial evaluation in ED, event concerning for aspiration occurred and patient subsequently required HFNC. CT chest with large effusion and debris in RLL bronchus. Fortunately, initial lactate, procal, troponin, WBC all within normal limits. Patient admitted and started on severe COVID pathway    12/25 - patient continues to report improvement in her breathing. Oxygen requirements continue to decrease and patient now on midflow.    Plan  Continue mid-flow nasal cannula and wean as able for oxygen level over 90%  Continue COVID severe pathway  Dexamethasone 6 mg every 12 hours - if ongoing improvement, may start tapering tomorrow  Ceftriaxone 1 g every 24 hours  Doxycycline 100 mg every 12 hours  S/p Tocilizumab 12/22  Continue heparin infusion  Continue guaifenesin and benzonatate  Per SLP, dysphagia 2 with thin liquids      Elevated BP without diagnosis of hypertension  Assessment & Plan  Since admission, patient noted to have elevated BP readings as high  as 180s/100s, which have improved following labetalol administration. Suspect could be related to acute illness versus dexamethasone administration.    Plan  Continue PRN labetalol  Will add low-dose amlodipine today and reassess    Acute kidney injury superimposed on CKD   Assessment & Plan  Patient with bump in creatinine to 1.07 on 12/22. Does have a history of CKD, with baseline per Nephro's note approximately 0.9-1.1. Renal function as of 12/24 improved with creatinine of      Plan  Continue to monitor renal function; avoid nephrotoxins    Acute hypoxic respiratory failure (HCC)  Assessment & Plan  Please see assessment and plan under COVID 19 infection    Chronic deep vein thrombosis (DVT) of proximal vein of both lower extremities (HCC)  Assessment & Plan  Patient with known hx of rocío LE DVT's on prior duplex from Jan. Reported right proximal femoral to posterior tibial clot and left popliteal and gastroc clot. She was previously on Eliquis but this had to be discontinued after a GI bleed (3/2023). It was resumed briefly in the past but the melena started again. Patient and son both agreed that risk of anticoagulation outweighed the benefits so she has not been on anticoagulation    Plan  Discussed with son - agreeable to heparin while admitted as part of COVID pathway    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)  Assessment & Plan  Continue sertraline 75 mg once daily    Cognitive impairment  Assessment & Plan  February 21, 2023: Mini-Mental status exam: 25/30 April 1, 2023: Mini-Mental status exam performed at rehabilitation facility: 23/30.  (Reported by her son to hospital physicians as a pre-existing condition prior to her March 2023 hospitalization)    August 14, 2023: MMSE: 21    Patient remains pleasantly demented. Today oriented to self and month but less so situation or date. Roughly at baseline per my discussion with her son.    Plan  Continue to monitor for changes in  mentation  Non-pharmacologic measures to prevent delirium    Paroxysmal supraventricular tachycardia   Assessment & Plan  Patient with history of pSVT, for which she was previously on treatment with metoprolol. However, she has been off of this for some time. Currently denying any cardiac complaints and appears to be in normal sinus rhythm with controlled rates.    Plan  Continue to monitor    Neuropathy  Assessment & Plan  Patient has history of neuropathy - no particular complaints related to this at present.    Plan  Continue PTA gabapentin        Disposition: remain admitted for ongoing treatment     SUBJECTIVE     Patient seen and examined. No acute events overnight. This morning, patient reports feeling better overall and without major complaints other than tiredness. She continues to report that her breathing is improving despite decreasing the amount of oxygen she is receiving. Otherwise, she denies any chest pain, SOB, or other complaints at present.     OBJECTIVE     Vitals:    23 0400 23 0555 23 0920 23 1002   BP: 168/93 168/93     BP Location: Left arm      Pulse: (!) 47 (!) 52     Resp: 20      Temp: (!) 97.3 °F (36.3 °C)      TempSrc: Oral      SpO2: 99%  96% 100%      Temperature:   Temp (24hrs), Av.8 °F (36.6 °C), Min:97.3 °F (36.3 °C), Max:98.2 °F (36.8 °C)    Temperature: (!) 97.3 °F (36.3 °C)  Intake & Output:  I/O          0701   0700  0701   0700    P.O. 660 420    I.V. 261.6 239.9    IV Piggyback 50 50    Total Intake 971.6 709.9    Urine 1625 838    Stool 0 0    Total Output 1625 838    Net -653.4 -128.1          Unmeasured Urine Occurrence 1 x 1 x    Unmeasured Stool Occurrence 1 x 1 x          Weights:        There is no height or weight on file to calculate BMI.  Weight (last 2 days)       None          Physical Exam  Vitals and nursing note reviewed.   Constitutional:       General: She is not in acute distress.     Appearance: Normal  appearance. She is not ill-appearing.   HENT:      Head: Normocephalic and atraumatic.      Right Ear: External ear normal.      Left Ear: External ear normal.      Nose: Nose normal.      Comments: NC in place     Mouth/Throat:      Mouth: Mucous membranes are moist.      Pharynx: Oropharynx is clear.   Eyes:      Extraocular Movements: Extraocular movements intact.      Conjunctiva/sclera: Conjunctivae normal.      Pupils: Pupils are equal, round, and reactive to light.   Cardiovascular:      Rate and Rhythm: Normal rate and regular rhythm.      Heart sounds: Normal heart sounds.   Pulmonary:      Effort: Pulmonary effort is normal. No respiratory distress.      Breath sounds: Normal breath sounds. No rhonchi.      Comments: No significant rhonchi when compared to prior exams  Abdominal:      General: Abdomen is flat. Bowel sounds are normal. There is no distension.      Palpations: Abdomen is soft.      Tenderness: There is no abdominal tenderness.   Musculoskeletal:      Cervical back: Neck supple.      Right lower leg: No edema.      Left lower leg: No edema.   Skin:     General: Skin is warm and dry.      Capillary Refill: Capillary refill takes less than 2 seconds.   Neurological:      Mental Status: She is alert. Mental status is at baseline.      Comments: Oriented to self and month today, not day or situation (right after being woken up)   Psychiatric:         Mood and Affect: Mood normal.         Behavior: Behavior normal.       LABORATORY DATA     Labs: I have personally reviewed pertinent reports.  Results from last 7 days   Lab Units 12/25/23  0559 12/24/23  0429 12/23/23  0826   WBC Thousand/uL 6.92 7.17 7.12   HEMOGLOBIN g/dL 9.7* 10.5* 11.1*   HEMATOCRIT % 33.6* 35.9 36.6   PLATELETS Thousands/uL 233 254  254 251   NEUTROS PCT % 84* 87* 85*   MONOS PCT % 6 4 5   EOS PCT % 0 0 0      Results from last 7 days   Lab Units 12/25/23  0559 12/24/23  0429 12/23/23  0826 12/22/23  0519   POTASSIUM mmol/L  4.8 4.1 4.3 4.4   CHLORIDE mmol/L 105 102 101 101   CO2 mmol/L 30 28 28 25   BUN mg/dL 34* 39* 39* 30*   CREATININE mg/dL 0.87 0.77 0.83 1.07   CALCIUM mg/dL 8.8 9.0 8.7 8.8   ALK PHOS U/L  --  56 57 72   ALT U/L  --  10 9 10   AST U/L  --  13 12* 12*     Results from last 7 days   Lab Units 12/25/23  0559 12/24/23  0429 12/23/23  0826   MAGNESIUM mg/dL 2.3 2.6 2.4     Results from last 7 days   Lab Units 12/25/23  0559 12/24/23  0429 12/23/23  0826   PHOSPHORUS mg/dL 3.9 3.4 4.0      Results from last 7 days   Lab Units 12/25/23  0756 12/25/23  0115 12/24/23  1725 12/23/23  0119 12/22/23  1815   INR   --   --   --   --  1.18   PTT seconds 104* 71* 56*   < > 53*    < > = values in this interval not displayed.     Results from last 7 days   Lab Units 12/21/23  0930   LACTIC ACID mmol/L 0.7           IMAGING & DIAGNOSTIC TESTING     Radiology Results: I have personally reviewed pertinent reports.  CTA ED chest PE Study    Result Date: 12/21/2023  Impression: With mild compromise by motion and partial right lower lobe atelectasis, no pulmonary embolus. Large right pleural effusion with moderate rounded atelectasis in the right lower lobe. Debris occluding the right lower lobe bronchi. Workstation performed: [id number]     Other Diagnostic Testing: I have personally reviewed pertinent reports.    ACTIVE MEDICATIONS     Current Facility-Administered Medications   Medication Dose Route Frequency    acetaminophen (Ofirmev) injection 1,000 mg  1,000 mg Intravenous Q8H PRN    albuterol inhalation solution 2.5 mg  2.5 mg Nebulization Q4H PRN    amLODIPine (NORVASC) tablet 2.5 mg  2.5 mg Oral Daily    benzonatate (TESSALON PERLES) capsule 200 mg  200 mg Oral TID    ceftriaxone (ROCEPHIN) 1 g/50 mL in dextrose IVPB  1,000 mg Intravenous Q24H    dexamethasone (PF) (DECADRON) injection 6 mg  6 mg Intravenous Q12H EDDIE    doxycycline hyclate (VIBRAMYCIN) capsule 100 mg  100 mg Oral Q12H EDDIE    gabapentin (NEURONTIN) capsule 100 mg   "100 mg Oral BID    guaiFENesin (MUCINEX) 12 hr tablet 1,200 mg  1,200 mg Oral Q12H EDDIE    heparin (porcine) 25,000 units in 0.45% NaCl 250 mL infusion (premix)  3-20 Units/kg/hr (Order-Specific) Intravenous Titrated    labetalol (NORMODYNE) injection 10 mg  10 mg Intravenous Q6H PRN    pantoprazole (PROTONIX) EC tablet 40 mg  40 mg Oral Daily    phenol (CHLORASEPTIC) 1.4 % mucosal liquid 1 spray  1 spray Mouth/Throat Q2H PRN    sertraline (ZOLOFT) tablet 75 mg  75 mg Oral Daily       VTE Pharmacologic Prophylaxis: Heparin  VTE Mechanical Prophylaxis: sequential compression device    Portions of the record may have been created with voice recognition software.  Occasional wrong word or \"sound a like\" substitutions may have occurred due to the inherent limitations of voice recognition software.  Read the chart carefully and recognize, using context, where substitutions have occurred.  ==  Rigoberto Santana MD  [facility]  Internal Medicine Residency PGY-1       "

## 2023-12-26 VITALS
DIASTOLIC BLOOD PRESSURE: 67 MMHG | TEMPERATURE: 97.7 F | OXYGEN SATURATION: 97 % | HEART RATE: 59 BPM | SYSTOLIC BLOOD PRESSURE: 157 MMHG | RESPIRATION RATE: 20 BRPM

## 2023-12-26 PROBLEM — R00.1 BRADYCARDIA: Status: ACTIVE | Noted: 2023-12-26

## 2023-12-26 LAB
ANION GAP SERPL CALCULATED.3IONS-SCNC: 10 MMOL/L
APTT PPP: 83 SECONDS (ref 23–37)
BASOPHILS # BLD AUTO: 0.01 THOUSANDS/ÂΜL (ref 0–0.1)
BASOPHILS NFR BLD AUTO: 0 % (ref 0–1)
BUN SERPL-MCNC: 31 MG/DL (ref 5–25)
CALCIUM SERPL-MCNC: 8.9 MG/DL (ref 8.4–10.2)
CHLORIDE SERPL-SCNC: 105 MMOL/L (ref 96–108)
CO2 SERPL-SCNC: 28 MMOL/L (ref 21–32)
CREAT SERPL-MCNC: 0.89 MG/DL (ref 0.6–1.3)
EOSINOPHIL # BLD AUTO: 0.01 THOUSAND/ÂΜL (ref 0–0.61)
EOSINOPHIL NFR BLD AUTO: 0 % (ref 0–6)
ERYTHROCYTE [DISTWIDTH] IN BLOOD BY AUTOMATED COUNT: 18.2 % (ref 11.6–15.1)
GFR SERPL CREATININE-BSD FRML MDRD: 54 ML/MIN/1.73SQ M
GLUCOSE SERPL-MCNC: 127 MG/DL (ref 65–140)
HCT VFR BLD AUTO: 35.3 % (ref 34.8–46.1)
HGB BLD-MCNC: 10.6 G/DL (ref 11.5–15.4)
IMM GRANULOCYTES # BLD AUTO: 0.13 THOUSAND/UL (ref 0–0.2)
IMM GRANULOCYTES NFR BLD AUTO: 2 % (ref 0–2)
LYMPHOCYTES # BLD AUTO: 0.68 THOUSANDS/ÂΜL (ref 0.6–4.47)
LYMPHOCYTES NFR BLD AUTO: 10 % (ref 14–44)
MAGNESIUM SERPL-MCNC: 2.4 MG/DL (ref 1.9–2.7)
MCH RBC QN AUTO: 24 PG (ref 26.8–34.3)
MCHC RBC AUTO-ENTMCNC: 30 G/DL (ref 31.4–37.4)
MCV RBC AUTO: 80 FL (ref 82–98)
MONOCYTES # BLD AUTO: 0.25 THOUSAND/ÂΜL (ref 0.17–1.22)
MONOCYTES NFR BLD AUTO: 4 % (ref 4–12)
NEUTROPHILS # BLD AUTO: 6.1 THOUSANDS/ÂΜL (ref 1.85–7.62)
NEUTS SEG NFR BLD AUTO: 84 % (ref 43–75)
NRBC BLD AUTO-RTO: 0 /100 WBCS
PHOSPHATE SERPL-MCNC: 3.6 MG/DL (ref 2.3–4.1)
PLATELET # BLD AUTO: 227 THOUSANDS/UL (ref 149–390)
PMV BLD AUTO: 9.5 FL (ref 8.9–12.7)
POTASSIUM SERPL-SCNC: 4.9 MMOL/L (ref 3.5–5.3)
PROCALCITONIN SERPL-MCNC: 0.7 NG/ML
RBC # BLD AUTO: 4.41 MILLION/UL (ref 3.81–5.12)
SODIUM SERPL-SCNC: 143 MMOL/L (ref 135–147)
WBC # BLD AUTO: 7.18 THOUSAND/UL (ref 4.31–10.16)

## 2023-12-26 PROCEDURE — 85025 COMPLETE CBC W/AUTO DIFF WBC: CPT | Performed by: INTERNAL MEDICINE

## 2023-12-26 PROCEDURE — 83735 ASSAY OF MAGNESIUM: CPT | Performed by: INTERNAL MEDICINE

## 2023-12-26 PROCEDURE — 80048 BASIC METABOLIC PNL TOTAL CA: CPT | Performed by: INTERNAL MEDICINE

## 2023-12-26 PROCEDURE — 85730 THROMBOPLASTIN TIME PARTIAL: CPT | Performed by: INTERNAL MEDICINE

## 2023-12-26 PROCEDURE — 84145 PROCALCITONIN (PCT): CPT | Performed by: INTERNAL MEDICINE

## 2023-12-26 PROCEDURE — 84100 ASSAY OF PHOSPHORUS: CPT | Performed by: INTERNAL MEDICINE

## 2023-12-26 RX ORDER — DEXAMETHASONE 2 MG/1
2 TABLET ORAL DAILY
Status: DISCONTINUED | OUTPATIENT
Start: 2023-12-30 | End: 2023-12-26 | Stop reason: HOSPADM

## 2023-12-26 RX ORDER — DEXAMETHASONE 4 MG/1
4 TABLET ORAL
Status: DISCONTINUED | OUTPATIENT
Start: 2023-12-26 | End: 2023-12-26 | Stop reason: HOSPADM

## 2023-12-26 RX ORDER — DEXAMETHASONE 4 MG/1
8 TABLET ORAL DAILY
Status: DISCONTINUED | OUTPATIENT
Start: 2023-12-27 | End: 2023-12-26 | Stop reason: HOSPADM

## 2023-12-26 RX ORDER — DEXAMETHASONE 4 MG/1
4 TABLET ORAL DAILY
Status: DISCONTINUED | OUTPATIENT
Start: 2023-12-29 | End: 2023-12-26 | Stop reason: HOSPADM

## 2023-12-26 RX ORDER — AMLODIPINE BESYLATE 2.5 MG/1
2.5 TABLET ORAL DAILY
Start: 2023-12-27

## 2023-12-26 RX ORDER — GUAIFENESIN 1200 MG/1
1200 TABLET, EXTENDED RELEASE ORAL EVERY 12 HOURS SCHEDULED
Start: 2023-12-26

## 2023-12-26 RX ORDER — ALBUTEROL SULFATE 90 UG/1
2 AEROSOL, METERED RESPIRATORY (INHALATION) EVERY 4 HOURS PRN
Status: DISCONTINUED | OUTPATIENT
Start: 2023-12-26 | End: 2023-12-26 | Stop reason: HOSPADM

## 2023-12-26 RX ORDER — DEXAMETHASONE 2 MG/1
TABLET ORAL DAILY
Start: 2023-12-27 | End: 2023-12-31

## 2023-12-26 RX ORDER — DEXAMETHASONE 4 MG/1
4 TABLET ORAL
Start: 2023-12-26 | End: 2023-12-27

## 2023-12-26 RX ORDER — BENZONATATE 200 MG/1
200 CAPSULE ORAL 3 TIMES DAILY
Start: 2023-12-26

## 2023-12-26 RX ADMIN — BENZONATATE 200 MG: 100 CAPSULE ORAL at 09:01

## 2023-12-26 RX ADMIN — SERTRALINE HYDROCHLORIDE 75 MG: 50 TABLET ORAL at 09:02

## 2023-12-26 RX ADMIN — PANTOPRAZOLE SODIUM 40 MG: 40 TABLET, DELAYED RELEASE ORAL at 09:02

## 2023-12-26 RX ADMIN — GUAIFENESIN 1200 MG: 600 TABLET, EXTENDED RELEASE ORAL at 09:01

## 2023-12-26 RX ADMIN — GABAPENTIN 100 MG: 100 CAPSULE ORAL at 09:01

## 2023-12-26 RX ADMIN — AMLODIPINE BESYLATE 2.5 MG: 2.5 TABLET ORAL at 09:04

## 2023-12-26 RX ADMIN — DOXYCYCLINE 100 MG: 100 CAPSULE ORAL at 09:01

## 2023-12-26 RX ADMIN — DEXAMETHASONE SODIUM PHOSPHATE 6 MG: 10 INJECTION, SOLUTION INTRAMUSCULAR; INTRAVENOUS at 09:02

## 2023-12-26 NOTE — CASE MANAGEMENT
Case Management Discharge Planning Note    Patient name Raisa Sinha  [facility] [id number]  : 1925 Date 2023       Current Admission Date: 2023  Current Admission Diagnosis:COVID-19 virus infection   Patient Active Problem List    Diagnosis Date Noted    Bradycardia 2023    Elevated BP without diagnosis of hypertension 2023    Acute kidney injury superimposed on CKD  2023    Acute hypoxic respiratory failure (HCC) 2023    Left arm pain 2023    Primary osteoarthritis of left shoulder 10/23/2023    Hypertension 10/23/2023    Left shoulder pain 08/10/2023    Current moderate episode of major depressive disorder without prior episode (HCC) 2023    Chronic deep vein thrombosis (DVT) of proximal vein of both lower extremities (HCC) 2023    Hypokalemia 2023    Frailty syndrome in geriatric patient 2023    Adnexal cyst 2023    Atrial premature contractions 2023    Grade I diastolic dysfunction 2023    History of gastric ulcer 2023    DNR (do not resuscitate) 2023    Goals of care, counseling/discussion 2023    Metabolic encephalopathy 2023    History of GI bleed 2023    Acute blood loss anemia 2023    Chronic pain of left knee 2023    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) 2023    History of DVT of lower extremity 2023    COVID-19 virus infection 2023    Pulmonary embolism (HCC) 2023    Paroxysmal supraventricular tachycardia  2022    Age-related osteoporosis without current pathological fracture 2019    Lower extremity edema 2019    Stage 3a chronic kidney disease (HCC) 2019    Venous stasis dermatitis of both lower extremities 2019    Constipation, unspecified 2018    Diverticulosis of intestine, part unspecified, without perforation or abscess without bleeding 2018    Generalized  muscle weakness 12/05/2018    Polyneuropathy, unspecified 12/05/2018    Ambulatory dysfunction 12/04/2018    Neuropathy 12/03/2018    Balance problem 01/01/2017    Cognitive impairment 01/01/2017      LOS (days): 5  Geometric Mean LOS (GMLOS) (days): 5  Days to GMLOS:-0.1     OBJECTIVE:  Risk of Unplanned Readmission Score: 21.41         Current admission status: Inpatient   Preferred Pharmacy:   [facility]/pharm[facility] #[id number] - [place] - [address]  [address] [facility] [address]  Phone: [phone] Fax: [phone]    Primary Care Provider: Demian Champion MD    Primary Insurance: [employer] RE[employer]  Secondary Insurance:     DISCHARGE DETAILS:    Discharge planning discussed with:: BharathMacario (Son)  [phone] (Mobile)        CM contacted family/caregiver?: Yes  Were Treatment Team discharge recommendations reviewed with patient/caregiver?: Yes  Did patient/caregiver verbalize understanding of patient care needs?: Yes  Were patient/caregiver advised of the risks associated with not following Treatment Team discharge recommendations?: Yes    Contacts  Patient Contacts: Macario Sinha  Relationship to Patient:: Family  Contact Method: Phone  Phone Number: [phone]  Reason/Outcome: Discharge Planning         Other Referral/Resources/Interventions Provided:  Interventions: Short Term Rehab         Treatment Team Recommendation: Short Term Rehab  Discharge Destination Plan:: Short Term Rehab  Transport at Discharge : Stretcher van        Transported by (Company and Unit #): [facility] claimed the [facility] Sin[facility] and will arrive on 12/26/2023 at 3:30pm EST. Contact them at [phone].          Accepting Facility Name, City & State : [facility] [place]  Receiving Facility/Agency Phone Number: [phone]  Facility/Agency Fax Number: [phone]    KALPESH spoke with the patients son who agrees and verbalizes  understanding of the discharge plan. He was notified of discharge date/time.

## 2023-12-26 NOTE — PROGRESS NOTES
INTERNAL MEDICINE RESIDENCY PROGRESS NOTE     Name: Raisa Sinha   Age & Sex: 89 y.o. female   MRN: [id number]  Unit/Bed#: [facility] 505-01   Encounter: [id number]  Team: SOD Team A    PATIENT INFORMATION     Name: Raisa Sinha   Age & Sex: 89 y.o. female   MRN: [id number]  Hospital Stay Days: 5    ASSESSMENT/PLAN     Principal Problem:    COVID-19 virus infection  Active Problems:    Neuropathy    Paroxysmal supraventricular tachycardia     Cognitive impairment    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)    Chronic deep vein thrombosis (DVT) of proximal vein of both lower extremities (HCC)    Acute hypoxic respiratory failure (HCC)    Acute kidney injury superimposed on CKD     Elevated BP without diagnosis of hypertension    Bradycardia      * COVID-19 virus infection  Assessment & Plan  Patient presented from [facility] due to worsening hypoxia. Found to be COVID-positive but sent to ED due to worsening respiratory status. During course of initial evaluation in ED, event concerning for aspiration occurred and patient subsequently required HFNC. CT chest with large effusion and debris in RLL bronchus. Fortunately, initial lactate, procal, troponin, WBC all within normal limits. Patient admitted and started on severe COVID pathway    12/26 - patient continues to report improvement in her breathing. Now back on RA and tolerating this well.     Plan  Continue mid-flow nasal cannula and wean as able for oxygen level over 90%  Continue COVID severe pathway  Will start dexamethasone taper today (2 mg daily decrease)  Ceftriaxone 1 g every 24 hours - if remaining today, will complete full 7 day course tomorrow  Doxycycline 100 mg every 12 hours - if remaining today, will complete full 7 day course tomorrow  S/p Tocilizumab 12/22  Will stop heparin infusion today  Continue guaifenesin and benzonatate  Per SLP, dysphagia 2 with thin liquids      Bradycardia  Assessment & Plan  During  admission, patient noted to be intermittently bradycardic to as low as 40s (particularly at night); otherwise, she has remained in the 50-80s throughout the rest of the day. No reported symptoms from this.    Plan  Continue to monitor    Elevated BP without diagnosis of hypertension  Assessment & Plan  Since admission, patient noted to have elevated BP readings as high as 180s/100s, overall now improved following addition of amlodipine. Suspect component of steroid-mediated HTN.     Plan  Continue PRN labetalol  Continue amlodipine 2.5 mg    Acute kidney injury superimposed on CKD   Assessment & Plan  Patient with bump in creatinine to 1.07 on 12/22. Does have a history of CKD, with baseline per Nephro's note approximately 0.9-1.1. Renal function as of 12/26 stable with creatinine of 0.89     Plan  Continue to monitor renal function; avoid nephrotoxins    Acute hypoxic respiratory failure (HCC)  Assessment & Plan  Please see assessment and plan under COVID 19 infection    Chronic deep vein thrombosis (DVT) of proximal vein of both lower extremities (HCC)  Assessment & Plan  Patient with known hx of rocío LE DVT's on prior duplex from Jan. Reported right proximal femoral to posterior tibial clot and left popliteal and gastroc clot. She was previously on Eliquis but this had to be discontinued after a GI bleed (3/2023). It was resumed briefly in the past but the melena started again. Patient and son both agreed that risk of anticoagulation outweighed the benefits so she has not been on anticoagulation    Plan  Discussed with son - agreeable to heparin while admitted as part of COVID pathway (will stop as of 12/26 given improvement)    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)  Assessment & Plan  Continue sertraline 75 mg once daily    Cognitive impairment  Assessment & Plan  February 21, 2023: Mini-Mental status exam: 25/30 April 1, 2023: Mini-Mental status exam performed at rehabilitation facility:  .  (Reported by her son to hospital physicians as a pre-existing condition prior to her 2023 hospitalization)    2023: MMSE: 21    Patient remains pleasantly demented. Today oriented to self and month but less so situation or date. Roughly at baseline per my discussion with her son.    Plan  Continue to monitor for changes in mentation  Non-pharmacologic measures to prevent delirium    Paroxysmal supraventricular tachycardia   Assessment & Plan  Patient with history of pSVT, for which she was previously on treatment with metoprolol. However, she has been off of this for some time. Currently denying any cardiac complaints and appears to be in normal sinus rhythm with controlled rates.    Plan  Continue to monitor    Neuropathy  Assessment & Plan  Patient has history of neuropathy - no particular complaints related to this at present.    Plan  Continue PTA gabapentin        Disposition: Remain admitted to ensure tolerating RA;  likely d/c later today versus tomorrow     SUBJECTIVE     Patient seen and examined. No acute events overnight. This morning, patient reports no acute complaints other than being sick of being in the hospital.     OBJECTIVE     Vitals:    23 1230 23 1353 23 1900 23 0258   BP:    139/89   BP Location:    Left arm   Pulse:    (!) 42   Resp:    20   Temp:    98.5 °F (36.9 °C)   TempSrc:    Oral   SpO2: 99% 98% 98% 96%      Temperature:   Temp (24hrs), Av.5 °F (36.9 °C), Min:98.5 °F (36.9 °C), Max:98.5 °F (36.9 °C)    Temperature: 98.5 °F (36.9 °C)  Intake & Output:  I/O          0701   0700  0701   0700    P.O. 420 0    I.V. 239.9 156.2    IV Piggyback 50     Total Intake 709.9 156.2    Urine 838 350    Stool 0     Total Output 838 350    Net -128.1 -193.8          Unmeasured Urine Occurrence 1 x     Unmeasured Stool Occurrence 1 x           Weights:        There is no height or weight on file to calculate BMI.  Weight (last 2  days)       None          Physical Exam  Vitals and nursing note reviewed.   Constitutional:       General: She is not in acute distress.     Appearance: Normal appearance. She is not ill-appearing.   HENT:      Head: Normocephalic and atraumatic.      Right Ear: External ear normal.      Left Ear: External ear normal.      Nose: Nose normal.      Comments: Not currently on oxygen     Mouth/Throat:      Mouth: Mucous membranes are moist.      Pharynx: Oropharynx is clear.   Eyes:      Extraocular Movements: Extraocular movements intact.      Conjunctiva/sclera: Conjunctivae normal.      Pupils: Pupils are equal, round, and reactive to light.   Cardiovascular:      Rate and Rhythm: Normal rate and regular rhythm.      Heart sounds: Normal heart sounds.   Pulmonary:      Effort: Pulmonary effort is normal. No respiratory distress.      Breath sounds: Normal breath sounds.   Abdominal:      General: Abdomen is flat. Bowel sounds are normal. There is no distension.      Palpations: Abdomen is soft.      Tenderness: There is no abdominal tenderness.   Musculoskeletal:      Cervical back: Neck supple.      Right lower leg: No edema.      Left lower leg: No edema.   Skin:     General: Skin is warm and dry.      Capillary Refill: Capillary refill takes less than 2 seconds.   Neurological:      Mental Status: She is alert. Mental status is at baseline.      Comments: Oriented primarily to self   Psychiatric:         Mood and Affect: Mood normal.         Behavior: Behavior normal.       LABORATORY DATA     Labs: I have personally reviewed pertinent reports.  Results from last 7 days   Lab Units 12/26/23 0435 12/25/23 0559 12/24/23  0429   WBC Thousand/uL 7.18 6.92 7.17   HEMOGLOBIN g/dL 10.6* 9.7* 10.5*   HEMATOCRIT % 35.3 33.6* 35.9   PLATELETS Thousands/uL 227 233 254  254   NEUTROS PCT % 84* 84* 87*   MONOS PCT % 4 6 4   EOS PCT % 0 0 0      Results from last 7 days   Lab Units 12/26/23 0435 12/25/23  0559  12/24/23  0429 12/23/23  0826 12/22/23  0519   POTASSIUM mmol/L 4.9 4.8 4.1 4.3 4.4   CHLORIDE mmol/L 105 105 102 101 101   CO2 mmol/L 28 30 28 28 25   BUN mg/dL 31* 34* 39* 39* 30*   CREATININE mg/dL 0.89 0.87 0.77 0.83 1.07   CALCIUM mg/dL 8.9 8.8 9.0 8.7 8.8   ALK PHOS U/L  --   --  56 57 72   ALT U/L  --   --  10 9 10   AST U/L  --   --  13 12* 12*     Results from last 7 days   Lab Units 12/26/23  0435 12/25/23  0559 12/24/23  0429   MAGNESIUM mg/dL 2.4 2.3 2.6     Results from last 7 days   Lab Units 12/26/23  0435 12/25/23  0559 12/24/23  0429   PHOSPHORUS mg/dL 3.6 3.9 3.4      Results from last 7 days   Lab Units 12/26/23  0435 12/25/23  2159 12/25/23  1522 12/23/23  0119 12/22/23  1815   INR   --   --   --   --  1.18   PTT seconds 83* 76* 78*   < > 53*    < > = values in this interval not displayed.     Results from last 7 days   Lab Units 12/21/23  0930   LACTIC ACID mmol/L 0.7           IMAGING & DIAGNOSTIC TESTING     Radiology Results: I have personally reviewed pertinent reports.  CTA ED chest PE Study    Result Date: 12/21/2023  Impression: With mild compromise by motion and partial right lower lobe atelectasis, no pulmonary embolus. Large right pleural effusion with moderate rounded atelectasis in the right lower lobe. Debris occluding the right lower lobe bronchi. Workstation performed: [id number]     Other Diagnostic Testing: I have personally reviewed pertinent reports.    ACTIVE MEDICATIONS     Current Facility-Administered Medications   Medication Dose Route Frequency    acetaminophen (Ofirmev) injection 1,000 mg  1,000 mg Intravenous Q8H PRN    albuterol (PROVENTIL HFA,VENTOLIN HFA) inhaler 2 puff  2 puff Inhalation Q4H PRN    amLODIPine (NORVASC) tablet 2.5 mg  2.5 mg Oral Daily    benzonatate (TESSALON PERLES) capsule 200 mg  200 mg Oral TID    ceftriaxone (ROCEPHIN) 1 g/50 mL in dextrose IVPB  1,000 mg Intravenous Q24H    [START ON 12/27/2023] dexamethasone (DECADRON) tablet 8 mg  8 mg Oral  "Daily    Followed by    [START ON 12/28/2023] dexamethasone (DECADRON) tablet 6 mg  6 mg Oral Daily    Followed by    [START ON 12/29/2023] dexamethasone (DECADRON) tablet 4 mg  4 mg Oral Daily    Followed by    [START ON 12/30/2023] dexamethasone (DECADRON) tablet 2 mg  2 mg Oral Daily    dexamethasone (DECADRON) tablet 4 mg  4 mg Oral HS    doxycycline hyclate (VIBRAMYCIN) capsule 100 mg  100 mg Oral Q12H EDDIE    gabapentin (NEURONTIN) capsule 100 mg  100 mg Oral BID    guaiFENesin (MUCINEX) 12 hr tablet 1,200 mg  1,200 mg Oral Q12H EDDIE    pantoprazole (PROTONIX) EC tablet 40 mg  40 mg Oral Daily    phenol (CHLORASEPTIC) 1.4 % mucosal liquid 1 spray  1 spray Mouth/Throat Q2H PRN    sertraline (ZOLOFT) tablet 75 mg  75 mg Oral Daily       VTE Pharmacologic Prophylaxis: Heparin - will stop today  VTE Mechanical Prophylaxis: sequential compression device    Portions of the record may have been created with voice recognition software.  Occasional wrong word or \"sound a like\" substitutions may have occurred due to the inherent limitations of voice recognition software.  Read the chart carefully and recognize, using context, where substitutions have occurred.  ==  Rigoberto Santana MD  [facility]  Internal Medicine Residency PGY-1       "

## 2023-12-26 NOTE — DISCHARGE INSTR - AVS FIRST PAGE
You have been treated for COVID-19. You will need to finish out a steroid taper over the coming days (down 2 mg per day each day). Please continue to isolate as much as possible to ensure that you do not get others sick and you will need to mask for the next four days. In addition, the only other new medication we have added during your stay is a small dose of a medication called amlodipine, for your blood pressure. This can be discontinued in the future if your doctor deems that you do not need it.     Please be sure to call and schedule an appointment with your doctor in 1-2 weeks for follow-up care.

## 2023-12-26 NOTE — DISCHARGE SUMMARY
INTERNAL MEDICINE RESIDENCY DISCHARGE SUMMARY     Raisa Sinha   89 y.o. female  MRN: [id number]  Room/Bed: [facility] 505/[facility] 505-01     [facility] 5 MED SURG/SD   Encounter: [id number]    Principal Problem:    COVID-19 virus infection  Active Problems:    Neuropathy    Paroxysmal supraventricular tachycardia     Cognitive impairment    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)    Chronic deep vein thrombosis (DVT) of proximal vein of both lower extremities (HCC)    Acute hypoxic respiratory failure (HCC)    Acute kidney injury superimposed on CKD     Elevated BP without diagnosis of hypertension    Bradycardia      * COVID-19 virus infection  Assessment & Plan  Patient presented from [facility] due to worsening hypoxia. Found to be COVID-positive but sent to ED due to worsening respiratory status. During course of initial evaluation in ED, event concerning for aspiration occurred and patient subsequently required HFNC. CT chest with large effusion and debris in RLL bronchus. Fortunately, initial lactate, procal, troponin, WBC all within normal limits. Patient admitted and started on severe COVID pathway    12/26 - patient continues to report improvement in her breathing. Now back on RA and tolerating this well.     Plan  Continue mid-flow nasal cannula and wean as able for oxygen level over 90%  Continue COVID severe pathway  Will start dexamethasone taper today (2 mg daily decrease)  Ceftriaxone 1 g every 24 hours - stop today  Doxycycline 100 mg every 12 hours - stop today  S/p Tocilizumab 12/22  Will stop heparin infusion today  Continue guaifenesin and benzonatate on d/c  Per SLP, dysphagia 2 with thin liquids      Bradycardia  Assessment & Plan  During admission, patient noted to be intermittently bradycardic to as low as 40s (particularly at night); otherwise, she has remained in the 50-80s throughout the rest of the day. No  reported symptoms from this.    Plan  Continue to monitor - can be evaluated as outpatient if needed    Elevated BP without diagnosis of hypertension  Assessment & Plan  Since admission, patient noted to have elevated BP readings as high as 180s/100s, overall now improved following addition of amlodipine. Suspect component of steroid-mediated HTN.     Plan  Continue PRN labetalol while admitted  Continue amlodipine 2.5 mg  Follow-up outpatient    Acute kidney injury superimposed on CKD   Assessment & Plan  Patient with bump in creatinine to 1.07 on 12/22. Does have a history of CKD, with baseline per Nephro's note approximately 0.9-1.1. Renal function as of 12/26 stable with creatinine of 0.89     Plan  Continue to monitor renal function; avoid nephrotoxins    Acute hypoxic respiratory failure (HCC)  Assessment & Plan  Please see assessment and plan under COVID 19 infection    Chronic deep vein thrombosis (DVT) of proximal vein of both lower extremities (HCC)  Assessment & Plan  Patient with known hx of rocío LE DVT's on prior duplex from Jan. Reported right proximal femoral to posterior tibial clot and left popliteal and gastroc clot. She was previously on Eliquis but this had to be discontinued after a GI bleed (3/2023). It was resumed briefly in the past but the melena started again. Patient and son both agreed that risk of anticoagulation outweighed the benefits so she has not been on anticoagulation    Plan  Discussed with son - agreeable to heparin while admitted as part of COVID pathway (will stop as of 12/26 given improvement)    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)  Assessment & Plan  Continue sertraline 75 mg once daily    Cognitive impairment  Assessment & Plan  February 21, 2023: Mini-Mental status exam: 25/30 April 1, 2023: Mini-Mental status exam performed at rehabilitation facility: 23/30.  (Reported by her son to hospital physicians as a pre-existing condition prior to her March  "2023 hospitalization)    August 14, 2023: MMSE: 21    Patient remains pleasantly demented. Today oriented to self and month but less so situation or date. Roughly at baseline per my discussion with her son.    Plan  Continue to monitor for changes in mentation  Non-pharmacologic measures to prevent delirium    Paroxysmal supraventricular tachycardia   Assessment & Plan  Patient with history of pSVT, for which she was previously on treatment with metoprolol. However, she has been off of this for some time. Currently denying any cardiac complaints and appears to be in normal sinus rhythm with controlled rates.    Plan  Continue to monitor    Neuropathy  Assessment & Plan  Patient has history of neuropathy - no particular complaints related to this at present.    Plan  Continue PTA gabapentin        DETAILS OF HOSPITAL COURSE     Per H&P by Dr. Tad Kern: \"89-year-old female patient with past medical history of CKD, SVT, PE/DVT who is not currently on anticoagulation, depression who presented to the ED from [facility] with cough, shortness of breath and worsening hypoxia.  Patient was tested positive in the ED for COVID.  Patient required high flow nasal cannula.  In the ED vital signs blood pressure 179/81, heart rate 88, respiration 20, oxygen saturation is 92% on high flow cell cannula.  Showed, white cell count 7.35, hemoglobin 11.5, creatinine 0.77, COVID test is positive, Pro-Ruben 0.17, lactic acid 0.7, troponin is negative.  CT chest showed no pulmonary emboli and showed large right pleural effusion with moderate round atelectasis in the right lower lobe.  Patient is level 3 DNR/DNI  Patient will be admitted to stepdown 2 for treatment of severe COVID.\"    While down in the ED, there was some concern for aspiration and the patient ultimately required high-flow NC for respiratory support. She was subsequently started on the severe COVID pathway. This included Tessalon/Mucinex, " doxycyline/ceftriaxone, and dexamethasone as well as routine respiratory monitoring and reevaluation. Although the patient was initially intermittently febrile, these slowly resolved during the course of her hospitalization. In addition to the aforementioned treatments, the patient also received a dose of Tocilizumab as part of the protocol for the severe pathway. Additionally, given an elevated d-dimer, the patient was started on a heparin drip (discussed and confirmed with the patient's son as she had previously been on outpatient anticoagulation).    Ultimately, the patients respiratory status continued to improve with time and the aforementioned treatments. By the morning of 12/26, the patient had been completed weaned off of supplemental oxygen and was maintaining her O2 sat on room air. As such, given her clinical improvement, the decision was made to discharge the patient back to [facility]. She will be completing a steroid taper over the coming days (decreasing by 2 mg daily) and should mask for the remainder of the 10 days following her diagnosis.    In addition to the aforementioned, there was a brief concern that the patient had developed DAVID by lab criteria on the day following her admission. However, on later examination, her renal function overall appeared to remain roughly consistent with her baseline. She was treated with a brief course of IV fluids as a precaution, however. Additionally, the patient was noted to be intermittently bradycardic, particularly overnight. As she was asymptomatic from this (and given that it improved during daytime), no further workup was done. This could be further monitored on an outpatient basis. Additionally, the patient was also noted to have elevated BP readings during her admission for which she was started on a small dose of amlodipine. This can be further monitored in the outpatient setting and adjusted as needed.     Please see my progress note today for detailed  subjective and physical exam.    DISCHARGE INFORMATION     PCP at Discharge: Demian Champion MD    Admitting Provider: Shilpa Gamez MD  Admission Date: 12/21/2023    Discharge Provider: Kayley Collins MD  Discharge Date: 12/26/23    Discharge Disposition: Non [facility] SNF/TCU/SNU  Discharge Condition: stable  Discharge with Lines: no    Discharge Diet: regular diet  Activity Restrictions:  No activity restrictions, but should remain masked when possible 10 days from diagnosis  Test Results Pending at Discharge: None    Discharge Diagnoses:  Principal Problem:    COVID-19 virus infection  Active Problems:    Neuropathy    Paroxysmal supraventricular tachycardia     Cognitive impairment    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)    Chronic deep vein thrombosis (DVT) of proximal vein of both lower extremities (HCC)    Acute hypoxic respiratory failure (HCC)    Acute kidney injury superimposed on CKD     Elevated BP without diagnosis of hypertension    Bradycardia  Resolved Problems:    * No resolved hospital problems. *      Consulting Providers:  PT/OT    Diagnostic & Therapeutic Procedures Performed:  CTA ED chest PE Study    Result Date: 12/21/2023  Impression: With mild compromise by motion and partial right lower lobe atelectasis, no pulmonary embolus. Large right pleural effusion with moderate rounded atelectasis in the right lower lobe. Debris occluding the right lower lobe bronchi. Workstation performed: [id number]       Code Status: Level 3 - DNAR and DNI  Advance Directive & Living Will: <no information>  Power of :    POLST:      Medications:  Current Discharge Medication List        Current Discharge Medication List        START taking these medications    Details   amLODIPine (NORVASC) 2.5 mg tablet Take 1 tablet (2.5 mg total) by mouth daily    Associated Diagnoses: Elevated BP without diagnosis of hypertension      benzonatate (TESSALON) 200 MG capsule Take 1 capsule (200 mg total)  by mouth 3 (three) times a day    Associated Diagnoses: COVID-19 virus infection      !! dexamethasone (DECADRON) 2 mg tablet Take 4 tablets (8 mg total) by mouth daily for 1 day, THEN 3 tablets (6 mg total) daily for 1 day, THEN 2 tablets (4 mg total) daily for 1 day, THEN 1 tablet (2 mg total) daily for 1 day. Do not start before December 27, 2023.    Associated Diagnoses: COVID-19 virus infection      !! dexamethasone (DECADRON) 4 mg tablet Take 1 tablet (4 mg total) by mouth daily at bedtime for 1 dose    Associated Diagnoses: COVID-19 virus infection      guaiFENesin 1200 MG TB12 Take 1 tablet (1,200 mg total) by mouth every 12 (twelve) hours    Associated Diagnoses: COVID-19 virus infection       !! - Potential duplicate medications found. Please discuss with provider.        Current Discharge Medication List        CONTINUE these medications which have NOT CHANGED    Details   !! acetaminophen (TYLENOL) 325 mg tablet Take 650 mg by mouth every 4 (four) hours as needed for mild pain or fever      !! acetaminophen (TYLENOL) 325 mg tablet Take 650 mg by mouth 2 (two) times a day      gabapentin (NEURONTIN) 100 mg capsule Take 100 mg by mouth 2 (two) times a day      lidocaine (Lidoderm) 5 % Apply 1 patch topically over 12 hours daily Remove & Discard patch within 12 hours or as directed by MD    Associated Diagnoses: Age-related osteoporosis without current pathological fracture; Acute pain of left shoulder      Menthol-Methyl Salicylate (CECIL MCCALLUM GREASELESS) 10-15 % greaseless cream Apply topically daily at bedtime    Associated Diagnoses: Age-related osteoporosis without current pathological fracture; Acute pain of left shoulder      pantoprazole (PROTONIX) 40 mg tablet Take 40 mg by mouth daily      sertraline (ZOLOFT) 50 mg tablet Take 75 mg by mouth daily       !! - Potential duplicate medications found. Please discuss with provider.          Allergies:  No Known Allergies    FOLLOW-UP     PCP Outpatient  "Follow-up:  yes  follow-up in 1-2 weeks for transition of care    Consulting Providers Follow-up:  none required     Active Issues Requiring Follow-up:   COVID-19, elevated BP without a diagnosis of HTN, history of DVT/PE not on anticoagulation, cognitive impairment    Discharge Statement:   I spent 45 minutes minutes discharging the patient. This time was spent on the day of discharge. I had direct contact with the patient on the day of discharge. Additional documentation is required if more than 30 minutes were spent on discharge.    Portions of the record may have been created with voice recognition software.  Occasional wrong word or \"sound a like\" substitutions may have occurred due to the inherent limitations of voice recognition software.  Read the chart carefully and recognize, using context, where substitutions have occurred.    ==  Rigoberto Santana MD  [facility]  Internal Medicine Resident PGY-1        "

## 2023-12-26 NOTE — ASSESSMENT & PLAN NOTE
During admission, patient noted to be intermittently bradycardic to as low as 40s (particularly at night); otherwise, she has remained in the 50-80s throughout the rest of the day. No reported symptoms from this.    Plan  Continue to monitor - can be evaluated as outpatient if needed

## 2023-12-27 ENCOUNTER — NURSING HOME VISIT (OUTPATIENT)
Dept: GERIATRICS | Facility: OTHER | Age: 88
End: 2023-12-27
Payer: COMMERCIAL

## 2023-12-27 VITALS
SYSTOLIC BLOOD PRESSURE: 162 MMHG | DIASTOLIC BLOOD PRESSURE: 62 MMHG | HEART RATE: 50 BPM | WEIGHT: 131 LBS | BODY MASS INDEX: 21.8 KG/M2 | OXYGEN SATURATION: 100 % | TEMPERATURE: 97.8 F | RESPIRATION RATE: 18 BRPM

## 2023-12-27 DIAGNOSIS — U07.1 COVID-19 VIRUS INFECTION: ICD-10-CM

## 2023-12-27 DIAGNOSIS — I26.93 SINGLE SUBSEGMENTAL PULMONARY EMBOLISM WITHOUT ACUTE COR PULMONALE (HCC): ICD-10-CM

## 2023-12-27 DIAGNOSIS — G62.9 NEUROPATHY: ICD-10-CM

## 2023-12-27 DIAGNOSIS — N18.9 ACUTE KIDNEY INJURY SUPERIMPOSED ON CKD: ICD-10-CM

## 2023-12-27 DIAGNOSIS — Z66 DNR (DO NOT RESUSCITATE): ICD-10-CM

## 2023-12-27 DIAGNOSIS — N17.9 ACUTE KIDNEY INJURY SUPERIMPOSED ON CKD: ICD-10-CM

## 2023-12-27 DIAGNOSIS — J96.01 ACUTE HYPOXIC RESPIRATORY FAILURE (HCC): Primary | ICD-10-CM

## 2023-12-27 PROCEDURE — 99309 SBSQ NF CARE MODERATE MDM 30: CPT | Performed by: PHYSICIAN ASSISTANT

## 2023-12-27 NOTE — ASSESSMENT & PLAN NOTE
Secondary to recent covid diagnosis on 12/21  Was hospitalized 12/21 thru 12/26  Has been weaned off oxygen  Continue to monitor closely

## 2023-12-27 NOTE — ASSESSMENT & PLAN NOTE
Lab Results   Component Value Date    EGFR 54 12/26/2023    EGFR 55 12/25/2023    EGFR 64 12/24/2023    CREATININE 0.89 12/26/2023    CREATININE 0.87 12/25/2023    CREATININE 0.77 12/24/2023     Likely secondary to recent covid

## 2023-12-27 NOTE — ASSESSMENT & PLAN NOTE
Hx PE in the past. Was previously on anticoagulation but had GI bleed x2 so it had to be discontinued. Son agreed risk of anticoagulation outweighed the benefits

## 2023-12-27 NOTE — PROGRESS NOTES
[facility]  [address] [address]  [phone]  [facility]  Code 32      NAME: Raisa Sinha  AGE: 89 y.o. SEX: female [id number]    DATE OF ENCOUNTER: 12/27/2023    CODE STATUS: DNR    Assessment and Plan     Problem List Items Addressed This Visit          Respiratory    Acute hypoxic respiratory failure (HCC) - Primary     Secondary to recent covid diagnosis on 12/21  Was hospitalized 12/21 thru 12/26  Has been weaned off oxygen  Continue to monitor closely            Cardiovascular and Mediastinum    Pulmonary embolism (HCC)     Hx PE in the past. Was previously on anticoagulation but had GI bleed x2 so it had to be discontinued. Son agreed risk of anticoagulation outweighed the benefits            Nervous and Auditory    Neuropathy     Recently started on gabapentin. States her neck/arm pain is much improved            Genitourinary    Acute kidney injury superimposed on CKD      Lab Results   Component Value Date    EGFR 54 12/26/2023    EGFR 55 12/25/2023    EGFR 64 12/24/2023    CREATININE 0.89 12/26/2023    CREATININE 0.87 12/25/2023    CREATININE 0.77 12/24/2023     Likely secondary to recent covid            Other    COVID-19 virus infection     Diagnosed in facility on 12/21, was found to be hypoxic and was sent to the hospital for admission. She was initially admitted to the step down unit on sever covid pathway. She completed antiviral medications. She was started on steroids and is currently ordered a steroid taper  She was weaned off oxygen. O2 sat is 100% on RA         DNR (do not resuscitate)       No orders of the defined types were placed in this encounter.          Chief Complaint     Chief Complaint   Patient presents with    Geriatric Evaluation     readmit       History of Present Illness   89 year old female resident of [facility] being seen today for readmission to facility. She was hospitalized for covid 12/21-12/26. She feels  well and has no complaints. She is doing well without oxygen         The following portions of the patient's history were reviewed and updated as appropriate: allergies, current medications, past family history, past medical history, past social history, past surgical history and problem list.    Review of Systems   Review of Systems   Constitutional: Negative.    HENT: Negative.     Eyes: Negative.    Respiratory: Negative.     Cardiovascular:  Positive for leg swelling.   Gastrointestinal: Negative.    Endocrine: Negative.    Genitourinary: Negative.    Musculoskeletal:  Positive for arthralgias, gait problem and neck pain.   Hematological: Negative.    Psychiatric/Behavioral: Negative.            Active Problem List     Patient Active Problem List   Diagnosis    Neuropathy    Ambulatory dysfunction    Venous stasis dermatitis of both lower extremities    Age-related osteoporosis without current pathological fracture    Lower extremity edema    Stage 3a chronic kidney disease (HCC)    Paroxysmal supraventricular tachycardia     Balance problem    Constipation, unspecified    Diverticulosis of intestine, part unspecified, without perforation or abscess without bleeding    Generalized muscle weakness    Cognitive impairment    Polyneuropathy, unspecified    Pulmonary embolism (HCC)    COVID-19 virus infection    History of DVT of lower extremity    Chronic pain of left knee    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)    History of GI bleed    Acute blood loss anemia    Metabolic encephalopathy    Goals of care, counseling/discussion    DNR (do not resuscitate)    Hypokalemia    Frailty syndrome in geriatric patient    Adnexal cyst    Atrial premature contractions    Grade I diastolic dysfunction    History of gastric ulcer    Chronic deep vein thrombosis (DVT) of proximal vein of both lower extremities (HCC)    Current moderate episode of major depressive disorder without prior episode (HCC)    Left  shoulder pain    Primary osteoarthritis of left shoulder    Hypertension    Left arm pain    Acute hypoxic respiratory failure (HCC)    Acute kidney injury superimposed on CKD     Elevated BP without diagnosis of hypertension    Bradycardia         Objective     /62   Pulse (!) 50   Temp 97.8 °F (36.6 °C)   Resp 18   Wt 59.4 kg (131 lb)   SpO2 100%   BMI 21.80 kg/m²     Physical Exam  Vitals reviewed.   Constitutional:       General: She is not in acute distress.     Appearance: She is not ill-appearing or diaphoretic.   HENT:      Head: Normocephalic and atraumatic.      Nose: Nose normal.      Mouth/Throat:      Pharynx: Oropharynx is clear.   Cardiovascular:      Rate and Rhythm: Normal rate.   Pulmonary:      Effort: Pulmonary effort is normal. No respiratory distress.      Breath sounds: Normal breath sounds.   Abdominal:      General: There is no distension.      Palpations: Abdomen is soft.      Tenderness: There is no abdominal tenderness.   Musculoskeletal:         General: No deformity or signs of injury.      Right lower leg: Edema present.      Left lower leg: Edema present.   Skin:     General: Skin is warm and dry.   Neurological:      Mental Status: She is alert. Mental status is at baseline.   Psychiatric:         Mood and Affect: Mood normal.         Behavior: Behavior normal.         Pertinent Laboratory/Diagnostic Studies:    Reviewed hospital chart    Current Medications   Medications reviewed and updated in facility chart.

## 2023-12-27 NOTE — UTILIZATION REVIEW
NOTIFICATION OF ADMISSION DISCHARGE   This is a Notification of Discharge from [facility]. Please be advised that this patient has been discharge from our facility. Below you will find the admission and discharge date and time including the patient’s disposition.   UTILIZATION REVIEW CONTACT:  Darcie Martinez  Utilization   Network Utilization Review Department  Phone: [phone] x carefully listen to the prompts. All voicemails are confidential.  Email: [email or web address]     ADMISSION INFORMATION  PRESENTATION DATE: 12/21/2023  9:05 AM  OBERVATION ADMISSION DATE:   INPATIENT ADMISSION DATE: 12/21/23  1:28 PM   DISCHARGE DATE: 12/26/2023  5:04 PM   DISPOSITION:Non [facility] SNF/TCU/SNU    Network Utilization Review Department  ATTENTION: Please call with any questions or concerns to [phone] and carefully listen to the prompts so that you are directed to the right person. All voicemails are confidential.   For Discharge needs, contact Care Management DC Support Team at [phone] opt. 2  Send all requests for admission clinical reviews, approved or denied determinations and any other requests to dedicated fax number below belonging to the campus where the patient is receiving treatment. List of dedicated fax numbers for the Facilities:  FACILITY NAME UR FAX NUMBER   ADMISSION DENIALS (Administrative/Medical Necessity) [phone]   DISCHARGE SUPPORT TEAM ([facility]) [phone]   PARENT CHILD HEALTH (Maternity/NICU/Pediatrics) [phone]   [facility] [phone]   [facility] [phone]   [facility] [phone]   General acute hospital [phone]   [facility] [phone]   General acute hospital [phone]   [facility] [phone]   [facility]  [phone]   [facility] [phone]   [facility] [phone]   [facility] [phone]

## 2023-12-27 NOTE — ASSESSMENT & PLAN NOTE
Diagnosed in facility on 12/21, was found to be hypoxic and was sent to the hospital for admission. She was initially admitted to the step down unit on sever covid pathway. She completed antiviral medications. She was started on steroids and is currently ordered a steroid taper  She was weaned off oxygen. O2 sat is 100% on RA

## 2024-01-22 ENCOUNTER — NURSING HOME VISIT (OUTPATIENT)
Dept: GERIATRICS | Facility: OTHER | Age: 89
End: 2024-01-22
Payer: COMMERCIAL

## 2024-01-22 DIAGNOSIS — Z66 DNR (DO NOT RESUSCITATE): Primary | ICD-10-CM

## 2024-01-22 DIAGNOSIS — M79.89 RIGHT LEG SWELLING: ICD-10-CM

## 2024-01-22 PROCEDURE — 99308 SBSQ NF CARE LOW MDM 20: CPT | Performed by: PHYSICIAN ASSISTANT

## 2024-01-24 VITALS
BODY MASS INDEX: 23.38 KG/M2 | DIASTOLIC BLOOD PRESSURE: 59 MMHG | HEART RATE: 76 BPM | WEIGHT: 140.5 LBS | SYSTOLIC BLOOD PRESSURE: 127 MMHG | TEMPERATURE: 97.6 F

## 2024-01-24 PROBLEM — M79.89 RIGHT LEG SWELLING: Status: ACTIVE | Noted: 2024-01-24

## 2024-01-24 NOTE — ASSESSMENT & PLAN NOTE
Increased right LE swelling. Has known hx of DVT from Jan 2023. Occlusive thrombus proximal femoral vein to posterior tibial vein.   Patient had been started on anticoagulation twice at that time but developed significant GI bleeding both times. It was discussed with her family and decided that the risks of anticoagulation outweighed the benefits  Today her weight is up. The right leg does have erythema anteriorly.   Will treat with lasix for 3 days for edema and keflex for cellulitis.   Repeat doppler not ordered because she is not a candidate for anticoagulation and results would not change treatment.   Family aware  Will follow

## 2024-01-24 NOTE — PROGRESS NOTES
[facility]  [address] [address]  [phone]  [facility]  Code 32      NAME: Raisa Sinha  AGE: 89 y.o. SEX: female [phone]    DATE OF ENCOUNTER: 1/22/24    CODE STATUS: DNR    Assessment and Plan     Problem List Items Addressed This Visit          Other    DNR (do not resuscitate) - Primary    Right leg swelling     Increased right LE swelling. Has known hx of DVT from Jan 2023. Occlusive thrombus proximal femoral vein to posterior tibial vein.   Patient had been started on anticoagulation twice at that time but developed significant GI bleeding both times. It was discussed with her family and decided that the risks of anticoagulation outweighed the benefits  Today her weight is up. The right leg does have erythema anteriorly.   Will treat with lasix for 3 days for edema and keflex for cellulitis.   Repeat doppler not ordered because she is not a candidate for anticoagulation and results would not change treatment.   Family aware  Will follow            No orders of the defined types were placed in this encounter.          Chief Complaint     Chief Complaint   Patient presents with    Geriatric Evaluation     Right leg swelling and redness       History of Present Illness   89 year old female resident of [facility] being seen today in collaboration with nursing for right leg swelling and erythema. She does have a hx of occlusive DVT but is unable to take anticoagulation due to recurrent GI bleeding.          The following portions of the patient's history were reviewed and updated as appropriate: allergies, current medications, past family history, past medical history, past social history, past surgical history and problem list.    Review of Systems   Review of Systems   Constitutional:  Positive for unexpected weight change.   Respiratory: Negative.     Cardiovascular:  Positive for leg swelling.   Musculoskeletal:  Positive for arthralgias.    Skin:  Positive for color change.          Active Problem List     Patient Active Problem List   Diagnosis    Neuropathy    Ambulatory dysfunction    Venous stasis dermatitis of both lower extremities    Age-related osteoporosis without current pathological fracture    Lower extremity edema    Stage 3a chronic kidney disease (HCC)    Paroxysmal supraventricular tachycardia     Balance problem    Constipation, unspecified    Diverticulosis of intestine, part unspecified, without perforation or abscess without bleeding    Generalized muscle weakness    Cognitive impairment    Polyneuropathy, unspecified    Pulmonary embolism (HCC)    COVID-19 virus infection    History of DVT of lower extremity    Chronic pain of left knee    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)    History of GI bleed    Acute blood loss anemia    Metabolic encephalopathy    Goals of care, counseling/discussion    DNR (do not resuscitate)    Hypokalemia    Frailty syndrome in geriatric patient    Adnexal cyst    Atrial premature contractions    Grade I diastolic dysfunction    History of gastric ulcer    Chronic deep vein thrombosis (DVT) of proximal vein of both lower extremities (HCC)    Current moderate episode of major depressive disorder without prior episode (HCC)    Left shoulder pain    Primary osteoarthritis of left shoulder    Hypertension    Left arm pain    Acute hypoxic respiratory failure (HCC)    Acute kidney injury superimposed on CKD     Elevated BP without diagnosis of hypertension    Bradycardia    Right leg swelling         Objective     /59   Pulse 76   Temp 97.6 °F (36.4 °C)   Wt 63.7 kg (140 lb 8 oz)   BMI 23.38 kg/m²     Physical Exam  Vitals reviewed.   Constitutional:       General: She is not in acute distress.     Appearance: She is not ill-appearing or diaphoretic.   Cardiovascular:      Rate and Rhythm: Normal rate.   Pulmonary:      Effort: Pulmonary effort is normal. No respiratory distress.    Musculoskeletal:      Right lower leg: Edema present.   Skin:     Comments: Erythema anterior aspect right LE. No open wounds or drainage   Neurological:      Mental Status: She is alert. Mental status is at baseline.         Pertinent Laboratory/Diagnostic Studies:    12/26/23  Creat 0.89  K 4.9    Current Medications   Medications reviewed and updated in facility chart.

## 2024-01-25 ENCOUNTER — NURSING HOME VISIT (OUTPATIENT)
Dept: GERIATRICS | Facility: OTHER | Age: 89
End: 2024-01-25
Payer: COMMERCIAL

## 2024-01-25 DIAGNOSIS — R60.0 LOWER EXTREMITY EDEMA: Primary | ICD-10-CM

## 2024-01-25 PROCEDURE — 99308 SBSQ NF CARE LOW MDM 20: CPT | Performed by: PHYSICIAN ASSISTANT

## 2024-01-26 NOTE — PROGRESS NOTES
[facility]  [address] [address]  [phone]  [facility]  Code 32      NAME: Raisa Sinha  AGE: 89 y.o. SEX: female [phone]    DATE OF ENCOUNTER: 1/25/24    CODE STATUS: DNR    Assessment and Plan     Problem List Items Addressed This Visit          Other    Lower extremity edema - Primary     Was previously on lasix but discontinued during her hospitalization last year  She was noted to have increased right LE edema and redness on 1/22  She has known hx of chronic DVT but cannot tolerate anticoagulation due to recurrent GI bleeds  She was started on keflex on 1/22 and lasix ordered for 3 days. On weight check, her weight today was up a couple pounds to 142 but clinically the edema and redness are much improved. Her weight was checked last today so will get repeat weight tomorrow AM for confirmation  Clinically improved. Continue to monitor            No orders of the defined types were placed in this encounter.          Chief Complaint   No chief complaint on file.      History of Present Illness   89 year old female being seen today for follow up. She was seen 1/22 for right LE edema. She was started on keflex and 3 days of lasix. Clinically much improved         The following portions of the patient's history were reviewed and updated as appropriate: allergies, current medications, past family history, past medical history, past social history, past surgical history and problem list.    Review of Systems   Review of Systems   Unable to perform ROS: Age          Active Problem List     Patient Active Problem List   Diagnosis    Neuropathy    Ambulatory dysfunction    Venous stasis dermatitis of both lower extremities    Age-related osteoporosis without current pathological fracture    Lower extremity edema    Stage 3a chronic kidney disease (HCC)    Paroxysmal supraventricular tachycardia     Balance problem    Constipation, unspecified     Diverticulosis of intestine, part unspecified, without perforation or abscess without bleeding    Generalized muscle weakness    Cognitive impairment    Polyneuropathy, unspecified    Pulmonary embolism (HCC)    COVID-19 virus infection    History of DVT of lower extremity    Chronic pain of left knee    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)    History of GI bleed    Acute blood loss anemia    Metabolic encephalopathy    Goals of care, counseling/discussion    DNR (do not resuscitate)    Hypokalemia    Frailty syndrome in geriatric patient    Adnexal cyst    Atrial premature contractions    Grade I diastolic dysfunction    History of gastric ulcer    Chronic deep vein thrombosis (DVT) of proximal vein of both lower extremities (HCC)    Current moderate episode of major depressive disorder without prior episode (HCC)    Left shoulder pain    Primary osteoarthritis of left shoulder    Hypertension    Left arm pain    Acute hypoxic respiratory failure (HCC)    Acute kidney injury superimposed on CKD     Elevated BP without diagnosis of hypertension    Bradycardia    Right leg swelling         Objective     There were no vitals taken for this visit.    Physical Exam  Vitals reviewed.   Constitutional:       General: She is not in acute distress.     Appearance: She is not ill-appearing or diaphoretic.   Cardiovascular:      Rate and Rhythm: Normal rate.   Pulmonary:      Effort: Pulmonary effort is normal. No respiratory distress.   Musculoskeletal:      Comments: Mild rocío LE edema R>L, much improved from last visit   Skin:     General: Skin is warm and dry.      Findings: No bruising or erythema.   Neurological:      Mental Status: She is alert. Mental status is at baseline.         Pertinent Laboratory/Diagnostic Studies:    Hgb 9.5  Wbc 3.6  Creat 0.86  K 4.2    Current Medications   Medications reviewed and updated in facility chart.

## 2024-01-26 NOTE — ASSESSMENT & PLAN NOTE
Was previously on lasix but discontinued during her hospitalization last year  She was noted to have increased right LE edema and redness on 1/22  She has known hx of chronic DVT but cannot tolerate anticoagulation due to recurrent GI bleeds  She was started on keflex on 1/22 and lasix ordered for 3 days. On weight check, her weight today was up a couple pounds to 142 but clinically the edema and redness are much improved. Her weight was checked last today so will get repeat weight tomorrow AM for confirmation  Clinically improved. Continue to monitor

## 2024-01-30 ENCOUNTER — NURSING HOME VISIT (OUTPATIENT)
Dept: GERIATRICS | Facility: OTHER | Age: 89
End: 2024-01-30
Payer: COMMERCIAL

## 2024-01-30 DIAGNOSIS — I82.5Y3 CHRONIC DEEP VEIN THROMBOSIS (DVT) OF PROXIMAL VEIN OF BOTH LOWER EXTREMITIES (HCC): ICD-10-CM

## 2024-01-30 DIAGNOSIS — I12.9 BENIGN HYPERTENSION WITH CHRONIC KIDNEY DISEASE, STAGE III (HCC): Primary | ICD-10-CM

## 2024-01-30 DIAGNOSIS — N18.31 STAGE 3A CHRONIC KIDNEY DISEASE (HCC): ICD-10-CM

## 2024-01-30 DIAGNOSIS — N18.30 BENIGN HYPERTENSION WITH CHRONIC KIDNEY DISEASE, STAGE III (HCC): Primary | ICD-10-CM

## 2024-01-30 DIAGNOSIS — R54 FRAILTY SYNDROME IN GERIATRIC PATIENT: ICD-10-CM

## 2024-01-30 DIAGNOSIS — Z66 DNR (DO NOT RESUSCITATE): ICD-10-CM

## 2024-01-30 DIAGNOSIS — F33.1 MODERATE EPISODE OF RECURRENT MAJOR DEPRESSIVE DISORDER (HCC): ICD-10-CM

## 2024-01-30 PROCEDURE — 99309 SBSQ NF CARE MODERATE MDM 30: CPT | Performed by: INTERNAL MEDICINE

## 2024-02-04 PROBLEM — G93.41 METABOLIC ENCEPHALOPATHY: Status: RESOLVED | Noted: 2023-03-22 | Resolved: 2024-02-04

## 2024-02-04 PROBLEM — R03.0 ELEVATED BP WITHOUT DIAGNOSIS OF HYPERTENSION: Status: RESOLVED | Noted: 2023-12-25 | Resolved: 2024-02-04

## 2024-02-04 PROBLEM — Z86.16 HISTORY OF COVID-19: Status: ACTIVE | Noted: 2023-01-27

## 2024-02-04 PROBLEM — F33.1 MODERATE EPISODE OF RECURRENT MAJOR DEPRESSIVE DISORDER (HCC): Status: ACTIVE | Noted: 2023-06-14

## 2024-02-04 PROBLEM — I12.9 BENIGN HYPERTENSION WITH CHRONIC KIDNEY DISEASE, STAGE III (HCC): Status: ACTIVE | Noted: 2023-10-23

## 2024-02-04 PROBLEM — N18.30 BENIGN HYPERTENSION WITH CHRONIC KIDNEY DISEASE, STAGE III (HCC): Status: ACTIVE | Noted: 2023-10-23

## 2024-02-04 RX ORDER — SERTRALINE HYDROCHLORIDE 100 MG/1
100 TABLET, FILM COATED ORAL DAILY
COMMUNITY
Start: 2024-01-11

## 2024-02-04 RX ORDER — SERTRALINE HYDROCHLORIDE 100 MG/1
100 TABLET, FILM COATED ORAL DAILY
COMMUNITY
Start: 2024-01-11 | End: 2024-02-04

## 2024-02-04 RX ORDER — LIDOCAINE 4 G/G
1 PATCH TOPICAL DAILY
COMMUNITY

## 2024-02-04 NOTE — ASSESSMENT & PLAN NOTE
She is doing well with amlodipine  Will continue with this care plan  Will continue with clinical and periodic laboratory monitoring for change in her condition

## 2024-02-04 NOTE — ASSESSMENT & PLAN NOTE
June 2023: Baseline creatinine 0.9-1.1  June 16, 2023: Creatinine 0.92, EGFR 56, ECrCl 31  January 25, 2024: Creatinine 0.86, EGFR 61  Will continue with avoidance of nephrotoxic medications and supplements, as able  Will continue with clinical and periodic laboratory monitoring for change in her condition

## 2024-02-04 NOTE — ASSESSMENT & PLAN NOTE
She is following with the [facility] psychiatry service having last been seen on January 25, 2024  Nursing staff reports that she is not having any behaviors  Will continue her care in collaboration with the [facility] psychiatry service and continue with her current psychotropic medication regimen  Will continue with monitoring for change in her condition

## 2024-02-04 NOTE — ASSESSMENT & PLAN NOTE
As previously care planne during her hospitalization in shared decision-making with her son, informed refusal for anticoagulation  January 27, 2023: Bilateral lower extremity DVT  April 21, 2023: Left common femoral vein DVT  Will continue with monitoring for change in her condition

## 2024-02-04 NOTE — PROGRESS NOTES
[facility]  [address]  [place] [address]  Facility: [facility]  32  Follow-up visit    NAME: Raisa Sinha  AGE: 89 y.o. SEX: female    DATE OF ENCOUNTER: January 30, 2024.    Code status:   DNR    Assessment and Plan     1. Benign hypertension with chronic kidney disease, stage III (HCC)  Assessment & Plan:  She is doing well with amlodipine  Will continue with this care plan  Will continue with clinical and periodic laboratory monitoring for change in her condition      2. Stage 3a chronic kidney disease (HCC)  Assessment & Plan:  June 2023: Baseline creatinine 0.9-1.1  June 16, 2023: Creatinine 0.92, EGFR 56, ECrCl 31  January 25, 2024: Creatinine 0.86, EGFR 61  Will continue with avoidance of nephrotoxic medications and supplements, as able  Will continue with clinical and periodic laboratory monitoring for change in her condition      3. Frailty syndrome in geriatric patient  Assessment & Plan:  Secondary to her chronic medical conditions  She continues to require 24/7 care/support of her ADLs  I recommend continued care/support of her ADLs as a long-term resident at the nursing facility  Will continue to monitor for change in her condition      4. Moderate episode of recurrent major depressive disorder (HCC)  Assessment & Plan:  She is following with the [facility] psychiatry service having last been seen on January 25, 2024  Nursing staff reports that she is not having any behaviors  Will continue her care in collaboration with the [facility] psychiatry service and continue with her current psychotropic medication regimen  Will continue with monitoring for change in her condition      5. Chronic deep vein thrombosis (DVT) of proximal vein of both lower extremities (HCC)  Assessment & Plan:  As previously care planne during her hospitalization in shared decision-making with her son, informed refusal for anticoagulation  January  27, 2023: Bilateral lower extremity DVT  April 21, 2023: Left common femoral vein DVT  Will continue with monitoring for change in her condition      6. DNR (do not resuscitate)      See my note of September 11, 2023 for further information.    All medications and routine orders were reviewed and updated as needed.    Plan discussed with: Nursing staff.    Chief Complaint     She is seen for a follow-up visit to update the care and treatment of her chronic medical conditions.    History of Present Illness     She is a pleasant 89-year-old woman who is seen with nursing staff for a follow-up visit to update the care and treatment of her chronic medical conditions, including hypertension, stage IIIa chronic kidney disease, frailty, and recent episode of right leg cellulitis.    Nursing staff reports that her overall clinical/functional status is stable, that her appetite is stable, that she is sleeping well, and that she is having no difficulty with constipation.  Nursing reports that she is not exhibiting any behaviors that are distressing to her or disruptive to the nursing unit.    Nursing notes that her right leg redness resolved with recent course of antibiotics.    The following portions of the patient's history were reviewed and updated as appropriate: current medications, past family history, past medical history, past social history, past surgical history and problem list.    Allergies:  No Known Allergies    Review of Systems     See HPI    Medications and orders     All medications reviewed and updated in senior care EMR.      Objective     Vitals: Recent vitals: Weight 137.5 pounds, pulse 68, blood pressure 149/52, pulse oximetry 96% on room air.    Physical Exam  Vitals reviewed. Exam conducted with a chaperone present.   Constitutional:       General: She is awake.      Appearance: She is well-developed. She is not toxic-appearing or diaphoretic.      Comments: She appears comfortable in a wheelchair,  "younger than her stated age, and frail.   Cardiovascular:      Comments: There is mild to moderate edema of right lower extremity about one third up her tibia  Skin:     Findings: No erythema.      Comments: There is no redness or warmth in both of her legs.   Neurological:      Mental Status: She is alert.   Psychiatric:         Behavior: Behavior is cooperative.       Pertinent Laboratory/Diagnostic Studies:     The following labs were reviewed please see chart or hospital paperwork for details.    January 25, 2024:    BMP: Sodium 142, potassium 4.2, BUN 26, creatinine 0.86, fasting blood sugar 83, EGFR 61    CBC with differential: WBC count 3.6, hemoglobin 9.5, hematocrit 29.6, platelet count 233,000, MCV 75    - Her order summary was reviewed and signed.      Portions of the record may have been created with voice recognition software.  Occasional wrong word or \"sound a like\" substitutions may have occurred due to the inherent limitations of voice recognition software.  Read the chart carefully and recognize, using context, where substitutions have occurred.    Demian Champion M.D.  2/4/2024 12:08 PM      "

## 2024-02-09 ENCOUNTER — NURSING HOME VISIT (OUTPATIENT)
Dept: GERIATRICS | Facility: OTHER | Age: 89
End: 2024-02-09
Payer: COMMERCIAL

## 2024-02-09 DIAGNOSIS — R60.0 LOWER EXTREMITY EDEMA: ICD-10-CM

## 2024-02-09 DIAGNOSIS — N18.31 STAGE 3A CHRONIC KIDNEY DISEASE (HCC): Primary | ICD-10-CM

## 2024-02-09 PROCEDURE — 99308 SBSQ NF CARE LOW MDM 20: CPT | Performed by: PHYSICIAN ASSISTANT

## 2024-02-10 VITALS
BODY MASS INDEX: 23.93 KG/M2 | HEART RATE: 77 BPM | SYSTOLIC BLOOD PRESSURE: 156 MMHG | TEMPERATURE: 97.8 F | RESPIRATION RATE: 20 BRPM | DIASTOLIC BLOOD PRESSURE: 64 MMHG | WEIGHT: 143.8 LBS

## 2024-02-10 PROBLEM — J96.01 ACUTE HYPOXIC RESPIRATORY FAILURE (HCC): Status: RESOLVED | Noted: 2023-12-21 | Resolved: 2024-02-10

## 2024-02-10 PROBLEM — M79.89 RIGHT LEG SWELLING: Status: RESOLVED | Noted: 2024-01-24 | Resolved: 2024-02-10

## 2024-02-10 PROBLEM — N18.9 ACUTE KIDNEY INJURY SUPERIMPOSED ON CKD: Status: RESOLVED | Noted: 2023-12-22 | Resolved: 2024-02-10

## 2024-02-10 PROBLEM — N17.9 ACUTE KIDNEY INJURY SUPERIMPOSED ON CKD: Status: RESOLVED | Noted: 2023-12-22 | Resolved: 2024-02-10

## 2024-02-10 PROBLEM — D62 ACUTE BLOOD LOSS ANEMIA: Status: RESOLVED | Noted: 2023-03-18 | Resolved: 2024-02-10

## 2024-02-10 PROBLEM — M79.602 LEFT ARM PAIN: Status: RESOLVED | Noted: 2023-12-11 | Resolved: 2024-02-10

## 2024-02-10 RX ORDER — FUROSEMIDE 20 MG/1
20 TABLET ORAL DAILY
COMMUNITY

## 2024-02-10 NOTE — ASSESSMENT & PLAN NOTE
Had been on daily lasix but this was discontinued during a hospitalization in December  Review of labs at that time shows her creat peaked at 1.07  She now has significant rocío LE edema and is up 9 lbs  Will resume low dose lasix  Will monitor weights and labs closely

## 2024-02-10 NOTE — PROGRESS NOTES
[facility]  [address] [address]  [phone]  [facility]  Code 32      NAME: Raisa Sinha  AGE: 89 y.o. SEX: female [phone]    DATE OF ENCOUNTER: 2/9/24    CODE STATUS: DNR    Assessment and Plan     Problem List Items Addressed This Visit          Genitourinary    Stage 3a chronic kidney disease (HCC) - Primary     Lab Results   Component Value Date    EGFR 54 12/26/2023    EGFR 55 12/25/2023    EGFR 64 12/24/2023    CREATININE 0.89 12/26/2023    CREATININE 0.87 12/25/2023    CREATININE 0.77 12/24/2023 1/25/24 creat 0.86  Being re-started on low dose lasix  Re-check labs next week         Relevant Medications    furosemide (LASIX) 20 mg tablet       Other    Lower extremity edema     Had been on daily lasix but this was discontinued during a hospitalization in December  Review of labs at that time shows her creat peaked at 1.07  She now has significant rocío LE edema and is up 9 lbs  Will resume low dose lasix  Will monitor weights and labs closely            No orders of the defined types were placed in this encounter.          Chief Complaint     Chief Complaint   Patient presents with    Geriatric Evaluation     Leg swelling, weight gain       History of Present Illness   HPI     The following portions of the patient's history were reviewed and updated as appropriate: allergies, current medications, past family history, past medical history, past social history, past surgical history and problem list.    Review of Systems   Review of Systems       Active Problem List     Patient Active Problem List   Diagnosis    Neuropathy    Ambulatory dysfunction    Venous stasis dermatitis of both lower extremities    Age-related osteoporosis without current pathological fracture    Lower extremity edema    Stage 3a chronic kidney disease (HCC)    Paroxysmal supraventricular tachycardia     Balance problem    Constipation, unspecified    Diverticulosis of  intestine, part unspecified, without perforation or abscess without bleeding    Generalized muscle weakness    Cognitive impairment    Polyneuropathy, unspecified    Pulmonary embolism (HCC)    History of COVID-19    History of DVT of lower extremity    Chronic pain of left knee    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)    History of GI bleed    Goals of care, counseling/discussion    DNR (do not resuscitate)    Hypokalemia    Frailty syndrome in geriatric patient    Adnexal cyst    Atrial premature contractions    Grade I diastolic dysfunction    History of gastric ulcer    Chronic deep vein thrombosis (DVT) of proximal vein of both lower extremities (HCC)    Moderate episode of recurrent major depressive disorder (HCC)    Left shoulder pain    Primary osteoarthritis of left shoulder    Benign hypertension with chronic kidney disease, stage III (HCC)    Bradycardia         Objective     /64   Pulse 77   Temp 97.8 °F (36.6 °C)   Resp 20   Wt 65.2 kg (143 lb 12.8 oz)   BMI 23.93 kg/m²     Physical Exam    Pertinent Laboratory/Diagnostic Studies:    1/25/24  Creat 0.86  K 4.2    Current Medications   Medications reviewed and updated in facility chart.

## 2024-02-20 ENCOUNTER — NURSING HOME VISIT (OUTPATIENT)
Dept: GERIATRICS | Facility: OTHER | Age: 89
End: 2024-02-20
Payer: COMMERCIAL

## 2024-02-20 VITALS
HEART RATE: 60 BPM | SYSTOLIC BLOOD PRESSURE: 122 MMHG | WEIGHT: 141.5 LBS | RESPIRATION RATE: 20 BRPM | DIASTOLIC BLOOD PRESSURE: 70 MMHG | TEMPERATURE: 97.8 F | BODY MASS INDEX: 23.55 KG/M2

## 2024-02-20 DIAGNOSIS — Z66 DNR (DO NOT RESUSCITATE): ICD-10-CM

## 2024-02-20 DIAGNOSIS — R60.0 LOWER EXTREMITY EDEMA: Primary | ICD-10-CM

## 2024-02-20 PROCEDURE — 99308 SBSQ NF CARE LOW MDM 20: CPT | Performed by: PHYSICIAN ASSISTANT

## 2024-02-20 NOTE — PROGRESS NOTES
[facility]  [address] [address]  [phone]  [facility]  Code 32      NAME: Raisa Sinha  AGE: 89 y.o. SEX: female [phone]    DATE OF ENCOUNTER: 2/20/2024    CODE STATUS: DNR    Assessment and Plan     Problem List Items Addressed This Visit          Other    Lower extremity edema - Primary     Previously on daily lasix for chronic edema. This was discontinued during a hospitalization in December. She did start having increased LE edema again and lasix 20 mg daily was started again on 1/9  Her weight is slightly higher than goal weight range of 130-140 lbs  Will give additional dose of 40 mg PO lasix today.  Monitor weights and labs closely         DNR (do not resuscitate)       No orders of the defined types were placed in this encounter.          Chief Complaint     Chief Complaint   Patient presents with    Geriatric Evaluation     Follow up       History of Present Illness   89 year old female being seen today in collaboration with nursing for follow up. She continues to have right > left LE edema. Weight is slightly higher than goal. No cough. No SOB         The following portions of the patient's history were reviewed and updated as appropriate: allergies, current medications, past family history, past medical history, past social history, past surgical history and problem list.    Review of Systems   Review of Systems   Constitutional:  Positive for unexpected weight change.   Respiratory: Negative.     Cardiovascular:  Positive for leg swelling.   Musculoskeletal:  Positive for arthralgias.          Active Problem List     Patient Active Problem List   Diagnosis    Neuropathy    Ambulatory dysfunction    Venous stasis dermatitis of both lower extremities    Age-related osteoporosis without current pathological fracture    Lower extremity edema    Stage 3a chronic kidney disease (HCC)    Paroxysmal supraventricular tachycardia     Balance  problem    Constipation, unspecified    Diverticulosis of intestine, part unspecified, without perforation or abscess without bleeding    Generalized muscle weakness    Cognitive impairment    Polyneuropathy, unspecified    Pulmonary embolism (HCC)    History of COVID-19    History of DVT of lower extremity    Chronic pain of left knee    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)    History of GI bleed    Goals of care, counseling/discussion    DNR (do not resuscitate)    Hypokalemia    Frailty syndrome in geriatric patient    Adnexal cyst    Atrial premature contractions    Grade I diastolic dysfunction    History of gastric ulcer    Chronic deep vein thrombosis (DVT) of proximal vein of both lower extremities (HCC)    Moderate episode of recurrent major depressive disorder (HCC)    Left shoulder pain    Primary osteoarthritis of left shoulder    Benign hypertension with chronic kidney disease, stage III (HCC)    Bradycardia         Objective     /70   Pulse 60   Temp 97.8 °F (36.6 °C)   Resp 20   Wt 64.2 kg (141 lb 8 oz)   BMI 23.55 kg/m²     Physical Exam  Vitals reviewed.   Constitutional:       General: She is not in acute distress.     Appearance: She is not ill-appearing or diaphoretic.   HENT:      Head: Normocephalic and atraumatic.   Cardiovascular:      Rate and Rhythm: Normal rate.   Pulmonary:      Effort: Pulmonary effort is normal. No respiratory distress.   Musculoskeletal:      Comments: Right > left LE edema   Skin:     General: Skin is warm and dry.      Findings: No bruising or erythema.   Neurological:      Mental Status: She is alert. Mental status is at baseline.         Pertinent Laboratory/Diagnostic Studies:    2/14/24  Creat 0.72  K 4.4    Current Medications   Medications reviewed and updated in facility chart.

## 2024-02-20 NOTE — ASSESSMENT & PLAN NOTE
Previously on daily lasix for chronic edema. This was discontinued during a hospitalization in December. She did start having increased LE edema again and lasix 20 mg daily was started again on 1/9  Her weight is slightly higher than goal weight range of 130-140 lbs  Will give additional dose of 40 mg PO lasix today.  Monitor weights and labs closely

## 2024-02-22 NOTE — ASSESSMENT & PLAN NOTE
Patient calling regarding his work letter he received yesterday stating the dates are not correct and he would like his restrictions lifted.       Pt would like a call back at  [phone] Secondary to her chronic medical conditions  She continues to require 24/7 care/support of her ADLs  I recommend continued care/support of her ADLs as a long-term resident at the nursing facility  Will continue to monitor for change in her condition

## 2024-02-27 ENCOUNTER — NURSING HOME VISIT (OUTPATIENT)
Dept: GERIATRICS | Facility: OTHER | Age: 89
End: 2024-02-27
Payer: COMMERCIAL

## 2024-02-27 VITALS
RESPIRATION RATE: 20 BRPM | WEIGHT: 141 LBS | SYSTOLIC BLOOD PRESSURE: 122 MMHG | TEMPERATURE: 98.2 F | HEART RATE: 68 BPM | DIASTOLIC BLOOD PRESSURE: 70 MMHG | BODY MASS INDEX: 23.46 KG/M2

## 2024-02-27 DIAGNOSIS — F33.1 MODERATE EPISODE OF RECURRENT MAJOR DEPRESSIVE DISORDER (HCC): ICD-10-CM

## 2024-02-27 DIAGNOSIS — I82.5Y3 CHRONIC DEEP VEIN THROMBOSIS (DVT) OF PROXIMAL VEIN OF BOTH LOWER EXTREMITIES (HCC): Primary | ICD-10-CM

## 2024-02-27 DIAGNOSIS — N18.31 STAGE 3A CHRONIC KIDNEY DISEASE (HCC): ICD-10-CM

## 2024-02-27 DIAGNOSIS — Z66 DNR (DO NOT RESUSCITATE): ICD-10-CM

## 2024-02-27 DIAGNOSIS — Z87.19 HISTORY OF GI BLEED: ICD-10-CM

## 2024-02-27 DIAGNOSIS — N18.30 BENIGN HYPERTENSION WITH CHRONIC KIDNEY DISEASE, STAGE III (HCC): ICD-10-CM

## 2024-02-27 DIAGNOSIS — G62.9 NEUROPATHY: ICD-10-CM

## 2024-02-27 DIAGNOSIS — I12.9 BENIGN HYPERTENSION WITH CHRONIC KIDNEY DISEASE, STAGE III (HCC): ICD-10-CM

## 2024-02-27 DIAGNOSIS — R60.0 LOWER EXTREMITY EDEMA: ICD-10-CM

## 2024-02-27 PROCEDURE — 99309 SBSQ NF CARE MODERATE MDM 30: CPT | Performed by: PHYSICIAN ASSISTANT

## 2024-02-27 NOTE — PROGRESS NOTES
[facility]  [address] [place] [address]  [phone]  [facility]  Code  32      NAME: Raisa Sinha  AGE: 89 y.o. SEX: female [id number]    DATE OF ENCOUNTER: 2/27/2024    CODE STATUS: DNR    Assessment and Plan     Problem List Items Addressed This Visit          Cardiovascular and Mediastinum    Chronic deep vein thrombosis (DVT) of proximal vein of both lower extremities (HCC) - Primary     As documented previously, not on anticoagulation due to recurrent GI bleeding         Benign hypertension with chronic kidney disease, stage III (HCC)     Lab Results   Component Value Date    EGFR 54 12/26/2023    EGFR 55 12/25/2023    EGFR 64 12/24/2023    CREATININE 0.89 12/26/2023    CREATININE 0.87 12/25/2023    CREATININE 0.77 12/24/2023     BP stable  Continue amlodipine, lasix            Nervous and Auditory    Neuropathy     Patient with occasional reports of pain but much improved since starting low dose gabapentin            Genitourinary    Stage 3a chronic kidney disease (HCC)     Lab Results   Component Value Date    EGFR 54 12/26/2023    EGFR 55 12/25/2023    EGFR 64 12/24/2023    CREATININE 0.89 12/26/2023    CREATININE 0.87 12/25/2023    CREATININE 0.77 12/24/2023     Creat remains at baseline since re-starting lasix  Continue to monitor            Other    Lower extremity edema     Was started back on lasix 20 mg daily on 1/9  Last week nursing reported increased edema  She was given an extra dose of lasix 40 mg  Swelling much improved today  Continue to monitor closely         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)     Follows with psychiatry service  Mood stable  Continue current medication regimen         History of GI bleed     No recent signs recurrent bleeding  Hgb 9.5 on 1/25/24- stable         DNR (do not resuscitate)       No orders of the defined types were placed in this encounter.          Chief Complaint     Chief Complaint    Patient presents with    Geriatric Evaluation     Follow up       History of Present Illness   89 year old female resident of [facility] being seen today for follow up for chronic conditions. She recently required extra lasix due to increased LE edema. This is much improved.   She enjoys attending facility activities and is frequently seen doing puzzles in the dining room.          The following portions of the patient's history were reviewed and updated as appropriate: allergies, current medications, past family history, past medical history, past social history, past surgical history and problem list.    Review of Systems   Review of Systems   Constitutional: Negative.    HENT: Negative.     Eyes: Negative.    Respiratory: Negative.     Cardiovascular:  Positive for leg swelling.   Gastrointestinal: Negative.    Endocrine: Negative.    Genitourinary: Negative.    Musculoskeletal:  Positive for arthralgias and gait problem.   Hematological: Negative.    Psychiatric/Behavioral: Negative.            Active Problem List     Patient Active Problem List   Diagnosis    Neuropathy    Ambulatory dysfunction    Venous stasis dermatitis of both lower extremities    Age-related osteoporosis without current pathological fracture    Lower extremity edema    Stage 3a chronic kidney disease (HCC)    Paroxysmal supraventricular tachycardia     Balance problem    Constipation, unspecified    Diverticulosis of intestine, part unspecified, without perforation or abscess without bleeding    Generalized muscle weakness    Cognitive impairment    Polyneuropathy, unspecified    Pulmonary embolism (HCC)    History of COVID-19    History of DVT of lower extremity    Chronic pain of left knee    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)    History of GI bleed    Goals of care, counseling/discussion    DNR (do not resuscitate)    Hypokalemia    Frailty syndrome in geriatric patient    Adnexal cyst    Atrial premature  contractions    Grade I diastolic dysfunction    History of gastric ulcer    Chronic deep vein thrombosis (DVT) of proximal vein of both lower extremities (HCC)    Moderate episode of recurrent major depressive disorder (HCC)    Left shoulder pain    Primary osteoarthritis of left shoulder    Benign hypertension with chronic kidney disease, stage III (HCC)    Bradycardia         Objective     /70   Pulse 68   Temp 98.2 °F (36.8 °C)   Resp 20   Wt 64 kg (141 lb)   BMI 23.46 kg/m²     Physical Exam  Vitals reviewed.   Constitutional:       General: She is not in acute distress.     Appearance: She is not ill-appearing or diaphoretic.   HENT:      Head: Normocephalic and atraumatic.      Nose: Nose normal.      Mouth/Throat:      Pharynx: Oropharynx is clear.   Cardiovascular:      Rate and Rhythm: Normal rate.   Pulmonary:      Effort: Pulmonary effort is normal. No respiratory distress.      Breath sounds: Normal breath sounds.   Abdominal:      General: Bowel sounds are normal. There is no distension.      Palpations: Abdomen is soft.      Tenderness: There is no abdominal tenderness.   Musculoskeletal:         General: No deformity or signs of injury.      Comments: Mild LE edema R>L but improved from prior visits   Skin:     General: Skin is warm and dry.      Findings: No bruising or erythema.   Neurological:      Mental Status: She is alert. Mental status is at baseline.   Psychiatric:         Behavior: Behavior normal.         Pertinent Laboratory/Diagnostic Studies:    2/14/24  Creat 0.72  K 4.4    Current Medications   Medications reviewed and updated in facility chart.

## 2024-02-27 NOTE — ASSESSMENT & PLAN NOTE
Lab Results   Component Value Date    EGFR 54 12/26/2023    EGFR 55 12/25/2023    EGFR 64 12/24/2023    CREATININE 0.89 12/26/2023    CREATININE 0.87 12/25/2023    CREATININE 0.77 12/24/2023     Creat remains at baseline since re-starting lasix  Continue to monitor

## 2024-02-27 NOTE — ASSESSMENT & PLAN NOTE
Lab Results   Component Value Date    EGFR 54 12/26/2023    EGFR 55 12/25/2023    EGFR 64 12/24/2023    CREATININE 0.89 12/26/2023    CREATININE 0.87 12/25/2023    CREATININE 0.77 12/24/2023     BP stable  Continue amlodipine, lasix

## 2024-02-27 NOTE — ASSESSMENT & PLAN NOTE
Was started back on lasix 20 mg daily on 1/9  Last week nursing reported increased edema  She was given an extra dose of lasix 40 mg  Swelling much improved today  Continue to monitor closely

## 2024-03-28 ENCOUNTER — DOCUMENTATION (OUTPATIENT)
Dept: GERIATRICS | Facility: OTHER | Age: 89
End: 2024-03-28

## 2024-04-15 ENCOUNTER — NURSING HOME VISIT (OUTPATIENT)
Dept: GERIATRICS | Facility: OTHER | Age: 89
End: 2024-04-15
Payer: COMMERCIAL

## 2024-04-15 DIAGNOSIS — R60.0 LOWER EXTREMITY EDEMA: ICD-10-CM

## 2024-04-15 DIAGNOSIS — Z66 DNR (DO NOT RESUSCITATE): Primary | ICD-10-CM

## 2024-04-15 DIAGNOSIS — R63.5 WEIGHT GAIN: ICD-10-CM

## 2024-04-15 PROCEDURE — 99309 SBSQ NF CARE MODERATE MDM 30: CPT | Performed by: PHYSICIAN ASSISTANT

## 2024-04-18 VITALS
DIASTOLIC BLOOD PRESSURE: 52 MMHG | SYSTOLIC BLOOD PRESSURE: 114 MMHG | WEIGHT: 145.5 LBS | TEMPERATURE: 98.2 F | HEART RATE: 62 BPM | BODY MASS INDEX: 24.21 KG/M2 | RESPIRATION RATE: 20 BRPM

## 2024-04-18 PROBLEM — R63.5 WEIGHT GAIN: Status: ACTIVE | Noted: 2024-04-18

## 2024-04-18 NOTE — ASSESSMENT & PLAN NOTE
Weight has been up a few lbs again recently, now weighs 145.5 lb  She does have chronic LE edema but this looks better than it has been in the past. Continue lasix 20 mg daily

## 2024-04-18 NOTE — PROGRESS NOTES
[facility]  [address] [address]  [phone]  [facility]  Code 32      NAME: Raisa Sinha  AGE: 89 y.o. SEX: female [phone]    DATE OF ENCOUNTER: 4/15/24    CODE STATUS: DNR    Assessment and Plan     Problem List Items Addressed This Visit          Care Coordination    DNR (do not resuscitate) - Primary       Surgery/Wound/Pain    Lower extremity edema     Weight has been up a few lbs again recently, now weighs 145.5 lb  She does have chronic LE edema but this looks better than it has been in the past. Continue lasix 20 mg daily            Other    Weight gain     Patient has been at the upper end of her goal weight range. Clinically she does not appear fluid overloaded. Edema is at baseline. She denies SOB/cough  She does state she has a good appetite and eats most of her meals. Nursing confirms she eats % of her meals.   Adjusted weight range to allow for recent weight gain which is likely due to excess calories. Will continue to monitor            No orders of the defined types were placed in this encounter.          Chief Complaint     Chief Complaint   Patient presents with    Geriatric Evaluation     Follow up, weight gain       History of Present Illness   89 year old female being seen today at the request of nursing due to weight gain. She has been weighing at the upper end of her recommended weight range. She states she has a good appetite and she feels the weight gain is food related. Denies SOB/cough         The following portions of the patient's history were reviewed and updated as appropriate: allergies, current medications, past family history, past medical history, past social history, past surgical history and problem list.    Review of Systems   Review of Systems   Constitutional:  Positive for unexpected weight change. Negative for activity change.   Respiratory: Negative.     Cardiovascular:  Positive for leg swelling.    Gastrointestinal: Negative.           Active Problem List     Patient Active Problem List   Diagnosis    Neuropathy    Ambulatory dysfunction    Venous stasis dermatitis of both lower extremities    Age-related osteoporosis without current pathological fracture    Lower extremity edema    Stage 3a chronic kidney disease (HCC)    Paroxysmal supraventricular tachycardia     Balance problem    Constipation, unspecified    Diverticulosis of intestine, part unspecified, without perforation or abscess without bleeding    Generalized muscle weakness    Cognitive impairment    Polyneuropathy, unspecified    Pulmonary embolism (HCC)    History of COVID-19    History of DVT of lower extremity    Chronic pain of left knee    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)    History of GI bleed    Goals of care, counseling/discussion    DNR (do not resuscitate)    Hypokalemia    Frailty syndrome in geriatric patient    Adnexal cyst    Atrial premature contractions    Grade I diastolic dysfunction    History of gastric ulcer    Chronic deep vein thrombosis (DVT) of proximal vein of both lower extremities (HCC)    Moderate episode of recurrent major depressive disorder (HCC)    Left shoulder pain    Primary osteoarthritis of left shoulder    Benign hypertension with chronic kidney disease, stage III (HCC)    Bradycardia    Weight gain         Objective     /52   Pulse 62   Temp 98.2 °F (36.8 °C)   Resp 20   Wt 66 kg (145 lb 8 oz)   BMI 24.21 kg/m²     Physical Exam  Vitals reviewed.   Constitutional:       General: She is not in acute distress.     Appearance: She is not ill-appearing or diaphoretic.   Cardiovascular:      Rate and Rhythm: Normal rate.   Pulmonary:      Effort: Pulmonary effort is normal. No respiratory distress.      Breath sounds: Normal breath sounds.   Musculoskeletal:      Comments: Moderate bilateral ankle edema but better than prior exams.    Skin:     General: Skin is warm.       Findings: No bruising or erythema.   Neurological:      Mental Status: She is alert. Mental status is at baseline.         Pertinent Laboratory/Diagnostic Studies:    2/14/24  Creat 0.72  K 4.4    Current Medications   Medications reviewed and updated in facility chart.    I have spent a total time of 45 minutes on 04/18/24 in caring for this patient including Prognosis, Risk factor reductions, Impressions, Counseling / Coordination of care, Documenting in the medical record, Reviewing / ordering tests, medicine, procedures  , and Obtaining or reviewing history  .

## 2024-04-18 NOTE — ASSESSMENT & PLAN NOTE
Patient has been at the upper end of her goal weight range. Clinically she does not appear fluid overloaded. Edema is at baseline. She denies SOB/cough  She does state she has a good appetite and eats most of her meals. Nursing confirms she eats % of her meals.   Adjusted weight range to allow for recent weight gain which is likely due to excess calories. Will continue to monitor

## 2024-05-21 NOTE — ASSESSMENT & PLAN NOTE
"Found by son with bloody vomitus on shirt - had multiple melanotic stools in the ED  An almost 4-point drop in hemoglobin today when compared to a few months prior coupled with mild/transient hypotension responsive to IV fluids  See plan for acute blood loss anemia below  Currently on clear liquids -> NPO after midnight for possible gastroenterologic intervention  Protonix drip initiated -> holding Eliquis/Naproxen  Per CT imaging: \"No CT evidence of active high volume gastrointestinal hemorrhage  Findings of gastritis and nonspecific enteritis  Moderate rectal stool ball  Stable 3 4 cm left adnexal cystic lesion  Recommend follow-up pelvic ultrasound in one year  \"  " Addended by: MARGARET VINES on: 5/21/2024 11:40 AM     Modules accepted: Orders

## 2024-05-28 ENCOUNTER — NURSING HOME VISIT (OUTPATIENT)
Dept: GERIATRICS | Facility: OTHER | Age: 89
End: 2024-05-28
Payer: COMMERCIAL

## 2024-05-28 DIAGNOSIS — N18.30 BENIGN HYPERTENSION WITH CHRONIC KIDNEY DISEASE, STAGE III (HCC): ICD-10-CM

## 2024-05-28 DIAGNOSIS — Z66 DNR (DO NOT RESUSCITATE): ICD-10-CM

## 2024-05-28 DIAGNOSIS — N18.31 STAGE 3A CHRONIC KIDNEY DISEASE (HCC): ICD-10-CM

## 2024-05-28 DIAGNOSIS — G62.9 NEUROPATHY: ICD-10-CM

## 2024-05-28 DIAGNOSIS — F33.1 MODERATE EPISODE OF RECURRENT MAJOR DEPRESSIVE DISORDER (HCC): ICD-10-CM

## 2024-05-28 DIAGNOSIS — I82.5Y3 CHRONIC DEEP VEIN THROMBOSIS (DVT) OF PROXIMAL VEIN OF BOTH LOWER EXTREMITIES (HCC): Primary | ICD-10-CM

## 2024-05-28 DIAGNOSIS — R60.0 LOWER EXTREMITY EDEMA: ICD-10-CM

## 2024-05-28 DIAGNOSIS — I12.9 BENIGN HYPERTENSION WITH CHRONIC KIDNEY DISEASE, STAGE III (HCC): ICD-10-CM

## 2024-05-28 PROCEDURE — 99309 SBSQ NF CARE MODERATE MDM 30: CPT | Performed by: PHYSICIAN ASSISTANT

## 2024-05-29 NOTE — PROGRESS NOTES
1.  Patient seen and examined for routine follow-up visit with nursing staff.    2.  Her order summary was reviewed and signed.

## 2024-06-03 VITALS
TEMPERATURE: 97.2 F | SYSTOLIC BLOOD PRESSURE: 134 MMHG | DIASTOLIC BLOOD PRESSURE: 64 MMHG | HEART RATE: 70 BPM | WEIGHT: 144 LBS | BODY MASS INDEX: 23.96 KG/M2 | RESPIRATION RATE: 20 BRPM

## 2024-06-03 PROBLEM — R00.1 BRADYCARDIA: Status: RESOLVED | Noted: 2023-12-26 | Resolved: 2024-06-03

## 2024-06-03 PROBLEM — R63.5 WEIGHT GAIN: Status: RESOLVED | Noted: 2024-04-18 | Resolved: 2024-06-03

## 2024-06-03 NOTE — ASSESSMENT & PLAN NOTE
Lab Results   Component Value Date    EGFR 54 12/26/2023    EGFR 55 12/25/2023    EGFR 64 12/24/2023    CREATININE 0.89 12/26/2023    CREATININE 0.87 12/25/2023    CREATININE 0.77 12/24/2023 2/14/24 creat 0.72, at baseline  Avoid nephrotoxins

## 2024-06-03 NOTE — ASSESSMENT & PLAN NOTE
Lab Results   Component Value Date    EGFR 54 12/26/2023    EGFR 55 12/25/2023    EGFR 64 12/24/2023    CREATININE 0.89 12/26/2023    CREATININE 0.87 12/25/2023    CREATININE 0.77 12/24/2023     Continue amlodipine, lasix

## 2024-06-03 NOTE — PROGRESS NOTES
[facility]  [address] [address]  [phone]  [facility]  Code 32      NAME: Raisa Sinha  AGE: 89 y.o. SEX: female [id number]    DATE OF ENCOUNTER: 5/28/24    CODE STATUS: DNR    Assessment and Plan     Problem List Items Addressed This Visit          Cardiovascular and Mediastinum    Chronic deep vein thrombosis (DVT) of proximal vein of both lower extremities (HCC) - Primary     Not on anticoagulation due to hx recurrent GI bleeding         Benign hypertension with chronic kidney disease, stage III (HCC)     Lab Results   Component Value Date    EGFR 54 12/26/2023    EGFR 55 12/25/2023    EGFR 64 12/24/2023    CREATININE 0.89 12/26/2023    CREATININE 0.87 12/25/2023    CREATININE 0.77 12/24/2023     Continue amlodipine, lasix            Nervous and Auditory    Neuropathy     Pain controlled on gabapentin            Genitourinary    Stage 3a chronic kidney disease (HCC)     Lab Results   Component Value Date    EGFR 54 12/26/2023    EGFR 55 12/25/2023    EGFR 64 12/24/2023    CREATININE 0.89 12/26/2023    CREATININE 0.87 12/25/2023    CREATININE 0.77 12/24/2023 2/14/24 creat 0.72, at baseline  Avoid nephrotoxins            Behavioral Health    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)     Followed by facility psych service  Continue sertraline            Care Coordination    DNR (do not resuscitate)       Surgery/Wound/Pain    Lower extremity edema       No orders of the defined types were placed in this encounter.          Chief Complaint     Chief Complaint   Patient presents with    Geriatric Evaluation     Follow up       History of Present Illness   89 year old female resident of [facility] being seen today in collaboration with nursing for follow up for chronic conditions. She feels well and has no complaints. She enjoys attending facility activities and doing puzzles         The following portions of the patient's history were  reviewed and updated as appropriate: allergies, current medications, past family history, past medical history, past social history, past surgical history and problem list.    Review of Systems   Review of Systems   Constitutional: Negative.    HENT: Negative.     Eyes: Negative.    Respiratory: Negative.     Cardiovascular:  Positive for leg swelling.   Gastrointestinal: Negative.    Endocrine: Negative.    Genitourinary: Negative.    Musculoskeletal:  Positive for arthralgias and gait problem.   Skin: Negative.    Psychiatric/Behavioral: Negative.            Active Problem List     Patient Active Problem List   Diagnosis    Neuropathy    Ambulatory dysfunction    Venous stasis dermatitis of both lower extremities    Age-related osteoporosis without current pathological fracture    Lower extremity edema    Stage 3a chronic kidney disease (HCC)    Paroxysmal supraventricular tachycardia     Balance problem    Constipation, unspecified    Diverticulosis of intestine, part unspecified, without perforation or abscess without bleeding    Generalized muscle weakness    Cognitive impairment    Polyneuropathy, unspecified    Pulmonary embolism (HCC)    History of COVID-19    History of DVT of lower extremity    Chronic pain of left knee    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)    History of GI bleed    Goals of care, counseling/discussion    DNR (do not resuscitate)    Hypokalemia    Frailty syndrome in geriatric patient    Adnexal cyst    Atrial premature contractions    Grade I diastolic dysfunction    History of gastric ulcer    Chronic deep vein thrombosis (DVT) of proximal vein of both lower extremities (HCC)    Moderate episode of recurrent major depressive disorder (HCC)    Left shoulder pain    Primary osteoarthritis of left shoulder    Benign hypertension with chronic kidney disease, stage III (HCC)         Objective     /64   Pulse 70   Temp (!) 97.2 °F (36.2 °C)   Resp 20   Wt 65.3 kg  (144 lb)   BMI 23.96 kg/m²     Physical Exam    Pertinent Laboratory/Diagnostic Studies:    2/14/24  Creat 0.72    K 4.4    Current Medications   Medications reviewed and updated in facility chart.

## 2024-06-10 ENCOUNTER — NURSING HOME VISIT (OUTPATIENT)
Dept: GERIATRICS | Facility: OTHER | Age: 89
End: 2024-06-10
Payer: COMMERCIAL

## 2024-06-10 DIAGNOSIS — R09.02 HYPOXIA: Primary | ICD-10-CM

## 2024-06-10 PROCEDURE — 99309 SBSQ NF CARE MODERATE MDM 30: CPT | Performed by: PHYSICIAN ASSISTANT

## 2024-06-11 PROBLEM — R09.02 HYPOXIA: Status: ACTIVE | Noted: 2024-06-11

## 2024-06-11 NOTE — PROGRESS NOTES
[facility]  [address] [address]  [phone]  [facility]  Code 32      NAME: Raisa Sinha  AGE: 89 y.o. SEX: female [id number]    DATE OF ENCOUNTER: 6/10/24    CODE STATUS: DNR    Assessment and Plan     Problem List Items Addressed This Visit          Respiratory    Hypoxia - Primary     Nursing reporting hypoxia and low grade fever this AM  Patient with hx of CHF, maintained on lasix  Patient seen today still laying in bed and LE's look more swollen than usual  Pulse ox reported as 82% on RA, now in mid 90's on 3L oxygen  Will order 1 dose STAT IM lasix due to hypoxia and increased LE edema  Also concerned about low grade temp. Will order CXR and labs  Covid swab negative  Continue supplemental oxygen as needed  Check VS q shift            No orders of the defined types were placed in this encounter.          Chief Complaint   No chief complaint on file.      History of Present Illness   89 year old female being seen urgently today due to hypoxia and low grade temp. Patient denies SOB but states she doesn't feel well. She is still laying in bed, now on oxygen. Feels diaphoretic despite the air conditioner being turned up high. Leg are very swollen         The following portions of the patient's history were reviewed and updated as appropriate: allergies, current medications, past family history, past medical history, past social history, past surgical history and problem list.    Review of Systems   Review of Systems   Constitutional:  Positive for diaphoresis, fatigue and fever.   Respiratory:  Positive for cough.    Cardiovascular:  Positive for leg swelling.          Active Problem List     Patient Active Problem List   Diagnosis    Neuropathy    Ambulatory dysfunction    Venous stasis dermatitis of both lower extremities    Age-related osteoporosis without current pathological fracture    Lower extremity edema    Stage 3a chronic kidney disease  (HCC)    Paroxysmal supraventricular tachycardia     Balance problem    Constipation, unspecified    Diverticulosis of intestine, part unspecified, without perforation or abscess without bleeding    Generalized muscle weakness    Cognitive impairment    Polyneuropathy, unspecified    Pulmonary embolism (HCC)    History of COVID-19    History of DVT of lower extremity    Chronic pain of left knee    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)    History of GI bleed    Goals of care, counseling/discussion    DNR (do not resuscitate)    Hypokalemia    Frailty syndrome in geriatric patient    Adnexal cyst    Atrial premature contractions    Grade I diastolic dysfunction    History of gastric ulcer    Chronic deep vein thrombosis (DVT) of proximal vein of both lower extremities (HCC)    Moderate episode of recurrent major depressive disorder (HCC)    Left shoulder pain    Primary osteoarthritis of left shoulder    Benign hypertension with chronic kidney disease, stage III (HCC)    Hypoxia         Objective     There were no vitals taken for this visit.    Physical Exam  Vitals reviewed.   Constitutional:       General: She is not in acute distress.     Appearance: She is ill-appearing and diaphoretic.   Cardiovascular:      Rate and Rhythm: Normal rate.   Pulmonary:      Effort: Pulmonary effort is normal. No respiratory distress.      Breath sounds: No wheezing.      Comments: Decreased breath sounds bilaterally  Musculoskeletal:      Right lower leg: Edema present.      Left lower leg: Edema present.   Neurological:      Mental Status: She is alert. Mental status is at baseline.         Pertinent Laboratory/Diagnostic Studies:    Covid negative  Labs and CXR ordered- will review when available    Current Medications   Medications reviewed and updated in facility chart.

## 2024-06-11 NOTE — ASSESSMENT & PLAN NOTE
Nursing reporting hypoxia and low grade fever this AM  Patient with hx of CHF, maintained on lasix  Patient seen today still laying in bed and LE's look more swollen than usual  Pulse ox reported as 82% on RA, now in mid 90's on 3L oxygen  Will order 1 dose STAT IM lasix due to hypoxia and increased LE edema  Also concerned about low grade temp. Will order CXR and labs  Covid swab negative  Continue supplemental oxygen as needed  Check VS q shift

## 2024-07-24 ENCOUNTER — NURSING HOME VISIT (OUTPATIENT)
Dept: GERIATRICS | Facility: OTHER | Age: 89
End: 2024-07-24
Payer: COMMERCIAL

## 2024-07-24 DIAGNOSIS — N18.31 STAGE 3A CHRONIC KIDNEY DISEASE (HCC): ICD-10-CM

## 2024-07-24 DIAGNOSIS — F33.1 MODERATE EPISODE OF RECURRENT MAJOR DEPRESSIVE DISORDER (HCC): ICD-10-CM

## 2024-07-24 DIAGNOSIS — I12.9 BENIGN HYPERTENSION WITH CHRONIC KIDNEY DISEASE, STAGE III (HCC): Primary | ICD-10-CM

## 2024-07-24 DIAGNOSIS — F03.C3 SEVERE DEMENTIA WITH MOOD DISTURBANCE, UNSPECIFIED DEMENTIA TYPE (HCC): ICD-10-CM

## 2024-07-24 DIAGNOSIS — N18.30 BENIGN HYPERTENSION WITH CHRONIC KIDNEY DISEASE, STAGE III (HCC): Primary | ICD-10-CM

## 2024-07-24 DIAGNOSIS — Z66 DNR (DO NOT RESUSCITATE): ICD-10-CM

## 2024-07-24 DIAGNOSIS — R54 FRAILTY SYNDROME IN GERIATRIC PATIENT: ICD-10-CM

## 2024-07-24 PROCEDURE — 99309 SBSQ NF CARE MODERATE MDM 30: CPT | Performed by: INTERNAL MEDICINE

## 2024-09-08 PROBLEM — F03.90 DEMENTIA (HCC): Status: ACTIVE | Noted: 2017-01-01

## 2024-09-08 PROBLEM — R09.02 HYPOXIA: Status: RESOLVED | Noted: 2024-06-11 | Resolved: 2024-09-08

## 2024-09-08 PROBLEM — F33.1 MODERATE EPISODE OF RECURRENT MAJOR DEPRESSIVE DISORDER (HCC): Status: ACTIVE | Noted: 2023-02-26

## 2024-09-09 NOTE — ASSESSMENT & PLAN NOTE
Baseline creatinine 0.9-1.1  Joslyn 10, 2024: Creatinine 0.89  Will continue with avoidance of nephrotoxic medications and supplements, as able  Will continue with clinical and periodic laboratory monitoring for change in her condition

## 2024-09-09 NOTE — PROGRESS NOTES
[facility]  [address]  [place] [address]  Facility: [facility]  [facility]  32  Follow-up visit    NAME: Raisa Sinha  AGE: 89 y.o. SEX: female    DATE OF ENCOUNTER: July 24, 2024.    Code status:   DNR    Assessment and Plan     1. Benign hypertension with chronic kidney disease, stage III (HCC)  Assessment & Plan:  She is doing well with amlodipine  Will continue with this care plan  Will continue with monitoring for change in her condition  2. Stage 3a chronic kidney disease (HCC)  Assessment & Plan:  Baseline creatinine 0.9-1.1  Joslyn 10, 2024: Creatinine 0.89  Will continue with avoidance of nephrotoxic medications and supplements, as able  Will continue with clinical and periodic laboratory monitoring for change in her condition  3. Frailty syndrome in geriatric patient  Assessment & Plan:  Secondary to her chronic medical conditions  She continues to require 24/7 care/support of her ADLs  I recommend continued care/support of her ADLs as a long-term resident at the nursing facility  Will continue to monitor for change in her condition  4. Severe dementia with mood disturbance, unspecified dementia type (HCC)  Assessment & Plan:  Will continue to provide a safe, secure, structured, and supportive environment at the nursing facility  Will continue with 24/7 care/support of her ADLs  Will continue her care in collaboration with the facility psychiatry service  Will continue with monitoring for change in her condition  5. Moderate episode of recurrent major depressive disorder (HCC)  Assessment & Plan:  Will continue her care in collaboration with the facility psychiatry service  Nursing staff reports that she is doing well with her current psychotropic medication regimen  Will continue with monitoring for change in her condition  6. DNR (do not resuscitate)      All medications and routine orders were reviewed and updated as  needed.    Plan discussed with: Nursing staff.    Chief Complaint     She is seen for a follow-up visit to update the care and treatment of her chronic medical conditions.    History of Present Illness     She is a 89-year-old woman who is seen for a follow-up visit to update the care and treatment of her chronic medical conditions, including hypertension, stage IIIa chronic kidney disease, and frailty secondary to her chronic medical conditions.    The nursing staff notes that her overall clinical/functional status is stable, that her appetite is good, that she is sleeping well, and that she is having no difficulty with her bowel movements.  Nursing reports she is not exhibiting any behaviors that are distressing to her or disruptive to the nursing unit.    Nursing reports that she has been participating in activities, enjoys doing puzzles, and has been socializing.    When asked, she has no complaints.  She denies chest pain and shortness of breath.  She notes that her ankles used to swell in the summer as a little girl.    The following portions of the patient's history were reviewed and updated as appropriate: current medications, past family history, past medical history, past social history, past surgical history and problem list.    Allergies:  No Known Allergies    Review of Systems     Review of Systems   Unable to perform ROS: Dementia       Medications and orders     All medications reviewed and updated in intermediate EMR.      Objective     Vitals: Recent vitals: Weight 143 pounds (stable), pulse 64, blood pressure 107/47.    Physical Exam  Vitals reviewed. Exam conducted with a chaperone present.   Constitutional:       General: She is awake. She is not in acute distress.     Appearance: She is well-developed. She is not toxic-appearing or diaphoretic.   Cardiovascular:      Rate and Rhythm: Normal rate and regular rhythm.      Heart sounds: Normal heart sounds. No murmur heard.     No friction rub. No  "gallop.      Comments: Bilateral pedal and ankle edema  Pulmonary:      Effort: No respiratory distress.      Breath sounds: Normal breath sounds. No stridor. No wheezing, rhonchi or rales.   Neurological:      Mental Status: She is alert.   Psychiatric:         Behavior: Behavior is cooperative.       Pertinent Laboratory/Diagnostic Studies:     The following labs were reviewed please see chart or hospital paperwork for details.    Joslyn 10, 2024:    CBC with differential: WBC count 7.4, hemoglobin 10.3, hematocrit 32.4, platelet count 233,000, MCV 72    BMP: Sodium 140, potassium 4.4, BUN 17, creatinine 0.89, fasting blood sugar 93    - Her order summary was reviewed and signed.      Portions of the record may have been created with voice recognition software.  Occasional wrong word or \"sound a like\" substitutions may have occurred due to the inherent limitations of voice recognition software.  Read the chart carefully and recognize, using context, where substitutions have occurred.    Demian Champion M.D.  9/8/2024 10:59 PM      "

## 2024-09-09 NOTE — ASSESSMENT & PLAN NOTE
She is doing well with amlodipine  Will continue with this care plan  Will continue with monitoring for change in her condition

## 2024-09-09 NOTE — ASSESSMENT & PLAN NOTE
Will continue her care in collaboration with the facility psychiatry service  Nursing staff reports that she is doing well with her current psychotropic medication regimen  Will continue with monitoring for change in her condition

## 2024-09-19 ENCOUNTER — NURSING HOME VISIT (OUTPATIENT)
Dept: GERIATRICS | Facility: OTHER | Age: 89
End: 2024-09-19
Payer: COMMERCIAL

## 2024-09-19 DIAGNOSIS — Z87.19 HISTORY OF GI BLEED: ICD-10-CM

## 2024-09-19 DIAGNOSIS — R54 FRAILTY SYNDROME IN GERIATRIC PATIENT: ICD-10-CM

## 2024-09-19 DIAGNOSIS — G62.9 NEUROPATHY: ICD-10-CM

## 2024-09-19 DIAGNOSIS — N18.31 STAGE 3A CHRONIC KIDNEY DISEASE (HCC): ICD-10-CM

## 2024-09-19 DIAGNOSIS — I12.9 BENIGN HYPERTENSION WITH CHRONIC KIDNEY DISEASE, STAGE III (HCC): Primary | ICD-10-CM

## 2024-09-19 DIAGNOSIS — F33.1 MODERATE EPISODE OF RECURRENT MAJOR DEPRESSIVE DISORDER (HCC): ICD-10-CM

## 2024-09-19 DIAGNOSIS — E87.6 HYPOKALEMIA: ICD-10-CM

## 2024-09-19 DIAGNOSIS — R60.0 LOWER EXTREMITY EDEMA: ICD-10-CM

## 2024-09-19 DIAGNOSIS — I26.93 SINGLE SUBSEGMENTAL PULMONARY EMBOLISM WITHOUT ACUTE COR PULMONALE (HCC): ICD-10-CM

## 2024-09-19 DIAGNOSIS — I82.5Y3 CHRONIC DEEP VEIN THROMBOSIS (DVT) OF PROXIMAL VEIN OF BOTH LOWER EXTREMITIES (HCC): ICD-10-CM

## 2024-09-19 DIAGNOSIS — N18.30 BENIGN HYPERTENSION WITH CHRONIC KIDNEY DISEASE, STAGE III (HCC): Primary | ICD-10-CM

## 2024-09-19 DIAGNOSIS — R26.2 AMBULATORY DYSFUNCTION: ICD-10-CM

## 2024-09-19 DIAGNOSIS — F03.C3 SEVERE DEMENTIA WITH MOOD DISTURBANCE, UNSPECIFIED DEMENTIA TYPE (HCC): ICD-10-CM

## 2024-09-19 DIAGNOSIS — Z66 DNR (DO NOT RESUSCITATE): ICD-10-CM

## 2024-09-19 DIAGNOSIS — Z87.11 HISTORY OF GASTRIC ULCER: ICD-10-CM

## 2024-09-19 PROCEDURE — 99309 SBSQ NF CARE MODERATE MDM 30: CPT

## 2024-09-19 NOTE — PROGRESS NOTES
[facility]  [address]  [place] [address]  Facility: [facility]  [facility]  Long Term Care: POS 32    NAME: Raisa Sinha  AGE: 89 y.o. SEX: female    DATE OF ENCOUNTER: 9/19/24    Code status:  No CPR    Assessment and Plan     1. Benign hypertension with chronic kidney disease, stage III ([facility])  Assessment & Plan:  Continue amlodipine  Blood pressure stable, continue to monitor  2. Chronic deep vein thrombosis (DVT) of proximal vein of both lower extremities ([facility])  Assessment & Plan:  Not on anticoagulation due to history of recurrent GI bleeds  3. Single subsegmental pulmonary embolism without acute cor pulmonale ([facility])  Assessment & Plan:  Patient no longer on anticoagulation due to history of GI bleeds  Continue to monitor  4. Severe dementia with mood disturbance, unspecified dementia type ([facility])  Assessment & Plan:  Continue to reorient, redirect, and reassure patient  Maintain delirium precautions and sleep/wake cycle  Avoid deliriogenic medications  Limit interruptions at night as medically appropriate  Patient continues to require 24/7 assistance  Continue supportive services and assistance with ADLs  Encourage adequate nutrition and hydration  Continue to monitor for acute changes in condition  5. Neuropathy  Assessment & Plan:  Continue gabapentin, stable  6. Stage 3a chronic kidney disease ([facility])  Assessment & Plan:  Baseline creatinine 0.9-1.1  Creatinine 0.89 on 6/10  Avoid nephrotoxic medications and prevent hypotension  Ensure adequate hydration  Intermittent laboratory monitoring  7. Moderate episode of recurrent major depressive disorder ([facility])  Assessment & Plan:  Collaborative care with psychiatry service at [facility]  Patient very pleasant and smiling on exam  Continue Zoloft  Continue to monitor for acute changes in patient's condition and mood  8. Ambulatory dysfunction  Assessment & Plan:  Fall and safety  precautions  Appropriate use of call bell  Appropriate DME use, wheelchair  Continue to monitor for acute changes in patient condition  9. DNR (do not resuscitate)  10. Lower extremity edema  Assessment & Plan:  Patient with chronic bilateral lower extremity edema  Continue current Lasix dose  +2 edema on exam to both ankles  Encourage elevation  Intermittent lab monitoring to assess electrolytes  11. Frailty syndrome in geriatric patient  Assessment & Plan:  Secondary to chronic medical conditions  Requires 24/7 care and support at long-term care facility  Continue supportive care and ADL assistance  Management of acute and chronic medical conditions as outlined  12. History of gastric ulcer  Assessment & Plan:  History of gastric ulcers  Continue pantoprazole  Continue to monitor for acute changes in patient condition  13. History of GI bleed  Assessment & Plan:  No recent signs of bleeding  Hemoglobin stable at 10.3 on 6/10  Continue to monitor for acute changes in patient condition  14. Hypokalemia  Assessment & Plan:  Patient taking furosemide for lower extremity edema  Intermittent lab monitoring to assess potassium levels  Potassium 4.4 on 6/10, stable      All medications and routine orders were reviewed and updated as needed.    Plan discussed with: Nurse and patient    Chief Complaint     Follow-up of chronic conditions    History of Present Illness     Per nursing staff, patient is doing well.  Upon exam, patient sitting in the dining room with a group of other residents for an activity.  Patient is smiling and very pleasant.  She has no concerns at this time and no pain.  She has chronic edema in bilateral lower extremities.    The following portions of the patient's history were reviewed and updated as appropriate: current medications, past family history, past medical history, past social history, past surgical history and problem list.    Allergies:  No Known Allergies    Review of Systems     Review  "of Systems   Cardiovascular:  Positive for leg swelling.   Musculoskeletal:  Positive for gait problem.   Neurological:  Positive for weakness.   All other systems reviewed and are negative.      Medications and orders     All medications reviewed and updated in halfway EMR.      Objective     Vitals: Blood pressure 126/66, temp 97.8 °F, HR 72, RR 20, O2 92%, weight 140 pounds    Physical Exam  Vitals and nursing note reviewed.   Constitutional:       General: She is awake.      Appearance: Normal appearance. She is well-groomed. She is not ill-appearing.   Cardiovascular:      Rate and Rhythm: Normal rate and regular rhythm.      Heart sounds: Normal heart sounds. No murmur heard.     Comments: Chronic bilateral ankle/pedal edema  Pulmonary:      Effort: Pulmonary effort is normal. No respiratory distress.      Breath sounds: Normal breath sounds. No wheezing.   Abdominal:      General: Bowel sounds are normal. There is no distension.      Palpations: Abdomen is soft.      Tenderness: There is no abdominal tenderness.   Musculoskeletal:      Right lower le+ Edema present.      Left lower le+ Edema present.   Skin:     General: Skin is warm and dry.   Neurological:      Mental Status: She is alert.      Motor: Weakness present.      Gait: Gait abnormal.   Psychiatric:         Attention and Perception: Attention and perception normal.         Mood and Affect: Mood and affect normal.         Speech: Speech normal.         Behavior: Behavior normal. Behavior is cooperative.       Pertinent Laboratory/Diagnostic Studies:     The following labs were reviewed please see chart or hospital paperwork for details.    6/10/24:  Hbg 10.3  Hct 32.4  Wbc 7.4  Plt 233  Bun 17  Crt 0.89  Na 140  K 4.4  Glucose 93    Portions of the record may have been created with voice recognition software.  Occasional wrong word or \"sound a like\" substitutions may have occurred due to the inherent limitations of voice recognition " software.  Read the chart carefully and recognize, using context, where substitutions have occurred.    KERON Ferreira  Geriatric Medicine  9/19/24

## 2024-09-21 NOTE — ASSESSMENT & PLAN NOTE
Continue to reorient, redirect, and reassure patient  Maintain delirium precautions and sleep/wake cycle  Avoid deliriogenic medications  Limit interruptions at night as medically appropriate  Patient continues to require 24/7 assistance  Continue supportive services and assistance with ADLs  Encourage adequate nutrition and hydration  Continue to monitor for acute changes in condition

## 2024-09-21 NOTE — ASSESSMENT & PLAN NOTE
Secondary to chronic medical conditions  Requires 24/7 care and support at long-term care facility  Continue supportive care and ADL assistance  Management of acute and chronic medical conditions as outlined

## 2024-09-21 NOTE — ASSESSMENT & PLAN NOTE
Baseline creatinine 0.9-1.1  Creatinine 0.89 on 6/10  Avoid nephrotoxic medications and prevent hypotension  Ensure adequate hydration  Intermittent laboratory monitoring

## 2024-09-21 NOTE — ASSESSMENT & PLAN NOTE
Collaborative care with psychiatry service at [facility]  Patient very pleasant and smiling on exam  Continue Zoloft  Continue to monitor for acute changes in patient's condition and mood

## 2024-09-21 NOTE — ASSESSMENT & PLAN NOTE
Fall and safety precautions  Appropriate use of call bell  Appropriate DME use, wheelchair  Continue to monitor for acute changes in patient condition

## 2024-09-21 NOTE — ASSESSMENT & PLAN NOTE
Patient taking furosemide for lower extremity edema  Intermittent lab monitoring to assess potassium levels  Potassium 4.4 on 6/10, stable

## 2024-09-21 NOTE — ASSESSMENT & PLAN NOTE
No recent signs of bleeding  Hemoglobin stable at 10.3 on 6/10  Continue to monitor for acute changes in patient condition

## 2024-09-21 NOTE — ASSESSMENT & PLAN NOTE
History of gastric ulcers  Continue pantoprazole  Continue to monitor for acute changes in patient condition

## 2024-09-21 NOTE — ASSESSMENT & PLAN NOTE
Patient with chronic bilateral lower extremity edema  Continue current Lasix dose  +2 edema on exam to both ankles  Encourage elevation  Intermittent lab monitoring to assess electrolytes

## 2024-11-15 ENCOUNTER — NURSING HOME VISIT (OUTPATIENT)
Dept: GERIATRICS | Facility: OTHER | Age: 89
End: 2024-11-15
Payer: COMMERCIAL

## 2024-11-15 DIAGNOSIS — I12.9 BENIGN HYPERTENSION WITH CHRONIC KIDNEY DISEASE, STAGE III (HCC): Primary | ICD-10-CM

## 2024-11-15 DIAGNOSIS — N18.30 BENIGN HYPERTENSION WITH CHRONIC KIDNEY DISEASE, STAGE III (HCC): Primary | ICD-10-CM

## 2024-11-15 DIAGNOSIS — R54 FRAILTY SYNDROME IN GERIATRIC PATIENT: ICD-10-CM

## 2024-11-15 DIAGNOSIS — N18.31 STAGE 3A CHRONIC KIDNEY DISEASE (HCC): ICD-10-CM

## 2024-11-15 DIAGNOSIS — I82.5Y3 CHRONIC DEEP VEIN THROMBOSIS (DVT) OF PROXIMAL VEIN OF BOTH LOWER EXTREMITIES (HCC): ICD-10-CM

## 2024-11-15 DIAGNOSIS — Z66 DNR (DO NOT RESUSCITATE): ICD-10-CM

## 2024-11-15 DIAGNOSIS — I87.2 VENOUS INSUFFICIENCY OF BOTH LOWER EXTREMITIES: ICD-10-CM

## 2024-11-15 DIAGNOSIS — F33.1 MODERATE EPISODE OF RECURRENT MAJOR DEPRESSIVE DISORDER (HCC): ICD-10-CM

## 2024-11-15 PROCEDURE — 99309 SBSQ NF CARE MODERATE MDM 30: CPT | Performed by: INTERNAL MEDICINE

## 2024-12-02 PROBLEM — I87.2 VENOUS INSUFFICIENCY OF BOTH LOWER EXTREMITIES: Status: ACTIVE | Noted: 2024-12-02

## 2024-12-02 NOTE — ASSESSMENT & PLAN NOTE
Per nursing staff, she is doing well with her current psychotropic medication regimen  Will continue her care in collaboration with the facility psychiatry team (she is followed by the psychologist and psychiatrist)  Will continue with monitoring for change in her condition

## 2024-12-02 NOTE — ASSESSMENT & PLAN NOTE
She is doing well with avoidance of nephrotoxic medications and supplements, as able  Will continue with clinical and periodic laboratory monitoring for change in her condition

## 2024-12-02 NOTE — ASSESSMENT & PLAN NOTE
Review of her BP log looks well with amlodipine 2.5 mg daily  Will continue with this care plan  Will continue with monitoring for change in her condition

## 2024-12-02 NOTE — ASSESSMENT & PLAN NOTE
As previously care planned with her son, she is not on anticoagulation  Will continue with monitoring for change in her condition

## 2024-12-02 NOTE — PROGRESS NOTES
[facility]  [address]  [place] [address]  [facility]  [facility]  32  Follow-up visit    NAME: Raisa Sinha  AGE: 89 y.o. SEX: female    DATE OF ENCOUNTER: November 15, 2024.    Code status:   DNR    Assessment and Plan     1. Benign hypertension with chronic kidney disease, stage III (HCC)  Assessment & Plan:  Review of her BP log looks well with amlodipine 2.5 mg daily  Will continue with this care plan  Will continue with monitoring for change in her condition  2. Venous insufficiency of both lower extremities  Assessment & Plan:  Secondary to chronic DVT  She is doing well with her current furosemide 20 mg daily  Will continue with clinical and periodic laboratory monitoring for change in her condition  3. Stage 3a chronic kidney disease (HCC)  Assessment & Plan:  She is doing well with avoidance of nephrotoxic medications and supplements, as able  Will continue with clinical and periodic laboratory monitoring for change in her condition  4. Frailty syndrome in geriatric patient  Assessment & Plan:  Secondary to her chronic medical conditions  She continues to require 24/7 care/support of her ADLs  I recommend continued care/support of her ADLs as a long-term resident at the nursing facility  Will continue to monitor for change in her condition  5. Chronic deep vein thrombosis (DVT) of proximal vein of both lower extremities (HCC)  Assessment & Plan:  As previously care planned with her son, she is not on anticoagulation  Will continue with monitoring for change in her condition  6. Moderate episode of recurrent major depressive disorder (HCC)  Assessment & Plan:  Per nursing staff, she is doing well with her current psychotropic medication regimen  Will continue her care in collaboration with the facility psychiatry team (she is followed by the psychologist and psychiatrist)  Will continue with monitoring for change in her  condition  7. DNR (do not resuscitate)    See my note of July 24, 2024 for further information.    All medications and routine orders were reviewed and updated as needed.    Plan discussed with: Nursing staff.    Chief Complaint     She is a 89-year-old woman who is seen with nursing staff for a follow-up visit to update the care and treatment of her chronic medical conditions.    History of Present Illness     She is a 89-year-old woman who is seen with nursing staff for a follow-up visit to update the care and treatment of her chronic medical conditions, including hypertension, stage IIIa chronic kidney disease, dementia with mood disturbance, and frailty secondary to her chronic medical conditions.    Nursing staff reports that her overall clinical/functional status is stable, that she is sleeping well, and that she is exhibiting no behaviors that are distressing to her or disruptive to the nursing unit.  Review of her amount eaten and bowel movement logs looks well.  Nursing endorses that she is not having too many bowel movements.    When asked, she has no complaints.  She denies chest pain and shortness of breath.    The following portions of the patient's history were reviewed and updated as appropriate: current medications, past family history, past medical history, past social history, past surgical history and problem list.    Allergies:  No Known Allergies    Review of Systems     Review of Systems   Respiratory:  Negative for shortness of breath.    Cardiovascular:  Negative for chest pain.       Medications and orders     All medications reviewed and updated in longterm EMR.      Objective     Vitals: Recent vitals: Weight 148.5 pounds (stable), pulse 67, blood pressure 151/64.    Physical Exam  Exam conducted with a chaperone present.   Constitutional:       General: She is awake. She is not in acute distress.     Appearance: She is well-developed. She is not toxic-appearing or diaphoretic.  "  Cardiovascular:      Rate and Rhythm: Normal rate and regular rhythm.      Heart sounds: Normal heart sounds. No murmur heard.     No friction rub. No gallop.      Comments: Trace bilateral pedal edema  Pulmonary:      Effort: No respiratory distress.      Breath sounds: Normal breath sounds. No stridor. No wheezing, rhonchi or rales.   Neurological:      Mental Status: She is alert.   Psychiatric:         Behavior: Behavior is cooperative.       - Her order summary was reviewed and signed.      Portions of the record may have been created with voice recognition software.  Occasional wrong word or \"sound a like\" substitutions may have occurred due to the inherent limitations of voice recognition software.  Read the chart carefully and recognize, using context, where substitutions have occurred.    Demian Champion M.D.        "

## 2024-12-02 NOTE — ASSESSMENT & PLAN NOTE
Secondary to chronic DVT  She is doing well with her current furosemide 20 mg daily  Will continue with clinical and periodic laboratory monitoring for change in her condition

## 2024-12-27 ENCOUNTER — NURSING HOME VISIT (OUTPATIENT)
Dept: GERIATRICS | Facility: OTHER | Age: 89
End: 2024-12-27
Payer: COMMERCIAL

## 2024-12-27 DIAGNOSIS — E87.6 HYPOKALEMIA: ICD-10-CM

## 2024-12-27 DIAGNOSIS — I87.2 VENOUS INSUFFICIENCY OF BOTH LOWER EXTREMITIES: ICD-10-CM

## 2024-12-27 DIAGNOSIS — R60.0 LOWER EXTREMITY EDEMA: Primary | ICD-10-CM

## 2024-12-27 PROCEDURE — 99309 SBSQ NF CARE MODERATE MDM 30: CPT

## 2024-12-27 NOTE — PROGRESS NOTES
[facility]  [address]  [place] [address]  Facility: [facility]  [facility]  Long Term Care: POS 32    NAME: Raisa Sinha  AGE: 89 y.o. SEX: female    DATE OF ENCOUNTER: 12/27/24    Code status:  No CPR    Assessment and Plan     1. Lower extremity edema  Assessment & Plan:  Patient with chronic bilateral lower extremity edema  Staff noticed recent weight gain  Sporadic meal completion documented anywhere from 0-100%  +2 edema to both ankles  Continue current Lasix dose  Order for additional 20 mg of lasix for two days  Give 10 mEq KCL for two days for electrolyte replacement  Change weight parameters to notify provider if patient < 140 lbs or > 150 lbs  Check a CMP on 12/31/24  Encourage elevation  Continue to monitor and trend weights  2. Hypokalemia  Assessment & Plan:  Patient takes daily lasix with no potassium supplements daily  Continue to monitor intermittently  Order for additional lasix to be given with a low dose of KCL for the next two days  CMP ordered for 12/31/24  3. Venous insufficiency of both lower extremities  Assessment & Plan:  Secondary to chronic DVT  She is doing well with her current furosemide 20 mg daily  Continue periodic laboratory monitoring for change in condition      All medications and routine orders were reviewed and updated as needed.    Plan discussed with: Patient and Nurse    Chief Complaint     Acute visit    History of Present Illness     Patient was seen today due to recent weight gain.  On exam, patient has bilateral lower ankle edema.    The following portions of the patient's history were reviewed and updated as appropriate: current medications, past family history, past medical history, past social history, past surgical history and problem list.    Allergies:  No Known Allergies    Review of Systems     Review of Systems   Constitutional:  Positive for unexpected weight change.        Weight gain  "  Cardiovascular:  Positive for leg swelling.       Medications and orders     All medications reviewed and updated in shelter EMR.      Objective     Vitals: /69, temp 97.1 °F, HR 60, RR 20, O2 93%, weight 146 pounds    Physical Exam  Vitals and nursing note reviewed.   Constitutional:       General: She is awake.      Appearance: Normal appearance. She is well-groomed. She is not ill-appearing.   Cardiovascular:      Rate and Rhythm: Normal rate and regular rhythm.      Heart sounds: Normal heart sounds, S1 normal and S2 normal. No murmur heard.     Comments: +2 edema in bilateral ankles  Pulmonary:      Effort: Pulmonary effort is normal. No respiratory distress.      Breath sounds: Normal breath sounds. No wheezing.   Abdominal:      General: Bowel sounds are normal. There is no distension.      Palpations: Abdomen is soft.      Tenderness: There is no abdominal tenderness.   Musculoskeletal:      Right lower le+ Edema present.      Left lower le+ Edema present.   Skin:     General: Skin is warm and dry.   Neurological:      Mental Status: She is alert. Mental status is at baseline.      Motor: Weakness present.      Gait: Gait abnormal.   Psychiatric:         Attention and Perception: Attention and perception normal.         Mood and Affect: Mood and affect normal.         Speech: Speech normal.         Behavior: Behavior normal. Behavior is cooperative.       Portions of the record may have been created with voice recognition software.  Occasional wrong word or \"sound a like\" substitutions may have occurred due to the inherent limitations of voice recognition software.  Read the chart carefully and recognize, using context, where substitutions have occurred.    KERON Ferreira  Geriatric Medicine  24  "

## 2024-12-30 NOTE — ASSESSMENT & PLAN NOTE
Secondary to chronic DVT  She is doing well with her current furosemide 20 mg daily  Continue periodic laboratory monitoring for change in condition

## 2024-12-30 NOTE — ASSESSMENT & PLAN NOTE
Patient takes daily lasix with no potassium supplements daily  Continue to monitor intermittently  Order for additional lasix to be given with a low dose of KCL for the next two days  CMP ordered for 12/31/24

## 2024-12-30 NOTE — ASSESSMENT & PLAN NOTE
Patient with chronic bilateral lower extremity edema  Staff noticed recent weight gain  Sporadic meal completion documented anywhere from 0-100%  +2 edema to both ankles  Continue current Lasix dose  Order for additional 20 mg of lasix for two days  Give 10 mEq KCL for two days for electrolyte replacement  Change weight parameters to notify provider if patient < 140 lbs or > 150 lbs  Check a CMP on 12/31/24  Encourage elevation  Continue to monitor and trend weights

## 2025-01-14 ENCOUNTER — NURSING HOME VISIT (OUTPATIENT)
Dept: GERIATRICS | Facility: OTHER | Age: OVER 89
End: 2025-01-14
Payer: COMMERCIAL

## 2025-01-14 DIAGNOSIS — N18.30 BENIGN HYPERTENSION WITH CHRONIC KIDNEY DISEASE, STAGE III (HCC): Primary | ICD-10-CM

## 2025-01-14 DIAGNOSIS — I12.9 BENIGN HYPERTENSION WITH CHRONIC KIDNEY DISEASE, STAGE III (HCC): Primary | ICD-10-CM

## 2025-01-14 DIAGNOSIS — R54 FRAILTY SYNDROME IN GERIATRIC PATIENT: ICD-10-CM

## 2025-01-14 DIAGNOSIS — I87.2 VENOUS INSUFFICIENCY OF BOTH LOWER EXTREMITIES: ICD-10-CM

## 2025-01-14 DIAGNOSIS — Z66 DNR (DO NOT RESUSCITATE): ICD-10-CM

## 2025-01-14 DIAGNOSIS — N18.31 STAGE 3A CHRONIC KIDNEY DISEASE (HCC): ICD-10-CM

## 2025-01-14 DIAGNOSIS — I82.5Y3 CHRONIC DEEP VEIN THROMBOSIS (DVT) OF PROXIMAL VEIN OF BOTH LOWER EXTREMITIES (HCC): ICD-10-CM

## 2025-01-14 DIAGNOSIS — F03.C3 SEVERE DEMENTIA WITH MOOD DISTURBANCE, UNSPECIFIED DEMENTIA TYPE (HCC): ICD-10-CM

## 2025-01-14 PROCEDURE — 99309 SBSQ NF CARE MODERATE MDM 30: CPT | Performed by: INTERNAL MEDICINE

## 2025-01-17 NOTE — ASSESSMENT & PLAN NOTE
History of pulmonary embolism and DVT noted, patient on apixaban at home  Discussed with patient and her son on 3/23 and 3/24, will not restart apixaban given ongoing GI bleeding and shifting goals of care  no

## 2025-02-10 PROBLEM — I87.2 VENOUS INSUFFICIENCY OF BOTH LOWER EXTREMITIES: Status: ACTIVE | Noted: 2019-03-24

## 2025-02-10 NOTE — ASSESSMENT & PLAN NOTE
January 2, 2025: Creatinine 0.82, ECrCl 39  She is maintaining her creatinine at her baseline level between 0.9 and 1.1 with avoidance of nephrotoxic medications and supplements, as able  Will continue with clinical and periodic laboratory monitoring for change in her condition

## 2025-02-10 NOTE — ASSESSMENT & PLAN NOTE
Will continue to provide a safe, secure, structured, and supportive environment at the nursing facility  Will continue 24/7 care/support of her ADLs at the nursing facility  Will continue to monitor for change in her condition

## 2025-02-10 NOTE — ASSESSMENT & PLAN NOTE
She is doing well with routine furosemide  Will continue with this care plan  Will continue with clinical and periodic laboratory monitoring for change in her condition

## 2025-02-10 NOTE — PROGRESS NOTES
[facility]  [address]  [place] [address]  Facility: [facility]  [facility]  32  Follow-up visit    NAME: Raisa Sinha  AGE: 89 y.o. SEX: female    DATE OF ENCOUNTER: January 14, 2025.    Code status:   DNR    Assessment and Plan     1. Benign hypertension with chronic kidney disease, stage III (HCC)  Assessment & Plan:  She is doing well with amlodipine  Will continue with this care plan  Will continue with monitoring for change in her condition  2. Venous insufficiency of both lower extremities  Assessment & Plan:  She is doing well with routine furosemide  Will continue with this care plan  Will continue with clinical and periodic laboratory monitoring for change in her condition  3. Stage 3a chronic kidney disease (HCC)  Assessment & Plan:  January 2, 2025: Creatinine 0.82, ECrCl 39  She is maintaining her creatinine at her baseline level between 0.9 and 1.1 with avoidance of nephrotoxic medications and supplements, as able  Will continue with clinical and periodic laboratory monitoring for change in her condition  4. Frailty syndrome in geriatric patient  Assessment & Plan:  Secondary to her chronic medical conditions  She continues to require 24/7 care/support of her ADLs  I recommend continued care/support of her ADLs as a long-term resident at the nursing facility  Will continue to monitor for change in her condition  5. Chronic deep vein thrombosis (DVT) of proximal vein of both lower extremities (HCC)  Assessment & Plan:  As previously care planned, she is not on anticoagulation  Will continue with monitoring for change in her condition  6. Severe dementia with mood disturbance, unspecified dementia type (HCC)  Assessment & Plan:  Will continue to provide a safe, secure, structured, and supportive environment at the nursing facility  Will continue 24/7 care/support of her ADLs at the nursing facility  Will continue to monitor for  change in her condition  7. DNR (do not resuscitate)    See my note of November 15, 2024 for further information.    All medications and routine orders were reviewed and updated as needed.    Plan discussed with: Nursing staff.    Chief Complaint     She is seen for a follow-up visit to update the care and treatment of her chronic medical conditions.    History of Present Illness     She is a pleasant 89-year-old woman who is seen with nursing staff for a follow-up visit to update the care and treatment of her chronic medical conditions, including hypertension, venous insufficiency of her legs, stage IIIa chronic kidney disease, and frailty secondary to her chronic medical conditions.    When asked, she has no complaints.    Nursing staff reports that her overall clinical/functional status is stable and that she is not exhibiting any behaviors that are distressing to her or disruptive to the nursing unit.    Review of her amount eaten and bowel movement logs looks well.    The following portions of the patient's history were reviewed and updated as appropriate: current medications, past family history, past medical history, past social history, past surgical history and problem list.    Allergies:  No Known Allergies    Review of Systems     Review of Systems   Unable to perform ROS: Dementia       Medications and orders     All medications reviewed and updated in [facility] EMR.      Objective     Vitals: Recent vitals: Weight 147.5 pounds, pulse 67, blood pressure 131/64.    Physical Exam  Vitals reviewed. Exam conducted with a chaperone present.   Constitutional:       General: She is awake. She is not in acute distress.     Appearance: She is well-developed. She is not toxic-appearing or diaphoretic.      Comments: She appears comfortable in a wheelchair, younger than her stated age, and frail.   Cardiovascular:      Comments: There is no pretibial edema, bilaterally.  Neurological:      Mental Status: She is  "alert.   Psychiatric:         Mood and Affect: Mood normal.         Behavior: Behavior normal. Behavior is cooperative.       Pertinent Laboratory/Diagnostic Studies:     The following labs were reviewed please see chart or hospital paperwork for details.    January 2, 2025:    CMP: Sodium 140, potassium 3.7, BUN 22, creatinine 0.82, fasting blood sugar 90, LFTs within normal limits except total protein 6.1, creatinine clearance 39    - Her order summary was reviewed and signed.      Portions of the record may have been created with voice recognition software.  Occasional wrong word or \"sound a like\" substitutions may have occurred due to the inherent limitations of voice recognition software.  Read the chart carefully and recognize, using context, where substitutions have occurred.    Demian Champion M.D.        "

## 2025-02-10 NOTE — ASSESSMENT & PLAN NOTE
As previously care planned, she is not on anticoagulation  Will continue with monitoring for change in her condition

## 2025-02-11 ENCOUNTER — NURSING HOME VISIT (OUTPATIENT)
Dept: GERIATRICS | Facility: OTHER | Age: OVER 89
End: 2025-02-11
Payer: COMMERCIAL

## 2025-02-11 DIAGNOSIS — J00 ACUTE NASOPHARYNGITIS: Primary | ICD-10-CM

## 2025-02-11 PROCEDURE — 99308 SBSQ NF CARE LOW MDM 20: CPT

## 2025-02-12 PROBLEM — J00 ACUTE NASOPHARYNGITIS: Status: ACTIVE | Noted: 2025-02-12

## 2025-02-12 RX ORDER — AMMONIUM LACTATE 12 G/100G
1 LOTION TOPICAL 2 TIMES DAILY
COMMUNITY

## 2025-02-12 NOTE — PROGRESS NOTES
[facility]  [address]  [place] [address]  Facility: [facility]  [facility]  Long Term Care: POS 32    NAME: Raisa Sinha  AGE: 89 y.o. SEX: female    DATE OF ENCOUNTER: 2/11/25    Code status:  No CPR    Assessment and Plan     1. Acute nasopharyngitis  Assessment & Plan:  Moist nonproductive cough, rhinorrhea, sore throat, raspy voice  Vital signs stable, afebrile  No shortness of breath or wheezing  Throat slightly erythematous with no exudate  Order for Cepacol lozenges as needed  Order to start Flonase for 1 week and scheduled Robitussin for 5 days  Continue to monitor for acute changes in patient condition      All medications and routine orders were reviewed and updated as needed.    Plan discussed with: Patient    Chief Complaint     Acute visit    History of Present Illness     Patient has a moist, nonproductive cough with rhinorrhea and sore throat.  On exam, patient appears comfortable sitting at the bedside with somewhat of a raspy voice and noticeable rhinorrhea.  Vital signs remain stable.    The following portions of the patient's history were reviewed and updated as appropriate: current medications, past family history, past medical history, past social history, past surgical history and problem list.    Allergies:  No Known Allergies    Review of Systems     Review of Systems   HENT:  Positive for congestion, rhinorrhea, sore throat and voice change.    Respiratory:  Positive for cough.        Medications and orders     All medications reviewed and updated in retirement EMR.      Objective     Vitals: /65, temp 97.8 °F, HR 72, RR 20, O2 96%, weight 145 pounds    Physical Exam  Vitals and nursing note reviewed.   Constitutional:       General: She is awake. She is not in acute distress.     Appearance: Normal appearance. She is well-developed and well-groomed. She is not ill-appearing or diaphoretic.   HENT:      Head:  "Normocephalic and atraumatic.      Nose: Congestion and rhinorrhea present. Rhinorrhea is clear.      Mouth/Throat:      Lips: Pink.      Mouth: Mucous membranes are moist.      Pharynx: Oropharynx is clear. Uvula midline. Posterior oropharyngeal erythema present. No oropharyngeal exudate or uvula swelling.      Tonsils: No tonsillar exudate or tonsillar abscesses.      Comments: Slightly erythematous pharynx  Cardiovascular:      Rate and Rhythm: Normal rate and regular rhythm.      Heart sounds: Normal heart sounds, S1 normal and S2 normal. No murmur heard.  Pulmonary:      Effort: Pulmonary effort is normal. No respiratory distress.      Breath sounds: Decreased breath sounds present.      Comments: Cough  Musculoskeletal:         General: No deformity or signs of injury.      Right lower leg: No edema.      Left lower leg: No edema.   Skin:     General: Skin is warm and dry.   Neurological:      Mental Status: She is alert. Mental status is at baseline.      Motor: Weakness present.      Gait: Gait abnormal.   Psychiatric:         Attention and Perception: Attention and perception normal.         Mood and Affect: Mood and affect normal.         Speech: Speech normal.         Behavior: Behavior normal. Behavior is cooperative.       Portions of the record may have been created with voice recognition software.  Occasional wrong word or \"sound a like\" substitutions may have occurred due to the inherent limitations of voice recognition software.  Read the chart carefully and recognize, using context, where substitutions have occurred.    KERON Ferreira  Geriatric Medicine  2/11/25    "

## 2025-02-12 NOTE — ASSESSMENT & PLAN NOTE
Moist nonproductive cough, rhinorrhea, sore throat, raspy voice  Vital signs stable, afebrile  No shortness of breath or wheezing  Throat slightly erythematous with no exudate  Order for Cepacol lozenges as needed  Order to start Flonase for 1 week and scheduled Robitussin for 5 days  Continue to monitor for acute changes in patient condition

## 2025-02-17 ENCOUNTER — NURSING HOME VISIT (OUTPATIENT)
Dept: GERIATRICS | Facility: OTHER | Age: OVER 89
End: 2025-02-17
Payer: COMMERCIAL

## 2025-02-17 DIAGNOSIS — F03.C3 SEVERE DEMENTIA WITH MOOD DISTURBANCE, UNSPECIFIED DEMENTIA TYPE (HCC): ICD-10-CM

## 2025-02-17 DIAGNOSIS — J00 ACUTE NASOPHARYNGITIS: Primary | ICD-10-CM

## 2025-02-17 PROCEDURE — 99308 SBSQ NF CARE LOW MDM 20: CPT

## 2025-02-17 NOTE — ASSESSMENT & PLAN NOTE
Nonproductive cough for about a week  Vital signs remain stable  No shortness of breath or wheezing  Raspy voice  Continue Cepacol lozenges as needed  Extend scheduled Robitussin DM for another 5 days  Patient often refusing her medications,  Emphasized the importance of taking her medications, especially the cough syrup to help improve her cough and raspy voice  Continue to monitor for acute changes in patient condition

## 2025-02-17 NOTE — PROGRESS NOTES
[facility]  [address]  [place] [address]  Facility: [facility]  [facility]  Long Term Care: POS 32    NAME: Raisa Sinha  AGE: 89 y.o. SEX: female    DATE OF ENCOUNTER: 2/17/25    Code status:  No CPR    Assessment and Plan     1. Acute nasopharyngitis  Assessment & Plan:  Nonproductive cough for about a week  Vital signs remain stable  No shortness of breath or wheezing  Raspy voice  Continue Cepacol lozenges as needed  Extend scheduled Robitussin DM for another 5 days  Patient often refusing her medications,  Emphasized the importance of taking her medications, especially the cough syrup to help improve her cough and raspy voice  Continue to monitor for acute changes in patient condition  2. Severe dementia with mood disturbance, unspecified dementia type (HCC)  Assessment & Plan:  Often needs encouragement to take her medications  Continue to reorient, redirect, and reassure patient  Maintain delirium precautions and sleep/wake cycle  Avoid deliriogenic medications  Limit interruptions at night as medically appropriate  Patient continues to require 24/7 assistance with ADLs  Encourage adequate nutrition and hydration  Continue to monitor for acute changes in condition      All medications and routine orders were reviewed and updated as needed.    Plan discussed with: Patient and Nurse    Chief Complaint     Acute visit    History of Present Illness     Staff concerned that patient continues to have a cough and raspy voice.  On exam, patient is comfortably lying in bed with no signs of distress or discomfort.  She has recently been refusing medications including cough syrup.    The following portions of the patient's history were reviewed and updated as appropriate: current medications, past family history, past medical history, past social history, past surgical history and problem list.    Allergies:  No Known Allergies    Review of  "Systems     Review of Systems   HENT:  Positive for voice change.    Respiratory:  Positive for cough.        Medications and orders     All medications reviewed and updated in [facility] EMR.      Objective     Vitals: /59, temp 97.8 °F, HR 72, RR 20, O2 96%, weight 140.5 pounds    Physical Exam  Vitals and nursing note reviewed.   Constitutional:       General: She is awake. She is not in acute distress.     Appearance: Normal appearance. She is well-developed and well-groomed. She is not ill-appearing or diaphoretic.   HENT:      Head: Normocephalic and atraumatic.   Cardiovascular:      Rate and Rhythm: Normal rate and regular rhythm.      Heart sounds: Normal heart sounds, S1 normal and S2 normal. No murmur heard.  Pulmonary:      Effort: Pulmonary effort is normal. No respiratory distress.      Breath sounds: Normal breath sounds.   Musculoskeletal:         General: No deformity or signs of injury.      Right lower leg: No edema.      Left lower leg: No edema.   Skin:     General: Skin is warm and dry.   Neurological:      Mental Status: She is alert. Mental status is at baseline.      Motor: Weakness present.      Gait: Gait abnormal.   Psychiatric:         Attention and Perception: Attention and perception normal.         Mood and Affect: Mood and affect normal.         Speech: Speech normal.         Behavior: Behavior normal. Behavior is cooperative.       Portions of the record may have been created with voice recognition software.  Occasional wrong word or \"sound a like\" substitutions may have occurred due to the inherent limitations of voice recognition software.  Read the chart carefully and recognize, using context, where substitutions have occurred.    KERON Ferreira  Geriatric Medicine  2/17/2025 3:39 PM      "

## 2025-02-17 NOTE — ASSESSMENT & PLAN NOTE
Often needs encouragement to take her medications  Continue to reorient, redirect, and reassure patient  Maintain delirium precautions and sleep/wake cycle  Avoid deliriogenic medications  Limit interruptions at night as medically appropriate  Patient continues to require 24/7 assistance with ADLs  Encourage adequate nutrition and hydration  Continue to monitor for acute changes in condition

## 2025-02-21 ENCOUNTER — NURSING HOME VISIT (OUTPATIENT)
Dept: GERIATRICS | Facility: OTHER | Age: OVER 89
End: 2025-02-21
Payer: COMMERCIAL

## 2025-02-21 DIAGNOSIS — J18.9 PNEUMONIA OF BOTH LUNGS DUE TO INFECTIOUS ORGANISM, UNSPECIFIED PART OF LUNG: Primary | ICD-10-CM

## 2025-02-21 DIAGNOSIS — F03.C3 SEVERE DEMENTIA WITH MOOD DISTURBANCE, UNSPECIFIED DEMENTIA TYPE (HCC): ICD-10-CM

## 2025-02-21 DIAGNOSIS — N18.31 STAGE 3A CHRONIC KIDNEY DISEASE (HCC): ICD-10-CM

## 2025-02-21 PROCEDURE — 99309 SBSQ NF CARE MODERATE MDM 30: CPT

## 2025-02-21 NOTE — PROGRESS NOTES
[facility]  [address]  [place] [address]  Facility: [facility]  [facility]  Long Term Care: POS 32    NAME: Raisa Sinha  AGE: 89 y.o. SEX: female    DATE OF ENCOUNTER: 2/21/25    Code status:  No CPR    Assessment and Plan     1. Pneumonia of both lungs due to infectious organism, unspecified part of lung  Assessment & Plan:  Patient with a continued moist productive cough  Has recently been treated with Robitussin DM, lozenges, and Flonase with no improvement  Intermittently refusing medications  Patient desatted to 80% on room air  Now requiring 3 L supplemental oxygen via nasal cannula to keep O2 sats greater than 88%  Order for stat DuoNeb and chest x-ray 2 views  Imaging showed bibasilar airspace disease  Will treat patient with Augmentin 875 mg twice daily for 10 doses starting tonight   Order for azithromycin 500 mg today followed by 4 days of 250 mg daily  Continue DuoNebs 4 times daily for 5 days  Encouraged to 50 mL of fluids 3 times daily for 5 days  Continue as needed lozenges and scheduled Robitussin  Continue to monitor patient closely and check vital signs every shift  2. Stage 3a chronic kidney disease (HCC)  Assessment & Plan:  Patient with a poor appetite and pneumonia  Will encourage extra fluids for the next 5 days  If patient continues with poor appetite, may need to evaluate a BMP  3. Severe dementia with mood disturbance, unspecified dementia type (HCC)  Assessment & Plan:  Patient needs encouragement and education often to take her medications  Continue to reorient, redirect, and reassure patient  Maintain delirium precautions and sleep/wake cycle  Avoid deliriogenic medications  Limit interruptions at night as medically appropriate  Patient continues to require 24/7 assistance with ADLs  Encourage adequate nutrition and hydration  Continue to monitor for acute changes in condition      All medications and routine  orders were reviewed and updated as needed.    Plan discussed with: Patient and Nurse    Chief Complaint     Acute visit    History of Present Illness     Patient with continued cough despite cough medicine and Flonase.  This morning her oxygen saturation was 80% on room air.  3 L supplemental oxygen via nasal cannula improved her oxygen saturation to 88%.  Patient with moist productive cough and rhonchi on auscultation.  Stat chest x-ray ordered.    The following portions of the patient's history were reviewed and updated as appropriate: current medications, past family history, past medical history, past social history, past surgical history and problem list.    Allergies:  No Known Allergies    Review of Systems     Review of Systems   Constitutional:  Positive for appetite change.        Poor appetite   HENT:  Positive for rhinorrhea.    Respiratory:  Positive for cough and shortness of breath.    Cardiovascular:  Negative for leg swelling.       Medications and orders     All medications reviewed and updated in longterm EMR.      Objective     Vitals: Temp 98.8 °F, HR 87, RR 22, O2 88% on 3 L via nasal cannula, weight 136 pounds    Physical Exam  Vitals and nursing note reviewed.   Constitutional:       General: She is awake. She is not in acute distress.     Appearance: Normal appearance. She is well-developed and well-groomed. She is not ill-appearing or diaphoretic.   HENT:      Head: Normocephalic and atraumatic.   Cardiovascular:      Rate and Rhythm: Normal rate and regular rhythm.      Heart sounds: Normal heart sounds, S1 normal and S2 normal. No murmur heard.  Pulmonary:      Effort: Pulmonary effort is normal. No respiratory distress.      Breath sounds: Decreased breath sounds and rhonchi present.   Musculoskeletal:         General: No deformity or signs of injury.      Right lower leg: No edema.      Left lower leg: No edema.   Skin:     General: Skin is warm and dry.   Neurological:      Mental  "Status: She is alert. Mental status is at baseline.      Motor: Weakness present.      Gait: Gait abnormal.   Psychiatric:         Attention and Perception: Attention and perception normal.         Mood and Affect: Mood and affect normal.         Speech: Speech normal.         Behavior: Behavior normal. Behavior is cooperative.       Pertinent Laboratory/Diagnostic Studies:     The following studies were reviewed please see chart or hospital paperwork for details.    2/21/25: Stat chest x-ray  Conclusion: Bibasilar airspace disease    Portions of the record may have been created with voice recognition software.  Occasional wrong word or \"sound a like\" substitutions may have occurred due to the inherent limitations of voice recognition software.  Read the chart carefully and recognize, using context, where substitutions have occurred.    KERON Ferreira  Geriatric Medicine  2/21/2025 4:04 PM      "

## 2025-02-21 NOTE — ASSESSMENT & PLAN NOTE
Patient needs encouragement and education often to take her medications  Continue to reorient, redirect, and reassure patient  Maintain delirium precautions and sleep/wake cycle  Avoid deliriogenic medications  Limit interruptions at night as medically appropriate  Patient continues to require 24/7 assistance with ADLs  Encourage adequate nutrition and hydration  Continue to monitor for acute changes in condition

## 2025-02-21 NOTE — ASSESSMENT & PLAN NOTE
Patient with a poor appetite and pneumonia  Will encourage extra fluids for the next 5 days  If patient continues with poor appetite, may need to evaluate a BMP

## 2025-02-21 NOTE — ASSESSMENT & PLAN NOTE
Patient with a continued moist productive cough  Has recently been treated with Robitussin DM, lozenges, and Flonase with no improvement  Intermittently refusing medications  Patient desatted to 80% on room air  Now requiring 3 L supplemental oxygen via nasal cannula to keep O2 sats greater than 88%  Order for stat DuoNeb and chest x-ray 2 views  Imaging showed bibasilar airspace disease  Will treat patient with Augmentin 875 mg twice daily for 10 doses starting tonight   Order for azithromycin 500 mg today followed by 4 days of 250 mg daily  Continue DuoNebs 4 times daily for 5 days  Encouraged to 50 mL of fluids 3 times daily for 5 days  Continue as needed lozenges and scheduled Robitussin  Continue to monitor patient closely and check vital signs every shift

## 2025-02-24 ENCOUNTER — NURSING HOME VISIT (OUTPATIENT)
Dept: GERIATRICS | Facility: OTHER | Age: OVER 89
End: 2025-02-24
Payer: COMMERCIAL

## 2025-02-24 DIAGNOSIS — R63.0 POOR APPETITE: ICD-10-CM

## 2025-02-24 DIAGNOSIS — J18.9 PNEUMONIA OF BOTH LUNGS DUE TO INFECTIOUS ORGANISM, UNSPECIFIED PART OF LUNG: Primary | ICD-10-CM

## 2025-02-24 PROCEDURE — 99308 SBSQ NF CARE LOW MDM 20: CPT

## 2025-02-24 NOTE — PROGRESS NOTES
[facility]  [address]  [place] [address]  Facility: [facility]  [facility]  Long Term Care: POS 32    NAME: Raisa Sinha  AGE: 89 y.o. SEX: female    DATE OF ENCOUNTER: 2/24/25    Code status:  No CPR    Assessment and Plan     1. Pneumonia of both lungs due to infectious organism, unspecified part of lung  Assessment & Plan:  Patient to continue azithromycin and Augmentin until 2/26/2025  No adverse signs and symptoms from antibiotics  She states she feels better and does not feel short of breath  Oxygen saturations remain in the 90s with 4 L supplemental oxygen via nasal cannula  Nursing staff to wean down as tolerated, patient normally on room air  Keep oxygen saturations greater than 88%  Continue to encourage fluids  Vital signs remain stable  Continue to monitor for acute changes in patient condition  2. Poor appetite  Assessment & Plan:  Continue to encourage and remind patient to eat and drink  Poor meal completion lately, especially while sick  Encourage protein supplements  Consult to dietary for recommendations      All medications and routine orders were reviewed and updated as needed.    Plan discussed with: Patient and Nurse    Chief Complaint     Follow-up of chronic conditions    History of Present Illness     Patient recently started on Augmentin and azithromycin for pneumonia.  Patient's condition has improved.  She has no complaints of shortness of breath and staff reports she is coughing less.    The following portions of the patient's history were reviewed and updated as appropriate: current medications, past family history, past medical history, past social history, past surgical history and problem list.    Allergies:  No Known Allergies    Review of Systems     Review of Systems   Respiratory:  Positive for cough. Negative for shortness of breath.        Medications and orders     All medications reviewed and updated  "in Nursing Home EMR.      Objective     Vitals: /67, temp 97.8 °F, HR 74, RR 20, O2 97% on 4 L via nasal cannula, weight 136 pounds    Physical Exam  Vitals and nursing note reviewed.   Constitutional:       General: She is awake. She is not in acute distress.     Appearance: Normal appearance. She is well-developed and well-groomed. She is not ill-appearing or diaphoretic.      Interventions: Nasal cannula in place.      Comments: 4 L   HENT:      Head: Normocephalic and atraumatic.   Cardiovascular:      Rate and Rhythm: Normal rate and regular rhythm.      Heart sounds: Normal heart sounds, S1 normal and S2 normal. No murmur heard.  Pulmonary:      Effort: Pulmonary effort is normal. No respiratory distress.      Breath sounds: Normal breath sounds. No wheezing, rhonchi or rales.   Musculoskeletal:         General: No deformity or signs of injury.      Right lower leg: No edema.      Left lower leg: No edema.   Skin:     General: Skin is warm and dry.   Neurological:      Mental Status: She is alert. Mental status is at baseline.      Motor: Weakness present.      Gait: Gait abnormal.   Psychiatric:         Attention and Perception: Attention and perception normal.         Mood and Affect: Mood and affect normal.         Speech: Speech normal.         Behavior: Behavior normal. Behavior is cooperative.       Pertinent Laboratory/Diagnostic Studies:     The following studies were reviewed please see chart or hospital paperwork for details.    2/21/2025 stat chest x-ray:  Conclusion: Bibasilar airspace disease.    Portions of the record may have been created with voice recognition software.  Occasional wrong word or \"sound a like\" substitutions may have occurred due to the inherent limitations of voice recognition software.  Read the chart carefully and recognize, using context, where substitutions have occurred.    KERON Ferreira  Geriatric Medicine  2/24/2025 3:37 PM      "

## 2025-02-24 NOTE — ASSESSMENT & PLAN NOTE
Patient to continue azithromycin and Augmentin until 2/26/2025  No adverse signs and symptoms from antibiotics  She states she feels better and does not feel short of breath  Oxygen saturations remain in the 90s with 4 L supplemental oxygen via nasal cannula  Nursing staff to wean down as tolerated, patient normally on room air  Keep oxygen saturations greater than 88%  Continue to encourage fluids  Vital signs remain stable  Continue to monitor for acute changes in patient condition

## 2025-02-24 NOTE — ASSESSMENT & PLAN NOTE
Continue to encourage and remind patient to eat and drink  Poor meal completion lately, especially while sick  Encourage protein supplements  Consult to dietary for recommendations

## 2025-03-14 ENCOUNTER — NURSING HOME VISIT (OUTPATIENT)
Dept: GERIATRICS | Facility: OTHER | Age: OVER 89
End: 2025-03-14
Payer: COMMERCIAL

## 2025-03-14 DIAGNOSIS — N18.31 STAGE 3A CHRONIC KIDNEY DISEASE (HCC): ICD-10-CM

## 2025-03-14 DIAGNOSIS — I82.5Y3 CHRONIC DEEP VEIN THROMBOSIS (DVT) OF PROXIMAL VEIN OF BOTH LOWER EXTREMITIES (HCC): ICD-10-CM

## 2025-03-14 DIAGNOSIS — I26.93 SINGLE SUBSEGMENTAL PULMONARY EMBOLISM WITHOUT ACUTE COR PULMONALE (HCC): ICD-10-CM

## 2025-03-14 DIAGNOSIS — R26.2 AMBULATORY DYSFUNCTION: ICD-10-CM

## 2025-03-14 DIAGNOSIS — Z66 DNR (DO NOT RESUSCITATE): ICD-10-CM

## 2025-03-14 DIAGNOSIS — F33.1 MODERATE EPISODE OF RECURRENT MAJOR DEPRESSIVE DISORDER (HCC): ICD-10-CM

## 2025-03-14 DIAGNOSIS — N18.30 BENIGN HYPERTENSION WITH CHRONIC KIDNEY DISEASE, STAGE III (HCC): ICD-10-CM

## 2025-03-14 DIAGNOSIS — R54 FRAILTY SYNDROME IN GERIATRIC PATIENT: ICD-10-CM

## 2025-03-14 DIAGNOSIS — I87.2 VENOUS INSUFFICIENCY OF BOTH LOWER EXTREMITIES: ICD-10-CM

## 2025-03-14 DIAGNOSIS — F03.C3 SEVERE DEMENTIA WITH MOOD DISTURBANCE, UNSPECIFIED DEMENTIA TYPE (HCC): ICD-10-CM

## 2025-03-14 DIAGNOSIS — I12.9 BENIGN HYPERTENSION WITH CHRONIC KIDNEY DISEASE, STAGE III (HCC): ICD-10-CM

## 2025-03-14 DIAGNOSIS — Z87.19 HISTORY OF GI BLEED: ICD-10-CM

## 2025-03-14 DIAGNOSIS — J18.9 PNEUMONIA OF BOTH LUNGS DUE TO INFECTIOUS ORGANISM, UNSPECIFIED PART OF LUNG: Primary | ICD-10-CM

## 2025-03-14 PROCEDURE — 99309 SBSQ NF CARE MODERATE MDM 30: CPT

## 2025-03-22 PROBLEM — G62.9 POLYNEUROPATHY, UNSPECIFIED: Status: ACTIVE | Noted: 2018-12-03

## 2025-03-22 NOTE — ASSESSMENT & PLAN NOTE
Continue amlodipine  May be able to discontinue in the future if blood pressures remain controlled and to decrease number of medications  Would rather have slightly elevated blood pressures rather than patient becoming hypotensive and fall  Continue to monitor for acute changes in patient condition

## 2025-03-22 NOTE — PROGRESS NOTES
[facility]  [address]  [place] [address]  Facility: [facility]  [facility]  Long Term Care: POS 32    NAME: Raisa Sinha  AGE: 89 y.o. SEX: female    DATE OF ENCOUNTER: 3/14/25    Code status:  No CPR    Assessment and Plan     1. Pneumonia of both lungs due to infectious organism, unspecified part of lung  Assessment & Plan:  Follow-up imaging revealed pneumonia  Plan to restart antibiotics including azithromycin and Augmentin  Continue to wean patient off oxygen to keep sats greater than 88%  Patient currently on room air  Lungs diminished and intermittently with a dry cough  Encourage hydration and Robitussin DM, patient often refusing medications  Continue to monitor for acute changes in patient condition  2. Benign hypertension with chronic kidney disease, stage III (HCC)  Assessment & Plan:  Continue amlodipine  May be able to discontinue in the future if blood pressures remain controlled and to decrease number of medications  Would rather have slightly elevated blood pressures rather than patient becoming hypotensive and fall  Continue to monitor for acute changes in patient condition  3. Chronic deep vein thrombosis (DVT) of proximal vein of both lower extremities (HCC)  Assessment & Plan:  Not currently taking anticoagulation  Continue to monitor for acute changes in patient condition  4. Single subsegmental pulmonary embolism without acute cor pulmonale (HCC)  Assessment & Plan:  Patient no longer on anticoagulation due to history of GI bleeds  Continue to monitor for acute changes in patient condition  5. Venous insufficiency of both lower extremities  Assessment & Plan:  Continue furosemide  No lower extremity edema  Intermittent laboratory monitoring for acute changes in patient condition  6. Severe dementia with mood disturbance, unspecified dementia type (HCC)  Assessment & Plan:  Continue to reorient, redirect, and reassure  patient  Maintain delirium precautions and sleep/wake cycle  Avoid deliriogenic medications  Limit interruptions at night as medically appropriate  Patient continues to require 24/7 assistance with ADLs  Encourage adequate nutrition and hydration  Continue to monitor for acute changes in condition  7. Stage 3a chronic kidney disease (HCC)  Assessment & Plan:  Avoid nephrotoxic medications and hypotension  Intermittent laboratory monitoring with acute changes in patient condition  8. Moderate episode of recurrent major depressive disorder (HCC)  Assessment & Plan:  Continue Zoloft  Care in collaboration with psychiatry service at facility  Continue to monitor for acute changes in patient condition and mood  9. Ambulatory dysfunction  Assessment & Plan:  Maintain fall and safety precautions  Encourage use of call bell and asst devices  Continue PT/OT services  Assist with transfers, mobility, and ADLs as needed  Continue to monitor for acute changes in condition  10. DNR (do not resuscitate)  11. Frailty syndrome in geriatric patient  Assessment & Plan:  Secondary to chronic medical conditions  Requires 24/7 care and support at long-term care facility  Continue supportive care and ADL assistance  Management of acute and chronic medical conditions as outlined  12. History of GI bleed  Assessment & Plan:  Chart review shows this is the reason behind discontinuing anticoagulation therapy  Intermittent laboratory monitoring to assess hemoglobin as necessary  Continue to monitor for acute changes in patient condition      All medications and routine orders were reviewed and updated as needed.    Plan discussed with: Patient and Nurse    Chief Complaint     Follow-up of chronic conditions    History of Present Illness     Patient is being seen today to follow-up on chronic medical conditions in collaboration with nursing staff.  Overall patient is doing well and remains stable.  She was recently treated with antibiotics for  "pneumonia.  Upon repeat x-ray imaging, patient still has pneumonia.  Plan to restart another course of antibiotics.  Patient reports that she only coughs due to a \"tickle\" in the back of her throat, but otherwise she feels good.  Staff reports that she is often refusing medications.  Documentation shows that she intermittently has a good appetite and is having regular bowel movements.    The following portions of the patient's history were reviewed and updated as appropriate: current medications, past family history, past medical history, past social history, past surgical history and problem list.    Allergies:  No Known Allergies    Review of Systems     Review of Systems   Respiratory:  Positive for cough.        Medications and orders     All medications reviewed and updated in [facility] EMR.      Objective     Vitals: /76, Temp 98.2 °F, HR 84, RR 20, O2 91% on room air, weight 136.1 pounds    Physical Exam  Vitals and nursing note reviewed.   Constitutional:       General: She is awake. She is not in acute distress.     Appearance: Normal appearance. She is well-developed and well-groomed. She is not ill-appearing or diaphoretic.      Interventions: Nasal cannula in place.   HENT:      Head: Normocephalic and atraumatic.   Cardiovascular:      Rate and Rhythm: Normal rate and regular rhythm.      Heart sounds: Normal heart sounds, S1 normal and S2 normal. No murmur heard.  Pulmonary:      Effort: Pulmonary effort is normal. No respiratory distress.      Breath sounds: Decreased breath sounds present. No wheezing, rhonchi or rales.      Comments: Dry cough  Musculoskeletal:         General: No deformity or signs of injury.      Right lower leg: No edema.      Left lower leg: No edema.   Skin:     General: Skin is warm and dry.   Neurological:      Mental Status: She is alert. Mental status is at baseline.      Motor: Weakness present.      Gait: Gait abnormal.   Psychiatric:         Attention and " "Perception: Attention and perception normal.         Mood and Affect: Mood and affect normal.         Speech: Speech normal.         Behavior: Behavior normal. Behavior is cooperative.       Pertinent Laboratory/Diagnostic Studies:     All labs and studies were reviewed please see chart or hospital paperwork for details.    Portions of the record may have been created with voice recognition software.  Occasional wrong word or \"sound a like\" substitutions may have occurred due to the inherent limitations of voice recognition software.  Read the chart carefully and recognize, using context, where substitutions have occurred.    KERON Ferreira  Geriatric Medicine  3/14/25  "

## 2025-03-22 NOTE — ASSESSMENT & PLAN NOTE
Avoid nephrotoxic medications and hypotension  Intermittent laboratory monitoring with acute changes in patient condition

## 2025-03-22 NOTE — ASSESSMENT & PLAN NOTE
Follow-up imaging revealed pneumonia  Plan to restart antibiotics including azithromycin and Augmentin  Continue to wean patient off oxygen to keep sats greater than 88%  Patient currently on room air  Lungs diminished and intermittently with a dry cough  Encourage hydration and Robitussin DM, patient often refusing medications  Continue to monitor for acute changes in patient condition

## 2025-03-22 NOTE — ASSESSMENT & PLAN NOTE
Continue to reorient, redirect, and reassure patient  Maintain delirium precautions and sleep/wake cycle  Avoid deliriogenic medications  Limit interruptions at night as medically appropriate  Patient continues to require 24/7 assistance with ADLs  Encourage adequate nutrition and hydration  Continue to monitor for acute changes in condition

## 2025-03-22 NOTE — ASSESSMENT & PLAN NOTE
Chart review shows this is the reason behind discontinuing anticoagulation therapy  Intermittent laboratory monitoring to assess hemoglobin as necessary  Continue to monitor for acute changes in patient condition

## 2025-03-22 NOTE — ASSESSMENT & PLAN NOTE
Continue Zoloft  Care in collaboration with psychiatry service at facility  Continue to monitor for acute changes in patient condition and mood

## 2025-03-22 NOTE — ASSESSMENT & PLAN NOTE
Maintain fall and safety precautions  Encourage use of call bell and asst devices  Continue PT/OT services  Assist with transfers, mobility, and ADLs as needed  Continue to monitor for acute changes in condition   3 4

## 2025-03-22 NOTE — ASSESSMENT & PLAN NOTE
Patient no longer on anticoagulation due to history of GI bleeds  Continue to monitor for acute changes in patient condition

## 2025-03-22 NOTE — ASSESSMENT & PLAN NOTE
Continue furosemide  No lower extremity edema  Intermittent laboratory monitoring for acute changes in patient condition

## 2025-03-23 PROBLEM — J18.9 PNEUMONIA: Status: RESOLVED | Noted: 2025-02-21 | Resolved: 2025-03-23

## 2025-05-15 ENCOUNTER — NURSING HOME VISIT (OUTPATIENT)
Dept: GERIATRICS | Facility: OTHER | Age: OVER 89
End: 2025-05-15
Payer: COMMERCIAL

## 2025-05-15 DIAGNOSIS — N18.30 BENIGN HYPERTENSION WITH CHRONIC KIDNEY DISEASE, STAGE III (HCC): Primary | ICD-10-CM

## 2025-05-15 DIAGNOSIS — I12.9 BENIGN HYPERTENSION WITH CHRONIC KIDNEY DISEASE, STAGE III (HCC): Primary | ICD-10-CM

## 2025-05-15 DIAGNOSIS — F03.C3 SEVERE DEMENTIA WITH MOOD DISTURBANCE, UNSPECIFIED DEMENTIA TYPE (HCC): ICD-10-CM

## 2025-05-15 DIAGNOSIS — F33.1 MODERATE EPISODE OF RECURRENT MAJOR DEPRESSIVE DISORDER (HCC): ICD-10-CM

## 2025-05-15 DIAGNOSIS — R54 FRAILTY SYNDROME IN GERIATRIC PATIENT: ICD-10-CM

## 2025-05-15 DIAGNOSIS — Z66 DNR (DO NOT RESUSCITATE): ICD-10-CM

## 2025-05-15 PROCEDURE — 99309 SBSQ NF CARE MODERATE MDM 30: CPT | Performed by: INTERNAL MEDICINE

## 2025-06-30 ENCOUNTER — NURSING HOME VISIT (OUTPATIENT)
Dept: GERIATRICS | Facility: OTHER | Age: OVER 89
End: 2025-06-30
Payer: COMMERCIAL

## 2025-06-30 DIAGNOSIS — L25.9 CONTACT DERMATITIS, UNSPECIFIED CONTACT DERMATITIS TYPE, UNSPECIFIED TRIGGER: Primary | ICD-10-CM

## 2025-06-30 PROCEDURE — 99308 SBSQ NF CARE LOW MDM 20: CPT

## 2025-06-30 NOTE — ASSESSMENT & PLAN NOTE
Patient has an area of irritation on her left lateral foot  Area is erythematous, about the size of a quarter  Intermittent pruritus and pain  No openings or drainage  Order for triamcinolone ointment twice daily for 14 days  Keep open to air while patient is in bed for healing  Irritation may have occurred from socks and/or shoes rubbing  Continue to monitor for acute changes in patient condition

## 2025-06-30 NOTE — PROGRESS NOTES
[facility]  [address]  [place] [address]  Facility: [facility]  [facility]  Long Term Care: POS 32    NAME: Raisa Sinha  AGE: 89 y.o. SEX: female    DATE OF ENCOUNTER: 6/30/25    Code status:  No CPR    Assessment and Plan     1. Contact dermatitis, unspecified contact dermatitis type, unspecified trigger  Assessment & Plan:  Patient has an area of irritation on her left lateral foot  Area is erythematous, about the size of a quarter  Intermittent pruritus and pain  No openings or drainage  Order for triamcinolone ointment twice daily for 14 days  Keep open to air while patient is in bed for healing  Irritation may have occurred from socks and/or shoes rubbing  Continue to monitor for acute changes in patient condition      All medications and routine orders were reviewed and updated as needed.    Plan discussed with: Patient and Nurse    Chief Complaint     Acute visit    History of Present Illness     Staff reports patient has a rash on the left lateral foot.  On exam, there is a small area of erythema and patient complains of pruritus and pain intermittently.    The following portions of the patient's history were reviewed and updated as appropriate: current medications, past family history, past medical history, past social history, past surgical history and problem list.    Allergies:  Allergies[1]    Review of Systems     Review of Systems   Unable to perform ROS: Dementia       Medications and orders     All medications reviewed and updated in [facility] EMR.      Objective     Physical Exam  Vitals and nursing note reviewed.   Constitutional:       General: She is awake. She is not in acute distress.     Appearance: Normal appearance. She is well-groomed. She is not ill-appearing or diaphoretic.   HENT:      Head: Normocephalic and atraumatic.   Pulmonary:      Effort: Pulmonary effort is normal.     Skin:     General: Skin is  "warm and dry.      Findings: Erythema and rash present.      Comments: Left lateral foot erythema     Neurological:      Mental Status: She is alert. Mental status is at baseline.      Motor: Weakness present.      Gait: Gait abnormal.     Psychiatric:         Attention and Perception: Attention and perception normal.         Mood and Affect: Mood and affect normal.         Speech: Speech normal.         Behavior: Behavior normal. Behavior is cooperative.       Portions of the record may have been created with voice recognition software.  Occasional wrong word or \"sound a like\" substitutions may have occurred due to the inherent limitations of voice recognition software.  Read the chart carefully and recognize, using context, where substitutions have occurred.    KERON Ferreira  Geriatric Medicine  6/30/2025       [1] No Known Allergies    "

## 2025-07-11 ENCOUNTER — NURSING HOME VISIT (OUTPATIENT)
Dept: GERIATRICS | Facility: OTHER | Age: OVER 89
End: 2025-07-11
Payer: COMMERCIAL

## 2025-07-11 DIAGNOSIS — N18.30 BENIGN HYPERTENSION WITH CHRONIC KIDNEY DISEASE, STAGE III (HCC): Primary | ICD-10-CM

## 2025-07-11 DIAGNOSIS — Z66 DNR (DO NOT RESUSCITATE): ICD-10-CM

## 2025-07-11 DIAGNOSIS — F03.C3 SEVERE DEMENTIA WITH MOOD DISTURBANCE, UNSPECIFIED DEMENTIA TYPE (HCC): ICD-10-CM

## 2025-07-11 DIAGNOSIS — I87.2 VENOUS INSUFFICIENCY OF BOTH LOWER EXTREMITIES: ICD-10-CM

## 2025-07-11 DIAGNOSIS — R54 FRAILTY SYNDROME IN GERIATRIC PATIENT: ICD-10-CM

## 2025-07-11 DIAGNOSIS — I12.9 BENIGN HYPERTENSION WITH CHRONIC KIDNEY DISEASE, STAGE III (HCC): Primary | ICD-10-CM

## 2025-07-11 DIAGNOSIS — Z71.89 GOALS OF CARE, COUNSELING/DISCUSSION: ICD-10-CM

## 2025-07-11 DIAGNOSIS — I82.5Y3 CHRONIC DEEP VEIN THROMBOSIS (DVT) OF PROXIMAL VEIN OF BOTH LOWER EXTREMITIES (HCC): ICD-10-CM

## 2025-07-11 DIAGNOSIS — F33.1 MODERATE EPISODE OF RECURRENT MAJOR DEPRESSIVE DISORDER (HCC): ICD-10-CM

## 2025-07-11 PROCEDURE — 99310 SBSQ NF CARE HIGH MDM 45: CPT | Performed by: INTERNAL MEDICINE

## 2025-07-11 NOTE — PROGRESS NOTES
[facility]  [address]  [place] [address]  Facility: [facility]  [facility]  32  Follow-up visit    NAME: Raisa Sinha  AGE: 89 y.o. SEX: female    DATE OF ENCOUNTER: May 15, 2025.    Code status:  DNR    Assessment and Plan     1. Benign hypertension with chronic kidney disease, stage III (HCC)  Assessment & Plan:  Her blood pressure is doing well with amlodipine  Will continue with this care plan  Will continue with monitoring for change in her condition  2. Frailty syndrome in geriatric patient  Assessment & Plan:  Secondary to her chronic medical conditions  She continues to require 24/7 care/support of her ADLs  I recommend continued care/support of her ADLs as a long-term resident at the nursing facility  Will continue to monitor for change in her condition  3. Moderate episode of recurrent major depressive disorder (HCC)  Assessment & Plan:  She continues on sertraline 100 mg daily in collaboration with the facility psychiatry service  Will continue with monitoring for change in her condition  4. Severe dementia with mood disturbance, unspecified dementia type (HCC)  Assessment & Plan:  Will continue to provide a safe, secure, structured, and supportive environment at the nursing facility  Will continue with 24/7 care/support of her ADLs at the nursing facility  Will continue her care in collaboration with the facility psychiatry service  Will continue with monitoring for change in her condition  5. DNR (do not resuscitate)      All medications and routine orders were reviewed and updated as needed.    Plan discussed with: Nursing staff.    Chief Complaint     She is seen for a follow-up visit update the care and treatment of her chronic medical conditions.    History of Present Illness     She is a pleasant 89-year-old woman who is seen with nursing staff for a follow-up visit update the care treatment of her chronic medical  "conditions, including hypertension, frailty secondary to her chronic medical conditions, and severe dementia.    The following portions of the patient's history were reviewed and updated as appropriate: current medications, past family history, past medical history, past social history, past surgical history and problem list.    Allergies:  Allergies[1]    Review of Systems     Review of Systems   Unable to perform ROS: Dementia       Medications and orders     All medications reviewed and updated in [facility] EMR.      Objective     Vitals: Recent vitals: Weight 138 pounds, pulse 77, blood pressure 152/73.    Physical Exam  Vitals reviewed. Exam conducted with a chaperone present.   Constitutional:       General: She is awake. She is not in acute distress.     Appearance: She is well-developed. She is not toxic-appearing or diaphoretic.      Comments: She appears comfortable, younger than her stated age, and frail.  She does not initiate conversation, but responds to interaction.     Neurological:      Mental Status: She is alert.     Psychiatric:         Behavior: Behavior is cooperative.       - Her order summary was reviewed and signed.      Portions of the record may have been created with voice recognition software.  Occasional wrong word or \"sound a like\" substitutions may have occurred due to the inherent limitations of voice recognition software.  Read the chart carefully and recognize, using context, where substitutions have occurred.    Demian Champion M.D.             [1] No Known Allergies    "

## 2025-07-11 NOTE — ASSESSMENT & PLAN NOTE
She continues on sertraline 100 mg daily in collaboration with the facility psychiatry service  Will continue with monitoring for change in her condition

## 2025-07-11 NOTE — ASSESSMENT & PLAN NOTE
Will continue to provide a safe, secure, structured, and supportive environment at the nursing facility  Will continue with 24/7 care/support of her ADLs at the nursing facility  Will continue her care in collaboration with the facility psychiatry service  Will continue with monitoring for change in her condition

## 2025-07-11 NOTE — ASSESSMENT & PLAN NOTE
Her blood pressure is doing well with amlodipine  Will continue with this care plan  Will continue with monitoring for change in her condition

## 2025-07-21 NOTE — ASSESSMENT & PLAN NOTE
As she is not taking her medication on a regular basis and in shared decision making with her son, we will taper her off her sertraline  Will monitor her mood and address, if needed

## 2025-07-21 NOTE — ASSESSMENT & PLAN NOTE
As she is not taking her amlodipine on a regular basis and after reviewing her BP log, we will discontinue her amlodipine and monitor her blood pressure  See goals of care for further discussion

## 2025-07-21 NOTE — ASSESSMENT & PLAN NOTE
As previously care plan, she is not on anticoagulation  Will continue with monitoring for change in her condition

## 2025-07-21 NOTE — ASSESSMENT & PLAN NOTE
I spoke with the patient's son on the phone during my visit to update him on her care  Informed him that she is not taking her medications on a regular basis and that I would like to discontinue them and monitor the need for them  In shared decision making with her son, we will discontinue her furosemide, amlodipine, and taper off her sertraline  He confirms wanting to continue with the option of having her go to the hospital for care, if needed

## 2025-07-21 NOTE — PROGRESS NOTES
[facility]  [address]  [place] [address]  Facility: [facility]  [place] [facility]  32  Follow-up visit    NAME: Raisa Sinha  AGE: 89 y.o. SEX: female    DATE OF ENCOUNTER: July 11, 2025.    Code status:  DNR    Assessment and Plan     1. Benign hypertension with chronic kidney disease, stage III (HCC)  Assessment & Plan:  As she is not taking her amlodipine on a regular basis and after reviewing her BP log, we will discontinue her amlodipine and monitor her blood pressure  See goals of care for further discussion  2. Moderate episode of recurrent major depressive disorder (HCC)  Assessment & Plan:  As she is not taking her medication on a regular basis and in shared decision making with her son, we will taper her off her sertraline  Will monitor her mood and address, if needed  3. Venous insufficiency of both lower extremities  Assessment & Plan:  As she has not taking her medications on a regular basis and in consultation with her son, we will discontinue her furosemide and monitor her exam and weight for need to reinstitute diuretic therapy  4. Frailty syndrome in geriatric patient  Assessment & Plan:  Secondary to her chronic medical conditions  She continues to require 24/7 care/support of her ADLs  I recommend continued care/support of her ADLs as a long-term resident at the nursing facility  Will continue to monitor for change in her condition  5. Goals of care, counseling/discussion  Assessment & Plan:  I spoke with the patient's son on the phone during my visit to update him on her care  Informed him that she is not taking her medications on a regular basis and that I would like to discontinue them and monitor the need for them  In shared decision making with her son, we will discontinue her furosemide, amlodipine, and taper off her sertraline  He confirms wanting to continue with the option of having her go to the hospital for care, if  needed  6. Severe dementia with mood disturbance, unspecified dementia type (HCC)  Assessment & Plan:  Will continue to provide a safe, secure, structured, and supportive environment at the nursing facility  Will continue 24/7 care/support of her ADLs at the nursing facility  Will continue to monitor for change in her condition  7. Chronic deep vein thrombosis (DVT) of proximal vein of both lower extremities (HCC)  Assessment & Plan:  As previously care plan, she is not on anticoagulation  Will continue with monitoring for change in her condition  8. DNR (do not resuscitate)    See my note of May 15, 2025 for further information.    All medications and routine orders were reviewed and updated as needed.    Plan discussed with: Nursing staff and patient's son, Macario.    I have spent a total time of 50 minutes in caring for this patient on the day of the visit/encounter including Prognosis, Risks and benefits of tx options, Instructions for management, Patient and family education, Impressions, Counseling / Coordination of care, Reviewing/placing orders in the medical record (including tests, medications, and/or procedures), Obtaining or reviewing history  , and Communicating with other healthcare professionals .     Chief Complaint     She is seen for a follow-up visit to update the care and treatment of her chronic medical conditions.    History of Present Illness     She is a pleasant 89-year-old woman who is seen for a follow-up visit to update the care and treatment of her chronic medical conditions, including hypertension, venous insufficiency of her legs, and frailty secondary to her chronic medical conditions.    When asked, she presents no complaints.    Nursing staff reports that she is not adherent to taking her medications on a regular basis.  Reportedly, she told the staff that she is 89 years old and does not need medication.  When asked, she confirms that she does not want to take medication.    The  following portions of the patient's history were reviewed and updated as appropriate: current medications, past family history, past medical history, past social history, past surgical history and problem list.    Allergies:  Allergies[1]    Review of Systems     Review of Systems   Unable to perform ROS: Dementia       Medications and orders     All medications reviewed and updated in [facility] EMR.      Objective     Vitals: Recent vitals: Weight 134.5 pounds (stable for the last 5 months), pulse 67, blood pressure 115/61.    Physical Exam  Vitals reviewed. Exam conducted with a chaperone present.   Constitutional:       General: She is awake. She is not in acute distress.     Appearance: She is well-developed. She is not toxic-appearing or diaphoretic.      Comments: She appears comfortable sitting in her wheelchair, stated age, and frail.     Cardiovascular:      Rate and Rhythm: Normal rate and regular rhythm.      Heart sounds: Normal heart sounds. No murmur heard.     No friction rub. No gallop.   Pulmonary:      Effort: No respiratory distress.      Breath sounds: Normal breath sounds. No stridor. No wheezing, rhonchi or rales.     Musculoskeletal:      Right lower leg: No swelling. No edema.      Left lower leg: No swelling. No edema.      Right foot: Swelling present.      Left foot: Swelling present.      Comments: Bilateral dorsal pedal edema, right greater than left     Neurological:      Mental Status: She is alert.     Psychiatric:         Mood and Affect: Mood normal.         Behavior: Behavior normal. Behavior is cooperative.       Pertinent Laboratory/Diagnostic Studies:     The following labs were reviewed please see chart or hospital paperwork for details.    January 2, 2025:    CMP: Sodium 140, potassium 3.7, BUN 22, creatinine 0.82, fasting blood sugar 90, LFTs WNL    - Her order summary was reviewed and signed.      Portions of the record may have been created with voice recognition  "software.  Occasional wrong word or \"sound a like\" substitutions may have occurred due to the inherent limitations of voice recognition software.  Read the chart carefully and recognize, using context, where substitutions have occurred.    Demian Champion M.D.             [1] No Known Allergies    "

## 2025-07-21 NOTE — ASSESSMENT & PLAN NOTE
As she has not taking her medications on a regular basis and in consultation with her son, we will discontinue her furosemide and monitor her exam and weight for need to reinstitute diuretic therapy

## 2025-08-11 ENCOUNTER — NURSING HOME VISIT (OUTPATIENT)
Dept: GERIATRICS | Facility: OTHER | Age: OVER 89
End: 2025-08-11
Payer: COMMERCIAL

## 2025-08-13 ENCOUNTER — NURSING HOME VISIT (OUTPATIENT)
Dept: GERIATRICS | Facility: OTHER | Age: OVER 89
End: 2025-08-13
Payer: COMMERCIAL

## 2025-08-15 PROBLEM — I82.5Y3 CHRONIC DEEP VEIN THROMBOSIS (DVT) OF PROXIMAL VEIN OF BOTH LOWER EXTREMITIES (HCC): Status: ACTIVE | Noted: 2023-01-28

## 2025-08-15 PROBLEM — J00 ACUTE NASOPHARYNGITIS: Status: RESOLVED | Noted: 2025-02-12 | Resolved: 2025-08-15
